# Patient Record
Sex: FEMALE | Race: WHITE | NOT HISPANIC OR LATINO | Employment: OTHER | URBAN - NONMETROPOLITAN AREA
[De-identification: names, ages, dates, MRNs, and addresses within clinical notes are randomized per-mention and may not be internally consistent; named-entity substitution may affect disease eponyms.]

---

## 2020-07-16 ENCOUNTER — HOSPITAL ENCOUNTER (OUTPATIENT)
Dept: MAMMOGRAPHY | Facility: HOSPITAL | Age: 50
Discharge: HOME | End: 2020-07-16
Admitting: NURSE PRACTITIONER

## 2020-07-16 DIAGNOSIS — Z12.31 VISIT FOR SCREENING MAMMOGRAM: ICD-10-CM

## 2020-07-16 PROCEDURE — 77063 BREAST TOMOSYNTHESIS BI: CPT

## 2020-07-16 PROCEDURE — 77067 SCR MAMMO BI INCL CAD: CPT | Performed by: RADIOLOGY

## 2020-07-16 PROCEDURE — 77063 BREAST TOMOSYNTHESIS BI: CPT | Performed by: RADIOLOGY

## 2020-07-16 PROCEDURE — 77067 SCR MAMMO BI INCL CAD: CPT

## 2021-08-25 ENCOUNTER — APPOINTMENT (OUTPATIENT)
Dept: MAMMOGRAPHY | Facility: HOSPITAL | Age: 51
End: 2021-08-25

## 2021-08-25 ENCOUNTER — APPOINTMENT (OUTPATIENT)
Dept: BONE DENSITY | Facility: HOSPITAL | Age: 51
End: 2021-08-25

## 2021-08-27 ENCOUNTER — HOSPITAL ENCOUNTER (EMERGENCY)
Facility: HOSPITAL | Age: 51
Discharge: HOME | End: 2021-08-27
Attending: EMERGENCY MEDICINE | Admitting: EMERGENCY MEDICINE

## 2021-08-27 ENCOUNTER — APPOINTMENT (OUTPATIENT)
Dept: CT IMAGING | Facility: HOSPITAL | Age: 51
End: 2021-08-27

## 2021-08-27 ENCOUNTER — APPOINTMENT (OUTPATIENT)
Dept: GENERAL RADIOLOGY | Facility: HOSPITAL | Age: 51
End: 2021-08-27

## 2021-08-27 VITALS
OXYGEN SATURATION: 95 % | HEIGHT: 67 IN | SYSTOLIC BLOOD PRESSURE: 122 MMHG | DIASTOLIC BLOOD PRESSURE: 80 MMHG | BODY MASS INDEX: 29.82 KG/M2 | HEART RATE: 92 BPM | RESPIRATION RATE: 18 BRPM | WEIGHT: 190 LBS | TEMPERATURE: 97.9 F

## 2021-08-27 DIAGNOSIS — J12.82 PNEUMONIA DUE TO COVID-19 VIRUS: Primary | ICD-10-CM

## 2021-08-27 DIAGNOSIS — U07.1 PNEUMONIA DUE TO COVID-19 VIRUS: Primary | ICD-10-CM

## 2021-08-27 LAB
A-A DO2: 38.3 MMHG (ref 0–300)
ALBUMIN SERPL-MCNC: 4.32 G/DL (ref 3.5–5.2)
ALBUMIN/GLOB SERPL: 1.5 G/DL
ALP SERPL-CCNC: 166 U/L (ref 39–117)
ALT SERPL W P-5'-P-CCNC: 161 U/L (ref 1–33)
ANION GAP SERPL CALCULATED.3IONS-SCNC: 10 MMOL/L (ref 5–15)
APTT PPP: 29.7 SECONDS (ref 25.5–35.4)
ARTERIAL PATENCY WRIST A: ABNORMAL
AST SERPL-CCNC: 140 U/L (ref 1–32)
ATMOSPHERIC PRESS: 731 MMHG
BASE EXCESS BLDA CALC-SCNC: 2.9 MMOL/L (ref 0–2)
BASOPHILS # BLD AUTO: 0 10*3/MM3 (ref 0–0.2)
BASOPHILS NFR BLD AUTO: 0 % (ref 0–1.5)
BDY SITE: ABNORMAL
BILIRUB SERPL-MCNC: 0.5 MG/DL (ref 0–1.2)
BODY TEMPERATURE: 0 C
BUN SERPL-MCNC: 9 MG/DL (ref 6–20)
BUN/CREAT SERPL: 10.5 (ref 7–25)
CALCIUM SPEC-SCNC: 9.1 MG/DL (ref 8.6–10.5)
CHLORIDE SERPL-SCNC: 103 MMOL/L (ref 98–107)
CO2 BLDA-SCNC: 28.1 MMOL/L (ref 22–33)
CO2 SERPL-SCNC: 28 MMOL/L (ref 22–29)
COHGB MFR BLD: 1 % (ref 0–5)
CREAT SERPL-MCNC: 0.86 MG/DL (ref 0.57–1)
CRP SERPL-MCNC: 2.3 MG/DL (ref 0–0.5)
D DIMER PPP FEU-MCNC: <0.27 MCGFEU/ML (ref 0–0.5)
D-LACTATE SERPL-SCNC: 1.2 MMOL/L (ref 0.5–2)
DEPRECATED RDW RBC AUTO: 44 FL (ref 37–54)
EOSINOPHIL # BLD AUTO: 0 10*3/MM3 (ref 0–0.4)
EOSINOPHIL NFR BLD AUTO: 0 % (ref 0.3–6.2)
ERYTHROCYTE [DISTWIDTH] IN BLOOD BY AUTOMATED COUNT: 12.7 % (ref 12.3–15.4)
FERRITIN SERPL-MCNC: 1207 NG/ML (ref 13–150)
GFR SERPL CREATININE-BSD FRML MDRD: 70 ML/MIN/1.73
GLOBULIN UR ELPH-MCNC: 2.9 GM/DL
GLUCOSE SERPL-MCNC: 89 MG/DL (ref 65–99)
HCO3 BLDA-SCNC: 26.9 MMOL/L (ref 20–26)
HCT VFR BLD AUTO: 38.6 % (ref 34–46.6)
HCT VFR BLD CALC: 38.8 % (ref 38–51)
HGB BLD-MCNC: 12.9 G/DL (ref 12–15.9)
HGB BLDA-MCNC: 12.7 G/DL (ref 13.5–17.5)
HOLD SPECIMEN: NORMAL
HOLD SPECIMEN: NORMAL
IMM GRANULOCYTES # BLD AUTO: 0.01 10*3/MM3 (ref 0–0.05)
IMM GRANULOCYTES NFR BLD AUTO: 0.3 % (ref 0–0.5)
INHALED O2 CONCENTRATION: 21 %
INR PPP: 0.87 (ref 0.9–1.1)
LDH SERPL-CCNC: 392 U/L (ref 135–214)
LYMPHOCYTES # BLD AUTO: 0.52 10*3/MM3 (ref 0.7–3.1)
LYMPHOCYTES NFR BLD AUTO: 15.4 % (ref 19.6–45.3)
Lab: ABNORMAL
MAGNESIUM SERPL-MCNC: 2 MG/DL (ref 1.6–2.6)
MCH RBC QN AUTO: 31.4 PG (ref 26.6–33)
MCHC RBC AUTO-ENTMCNC: 33.4 G/DL (ref 31.5–35.7)
MCV RBC AUTO: 93.9 FL (ref 79–97)
METHGB BLD QL: 0.3 % (ref 0–3)
MODALITY: ABNORMAL
MONOCYTES # BLD AUTO: 0.15 10*3/MM3 (ref 0.1–0.9)
MONOCYTES NFR BLD AUTO: 4.5 % (ref 5–12)
NEUTROPHILS NFR BLD AUTO: 2.69 10*3/MM3 (ref 1.7–7)
NEUTROPHILS NFR BLD AUTO: 79.8 % (ref 42.7–76)
NOTE: ABNORMAL
NRBC BLD AUTO-RTO: 0 /100 WBC (ref 0–0.2)
OXYHGB MFR BLDV: 93.1 % (ref 94–99)
PCO2 BLDA: 38.3 MM HG (ref 35–45)
PCO2 TEMP ADJ BLD: ABNORMAL MM[HG]
PH BLDA: 7.46 PH UNITS (ref 7.35–7.45)
PH, TEMP CORRECTED: ABNORMAL
PLATELET # BLD AUTO: 194 10*3/MM3 (ref 140–450)
PMV BLD AUTO: 9.7 FL (ref 6–12)
PO2 BLDA: 62.1 MM HG (ref 83–108)
PO2 TEMP ADJ BLD: ABNORMAL MM[HG]
POTASSIUM SERPL-SCNC: 3.3 MMOL/L (ref 3.5–5.2)
PROCALCITONIN SERPL-MCNC: 0.1 NG/ML (ref 0–0.25)
PROT SERPL-MCNC: 7.2 G/DL (ref 6–8.5)
PROTHROMBIN TIME: 12.2 SECONDS (ref 12.8–14.5)
RBC # BLD AUTO: 4.11 10*6/MM3 (ref 3.77–5.28)
SAO2 % BLDCOA: 94.3 % (ref 94–99)
SODIUM SERPL-SCNC: 141 MMOL/L (ref 136–145)
TROPONIN T SERPL-MCNC: <0.01 NG/ML (ref 0–0.03)
TROPONIN T SERPL-MCNC: <0.01 NG/ML (ref 0–0.03)
VENTILATOR MODE: ABNORMAL
WBC # BLD AUTO: 3.37 10*3/MM3 (ref 3.4–10.8)
WHOLE BLOOD HOLD SPECIMEN: NORMAL
WHOLE BLOOD HOLD SPECIMEN: NORMAL

## 2021-08-27 PROCEDURE — 83615 LACTATE (LD) (LDH) ENZYME: CPT | Performed by: PHYSICIAN ASSISTANT

## 2021-08-27 PROCEDURE — 71046 X-RAY EXAM CHEST 2 VIEWS: CPT

## 2021-08-27 PROCEDURE — 83050 HGB METHEMOGLOBIN QUAN: CPT

## 2021-08-27 PROCEDURE — 25010000002 CEFTRIAXONE PER 250 MG: Performed by: PHYSICIAN ASSISTANT

## 2021-08-27 PROCEDURE — 83605 ASSAY OF LACTIC ACID: CPT | Performed by: PHYSICIAN ASSISTANT

## 2021-08-27 PROCEDURE — 82728 ASSAY OF FERRITIN: CPT | Performed by: PHYSICIAN ASSISTANT

## 2021-08-27 PROCEDURE — 93005 ELECTROCARDIOGRAM TRACING: CPT | Performed by: PHYSICIAN ASSISTANT

## 2021-08-27 PROCEDURE — 25010000006 INJECTION, CASIRIVIMAB AND IMDEVIMAB, 1200 MG: Performed by: PHYSICIAN ASSISTANT

## 2021-08-27 PROCEDURE — 87040 BLOOD CULTURE FOR BACTERIA: CPT | Performed by: PHYSICIAN ASSISTANT

## 2021-08-27 PROCEDURE — 85379 FIBRIN DEGRADATION QUANT: CPT | Performed by: PHYSICIAN ASSISTANT

## 2021-08-27 PROCEDURE — 63710000001 DEXAMETHASONE PER 0.25 MG: Performed by: PHYSICIAN ASSISTANT

## 2021-08-27 PROCEDURE — 71250 CT THORAX DX C-: CPT | Performed by: RADIOLOGY

## 2021-08-27 PROCEDURE — 84145 PROCALCITONIN (PCT): CPT | Performed by: PHYSICIAN ASSISTANT

## 2021-08-27 PROCEDURE — 85730 THROMBOPLASTIN TIME PARTIAL: CPT | Performed by: PHYSICIAN ASSISTANT

## 2021-08-27 PROCEDURE — 82375 ASSAY CARBOXYHB QUANT: CPT

## 2021-08-27 PROCEDURE — 36600 WITHDRAWAL OF ARTERIAL BLOOD: CPT

## 2021-08-27 PROCEDURE — M0243 CASIRIVI AND IMDEVI INFUSION: HCPCS | Performed by: PHYSICIAN ASSISTANT

## 2021-08-27 PROCEDURE — 85025 COMPLETE CBC W/AUTO DIFF WBC: CPT | Performed by: PHYSICIAN ASSISTANT

## 2021-08-27 PROCEDURE — 86140 C-REACTIVE PROTEIN: CPT | Performed by: PHYSICIAN ASSISTANT

## 2021-08-27 PROCEDURE — 96365 THER/PROPH/DIAG IV INF INIT: CPT

## 2021-08-27 PROCEDURE — 71250 CT THORAX DX C-: CPT

## 2021-08-27 PROCEDURE — 82805 BLOOD GASES W/O2 SATURATION: CPT

## 2021-08-27 PROCEDURE — 83735 ASSAY OF MAGNESIUM: CPT | Performed by: PHYSICIAN ASSISTANT

## 2021-08-27 PROCEDURE — 99284 EMERGENCY DEPT VISIT MOD MDM: CPT

## 2021-08-27 PROCEDURE — 85610 PROTHROMBIN TIME: CPT | Performed by: PHYSICIAN ASSISTANT

## 2021-08-27 PROCEDURE — 84484 ASSAY OF TROPONIN QUANT: CPT | Performed by: PHYSICIAN ASSISTANT

## 2021-08-27 PROCEDURE — 71046 X-RAY EXAM CHEST 2 VIEWS: CPT | Performed by: RADIOLOGY

## 2021-08-27 PROCEDURE — 80053 COMPREHEN METABOLIC PANEL: CPT | Performed by: PHYSICIAN ASSISTANT

## 2021-08-27 RX ORDER — SODIUM CHLORIDE 0.9 % (FLUSH) 0.9 %
10 SYRINGE (ML) INJECTION AS NEEDED
Status: DISCONTINUED | OUTPATIENT
Start: 2021-08-27 | End: 2021-08-27 | Stop reason: HOSPADM

## 2021-08-27 RX ORDER — SODIUM CHLORIDE 9 MG/ML
30 INJECTION, SOLUTION INTRAVENOUS ONCE
Status: COMPLETED | OUTPATIENT
Start: 2021-08-27 | End: 2021-08-27

## 2021-08-27 RX ORDER — DEXAMETHASONE 4 MG/1
6 TABLET ORAL ONCE
Status: COMPLETED | OUTPATIENT
Start: 2021-08-27 | End: 2021-08-27

## 2021-08-27 RX ORDER — DEXAMETHASONE 6 MG/1
6 TABLET ORAL DAILY
Qty: 10 TABLET | Refills: 0 | Status: SHIPPED | OUTPATIENT
Start: 2021-08-27 | End: 2021-09-06

## 2021-08-27 RX ORDER — CEFDINIR 300 MG/1
300 CAPSULE ORAL 2 TIMES DAILY
Qty: 20 CAPSULE | Refills: 0 | Status: SHIPPED | OUTPATIENT
Start: 2021-08-27 | End: 2021-09-06

## 2021-08-27 RX ORDER — ALBUTEROL SULFATE 90 UG/1
2 AEROSOL, METERED RESPIRATORY (INHALATION) EVERY 4 HOURS PRN
Qty: 6.7 G | Refills: 0 | Status: SHIPPED | OUTPATIENT
Start: 2021-08-27 | End: 2023-01-09

## 2021-08-27 RX ORDER — EPINEPHRINE 1 MG/ML
0.3 INJECTION, SOLUTION INTRAMUSCULAR; SUBCUTANEOUS ONCE AS NEEDED
Status: DISCONTINUED | OUTPATIENT
Start: 2021-08-27 | End: 2021-08-27 | Stop reason: HOSPADM

## 2021-08-27 RX ORDER — ALBUTEROL SULFATE 90 UG/1
2 AEROSOL, METERED RESPIRATORY (INHALATION) ONCE
Status: COMPLETED | OUTPATIENT
Start: 2021-08-27 | End: 2021-08-27

## 2021-08-27 RX ORDER — METHYLPREDNISOLONE SODIUM SUCCINATE 125 MG/2ML
125 INJECTION, POWDER, LYOPHILIZED, FOR SOLUTION INTRAMUSCULAR; INTRAVENOUS ONCE AS NEEDED
Status: DISCONTINUED | OUTPATIENT
Start: 2021-08-27 | End: 2021-08-27 | Stop reason: HOSPADM

## 2021-08-27 RX ORDER — DIPHENHYDRAMINE HYDROCHLORIDE 50 MG/ML
50 INJECTION INTRAMUSCULAR; INTRAVENOUS ONCE AS NEEDED
Status: DISCONTINUED | OUTPATIENT
Start: 2021-08-27 | End: 2021-08-27 | Stop reason: HOSPADM

## 2021-08-27 RX ADMIN — CEFTRIAXONE 1 G: 1 INJECTION, POWDER, FOR SOLUTION INTRAMUSCULAR; INTRAVENOUS at 14:39

## 2021-08-27 RX ADMIN — ALBUTEROL SULFATE 2 PUFF: 90 AEROSOL, METERED RESPIRATORY (INHALATION) at 14:40

## 2021-08-27 RX ADMIN — DEXAMETHASONE 6 MG: 4 TABLET ORAL at 14:40

## 2021-08-27 RX ADMIN — CASIRIVIMAB AND IMDEVIMAB: 600; 600 INJECTION, SOLUTION, CONCENTRATE INTRAVENOUS at 15:33

## 2021-08-27 RX ADMIN — SODIUM CHLORIDE 30 ML: 9 INJECTION, SOLUTION INTRAVENOUS at 15:54

## 2021-08-28 LAB
QT INTERVAL: 312 MS
QTC INTERVAL: 404 MS

## 2021-09-01 LAB
BACTERIA SPEC AEROBE CULT: NORMAL
BACTERIA SPEC AEROBE CULT: NORMAL

## 2021-09-10 ENCOUNTER — APPOINTMENT (OUTPATIENT)
Dept: CT IMAGING | Facility: HOSPITAL | Age: 51
End: 2021-09-10

## 2021-09-10 ENCOUNTER — HOSPITAL ENCOUNTER (EMERGENCY)
Facility: HOSPITAL | Age: 51
Discharge: HOME | End: 2021-09-10
Attending: EMERGENCY MEDICINE | Admitting: EMERGENCY MEDICINE

## 2021-09-10 ENCOUNTER — APPOINTMENT (OUTPATIENT)
Dept: GENERAL RADIOLOGY | Facility: HOSPITAL | Age: 51
End: 2021-09-10

## 2021-09-10 ENCOUNTER — APPOINTMENT (OUTPATIENT)
Dept: ULTRASOUND IMAGING | Facility: HOSPITAL | Age: 51
End: 2021-09-10

## 2021-09-10 VITALS
WEIGHT: 190 LBS | RESPIRATION RATE: 16 BRPM | TEMPERATURE: 98.6 F | BODY MASS INDEX: 29.82 KG/M2 | OXYGEN SATURATION: 98 % | HEIGHT: 67 IN | HEART RATE: 59 BPM | DIASTOLIC BLOOD PRESSURE: 58 MMHG | SYSTOLIC BLOOD PRESSURE: 92 MMHG

## 2021-09-10 DIAGNOSIS — U07.1 COVID-19 VIRUS INFECTION: Primary | ICD-10-CM

## 2021-09-10 LAB
ALBUMIN SERPL-MCNC: 3.44 G/DL (ref 3.5–5.2)
ALBUMIN/GLOB SERPL: 1.8 G/DL
ALP SERPL-CCNC: 126 U/L (ref 39–117)
ALT SERPL W P-5'-P-CCNC: 206 U/L (ref 1–33)
ANION GAP SERPL CALCULATED.3IONS-SCNC: 10.5 MMOL/L (ref 5–15)
AST SERPL-CCNC: 53 U/L (ref 1–32)
BACTERIA UR QL AUTO: ABNORMAL /HPF
BASOPHILS # BLD AUTO: 0 10*3/MM3 (ref 0–0.2)
BASOPHILS NFR BLD AUTO: 0 % (ref 0–1.5)
BILIRUB SERPL-MCNC: 0.6 MG/DL (ref 0–1.2)
BILIRUB UR QL STRIP: ABNORMAL
BUN SERPL-MCNC: 17 MG/DL (ref 6–20)
BUN/CREAT SERPL: 22.1 (ref 7–25)
CALCIUM SPEC-SCNC: 8.4 MG/DL (ref 8.6–10.5)
CHLORIDE SERPL-SCNC: 106 MMOL/L (ref 98–107)
CLARITY UR: CLEAR
CO2 SERPL-SCNC: 23.5 MMOL/L (ref 22–29)
COD CRY URNS QL: ABNORMAL /HPF
COLOR UR: ABNORMAL
CREAT SERPL-MCNC: 0.77 MG/DL (ref 0.57–1)
CRP SERPL-MCNC: <0.3 MG/DL (ref 0–0.5)
D DIMER PPP FEU-MCNC: 0.9 MCGFEU/ML (ref 0–0.5)
DEPRECATED RDW RBC AUTO: 46 FL (ref 37–54)
EOSINOPHIL # BLD AUTO: 0.03 10*3/MM3 (ref 0–0.4)
EOSINOPHIL NFR BLD AUTO: 0.5 % (ref 0.3–6.2)
ERYTHROCYTE [DISTWIDTH] IN BLOOD BY AUTOMATED COUNT: 13.4 % (ref 12.3–15.4)
FERRITIN SERPL-MCNC: 508.1 NG/ML (ref 13–150)
GFR SERPL CREATININE-BSD FRML MDRD: 79 ML/MIN/1.73
GLOBULIN UR ELPH-MCNC: 2 GM/DL
GLUCOSE BLDC GLUCOMTR-MCNC: 84 MG/DL (ref 70–130)
GLUCOSE SERPL-MCNC: 84 MG/DL (ref 65–99)
GLUCOSE UR STRIP-MCNC: NEGATIVE MG/DL
HCT VFR BLD AUTO: 36.7 % (ref 34–46.6)
HGB BLD-MCNC: 12 G/DL (ref 12–15.9)
HGB UR QL STRIP.AUTO: NEGATIVE
HOLD SPECIMEN: NORMAL
HOLD SPECIMEN: NORMAL
HYALINE CASTS UR QL AUTO: ABNORMAL /LPF
IMM GRANULOCYTES # BLD AUTO: 0.04 10*3/MM3 (ref 0–0.05)
IMM GRANULOCYTES NFR BLD AUTO: 0.6 % (ref 0–0.5)
KETONES UR QL STRIP: ABNORMAL
LDH SERPL-CCNC: 256 U/L (ref 135–214)
LEUKOCYTE ESTERASE UR QL STRIP.AUTO: ABNORMAL
LYMPHOCYTES # BLD AUTO: 1.13 10*3/MM3 (ref 0.7–3.1)
LYMPHOCYTES NFR BLD AUTO: 17.2 % (ref 19.6–45.3)
MAGNESIUM SERPL-MCNC: 2.1 MG/DL (ref 1.6–2.6)
MCH RBC QN AUTO: 31.2 PG (ref 26.6–33)
MCHC RBC AUTO-ENTMCNC: 32.7 G/DL (ref 31.5–35.7)
MCV RBC AUTO: 95.3 FL (ref 79–97)
MONOCYTES # BLD AUTO: 0.46 10*3/MM3 (ref 0.1–0.9)
MONOCYTES NFR BLD AUTO: 7 % (ref 5–12)
NEUTROPHILS NFR BLD AUTO: 4.9 10*3/MM3 (ref 1.7–7)
NEUTROPHILS NFR BLD AUTO: 74.7 % (ref 42.7–76)
NITRITE UR QL STRIP: NEGATIVE
NRBC BLD AUTO-RTO: 0 /100 WBC (ref 0–0.2)
PH UR STRIP.AUTO: 5.5 [PH] (ref 5–8)
PLATELET # BLD AUTO: 222 10*3/MM3 (ref 140–450)
PMV BLD AUTO: 10.5 FL (ref 6–12)
POTASSIUM SERPL-SCNC: 4 MMOL/L (ref 3.5–5.2)
PROT SERPL-MCNC: 5.4 G/DL (ref 6–8.5)
PROT UR QL STRIP: NEGATIVE
RBC # BLD AUTO: 3.85 10*6/MM3 (ref 3.77–5.28)
RBC # UR: ABNORMAL /HPF
REF LAB TEST METHOD: ABNORMAL
SODIUM SERPL-SCNC: 140 MMOL/L (ref 136–145)
SP GR UR STRIP: >1.03 (ref 1–1.03)
SQUAMOUS #/AREA URNS HPF: ABNORMAL /HPF
UROBILINOGEN UR QL STRIP: ABNORMAL
WBC # BLD AUTO: 6.56 10*3/MM3 (ref 3.4–10.8)
WBC UR QL AUTO: ABNORMAL /HPF
WHOLE BLOOD HOLD SPECIMEN: NORMAL
WHOLE BLOOD HOLD SPECIMEN: NORMAL

## 2021-09-10 PROCEDURE — 70450 CT HEAD/BRAIN W/O DYE: CPT

## 2021-09-10 PROCEDURE — 93010 ELECTROCARDIOGRAM REPORT: CPT | Performed by: INTERNAL MEDICINE

## 2021-09-10 PROCEDURE — 85025 COMPLETE CBC W/AUTO DIFF WBC: CPT | Performed by: NURSE PRACTITIONER

## 2021-09-10 PROCEDURE — 80053 COMPREHEN METABOLIC PANEL: CPT | Performed by: NURSE PRACTITIONER

## 2021-09-10 PROCEDURE — 71045 X-RAY EXAM CHEST 1 VIEW: CPT | Performed by: RADIOLOGY

## 2021-09-10 PROCEDURE — 70360 X-RAY EXAM OF NECK: CPT

## 2021-09-10 PROCEDURE — 96374 THER/PROPH/DIAG INJ IV PUSH: CPT

## 2021-09-10 PROCEDURE — 70490 CT SOFT TISSUE NECK W/O DYE: CPT | Performed by: RADIOLOGY

## 2021-09-10 PROCEDURE — 96375 TX/PRO/DX INJ NEW DRUG ADDON: CPT

## 2021-09-10 PROCEDURE — 82962 GLUCOSE BLOOD TEST: CPT

## 2021-09-10 PROCEDURE — 81001 URINALYSIS AUTO W/SCOPE: CPT | Performed by: NURSE PRACTITIONER

## 2021-09-10 PROCEDURE — 83735 ASSAY OF MAGNESIUM: CPT | Performed by: NURSE PRACTITIONER

## 2021-09-10 PROCEDURE — 71045 X-RAY EXAM CHEST 1 VIEW: CPT

## 2021-09-10 PROCEDURE — 70450 CT HEAD/BRAIN W/O DYE: CPT | Performed by: RADIOLOGY

## 2021-09-10 PROCEDURE — 70490 CT SOFT TISSUE NECK W/O DYE: CPT

## 2021-09-10 PROCEDURE — 93005 ELECTROCARDIOGRAM TRACING: CPT | Performed by: NURSE PRACTITIONER

## 2021-09-10 PROCEDURE — 83615 LACTATE (LD) (LDH) ENZYME: CPT | Performed by: NURSE PRACTITIONER

## 2021-09-10 PROCEDURE — 99284 EMERGENCY DEPT VISIT MOD MDM: CPT

## 2021-09-10 PROCEDURE — 86140 C-REACTIVE PROTEIN: CPT | Performed by: NURSE PRACTITIONER

## 2021-09-10 PROCEDURE — 0 IOPAMIDOL PER 1 ML: Performed by: EMERGENCY MEDICINE

## 2021-09-10 PROCEDURE — 82728 ASSAY OF FERRITIN: CPT | Performed by: NURSE PRACTITIONER

## 2021-09-10 PROCEDURE — 25010000002 KETOROLAC TROMETHAMINE PER 15 MG: Performed by: NURSE PRACTITIONER

## 2021-09-10 PROCEDURE — 71275 CT ANGIOGRAPHY CHEST: CPT

## 2021-09-10 PROCEDURE — 85379 FIBRIN DEGRADATION QUANT: CPT | Performed by: NURSE PRACTITIONER

## 2021-09-10 PROCEDURE — 71275 CT ANGIOGRAPHY CHEST: CPT | Performed by: RADIOLOGY

## 2021-09-10 PROCEDURE — 93970 EXTREMITY STUDY: CPT | Performed by: RADIOLOGY

## 2021-09-10 PROCEDURE — 70360 X-RAY EXAM OF NECK: CPT | Performed by: RADIOLOGY

## 2021-09-10 PROCEDURE — P9612 CATHETERIZE FOR URINE SPEC: HCPCS

## 2021-09-10 PROCEDURE — 93970 EXTREMITY STUDY: CPT

## 2021-09-10 RX ORDER — KETOROLAC TROMETHAMINE 30 MG/ML
30 INJECTION, SOLUTION INTRAMUSCULAR; INTRAVENOUS EVERY 6 HOURS PRN
Status: DISCONTINUED | OUTPATIENT
Start: 2021-09-10 | End: 2021-09-10 | Stop reason: HOSPADM

## 2021-09-10 RX ORDER — PANTOPRAZOLE SODIUM 40 MG/10ML
40 INJECTION, POWDER, LYOPHILIZED, FOR SOLUTION INTRAVENOUS ONCE
Status: COMPLETED | OUTPATIENT
Start: 2021-09-10 | End: 2021-09-10

## 2021-09-10 RX ORDER — SODIUM CHLORIDE 0.9 % (FLUSH) 0.9 %
10 SYRINGE (ML) INJECTION AS NEEDED
Status: DISCONTINUED | OUTPATIENT
Start: 2021-09-10 | End: 2021-09-10 | Stop reason: HOSPADM

## 2021-09-10 RX ADMIN — KETOROLAC TROMETHAMINE 30 MG: 30 INJECTION, SOLUTION INTRAMUSCULAR; INTRAVENOUS at 18:53

## 2021-09-10 RX ADMIN — SODIUM CHLORIDE 1000 ML: 9 INJECTION, SOLUTION INTRAVENOUS at 18:53

## 2021-09-10 RX ADMIN — PANTOPRAZOLE SODIUM 40 MG: 40 INJECTION, POWDER, FOR SOLUTION INTRAVENOUS at 18:53

## 2021-09-10 RX ADMIN — IOPAMIDOL 70 ML: 755 INJECTION, SOLUTION INTRAVENOUS at 18:13

## 2021-09-10 NOTE — ED PROVIDER NOTES
Subjective   Patient is a 51  Year old female presenting to the clinic with dizziness. Patient states she was dx with COVID-19 3 weeks ago was given antibodies 2 weeks ago and has been doing great until last night. She was brushing her teeth and the room began to spin. She then started having bilateral leg pain and wheezing in her chest. Denies SOA but reports wheezing. Patient denies chest pain, sob, abd pain/n/v/d or any additional symptoms.           Review of Systems   Constitutional: Negative.    HENT: Negative.    Respiratory: Negative.    Cardiovascular: Negative.    Gastrointestinal: Negative.    Endocrine: Negative.    Genitourinary: Negative.    Musculoskeletal: Positive for myalgias.   Skin: Negative.    Allergic/Immunologic: Negative.    Neurological: Positive for dizziness.   Hematological: Negative.    Psychiatric/Behavioral: Negative.    All other systems reviewed and are negative.      Past Medical History:   Diagnosis Date   • Gallbladder attack    • GERD (gastroesophageal reflux disease)    • History of ear infections        Allergies   Allergen Reactions   • Keflex [Cephalexin] Hives       Past Surgical History:   Procedure Laterality Date   • CHOLECYSTECTOMY     • MIDDLE EAR SURGERY         Family History   Problem Relation Age of Onset   • Cancer Mother    • Cancer Father    • Cancer Sister    • Cancer Brother    • Cancer Maternal Grandmother    • Cancer Maternal Grandfather    • Breast cancer Neg Hx        Social History     Socioeconomic History   • Marital status:      Spouse name: Not on file   • Number of children: Not on file   • Years of education: Not on file   • Highest education level: Not on file   Tobacco Use   • Smoking status: Current Every Day Smoker   Substance and Sexual Activity   • Alcohol use: No   • Drug use: No           Objective   Physical Exam  Vitals and nursing note reviewed.   Constitutional:       Appearance: Normal appearance. She is normal weight.   HENT:       Head: Normocephalic.      Nose: Nose normal.      Mouth/Throat:      Mouth: Mucous membranes are dry.      Pharynx: Oropharynx is clear.   Eyes:      Extraocular Movements: Extraocular movements intact.      Conjunctiva/sclera: Conjunctivae normal.      Pupils: Pupils are equal, round, and reactive to light.   Cardiovascular:      Rate and Rhythm: Normal rate.      Pulses: Normal pulses.      Heart sounds: Normal heart sounds.   Pulmonary:      Effort: Pulmonary effort is normal.      Breath sounds: Normal breath sounds.   Abdominal:      General: Bowel sounds are normal.      Palpations: Abdomen is soft.   Musculoskeletal:         General: Normal range of motion.      Cervical back: Normal range of motion and neck supple.   Skin:     General: Skin is warm and dry.      Capillary Refill: Capillary refill takes less than 2 seconds.   Neurological:      General: No focal deficit present.      Mental Status: She is alert and oriented to person, place, and time. Mental status is at baseline.   Psychiatric:         Mood and Affect: Mood normal.         Behavior: Behavior normal.         Thought Content: Thought content normal.         Judgment: Judgment normal.         Procedures           ED Course  ED Course as of Sep 11 2208   Fri Sep 10, 2021   1823 Patient c/o sensation in her throat that something is stuck. Denies soa or difficulty breathing.     [SM]   2016 Endorsed to ANURAG Altamirano at shift change.     [SM]   2051 Normal sinus rhythm.  Rate 61.  Normal axis.  Normal QT interval.  Q waves in lead III and V1.  Nonspecific ST-T wave changes.  No acute ST elevation or depression.  Abnormal EKG.  Interpreted by me.   ECG 12 Lead [BC]      ED Course User Index  [BC] Pranay Villaseñor MD  [SM] Lavern Manuel, APRN      CT Soft Tissue Neck Without Contrast   Final Result   No foreign bodies identified on today's study        This report was finalized on 9/10/2021 8:17 PM by Dr. Edmond Vasquez MD.          XR Neck  Soft Tissue   Final Result      CT Chest Pulmonary Embolism   Final Result   1. No evidence of a pulmonary embolus   2. Few groundglass opacities bilaterally, possibly representing Covid   pneumonia.       This report was finalized on 9/10/2021 6:17 PM by Dr. Edmond Vasquez MD.          US Venous Doppler Lower Extremity Bilateral (duplex)   Final Result   No DVT in the lower extremities on today's exam.        This report was finalized on 9/10/2021 5:59 PM by Dr. Edmond Vasquez MD.          XR Chest 1 View   Final Result   No evidence of active or acute cardiopulmonary disease on today's chest   radiograph.       This report was finalized on 9/10/2021 4:30 PM by Dr. Edmond Vasquez MD.          CT Head Without Contrast   Final Result   No acute intracranial pathology. Nothing is seen on this exam to   specifically account for the patient's symptoms.       This report was finalized on 9/10/2021 4:16 PM by Dr. Edmond Vasquez MD.                Results for orders placed or performed during the hospital encounter of 09/10/21   Comprehensive Metabolic Panel    Specimen: Blood   Result Value Ref Range    Glucose 84 65 - 99 mg/dL    BUN 17 6 - 20 mg/dL    Creatinine 0.77 0.57 - 1.00 mg/dL    Sodium 140 136 - 145 mmol/L    Potassium 4.0 3.5 - 5.2 mmol/L    Chloride 106 98 - 107 mmol/L    CO2 23.5 22.0 - 29.0 mmol/L    Calcium 8.4 (L) 8.6 - 10.5 mg/dL    Total Protein 5.4 (L) 6.0 - 8.5 g/dL    Albumin 3.44 (L) 3.50 - 5.20 g/dL    ALT (SGPT) 206 (H) 1 - 33 U/L    AST (SGOT) 53 (H) 1 - 32 U/L    Alkaline Phosphatase 126 (H) 39 - 117 U/L    Total Bilirubin 0.6 0.0 - 1.2 mg/dL    eGFR Non African Amer 79 >60 mL/min/1.73    Globulin 2.0 gm/dL    A/G Ratio 1.8 g/dL    BUN/Creatinine Ratio 22.1 7.0 - 25.0    Anion Gap 10.5 5.0 - 15.0 mmol/L   Urinalysis With Microscopic If Indicated (No Culture) - Urine, Catheter    Specimen: Urine, Catheter   Result Value Ref Range    Color, UA Dark Yellow (A) Yellow, Straw    Appearance, UA Clear  Clear    pH, UA 5.5 5.0 - 8.0    Specific Gravity, UA >1.030 (H) 1.005 - 1.030    Glucose, UA Negative Negative    Ketones, UA Trace (A) Negative    Bilirubin, UA Small (1+) (A) Negative    Blood, UA Negative Negative    Protein, UA Negative Negative    Leuk Esterase, UA Trace (A) Negative    Nitrite, UA Negative Negative    Urobilinogen, UA 1.0 E.U./dL 0.2 - 1.0 E.U./dL   CBC Auto Differential    Specimen: Blood   Result Value Ref Range    WBC 6.56 3.40 - 10.80 10*3/mm3    RBC 3.85 3.77 - 5.28 10*6/mm3    Hemoglobin 12.0 12.0 - 15.9 g/dL    Hematocrit 36.7 34.0 - 46.6 %    MCV 95.3 79.0 - 97.0 fL    MCH 31.2 26.6 - 33.0 pg    MCHC 32.7 31.5 - 35.7 g/dL    RDW 13.4 12.3 - 15.4 %    RDW-SD 46.0 37.0 - 54.0 fl    MPV 10.5 6.0 - 12.0 fL    Platelets 222 140 - 450 10*3/mm3    Neutrophil % 74.7 42.7 - 76.0 %    Lymphocyte % 17.2 (L) 19.6 - 45.3 %    Monocyte % 7.0 5.0 - 12.0 %    Eosinophil % 0.5 0.3 - 6.2 %    Basophil % 0.0 0.0 - 1.5 %    Immature Grans % 0.6 (H) 0.0 - 0.5 %    Neutrophils, Absolute 4.90 1.70 - 7.00 10*3/mm3    Lymphocytes, Absolute 1.13 0.70 - 3.10 10*3/mm3    Monocytes, Absolute 0.46 0.10 - 0.90 10*3/mm3    Eosinophils, Absolute 0.03 0.00 - 0.40 10*3/mm3    Basophils, Absolute 0.00 0.00 - 0.20 10*3/mm3    Immature Grans, Absolute 0.04 0.00 - 0.05 10*3/mm3    nRBC 0.0 0.0 - 0.2 /100 WBC   D-dimer, Quantitative    Specimen: Blood   Result Value Ref Range    D-Dimer, Quantitative 0.90 (C) 0.00 - 0.50 MCGFEU/mL   C-reactive Protein    Specimen: Blood   Result Value Ref Range    C-Reactive Protein <0.30 0.00 - 0.50 mg/dL   Ferritin    Specimen: Blood   Result Value Ref Range    Ferritin 508.10 (H) 13.00 - 150.00 ng/mL   Lactate Dehydrogenase    Specimen: Blood   Result Value Ref Range     (H) 135 - 214 U/L   Urinalysis, Microscopic Only - Urine, Catheter    Specimen: Urine, Catheter   Result Value Ref Range    RBC, UA 0-2 None Seen, 0-2 /HPF    WBC, UA 3-5 (A) None Seen, 0-2 /HPF    Bacteria, UA  Trace (A) None Seen /HPF    Squamous Epithelial Cells, UA 13-20 (A) None Seen, 0-2 /HPF    Hyaline Casts, UA None Seen None Seen /LPF    Calcium Oxalate Crystals, UA Small/1+ None Seen /HPF    Methodology Manual Light Microscopy    POC Glucose Once    Specimen: Blood   Result Value Ref Range    Glucose 84 70 - 130 mg/dL   Green Top (Gel)   Result Value Ref Range    Extra Tube Hold for add-ons.    Lavender Top   Result Value Ref Range    Extra Tube hold for add-on    Gold Top - SST   Result Value Ref Range    Extra Tube Hold for add-ons.    Light Blue Top   Result Value Ref Range    Extra Tube hold for add-on                                      MDM    Final diagnoses:   COVID-19 virus infection       ED Disposition  ED Disposition     ED Disposition Condition Comment    Discharge Stable           Vimal Moreno MD  [address] [place] [address] [address] [place] [address] [phone]    Schedule an appointment as soon as possible for a visit in 1 day           Medication List      No changes were made to your prescriptions during this visit.          Lavern Manuel, APRN  09/11/21 5111

## 2021-09-11 LAB
QT INTERVAL: 406 MS
QTC INTERVAL: 408 MS

## 2021-09-11 NOTE — DISCHARGE INSTRUCTIONS
Advised patient to return to the ER with persistent or worsening symptoms. Advised patient to follow up with PCP. Verbalized understanding at this time.

## 2021-11-05 ENCOUNTER — HOSPITAL ENCOUNTER (OUTPATIENT)
Dept: MAMMOGRAPHY | Facility: HOSPITAL | Age: 51
Discharge: HOME | End: 2021-11-05
Admitting: NURSE PRACTITIONER

## 2021-11-05 ENCOUNTER — APPOINTMENT (OUTPATIENT)
Dept: BONE DENSITY | Facility: HOSPITAL | Age: 51
End: 2021-11-05

## 2021-11-05 DIAGNOSIS — Z12.31 VISIT FOR SCREENING MAMMOGRAM: ICD-10-CM

## 2021-11-05 PROCEDURE — 77067 SCR MAMMO BI INCL CAD: CPT | Performed by: RADIOLOGY

## 2021-11-05 PROCEDURE — 77067 SCR MAMMO BI INCL CAD: CPT

## 2021-11-05 PROCEDURE — 77063 BREAST TOMOSYNTHESIS BI: CPT

## 2021-11-05 PROCEDURE — 77063 BREAST TOMOSYNTHESIS BI: CPT | Performed by: RADIOLOGY

## 2022-11-03 ENCOUNTER — TRANSCRIBE ORDERS (OUTPATIENT)
Dept: ADMINISTRATIVE | Facility: HOSPITAL | Age: 52
End: 2022-11-03

## 2022-11-03 DIAGNOSIS — R10.31 ABDOMINAL PAIN, RIGHT LOWER QUADRANT: Primary | ICD-10-CM

## 2022-11-04 ENCOUNTER — HOSPITAL ENCOUNTER (OUTPATIENT)
Dept: ULTRASOUND IMAGING | Facility: HOSPITAL | Age: 52
Discharge: HOME | End: 2022-11-04

## 2022-11-04 DIAGNOSIS — R10.31 ABDOMINAL PAIN, RIGHT LOWER QUADRANT: ICD-10-CM

## 2022-11-04 PROCEDURE — 76700 US EXAM ABDOM COMPLETE: CPT | Performed by: RADIOLOGY

## 2022-11-04 PROCEDURE — 76700 US EXAM ABDOM COMPLETE: CPT

## 2022-11-04 PROCEDURE — 76856 US EXAM PELVIC COMPLETE: CPT

## 2022-11-04 PROCEDURE — 76856 US EXAM PELVIC COMPLETE: CPT | Performed by: RADIOLOGY

## 2022-11-09 ENCOUNTER — HOSPITAL ENCOUNTER (EMERGENCY)
Facility: HOSPITAL | Age: 52
Discharge: HOME | End: 2022-11-09
Attending: STUDENT IN AN ORGANIZED HEALTH CARE EDUCATION/TRAINING PROGRAM | Admitting: STUDENT IN AN ORGANIZED HEALTH CARE EDUCATION/TRAINING PROGRAM

## 2022-11-09 ENCOUNTER — APPOINTMENT (OUTPATIENT)
Dept: GENERAL RADIOLOGY | Facility: HOSPITAL | Age: 52
End: 2022-11-09

## 2022-11-09 ENCOUNTER — APPOINTMENT (OUTPATIENT)
Dept: CT IMAGING | Facility: HOSPITAL | Age: 52
End: 2022-11-09

## 2022-11-09 VITALS
WEIGHT: 194 LBS | DIASTOLIC BLOOD PRESSURE: 71 MMHG | HEIGHT: 67 IN | OXYGEN SATURATION: 98 % | TEMPERATURE: 97.8 F | RESPIRATION RATE: 18 BRPM | BODY MASS INDEX: 30.45 KG/M2 | HEART RATE: 67 BPM | SYSTOLIC BLOOD PRESSURE: 117 MMHG

## 2022-11-09 DIAGNOSIS — K59.00 CONSTIPATION, UNSPECIFIED CONSTIPATION TYPE: Primary | ICD-10-CM

## 2022-11-09 LAB
ALBUMIN SERPL-MCNC: 4.28 G/DL (ref 3.5–5.2)
ALBUMIN/GLOB SERPL: 1.6 G/DL
ALP SERPL-CCNC: 154 U/L (ref 39–117)
ALT SERPL W P-5'-P-CCNC: 16 U/L (ref 1–33)
ANION GAP SERPL CALCULATED.3IONS-SCNC: 9.9 MMOL/L (ref 5–15)
AST SERPL-CCNC: 21 U/L (ref 1–32)
BASOPHILS # BLD AUTO: 0.04 10*3/MM3 (ref 0–0.2)
BASOPHILS NFR BLD AUTO: 0.8 % (ref 0–1.5)
BILIRUB SERPL-MCNC: 0.9 MG/DL (ref 0–1.2)
BILIRUB UR QL STRIP: NEGATIVE
BUN SERPL-MCNC: 5 MG/DL (ref 6–20)
BUN/CREAT SERPL: 5.9 (ref 7–25)
CALCIUM SPEC-SCNC: 9.8 MG/DL (ref 8.6–10.5)
CHLORIDE SERPL-SCNC: 106 MMOL/L (ref 98–107)
CLARITY UR: CLEAR
CO2 SERPL-SCNC: 27.1 MMOL/L (ref 22–29)
COLOR UR: YELLOW
CREAT SERPL-MCNC: 0.85 MG/DL (ref 0.57–1)
CRP SERPL-MCNC: 1.18 MG/DL (ref 0–0.5)
DEPRECATED RDW RBC AUTO: 44.6 FL (ref 37–54)
EGFRCR SERPLBLD CKD-EPI 2021: 83.1 ML/MIN/1.73
EOSINOPHIL # BLD AUTO: 0.03 10*3/MM3 (ref 0–0.4)
EOSINOPHIL NFR BLD AUTO: 0.6 % (ref 0.3–6.2)
ERYTHROCYTE [DISTWIDTH] IN BLOOD BY AUTOMATED COUNT: 12.8 % (ref 12.3–15.4)
GLOBULIN UR ELPH-MCNC: 2.7 GM/DL
GLUCOSE SERPL-MCNC: 102 MG/DL (ref 65–99)
GLUCOSE UR STRIP-MCNC: NEGATIVE MG/DL
HCT VFR BLD AUTO: 37.1 % (ref 34–46.6)
HGB BLD-MCNC: 12.3 G/DL (ref 12–15.9)
HGB UR QL STRIP.AUTO: NEGATIVE
HOLD SPECIMEN: NORMAL
HOLD SPECIMEN: NORMAL
IMM GRANULOCYTES # BLD AUTO: 0 10*3/MM3 (ref 0–0.05)
IMM GRANULOCYTES NFR BLD AUTO: 0 % (ref 0–0.5)
KETONES UR QL STRIP: NEGATIVE
LEUKOCYTE ESTERASE UR QL STRIP.AUTO: NEGATIVE
LIPASE SERPL-CCNC: 19 U/L (ref 13–60)
LYMPHOCYTES # BLD AUTO: 0.79 10*3/MM3 (ref 0.7–3.1)
LYMPHOCYTES NFR BLD AUTO: 16.6 % (ref 19.6–45.3)
MCH RBC QN AUTO: 31.6 PG (ref 26.6–33)
MCHC RBC AUTO-ENTMCNC: 33.2 G/DL (ref 31.5–35.7)
MCV RBC AUTO: 95.4 FL (ref 79–97)
MONOCYTES # BLD AUTO: 0.25 10*3/MM3 (ref 0.1–0.9)
MONOCYTES NFR BLD AUTO: 5.3 % (ref 5–12)
NEUTROPHILS NFR BLD AUTO: 3.65 10*3/MM3 (ref 1.7–7)
NEUTROPHILS NFR BLD AUTO: 76.7 % (ref 42.7–76)
NITRITE UR QL STRIP: NEGATIVE
NRBC BLD AUTO-RTO: 0 /100 WBC (ref 0–0.2)
PH UR STRIP.AUTO: 7.5 [PH] (ref 5–8)
PLATELET # BLD AUTO: 225 10*3/MM3 (ref 140–450)
PMV BLD AUTO: 10.1 FL (ref 6–12)
POTASSIUM SERPL-SCNC: 4.1 MMOL/L (ref 3.5–5.2)
PROT SERPL-MCNC: 7 G/DL (ref 6–8.5)
PROT UR QL STRIP: NEGATIVE
RBC # BLD AUTO: 3.89 10*6/MM3 (ref 3.77–5.28)
SODIUM SERPL-SCNC: 143 MMOL/L (ref 136–145)
SP GR UR STRIP: <=1.005 (ref 1–1.03)
UROBILINOGEN UR QL STRIP: NORMAL
WBC NRBC COR # BLD: 4.76 10*3/MM3 (ref 3.4–10.8)
WHOLE BLOOD HOLD COAG: NORMAL
WHOLE BLOOD HOLD SPECIMEN: NORMAL

## 2022-11-09 PROCEDURE — 99284 EMERGENCY DEPT VISIT MOD MDM: CPT

## 2022-11-09 PROCEDURE — 81003 URINALYSIS AUTO W/O SCOPE: CPT | Performed by: PHYSICIAN ASSISTANT

## 2022-11-09 PROCEDURE — 99283 EMERGENCY DEPT VISIT LOW MDM: CPT

## 2022-11-09 PROCEDURE — 74018 RADEX ABDOMEN 1 VIEW: CPT | Performed by: RADIOLOGY

## 2022-11-09 PROCEDURE — 74018 RADEX ABDOMEN 1 VIEW: CPT

## 2022-11-09 PROCEDURE — 74177 CT ABD & PELVIS W/CONTRAST: CPT | Performed by: RADIOLOGY

## 2022-11-09 PROCEDURE — 83690 ASSAY OF LIPASE: CPT | Performed by: PHYSICIAN ASSISTANT

## 2022-11-09 PROCEDURE — 86140 C-REACTIVE PROTEIN: CPT | Performed by: PHYSICIAN ASSISTANT

## 2022-11-09 PROCEDURE — 74177 CT ABD & PELVIS W/CONTRAST: CPT

## 2022-11-09 PROCEDURE — 36415 COLL VENOUS BLD VENIPUNCTURE: CPT

## 2022-11-09 PROCEDURE — 80053 COMPREHEN METABOLIC PANEL: CPT | Performed by: PHYSICIAN ASSISTANT

## 2022-11-09 PROCEDURE — 85025 COMPLETE CBC W/AUTO DIFF WBC: CPT | Performed by: PHYSICIAN ASSISTANT

## 2022-11-09 PROCEDURE — 25010000002 IOPAMIDOL 61 % SOLUTION: Performed by: STUDENT IN AN ORGANIZED HEALTH CARE EDUCATION/TRAINING PROGRAM

## 2022-11-09 RX ORDER — POLYETHYLENE GLYCOL 3350 17 G/17G
17 POWDER, FOR SOLUTION ORAL DAILY PRN
Qty: 510 G | Refills: 0 | Status: ON HOLD | OUTPATIENT
Start: 2022-11-09 | End: 2023-03-16 | Stop reason: SDUPTHER

## 2022-11-09 RX ADMIN — IOPAMIDOL 100 ML: 612 INJECTION, SOLUTION INTRAVENOUS at 16:33

## 2022-11-09 NOTE — ED NOTES
MEDICAL SCREENING:    Reason for Visit: abdominal pain, nausea, and vomiting x 2 months. Recent US which was negative for any acute pathology. Sent by PCP for further evaluation.    Patient initially seen in triage.  The patient was advised further evaluation and diagnostic testing will be needed, some of the treatment and testing will be initiated in the lobby in order to begin the process.  The patient will be returned to the waiting area for the time being and possibly be re-assessed by a subsequent ED provider.  The patient will be brought back to the treatment area in as timely manner as possible.         Jenny Arriola PA-C  11/09/22 1257

## 2022-11-09 NOTE — ED PROVIDER NOTES
Subjective     Abdominal Pain  Pain location:  Generalized  Pain quality: aching    Pain radiates to:  Does not radiate  Pain severity:  Moderate  Onset quality:  Sudden  Duration:  1 day  Timing:  Constant  Progression:  Worsening  Chronicity:  New  Context: not awakening from sleep, not diet changes, not previous surgeries, not recent sexual activity and not suspicious food intake    Relieved by:  Nothing  Worsened by:  Nothing  Ineffective treatments:  None tried  Associated symptoms: nausea and vomiting    Associated symptoms: no anorexia, no chest pain, no constipation, no fever and no hematemesis    Risk factors: no alcohol abuse, not obese and no recent hospitalization        Review of Systems   Constitutional: Negative.  Negative for fever.   HENT: Negative.    Eyes: Negative.    Respiratory: Negative.    Cardiovascular: Negative.  Negative for chest pain.   Gastrointestinal: Positive for abdominal pain, nausea and vomiting. Negative for anorexia, constipation and hematemesis.   Endocrine: Negative.    Genitourinary: Negative.    Musculoskeletal: Negative.    Skin: Negative.    Allergic/Immunologic: Negative.    Neurological: Negative.    Hematological: Negative.    Psychiatric/Behavioral: Negative.        Past Medical History:   Diagnosis Date   • Gallbladder attack    • GERD (gastroesophageal reflux disease)    • History of ear infections        Allergies   Allergen Reactions   • Keflex [Cephalexin] Hives       Past Surgical History:   Procedure Laterality Date   • CHOLECYSTECTOMY     • MIDDLE EAR SURGERY         Family History   Problem Relation Age of Onset   • Cancer Mother    • Cancer Father    • Cancer Sister    • Cancer Brother    • Cancer Maternal Grandmother    • Cancer Maternal Grandfather    • Breast cancer Neg Hx        Social History     Socioeconomic History   • Marital status:    Tobacco Use   • Smoking status: Every Day   Substance and Sexual Activity   • Alcohol use: No   • Drug use:  No           Objective   Physical Exam  Vitals and nursing note reviewed.   Constitutional:       Appearance: She is well-developed.   HENT:      Head: Normocephalic.      Right Ear: External ear normal.      Left Ear: External ear normal.   Eyes:      Conjunctiva/sclera: Conjunctivae normal.      Pupils: Pupils are equal, round, and reactive to light.   Cardiovascular:      Rate and Rhythm: Normal rate and regular rhythm.      Heart sounds: Normal heart sounds.   Pulmonary:      Effort: Pulmonary effort is normal.      Breath sounds: Normal breath sounds.   Abdominal:      General: Bowel sounds are normal.      Palpations: Abdomen is soft.      Tenderness: There is abdominal tenderness.   Musculoskeletal:         General: Normal range of motion.      Cervical back: Normal range of motion and neck supple.   Skin:     General: Skin is warm and dry.      Capillary Refill: Capillary refill takes less than 2 seconds.   Neurological:      Mental Status: She is alert and oriented to person, place, and time.   Psychiatric:         Behavior: Behavior normal.         Thought Content: Thought content normal.         Procedures           ED Course                                           MDM    Final diagnoses:   Constipation, unspecified constipation type       ED Disposition  ED Disposition     ED Disposition   Discharge    Condition   Stable    Comment   --             Vimal Moreno MD  [address] [place] [address] [address] [place] [address] [phone]    Schedule an appointment as soon as possible for a visit   For further evaluation         Medication List      New Prescriptions    magnesium citrate solution  Take 296 mL by mouth 1 (One) Time for 1 dose.     polyethylene glycol 17 GM/SCOOP powder  Commonly known as: MIRALAX  Take 17 g by mouth Daily As Needed (constipation).           Where to Get Your Medications      You can get these medications from any pharmacy    Bring a paper prescription for each of these  medications  · magnesium citrate solution  · polyethylene glycol 17 GM/SCOOP powder          Niranjan Leon, APRN  11/09/22 1830

## 2023-01-09 ENCOUNTER — OFFICE VISIT (OUTPATIENT)
Dept: GASTROENTEROLOGY | Facility: CLINIC | Age: 53
End: 2023-01-09
Payer: COMMERCIAL

## 2023-01-09 VITALS — BODY MASS INDEX: 30.04 KG/M2 | WEIGHT: 191.4 LBS | HEIGHT: 67 IN

## 2023-01-09 DIAGNOSIS — R13.19 ESOPHAGEAL DYSPHAGIA: ICD-10-CM

## 2023-01-09 DIAGNOSIS — Z12.11 ENCOUNTER FOR SCREENING FOR MALIGNANT NEOPLASM OF COLON: Primary | ICD-10-CM

## 2023-01-09 DIAGNOSIS — K59.04 CHRONIC IDIOPATHIC CONSTIPATION: ICD-10-CM

## 2023-01-09 PROCEDURE — 99214 OFFICE O/P EST MOD 30 MIN: CPT | Performed by: PHYSICIAN ASSISTANT

## 2023-01-09 RX ORDER — LORATADINE 10 MG/1
CAPSULE, LIQUID FILLED ORAL AS NEEDED
COMMUNITY

## 2023-01-09 RX ORDER — BISACODYL 5 MG/1
20 TABLET, DELAYED RELEASE ORAL ONCE
Qty: 4 TABLET | Refills: 0 | Status: SHIPPED | OUTPATIENT
Start: 2023-01-09 | End: 2023-01-09

## 2023-01-09 RX ORDER — PANTOPRAZOLE SODIUM 40 MG/1
40 TABLET, DELAYED RELEASE ORAL DAILY
COMMUNITY
Start: 2023-01-03 | End: 2023-02-15 | Stop reason: HOSPADM

## 2023-01-09 RX ORDER — POLYETHYLENE GLYCOL 3350 17 G/17G
510 POWDER, FOR SOLUTION ORAL TAKE AS DIRECTED
Qty: 510 G | Refills: 0 | Status: SHIPPED | OUTPATIENT
Start: 2023-01-09 | End: 2023-02-15 | Stop reason: HOSPADM

## 2023-01-09 NOTE — PROGRESS NOTES
Chief Complaint   Patient presents with   • Difficulty Swallowing       Vangie Carney is a 52 y.o. female who presents to the office today for evaluation of Difficulty Swallowing      HPI    The patient is a new patient presenting for difficulty swallowing.The patient reports that she has had heartburn for a long time, but it has improved with medication management. She states that she does drinks coffee and Sprite often. The patient reports that she has stopped eating onions.  Her difficulty swallowing only occurs intermittently and is mainly to solids.  The patient reports that she is having stomach issues. She reports epigastric pain intermittently resulting in a bowel movement. She states that the epigastric pain will intermittently persist without a bowel movement and she will become nauseated. She states that the nausea has become so severe that she will occasionally vomit. She states that she always takes stool softeners, so the stool is not firm. The patient reports intermittent episodes of diarrhea. She states that sometimes she feels like she empties out pretty well. The patient reports that she has always had issues with constipation.  The patient reports that when she was a child, she would go 30 days without a bowel movement. She states that as she got older, it improved. The patient reports that when she started going through menopause, it got better. She states that she takes MiraLAX, but not daily.  The patient reports that she has never had a colonoscopy. She denies any family history of colon cancer.  Review of Systems   Constitutional: Negative for fever.   HENT: Positive for trouble swallowing.    Eyes: Negative.    Respiratory: Negative.    Cardiovascular: Negative.    Gastrointestinal: Positive for abdominal distention, abdominal pain, anal bleeding, blood in stool, constipation, diarrhea, nausea and vomiting. Negative for rectal pain.   Endocrine: Negative.    Genitourinary: Negative.   "  Musculoskeletal: Negative.    Skin: Negative.    Allergic/Immunologic: Negative.    Neurological: Negative.    Hematological: Negative.    Psychiatric/Behavioral: Negative.        ACTIVE PROBLEMS:   Specialty Problems    None      PAST MEDICAL HISTORY:  Past Medical History:   Diagnosis Date   • Gallbladder attack    • GERD (gastroesophageal reflux disease)    • History of ear infections        SURGICAL HISTORY:  Past Surgical History:   Procedure Laterality Date   • CHOLECYSTECTOMY     • MIDDLE EAR SURGERY         FAMILY HISTORY:  Family History   Problem Relation Age of Onset   • Cancer Mother    • Cancer Father    • Cancer Sister    • Cancer Brother    • Cancer Maternal Grandmother    • Cancer Maternal Grandfather    • Breast cancer Neg Hx        SOCIAL HISTORY:  Social History     Tobacco Use   • Smoking status: Every Day   • Smokeless tobacco: Not on file   Substance Use Topics   • Alcohol use: No       CURRENT MEDICATION:    Current Outpatient Medications:   •  Lactobacillus (DIGESTIVE HEALTH PROBIOTIC PO), Take  by mouth., Disp: , Rfl:   •  Loratadine (Claritin) 10 MG capsule, Take  by mouth., Disp: , Rfl:   •  pantoprazole (PROTONIX) 40 MG EC tablet, Take 40 mg by mouth Daily., Disp: , Rfl:   •  polyethylene glycol (MIRALAX) 17 GM/SCOOP powder, Take 17 g by mouth Daily As Needed (constipation)., Disp: 510 g, Rfl: 0  •  polyethylene glycol (MIRALAX) 17 GM/SCOOP powder, Take 510 g by mouth Take As Directed. Place 15 cap fulls of powder in 32 oz of liquid of choice at 4:00 and 10:00 PM, Disp: 510 g, Rfl: 0    ALLERGIES:  Keflex [cephalexin]    VISIT VITALS:  Ht 170.2 cm (67\")   Wt 86.8 kg (191 lb 6.4 oz)   LMP 06/15/2016   BMI 29.98 kg/m²   Physical Exam  Constitutional:       General: She is not in acute distress.     Appearance: Normal appearance. She is well-developed.   HENT:      Head: Normocephalic and atraumatic.   Eyes:      Pupils: Pupils are equal, round, and reactive to light.   Cardiovascular: "      Rate and Rhythm: Normal rate and regular rhythm.      Heart sounds: Normal heart sounds.   Pulmonary:      Effort: Pulmonary effort is normal. No respiratory distress.      Breath sounds: Normal breath sounds. No wheezing, rhonchi or rales.   Abdominal:      General: Abdomen is flat. Bowel sounds are normal. There is no distension.      Palpations: Abdomen is soft. There is no mass.      Tenderness: There is no abdominal tenderness. There is no guarding or rebound.      Hernia: No hernia is present.   Musculoskeletal:         General: No swelling. Normal range of motion.      Cervical back: Normal range of motion and neck supple.      Right lower leg: No edema.      Left lower leg: No edema.   Skin:     General: Skin is warm and dry.   Neurological:      Mental Status: She is alert and oriented to person, place, and time.   Psychiatric:         Attention and Perception: Attention normal.         Mood and Affect: Mood normal.         Speech: Speech normal.         Behavior: Behavior normal. Behavior is cooperative.         Thought Content: Thought content normal.       Assessment    1. Constipation  - The patient will come by the office on 01/10/2023 to get samples of Trulance.     2. Dysphagia  - An order for an esophagogastroduodenoscopy has been placed.      3. Colon cancer screening  - An order for a colonoscopy was placed.   - A prescription for the MiraLAX was sent to the patients preferred pharmacy. The patient was provided with the colon prep instructions.      Diagnosis Plan   1. Encounter for screening for malignant neoplasm of colon  Case Request    Follow Anesthesia Guidelines / Protocol    Obtain Informed Consent    Case Request    bisacodyl (DULCOLAX) 5 MG EC tablet    polyethylene glycol (MIRALAX) 17 GM/SCOOP powder      2. Chronic idiopathic constipation  Case Request    Follow Anesthesia Guidelines / Protocol    Obtain Informed Consent    Case Request    bisacodyl (DULCOLAX) 5 MG EC tablet     polyethylene glycol (MIRALAX) 17 GM/SCOOP powder      3. Esophageal dysphagia  bisacodyl (DULCOLAX) 5 MG EC tablet    polyethylene glycol (MIRALAX) 17 GM/SCOOP powder          Return for after procedure follow-up.               This document has been electronically signed by Tim Monsivais PA-C  January 12, 2023 08:36 EST    Part of this note may be an electronic transcription/translation of spoken language to printed text using the Dragon Dictation System.    Transcribed from ambient dictation for Tim Monsivais PA-C by Deborah Cox.  01/09/23   12:01 EST    Patient or patient representative verbalized consent to the visit recording.  I have personally performed the services described in this document as transcribed by the above individual, and it is both accurate and complete.

## 2023-01-16 ENCOUNTER — TELEPHONE (OUTPATIENT)
Dept: GASTROENTEROLOGY | Facility: CLINIC | Age: 53
End: 2023-01-16
Payer: COMMERCIAL

## 2023-01-16 NOTE — TELEPHONE ENCOUNTER
----- Message from Tim Monsivais PA-C sent at 1/12/2023  8:36 AM EST -----  Can you let patient know that we have Trulance samples and will leave her a few boxes at the window

## 2023-02-02 PROBLEM — Z12.11 ENCOUNTER FOR SCREENING FOR MALIGNANT NEOPLASM OF COLON: Status: ACTIVE | Noted: 2023-02-02

## 2023-02-02 PROBLEM — K59.04 CHRONIC IDIOPATHIC CONSTIPATION: Status: ACTIVE | Noted: 2023-02-02

## 2023-02-15 ENCOUNTER — HOSPITAL ENCOUNTER (OUTPATIENT)
Facility: HOSPITAL | Age: 53
Setting detail: HOSPITAL OUTPATIENT SURGERY
Discharge: HOME | End: 2023-02-15
Attending: INTERNAL MEDICINE | Admitting: INTERNAL MEDICINE
Payer: COMMERCIAL

## 2023-02-15 ENCOUNTER — ANESTHESIA EVENT (OUTPATIENT)
Dept: PERIOP | Facility: HOSPITAL | Age: 53
End: 2023-02-15
Payer: COMMERCIAL

## 2023-02-15 ENCOUNTER — ANESTHESIA (OUTPATIENT)
Dept: PERIOP | Facility: HOSPITAL | Age: 53
End: 2023-02-15
Payer: COMMERCIAL

## 2023-02-15 ENCOUNTER — APPOINTMENT (OUTPATIENT)
Dept: CT IMAGING | Facility: HOSPITAL | Age: 53
End: 2023-02-15
Payer: COMMERCIAL

## 2023-02-15 VITALS
DIASTOLIC BLOOD PRESSURE: 74 MMHG | BODY MASS INDEX: 29.98 KG/M2 | RESPIRATION RATE: 20 BRPM | HEART RATE: 62 BPM | WEIGHT: 191 LBS | HEIGHT: 67 IN | TEMPERATURE: 97.5 F | OXYGEN SATURATION: 98 % | SYSTOLIC BLOOD PRESSURE: 122 MMHG

## 2023-02-15 DIAGNOSIS — K63.89 COLONIC MASS: Primary | ICD-10-CM

## 2023-02-15 DIAGNOSIS — Z12.11 ENCOUNTER FOR SCREENING FOR MALIGNANT NEOPLASM OF COLON: ICD-10-CM

## 2023-02-15 DIAGNOSIS — K59.04 CHRONIC IDIOPATHIC CONSTIPATION: ICD-10-CM

## 2023-02-15 PROCEDURE — 85025 COMPLETE CBC W/AUTO DIFF WBC: CPT | Performed by: INTERNAL MEDICINE

## 2023-02-15 PROCEDURE — 88305 TISSUE EXAM BY PATHOLOGIST: CPT

## 2023-02-15 PROCEDURE — 36415 COLL VENOUS BLD VENIPUNCTURE: CPT | Performed by: INTERNAL MEDICINE

## 2023-02-15 PROCEDURE — 45380 COLONOSCOPY AND BIOPSY: CPT | Performed by: INTERNAL MEDICINE

## 2023-02-15 PROCEDURE — 80053 COMPREHEN METABOLIC PANEL: CPT | Performed by: INTERNAL MEDICINE

## 2023-02-15 PROCEDURE — 88342 IMHCHEM/IMCYTCHM 1ST ANTB: CPT

## 2023-02-15 PROCEDURE — C1726 CATH, BAL DIL, NON-VASCULAR: HCPCS | Performed by: INTERNAL MEDICINE

## 2023-02-15 PROCEDURE — 45381 COLONOSCOPY SUBMUCOUS NJX: CPT | Performed by: INTERNAL MEDICINE

## 2023-02-15 PROCEDURE — 82378 CARCINOEMBRYONIC ANTIGEN: CPT | Performed by: INTERNAL MEDICINE

## 2023-02-15 PROCEDURE — 88341 IMHCHEM/IMCYTCHM EA ADD ANTB: CPT

## 2023-02-15 PROCEDURE — 43239 EGD BIOPSY SINGLE/MULTIPLE: CPT | Performed by: INTERNAL MEDICINE

## 2023-02-15 PROCEDURE — 25010000002 FENTANYL CITRATE (PF) 50 MCG/ML SOLUTION: Performed by: NURSE ANESTHETIST, CERTIFIED REGISTERED

## 2023-02-15 PROCEDURE — 43249 ESOPH EGD DILATION <30 MM: CPT | Performed by: INTERNAL MEDICINE

## 2023-02-15 PROCEDURE — 74176 CT ABD & PELVIS W/O CONTRAST: CPT | Performed by: RADIOLOGY

## 2023-02-15 PROCEDURE — 74176 CT ABD & PELVIS W/O CONTRAST: CPT

## 2023-02-15 PROCEDURE — 25010000002 PROPOFOL 10 MG/ML EMULSION: Performed by: NURSE ANESTHETIST, CERTIFIED REGISTERED

## 2023-02-15 RX ORDER — SODIUM CHLORIDE 0.9 % (FLUSH) 0.9 %
10 SYRINGE (ML) INJECTION EVERY 12 HOURS SCHEDULED
Status: DISCONTINUED | OUTPATIENT
Start: 2023-02-15 | End: 2023-02-15 | Stop reason: HOSPADM

## 2023-02-15 RX ORDER — PROPOFOL 10 MG/ML
VIAL (ML) INTRAVENOUS AS NEEDED
Status: DISCONTINUED | OUTPATIENT
Start: 2023-02-15 | End: 2023-02-15 | Stop reason: SURG

## 2023-02-15 RX ORDER — SODIUM CHLORIDE, SODIUM LACTATE, POTASSIUM CHLORIDE, CALCIUM CHLORIDE 600; 310; 30; 20 MG/100ML; MG/100ML; MG/100ML; MG/100ML
100 INJECTION, SOLUTION INTRAVENOUS ONCE AS NEEDED
Status: DISCONTINUED | OUTPATIENT
Start: 2023-02-15 | End: 2023-02-15 | Stop reason: HOSPADM

## 2023-02-15 RX ORDER — IPRATROPIUM BROMIDE AND ALBUTEROL SULFATE 2.5; .5 MG/3ML; MG/3ML
3 SOLUTION RESPIRATORY (INHALATION) ONCE AS NEEDED
Status: DISCONTINUED | OUTPATIENT
Start: 2023-02-15 | End: 2023-02-15 | Stop reason: HOSPADM

## 2023-02-15 RX ORDER — LIDOCAINE HYDROCHLORIDE 20 MG/ML
INJECTION, SOLUTION EPIDURAL; INFILTRATION; INTRACAUDAL; PERINEURAL AS NEEDED
Status: DISCONTINUED | OUTPATIENT
Start: 2023-02-15 | End: 2023-02-15 | Stop reason: SURG

## 2023-02-15 RX ORDER — SODIUM CHLORIDE, SODIUM LACTATE, POTASSIUM CHLORIDE, CALCIUM CHLORIDE 600; 310; 30; 20 MG/100ML; MG/100ML; MG/100ML; MG/100ML
125 INJECTION, SOLUTION INTRAVENOUS ONCE
Status: COMPLETED | OUTPATIENT
Start: 2023-02-15 | End: 2023-02-15

## 2023-02-15 RX ORDER — MIDAZOLAM HYDROCHLORIDE 1 MG/ML
1 INJECTION INTRAMUSCULAR; INTRAVENOUS
Status: DISCONTINUED | OUTPATIENT
Start: 2023-02-15 | End: 2023-02-15 | Stop reason: HOSPADM

## 2023-02-15 RX ORDER — DEXLANSOPRAZOLE 60 MG/1
60 CAPSULE, DELAYED RELEASE ORAL DAILY
Qty: 30 CAPSULE | Refills: 5 | Status: SHIPPED | OUTPATIENT
Start: 2023-02-15 | End: 2023-03-13

## 2023-02-15 RX ORDER — SODIUM CHLORIDE 0.9 % (FLUSH) 0.9 %
10 SYRINGE (ML) INJECTION AS NEEDED
Status: DISCONTINUED | OUTPATIENT
Start: 2023-02-15 | End: 2023-02-15 | Stop reason: HOSPADM

## 2023-02-15 RX ORDER — FENTANYL CITRATE 50 UG/ML
50 INJECTION, SOLUTION INTRAMUSCULAR; INTRAVENOUS
Status: DISCONTINUED | OUTPATIENT
Start: 2023-02-15 | End: 2023-02-15 | Stop reason: HOSPADM

## 2023-02-15 RX ORDER — SODIUM CHLORIDE 9 MG/ML
40 INJECTION, SOLUTION INTRAVENOUS AS NEEDED
Status: DISCONTINUED | OUTPATIENT
Start: 2023-02-15 | End: 2023-02-15 | Stop reason: HOSPADM

## 2023-02-15 RX ORDER — FENTANYL CITRATE 50 UG/ML
INJECTION, SOLUTION INTRAMUSCULAR; INTRAVENOUS AS NEEDED
Status: DISCONTINUED | OUTPATIENT
Start: 2023-02-15 | End: 2023-02-15 | Stop reason: SURG

## 2023-02-15 RX ORDER — ONDANSETRON 2 MG/ML
4 INJECTION INTRAMUSCULAR; INTRAVENOUS AS NEEDED
Status: DISCONTINUED | OUTPATIENT
Start: 2023-02-15 | End: 2023-02-15 | Stop reason: HOSPADM

## 2023-02-15 RX ADMIN — SODIUM CHLORIDE, POTASSIUM CHLORIDE, SODIUM LACTATE AND CALCIUM CHLORIDE: 600; 310; 30; 20 INJECTION, SOLUTION INTRAVENOUS at 11:49

## 2023-02-15 RX ADMIN — PROPOFOL 70 MG: 10 INJECTION, EMULSION INTRAVENOUS at 11:51

## 2023-02-15 RX ADMIN — PROPOFOL 100 MG: 10 INJECTION, EMULSION INTRAVENOUS at 12:11

## 2023-02-15 RX ADMIN — PROPOFOL 70 MG: 10 INJECTION, EMULSION INTRAVENOUS at 11:56

## 2023-02-15 RX ADMIN — FENTANYL CITRATE 100 MCG: 50 INJECTION INTRAMUSCULAR; INTRAVENOUS at 11:49

## 2023-02-15 RX ADMIN — PROPOFOL 30 MG: 10 INJECTION, EMULSION INTRAVENOUS at 12:01

## 2023-02-15 RX ADMIN — PROPOFOL 100 MG: 10 INJECTION, EMULSION INTRAVENOUS at 12:06

## 2023-02-15 RX ADMIN — LIDOCAINE HYDROCHLORIDE 100 MG: 20 INJECTION, SOLUTION EPIDURAL; INFILTRATION; INTRACAUDAL; PERINEURAL at 11:49

## 2023-02-15 NOTE — ANESTHESIA PREPROCEDURE EVALUATION
Anesthesia Evaluation     Patient summary reviewed and Nursing notes reviewed   no history of anesthetic complications:  NPO Solid Status: > 8 hours  NPO Liquid Status: > 8 hours           Airway   Mallampati: I  TM distance: >3 FB  Dental    (+) poor dentition        Pulmonary - negative pulmonary ROS    breath sounds clear to auscultation  Cardiovascular   Exercise tolerance: good (4-7 METS)    Rhythm: regular  Rate: normal        Neuro/Psych- negative ROS  GI/Hepatic/Renal/Endo    (+)  GERD,      Musculoskeletal (-) negative ROS    Abdominal     Abdomen: soft.   Substance History - negative use     OB/GYN negative ob/gyn ROS         Other - negative ROS                       Anesthesia Plan    ASA 2     general     intravenous induction     Anesthetic plan, risks, benefits, and alternatives have been provided, discussed and informed consent has been obtained with: patient.    Use of blood products discussed with consented to blood products.   Plan discussed with CRNA.        CODE STATUS:

## 2023-02-15 NOTE — H&P
[facility]    Patient Identification:  Name:  Vangie Carney  Age:  52 y.o.  Sex:  female  :  1970  MRN:  [id number]   Visit Number:  [id number]  Primary Care Physician:  Vimal Moreno MD       Chief complaint: Dysphagia (solids), Constipation     History of presenting illness:  52 y.o. female who presents today for colonoscopy and EGD. Ms. Carney reports chronic difficulty with acid reflux. She is currently taking Protonix 40 mg once daily but continues with frequent episodes of burning in her esophagus. She denies having epigastric pain. She continues to have occasional dysphagia with solids. She is s/p cholecystectomy. She reports chronic difficulty with constipation. She is currently using Miralax daily with some improvement. She was previously given samples of Trulance but she says she did not take it as her constipation had improved. She reports occasional nausea when she is constipated but currently denies. She reports intermittent BRBPR but denies having this recently. Denies melena. Denies family history of colon cancer. She has no other complaints today. Of note, she had CT A/P done on 22 which showed focal wall thickening of the right colon without inflammatory change and lower endoscopy was recommended.     Review of Systems   Constitutional: Negative.    HENT: Positive for trouble swallowing.    Eyes: Negative.    Respiratory: Negative.    Cardiovascular: Negative.    Gastrointestinal: Positive for anal bleeding, constipation and nausea.   Endocrine: Negative.    Genitourinary: Negative.    Musculoskeletal: Negative.    Skin: Negative.    Neurological: Negative.    Hematological: Negative.    Psychiatric/Behavioral: Negative.         Past Medical History:   Diagnosis Date   • Gallbladder attack    • GERD (gastroesophageal reflux disease)    • Hearing aid worn    • History of ear infections      Past Surgical History:   Procedure  "Laterality Date   • CHOLECYSTECTOMY     • MIDDLE EAR SURGERY       Family History   Problem Relation Age of Onset   • Cancer Mother    • Cancer Father    • Cancer Sister    • Cancer Brother    • Cancer Maternal Grandmother    • Cancer Maternal Grandfather    • Breast cancer Neg Hx      Social History     Socioeconomic History   • Marital status:    Tobacco Use   • Smoking status: Former     Types: Cigarettes   • Smokeless tobacco: Never   Vaping Use   • Vaping Use: Every day   • Substances: Nicotine, Flavoring   Substance and Sexual Activity   • Alcohol use: No   • Drug use: No     ---------------------------------------------------------------------------------------------------------------------   Allergies:  Keflex [cephalexin]  ---------------------------------------------------------------------------------------------------------------------   Prior to Admission Medications     Prescriptions Last Dose Informant Patient Reported? Taking?    Lactobacillus (DIGESTIVE HEALTH PROBIOTIC PO)   Yes No    Take  by mouth.    Loratadine (Claritin) 10 MG capsule   Yes No    Take  by mouth.    pantoprazole (PROTONIX) 40 MG EC tablet   Yes No    Take 40 mg by mouth Daily.    polyethylene glycol (MIRALAX) 17 GM/SCOOP powder   No No    Take 17 g by mouth Daily As Needed (constipation).    polyethylene glycol (MIRALAX) 17 GM/SCOOP powder   No No    Take 510 g by mouth Take As Directed. Place 15 cap fulls of powder in 32 oz of liquid of choice at 4:00 and 10:00 PM        Hospital Scheduled Meds:    No current facility-administered medications for this encounter.      Vital Signs:      Vitals:    02/15/23 1050   BP: 127/83   BP Location: Right arm   Patient Position: Lying   Pulse: 106   Resp: 20   Temp: 97.9 °F (36.6 °C)   TempSrc: Oral   SpO2: 99%   Weight: 86.6 kg (191 lb)   Height: 170.2 cm (67\")     Physical Exam:  Constitutional: Alert and oriented.  Well-developed and well-nourished. In no acute distress   HENT:  " Head: Normocephalic and atraumatic.  Mouth:  Moist mucous membranes.    Eyes:  Conjunctivae and EOM are normal.  Pupils are equal, round, and reactive to light.  No scleral icterus.  Neck:  Neck supple.  No JVD present.    Cardiovascular:  RRR with no murmur.  Pulmonary/Chest:  CTAB  Abdominal:  Soft.  Bowel sounds are normal.  No distension and no tenderness.   Musculoskeletal:  No edema, no tenderness, and no deformity.  No red or swollen joints anywhere.    Neurological:  MS as above, grossly non focal exam   Skin:  Skin is warm and dry.  No rash noted.  No pallor.   Psychiatric:  Normal mood and affect.  Behavior is normal.  Judgment and thought content normal.         CrCl cannot be calculated (Patient's most recent lab result is older than the maximum 30 days allowed.).  No results found for: AMMONIA          No results found for: HGBA1C  No results found for: TSH, FREET4  Lab Results   Component Value Date    PREGTESTUR Negative 05/15/2015     Pain Management Panel    There is no flowsheet data to display.       No results found for: BLOODCX  No results found for: URINECX  No results found for: WOUNDCX  No results found for: STOOLCX      ---------------------------------------------------------------------------------------------------------------------  Imaging Results (Last 7 Days)     ** No results found for the last 168 hours. **        I have personally reviewed the radiology images and read the final radiology report.  ---------------------------------------------------------------------------------------------------------------------  Assessment and Plan:  1. Proceed with EGD secondary to acid reflux and dysphagia (solids). Proceed with colonoscopy for evaluation of constipation.      Isabell Del Toro, APRN  02/15/23  10:48 EST

## 2023-02-15 NOTE — ANESTHESIA POSTPROCEDURE EVALUATION
Patient: Vangie Carney    Procedure Summary     Date: 02/15/23 Room / Location: [facility] OR  /  [facility] OR    Anesthesia Start: 1149 Anesthesia Stop: 1216    Procedures:       ESOPHAGOGASTRODUODENOSCOPY WITH BIOPSY (Esophagus)      COLONOSCOPY FOR SCREENING Diagnosis:       Chronic idiopathic constipation      Encounter for screening for malignant neoplasm of colon      (Chronic idiopathic constipation [K59.04])      (Encounter for screening for malignant neoplasm of colon [Z12.11])    Surgeons: Giselle Iniguez MD Provider: All Qureshi MD    Anesthesia Type: general ASA Status: 2          Anesthesia Type: general    Vitals  Vitals Value Taken Time   /74 02/15/23 1250   Temp 97.5 °F (36.4 °C) 02/15/23 1220   Pulse 62 02/15/23 1250   Resp 20 02/15/23 1250   SpO2 98 % 02/15/23 1250           Post Anesthesia Care and Evaluation    Patient location during evaluation: PHASE II  Patient participation: complete - patient participated  Level of consciousness: awake and alert  Pain score: 0  Pain management: adequate    Airway patency: patent  Anesthetic complications: No anesthetic complications  PONV Status: controlled  Cardiovascular status: acceptable  Respiratory status: acceptable and room air  Hydration status: euvolemic

## 2023-02-16 ENCOUNTER — TELEPHONE (OUTPATIENT)
Dept: GASTROENTEROLOGY | Facility: CLINIC | Age: 53
End: 2023-02-16
Payer: COMMERCIAL

## 2023-02-16 LAB
ALBUMIN SERPL-MCNC: 3.8 G/DL (ref 3.5–5.2)
ALBUMIN/GLOB SERPL: 1.8 G/DL
ALP SERPL-CCNC: 133 U/L (ref 39–117)
ALT SERPL W P-5'-P-CCNC: 19 U/L (ref 1–33)
ANION GAP SERPL CALCULATED.3IONS-SCNC: 7.6 MMOL/L (ref 5–15)
AST SERPL-CCNC: 19 U/L (ref 1–32)
BASOPHILS # BLD AUTO: 0.04 10*3/MM3 (ref 0–0.2)
BASOPHILS NFR BLD AUTO: 1 % (ref 0–1.5)
BILIRUB SERPL-MCNC: 0.7 MG/DL (ref 0–1.2)
BUN SERPL-MCNC: 9 MG/DL (ref 6–20)
BUN/CREAT SERPL: 9.7 (ref 7–25)
CALCIUM SPEC-SCNC: 9.2 MG/DL (ref 8.6–10.5)
CEA SERPL-MCNC: 0.85 NG/ML
CHLORIDE SERPL-SCNC: 105 MMOL/L (ref 98–107)
CO2 SERPL-SCNC: 26.4 MMOL/L (ref 22–29)
CREAT SERPL-MCNC: 0.93 MG/DL (ref 0.57–1)
DEPRECATED RDW RBC AUTO: 44.4 FL (ref 37–54)
EGFRCR SERPLBLD CKD-EPI 2021: 74.6 ML/MIN/1.73
EOSINOPHIL # BLD AUTO: 0.02 10*3/MM3 (ref 0–0.4)
EOSINOPHIL NFR BLD AUTO: 0.5 % (ref 0.3–6.2)
ERYTHROCYTE [DISTWIDTH] IN BLOOD BY AUTOMATED COUNT: 12.9 % (ref 12.3–15.4)
GLOBULIN UR ELPH-MCNC: 2.1 GM/DL
GLUCOSE SERPL-MCNC: 92 MG/DL (ref 65–99)
HCT VFR BLD AUTO: 32.6 % (ref 34–46.6)
HGB BLD-MCNC: 10.9 G/DL (ref 12–15.9)
IMM GRANULOCYTES # BLD AUTO: 0 10*3/MM3 (ref 0–0.05)
IMM GRANULOCYTES NFR BLD AUTO: 0 % (ref 0–0.5)
LYMPHOCYTES # BLD AUTO: 0.61 10*3/MM3 (ref 0.7–3.1)
LYMPHOCYTES NFR BLD AUTO: 15.2 % (ref 19.6–45.3)
MCH RBC QN AUTO: 31.4 PG (ref 26.6–33)
MCHC RBC AUTO-ENTMCNC: 33.4 G/DL (ref 31.5–35.7)
MCV RBC AUTO: 93.9 FL (ref 79–97)
MONOCYTES # BLD AUTO: 0.26 10*3/MM3 (ref 0.1–0.9)
MONOCYTES NFR BLD AUTO: 6.5 % (ref 5–12)
NEUTROPHILS NFR BLD AUTO: 3.08 10*3/MM3 (ref 1.7–7)
NEUTROPHILS NFR BLD AUTO: 76.8 % (ref 42.7–76)
NRBC BLD AUTO-RTO: 0 /100 WBC (ref 0–0.2)
PLATELET # BLD AUTO: 212 10*3/MM3 (ref 140–450)
PMV BLD AUTO: 11 FL (ref 6–12)
POTASSIUM SERPL-SCNC: 3.9 MMOL/L (ref 3.5–5.2)
PROT SERPL-MCNC: 5.9 G/DL (ref 6–8.5)
RBC # BLD AUTO: 3.47 10*6/MM3 (ref 3.77–5.28)
SODIUM SERPL-SCNC: 139 MMOL/L (ref 136–145)
WBC NRBC COR # BLD: 4.01 10*3/MM3 (ref 3.4–10.8)

## 2023-02-16 NOTE — TELEPHONE ENCOUNTER
The patient was called with results of her CT scan which shows some mild thickening in the sigmoid colon.  Her hemoglobin and hematocrit are mildly low.  Her CEA level is in the normal range.  Once I obtain her pathology results, I will recommend surgical consult.

## 2023-02-17 LAB — REF LAB TEST METHOD: NORMAL

## 2023-02-20 ENCOUNTER — TELEPHONE (OUTPATIENT)
Dept: GASTROENTEROLOGY | Facility: CLINIC | Age: 53
End: 2023-02-20
Payer: COMMERCIAL

## 2023-02-20 NOTE — TELEPHONE ENCOUNTER
As we discussed by phone, the tumor in your sigmoid colon is an adenocarcinoma.  We will set you up with a colorectal surgeon due to the position of the tumor in the lower sigmoid colon.  Your tumor marker CEA is of normal range.  You are mildly anemic.  Your CT scan revealed thickening in the sigmoid colon area.  There did not appear to be any lesions in the liver.  We will keep you posted in respect to consultation with colorectal surgery.

## 2023-02-21 ENCOUNTER — TELEPHONE (OUTPATIENT)
Dept: GASTROENTEROLOGY | Facility: CLINIC | Age: 53
End: 2023-02-21
Payer: COMMERCIAL

## 2023-02-21 DIAGNOSIS — C18.7 CANCER OF SIGMOID COLON: Primary | ICD-10-CM

## 2023-02-21 NOTE — TELEPHONE ENCOUNTER
I Spoke to Dr. Aguirre's office,  I faxed referral form and paperwork to there office. Patient notified  and given Dr. Aguirre's phone number @ [phone] to call their office if she does not hear from them.

## 2023-02-21 NOTE — TELEPHONE ENCOUNTER
----- Message from Giselle Iniguez MD sent at 2/21/2023  2:45 PM EST -----  I have made a referral to Dr. Shari Martinez MD for colorectal surgery secondary to sigmoid colon cancer.  Please contact Dr. Martinez's office so that they can contact the patient about an appointment.  Thank you

## 2023-03-06 ENCOUNTER — TRANSCRIBE ORDERS (OUTPATIENT)
Dept: ADMINISTRATIVE | Facility: HOSPITAL | Age: 53
End: 2023-03-06
Payer: COMMERCIAL

## 2023-03-06 ENCOUNTER — HOSPITAL ENCOUNTER (OUTPATIENT)
Dept: CT IMAGING | Facility: HOSPITAL | Age: 53
Discharge: HOME | End: 2023-03-06
Admitting: COLON & RECTAL SURGERY
Payer: COMMERCIAL

## 2023-03-06 DIAGNOSIS — C18.7 MALIGNANT NEOPLASM OF SIGMOID COLON: Primary | ICD-10-CM

## 2023-03-06 DIAGNOSIS — C18.7 MALIGNANT NEOPLASM OF SIGMOID COLON: ICD-10-CM

## 2023-03-06 PROCEDURE — 71250 CT THORAX DX C-: CPT

## 2023-03-13 ENCOUNTER — PRE-ADMISSION TESTING (OUTPATIENT)
Dept: PREADMISSION TESTING | Facility: HOSPITAL | Age: 53
DRG: 330 | End: 2023-03-13
Payer: COMMERCIAL

## 2023-03-13 VITALS — BODY MASS INDEX: 31.52 KG/M2 | HEIGHT: 65 IN | WEIGHT: 189.15 LBS

## 2023-03-13 LAB
ABO GROUP BLD: NORMAL
ALBUMIN SERPL-MCNC: 4.7 G/DL (ref 3.5–5.2)
ALBUMIN/GLOB SERPL: 1.5 G/DL
ALP SERPL-CCNC: 181 U/L (ref 39–117)
ALT SERPL W P-5'-P-CCNC: 16 U/L (ref 1–33)
ANION GAP SERPL CALCULATED.3IONS-SCNC: 8 MMOL/L (ref 5–15)
AST SERPL-CCNC: 20 U/L (ref 1–32)
BILIRUB SERPL-MCNC: 1.2 MG/DL (ref 0–1.2)
BLD GP AB SCN SERPL QL: NEGATIVE
BUN SERPL-MCNC: 9 MG/DL (ref 6–20)
BUN/CREAT SERPL: 11 (ref 7–25)
CALCIUM SPEC-SCNC: 9.9 MG/DL (ref 8.6–10.5)
CEA SERPL-MCNC: 1.17 NG/ML
CHLORIDE SERPL-SCNC: 103 MMOL/L (ref 98–107)
CO2 SERPL-SCNC: 31 MMOL/L (ref 22–29)
CREAT SERPL-MCNC: 0.82 MG/DL (ref 0.57–1)
DEPRECATED RDW RBC AUTO: 42.6 FL (ref 37–54)
EGFRCR SERPLBLD CKD-EPI 2021: 86.7 ML/MIN/1.73
ERYTHROCYTE [DISTWIDTH] IN BLOOD BY AUTOMATED COUNT: 12.5 % (ref 12.3–15.4)
GLOBULIN UR ELPH-MCNC: 3.1 GM/DL
GLUCOSE SERPL-MCNC: 102 MG/DL (ref 65–99)
HBA1C MFR BLD: 4.7 % (ref 4.8–5.6)
HCT VFR BLD AUTO: 38.6 % (ref 34–46.6)
HGB BLD-MCNC: 12.8 G/DL (ref 12–15.9)
MCH RBC QN AUTO: 30.9 PG (ref 26.6–33)
MCHC RBC AUTO-ENTMCNC: 33.2 G/DL (ref 31.5–35.7)
MCV RBC AUTO: 93.2 FL (ref 79–97)
PLATELET # BLD AUTO: 269 10*3/MM3 (ref 140–450)
PMV BLD AUTO: 10 FL (ref 6–12)
POTASSIUM SERPL-SCNC: 4.2 MMOL/L (ref 3.5–5.2)
PROT SERPL-MCNC: 7.8 G/DL (ref 6–8.5)
RBC # BLD AUTO: 4.14 10*6/MM3 (ref 3.77–5.28)
RH BLD: NEGATIVE
SODIUM SERPL-SCNC: 142 MMOL/L (ref 136–145)
T&S EXPIRATION DATE: NORMAL
WBC NRBC COR # BLD: 4.72 10*3/MM3 (ref 3.4–10.8)

## 2023-03-13 PROCEDURE — 82378 CARCINOEMBRYONIC ANTIGEN: CPT

## 2023-03-13 PROCEDURE — 93005 ELECTROCARDIOGRAM TRACING: CPT

## 2023-03-13 PROCEDURE — 85027 COMPLETE CBC AUTOMATED: CPT

## 2023-03-13 PROCEDURE — 36415 COLL VENOUS BLD VENIPUNCTURE: CPT

## 2023-03-13 PROCEDURE — 86901 BLOOD TYPING SEROLOGIC RH(D): CPT

## 2023-03-13 PROCEDURE — 86923 COMPATIBILITY TEST ELECTRIC: CPT

## 2023-03-13 PROCEDURE — 86900 BLOOD TYPING SEROLOGIC ABO: CPT

## 2023-03-13 PROCEDURE — 93010 ELECTROCARDIOGRAM REPORT: CPT | Performed by: INTERNAL MEDICINE

## 2023-03-13 PROCEDURE — 80053 COMPREHEN METABOLIC PANEL: CPT

## 2023-03-13 PROCEDURE — 83036 HEMOGLOBIN GLYCOSYLATED A1C: CPT

## 2023-03-13 PROCEDURE — 86850 RBC ANTIBODY SCREEN: CPT

## 2023-03-13 RX ORDER — PANTOPRAZOLE SODIUM 40 MG/1
40 TABLET, DELAYED RELEASE ORAL DAILY
COMMUNITY

## 2023-03-13 NOTE — PAT
CXR cancelled per Lissy at Dr. Aguirre's office- Patient had CT scan of chest on 3/6/23.     Patient to apply Chlorhexadine wipes  to surgical area (as instructed) the night before procedure and the AM of procedure. Wipes provided.    Patient instructed to drink 20 ounces of Gatorade and it needs to be completed 1 hour (for Main OR patients) or 2 hours (scheduled  section & [facility] patients) before given arrival time for procedure (NO RED Gatorade)    Patient verbalized understanding.    Patient viewed general PAT education video as instructed in their preoperative information received from their surgeon.  Patient stated the general PAT education video was viewed in its entirety and survey completed.  Copies of PAT general education handouts (Incentive Spirometry, Meds to Beds Program, Patient Belongings, Pre-op skin preparation instructions, Blood Glucose testing, Visitor policy, Surgery FAQ, Code H) distributed to patient if not printed. Education related to the PAT pass and skin preparation for surgery (if applicable) completed in PAT as a reinforcement to PAT education video. Patient instructed to return PAT pass provided today as well as completed skin preparation sheet (if applicable) on the day of procedure.     Additionally if patient had not viewed video yet but intended to view it at home or in our waiting area, then referred them to the handout with QR code/link provided during PAT visit.  Instructed patient to complete survey after viewing the video in its entirety.  Encouraged patient/family to read PAT general education handouts thoroughly and notify PAT staff with any questions or concerns. Patient verbalized understanding of all information and priority content.      Blood bank bracelet applied to patient during Pre Admission Testing visit.  Patient instructed not to remove from arm until after procedure and they are discharged from the hospital.  Explained to patient that they may shower  and get the bracelet wet, but not to immerse under water for longer periods (bathing, swimming, hand dishwashing, etc).  Patient verbalized understanding.

## 2023-03-14 ENCOUNTER — ANESTHESIA (OUTPATIENT)
Dept: PERIOP | Facility: HOSPITAL | Age: 53
DRG: 330 | End: 2023-03-14
Payer: COMMERCIAL

## 2023-03-14 ENCOUNTER — APPOINTMENT (OUTPATIENT)
Dept: GENERAL RADIOLOGY | Facility: HOSPITAL | Age: 53
DRG: 330 | End: 2023-03-14
Payer: COMMERCIAL

## 2023-03-14 ENCOUNTER — ANESTHESIA EVENT (OUTPATIENT)
Dept: PERIOP | Facility: HOSPITAL | Age: 53
DRG: 330 | End: 2023-03-14
Payer: COMMERCIAL

## 2023-03-14 ENCOUNTER — ANESTHESIA EVENT CONVERTED (OUTPATIENT)
Dept: ANESTHESIOLOGY | Facility: HOSPITAL | Age: 53
DRG: 330 | End: 2023-03-14
Payer: COMMERCIAL

## 2023-03-14 ENCOUNTER — HOSPITAL ENCOUNTER (INPATIENT)
Facility: HOSPITAL | Age: 53
LOS: 2 days | Discharge: HOME | DRG: 330 | End: 2023-03-16
Attending: COLON & RECTAL SURGERY | Admitting: COLON & RECTAL SURGERY
Payer: COMMERCIAL

## 2023-03-14 DIAGNOSIS — G89.18 ACUTE POSTOPERATIVE PAIN: Primary | ICD-10-CM

## 2023-03-14 DIAGNOSIS — C18.7 MALIGNANT NEOPLASM OF SIGMOID COLON: ICD-10-CM

## 2023-03-14 LAB
ABO GROUP BLD: NORMAL
B-HCG UR QL: NEGATIVE
EXPIRATION DATE: NORMAL
GLUCOSE BLDC GLUCOMTR-MCNC: 200 MG/DL (ref 70–130)
HCT VFR BLD AUTO: 30.5 % (ref 34–46.6)
HGB BLD-MCNC: 10.3 G/DL (ref 12–15.9)
INTERNAL NEGATIVE CONTROL: NEGATIVE
INTERNAL POSITIVE CONTROL: POSITIVE
Lab: NORMAL
QT INTERVAL: 382 MS
QTC INTERVAL: 432 MS
RH BLD: NEGATIVE

## 2023-03-14 PROCEDURE — 8E0W4CZ ROBOTIC ASSISTED PROCEDURE OF TRUNK REGION, PERCUTANEOUS ENDOSCOPIC APPROACH: ICD-10-PCS | Performed by: COLON & RECTAL SURGERY

## 2023-03-14 PROCEDURE — 25010000002 FENTANYL CITRATE (PF) 100 MCG/2ML SOLUTION: Performed by: ANESTHESIOLOGY

## 2023-03-14 PROCEDURE — 25010000002 DEXAMETHASONE SODIUM PHOSPHATE 10 MG/ML SOLUTION: Performed by: NURSE ANESTHETIST, CERTIFIED REGISTERED

## 2023-03-14 PROCEDURE — 0DTN4ZZ RESECTION OF SIGMOID COLON, PERCUTANEOUS ENDOSCOPIC APPROACH: ICD-10-PCS | Performed by: COLON & RECTAL SURGERY

## 2023-03-14 PROCEDURE — 25010000002 PROPOFOL 10 MG/ML EMULSION: Performed by: ANESTHESIOLOGY

## 2023-03-14 PROCEDURE — 82962 GLUCOSE BLOOD TEST: CPT

## 2023-03-14 PROCEDURE — 88309 TISSUE EXAM BY PATHOLOGIST: CPT | Performed by: COLON & RECTAL SURGERY

## 2023-03-14 PROCEDURE — 25010000002 HEPARIN (PORCINE) PER 1000 UNITS: Performed by: COLON & RECTAL SURGERY

## 2023-03-14 PROCEDURE — 85014 HEMATOCRIT: CPT | Performed by: COLON & RECTAL SURGERY

## 2023-03-14 PROCEDURE — 86901 BLOOD TYPING SEROLOGIC RH(D): CPT

## 2023-03-14 PROCEDURE — 63710000001 INSULIN LISPRO (HUMAN) PER 5 UNITS: Performed by: COLON & RECTAL SURGERY

## 2023-03-14 PROCEDURE — 86900 BLOOD TYPING SEROLOGIC ABO: CPT

## 2023-03-14 PROCEDURE — 4A1BXSH MONITORING OF GASTROINTESTINAL VASCULAR PERFUSION USING INDOCYANINE GREEN DYE, EXTERNAL APPROACH: ICD-10-PCS | Performed by: COLON & RECTAL SURGERY

## 2023-03-14 PROCEDURE — 85018 HEMOGLOBIN: CPT | Performed by: COLON & RECTAL SURGERY

## 2023-03-14 PROCEDURE — 0DBP4ZZ EXCISION OF RECTUM, PERCUTANEOUS ENDOSCOPIC APPROACH: ICD-10-PCS | Performed by: COLON & RECTAL SURGERY

## 2023-03-14 PROCEDURE — 81025 URINE PREGNANCY TEST: CPT | Performed by: COLON & RECTAL SURGERY

## 2023-03-14 PROCEDURE — 25010000002 ERTAPENEM PER 500 MG: Performed by: COLON & RECTAL SURGERY

## 2023-03-14 PROCEDURE — 25010000002 ONDANSETRON PER 1 MG: Performed by: ANESTHESIOLOGY

## 2023-03-14 DEVICE — ECHELON CIRCULAR POWERED STAPLER
Type: IMPLANTABLE DEVICE | Site: SIGMOID COLON | Status: FUNCTIONAL
Brand: ECHELON CIRCULAR

## 2023-03-14 DEVICE — DEV WND/CLS STRATAFIX SPIRALPDS PLS CT 2/0 15CM 26MM VIL: Type: IMPLANTABLE DEVICE | Site: SIGMOID COLON | Status: FUNCTIONAL

## 2023-03-14 DEVICE — SUREFORM 45 RELOAD GREEN
Type: IMPLANTABLE DEVICE | Site: SIGMOID COLON | Status: FUNCTIONAL
Brand: SUREFORM

## 2023-03-14 RX ORDER — ENOXAPARIN SODIUM 100 MG/ML
40 INJECTION SUBCUTANEOUS EVERY 24 HOURS
Status: DISCONTINUED | OUTPATIENT
Start: 2023-03-15 | End: 2023-03-15

## 2023-03-14 RX ORDER — OXYCODONE HYDROCHLORIDE 5 MG/1
5 TABLET ORAL EVERY 4 HOURS PRN
Status: DISCONTINUED | OUTPATIENT
Start: 2023-03-14 | End: 2023-03-16 | Stop reason: HOSPADM

## 2023-03-14 RX ORDER — MEPERIDINE HYDROCHLORIDE 25 MG/ML
12.5 INJECTION INTRAMUSCULAR; INTRAVENOUS; SUBCUTANEOUS
Status: DISCONTINUED | OUTPATIENT
Start: 2023-03-14 | End: 2023-03-14 | Stop reason: HOSPADM

## 2023-03-14 RX ORDER — ACETAMINOPHEN 500 MG
1000 TABLET ORAL ONCE
Status: COMPLETED | OUTPATIENT
Start: 2023-03-14 | End: 2023-03-14

## 2023-03-14 RX ORDER — INSULIN LISPRO 100 [IU]/ML
0-7 INJECTION, SOLUTION INTRAVENOUS; SUBCUTANEOUS
Status: DISCONTINUED | OUTPATIENT
Start: 2023-03-14 | End: 2023-03-16 | Stop reason: HOSPADM

## 2023-03-14 RX ORDER — IBUPROFEN 400 MG/1
400 TABLET ORAL EVERY 6 HOURS SCHEDULED
Status: DISCONTINUED | OUTPATIENT
Start: 2023-03-15 | End: 2023-03-16 | Stop reason: HOSPADM

## 2023-03-14 RX ORDER — SODIUM CHLORIDE, SODIUM LACTATE, POTASSIUM CHLORIDE, CALCIUM CHLORIDE 600; 310; 30; 20 MG/100ML; MG/100ML; MG/100ML; MG/100ML
9 INJECTION, SOLUTION INTRAVENOUS CONTINUOUS
Status: DISCONTINUED | OUTPATIENT
Start: 2023-03-14 | End: 2023-03-14

## 2023-03-14 RX ORDER — ROCURONIUM BROMIDE 10 MG/ML
INJECTION, SOLUTION INTRAVENOUS AS NEEDED
Status: DISCONTINUED | OUTPATIENT
Start: 2023-03-14 | End: 2023-03-14 | Stop reason: SURG

## 2023-03-14 RX ORDER — CETIRIZINE HYDROCHLORIDE 10 MG/1
10 TABLET ORAL DAILY
Status: DISCONTINUED | OUTPATIENT
Start: 2023-03-15 | End: 2023-03-16 | Stop reason: HOSPADM

## 2023-03-14 RX ORDER — NALOXONE HCL 0.4 MG/ML
0.4 VIAL (ML) INJECTION AS NEEDED
Status: DISCONTINUED | OUTPATIENT
Start: 2023-03-14 | End: 2023-03-14 | Stop reason: HOSPADM

## 2023-03-14 RX ORDER — SODIUM CHLORIDE 0.9 % (FLUSH) 0.9 %
10 SYRINGE (ML) INJECTION AS NEEDED
Status: DISCONTINUED | OUTPATIENT
Start: 2023-03-14 | End: 2023-03-14 | Stop reason: HOSPADM

## 2023-03-14 RX ORDER — MORPHINE SULFATE 2 MG/ML
2 INJECTION, SOLUTION INTRAMUSCULAR; INTRAVENOUS EVERY 4 HOURS PRN
Status: DISCONTINUED | OUTPATIENT
Start: 2023-03-14 | End: 2023-03-16 | Stop reason: HOSPADM

## 2023-03-14 RX ORDER — MIDAZOLAM HYDROCHLORIDE 1 MG/ML
1 INJECTION INTRAMUSCULAR; INTRAVENOUS
Status: DISCONTINUED | OUTPATIENT
Start: 2023-03-14 | End: 2023-03-14 | Stop reason: HOSPADM

## 2023-03-14 RX ORDER — HEPARIN SODIUM 5000 [USP'U]/ML
5000 INJECTION, SOLUTION INTRAVENOUS; SUBCUTANEOUS ONCE
Status: COMPLETED | OUTPATIENT
Start: 2023-03-14 | End: 2023-03-14

## 2023-03-14 RX ORDER — ONDANSETRON 2 MG/ML
4 INJECTION INTRAMUSCULAR; INTRAVENOUS ONCE AS NEEDED
Status: DISCONTINUED | OUTPATIENT
Start: 2023-03-14 | End: 2023-03-14 | Stop reason: HOSPADM

## 2023-03-14 RX ORDER — PHENYLEPHRINE HCL IN 0.9% NACL 1 MG/10 ML
SYRINGE (ML) INTRAVENOUS AS NEEDED
Status: DISCONTINUED | OUTPATIENT
Start: 2023-03-14 | End: 2023-03-14 | Stop reason: SURG

## 2023-03-14 RX ORDER — ONDANSETRON 2 MG/ML
INJECTION INTRAMUSCULAR; INTRAVENOUS AS NEEDED
Status: DISCONTINUED | OUTPATIENT
Start: 2023-03-14 | End: 2023-03-14 | Stop reason: SDUPTHER

## 2023-03-14 RX ORDER — DIAZEPAM 5 MG/1
5 TABLET ORAL EVERY 6 HOURS PRN
Status: DISCONTINUED | OUTPATIENT
Start: 2023-03-14 | End: 2023-03-16 | Stop reason: HOSPADM

## 2023-03-14 RX ORDER — MELOXICAM 15 MG/1
15 TABLET ORAL ONCE
Status: COMPLETED | OUTPATIENT
Start: 2023-03-14 | End: 2023-03-14

## 2023-03-14 RX ORDER — PROPOFOL 10 MG/ML
VIAL (ML) INTRAVENOUS AS NEEDED
Status: DISCONTINUED | OUTPATIENT
Start: 2023-03-14 | End: 2023-03-14 | Stop reason: SURG

## 2023-03-14 RX ORDER — ALVIMOPAN 12 MG/1
12 CAPSULE ORAL ONCE
Status: COMPLETED | OUTPATIENT
Start: 2023-03-14 | End: 2023-03-14

## 2023-03-14 RX ORDER — ALVIMOPAN 12 MG/1
12 CAPSULE ORAL 2 TIMES DAILY
Status: DISCONTINUED | OUTPATIENT
Start: 2023-03-15 | End: 2023-03-16

## 2023-03-14 RX ORDER — DEXAMETHASONE SODIUM PHOSPHATE 10 MG/ML
INJECTION, SOLUTION INTRAMUSCULAR; INTRAVENOUS
Status: COMPLETED | OUTPATIENT
Start: 2023-03-14 | End: 2023-03-14

## 2023-03-14 RX ORDER — EPHEDRINE SULFATE 50 MG/ML
INJECTION INTRAVENOUS AS NEEDED
Status: DISCONTINUED | OUTPATIENT
Start: 2023-03-14 | End: 2023-03-14 | Stop reason: SDUPTHER

## 2023-03-14 RX ORDER — BUPIVACAINE HYDROCHLORIDE 2.5 MG/ML
INJECTION, SOLUTION EPIDURAL; INFILTRATION; INTRACAUDAL
Status: COMPLETED | OUTPATIENT
Start: 2023-03-14 | End: 2023-03-14

## 2023-03-14 RX ORDER — SODIUM CHLORIDE 9 MG/ML
40 INJECTION, SOLUTION INTRAVENOUS AS NEEDED
Status: DISCONTINUED | OUTPATIENT
Start: 2023-03-14 | End: 2023-03-14 | Stop reason: HOSPADM

## 2023-03-14 RX ORDER — ACETAMINOPHEN 500 MG
1000 TABLET ORAL EVERY 6 HOURS
Status: DISCONTINUED | OUTPATIENT
Start: 2023-03-14 | End: 2023-03-16 | Stop reason: HOSPADM

## 2023-03-14 RX ORDER — SODIUM CHLORIDE, SODIUM LACTATE, POTASSIUM CHLORIDE, CALCIUM CHLORIDE 600; 310; 30; 20 MG/100ML; MG/100ML; MG/100ML; MG/100ML
100 INJECTION, SOLUTION INTRAVENOUS CONTINUOUS
Status: DISCONTINUED | OUTPATIENT
Start: 2023-03-14 | End: 2023-03-15

## 2023-03-14 RX ORDER — LIDOCAINE HYDROCHLORIDE 10 MG/ML
0.5 INJECTION, SOLUTION EPIDURAL; INFILTRATION; INTRACAUDAL; PERINEURAL ONCE AS NEEDED
Status: COMPLETED | OUTPATIENT
Start: 2023-03-14 | End: 2023-03-14

## 2023-03-14 RX ORDER — LIDOCAINE HYDROCHLORIDE 10 MG/ML
INJECTION, SOLUTION EPIDURAL; INFILTRATION; INTRACAUDAL; PERINEURAL AS NEEDED
Status: DISCONTINUED | OUTPATIENT
Start: 2023-03-14 | End: 2023-03-14 | Stop reason: SURG

## 2023-03-14 RX ORDER — SCOLOPAMINE TRANSDERMAL SYSTEM 1 MG/1
1 PATCH, EXTENDED RELEASE TRANSDERMAL CONTINUOUS
Status: DISCONTINUED | OUTPATIENT
Start: 2023-03-14 | End: 2023-03-14

## 2023-03-14 RX ORDER — FAMOTIDINE 20 MG/1
20 TABLET, FILM COATED ORAL ONCE
Status: COMPLETED | OUTPATIENT
Start: 2023-03-14 | End: 2023-03-14

## 2023-03-14 RX ORDER — INDOCYANINE GREEN AND WATER 25 MG
KIT INJECTION AS NEEDED
Status: DISCONTINUED | OUTPATIENT
Start: 2023-03-14 | End: 2023-03-14 | Stop reason: SURG

## 2023-03-14 RX ORDER — DEXTROSE, SODIUM CHLORIDE, AND POTASSIUM CHLORIDE 5; .45; .15 G/100ML; G/100ML; G/100ML
100 INJECTION INTRAVENOUS CONTINUOUS
Status: DISCONTINUED | OUTPATIENT
Start: 2023-03-14 | End: 2023-03-15

## 2023-03-14 RX ORDER — PREGABALIN 75 MG/1
75 CAPSULE ORAL ONCE
Status: COMPLETED | OUTPATIENT
Start: 2023-03-14 | End: 2023-03-14

## 2023-03-14 RX ORDER — EPHEDRINE SULFATE 50 MG/ML
5 INJECTION, SOLUTION INTRAVENOUS ONCE AS NEEDED
Status: DISCONTINUED | OUTPATIENT
Start: 2023-03-14 | End: 2023-03-14 | Stop reason: HOSPADM

## 2023-03-14 RX ORDER — DEXAMETHASONE SODIUM PHOSPHATE 10 MG/ML
INJECTION, SOLUTION INTRAMUSCULAR; INTRAVENOUS AS NEEDED
Status: DISCONTINUED | OUTPATIENT
Start: 2023-03-14 | End: 2023-03-14 | Stop reason: SURG

## 2023-03-14 RX ORDER — FENTANYL CITRATE 50 UG/ML
50 INJECTION, SOLUTION INTRAMUSCULAR; INTRAVENOUS
Status: DISCONTINUED | OUTPATIENT
Start: 2023-03-14 | End: 2023-03-14 | Stop reason: HOSPADM

## 2023-03-14 RX ORDER — FAMOTIDINE 10 MG/ML
20 INJECTION, SOLUTION INTRAVENOUS ONCE
Status: CANCELLED | OUTPATIENT
Start: 2023-03-14 | End: 2023-03-14

## 2023-03-14 RX ORDER — PANTOPRAZOLE SODIUM 40 MG/1
40 TABLET, DELAYED RELEASE ORAL DAILY
Status: DISCONTINUED | OUTPATIENT
Start: 2023-03-15 | End: 2023-03-16 | Stop reason: HOSPADM

## 2023-03-14 RX ORDER — SODIUM CHLORIDE 0.9 % (FLUSH) 0.9 %
10 SYRINGE (ML) INJECTION EVERY 12 HOURS SCHEDULED
Status: DISCONTINUED | OUTPATIENT
Start: 2023-03-14 | End: 2023-03-14 | Stop reason: HOSPADM

## 2023-03-14 RX ORDER — NALOXONE HCL 0.4 MG/ML
0.4 VIAL (ML) INJECTION
Status: DISCONTINUED | OUTPATIENT
Start: 2023-03-14 | End: 2023-03-16 | Stop reason: HOSPADM

## 2023-03-14 RX ORDER — ONDANSETRON 2 MG/ML
4 INJECTION INTRAMUSCULAR; INTRAVENOUS EVERY 6 HOURS PRN
Status: DISCONTINUED | OUTPATIENT
Start: 2023-03-14 | End: 2023-03-16 | Stop reason: HOSPADM

## 2023-03-14 RX ORDER — FENTANYL CITRATE 50 UG/ML
INJECTION, SOLUTION INTRAMUSCULAR; INTRAVENOUS AS NEEDED
Status: DISCONTINUED | OUTPATIENT
Start: 2023-03-14 | End: 2023-03-14 | Stop reason: SDUPTHER

## 2023-03-14 RX ADMIN — PROPOFOL 200 MG: 10 INJECTION, EMULSION INTRAVENOUS at 15:23

## 2023-03-14 RX ADMIN — INDOCYANINE GREEN AND WATER 12.5 MG: KIT at 16:56

## 2023-03-14 RX ADMIN — ROCURONIUM BROMIDE 10 MG: 10 INJECTION INTRAVENOUS at 16:46

## 2023-03-14 RX ADMIN — EPHEDRINE SULFATE 10 MG: 50 INJECTION INTRAVENOUS at 15:59

## 2023-03-14 RX ADMIN — POTASSIUM CHLORIDE, DEXTROSE MONOHYDRATE AND SODIUM CHLORIDE 100 ML/HR: 150; 5; 450 INJECTION, SOLUTION INTRAVENOUS at 20:49

## 2023-03-14 RX ADMIN — INSULIN LISPRO 2 UNITS: 100 INJECTION, SOLUTION INTRAVENOUS; SUBCUTANEOUS at 23:32

## 2023-03-14 RX ADMIN — BUPIVACAINE HYDROCHLORIDE 60 ML: 2.5 INJECTION, SOLUTION EPIDURAL; INFILTRATION; INTRACAUDAL; PERINEURAL at 15:30

## 2023-03-14 RX ADMIN — ACETAMINOPHEN 1000 MG: 500 TABLET ORAL at 22:13

## 2023-03-14 RX ADMIN — Medication 1 G: at 15:23

## 2023-03-14 RX ADMIN — SODIUM CHLORIDE, POTASSIUM CHLORIDE, SODIUM LACTATE AND CALCIUM CHLORIDE 9 ML/HR: 600; 310; 30; 20 INJECTION, SOLUTION INTRAVENOUS at 12:44

## 2023-03-14 RX ADMIN — PROPOFOL 20 MCG/KG/MIN: 10 INJECTION, EMULSION INTRAVENOUS at 15:23

## 2023-03-14 RX ADMIN — DEXAMETHASONE SODIUM PHOSPHATE 6 MG: 10 INJECTION, SOLUTION INTRAMUSCULAR; INTRAVENOUS at 15:37

## 2023-03-14 RX ADMIN — ROCURONIUM BROMIDE 50 MG: 10 INJECTION INTRAVENOUS at 15:23

## 2023-03-14 RX ADMIN — SUGAMMADEX 200 MG: 100 INJECTION, SOLUTION INTRAVENOUS at 17:45

## 2023-03-14 RX ADMIN — Medication 100 MCG: at 16:01

## 2023-03-14 RX ADMIN — Medication 100 MCG: at 15:47

## 2023-03-14 RX ADMIN — EPHEDRINE SULFATE 5 MG: 50 INJECTION INTRAVENOUS at 16:01

## 2023-03-14 RX ADMIN — SODIUM CHLORIDE, POTASSIUM CHLORIDE, SODIUM LACTATE AND CALCIUM CHLORIDE: 600; 310; 30; 20 INJECTION, SOLUTION INTRAVENOUS at 16:14

## 2023-03-14 RX ADMIN — ALVIMOPAN 12 MG: 12 CAPSULE ORAL at 13:01

## 2023-03-14 RX ADMIN — SCOPALAMINE 1 PATCH: 1 PATCH, EXTENDED RELEASE TRANSDERMAL at 12:57

## 2023-03-14 RX ADMIN — Medication 100 MCG: at 15:37

## 2023-03-14 RX ADMIN — MELOXICAM 15 MG: 15 TABLET ORAL at 12:56

## 2023-03-14 RX ADMIN — ROCURONIUM BROMIDE 30 MG: 10 INJECTION INTRAVENOUS at 15:56

## 2023-03-14 RX ADMIN — PREGABALIN 75 MG: 75 CAPSULE ORAL at 12:56

## 2023-03-14 RX ADMIN — LIDOCAINE HYDROCHLORIDE 50 MG: 10 INJECTION, SOLUTION EPIDURAL; INFILTRATION; INTRACAUDAL; PERINEURAL at 15:23

## 2023-03-14 RX ADMIN — HEPARIN SODIUM 5000 UNITS: 5000 INJECTION INTRAVENOUS; SUBCUTANEOUS at 12:57

## 2023-03-14 RX ADMIN — FENTANYL CITRATE 100 MCG: 50 INJECTION, SOLUTION INTRAMUSCULAR; INTRAVENOUS at 15:23

## 2023-03-14 RX ADMIN — ONDANSETRON 4 MG: 2 INJECTION INTRAMUSCULAR; INTRAVENOUS at 17:33

## 2023-03-14 RX ADMIN — LIDOCAINE HYDROCHLORIDE 0.5 ML: 10 INJECTION, SOLUTION EPIDURAL; INFILTRATION; INTRACAUDAL; PERINEURAL at 12:44

## 2023-03-14 RX ADMIN — FAMOTIDINE 20 MG: 20 TABLET ORAL at 12:56

## 2023-03-14 RX ADMIN — DEXAMETHASONE SODIUM PHOSPHATE 4 MG: 10 INJECTION, SOLUTION INTRAMUSCULAR; INTRAVENOUS at 15:30

## 2023-03-14 RX ADMIN — ACETAMINOPHEN 1000 MG: 500 TABLET ORAL at 12:56

## 2023-03-14 NOTE — H&P
Pre-Op H&P  Vangie Carney  [id number]  1970      Chief complaint: Colon cancer      Subjective:  Patient is a 52 y.o.female presents for scheduled surgery by Dr. Aguirre. She anticipates a COLON RESECTION LOW ANTERIOR LAPAROSCOPIC WITH DAVINCI ROBOT today. Last October, she started having stomach problems. Symptoms included nausea, constipation and cramping. No diarrhea or bloody stools. About a month ago she had colonoscopy and was found to have sigmoid colon cancer.      Review of Systems:  Constitutional-- No fever, chills or sweats. No fatigue.  CV-- No chest pain, palpitation or syncope  Resp-- No SOB, cough, hemoptysis  Skin--No rashes or lesions      Allergies:   Allergies   Allergen Reactions   • Keflex [Cephalexin] Hives         Home Meds:  Medications Prior to Admission   Medication Sig Dispense Refill Last Dose   • Loratadine 10 MG capsule Take  by mouth As Needed.   Past Week   • pantoprazole (PROTONIX) 40 MG EC tablet Take 1 tablet by mouth Daily.   3/13/2023   • polyethylene glycol (MIRALAX) 17 GM/SCOOP powder Take 17 g by mouth Daily As Needed (constipation). 510 g 0 Past Week         PMH:   Past Medical History:   Diagnosis Date   • Gallbladder attack    • GERD (gastroesophageal reflux disease)    • Hearing aid worn    • History of ear infections    • Seasonal allergies    • Wears glasses      PSH:    Past Surgical History:   Procedure Laterality Date   • CHOLECYSTECTOMY     • COLONOSCOPY N/A 2/15/2023    Procedure: COLONOSCOPY FOR SCREENING;  Surgeon: Giselle Iniguez MD;  Location: [facility] OR;  Service: Gastroenterology;  Laterality: N/A;   • ENDOSCOPY N/A 2/15/2023    Procedure: ESOPHAGOGASTRODUODENOSCOPY WITH BIOPSY;  Surgeon: Giselle Iniguez MD;  Location: [facility] OR;  Service: Gastroenterology;  Laterality: N/A;   • LAPAROSCOPIC TUBAL LIGATION Bilateral    • MIDDLE EAR SURGERY         Immunization History:  Influenza: No  Pneumococcal: No  Tetanus: UTD  Covid :  "No    Social History:   Tobacco:   Social History     Tobacco Use   Smoking Status Former   • Packs/day: 1.00   • Years: 30.00   • Pack years: 30.00   • Types: Cigarettes   • Quit date:    • Years since quittin.2   Smokeless Tobacco Never      Alcohol:     Social History     Substance and Sexual Activity   Alcohol Use Never         Physical Exam:/82 (BP Location: Right arm, Patient Position: Lying)   Pulse 87   Temp 97.2 °F (36.2 °C) (Temporal)   Resp 18   Ht 165.1 cm (65\")   Wt 85.7 kg (189 lb)   LMP 06/15/2016   SpO2 96%   BMI 31.45 kg/m²       General Appearance:    Alert, cooperative, no distress, appears stated age   Head:    Normocephalic, without obvious abnormality, atraumatic   Lungs:     Clear to auscultation bilaterally, respirations unlabored    Heart:   Regular rate and rhythm, S1 and S2 normal    Abdomen:    Soft without tenderness   Extremities:   Extremities normal, atraumatic, no cyanosis or edema   Skin:   Skin color, texture, turgor normal, no rashes or lesions   Neurologic:   Grossly intact     Results Review:     LABS:  Lab Results   Component Value Date    WBC 4.72 2023    HGB 12.8 2023    HCT 38.6 2023    MCV 93.2 2023     2023    NEUTROABS 3.08 02/15/2023    GLUCOSE 102 (H) 2023    BUN 9 2023    CREATININE 0.82 2023    EGFRIFNONA 79 09/10/2021     2023    K 4.2 2023     2023    CO2 31.0 (H) 2023    MG 2.1 09/10/2021    CALCIUM 9.9 2023    ALBUMIN 4.7 2023    AST 20 2023    ALT 16 2023    BILITOT 1.2 2023       RADIOLOGY:  2/15/23 CT abd:  FINDINGS:     Lower thorax: Clear. No effusions.     Abdomen:     Liver: Homogeneous. No focal hepatic mass or ductal dilatation.     Gallbladder: Surgically absent     Pancreas: Unremarkable. No mass or ductal dilatation.     Spleen: Homogeneous. No splenomegaly.     Adrenals: No mass.     Kidneys/ureters: No mass. " No obstructive uropathy.  No evidence of  urolithiasis.     GI tract: Mild narrowing in the sigmoid colon image 479 of the axial  series.. There is no evidence of appendicitis     MESENTERY: No free fluid, walled off fluid collections, mesenteric  stranding, or enlarged lymph nodes        Vasculature: No evidence of aneurysm.     Abdominal wall: No focal hernia or mass.        Bladder: No focal mass or significant wall thickening     Reproductive: Unremarkable as visualized     Bones: No acute bony abnormality.     IMPRESSION:     1. Mild narrowing in the sigmoid colon. Recommend direct visualization     2.Other findings as above    I reviewed the patient's new clinical results.    Cancer Staging (if applicable)  Cancer Patient: __ yes __no __unknown; If yes, clinical stage T:__ N:__M:__, stage group or __N/A      Impression: Malignant neoplasm of sigmoid colon       Plan: COLON RESECTION LOW ANTERIOR LAPAROSCOPIC WITH DAVINCI ROBOT      Tayla Gonzalez, APRN   3/14/2023   13:10 EDT

## 2023-03-14 NOTE — ANESTHESIA PREPROCEDURE EVALUATION
Anesthesia Evaluation     Patient summary reviewed and Nursing notes reviewed   no history of anesthetic complications:  NPO Solid Status: > 8 hours  NPO Liquid Status: > 2 hours           Airway   Mallampati: II  TM distance: >3 FB  Neck ROM: full  No difficulty expected  Dental - normal exam     Pulmonary - normal exam    breath sounds clear to auscultation  (+) a smoker (Vapes),   Cardiovascular - normal exam    ECG reviewed  Rhythm: regular  Rate: normal        Neuro/Psych- negative ROS  GI/Hepatic/Renal/Endo    (+) obesity,  GERD well controlled,      ROS Comment: Chronic constipation; colon mass    Musculoskeletal     Abdominal    Substance History      OB/GYN          Other - negative ROS                       Anesthesia Plan    ASA 3     general with block     (Bilateral TAP blocks post-induction for post-operative analgesia per request of Dr. Aguirre)  intravenous induction     Anesthetic plan, risks, benefits, and alternatives have been provided, discussed and informed consent has been obtained with: patient.    Plan discussed with CRNA.        CODE STATUS:

## 2023-03-14 NOTE — ANESTHESIA POSTPROCEDURE EVALUATION
Patient: Vangie Carney    Procedure Summary     Date: 03/14/23 Room / Location:  [facility]    Anesthesia Start: 1520 Anesthesia Stop: 1835    Procedure: COLON RESECTION LOW ANTERIOR LAPAROSCOPIC WITH DAVINCI ROBOT (Abdomen) Diagnosis:     Surgeons: Shari Aguirre MD Provider: Kalyan Hernandez MD    Anesthesia Type: general with block ASA Status: 3          Anesthesia Type: general with block    Vitals  Vitals Value Taken Time   BP     Temp     Pulse 88 03/14/23 1834   Resp     SpO2 100 % 03/14/23 1834   Vitals shown include unvalidated device data.        Post Anesthesia Care and Evaluation    Patient location during evaluation: PACU  Patient participation: complete - patient participated  Level of consciousness: awake and alert  Pain management: adequate    Airway patency: patent  Anesthetic complications: No anesthetic complications  PONV Status: none  Cardiovascular status: hemodynamically stable and acceptable  Respiratory status: nonlabored ventilation, acceptable and nasal cannula  Hydration status: acceptable

## 2023-03-14 NOTE — OP NOTE
[facility]  OPERATIVE REPORT      Vangie Carney  3/14/2023    Pre-op Diagnosis:   Adenocarcinoma of the sigmoid colon    Post-op Diagnosis:    Adenocarcinoma of the sigmoid colon    Procedure(s):  COLON RESECTION LOW ANTERIOR LAPAROSCOPIC WITH DAVINCI ROBOT    Surgeon(s):  Shari Aguirre MD    Anesthesia: General with Block    Staff:   Circulator: Yanira Hawkins RN; Lilibeth Villatoro RN  Scrub Person: Diandra Mendez; Chaparrita Lewis; Laura Pop  Nursing Assistant: Екатерина Orellana  Assistant: Sha García PA    Assistant: Sha García PA was responsible for performing the following activities: Retraction, Suction, Irrigation, Suturing, Closing, Placing Dressing and Held/Positioned Camera and their skilled assistance was necessary for the success of this case.          Synoptic Findings:  Intent of Procedure (if related to known cancer diagnosis): This procedure was performed with curative intent related to a known cancer diagnosis.      Estimated Blood Loss: 100ml    Urine Voided: * No values recorded between 3/14/2023  3:20 PM and 3/14/2023  5:36 PM *    Specimens:                Specimens     ID Source Type Tests Collected By Collected At Frozen?    A Large Intestine, Sigmoid Colon Tissue · TISSUE PATHOLOGY EXAM   Shari Aguirre MD 3/14/23 1665     Description: Sigmoid Colon, Upper Rectum, Anastomotic Rings--stitch marks distal margin    This specimen was not marked as sent.                Drains:   Urethral Catheter Silicone 16 Fr. (Active)       Findings: Distal sigmoid tumor, apple core lesion.  Tumor perforated and toward during extraction.  It was not perforated in the abdominal cavity.  Negative air leak test    Complications: None.    Procedure in Detail:   Lithotomy position.       Operative site was prepped and draped in the usual sterile fashion.    GelPort was placed using open technique at the level of the umbilicus.      Abdominal exploration was  performed and there is no evidence of metastatic disease.      In the right abdomen, 3 additional 8 mm ports and one 12 mm port was placed under direct visualization.  The patient was placed in Trendelenburg position to 28 degrees and 9 degrees right side tilt.  The robot was docked.  There were pelvic adhesions of the small bowel to the vaginal cuff.  These were taken down sharply.  The small bowel was placed in the right upper quadrant.      Pathology noted in the distal sigmoid colon with tattoo present just above the peritoneal reflection.  Sigmoid was freely mobile without adhesions to the pelvic organs.  The line of Toldt was then incised.       The left ureter was identified and kept out of the plane of dissection at all times.     The ALVARADO pedicle was isolated and divided using the vessel seal device.  Tumor-specific mesorectal dissection was performed to the level of the mid rectum.  The mesorectum was transected using the vessel seal device.    Isocyanate green was used to confirm bright fluorescence in the descending colon conduit and the residual rectum.    Rectum transected with 2 firings of the sure form 45 stapler, 2 green loads.    A healthy location on the descending colon was chosen for a point of transection. This was performed using the vessel sealer device.  The mesentery of the specimen was then transected using the vessel seal device.  This was done using the vessel sealer.  Isocyanate green was used to confirm good fluorescence at the descending colon conduit.  The descending colon was transected using the 45 sure form green stapler.    The abdomen was desufflated and the specimen was removed through the GelPort.  The anvil was placed through the GelPort as the abdomen was reinsufflated.    I opened the specimen on the back table and ensured greater than 5 cm proximal and distal margins.  I put a stitch on the distal margin.  The specimen did tear at the level of the tumor.  There is a staple  line at the proximal margin as well.    The anvil placed in the proximal colon and secured with a pursestring of 3-0 strata fix.    Anastomosis was performed in the mid rectum, below the peritoneal reflection.   29 mm Ethicon powered EEA stapler and was used.    Rigid proctoscopy performed anastomosis under saline irrigation.  Anastomosis was found to be hemostatic, airtight, and circumferentially intact.      The anastomosis was inspected and found to be tension-free.      Abdomen was inspected for hemostasis.  Achieved.    Abdomen was inspected for other pathology. None noted.     12 millimeter stapler port in the right lower quadrant was closed with James-close device.     Uterus and adenxal structures appeared normal.    Fascia closed with #1 non-looped PDS x2 in an interrupted fashion.  Wounds were irrigated with chlorhexidine solution.  Skin closed with monocryl. Exofin dressing applied.       All counts were announced as correct at the end of the case. Patient tolerated procedure well, extubated in the operating room and transferred to recovery room in stable condition.         Shari Aguirre MD     Date: 3/14/2023  Time: 18:05 EDT

## 2023-03-14 NOTE — ANESTHESIA PROCEDURE NOTES
"Peripheral Block      Patient reassessed immediately prior to procedure    Patient location during procedure: OR  Reason for block: at surgeon's request and post-op pain management  Performed by  CRNA/CAA: Godwin Francisco, RISHABHSRNA: Fidelina Ramirez SRNA  Preanesthetic Checklist  Completed: patient identified, IV checked, site marked, risks and benefits discussed, surgical consent, monitors and equipment checked, pre-op evaluation and timeout performed  Prep:  Pt Position: supine  Sterile barriers:cap, gloves, mask and washed/disinfected hands  Prep: ChloraPrep  Patient monitoring: blood pressure monitoring, continuous pulse oximetry and EKG  Procedure    Sedation: yes  Performed under: general  Guidance:ultrasound guided  Images:still images obtained, printed/placed on chart    Laterality:Bilateral  Block Type:TAP  Injection Technique:single-shot  Needle Type:short-bevel and echogenic  Needle Gauge:20 G  Resistance on Injection: none    Medications Used: bupivacaine PF (MARCAINE) 0.25 % injection - Injection   60 mL - 3/14/2023 3:30:00 PM  dexamethasone sodium phosphate injection - Injection   4 mg - 3/14/2023 3:30:00 PM      Medications  Comment:Block Injection:  LA dose divided between Right and Left block        Post Assessment  Injection Assessment: negative aspiration for heme, incremental injection and no paresthesia on injection  Patient Tolerance:comfortable throughout block  Complications:no  Additional Notes    Subcostal TAPs    A high-frequency linear transducer, with sterile cover, was placed sub-xiphoid to identify Linea Alba, right and left Rectus Abdominus Muscles (CLIFFORD). The transducer was moved either right or left subcostally to identify the CLIFFORD and the Transverse Abdominus Muscle (BADILLO). The insertion site was prepped in sterile fashion and then localized with 2-5 ml of 1% Lidocaine. Using ultrasound-guidance, a 20-gauge B-Mondragon 4\" Ultraplex 360 non-stimulating echogenic needle was advanced in " "plane, from medial to lateral, until the tip of the needle was in the fascial plane between the CLIFFORD and BADILLO. 1-3ml of preservative free normal saline was used to hydro-dissect the fascial planes. After the fascial plane was verified, the local anesthetic (LA) was injected. The procedure was repeated on the opposite side for bilateral coverage. Aspiration every 5 ml to prevent intravascular injection. Injection was completed with negative aspiration of blood and negative intravascular injection. Injection pressures were normal with minimal resistance. The subcostal approach to the TAP nerve block ideally anesthetizes the intercostal nerves T6-T9.     Mid-Axillary/Lateral TAPs    A high-frequency linear transducer, with sterile cover, was placed in the midaxillary line between the ASIS and costal margin. The External Oblique Muscle (EOM), Internal Oblique Muscle (IOM), Transverse Abdominus Muscle (BADILLO), and Peritoneum were identified. The insertion site was prepped in sterile fashion and then localized with 2-5 ml of 1% Lidocaine. Using ultrasound-guidance, a 20-gauge B-Mondragon 4\" Ultraplex 360 non-stimulating echogenic needle was advanced in plane, from medial to lateral, until the tip of the needle was in the fascial plane between the IOM and BADILLO. 1-3ml of preservative free normal saline was used to hydro-dissect the fascial planes. After the fascial plane was verified, the local anesthetic (LA) was injected. The procedure was repeated on the opposite side for bilateral coverage. Aspiration every 5 ml to prevent intravascular injection. Injection was completed with negative aspiration of blood and negative intravascular injection. Injection pressures were normal with minimal resistance. Midaxillary TAPs should reach intercostal nerves T10- T11 and the subcostal nerve T12.            "

## 2023-03-14 NOTE — ANESTHESIA PROCEDURE NOTES
Airway  Urgency: elective    Date/Time: 3/14/2023 3:30 PM  Airway not difficult    General Information and Staff    Patient location during procedure: OR  CRNA/CAA: Rafita Dumont, CRNA    Indications and Patient Condition  Indications for airway management: airway protection    Preoxygenated: yes  MILS not maintained throughout  Mask difficulty assessment: 1 - vent by mask    Final Airway Details  Final airway type: endotracheal airway      Successful airway: ETT  Cuffed: yes   Successful intubation technique: direct laryngoscopy  Endotracheal tube insertion site: oral  Blade: Flaquito  Blade size: 3  ETT size (mm): 7.0  Cormack-Lehane Classification: grade I - full view of glottis  Placement verified by: chest auscultation and capnometry   Cuff volume (mL): 5  Measured from: lips  ETT/EBT  to lips (cm): 20  Number of attempts at approach: 1  Assessment: lips, teeth, and gum same as pre-op and atraumatic intubation    Additional Comments  Negative epigastric sounds, Breath sound equal bilaterally with symmetric chest rise and fall

## 2023-03-15 PROBLEM — Z90.49 S/P COLECTOMY: Status: ACTIVE | Noted: 2023-03-15

## 2023-03-15 PROBLEM — K21.9 GERD WITHOUT ESOPHAGITIS: Status: ACTIVE | Noted: 2023-03-15

## 2023-03-15 PROBLEM — G89.18 ACUTE POSTOPERATIVE PAIN: Status: ACTIVE | Noted: 2023-03-15

## 2023-03-15 LAB
ANION GAP SERPL CALCULATED.3IONS-SCNC: 7 MMOL/L (ref 5–15)
BASOPHILS # BLD AUTO: 0 10*3/MM3 (ref 0–0.2)
BASOPHILS NFR BLD AUTO: 0 % (ref 0–1.5)
BUN SERPL-MCNC: 7 MG/DL (ref 6–20)
BUN/CREAT SERPL: 11.1 (ref 7–25)
CALCIUM SPEC-SCNC: 8.5 MG/DL (ref 8.6–10.5)
CHLORIDE SERPL-SCNC: 107 MMOL/L (ref 98–107)
CO2 SERPL-SCNC: 27 MMOL/L (ref 22–29)
CREAT SERPL-MCNC: 0.63 MG/DL (ref 0.57–1)
DEPRECATED RDW RBC AUTO: 42.2 FL (ref 37–54)
EGFRCR SERPLBLD CKD-EPI 2021: 107.6 ML/MIN/1.73
EOSINOPHIL # BLD AUTO: 0 10*3/MM3 (ref 0–0.4)
EOSINOPHIL NFR BLD AUTO: 0 % (ref 0.3–6.2)
ERYTHROCYTE [DISTWIDTH] IN BLOOD BY AUTOMATED COUNT: 12.4 % (ref 12.3–15.4)
GLUCOSE BLDC GLUCOMTR-MCNC: 110 MG/DL (ref 70–130)
GLUCOSE BLDC GLUCOMTR-MCNC: 119 MG/DL (ref 70–130)
GLUCOSE BLDC GLUCOMTR-MCNC: 162 MG/DL (ref 70–130)
GLUCOSE BLDC GLUCOMTR-MCNC: 186 MG/DL (ref 70–130)
GLUCOSE BLDC GLUCOMTR-MCNC: 96 MG/DL (ref 70–130)
GLUCOSE SERPL-MCNC: 184 MG/DL (ref 65–99)
HCT VFR BLD AUTO: 26.1 % (ref 34–46.6)
HCT VFR BLD AUTO: 27.1 % (ref 34–46.6)
HCT VFR BLD AUTO: 30.3 % (ref 34–46.6)
HGB BLD-MCNC: 10.1 G/DL (ref 12–15.9)
HGB BLD-MCNC: 8.7 G/DL (ref 12–15.9)
HGB BLD-MCNC: 9 G/DL (ref 12–15.9)
IMM GRANULOCYTES # BLD AUTO: 0.02 10*3/MM3 (ref 0–0.05)
IMM GRANULOCYTES NFR BLD AUTO: 0.3 % (ref 0–0.5)
LYMPHOCYTES # BLD AUTO: 0.25 10*3/MM3 (ref 0.7–3.1)
LYMPHOCYTES NFR BLD AUTO: 3.8 % (ref 19.6–45.3)
MAGNESIUM SERPL-MCNC: 2 MG/DL (ref 1.6–2.6)
MCH RBC QN AUTO: 30.9 PG (ref 26.6–33)
MCHC RBC AUTO-ENTMCNC: 33.3 G/DL (ref 31.5–35.7)
MCV RBC AUTO: 92.7 FL (ref 79–97)
MONOCYTES # BLD AUTO: 0.09 10*3/MM3 (ref 0.1–0.9)
MONOCYTES NFR BLD AUTO: 1.4 % (ref 5–12)
NEUTROPHILS NFR BLD AUTO: 6.24 10*3/MM3 (ref 1.7–7)
NEUTROPHILS NFR BLD AUTO: 94.5 % (ref 42.7–76)
NRBC BLD AUTO-RTO: 0 /100 WBC (ref 0–0.2)
PLATELET # BLD AUTO: 217 10*3/MM3 (ref 140–450)
PMV BLD AUTO: 10.1 FL (ref 6–12)
POTASSIUM SERPL-SCNC: 4.3 MMOL/L (ref 3.5–5.2)
RBC # BLD AUTO: 3.27 10*6/MM3 (ref 3.77–5.28)
SODIUM SERPL-SCNC: 141 MMOL/L (ref 136–145)
WBC NRBC COR # BLD: 6.6 10*3/MM3 (ref 3.4–10.8)

## 2023-03-15 PROCEDURE — 85025 COMPLETE CBC W/AUTO DIFF WBC: CPT | Performed by: COLON & RECTAL SURGERY

## 2023-03-15 PROCEDURE — 85014 HEMATOCRIT: CPT | Performed by: COLON & RECTAL SURGERY

## 2023-03-15 PROCEDURE — 85018 HEMOGLOBIN: CPT | Performed by: COLON & RECTAL SURGERY

## 2023-03-15 PROCEDURE — 82962 GLUCOSE BLOOD TEST: CPT

## 2023-03-15 PROCEDURE — P9047 ALBUMIN (HUMAN), 25%, 50ML: HCPCS | Performed by: COLON & RECTAL SURGERY

## 2023-03-15 PROCEDURE — 25010000002 ENOXAPARIN PER 10 MG

## 2023-03-15 PROCEDURE — 25010000002 ALBUMIN HUMAN 25% PER 50 ML: Performed by: COLON & RECTAL SURGERY

## 2023-03-15 PROCEDURE — 63710000001 INSULIN LISPRO (HUMAN) PER 5 UNITS: Performed by: COLON & RECTAL SURGERY

## 2023-03-15 PROCEDURE — 83735 ASSAY OF MAGNESIUM: CPT | Performed by: COLON & RECTAL SURGERY

## 2023-03-15 PROCEDURE — 80048 BASIC METABOLIC PNL TOTAL CA: CPT | Performed by: COLON & RECTAL SURGERY

## 2023-03-15 RX ORDER — ALBUMIN (HUMAN) 12.5 G/50ML
25 SOLUTION INTRAVENOUS ONCE
Status: COMPLETED | OUTPATIENT
Start: 2023-03-15 | End: 2023-03-15

## 2023-03-15 RX ORDER — LABETALOL HYDROCHLORIDE 5 MG/ML
10 INJECTION, SOLUTION INTRAVENOUS EVERY 4 HOURS PRN
Status: DISCONTINUED | OUTPATIENT
Start: 2023-03-15 | End: 2023-03-16 | Stop reason: HOSPADM

## 2023-03-15 RX ORDER — SODIUM CHLORIDE, SODIUM LACTATE, POTASSIUM CHLORIDE, CALCIUM CHLORIDE 600; 310; 30; 20 MG/100ML; MG/100ML; MG/100ML; MG/100ML
100 INJECTION, SOLUTION INTRAVENOUS CONTINUOUS
Status: DISCONTINUED | OUTPATIENT
Start: 2023-03-15 | End: 2023-03-16

## 2023-03-15 RX ORDER — ENOXAPARIN SODIUM 100 MG/ML
40 INJECTION SUBCUTANEOUS
Status: DISCONTINUED | OUTPATIENT
Start: 2023-03-15 | End: 2023-03-15

## 2023-03-15 RX ORDER — ENOXAPARIN SODIUM 100 MG/ML
INJECTION SUBCUTANEOUS
Status: COMPLETED
Start: 2023-03-15 | End: 2023-03-15

## 2023-03-15 RX ADMIN — ACETAMINOPHEN 1000 MG: 500 TABLET ORAL at 03:04

## 2023-03-15 RX ADMIN — ALVIMOPAN 12 MG: 12 CAPSULE ORAL at 08:31

## 2023-03-15 RX ADMIN — ACETAMINOPHEN 1000 MG: 500 TABLET ORAL at 20:37

## 2023-03-15 RX ADMIN — SODIUM CHLORIDE 500 ML: 9 INJECTION, SOLUTION INTRAVENOUS at 15:21

## 2023-03-15 RX ADMIN — INSULIN LISPRO 2 UNITS: 100 INJECTION, SOLUTION INTRAVENOUS; SUBCUTANEOUS at 08:31

## 2023-03-15 RX ADMIN — ENOXAPARIN SODIUM: 40 INJECTION SUBCUTANEOUS at 10:30

## 2023-03-15 RX ADMIN — SODIUM CHLORIDE 500 ML: 9 INJECTION, SOLUTION INTRAVENOUS at 08:59

## 2023-03-15 RX ADMIN — ENOXAPARIN SODIUM 40 MG: 40 INJECTION SUBCUTANEOUS at 08:46

## 2023-03-15 RX ADMIN — PANTOPRAZOLE SODIUM 40 MG: 40 TABLET, DELAYED RELEASE ORAL at 08:32

## 2023-03-15 RX ADMIN — SODIUM CHLORIDE 500 ML: 9 INJECTION, SOLUTION INTRAVENOUS at 18:27

## 2023-03-15 RX ADMIN — IBUPROFEN 400 MG: 400 TABLET ORAL at 17:04

## 2023-03-15 RX ADMIN — ALBUMIN (HUMAN) 25 G: 0.25 INJECTION, SOLUTION INTRAVENOUS at 16:30

## 2023-03-15 RX ADMIN — CETIRIZINE HYDROCHLORIDE 10 MG: 10 TABLET, FILM COATED ORAL at 08:32

## 2023-03-15 RX ADMIN — ACETAMINOPHEN 1000 MG: 500 TABLET ORAL at 15:08

## 2023-03-15 RX ADMIN — ACETAMINOPHEN 1000 MG: 500 TABLET ORAL at 08:31

## 2023-03-15 RX ADMIN — IBUPROFEN 400 MG: 400 TABLET ORAL at 23:48

## 2023-03-15 RX ADMIN — ALVIMOPAN 12 MG: 12 CAPSULE ORAL at 20:35

## 2023-03-15 RX ADMIN — IBUPROFEN 400 MG: 400 TABLET ORAL at 08:31

## 2023-03-15 RX ADMIN — SODIUM CHLORIDE, POTASSIUM CHLORIDE, SODIUM LACTATE AND CALCIUM CHLORIDE 100 ML/HR: 600; 310; 30; 20 INJECTION, SOLUTION INTRAVENOUS at 15:54

## 2023-03-15 RX ADMIN — POTASSIUM CHLORIDE, DEXTROSE MONOHYDRATE AND SODIUM CHLORIDE 100 ML/HR: 150; 5; 450 INJECTION, SOLUTION INTRAVENOUS at 08:46

## 2023-03-15 RX ADMIN — SODIUM CHLORIDE, POTASSIUM CHLORIDE, SODIUM LACTATE AND CALCIUM CHLORIDE 100 ML/HR: 600; 310; 30; 20 INJECTION, SOLUTION INTRAVENOUS at 16:31

## 2023-03-15 NOTE — H&P
Admission HP     Patient Name: Vangie Carney  MRN: [id number]  : 1970  DOS: 3/14/2023    Attending: Shari Aguirre MD    Primary Care Provider: Vimal Moreno MD      Patient Care Team:  Vimal Moreno MD as PCP - General (Family Medicine)    Chief complaint: Colon cancer    Subjective   Patient is a pleasant 52 y.o. female presented for scheduled surgery by Dr. Aguirre.    Last October, she started having stomach problems. Symptoms included nausea, constipation and cramping. No diarrhea or bloody stools. About a month ago she had colonoscopy and was found to have sigmoid colon cancer.    Seen preoperatively, doing fairly well, no new complaints, no history of chest pain, no coronary artery disease, no liver or kidney disease.     Subsequent record review indicates she underwent robotic LAR by Dr. Aguirre under general anesthesia and a block, tolerated surgery well, was admitted for further management.    Allergies   Allergen Reactions   • Keflex [Cephalexin] Hives        Medications Prior to Admission   Medication Sig Dispense Refill Last Dose   • Loratadine 10 MG capsule Take  by mouth As Needed.   Past Week   • pantoprazole (PROTONIX) 40 MG EC tablet Take 1 tablet by mouth Daily.   3/13/2023   • polyethylene glycol (MIRALAX) 17 GM/SCOOP powder Take 17 g by mouth Daily As Needed (constipation). 510 g 0 Past Week            Past Medical History:   Diagnosis Date   • Gallbladder attack    • GERD (gastroesophageal reflux disease)    • Hearing aid worn    • History of ear infections    • Malignant neoplasm of sigmoid colon (HCC) 3/14/2023   • Seasonal allergies    • Wears glasses      Past Surgical History:   Procedure Laterality Date   • CHOLECYSTECTOMY     • COLONOSCOPY N/A 2/15/2023    Procedure: COLONOSCOPY FOR SCREENING;  Surgeon: Giselle Iniguez MD;  Location: [facility];  Service: Gastroenterology;  Laterality: N/A;   • ENDOSCOPY N/A 2/15/2023    Procedure:  "ESOPHAGOGASTRODUODENOSCOPY WITH BIOPSY;  Surgeon: Giselle Iniguez MD;  Location: [facility];  Service: Gastroenterology;  Laterality: N/A;   • LAPAROSCOPIC TUBAL LIGATION Bilateral    • MIDDLE EAR SURGERY       Family History   Problem Relation Age of Onset   • Cancer Mother    • Cancer Father    • Cancer Sister    • Cancer Brother    • Cancer Maternal Grandmother    • Cancer Maternal Grandfather    • Breast cancer Neg Hx      Social History     Tobacco Use   • Smoking status: Former     Packs/day: 1.00     Years: 30.00     Pack years: 30.00     Types: Cigarettes     Quit date:      Years since quittin.2   • Smokeless tobacco: Never   Vaping Use   • Vaping Use: Every day   • Substances: Nicotine, Flavoring   • Devices: Zuznow   Substance Use Topics   • Alcohol use: Never   • Drug use: Defer   , works with her  at his business    Review of Systems  Pertinent items are noted in HPI, all other systems reviewed and negative    Vital Signs  /65 (BP Location: Left arm, Patient Position: Lying)   Pulse 50   Temp 97 °F (36.1 °C) (Oral)   Resp 16   Ht 170.2 cm (67\")   Wt 90.2 kg (198 lb 12.8 oz)   LMP 06/15/2016   SpO2 97%   BMI 31.14 kg/m²     Physical Exam:    General Appearance:    Alert, cooperative, in no acute distress   Head:    Normocephalic, without obvious abnormality, atraumatic   Eyes:            Lids and lashes normal, conjunctivae and sclerae normal, no   icterus, no pallor, corneas clear   Ears:    Ears appear intact with no abnormalities noted   Throat:   No oral lesions, no thrush, oral mucosa moist   Neck:   No adenopathy, supple, trachea midline, no thyromegaly         Lungs:     Clear to auscultation,respirations regular, even and       unlabored. No wheezes or rales.    Heart:    Regular rhythm and normal rate, normal S1 and S2, no murmur    Abdomen:      Soft and benign, seen preop   Genitalia:    Deferred   Extremities:   Moves all extremities " well, no edema, no cyanosis, no              redness   Pulses:   Pulses palpable and equal bilaterally   Skin:   No bleeding, bruising or rash   Neurologic:   Cranial nerves 2 - 12 grossly intact, no gross deficit     Results from last 7 days   Lab Units 03/15/23  0400 03/14/23  1901 03/13/23  1339   WBC 10*3/mm3 6.60  --  4.72   HEMOGLOBIN g/dL 10.1* 10.3* 12.8   HEMATOCRIT % 30.3* 30.5* 38.6   PLATELETS 10*3/mm3 217  --  269     Results from last 7 days   Lab Units 03/15/23  0400 03/13/23  1339   SODIUM mmol/L 141 142   POTASSIUM mmol/L 4.3 4.2   CHLORIDE mmol/L 107 103   CO2 mmol/L 27.0 31.0*   BUN mg/dL 7 9   CREATININE mg/dL 0.63 0.82   CALCIUM mg/dL 8.5* 9.9   BILIRUBIN mg/dL  --  1.2   ALK PHOS U/L  --  181*   ALT (SGPT) U/L  --  16   AST (SGOT) U/L  --  20   GLUCOSE mg/dL 184* 102*     Lab Results   Component Value Date    HGBA1C 4.70 (L) 03/13/2023       Assessment and Plan:       S/P colectomy( robotic lap LAR)    Malignant neoplasm of sigmoid colon (HCC)    GERD without esophagitis  Anemia    Plan  Postop management to include  1. Ambulation  2. Pain control-prns   3. IS-encourage  4. DVT proph- Mechanical, subcutaneous Lovenox  5. Bowel regimen, alvimopan  6. Resume home medications as appropriate  7. Monitor post-op labs  8. DC planning   9. Diet, Clears, advance diet as tolerated.  IVF initially, monitor volume status.     -GERD:  Resume PPI.  Formulary substitution when indicated.    Discussed with patient and her .    Dragon disclaimer:  Part of this encounter note is an electronic transcription/translation of spoken language to printed text. The electronic translation of spoken language may permit erroneous, or at times, nonsensical words or phrases to be inadvertently transcribed; Although I have reviewed the note for such errors, some may still exist.    Jese Gray MD  03/15/23  07:53 EDT

## 2023-03-15 NOTE — PROGRESS NOTES
"IM progress note      Vangie Carney  [id number]  1970     LOS: 1 day     Attending: Shari Aguirre MD    Primary Care Provider: Vimal Moreno MD      Chief Complaint/Reason for visit:  Abd pain    Subjective   Doing fairly well.  Adequate pain control.  Tolerating clear liquid diet.  Ambulating in halls.  Reports flatus, no BM. Denies f/c/n/v/sob/cp.    Objective     Vital Signs    Visit Vitals  BP 95/53 (BP Location: Left arm, Patient Position: Lying)   Pulse 56   Temp 98.1 °F (36.7 °C) (Axillary)   Resp 18   Ht 170.2 cm (67\")   Wt 90.2 kg (198 lb 12.8 oz)   LMP 06/15/2016   SpO2 95%   BMI 31.14 kg/m²     Temp (24hrs), Av.3 °F (36.3 °C), Min:96.5 °F (35.8 °C), Max:98.1 °F (36.7 °C)      Nutrition: Advance to full liquids    Respiratory: Room air    Physical Exam:     General Appearance:    Alert, cooperative, in no acute distress   Head:    Normocephalic, without obvious abnormality, atraumatic    Lungs:     Normal effort, symmetric chest rise, no crepitus, clear to      auscultation bilaterally             Heart:    Regular rhythm and normal rate, normal S1 and S2   Abdomen:    Soft, expected tenderness.  Clean incisions. + Bowel sounds   Extremities:   No clubbing, cyanosis or edema.  No deformities.    Pulses:   Pulses palpable and equal bilaterally   Skin:   No bleeding, bruising or rash   Neurologic:   Moves all extremities with no obvious focal motor deficit.  Cranial nerves 2 - 12 grossly intact     Results Review:     I reviewed the patient's new clinical results.   Results from last 7 days   Lab Units 03/15/23  0400 23  1901 23  1339   WBC 10*3/mm3 6.60  --  4.72   HEMOGLOBIN g/dL 10.1* 10.3* 12.8   HEMATOCRIT % 30.3* 30.5* 38.6   PLATELETS 10*3/mm3 217  --  269     Results from last 7 days   Lab Units 03/15/23  0400 23  1339   SODIUM mmol/L 141 142   POTASSIUM mmol/L 4.3 4.2   CHLORIDE mmol/L 107 103   CO2 mmol/L 27.0 31.0*   BUN mg/dL 7 9   CREATININE mg/dL 0.63 " 0.82   CALCIUM mg/dL 8.5* 9.9   BILIRUBIN mg/dL  --  1.2   ALK PHOS U/L  --  181*   ALT (SGPT) U/L  --  16   AST (SGOT) U/L  --  20   GLUCOSE mg/dL 184* 102*      Latest Reference Range & Units 03/15/23 05:59 03/15/23 07:56 03/15/23 11:10   Glucose 70 - 130 mg/dL 186 (H) 162 (H) 96   (H): Data is abnormally high    I reviewed the patient's new imaging including images and reports.    All medications reviewed.   acetaminophen, 1,000 mg, Oral, Q6H  alvimopan, 12 mg, Oral, BID  cetirizine, 10 mg, Oral, Daily  enoxaparin, 40 mg, Subcutaneous, Q24H  ibuprofen, 400 mg, Oral, Q6H  insulin lispro, 0-7 Units, Subcutaneous, 4x Daily AC & at Bedtime  pantoprazole, 40 mg, Oral, Daily        Assessment & Plan     S/P colectomy( robotic lap LAR)    Malignant neoplasm of sigmoid colon (HCC)    GERD without esophagitis    Acute postoperative pain      Plan  1. Ambulation  2. Pain control-prns   3. IS-encouraged  4. DVT proph- mechs/Lovenox  5. Bowel regimen/Entereg  6.  Advance to clear liquid diet.  Continue IVF  7. Monitor post-op labs  8. DC planning for home      -GERD:  Resume PPI.  Formulary substitution when indicated.      ANURAG Valles  03/15/23  12:18 EDT

## 2023-03-15 NOTE — ADDENDUM NOTE
Addendum  created 03/15/23 0948 by Godwin Francisco, RISHABH    Clinical Note Signed, Diagnosis association updated, Intraprocedure Blocks edited, SmartForm saved

## 2023-03-15 NOTE — PROGRESS NOTES
Seen and examined.  Chart data reviewed.  Tired today and blood pressure has been in the high 80s to 90s.  Heart rate in the 50s to 60s.  Urinating and ambulated several times today.  Has received a liter of fluid so far.  Abdomen is soft and nontender.  Nondistended.  No nausea or vomiting, no fevers or chills.  No gas or bowel movement yet.  Stat H&H is pending and I have set up a couple units of blood for her and will give albumin.  Doubt she has bleeding but possibly.  Will monitor closely.

## 2023-03-16 ENCOUNTER — READMISSION MANAGEMENT (OUTPATIENT)
Dept: CALL CENTER | Facility: HOSPITAL | Age: 53
End: 2023-03-16
Payer: COMMERCIAL

## 2023-03-16 VITALS
DIASTOLIC BLOOD PRESSURE: 66 MMHG | HEART RATE: 62 BPM | TEMPERATURE: 97.7 F | OXYGEN SATURATION: 97 % | HEIGHT: 67 IN | RESPIRATION RATE: 18 BRPM | WEIGHT: 198.8 LBS | BODY MASS INDEX: 31.2 KG/M2 | SYSTOLIC BLOOD PRESSURE: 121 MMHG

## 2023-03-16 LAB
ANION GAP SERPL CALCULATED.3IONS-SCNC: 6 MMOL/L (ref 5–15)
BASOPHILS # BLD AUTO: 0.02 10*3/MM3 (ref 0–0.2)
BASOPHILS NFR BLD AUTO: 0.4 % (ref 0–1.5)
BUN SERPL-MCNC: 6 MG/DL (ref 6–20)
BUN/CREAT SERPL: 6.9 (ref 7–25)
CALCIUM SPEC-SCNC: 8.5 MG/DL (ref 8.6–10.5)
CHLORIDE SERPL-SCNC: 111 MMOL/L (ref 98–107)
CO2 SERPL-SCNC: 27 MMOL/L (ref 22–29)
CREAT SERPL-MCNC: 0.87 MG/DL (ref 0.57–1)
CYTO UR: NORMAL
DEPRECATED RDW RBC AUTO: 47.9 FL (ref 37–54)
EGFRCR SERPLBLD CKD-EPI 2021: 80.8 ML/MIN/1.73
EOSINOPHIL # BLD AUTO: 0.02 10*3/MM3 (ref 0–0.4)
EOSINOPHIL NFR BLD AUTO: 0.4 % (ref 0.3–6.2)
ERYTHROCYTE [DISTWIDTH] IN BLOOD BY AUTOMATED COUNT: 13.2 % (ref 12.3–15.4)
GLUCOSE BLDC GLUCOMTR-MCNC: 104 MG/DL (ref 70–130)
GLUCOSE BLDC GLUCOMTR-MCNC: 94 MG/DL (ref 70–130)
GLUCOSE SERPL-MCNC: 82 MG/DL (ref 65–99)
HCT VFR BLD AUTO: 26.4 % (ref 34–46.6)
HCT VFR BLD AUTO: 28.4 % (ref 34–46.6)
HGB BLD-MCNC: 8.4 G/DL (ref 12–15.9)
HGB BLD-MCNC: 9.1 G/DL (ref 12–15.9)
IMM GRANULOCYTES # BLD AUTO: 0.01 10*3/MM3 (ref 0–0.05)
IMM GRANULOCYTES NFR BLD AUTO: 0.2 % (ref 0–0.5)
LAB AP CASE REPORT: NORMAL
LAB AP CLINICAL INFORMATION: NORMAL
LYMPHOCYTES # BLD AUTO: 1.07 10*3/MM3 (ref 0.7–3.1)
LYMPHOCYTES NFR BLD AUTO: 22.4 % (ref 19.6–45.3)
MAGNESIUM SERPL-MCNC: 1.8 MG/DL (ref 1.6–2.6)
MCH RBC QN AUTO: 31.8 PG (ref 26.6–33)
MCHC RBC AUTO-ENTMCNC: 31.8 G/DL (ref 31.5–35.7)
MCV RBC AUTO: 100 FL (ref 79–97)
MONOCYTES # BLD AUTO: 0.24 10*3/MM3 (ref 0.1–0.9)
MONOCYTES NFR BLD AUTO: 5 % (ref 5–12)
NEUTROPHILS NFR BLD AUTO: 3.42 10*3/MM3 (ref 1.7–7)
NEUTROPHILS NFR BLD AUTO: 71.6 % (ref 42.7–76)
NRBC BLD AUTO-RTO: 0 /100 WBC (ref 0–0.2)
PATH REPORT.FINAL DX SPEC: NORMAL
PATH REPORT.GROSS SPEC: NORMAL
PLATELET # BLD AUTO: 195 10*3/MM3 (ref 140–450)
PMV BLD AUTO: 10.6 FL (ref 6–12)
POTASSIUM SERPL-SCNC: 4.3 MMOL/L (ref 3.5–5.2)
RBC # BLD AUTO: 2.64 10*6/MM3 (ref 3.77–5.28)
SODIUM SERPL-SCNC: 144 MMOL/L (ref 136–145)
WBC NRBC COR # BLD: 4.78 10*3/MM3 (ref 3.4–10.8)

## 2023-03-16 PROCEDURE — 82962 GLUCOSE BLOOD TEST: CPT

## 2023-03-16 PROCEDURE — 80048 BASIC METABOLIC PNL TOTAL CA: CPT | Performed by: COLON & RECTAL SURGERY

## 2023-03-16 PROCEDURE — 85018 HEMOGLOBIN: CPT | Performed by: COLON & RECTAL SURGERY

## 2023-03-16 PROCEDURE — 85014 HEMATOCRIT: CPT | Performed by: COLON & RECTAL SURGERY

## 2023-03-16 PROCEDURE — 85025 COMPLETE CBC W/AUTO DIFF WBC: CPT | Performed by: COLON & RECTAL SURGERY

## 2023-03-16 PROCEDURE — 83735 ASSAY OF MAGNESIUM: CPT | Performed by: COLON & RECTAL SURGERY

## 2023-03-16 RX ORDER — OXYCODONE HYDROCHLORIDE 5 MG/1
5-10 TABLET ORAL EVERY 4 HOURS PRN
Qty: 40 TABLET | Refills: 0 | Status: SHIPPED | OUTPATIENT
Start: 2023-03-16 | End: 2024-03-15

## 2023-03-16 RX ORDER — DIAZEPAM 5 MG/1
5 TABLET ORAL EVERY 6 HOURS PRN
Qty: 30 TABLET | Refills: 0 | Status: SHIPPED | OUTPATIENT
Start: 2023-03-16 | End: 2024-03-15

## 2023-03-16 RX ORDER — POLYETHYLENE GLYCOL 3350 17 G/17G
17 POWDER, FOR SOLUTION ORAL DAILY PRN
Qty: 510 G | Refills: 0
Start: 2023-03-16

## 2023-03-16 RX ADMIN — SODIUM CHLORIDE, POTASSIUM CHLORIDE, SODIUM LACTATE AND CALCIUM CHLORIDE 100 ML/HR: 600; 310; 30; 20 INJECTION, SOLUTION INTRAVENOUS at 02:15

## 2023-03-16 RX ADMIN — CETIRIZINE HYDROCHLORIDE 10 MG: 10 TABLET, FILM COATED ORAL at 08:50

## 2023-03-16 RX ADMIN — PANTOPRAZOLE SODIUM 40 MG: 40 TABLET, DELAYED RELEASE ORAL at 08:50

## 2023-03-16 RX ADMIN — ACETAMINOPHEN 1000 MG: 500 TABLET ORAL at 04:21

## 2023-03-16 RX ADMIN — ACETAMINOPHEN 1000 MG: 500 TABLET ORAL at 08:50

## 2023-03-16 RX ADMIN — IBUPROFEN 400 MG: 400 TABLET ORAL at 05:50

## 2023-03-16 RX ADMIN — ALVIMOPAN 12 MG: 12 CAPSULE ORAL at 08:50

## 2023-03-16 RX ADMIN — IBUPROFEN 400 MG: 400 TABLET ORAL at 12:21

## 2023-03-16 NOTE — PAYOR COMM NOTE
"Isela Gonzalez, RAÚL  Utilization Management  P:[phone]  F:[phone]    Auth# [id number]  Jan Solano (52 y.o. Female)     Date of Birth   1970    Social Security Number       Address   [address] [place] [address]    Home Phone   [phone]    MRN   [id number]       Muslim   Spiritism    Marital Status                               Admission Date   3/14/23    Admission Type   Elective    Admitting Provider   Shari Aguirre MD    Attending Provider   Shari Aguirre MD    Department, Room/Bed   [facility] 5G, S564/1       Discharge Date       Discharge Disposition       Discharge Destination                               Attending Provider: Shari Aguirre MD    Allergies: Keflex [Cephalexin]    Isolation: None   Infection: None   Code Status: CPR    Ht: 170.2 cm (67\")   Wt: 90.2 kg (198 lb 12.8 oz)    Admission Cmt: None   Principal Problem: S/P colectomy( robotic lap LAR) [Z90.49]                 Active Insurance as of 3/14/2023     Primary Coverage     Payor Plan Insurance Group Employer/Plan Group    [employer] [id number]     Payor Plan Address Payor Plan Phone Number Payor Plan Fax Number Effective Dates    [address] [phone]  2018 - None Entered    [place] [address]       Subscriber Name Subscriber Birth Date Member ID       JAN SOLANO 1970 [id number]                 Emergency Contacts      (Rel.) Home Phone Work Phone Mobile Phone    Red Solano (Spouse) -- -- [phone]               History & Physical      Jese Gray MD at 23 1435          Admission HP     Patient Name: Jan Solano  MRN: [id number]  : 1970  DOS: 3/14/2023    Attending: Shari Aguirre MD    Primary Care Provider: Vimal Moreno MD      Patient Care Team:  Vimal Moreno MD as PCP - General (Family Medicine)    Chief complaint: Colon cancer    Subjective  "   Patient is a pleasant 52 y.o. female presented for scheduled surgery by Dr. Aguirre.    Last October, she started having stomach problems. Symptoms included nausea, constipation and cramping. No diarrhea or bloody stools. About a month ago she had colonoscopy and was found to have sigmoid colon cancer.    Seen preoperatively, doing fairly well, no new complaints, no history of chest pain, no coronary artery disease, no liver or kidney disease.     Subsequent record review indicates she underwent robotic LAR by Dr. Aguirre under general anesthesia and a block, tolerated surgery well, was admitted for further management.    Allergies   Allergen Reactions   • Keflex [Cephalexin] Hives        Medications Prior to Admission   Medication Sig Dispense Refill Last Dose   • Loratadine 10 MG capsule Take  by mouth As Needed.   Past Week   • pantoprazole (PROTONIX) 40 MG EC tablet Take 1 tablet by mouth Daily.   3/13/2023   • polyethylene glycol (MIRALAX) 17 GM/SCOOP powder Take 17 g by mouth Daily As Needed (constipation). 510 g 0 Past Week            Past Medical History:   Diagnosis Date   • Gallbladder attack    • GERD (gastroesophageal reflux disease)    • Hearing aid worn    • History of ear infections    • Malignant neoplasm of sigmoid colon (HCC) 3/14/2023   • Seasonal allergies    • Wears glasses      Past Surgical History:   Procedure Laterality Date   • CHOLECYSTECTOMY     • COLONOSCOPY N/A 2/15/2023    Procedure: COLONOSCOPY FOR SCREENING;  Surgeon: Giselle Iniguez MD;  Location: [facility];  Service: Gastroenterology;  Laterality: N/A;   • ENDOSCOPY N/A 2/15/2023    Procedure: ESOPHAGOGASTRODUODENOSCOPY WITH BIOPSY;  Surgeon: Giselle Iniguez MD;  Location: [facility];  Service: Gastroenterology;  Laterality: N/A;   • LAPAROSCOPIC TUBAL LIGATION Bilateral    • MIDDLE EAR SURGERY       Family History   Problem Relation Age of Onset   • Cancer Mother    • Cancer Father    • Cancer Sister    • Cancer  "Brother    • Cancer Maternal Grandmother    • Cancer Maternal Grandfather    • Breast cancer Neg Hx      Social History     Tobacco Use   • Smoking status: Former     Packs/day: 1.00     Years: 30.00     Pack years: 30.00     Types: Cigarettes     Quit date:      Years since quittin.2   • Smokeless tobacco: Never   Vaping Use   • Vaping Use: Every day   • Substances: Nicotine, Flavoring   • Devices: Refillable tank   Substance Use Topics   • Alcohol use: Never   • Drug use: Defer   , works with her  at his business    Review of Systems  Pertinent items are noted in HPI, all other systems reviewed and negative    Vital Signs  /65 (BP Location: Left arm, Patient Position: Lying)   Pulse 50   Temp 97 °F (36.1 °C) (Oral)   Resp 16   Ht 170.2 cm (67\")   Wt 90.2 kg (198 lb 12.8 oz)   LMP 06/15/2016   SpO2 97%   BMI 31.14 kg/m²     Physical Exam:    General Appearance:    Alert, cooperative, in no acute distress   Head:    Normocephalic, without obvious abnormality, atraumatic   Eyes:            Lids and lashes normal, conjunctivae and sclerae normal, no   icterus, no pallor, corneas clear   Ears:    Ears appear intact with no abnormalities noted   Throat:   No oral lesions, no thrush, oral mucosa moist   Neck:   No adenopathy, supple, trachea midline, no thyromegaly         Lungs:     Clear to auscultation,respirations regular, even and       unlabored. No wheezes or rales.    Heart:    Regular rhythm and normal rate, normal S1 and S2, no murmur    Abdomen:      Soft and benign, seen preop   Genitalia:    Deferred   Extremities:   Moves all extremities well, no edema, no cyanosis, no              redness   Pulses:   Pulses palpable and equal bilaterally   Skin:   No bleeding, bruising or rash   Neurologic:   Cranial nerves 2 - 12 grossly intact, no gross deficit     Results from last 7 days   Lab Units 03/15/23  0400 23  1901 23  1339   WBC 10*3/mm3 6.60  --  4.72 "   HEMOGLOBIN g/dL 10.1* 10.3* 12.8   HEMATOCRIT % 30.3* 30.5* 38.6   PLATELETS 10*3/mm3 217  --  269     Results from last 7 days   Lab Units 03/15/23  0400 03/13/23  1339   SODIUM mmol/L 141 142   POTASSIUM mmol/L 4.3 4.2   CHLORIDE mmol/L 107 103   CO2 mmol/L 27.0 31.0*   BUN mg/dL 7 9   CREATININE mg/dL 0.63 0.82   CALCIUM mg/dL 8.5* 9.9   BILIRUBIN mg/dL  --  1.2   ALK PHOS U/L  --  181*   ALT (SGPT) U/L  --  16   AST (SGOT) U/L  --  20   GLUCOSE mg/dL 184* 102*     Lab Results   Component Value Date    HGBA1C 4.70 (L) 03/13/2023       Assessment and Plan:       S/P colectomy( robotic lap LAR)    Malignant neoplasm of sigmoid colon (HCC)    GERD without esophagitis  Anemia    Plan  Postop management to include  1. Ambulation  2. Pain control-prns   3. IS-encourage  4. DVT proph- Mechanical, subcutaneous Lovenox  5. Bowel regimen, alvimopan  6. Resume home medications as appropriate  7. Monitor post-op labs  8. DC planning   9. Diet, Clears, advance diet as tolerated.  IVF initially, monitor volume status.     -GERD:  Resume PPI.  Formulary substitution when indicated.    Discussed with patient and her .    Dragon disclaimer:  Part of this encounter note is an electronic transcription/translation of spoken language to printed text. The electronic translation of spoken language may permit erroneous, or at times, nonsensical words or phrases to be inadvertently transcribed; Although I have reviewed the note for such errors, some may still exist.    Jese Gray MD  03/15/23  07:53 EDT              Electronically signed by Jese Gray MD at 03/15/23 0756     Tayla Gonzalez APRN at 03/14/23 1310     Attestation signed by Shari Aguirre MD at 03/14/23 1431    I have reviewed this documentation and agree.                    Pre-Op H&P  Vangienoreen Carney  [id number]  1970      Chief complaint: Colon cancer      Subjective:  Patient is a 52 y.o.female presents for scheduled surgery by   Carla. She anticipates a COLON RESECTION LOW ANTERIOR LAPAROSCOPIC WITH DAVINCI ROBOT today. Last October, she started having stomach problems. Symptoms included nausea, constipation and cramping. No diarrhea or bloody stools. About a month ago she had colonoscopy and was found to have sigmoid colon cancer.      Review of Systems:  Constitutional-- No fever, chills or sweats. No fatigue.  CV-- No chest pain, palpitation or syncope  Resp-- No SOB, cough, hemoptysis  Skin--No rashes or lesions      Allergies:   Allergies   Allergen Reactions   • Keflex [Cephalexin] Hives         Home Meds:  Medications Prior to Admission   Medication Sig Dispense Refill Last Dose   • Loratadine 10 MG capsule Take  by mouth As Needed.   Past Week   • pantoprazole (PROTONIX) 40 MG EC tablet Take 1 tablet by mouth Daily.   3/13/2023   • polyethylene glycol (MIRALAX) 17 GM/SCOOP powder Take 17 g by mouth Daily As Needed (constipation). 510 g 0 Past Week         PMH:   Past Medical History:   Diagnosis Date   • Gallbladder attack    • GERD (gastroesophageal reflux disease)    • Hearing aid worn    • History of ear infections    • Seasonal allergies    • Wears glasses      PSH:    Past Surgical History:   Procedure Laterality Date   • CHOLECYSTECTOMY     • COLONOSCOPY N/A 2/15/2023    Procedure: COLONOSCOPY FOR SCREENING;  Surgeon: Giselle Iniguez MD;  Location: [facility];  Service: Gastroenterology;  Laterality: N/A;   • ENDOSCOPY N/A 2/15/2023    Procedure: ESOPHAGOGASTRODUODENOSCOPY WITH BIOPSY;  Surgeon: Giselle Iniguez MD;  Location: [facility];  Service: Gastroenterology;  Laterality: N/A;   • LAPAROSCOPIC TUBAL LIGATION Bilateral    • MIDDLE EAR SURGERY         Immunization History:  Influenza: No  Pneumococcal: No  Tetanus: UTD  Covid : No    Social History:   Tobacco:   Social History     Tobacco Use   Smoking Status Former   • Packs/day: 1.00   • Years: 30.00   • Pack years: 30.00   • Types: Cigarettes   • Quit  "date:    • Years since quittin.2   Smokeless Tobacco Never      Alcohol:     Social History     Substance and Sexual Activity   Alcohol Use Never         Physical Exam:/82 (BP Location: Right arm, Patient Position: Lying)   Pulse 87   Temp 97.2 °F (36.2 °C) (Temporal)   Resp 18   Ht 165.1 cm (65\")   Wt 85.7 kg (189 lb)   LMP 06/15/2016   SpO2 96%   BMI 31.45 kg/m²       General Appearance:    Alert, cooperative, no distress, appears stated age   Head:    Normocephalic, without obvious abnormality, atraumatic   Lungs:     Clear to auscultation bilaterally, respirations unlabored    Heart:   Regular rate and rhythm, S1 and S2 normal    Abdomen:    Soft without tenderness   Extremities:   Extremities normal, atraumatic, no cyanosis or edema   Skin:   Skin color, texture, turgor normal, no rashes or lesions   Neurologic:   Grossly intact     Results Review:     LABS:  Lab Results   Component Value Date    WBC 4.72 2023    HGB 12.8 2023    HCT 38.6 2023    MCV 93.2 2023     2023    NEUTROABS 3.08 02/15/2023    GLUCOSE 102 (H) 2023    BUN 9 2023    CREATININE 0.82 2023    EGFRIFNONA 79 09/10/2021     2023    K 4.2 2023     2023    CO2 31.0 (H) 2023    MG 2.1 09/10/2021    CALCIUM 9.9 2023    ALBUMIN 4.7 2023    AST 20 2023    ALT 16 2023    BILITOT 1.2 2023       RADIOLOGY:  2/15/23 CT abd:  FINDINGS:     Lower thorax: Clear. No effusions.     Abdomen:     Liver: Homogeneous. No focal hepatic mass or ductal dilatation.     Gallbladder: Surgically absent     Pancreas: Unremarkable. No mass or ductal dilatation.     Spleen: Homogeneous. No splenomegaly.     Adrenals: No mass.     Kidneys/ureters: No mass. No obstructive uropathy.  No evidence of  urolithiasis.     GI tract: Mild narrowing in the sigmoid colon image 479 of the axial  series.. There is no evidence of appendicitis   "   MESENTERY: No free fluid, walled off fluid collections, mesenteric  stranding, or enlarged lymph nodes        Vasculature: No evidence of aneurysm.     Abdominal wall: No focal hernia or mass.        Bladder: No focal mass or significant wall thickening     Reproductive: Unremarkable as visualized     Bones: No acute bony abnormality.     IMPRESSION:     1. Mild narrowing in the sigmoid colon. Recommend direct visualization     2.Other findings as above    I reviewed the patient's new clinical results.    Cancer Staging (if applicable)  Cancer Patient: __ yes __no __unknown; If yes, clinical stage T:__ N:__M:__, stage group or __N/A      Impression: Malignant neoplasm of sigmoid colon       Plan: COLON RESECTION LOW ANTERIOR LAPAROSCOPIC WITH DAVINCI ROBOT      Tayla ANURAG Gonzalez   3/14/2023   13:10 EDT    Electronically signed by Shari Aguirre MD at 03/14/23 1431         Current Facility-Administered Medications   Medication Dose Route Frequency Provider Last Rate Last Admin   • acetaminophen (TYLENOL) tablet 1,000 mg  1,000 mg Oral Q6H Shari Aguirre MD   1,000 mg at 03/16/23 0850   • alvimopan (ENTEREG) capsule 12 mg  12 mg Oral BID Shari Aguirre MD   12 mg at 03/16/23 0850   • cetirizine (zyrTEC) tablet 10 mg  10 mg Oral Daily Shari Aguirre MD   10 mg at 03/16/23 0850   • diazePAM (VALIUM) tablet 5 mg  5 mg Oral Q6H PRN Shari Aguirre MD       • ibuprofen (ADVIL,MOTRIN) tablet 400 mg  400 mg Oral Q6H Shari Aguirre MD   400 mg at 03/16/23 0550   • Insulin Lispro (humaLOG) injection 0-7 Units  0-7 Units Subcutaneous 4x Daily AC & at Bedtime Shari Aguirre MD   2 Units at 03/15/23 0831   • labetalol (NORMODYNE,TRANDATE) injection 10 mg  10 mg Intravenous Q4H PRN Jese Gray MD       • morphine injection 2 mg  2 mg Intravenous Q4H PRN Shari Aguirre MD        And   • naloxone (NARCAN) injection 0.4 mg  0.4 mg Intravenous Q5 Min PRN Shari Aguirre, MD       • ondansetron (ZOFRAN)  injection 4 mg  4 mg Intravenous Q6H PRN Shari Aguirre MD       • oxyCODONE (ROXICODONE) immediate release tablet 5 mg  5 mg Oral Q4H PRN Shari Aguirre MD       • pantoprazole (PROTONIX) EC tablet 40 mg  40 mg Oral Daily Shari Aguirre MD   40 mg at 03/16/23 0850   • sodium chloride 0.9 % bolus 500 mL  500 mL Intravenous TID PRN Jese Gray MD 1,000 mL/hr at 03/15/23 1827 500 mL at 03/15/23 1827       Lab Results (last 24 hours)     Procedure Component Value Units Date/Time    POC Glucose Once [[id number]]  (Normal) Collected: 03/16/23 1135    Specimen: Blood Updated: 03/16/23 1138     Glucose 94 mg/dL      Comment: Meter: [id number] : [id number] [facility]       Tissue Pathology Exam [[id number]] Collected: 03/14/23 1704    Specimen: Tissue from Large Intestine, Sigmoid Colon Updated: 03/16/23 1002     Case Report --     Surgical Pathology Report                         Case: [id number]                                  Authorizing Provider:  Shari Aguirre MD        Collected:           03/14/2023 05:04 PM          Ordering Location:     [facility]   Received:            03/15/2023 07:35 AM                                 OR                                                                           Pathologist:           Jose Benson MD                                                        Specimen:    Large Intestine, Sigmoid Colon, Sigmoid Colon, Upper Rectum, Anastomotic                            Rings--stitch marks distal margin                                                           Clinical Information --     Malignant neoplasm of sigmoid colon (HCC)       Final Diagnosis --     SIGMOID COLON AND UPPER RECTUM, LOW ANTERIOR RESECTION:  Adenocarcinoma, moderately differentiated, invasive through muscularis propria into pericolonic soft tissue.  Lymphovascular invasion present.  Surgical margins negative for carcinoma.  2 of 15 lymph nodes involved by metastatic  carcinoma with extranodal extension present (2/15).    COLON AND RECTUM TEMPLATE:  TYPE OF SPECIMEN/PROCEDURE: Low anterior resection  MULTIPLE PRIMARY SITES: Not identified  TUMOR SITE: Sigmoid colon  TUMOR SIZE (greatest dimension): 3.5 x 2.5 x 1.4 cm  MACROSCOPIC TUMOR PERFORATION: Not identified (see comment)  HISTOLOGIC TYPE: Adenocarcinoma  HISTOLOGIC GRADE: G2  TUMOR BUD SCORE: High  MICROSCOPIC DEPTH OF TUMOR INVASION/EXTENSION: Tumor extends through muscularis propria into pericolonic soft tissue  PERITONEAL INVOLVEMENT: Not identified  LYMPHVASCULAR INVASION: Present  PERINEURAL INVASION: Not identified  SURGICAL MARGINS: All surgical margins negative for invasive carcinoma, high-grade dysplasia and low-grade dysplasia  TUMOR DEPOSITS (DISCONTINUOUS EXTRAMURAL EXTENSION): Not identified  NUMBER OF LYMPH NODES INVOLVED BY METASTATIC NEOPLASM: 2  NUMBER OF REGIONAL LYMPH NODES EXAMINED: 15  EXTRANODAL EXTENSION: Present  TREATMENT EFFECT: No known presurgical therapy  MSI TESTING: Intact expression per previous biopsy   DISTANT SITES INVOLVED: N/A    AJCC PATHOLOGIC STAGE:  (COMPLETED BY PATHOLOGIST, BASED ONLY ON TISSUE FINDINGS, MORE EXTENSIVE DISEASE MAY NOT BE KNOWN TO THE PATHOLOGIST)  pT=  3  pN=  1b        Gross Description --     1. Large Intestine, Sigmoid Colon.  Received in formalin labeled sigmoid colon, upper rectum, anastomotic rings is a disrupted, intact segment of sigmoid colon and upper rectum.  There is a large transmural defect located 8.5 cm proximal to the distal margin with a piece of mesentery connecting the 2 disrupted pieces.  The colon is 25 cm long when reapproximated and averages 2.4 cm in diameter.  There is a suture designating the distal margin.  At the distal end there is up to 3.5 cm of intact, complete mesorectum.  Located 5.5 cm proximal to the anterior peritoneal reflection is a 3.5 x 2.5 x 1.4 cm disrupted, polypoid mass.  The mass is 7.7 cm from the distal margin and 13  cm from the proximal margin.  Sectioning through the mass reveals invasion through the muscularis into the fat.  The mass is at least 2.6 cm from the radial margin (inked green).  The remaining mucosa is tan and glistening with normal folds and sectioning reveals an unremarkable tan-pink colon wall averaging 0.3 cm thick.  Sectioning through the mesenteric fat reveals 17 possible lymph nodes ranging from 0.3 x 0.2 x 0.2 cm to 0.7 x 0.6 x 0.6 cm.  Also received in the container is a 1.8 x 1.8 x 1.2 cm anastomotic ring with congested mucosa.  No polyps or ulcers are present.  Representative sections are submitted as follows:    1A: Proximal margin en face  1B: Distal margin en face  1C: Perpendicular radial margin closest to mass  1D-1F: Mass with deepest invasion  1G-1H: Intact nodes  1I: Bisected node  1J: Intact nodes  1K: Anastomotic ring   HDM       Microscopic Description --     The slides are reviewed and demonstrate histopathologic features supporting the above rendered diagnosis.        POC Glucose Once [[id number]]  (Normal) Collected: 03/16/23 0735    Specimen: Blood Updated: 03/16/23 0736     Glucose 104 mg/dL      Comment: Meter: [id number] : [id number] [place]       Basic Metabolic Panel [[id number]]  (Abnormal) Collected: 03/16/23 0501    Specimen: Blood Updated: 03/16/23 0626     Glucose 82 mg/dL      BUN 6 mg/dL      Creatinine 0.87 mg/dL      Sodium 144 mmol/L      Potassium 4.3 mmol/L      Chloride 111 mmol/L      CO2 27.0 mmol/L      Calcium 8.5 mg/dL      BUN/Creatinine Ratio 6.9     Anion Gap 6.0 mmol/L      eGFR 80.8 mL/min/1.73     Narrative:      GFR Normal >60  Chronic Kidney Disease <60  Kidney Failure <15      Magnesium [[id number]]  (Normal) Collected: 03/16/23 0501    Specimen: Blood Updated: 03/16/23 0626     Magnesium 1.8 mg/dL     CBC & Differential [[id number]]  (Abnormal) Collected: 03/16/23 0501    Specimen: Blood Updated: 03/16/23 0604    Narrative:      The following  orders were created for panel order CBC & Differential.  Procedure                               Abnormality         Status                     ---------                               -----------         ------                     CBC Auto Differential[[id number]]        Abnormal            Final result                 Please view results for these tests on the individual orders.    CBC Auto Differential [[id number]]  (Abnormal) Collected: 03/16/23 0501    Specimen: Blood Updated: 03/16/23 0604     WBC 4.78 10*3/mm3      RBC 2.64 10*6/mm3      Hemoglobin 8.4 g/dL      Hematocrit 26.4 %      .0 fL      MCH 31.8 pg      MCHC 31.8 g/dL      RDW 13.2 %      RDW-SD 47.9 fl      MPV 10.6 fL      Platelets 195 10*3/mm3      Neutrophil % 71.6 %      Lymphocyte % 22.4 %      Monocyte % 5.0 %      Eosinophil % 0.4 %      Basophil % 0.4 %      Immature Grans % 0.2 %      Neutrophils, Absolute 3.42 10*3/mm3      Lymphocytes, Absolute 1.07 10*3/mm3      Monocytes, Absolute 0.24 10*3/mm3      Eosinophils, Absolute 0.02 10*3/mm3      Basophils, Absolute 0.02 10*3/mm3      Immature Grans, Absolute 0.01 10*3/mm3      nRBC 0.0 /100 WBC     Hemoglobin & Hematocrit, Blood [[id number]]  (Abnormal) Collected: 03/15/23 2054    Specimen: Blood Updated: 03/15/23 2105     Hemoglobin 8.7 g/dL      Hematocrit 26.1 %     POC Glucose Once [[id number]]  (Normal) Collected: 03/15/23 2009    Specimen: Blood Updated: 03/15/23 2010     Glucose 110 mg/dL      Comment: Meter: [id number] : [id number] Norma Domínguez       Hemoglobin & Hematocrit, Blood [[id number]]  (Abnormal) Collected: 03/15/23 1648    Specimen: Blood Updated: 03/15/23 1714     Hemoglobin 9.0 g/dL      Hematocrit 27.1 %     POC Glucose Once [[id number]]  (Normal) Collected: 03/15/23 1634    Specimen: Blood Updated: 03/15/23 1635     Glucose 119 mg/dL      Comment: Meter: [id number] : [id number] [facility]           Imaging Results (Last 24 Hours)     ** No results found  for the last 24 hours. **           Operative/Procedure Notes (all)      Shari Aguirre MD at 03/14/23 1257  Version 1 of 1         [facility]  OPERATIVE REPORT      Vangie Carney  3/14/2023    Pre-op Diagnosis:   Adenocarcinoma of the sigmoid colon    Post-op Diagnosis:    Adenocarcinoma of the sigmoid colon    Procedure(s):  COLON RESECTION LOW ANTERIOR LAPAROSCOPIC WITH DAVINCI ROBOT    Surgeon(s):  Shari Aguirre MD    Anesthesia: General with Block    Staff:   Circulator: Yanira Hawkins RN; Lilibeth Villatoro RN  Scrub Person: Diandra Mendez; Chaparrita Lewis; Laura Pop  Nursing Assistant: Екатерина Orellana  Assistant: Sha García PA    Assistant: Sha García PA was responsible for performing the following activities: Retraction, Suction, Irrigation, Suturing, Closing, Placing Dressing and Held/Positioned Camera and their skilled assistance was necessary for the success of this case.          Synoptic Findings:  Intent of Procedure (if related to known cancer diagnosis): This procedure was performed with curative intent related to a known cancer diagnosis.      Estimated Blood Loss: 100ml    Urine Voided: * No values recorded between 3/14/2023  3:20 PM and 3/14/2023  5:36 PM *    Specimens:                Specimens     ID Source Type Tests Collected By Collected At Frozen?    A Large Intestine, Sigmoid Colon Tissue · TISSUE PATHOLOGY EXAM   Shari Aguirre MD 3/14/23 4192     Description: Sigmoid Colon, Upper Rectum, Anastomotic Rings--stitch marks distal margin    This specimen was not marked as sent.                Drains:   Urethral Catheter Silicone 16 Fr. (Active)       Findings: Distal sigmoid tumor, apple core lesion.  Tumor perforated and toward during extraction.  It was not perforated in the abdominal cavity.  Negative air leak test    Complications: None.    Procedure in Detail:   Lithotomy position.       Operative site was prepped and draped in the  usual sterile fashion.    GelPort was placed using open technique at the level of the umbilicus.      Abdominal exploration was performed and there is no evidence of metastatic disease.      In the right abdomen, 3 additional 8 mm ports and one 12 mm port was placed under direct visualization.  The patient was placed in Trendelenburg position to 28 degrees and 9 degrees right side tilt.  The robot was docked.  There were pelvic adhesions of the small bowel to the vaginal cuff.  These were taken down sharply.  The small bowel was placed in the right upper quadrant.      Pathology noted in the distal sigmoid colon with tattoo present just above the peritoneal reflection.  Sigmoid was freely mobile without adhesions to the pelvic organs.  The line of Toldt was then incised.       The left ureter was identified and kept out of the plane of dissection at all times.     The ALVARADO pedicle was isolated and divided using the vessel seal device.  Tumor-specific mesorectal dissection was performed to the level of the mid rectum.  The mesorectum was transected using the vessel seal device.    Isocyanate green was used to confirm bright fluorescence in the descending colon conduit and the residual rectum.    Rectum transected with 2 firings of the sure form 45 stapler, 2 green loads.    A healthy location on the descending colon was chosen for a point of transection. This was performed using the vessel sealer device.  The mesentery of the specimen was then transected using the vessel seal device.  This was done using the vessel sealer.  Isocyanate green was used to confirm good fluorescence at the descending colon conduit.  The descending colon was transected using the 45 sure form green stapler.    The abdomen was desufflated and the specimen was removed through the GelPort.  The anvil was placed through the GelPort as the abdomen was reinsufflated.    I opened the specimen on the back table and ensured greater than 5 cm proximal  and distal margins.  I put a stitch on the distal margin.  The specimen did tear at the level of the tumor.  There is a staple line at the proximal margin as well.    The anvil placed in the proximal colon and secured with a pursestring of 3-0 strata fix.    Anastomosis was performed in the mid rectum, below the peritoneal reflection.   29 mm Ethicon powered EEA stapler and was used.    Rigid proctoscopy performed anastomosis under saline irrigation.  Anastomosis was found to be hemostatic, airtight, and circumferentially intact.      The anastomosis was inspected and found to be tension-free.      Abdomen was inspected for hemostasis.  Achieved.    Abdomen was inspected for other pathology. None noted.     12 millimeter stapler port in the right lower quadrant was closed with James-close device.     Uterus and adenxal structures appeared normal.    Fascia closed with #1 non-looped PDS x2 in an interrupted fashion.  Wounds were irrigated with chlorhexidine solution.  Skin closed with monocryl. Exofin dressing applied.       All counts were announced as correct at the end of the case. Patient tolerated procedure well, extubated in the operating room and transferred to recovery room in stable condition.         Shari Aguirre MD     Date: 3/14/2023  Time: 18:05 EDT        Electronically signed by Shari Aguirre MD at 03/14/23 1809          Physician Progress Notes (last 24 hours)      Shari Aguirre MD at 03/15/23 1637        Seen and examined.  Chart data reviewed.  Tired today and blood pressure has been in the high 80s to 90s.  Heart rate in the 50s to 60s.  Urinating and ambulated several times today.  Has received a liter of fluid so far.  Abdomen is soft and nontender.  Nondistended.  No nausea or vomiting, no fevers or chills.  No gas or bowel movement yet.  Stat H&H is pending and I have set up a couple units of blood for her and will give albumin.  Doubt she has bleeding but possibly.  Will monitor  "closely.    Electronically signed by Shari Aguirre MD at 03/15/23 1638     Carlos TaylaANURAG at 03/15/23 1216           progress note      Vangie Carney  [id number]  1970     LOS: 1 day     Attending: Shari Aguirre MD    Primary Care Provider: Vimal Moreno MD      Chief Complaint/Reason for visit:  Abd pain    Subjective   Doing fairly well.  Adequate pain control.  Tolerating clear liquid diet.  Ambulating in halls.  Reports flatus, no BM. Denies f/c/n/v/sob/cp.    Objective     Vital Signs    Visit Vitals  BP 95/53 (BP Location: Left arm, Patient Position: Lying)   Pulse 56   Temp 98.1 °F (36.7 °C) (Axillary)   Resp 18   Ht 170.2 cm (67\")   Wt 90.2 kg (198 lb 12.8 oz)   LMP 06/15/2016   SpO2 95%   BMI 31.14 kg/m²     Temp (24hrs), Av.3 °F (36.3 °C), Min:96.5 °F (35.8 °C), Max:98.1 °F (36.7 °C)      Nutrition: Advance to full liquids    Respiratory: Room air    Physical Exam:     General Appearance:    Alert, cooperative, in no acute distress   Head:    Normocephalic, without obvious abnormality, atraumatic    Lungs:     Normal effort, symmetric chest rise, no crepitus, clear to      auscultation bilaterally             Heart:    Regular rhythm and normal rate, normal S1 and S2   Abdomen:    Soft, expected tenderness.  Clean incisions. + Bowel sounds   Extremities:   No clubbing, cyanosis or edema.  No deformities.    Pulses:   Pulses palpable and equal bilaterally   Skin:   No bleeding, bruising or rash   Neurologic:   Moves all extremities with no obvious focal motor deficit.  Cranial nerves 2 - 12 grossly intact     Results Review:     I reviewed the patient's new clinical results.   Results from last 7 days   Lab Units 03/15/23  0400 23  1901 23  1339   WBC 10*3/mm3 6.60  --  4.72   HEMOGLOBIN g/dL 10.1* 10.3* 12.8   HEMATOCRIT % 30.3* 30.5* 38.6   PLATELETS 10*3/mm3 217  --  269     Results from last 7 days   Lab Units 03/15/23  0400 23  1339   SODIUM " mmol/L 141 142   POTASSIUM mmol/L 4.3 4.2   CHLORIDE mmol/L 107 103   CO2 mmol/L 27.0 31.0*   BUN mg/dL 7 9   CREATININE mg/dL 0.63 0.82   CALCIUM mg/dL 8.5* 9.9   BILIRUBIN mg/dL  --  1.2   ALK PHOS U/L  --  181*   ALT (SGPT) U/L  --  16   AST (SGOT) U/L  --  20   GLUCOSE mg/dL 184* 102*      Latest Reference Range & Units 03/15/23 05:59 03/15/23 07:56 03/15/23 11:10   Glucose 70 - 130 mg/dL 186 (H) 162 (H) 96   (H): Data is abnormally high    I reviewed the patient's new imaging including images and reports.    All medications reviewed.   acetaminophen, 1,000 mg, Oral, Q6H  alvimopan, 12 mg, Oral, BID  cetirizine, 10 mg, Oral, Daily  enoxaparin, 40 mg, Subcutaneous, Q24H  ibuprofen, 400 mg, Oral, Q6H  insulin lispro, 0-7 Units, Subcutaneous, 4x Daily AC & at Bedtime  pantoprazole, 40 mg, Oral, Daily        Assessment & Plan     S/P colectomy( robotic lap LAR)    Malignant neoplasm of sigmoid colon (HCC)    GERD without esophagitis    Acute postoperative pain      Plan  1. Ambulation  2. Pain control-prns   3. IS-encouraged  4. DVT proph- mechs/Lovenox  5. Bowel regimen/Entereg  6.  Advance to clear liquid diet.  Continue IVF  7. Monitor post-op labs  8. DC planning for home      -GERD:  Resume PPI.  Formulary substitution when indicated.      ANURAG Valles  03/15/23  12:18 EDT    Electronically signed by Tayla Gonzalez APRN at 03/15/23 4632

## 2023-03-16 NOTE — CASE MANAGEMENT/SOCIAL WORK
Discharge Planning Assessment  [facility]     Patient Name: Vangie Carney  MRN: [id number]  Today's Date: 3/16/2023    Admit Date: 3/14/2023    Plan: Home with Family   Discharge Needs Assessment     Row Name 03/16/23 1337       Living Environment    People in Home child(alfred), adult;spouse    Name(s) of People in Home Red Carney-     Current Living Arrangements home    Primary Care Provided by self    Provides Primary Care For no one    Family Caregiver if Needed spouse    Family Caregiver Names Red Carney    Quality of Family Relationships helpful;involved;supportive    Able to Return to Prior Arrangements yes       Transition Planning    Patient/Family Anticipates Transition to home with family    Transportation Anticipated family or friend will provide       Discharge Needs Assessment    Readmission Within the Last 30 Days no previous admission in last 30 days    Equipment Currently Used at Home none    Concerns to be Addressed discharge planning               Discharge Plan     Row Name 03/16/23 2241       Plan    Plan Home with Family    Patient/Family in Agreement with Plan yes    Plan Comments I have met with Mrs. Carney at the bedside to initiate a discharge plan.  She states that she lives with her , Red in a home in [facility].  She reports that she is independent with activities of daily living and mobility prior to this hospital admission.  She denies use of DME and current receipt of home health/outpatient services.  She plans to return home at discharge and states that her  will be available to assist as needed.  CM will cont to follow the evolving plan of care and assist with discharge planning as recommendations become available.    Final Discharge Disposition Code 01 - home or self-care              Continued Care and Services - Admitted Since 3/14/2023    Coordination has not been started for this encounter.          Demographic Summary     Row Name 03/16/23 1339        General Information    Admission Type inpatient    Arrived From home    Referral Source admission list    Reason for Consult discharge planning    General Information Comments I have confirmed with Ms. Carney that her PCP is Vimal Moreno MD and her insurance is "[employer].       Contact Information    Permission Granted to Share Info With                Functional Status     Row Name 03/16/23 1337       Functional Status, IADL    Medications independent    Meal Preparation independent    Housekeeping independent    Laundry independent    Shopping independent       Employment/    Employment Status employed part-time               Psychosocial    No documentation.                Abuse/Neglect    No documentation.                Legal    No documentation.                Substance Abuse    No documentation.                Patient Forms    No documentation.                   Sybil Huang RN

## 2023-03-16 NOTE — PROGRESS NOTES
Patient requested a visit from the . Patient asked the  to pray for her.  prayed according to her wishes.  provided supportive conversation, active listening and spiritual encouragement during this patient visit.

## 2023-03-16 NOTE — PROGRESS NOTES
Seen and examined.  Chart data reviewed.  Vital signs and lab work all reassuring.  She feels a lot better today.  Ambulating.  Tolerating solid food.  Bowels are moving and passing gas.  I will plan to see her in 2 to 3 weeks or sooner as needed.  Okay to discharge from my standpoint.

## 2023-03-16 NOTE — PLAN OF CARE
Goal Outcome Evaluation:           Progress: no change  Outcome Evaluation: Pt SBP in high 80s at the beggining of shift. H&H rechecked at 2030, 8.7 & 26.1. Pt recieved 3x 500ml NS bolus and 1x albumin on dayshift. SBP is now 90s-100s. No signs of bleeding. NSR-SB. Pain being well controlled with tylenol and ibuprofen. UOP adequate. No BM. Passing flatulence.

## 2023-03-16 NOTE — DISCHARGE SUMMARY
Patient Name: Vangie Carney  MRN: [id number]  : 1970  DOS: 3/16/2023    Attending: Shari Aguirre MD    Primary Care Provider: Vimal Moreno MD    Date of Admission:.3/14/2023 12:09 PM    Date of Discharge:  3/16/2023    Discharge Diagnosis:   S/P colectomy( robotic lap LAR)    Malignant neoplasm of sigmoid colon (HCC)    GERD without esophagitis    Acute postoperative pain      Hospital Course  At admit       Patient is a pleasant 52 y.o. female presented for scheduled surgery by Dr. Aguirre.     Last October, she started having stomach problems. Symptoms included nausea, constipation and cramping. No diarrhea or bloody stools. About a month ago she had colonoscopy and was found to have sigmoid colon cancer.     Seen preoperatively, doing fairly well, no new complaints, no history of chest pain, no coronary artery disease, no liver or kidney disease.     Subsequent record review indicates she underwent robotic LAR by Dr. Aguirre under general anesthesia and a block, tolerated surgery well, was admitted for further management.    After admit    She was provided pain medication as needed for pain control.  Pt received DVT prophylaxis with subcutaneous Lovenox as well as mechanicals      Patient was started on clear liquid diet, this was advanced when sheshowed evidence of bowel function return.  She had blood with stool, improved. H/H stable.     Tolerated diet without difficulty.    she  used an IS for atelectasis prophylaxis.    Home medications were resumed as appropriate, and labs were monitored and remained fairly stable.      With the progress she has made, pt is ready for DC home today.      Discussed with patient regarding plan and she shows understanding and agreement.       Procedures Performed  Procedure(s):  COLON RESECTION LOW ANTERIOR LAPAROSCOPIC WITH DAVINCI ROBOT       Pertinent Test Results:    I reviewed the patient's new clinical results.   Results from last 7 days   Lab Units  "23  1432 23  0501 03/15/23  2054 03/15/23  1648 03/15/23  0400 23  1901 23  1339   WBC 10*3/mm3  --  4.78  --   --  6.60  --  4.72   HEMOGLOBIN g/dL 9.1* 8.4* 8.7*   < > 10.1*   < > 12.8   HEMATOCRIT % 28.4* 26.4* 26.1*   < > 30.3*   < > 38.6   PLATELETS 10*3/mm3  --  195  --   --  217  --  269    < > = values in this interval not displayed.     Results from last 7 days   Lab Units 23  0501 03/15/23  0400 23  1339   SODIUM mmol/L 144 141 142   POTASSIUM mmol/L 4.3 4.3 4.2   CHLORIDE mmol/L 111* 107 103   CO2 mmol/L 27.0 27.0 31.0*   BUN mg/dL 6 7 9   CREATININE mg/dL 0.87 0.63 0.82   CALCIUM mg/dL 8.5* 8.5* 9.9   BILIRUBIN mg/dL  --   --  1.2   ALK PHOS U/L  --   --  181*   ALT (SGPT) U/L  --   --  16   AST (SGOT) U/L  --   --  20   GLUCOSE mg/dL 82 184* 102*     I reviewed the patient's new imaging including images and reports.       Discharge Assessment:       Visit Vitals  /66 (BP Location: Right arm, Patient Position: Lying)   Pulse 62   Temp 97.7 °F (36.5 °C) (Oral)   Resp 18   Ht 170.2 cm (67\")   Wt 90.2 kg (198 lb 12.8 oz)   LMP 06/15/2016   SpO2 97%   BMI 31.14 kg/m²     Temp (24hrs), Av.8 °F (36.6 °C), Min:97.6 °F (36.4 °C), Max:98.2 °F (36.8 °C)      General Appearance:    Alert, cooperative, in no acute distress   Lungs:     Clear to auscultation,respirations regular, even and                   unlabored    Heart:    Regular rhythm and normal rate, normal S1 and S2    Abdomen:   Soft and benign with clean incisions   Extremities:   Moves all extremities well, no edema, no cyanosis, no              redness   Pulses:   Pulses palpable and equal bilaterally   Skin:   No bleeding, bruising or rash            Discharge Disposition: Home.           Discharge Medications      New Medications      Instructions Start Date   diazePAM 5 MG tablet  Commonly known as: Valium   5 mg, Oral, Every 6 Hours PRN      oxyCODONE 5 MG immediate release tablet  Commonly known as: " Roxicodone   5-10 mg, Oral, Every 4 Hours PRN         Continue These Medications      Instructions Start Date   Loratadine 10 MG capsule   Oral, As Needed      pantoprazole 40 MG EC tablet  Commonly known as: PROTONIX   40 mg, Oral, Daily      polyethylene glycol 17 GM/SCOOP powder  Commonly known as: MIRALAX   17 g, Oral, Daily PRN             Discharge Diet: Soft GI.    Activity at Discharge:   Ambulate. Restriction per .     Follow-up Appointments:  Shari Aguirre MD per her orders.     Dragon disclaimer:  Part of this encounter note is an electronic transcription/translation of spoken language to printed text. The electronic translation of spoken language may permit erroneous, or at times, nonsensical words or phrases to be inadvertently transcribed; Although I have reviewed the note for such errors, some may still exist.       Jese Gray MD  03/16/23  15:52 EDT

## 2023-03-17 LAB
BH BB BLOOD EXPIRATION DATE: NORMAL
BH BB BLOOD EXPIRATION DATE: NORMAL
BH BB BLOOD TYPE BARCODE: 9500
BH BB BLOOD TYPE BARCODE: 9500
BH BB DISPENSE STATUS: NORMAL
BH BB DISPENSE STATUS: NORMAL
BH BB PRODUCT CODE: NORMAL
BH BB PRODUCT CODE: NORMAL
BH BB UNIT NUMBER: NORMAL
BH BB UNIT NUMBER: NORMAL
CROSSMATCH INTERPRETATION: NORMAL
CROSSMATCH INTERPRETATION: NORMAL
UNIT  ABO: NORMAL
UNIT  ABO: NORMAL
UNIT  RH: NORMAL
UNIT  RH: NORMAL

## 2023-03-17 NOTE — OUTREACH NOTE
Prep Survey    Flowsheet Row Responses   [facility] facility patient discharged from? [facility]   Is LACE score < 7 ? No   Eligibility Readm Mgmt   Discharge diagnosis COLON RESECTION LOW ANTERIOR LAPAROSCOPIC for adenocarcinoma of the sigmoid colon   Does the patient have one of the following disease processes/diagnoses(primary or secondary)? General Surgery   Does the patient have Home health ordered? No   Is there a DME ordered? No   Prep survey completed? Yes          Whitney JACOBSON - Registered Nurse

## 2023-03-20 NOTE — PAYOR COMM NOTE
"Isela Gonzalez, RAÚL  Utilization Management  P:[phone]  F:[phone]    Auth# [id number]  Jan Solano (52 y.o. Female)     Date of Birth   1970    Social Security Number       Address   [address] [place] [address]    Home Phone   [phone]    MRN   [id number]       Mormonism   Baptism    Marital Status                               Admission Date   3/14/23    Admission Type   Elective    Admitting Provider   Shari Aguirre MD    Attending Provider       Department, Room/Bed   [facility] 5G, S564/1       Discharge Date   3/16/2023    Discharge Disposition   Home or Self Care    Discharge Destination                               Attending Provider: (none)   Allergies: Keflex [Cephalexin]    Isolation: None   Infection: None   Code Status: Prior    Ht: 170.2 cm (67\")   Wt: 90.2 kg (198 lb 12.8 oz)    Admission Cmt: None   Principal Problem: S/P colectomy( robotic lap LAR) [Z90.49]                 Active Insurance as of 3/14/2023     Primary Coverage     Payor Plan Insurance Group Employer/Plan Group    [employer] [facility] [employer] [facility] [employer] EMPLOYEE [id number]     Payor Plan Address Payor Plan Phone Number Payor Plan Fax Number Effective Dates    [phone]  2018 - None Entered    [place] [address]       Subscriber Name Subscriber Birth Date Member ID       JAN SOLANO 1970 [id number]                 Emergency Contacts      (Rel.) Home Phone Work Phone Mobile Phone    Red Solano (Spouse) -- -- [phone]               Discharge Summary      Jese Gray MD at 23 1552            Patient Name: Jan Solano  MRN: [id number]  : 1970  DOS: 3/16/2023    Attending: Shari Aguirre MD    Primary Care Provider: Vimal Moreno MD    Date of Admission:.3/14/2023 12:09 PM    Date of Discharge:  3/16/2023    Discharge Diagnosis:   S/P colectomy( robotic lap LAR)    Malignant neoplasm of " sigmoid colon (HCC)    GERD without esophagitis    Acute postoperative pain      Hospital Course  At admit       Patient is a pleasant 52 y.o. female presented for scheduled surgery by Dr. Aguirre.     Last October, she started having stomach problems. Symptoms included nausea, constipation and cramping. No diarrhea or bloody stools. About a month ago she had colonoscopy and was found to have sigmoid colon cancer.     Seen preoperatively, doing fairly well, no new complaints, no history of chest pain, no coronary artery disease, no liver or kidney disease.     Subsequent record review indicates she underwent robotic LAR by Dr. Aguirre under general anesthesia and a block, tolerated surgery well, was admitted for further management.    After admit    She was provided pain medication as needed for pain control.  Pt received DVT prophylaxis with subcutaneous Lovenox as well as mechanicals      Patient was started on clear liquid diet, this was advanced when sheshowed evidence of bowel function return.  She had blood with stool, improved. H/H stable.     Tolerated diet without difficulty.    she  used an IS for atelectasis prophylaxis.    Home medications were resumed as appropriate, and labs were monitored and remained fairly stable.      With the progress she has made, pt is ready for DC home today.      Discussed with patient regarding plan and she shows understanding and agreement.       Procedures Performed  Procedure(s):  COLON RESECTION LOW ANTERIOR LAPAROSCOPIC WITH DAVINCI ROBOT       Pertinent Test Results:    I reviewed the patient's new clinical results.   Results from last 7 days   Lab Units 03/16/23  1432 03/16/23  0501 03/15/23  2054 03/15/23  1648 03/15/23  0400 03/14/23  1901 03/13/23  1339   WBC 10*3/mm3  --  4.78  --   --  6.60  --  4.72   HEMOGLOBIN g/dL 9.1* 8.4* 8.7*   < > 10.1*   < > 12.8   HEMATOCRIT % 28.4* 26.4* 26.1*   < > 30.3*   < > 38.6   PLATELETS 10*3/mm3  --  195  --   --  217  --  269    < > =  "values in this interval not displayed.     Results from last 7 days   Lab Units 23  0501 03/15/23  0400 23  1339   SODIUM mmol/L 144 141 142   POTASSIUM mmol/L 4.3 4.3 4.2   CHLORIDE mmol/L 111* 107 103   CO2 mmol/L 27.0 27.0 31.0*   BUN mg/dL 6 7 9   CREATININE mg/dL 0.87 0.63 0.82   CALCIUM mg/dL 8.5* 8.5* 9.9   BILIRUBIN mg/dL  --   --  1.2   ALK PHOS U/L  --   --  181*   ALT (SGPT) U/L  --   --  16   AST (SGOT) U/L  --   --  20   GLUCOSE mg/dL 82 184* 102*     I reviewed the patient's new imaging including images and reports.       Discharge Assessment:       Visit Vitals  /66 (BP Location: Right arm, Patient Position: Lying)   Pulse 62   Temp 97.7 °F (36.5 °C) (Oral)   Resp 18   Ht 170.2 cm (67\")   Wt 90.2 kg (198 lb 12.8 oz)   LMP 06/15/2016   SpO2 97%   BMI 31.14 kg/m²     Temp (24hrs), Av.8 °F (36.6 °C), Min:97.6 °F (36.4 °C), Max:98.2 °F (36.8 °C)      General Appearance:    Alert, cooperative, in no acute distress   Lungs:     Clear to auscultation,respirations regular, even and                   unlabored    Heart:    Regular rhythm and normal rate, normal S1 and S2    Abdomen:   Soft and benign with clean incisions   Extremities:   Moves all extremities well, no edema, no cyanosis, no              redness   Pulses:   Pulses palpable and equal bilaterally   Skin:   No bleeding, bruising or rash            Discharge Disposition: Home.           Discharge Medications      New Medications      Instructions Start Date   diazePAM 5 MG tablet  Commonly known as: Valium   5 mg, Oral, Every 6 Hours PRN      oxyCODONE 5 MG immediate release tablet  Commonly known as: Roxicodone   5-10 mg, Oral, Every 4 Hours PRN         Continue These Medications      Instructions Start Date   Loratadine 10 MG capsule   Oral, As Needed      pantoprazole 40 MG EC tablet  Commonly known as: PROTONIX   40 mg, Oral, Daily      polyethylene glycol 17 GM/SCOOP powder  Commonly known as: MIRALAX   17 g, Oral, Daily " PRN             Discharge Diet: Soft GI.    Activity at Discharge:   Ambulate. Restriction per .     Follow-up Appointments:  Shari Aguirre MD per her orders.     Dragon disclaimer:  Part of this encounter note is an electronic transcription/translation of spoken language to printed text. The electronic translation of spoken language may permit erroneous, or at times, nonsensical words or phrases to be inadvertently transcribed; Although I have reviewed the note for such errors, some may still exist.       Jese Gray MD  03/16/23  15:52 EDT          Electronically signed by Jese Gray MD at 03/19/23 0482

## 2023-03-21 ENCOUNTER — READMISSION MANAGEMENT (OUTPATIENT)
Dept: CALL CENTER | Facility: HOSPITAL | Age: 53
End: 2023-03-21
Payer: COMMERCIAL

## 2023-03-21 NOTE — OUTREACH NOTE
General Surgery Week 1 Survey    Flowsheet Row Responses   [facility] patient discharged from? [place]   Does the patient have one of the following disease processes/diagnoses(primary or secondary)? General Surgery   Week 1 attempt successful? Yes   Call start time 1616   Call end time 1623   Discharge diagnosis COLON RESECTION LOW ANTERIOR LAPAROSCOPIC for adenocarcinoma of the sigmoid colon   Meds reviewed with patient/caregiver? Yes   Is the patient having any side effects they believe may be caused by any medication additions or changes? No   Does the patient have all medications related to this admission filled (includes all antibiotics, pain medications, etc.) Yes   Is the patient taking all medications as directed (includes completed medication regime)? Yes   Does the patient have a follow up appointment scheduled with their surgeon? No   What is preventing the patient from scheduling follow up appointments? Haven't had time   Nursing Interventions Advised patient to make appointment, Educated patient on importance of making appointment   Has the patient kept scheduled appointments due by today? N/A   Has home health visited the patient within 72 hours of discharge? N/A   Psychosocial issues? No   Did the patient receive a copy of their discharge instructions? Yes   Nursing interventions Reviewed instructions with patient   What is the patient's perception of their health status since discharge? Improving   Nursing interventions Nurse provided patient education   Is the patient /caregiver able to teach back basic post-op care? Continue use of incentive spirometry at least 1 week post discharge, Practice 'cough and deep breath', Drive as instructed by MD in discharge instructions, Take showers only when approved by MD-sponge bathe until then, No tub bath, swimming, or hot tub until instructed by MD, Keep incision areas clean,dry and protected, Do not remove steri-strips, Lifting as instructed by MD in  discharge instructions   Is the patient/caregiver able to teach back signs and symptoms of incisional infection? Increased redness, swelling or pain at the incisonal site, Increased drainage or bleeding, Incisional warmth, Pus or odor from incision, Fever   Is the patient/caregiver able to teach back steps to recovery at home? Set small, achievable goals for return to baseline health, Rest and rebuild strength, gradually increase activity, Eat a well-balance diet   If the patient is a current smoker, are they able to teach back resources for cessation? --  [vapes]   Is the patient/caregiver able to teach back the hierarchy of who to call/visit for symptoms/problems? PCP, Specialist, Home health nurse, Urgent Care, ED, 911 Yes   Additional teach back comments states has nl bowel function with use of Miralax and stool softeners   Week 1 call completed? Yes          Carrie JACOBSON - Registered Nurse

## 2023-03-24 NOTE — PROGRESS NOTES
"Enter Query Response Below      Query Response: yes             If applicable, please update the problem list.     Patient: Vangie Carney        : 1970  Account: [id number]           Admit Date: 3/14/2023        How to Respond to this query:       a. Click New Note     b. Answer query within the yellow box.                c. Update the Problem List, if applicable.      If you have any questions about this query contact me at: [email or web address]    ,     Patient underwent \"COLON RESECTION LOW ANTERIOR LAPAROSCOPIC WITH DAVINCI ROBOT\" per OP note (3/14).  Based on Medicare billing guidelines [facility] are not allowed to use diagnoses from pathology reports as the sole basis for billing. Any findings noted on a pathology report must be affirmed by the treating physician before billing.  The pathologist reported that the specimen shows :    FINAL DIAGNOSIS  SIGMOID COLON AND UPPER RECTUM, LOW ANTERIOR RESECTION:  Adenocarcinoma, moderately differentiated, invasive through muscularis propria into pericolonic soft tissue.  Lymphovascular invasion present.  Surgical margins negative for carcinoma.  2 of 15 lymph nodes involved by metastatic carcinoma with extranodal extension present (2/15).    COLON AND RECTUM TEMPLATE:  TYPE OF SPECIMEN/PROCEDURE: Low anterior resection  MULTIPLE PRIMARY SITES: Not identified  TUMOR SITE: Sigmoid colon  TUMOR SIZE (greatest dimension): 3.5 x 2.5 x 1.4 cm  MACROSCOPIC TUMOR PERFORATION: Not identified (see comment)  HISTOLOGIC TYPE: Adenocarcinoma  HISTOLOGIC GRADE: G2  TUMOR BUD SCORE: High  MICROSCOPIC DEPTH OF TUMOR INVASION/EXTENSION: Tumor extends through muscularis propria into pericolonic soft tissue  PERITONEAL INVOLVEMENT: Not identified  LYMPHVASCULAR INVASION: Present  PERINEURAL INVASION: Not identified  SURGICAL MARGINS: All surgical margins negative for invasive carcinoma, high-grade dysplasia and low-grade dysplasia  TUMOR DEPOSITS " (DISCONTINUOUS EXTRAMURAL EXTENSION): Not identified  NUMBER OF LYMPH NODES INVOLVED BY METASTATIC NEOPLASM: 2  NUMBER OF REGIONAL LYMPH NODES EXAMINED: 15  EXTRANODAL EXTENSION: Present  TREATMENT EFFECT: No known presurgical therapy  MSI TESTING: Intact expression per previous biopsy  DISTANT SITES INVOLVED: N/A    AJCC PATHOLOGIC STAGE: (COMPLETED BY PATHOLOGIST, BASED ONLY ON TISSUE FINDINGS, MORE EXTENSIVE DISEASE MAY NOT BE KNOWN TO THE PATHOLOGIST)  pT= 3  pN= 1b  Electronically signed by Jose Benson MD on 3/16/2023 at 10:02 AM-    Is this finding consistent with your clinical impression and treatment plan for this patient?    -Yes  -No  -Other explanation for clinical impression and treatment plan_________  -Clinically indeterminable    By submitting this query, we are merely seeking further clarification of documentation to accurately reflect all conditions that you are monitoring, evaluating, treating or that extend the hospitalization or utilize additional resources of care. Please utilize your independent clinical judgment when addressing the question(s) above.     This query and your response, once completed, will be entered into the legal medical record.    Sincerely,  Lavern Marquis RN,[facility]  Clinical Documentation Integrity Program

## 2023-03-29 ENCOUNTER — READMISSION MANAGEMENT (OUTPATIENT)
Dept: CALL CENTER | Facility: HOSPITAL | Age: 53
End: 2023-03-29
Payer: COMMERCIAL

## 2023-03-29 ENCOUNTER — CONSULT (OUTPATIENT)
Dept: ONCOLOGY | Facility: CLINIC | Age: 53
End: 2023-03-29
Payer: COMMERCIAL

## 2023-03-29 VITALS
OXYGEN SATURATION: 99 % | RESPIRATION RATE: 18 BRPM | HEIGHT: 65 IN | HEART RATE: 98 BPM | DIASTOLIC BLOOD PRESSURE: 81 MMHG | SYSTOLIC BLOOD PRESSURE: 117 MMHG | TEMPERATURE: 97.3 F | WEIGHT: 180.4 LBS | BODY MASS INDEX: 30.06 KG/M2

## 2023-03-29 DIAGNOSIS — C18.7 MALIGNANT NEOPLASM OF SIGMOID COLON: Primary | ICD-10-CM

## 2023-03-29 PROCEDURE — 99205 OFFICE O/P NEW HI 60 MIN: CPT | Performed by: INTERNAL MEDICINE

## 2023-03-29 RX ORDER — DEXTROSE MONOHYDRATE 50 MG/ML
250 INJECTION, SOLUTION INTRAVENOUS ONCE
OUTPATIENT
Start: 2023-04-05

## 2023-03-29 RX ORDER — FAMOTIDINE 10 MG/ML
20 INJECTION, SOLUTION INTRAVENOUS AS NEEDED
OUTPATIENT
Start: 2023-04-19

## 2023-03-29 RX ORDER — FLUOROURACIL 50 MG/ML
400 INJECTION, SOLUTION INTRAVENOUS ONCE
OUTPATIENT
Start: 2023-05-03

## 2023-03-29 RX ORDER — DEXTROSE MONOHYDRATE 50 MG/ML
250 INJECTION, SOLUTION INTRAVENOUS ONCE
OUTPATIENT
Start: 2023-05-03

## 2023-03-29 RX ORDER — FAMOTIDINE 10 MG/ML
20 INJECTION, SOLUTION INTRAVENOUS AS NEEDED
OUTPATIENT
Start: 2023-04-05

## 2023-03-29 RX ORDER — FAMOTIDINE 10 MG/ML
20 INJECTION, SOLUTION INTRAVENOUS AS NEEDED
OUTPATIENT
Start: 2023-05-03

## 2023-03-29 RX ORDER — DIPHENHYDRAMINE HYDROCHLORIDE 50 MG/ML
50 INJECTION INTRAMUSCULAR; INTRAVENOUS AS NEEDED
OUTPATIENT
Start: 2023-05-03

## 2023-03-29 RX ORDER — DIPHENHYDRAMINE HYDROCHLORIDE 50 MG/ML
50 INJECTION INTRAMUSCULAR; INTRAVENOUS AS NEEDED
OUTPATIENT
Start: 2023-04-05

## 2023-03-29 RX ORDER — FLUOROURACIL 50 MG/ML
400 INJECTION, SOLUTION INTRAVENOUS ONCE
OUTPATIENT
Start: 2023-04-19

## 2023-03-29 RX ORDER — PALONOSETRON 0.05 MG/ML
0.25 INJECTION, SOLUTION INTRAVENOUS ONCE
OUTPATIENT
Start: 2023-04-05

## 2023-03-29 RX ORDER — PALONOSETRON 0.05 MG/ML
0.25 INJECTION, SOLUTION INTRAVENOUS ONCE
OUTPATIENT
Start: 2023-04-19

## 2023-03-29 RX ORDER — FLUOROURACIL 50 MG/ML
400 INJECTION, SOLUTION INTRAVENOUS ONCE
OUTPATIENT
Start: 2023-04-05

## 2023-03-29 RX ORDER — PALONOSETRON 0.05 MG/ML
0.25 INJECTION, SOLUTION INTRAVENOUS ONCE
OUTPATIENT
Start: 2023-05-03

## 2023-03-29 RX ORDER — DEXTROSE MONOHYDRATE 50 MG/ML
250 INJECTION, SOLUTION INTRAVENOUS ONCE
OUTPATIENT
Start: 2023-04-19

## 2023-03-29 RX ORDER — DIPHENHYDRAMINE HYDROCHLORIDE 50 MG/ML
50 INJECTION INTRAMUSCULAR; INTRAVENOUS AS NEEDED
OUTPATIENT
Start: 2023-04-19

## 2023-03-29 NOTE — PROGRESS NOTES
"  Name:  Vangie Carney  :  1970  Date:  3/30/2023     REFERRING PHYSICIAN  Shari Aguirre MD    PRIMARY CARE PHYSICIAN  Vimal Moreno MD    REASON FOR CONSULTATION  1. Malignant neoplasm of sigmoid colon (HCC)      CHIEF COMPLAINT  None.    Dear Dr. Aguirre,    HISTORY OF PRESENT ILLNESS:   Thank you for referring Ms. Carney to our medical oncology clinic. As you are aware, she is a pleasant, 52 y.o., white female with minimal past medical history who was in her usual state of health until 2023 when she developed more frequent epigastric pains and BRBPR. She was referred to local gastroenterology, who performed an endoscopic exam that identified a tumor in the sigmoid colon. Biopsies were consistent with a moderately differentiated adenocarcinoma. She was referred to you, and you ultimately took her to the OR on 2023 for LAR. This procedure went very well, and a diverting ostomy was not required. She is now referred to our clinic for further evaluation and management.    INTERIM HISTORY:  Ms. Carney presents to clinic today for follow up accompanied by her . They confirm the above history. Over the course of the ~two weeks since her surgery, she has been recovering very well, without any complications. Her bowel movements are still a little \"abnormal\", but they are obviously much improved compared to how she was doing prior to her diagnosis and resection. She has no specific complaints in clinic today.    Past Medical History:   Diagnosis Date   • Gallbladder attack    • GERD (gastroesophageal reflux disease)    • Hearing aid worn    • History of ear infections    • Malignant neoplasm of sigmoid colon (HCC) 3/14/2023   • Seasonal allergies    • Wears glasses        Past Surgical History:   Procedure Laterality Date   • CHOLECYSTECTOMY     • COLON RESECTION N/A 3/14/2023    Procedure: COLON RESECTION LOW ANTERIOR LAPAROSCOPIC WITH DAVINCI ROBOT;  Surgeon: Shari Aguirre MD;  " Location:  [facility];  Service: Robotics - DaVinci;  Laterality: N/A;   • COLONOSCOPY N/A 2/15/2023    Procedure: COLONOSCOPY FOR SCREENING;  Surgeon: Giselle Iniguez MD;  Location:  [facility];  Service: Gastroenterology;  Laterality: N/A;   • ENDOSCOPY N/A 2/15/2023    Procedure: ESOPHAGOGASTRODUODENOSCOPY WITH BIOPSY;  Surgeon: Giselle Iniguez MD;  Location:  [facility];  Service: Gastroenterology;  Laterality: N/A;   • LAPAROSCOPIC TUBAL LIGATION Bilateral    • MIDDLE EAR SURGERY         Social History     Socioeconomic History   • Marital status:    Tobacco Use   • Smoking status: Former     Packs/day: 1.00     Years: 30.00     Pack years: 30.00     Types: Cigarettes     Quit date:      Years since quittin.2   • Smokeless tobacco: Never   Vaping Use   • Vaping Use: Every day   • Substances: Nicotine, Flavoring   • Devices: Refillable tank   Substance and Sexual Activity   • Alcohol use: Never   • Drug use: Defer   • Sexual activity: Defer     Birth control/protection: None       Family History   Problem Relation Age of Onset   • Cancer Mother    • Cancer Father    • Cancer Sister    • Cancer Brother    • Cancer Maternal Grandmother    • Cancer Maternal Grandfather    • Breast cancer Neg Hx        Allergies   Allergen Reactions   • Keflex [Cephalexin] Hives       Current Outpatient Medications   Medication Sig Dispense Refill   • diazePAM (Valium) 5 MG tablet Take 1 tablet by mouth Every 6 (Six) Hours As Needed for Anxiety or Muscle Spasms. 30 tablet 0   • Loratadine 10 MG capsule Take  by mouth As Needed.     • oxyCODONE (Roxicodone) 5 MG immediate release tablet Take 1-2 tablets by mouth Every 4 (Four) Hours As Needed (breakthrough pain). 40 tablet 0   • pantoprazole (PROTONIX) 40 MG EC tablet Take 1 tablet by mouth Daily.     • polyethylene glycol (MIRALAX) 17 GM/SCOOP powder Take 17 g by mouth Daily As Needed (constipation). 510 g 0     No current facility-administered  "medications for this visit.     REVIEW OF SYSTEMS  CONSTITUTIONAL:  No fever, chills, night sweats or fatigue.  EYES:  No blurry vision, diplopia or other vision changes.  ENT:  No hearing loss, nosebleeds or sore throat.  CARDIOVASCULAR:  No palpitations, arrhythmia, syncopal episodes or edema.  PULMONARY:  No hemoptysis, wheezing, chronic cough or shortness of breath.  GASTROINTESTINAL:  As per the HPI above.  GENITOURINARY:  No hematuria, kidney stones or frequent urination.  MUSCULOSKELETAL:  No joint or back pains.  INTEGUMENTARY: No rashes or pruritus.  ENDOCRINE:  No excessive thirst or hot flashes.  HEMATOLOGIC:  No history of free bleeding, spontaneous bleeding or clotting.  IMMUNOLOGIC:  No allergies or frequent infections.  NEUROLOGIC: No numbness, tingling, seizures or weakness.  PSYCHIATRIC:  No anxiety or depression.    PHYSICAL EXAMINATION  /81   Pulse 98   Temp 97.3 °F (36.3 °C) (Temporal)   Resp 18   Ht 165.1 cm (65\")   Wt 81.8 kg (180 lb 6.4 oz)   LMP 06/15/2016   SpO2 99%   BMI 30.02 kg/m²     Pain Score:  Pain Score    23 1015   PainSc: 0-No pain       PHQ-Score Total:  PHQ-9 Total Score:      ECO  GENERAL:  A well-developed, well-nourished, white female in no acute distress.  HEENT:  Pupils equally round and reactive to light.  Extraocular muscles intact.  CARDIOVASCULAR:  Regular rate and rhythm.  No murmurs, gallops or rubs.  LUNGS:  Clear to auscultation bilaterally.  ABDOMEN:  Soft, nontender, nondistended with positive bowel sounds. Well-healing LAR scars.  EXTREMITIES:  No clubbing, cyanosis or edema bilaterally.  SKIN:  No rashes or petechiae.  NEURO:  Cranial nerves grossly intact.  No focal deficits.  PSYCH:  Alert and oriented x3.    LABORATORY  Lab Results   Component Value Date    WBC 4.78 2023    HGB 9.1 (L) 2023    HCT 28.4 (L) 2023    .0 (H) 2023     2023    NEUTROABS 3.42 2023       Lab Results   Component " Value Date     03/16/2023    K 4.3 03/16/2023     (H) 03/16/2023    CO2 27.0 03/16/2023    BUN 6 03/16/2023    CREATININE 0.87 03/16/2023    GLUCOSE 82 03/16/2023    CALCIUM 8.5 (L) 03/16/2023    AST 20 03/13/2023    ALT 16 03/13/2023    ALKPHOS 181 (H) 03/13/2023    BILITOT 1.2 03/13/2023    PROTEINTOT 7.8 03/13/2023    ALBUMIN 4.7 03/13/2023     CBC (03/16/2023): WBCs: 4.78; HgB: 9.1; Hct: 28.4; platelets: 195; MCV: 100.0    CEA (03/13/2023): 1.17 ng/mL    IMAGING  CT abdomen and pelvis without contrast (02/15/2023):  Impression:  1) Mild narrowing in the sigmoid colon. Recommend direct visualization.  2) Other findings [are nonacute].    CT chest without contrast (03/06/2023):  Impression: No evidence of metastatic disease in the chest.    PATHOLOGY  Sigmoid colon and upper rectum, low anterior resection (03/14/2023):  Adenocarcinoma, moderately differentiated, invasive through muscularis propria into pericolonic soft tissue. Lymphovascular invasion present. Surgical margins negative for carcinoma. Two out of fifteen (2/15) lymph nodes involved by metastatic carcinoma with extranodal extension present. Tumor size: 3.5 x 2.5 x 1.4 cm. Intact MSI expression per previous biopsy. [id number] (stage IIIB).    IMPRESSION AND PLAN  Ms. Carney is a 52 y.o., white female with:  1. Colon adenocarcinoma: Diagnosed in February 2023 and status post resection of the sigmoid colon and upper rectum on 03/14/2023. Final, surgical pathology is consistent with stage IIIB ([id number]) disease. Two out of fifteen (2/15) lymph nodes were involved with metastatic disease, but all surgical margins were negative. I had a long discussion with the patient and her  in clinic today regarding this diagnosis and its prognosis. We discussed how, while her surgery went overall very well, her chance of eventually developing a relapse in his cancer are, unfortunately, significant; and the purpose of adjuvant chemotherapy is to reduce  this chance by as much as possible (perhaps by as much as 10%). Particularly given her stage III disease, young age and multiple, poor risk factors (left-sided tumor, lymphovascular invasion present, etc.), six months of [id number] FOLFOX (or XELOX) is the preferred regimen; and, following a long discussion regarding the potential risks vs. benefits of this treatment regimen, she was agreeable to the former (FOLFOX). We will refer her back to local surgery for powerport placement. Given her locally very advanced disease, we will also attempt to obtain a PET scan between now and the start of therapy. Once these steps are completed, we should be able to give her the first cycle of [id number] FOLFOX in ~two to three weeks. We will see her back in our clinic in ~four to five weeks, on day #1 of cycle #2 of FOLFOX, with a CBC, CMP and CEA for a toxicity/symptom check.  2. Anemia: HgB of 9.1 g/dL today status post two weeks after resection of a sigmoid/upper rectal adenocarcinoma. A Rx for ferrous sulfate 325 mg BID was provided. Continue to monitor.  The patient and her  were in agreement with these plans.    It is a pleasure to participate in Ms. Carney's care. Please do not hesitate to call with any questions or concerns that you may have.    A total of 60 minutes were spent coordinating this patient’s care in clinic today; more than 50% of this time was face-to-face with the patient and her , reviewing her medical history, discussing the diagnosis and its prognosis and counseling on the current evaluation, treatment and followup plan. All questions were answered to their satisfaction.    FOLLOW UP  With [facility] colorectal surgery, as previously planned. NM PET scan within the next ~1-2 weeks. Referral placed to local surgery re: powerport placement. Begin adjuvant, [id number] FOLFOX in ~2-3 weeks. Return to our clinic in ~4-5 weeks, on day #1 of cycle #2 of FOLFOX, with a CBC, CMP and CEA.            This document  was electronically signed by ROWENA Hanks MD March 30, 2023 09:27 EDT      CC: MD Giselle Molina MD William Thomas Ashburn, MD

## 2023-03-29 NOTE — OUTREACH NOTE
General Surgery Week 2 Survey    Flowsheet Row Responses   [facility], [place], operated by [facility] patient discharged from? [place]   Does the patient have one of the following disease processes/diagnoses(primary or secondary)? General Surgery   Week 2 attempt successful? No   Call start time 1813   Unsuccessful attempts Attempt 1   Discharge diagnosis COLON RESECTION LOW ANTERIOR LAPAROSCOPIC for adenocarcinoma of the sigmoid colon          Giselle MASCORRO - Licensed Nurse

## 2023-03-30 RX ORDER — FERROUS SULFATE 325(65) MG
325 TABLET ORAL 2 TIMES DAILY WITH MEALS
Qty: 60 TABLET | Refills: 5 | Status: SHIPPED | OUTPATIENT
Start: 2023-03-30

## 2023-03-31 ENCOUNTER — READMISSION MANAGEMENT (OUTPATIENT)
Dept: CALL CENTER | Facility: HOSPITAL | Age: 53
End: 2023-03-31
Payer: COMMERCIAL

## 2023-03-31 NOTE — OUTREACH NOTE
General Surgery Week 2 Survey    Flowsheet Row Responses   [facility], [place] facility patient discharged from? [place]   Does the patient have one of the following disease processes/diagnoses(primary or secondary)? General Surgery   Week 2 attempt successful? No   Unsuccessful attempts Attempt 2          Myah THAPA - Registered Nurse

## 2023-04-03 ENCOUNTER — TELEPHONE (OUTPATIENT)
Dept: ONCOLOGY | Facility: CLINIC | Age: 53
End: 2023-04-03

## 2023-04-03 ENCOUNTER — PATIENT ROUNDING (BHMG ONLY) (OUTPATIENT)
Dept: ONCOLOGY | Facility: CLINIC | Age: 53
End: 2023-04-03
Payer: COMMERCIAL

## 2023-04-03 NOTE — TELEPHONE ENCOUNTER
Caller: LAZARO    Relationship: [facility]    Best call back number: [phone]    What was the call regarding: PATIENTS PET SCAN FOR TOMORROW WAS DENIED BY INSURANCE.      Do you require a callback: YES

## 2023-04-04 DIAGNOSIS — C18.7 MALIGNANT NEOPLASM OF SIGMOID COLON: Primary | ICD-10-CM

## 2023-04-06 ENCOUNTER — TELEPHONE (OUTPATIENT)
Dept: ONCOLOGY | Facility: CLINIC | Age: 53
End: 2023-04-06
Payer: COMMERCIAL

## 2023-04-06 NOTE — TELEPHONE ENCOUNTER
Caller: Vangie Carney    Relationship: Self    Best call back number: [phone]    What is the best time to reach you: ANYTIME    Who are you requesting to speak with (clinical staff, provider,  specific staff member): NURSE        What was the call regarding:     NEEDING TO KNOW IF CAN EAT OR DRINK AFTER FIRST BOTTLE OF CONTRAST AFTER 9PM     Do you require a callback: YES

## 2023-04-07 ENCOUNTER — ANCILLARY PROCEDURE (OUTPATIENT)
Dept: RADIATION ONCOLOGY | Facility: HOSPITAL | Age: 53
End: 2023-04-07
Payer: COMMERCIAL

## 2023-04-07 DIAGNOSIS — C18.7 MALIGNANT NEOPLASM OF SIGMOID COLON: ICD-10-CM

## 2023-04-07 PROCEDURE — 25510000001 IOPAMIDOL 61 % SOLUTION: Performed by: INTERNAL MEDICINE

## 2023-04-07 PROCEDURE — 74177 CT ABD & PELVIS W/CONTRAST: CPT

## 2023-04-07 PROCEDURE — 74177 CT ABD & PELVIS W/CONTRAST: CPT | Performed by: RADIOLOGY

## 2023-04-07 PROCEDURE — 71260 CT THORAX DX C+: CPT | Performed by: RADIOLOGY

## 2023-04-07 PROCEDURE — 71260 CT THORAX DX C+: CPT

## 2023-04-07 RX ADMIN — IOPAMIDOL 98 ML: 612 INJECTION, SOLUTION INTRAVENOUS at 15:14

## 2023-04-17 ENCOUNTER — TELEPHONE (OUTPATIENT)
Dept: ONCOLOGY | Facility: HOSPITAL | Age: 53
End: 2023-04-17
Payer: COMMERCIAL

## 2023-04-17 NOTE — TELEPHONE ENCOUNTER
Shari Rock MA reached out to Ms. Carney on 4/10/23 to schedule her Port placement following imaging. Ms. Carney informed KASHIF Mccarthy that she had decided against taking treatment. Informed RAÚL Dietz and Dr. Hanks. No new orders were given. Noted that future appts had been canceled.

## 2024-01-01 ENCOUNTER — TELEPHONE (OUTPATIENT)
Dept: TELEMETRY | Facility: HOSPITAL | Age: 54
End: 2024-01-01
Payer: COMMERCIAL

## 2024-01-03 ENCOUNTER — HOSPITAL ENCOUNTER (OUTPATIENT)
Dept: GENERAL RADIOLOGY | Facility: HOSPITAL | Age: 54
Discharge: HOME | End: 2024-01-03
Payer: COMMERCIAL

## 2024-01-03 ENCOUNTER — TRANSCRIBE ORDERS (OUTPATIENT)
Dept: ADMINISTRATIVE | Facility: HOSPITAL | Age: 54
End: 2024-01-03
Payer: COMMERCIAL

## 2024-01-03 DIAGNOSIS — R10.84 ABDOMINAL PAIN, GENERALIZED: Primary | ICD-10-CM

## 2024-01-03 DIAGNOSIS — R10.84 ABDOMINAL PAIN, GENERALIZED: ICD-10-CM

## 2024-01-03 DIAGNOSIS — M54.42 ACUTE BACK PAIN WITH SCIATICA, LEFT: ICD-10-CM

## 2024-01-03 PROCEDURE — 74022 RADEX COMPL AQT ABD SERIES: CPT

## 2024-01-03 PROCEDURE — 72110 X-RAY EXAM L-2 SPINE 4/>VWS: CPT

## 2024-01-11 ENCOUNTER — TRANSCRIBE ORDERS (OUTPATIENT)
Dept: ADMINISTRATIVE | Facility: HOSPITAL | Age: 54
End: 2024-01-11
Payer: COMMERCIAL

## 2024-01-11 DIAGNOSIS — R10.84 ABDOMINAL PAIN, GENERALIZED: Primary | ICD-10-CM

## 2024-01-17 ENCOUNTER — HOSPITAL ENCOUNTER (OUTPATIENT)
Dept: CT IMAGING | Facility: HOSPITAL | Age: 54
Discharge: HOME | End: 2024-01-17
Admitting: NURSE PRACTITIONER
Payer: COMMERCIAL

## 2024-01-17 DIAGNOSIS — R10.84 ABDOMINAL PAIN, GENERALIZED: ICD-10-CM

## 2024-01-17 LAB — CREAT BLDA-MCNC: 0.9 MG/DL (ref 0.6–1.3)

## 2024-01-17 PROCEDURE — 74178 CT ABD&PLV WO CNTR FLWD CNTR: CPT

## 2024-01-17 PROCEDURE — 82565 ASSAY OF CREATININE: CPT

## 2024-01-17 PROCEDURE — 25510000001 IOPAMIDOL 61 % SOLUTION: Performed by: NURSE PRACTITIONER

## 2024-01-17 RX ADMIN — IOPAMIDOL 100 ML: 612 INJECTION, SOLUTION INTRAVENOUS at 14:00

## 2024-01-23 ENCOUNTER — OFFICE VISIT (OUTPATIENT)
Dept: ONCOLOGY | Facility: CLINIC | Age: 54
End: 2024-01-23
Payer: COMMERCIAL

## 2024-01-23 ENCOUNTER — LAB (OUTPATIENT)
Dept: ONCOLOGY | Facility: CLINIC | Age: 54
End: 2024-01-23
Payer: COMMERCIAL

## 2024-01-23 VITALS
TEMPERATURE: 97.7 F | HEIGHT: 65 IN | OXYGEN SATURATION: 99 % | RESPIRATION RATE: 18 BRPM | BODY MASS INDEX: 27.96 KG/M2 | HEART RATE: 101 BPM | WEIGHT: 167.8 LBS | DIASTOLIC BLOOD PRESSURE: 66 MMHG | SYSTOLIC BLOOD PRESSURE: 118 MMHG

## 2024-01-23 DIAGNOSIS — C18.7 MALIGNANT NEOPLASM OF SIGMOID COLON: Primary | ICD-10-CM

## 2024-01-23 LAB
ALBUMIN SERPL-MCNC: 4.3 G/DL (ref 3.5–5.2)
ALBUMIN/GLOB SERPL: 1.7 G/DL
ALP SERPL-CCNC: 137 U/L (ref 39–117)
ALT SERPL W P-5'-P-CCNC: 9 U/L (ref 1–33)
ANION GAP SERPL CALCULATED.3IONS-SCNC: 8.9 MMOL/L (ref 5–15)
AST SERPL-CCNC: 13 U/L (ref 1–32)
BASOPHILS # BLD AUTO: 0.04 10*3/MM3 (ref 0–0.2)
BASOPHILS NFR BLD AUTO: 0.7 % (ref 0–1.5)
BILIRUB SERPL-MCNC: 0.4 MG/DL (ref 0–1.2)
BUN SERPL-MCNC: 9 MG/DL (ref 6–20)
BUN/CREAT SERPL: 10.3 (ref 7–25)
CALCIUM SPEC-SCNC: 9.8 MG/DL (ref 8.6–10.5)
CHLORIDE SERPL-SCNC: 105 MMOL/L (ref 98–107)
CO2 SERPL-SCNC: 28.1 MMOL/L (ref 22–29)
CREAT SERPL-MCNC: 0.87 MG/DL (ref 0.57–1)
DEPRECATED RDW RBC AUTO: 44.7 FL (ref 37–54)
EGFRCR SERPLBLD CKD-EPI 2021: 80.3 ML/MIN/1.73
EOSINOPHIL # BLD AUTO: 0.03 10*3/MM3 (ref 0–0.4)
EOSINOPHIL NFR BLD AUTO: 0.6 % (ref 0.3–6.2)
ERYTHROCYTE [DISTWIDTH] IN BLOOD BY AUTOMATED COUNT: 13.6 % (ref 12.3–15.4)
FERRITIN SERPL-MCNC: 9.74 NG/ML (ref 13–150)
GLOBULIN UR ELPH-MCNC: 2.5 GM/DL
GLUCOSE SERPL-MCNC: 123 MG/DL (ref 65–99)
HCT VFR BLD AUTO: 27.3 % (ref 34–46.6)
HGB BLD-MCNC: 8.3 G/DL (ref 12–15.9)
IMM GRANULOCYTES # BLD AUTO: 0.02 10*3/MM3 (ref 0–0.05)
IMM GRANULOCYTES NFR BLD AUTO: 0.4 % (ref 0–0.5)
IRON 24H UR-MRATE: 22 MCG/DL (ref 37–145)
IRON SATN MFR SERPL: 5 % (ref 20–50)
LYMPHOCYTES # BLD AUTO: 0.64 10*3/MM3 (ref 0.7–3.1)
LYMPHOCYTES NFR BLD AUTO: 12 % (ref 19.6–45.3)
MCH RBC QN AUTO: 27.8 PG (ref 26.6–33)
MCHC RBC AUTO-ENTMCNC: 30.4 G/DL (ref 31.5–35.7)
MCV RBC AUTO: 91.3 FL (ref 79–97)
MONOCYTES # BLD AUTO: 0.31 10*3/MM3 (ref 0.1–0.9)
MONOCYTES NFR BLD AUTO: 5.8 % (ref 5–12)
NEUTROPHILS NFR BLD AUTO: 4.3 10*3/MM3 (ref 1.7–7)
NEUTROPHILS NFR BLD AUTO: 80.5 % (ref 42.7–76)
NRBC BLD AUTO-RTO: 0 /100 WBC (ref 0–0.2)
PLATELET # BLD AUTO: 235 10*3/MM3 (ref 140–450)
PMV BLD AUTO: 10.2 FL (ref 6–12)
POTASSIUM SERPL-SCNC: 4.4 MMOL/L (ref 3.5–5.2)
PROT SERPL-MCNC: 6.8 G/DL (ref 6–8.5)
RBC # BLD AUTO: 2.99 10*6/MM3 (ref 3.77–5.28)
SODIUM SERPL-SCNC: 142 MMOL/L (ref 136–145)
TIBC SERPL-MCNC: 443 MCG/DL (ref 298–536)
TRANSFERRIN SERPL-MCNC: 297 MG/DL (ref 200–360)
WBC NRBC COR # BLD AUTO: 5.34 10*3/MM3 (ref 3.4–10.8)

## 2024-01-23 PROCEDURE — 82728 ASSAY OF FERRITIN: CPT | Performed by: INTERNAL MEDICINE

## 2024-01-23 PROCEDURE — 80053 COMPREHEN METABOLIC PANEL: CPT | Performed by: INTERNAL MEDICINE

## 2024-01-23 PROCEDURE — 36415 COLL VENOUS BLD VENIPUNCTURE: CPT | Performed by: INTERNAL MEDICINE

## 2024-01-23 PROCEDURE — 83540 ASSAY OF IRON: CPT | Performed by: INTERNAL MEDICINE

## 2024-01-23 PROCEDURE — 85025 COMPLETE CBC W/AUTO DIFF WBC: CPT | Performed by: INTERNAL MEDICINE

## 2024-01-23 PROCEDURE — 99215 OFFICE O/P EST HI 40 MIN: CPT | Performed by: INTERNAL MEDICINE

## 2024-01-23 PROCEDURE — 84466 ASSAY OF TRANSFERRIN: CPT | Performed by: INTERNAL MEDICINE

## 2024-01-23 PROCEDURE — 82378 CARCINOEMBRYONIC ANTIGEN: CPT | Performed by: INTERNAL MEDICINE

## 2024-01-23 RX ORDER — LACTULOSE 10 G/15ML
10 SOLUTION ORAL 2 TIMES DAILY
COMMUNITY
Start: 2024-01-04

## 2024-01-23 NOTE — PROGRESS NOTES
Venipuncture Blood Specimen Collection  Venipuncture performed in left arm by Lakeisha Waggoner MA with good hemostasis. Patient tolerated the procedure well without complications.   01/23/24   Lakeisha Waggoner MA

## 2024-01-23 NOTE — PROGRESS NOTES
Name:  Vangie Carney  :  1970  Date:  2024     REFERRING PHYSICIAN  Shari Aguirre MD    PRIMARY CARE PHYSICIAN  Josh, Vimal Neville MD    REASON FOR FOLLOWUP  1. Malignant neoplasm of sigmoid colon      CHIEF COMPLAINT  Reprogressive constipation since ~2023.    Dear Dr. Moreno,    HISTORY OF PRESENT ILLNESS:   I saw Ms. Carney in reconsultation today in our medical oncology clinic. As you are aware, she is a pleasant, 53 y.o., white female with minimal past medical history who was in her usual state of health until 2023 when she developed more frequent epigastric pains and BRBPR. She was referred to local gastroenterology, who performed an endoscopic exam that identified a tumor in the sigmoid colon. Biopsies were consistent with a moderately differentiated adenocarcinoma. She was referred [facility] colorectal surgery in [place], and she underwent an LAR on 2023 for LAR. This procedure went very well, and a diverting ostomy was not required. She was subsequently referred to our clinic for further evaluation and management. At the time of her initial consultation with us (on 2023), we discussed how ~six months of adjuvant chemotherapy was now indicated and recommended as the current standard of care. Following a long discussion regarding the potential risks vs. benefits, she was initially agreeable to undergoing powerport placement and proceeding with [id number] FOLFOX; however, she subsequently changed her mind, canceled her scheduled powerport appointment and was lost to routine follow up in both ours and [facility] colorectal surgery's clinics. You have now re-referred her to our clinic; because, unfortunately, since ~late 2023, she had been feeling overall steadily worse, with recurrent and progressive constipation and back pain. The results of a CT of the abdomen and pelvis performed on 2024 for further evaluation were consistent with recurrent, and now  metastatic, disease.    INTERIM HISTORY:  Ms. Carney presents to clinic today for follow up after a ~ten month absence again accompanied by her . She remains constipated, but she has been managing this fairly well for the past couple of months with scheduled stool softeners and prn laxatives. She has been taking Tylenol for the back pain. She otherwise has no specific complaints.    Past Medical History:   Diagnosis Date    Gallbladder attack     GERD (gastroesophageal reflux disease)     Hearing aid worn     History of ear infections     Malignant neoplasm of sigmoid colon 3/14/2023    Seasonal allergies     Wears glasses        Past Surgical History:   Procedure Laterality Date    CHOLECYSTECTOMY      COLON RESECTION N/A 3/14/2023    Procedure: COLON RESECTION LOW ANTERIOR LAPAROSCOPIC WITH DAVINCI ROBOT;  Surgeon: Shari Aguirre MD;  Location: [facility] OR;  Service: Robotics - DaVinci;  Laterality: N/A;    COLONOSCOPY N/A 2/15/2023    Procedure: COLONOSCOPY FOR SCREENING;  Surgeon: Giselle Iniguez MD;  Location: [facility] OR;  Service: Gastroenterology;  Laterality: N/A;    ENDOSCOPY N/A 2/15/2023    Procedure: ESOPHAGOGASTRODUODENOSCOPY WITH BIOPSY;  Surgeon: Giselle Iniguez MD;  Location: [facility] OR;  Service: Gastroenterology;  Laterality: N/A;    LAPAROSCOPIC TUBAL LIGATION Bilateral     MIDDLE EAR SURGERY         Social History     Socioeconomic History    Marital status:    Tobacco Use    Smoking status: Former     Packs/day: 1.00     Years: 30.00     Additional pack years: 0.00     Total pack years: 30.00     Types: Cigarettes     Quit date:      Years since quittin.0    Smokeless tobacco: Never   Vaping Use    Vaping Use: Every day    Substances: Nicotine, Flavoring    Devices: Refillable tank   Substance and Sexual Activity    Alcohol use: Never    Drug use: Defer    Sexual activity: Defer     Birth control/protection: None       Family History   Problem Relation  Age of Onset    Cancer Mother     Cancer Father     Cancer Sister     Cancer Brother     Cancer Maternal Grandmother     Cancer Maternal Grandfather     Breast cancer Neg Hx        Allergies   Allergen Reactions    Keflex [Cephalexin] Hives       Current Outpatient Medications   Medication Sig Dispense Refill    lactulose (CHRONULAC) 10 GM/15ML solution Take 15 mL by mouth 2 (Two) Times a Day.      Loratadine 10 MG capsule Take  by mouth As Needed.      pantoprazole (PROTONIX) 40 MG EC tablet Take 1 tablet by mouth Daily.      polyethylene glycol (MIRALAX) 17 GM/SCOOP powder Take 17 g by mouth Daily As Needed (constipation). 510 g 0    diazePAM (Valium) 5 MG tablet Take 1 tablet by mouth Every 6 (Six) Hours As Needed for Anxiety or Muscle Spasms. 30 tablet 0    ferrous sulfate 325 (65 FE) MG tablet Take 1 tablet by mouth 2 (Two) Times a Day With Meals. 60 tablet 5    oxyCODONE (Roxicodone) 5 MG immediate release tablet Take 1-2 tablets by mouth Every 4 (Four) Hours As Needed (breakthrough pain). 40 tablet 0     No current facility-administered medications for this visit.     REVIEW OF SYSTEMS  CONSTITUTIONAL:  No fever, chills, night sweats or fatigue.  EYES:  No blurry vision, diplopia or other vision changes.  ENT:  No hearing loss, nosebleeds or sore throat.  CARDIOVASCULAR:  No palpitations, arrhythmia, syncopal episodes or edema.  PULMONARY:  No hemoptysis, wheezing, chronic cough or shortness of breath.  GASTROINTESTINAL:  As per the HPI above.  GENITOURINARY:  No hematuria, kidney stones or frequent urination.  MUSCULOSKELETAL:  As per the HPI above.  INTEGUMENTARY: No rashes or pruritus.  ENDOCRINE:  No excessive thirst or hot flashes.  HEMATOLOGIC:  No history of free bleeding, spontaneous bleeding or clotting.  IMMUNOLOGIC:  No allergies or frequent infections.  NEUROLOGIC: No numbness, tingling, seizures or weakness.  PSYCHIATRIC:  No anxiety or depression.    PHYSICAL EXAMINATION  /66   Pulse 101   " Temp 97.7 °F (36.5 °C) (Temporal)   Resp 18   Ht 165.1 cm (65\")   Wt 76.1 kg (167 lb 12.8 oz)   LMP 06/15/2016   SpO2 99%   BMI 27.92 kg/m²     Pain Score:  Pain Score    24 1323   PainSc:   4   PainLoc: Rectum     PHQ-Score Total:  PHQ-9 Total Score:      ECO  GENERAL:  A well-developed, well-nourished, white female in no acute distress.  HEENT:  Pupils equally round and reactive to light.  Extraocular muscles intact.  CARDIOVASCULAR:  Regular rate and rhythm.  No murmurs, gallops or rubs.  LUNGS:  Clear to auscultation bilaterally.  ABDOMEN:  Soft, nontender, nondistended with positive bowel sounds.  EXTREMITIES:  No clubbing, cyanosis or edema bilaterally.  SKIN:  No rashes or petechiae.  NEURO:  Cranial nerves grossly intact.  No focal deficits.  PSYCH:  Alert and oriented x3.    LABORATORY  Lab Results   Component Value Date    WBC 5.34 2024    HGB 8.3 (L) 2024    HCT 27.3 (L) 2024    MCV 91.3 2024     2024    NEUTROABS 4.30 2024       Lab Results   Component Value Date     2024    K 4.4 2024     2024    CO2 28.1 2024    BUN 9 2024    CREATININE 0.87 2024    GLUCOSE 123 (H) 2024    CALCIUM 9.8 2024    AST 13 2024    ALT 9 2024    ALKPHOS 137 (H) 2024    BILITOT 0.4 2024    PROTEINTOT 6.8 2024    ALBUMIN 4.3 2024     CBC (2024): WBCs: 5.34; HgB: 8.3; Hct: 27.3; platelets: 235; MCV: 91.3  CBC (2023): WBCs: 4.78; HgB: 9.1; Hct: 28.4; platelets: 195; MCV: 100.0    CEA (2024): pending  CEA (2023): 1.17 ng/mL    Iron panel (2024): serum iron: 22; % saturation: 5; TIBC: 443; ferritin: 9.74 ng/mL    IMAGING  CT abdomen and pelvis without contrast (02/15/2023):  Impression:  1) Mild narrowing in the sigmoid colon. Recommend direct visualization.  2) Other findings [are nonacute].    CT chest without contrast (2023):  Impression: " No evidence of metastatic disease in the chest.    CT chest with contrast (04/07/2023, compared to 03/06/2023):  Impression: No evidence of metastatic disease to the chest. Stable exam.    CT abdomen and pelvis with contrast (04/07/2023, compared to 02/15/2023):  Impression:  1) Interval post surgical changes noted from partial resection of the sigmoid colon region.  2) 3.1 cm predominantly gas-filled collection in the left presacral space near the anastomosis site that may reflect postoperative changes. Possibility of small fistulous connection not excluded within the anastomosis.  3) No ileus or bowel obstruction.  4) No solid organ lesions or adenopathy identified.    CT abdomen and pelvis with and without contrast (01/17/2024):  Impression:  1) Interval development of metastatic disease.  2) Specifically, soft tissue metastatic implants are noted in the omentum, left lower quadrant, upper presacral space, and left lower presacral space immediately adjacent to the anastomosis site. Soft tissue implant within umbilical hernia fat.  3) Development of 5.5 mm nodule right lower lobe suspicious for metastatic lung nodule.  4) Interval postsurgical changes from sigmoid resection.  5) Mild fatty infiltration of liver. No focal liver lesions.  6) Other incidental/nonacute findings.    PATHOLOGY  Sigmoid colon and upper rectum, low anterior resection (03/14/2023):  Adenocarcinoma, moderately differentiated, invasive through muscularis propria into pericolonic soft tissue. Lymphovascular invasion present. Surgical margins negative for carcinoma. Two out of fifteen (2/15) lymph nodes involved by metastatic carcinoma with extranodal extension present. Tumor size: 3.5 x 2.5 x 1.4 cm. Intact MSI expression per previous biopsy. [id number] (stage IIIB).    IMPRESSION AND PLAN  Ms. Carney is a 53 y.o., white female with:  Colon adenocarcinoma: Initially diagnosed in February 2023 and status post resection of the sigmoid colon and  upper rectum on 03/14/2023. The final, surgical pathology from this procedure was consistent with stage IIIB ([id number]) disease. Two out of fifteen (2/15) lymph nodes were involved with metastatic disease, but all surgical margins were negative. I had a long discussion with the patient and her  at the time of her initial (and, prior to today, only) consultation in our clinic (on 03/30/2023) regarding this diagnosis and its prognosis. We discussed how, while her surgery went overall very well, her chance of eventually developing a relapse in this malignancy was, unfortunately, significant; and the purpose of adjuvant chemotherapy was to reduce this chance by as much as possible (perhaps by as much as 10%). Particularly given her stage III disease, young age and multiple, poor risk factors (left-sided tumor, lymphovascular invasion present, etc.), six months of [id number] FOLFOX (or XELOX) is the preferred regimen; and, following a long discussion regarding the potential risks vs. benefits of this treatment regimen at that time, she was initially agreeable to the former (FOLFOX). We referred her back to local surgery for powerport placement; however, per her preference, she ultimately never followed through with either this (powerport placement) or starting the adjuvant chemotherapy, and she was subsequently lost to routine follow up in both ours and [facility] colorectal surgery's clinics. She apparently overall still did very well for the next ~six to seven months; but, by late October 2023, she was experiencing recurrent constipation and back pain, and a repeat CT of the abdomen and pelvis performed on 01/17/2024 (and summarized above) is, unfortunately, consistent with recurrent, and now metastatic, disease, with the development of multiple, soft tissue implants within the omentum/peritoneum and, possibly, at least one metastatic lung lesion (in the right lower lobe) as well. I had a long discussion today with the  patient and her  regarding this updated diagnosis and, in general terms, its prognosis. We discussed how this disease is now recurrent, by definition, stage IV, and consequently, incurable. That said, particularly given her continued, good performance status, it is potentially treatable; and sustained remissions are possible. The initial steps in our reevaluation will be to refer her back to local surgery for powerport placement while we await the results of molecular profiling ([id number]) on both tissue (March 2023's partial colectomy specimen(s)) and liquid (peripheral blood drawn today) biopsies (which were requested/sent today). We will see her back in our clinic in two weeks with these results to finalize our initial, palliative treatment plan.  Anemia: She initially presented to our clinic in March 2023 with a HgB of 9.1 g/dL status post (at that time) the resection of a sigmoid/upper rectal adenocarcinoma primary; and a Rx for ferrous sulfate 325 mg BID was provided. She was subsequently lost to routine follow up in our clinic until today. Repeat labs from today (01/23/2024) show a HgB of 8.3 g/dL and a ferritin level of 9.7 ng/mL. She would clearly still greatly benefit from iron supplementation, and a new Rx for ferrous sulfate 325 mg BID was provided today. Continue to monitor.  The patient and her  were in agreement with these plans.    It is a pleasure to participate in Ms. Carney's care. Please do not hesitate to call with any questions or concerns that you may have.    A total of 50 minutes were spent coordinating this patient’s care in clinic today; more than 50% of this time was face-to-face with the patient and her , reviewing her interim medical history, discussing the (updated) diagnosis and, in general terms, its prognosis and counseling on the current evaluation, potential treatment and followup plans. All questions were answered to their satisfaction.    FOLLOW UP  Labs,  including a CEA level, drawn today. Next generation sequencing requested today on both tissue (from March 2023's sigmoidectomy) and liquid (peripheral blood drawn today) specimens. Referral placed to local surgery re: powerport placement. Return to our clinic in 2 weeks.            This document was electronically signed by ROWENA Hanks MD January 23, 2024 16:43 EST      CC: MD Shari Saeed MD Karen S. Jennings-Conklin, MD

## 2024-01-24 LAB — CEA SERPL-MCNC: 1.56 NG/ML

## 2024-01-24 RX ORDER — FERROUS SULFATE 325(65) MG
325 TABLET ORAL 2 TIMES DAILY WITH MEALS
Qty: 60 TABLET | Refills: 5 | Status: SHIPPED | OUTPATIENT
Start: 2024-01-24

## 2024-01-31 LAB
CYTO UR: NORMAL
LAB AP CASE REPORT: NORMAL
LAB AP CLINICAL INFORMATION: NORMAL
PATH REPORT.ADDENDUM SPEC: NORMAL
PATH REPORT.FINAL DX SPEC: NORMAL
PATH REPORT.GROSS SPEC: NORMAL

## 2024-02-06 ENCOUNTER — OFFICE VISIT (OUTPATIENT)
Dept: ONCOLOGY | Facility: CLINIC | Age: 54
End: 2024-02-06
Payer: COMMERCIAL

## 2024-02-06 VITALS
DIASTOLIC BLOOD PRESSURE: 71 MMHG | HEIGHT: 65 IN | BODY MASS INDEX: 27.99 KG/M2 | OXYGEN SATURATION: 100 % | SYSTOLIC BLOOD PRESSURE: 111 MMHG | WEIGHT: 168 LBS | RESPIRATION RATE: 18 BRPM | TEMPERATURE: 97.7 F | HEART RATE: 101 BPM

## 2024-02-06 DIAGNOSIS — C18.7 MALIGNANT NEOPLASM OF SIGMOID COLON: Primary | ICD-10-CM

## 2024-02-06 PROCEDURE — 99214 OFFICE O/P EST MOD 30 MIN: CPT | Performed by: INTERNAL MEDICINE

## 2024-02-06 RX ORDER — TRAMADOL HYDROCHLORIDE 50 MG/1
25 TABLET ORAL EVERY 6 HOURS PRN
COMMUNITY
Start: 2024-01-19

## 2024-02-06 NOTE — PROGRESS NOTES
Name:  Vangie Carney  :  1970  Date:  2024     REFERRING PHYSICIAN  Shari Aguirre MD    PRIMARY CARE PHYSICIAN  Josh, Vimal Neville MD    REASON FOR FOLLOWUP  1. Malignant neoplasm of sigmoid colon      CHIEF COMPLAINT  Reprogressive constipation since ~2023.    Dear Dr. Moreno,    HISTORY OF PRESENT ILLNESS:   I saw Ms. Carney in reconsultation today in our medical oncology clinic. As you are aware, she is a pleasant, 53 y.o., white female with minimal past medical history who was in her usual state of health until 2023 when she developed more frequent epigastric pains and BRBPR. She was referred to local gastroenterology, who performed an endoscopic exam that identified a tumor in the sigmoid colon. Biopsies were consistent with a moderately differentiated adenocarcinoma. She was referred [facility] colorectal surgery in [place], and she underwent an LAR on 2023 for LAR. This procedure went very well, and a diverting ostomy was not required. She was subsequently referred to our clinic for further evaluation and management. At the time of her initial consultation with us (on 2023), we discussed how ~six months of adjuvant chemotherapy was now indicated and recommended as the current standard of care. Following a long discussion regarding the potential risks vs. benefits, she was initially agreeable to undergoing powerport placement and proceeding with [id number] FOLFOX; however, she subsequently changed her mind, canceled her scheduled powerport appointment and was lost to routine follow up in both ours and [facility] colorectal surgery's clinics. You have now re-referred her to our clinic; because, unfortunately, since ~late 2023, she had been feeling overall steadily worse, with recurrent and progressive constipation and back pain. The results of a CT of the abdomen and pelvis performed on 2024 for further evaluation were consistent with recurrent, and now  metastatic, disease.    INTERIM HISTORY:  Ms. Carney returns to clinic today for follow up of (now metastatic) colorectal cancer again accompanied by her . She remains constipated, but she has still been managing this fairly well for the past couple of months with scheduled stool softeners and prn laxatives. Since we last saw her in our clinic, she did experience one, severe episode of increased abdominal pain that resolved on its own (after several minutes) and has not recurred. Her back pain remains about the same. She otherwise has no new or specific complaints.    Past Medical History:   Diagnosis Date    Gallbladder attack     GERD (gastroesophageal reflux disease)     Hearing aid worn     History of ear infections     Malignant neoplasm of sigmoid colon 3/14/2023    Seasonal allergies     Wears glasses        Past Surgical History:   Procedure Laterality Date    CHOLECYSTECTOMY      COLON RESECTION N/A 3/14/2023    Procedure: COLON RESECTION LOW ANTERIOR LAPAROSCOPIC WITH DAVINCI ROBOT;  Surgeon: Shari Aguirre MD;  Location: [facility] OR;  Service: Robotics - DaVinci;  Laterality: N/A;    COLONOSCOPY N/A 2/15/2023    Procedure: COLONOSCOPY FOR SCREENING;  Surgeon: Giselle Iniguez MD;  Location: [facility] OR;  Service: Gastroenterology;  Laterality: N/A;    ENDOSCOPY N/A 2/15/2023    Procedure: ESOPHAGOGASTRODUODENOSCOPY WITH BIOPSY;  Surgeon: Giselle Iniguez MD;  Location: [facility] OR;  Service: Gastroenterology;  Laterality: N/A;    LAPAROSCOPIC TUBAL LIGATION Bilateral     MIDDLE EAR SURGERY         Social History     Socioeconomic History    Marital status:    Tobacco Use    Smoking status: Former     Packs/day: 1.00     Years: 30.00     Additional pack years: 0.00     Total pack years: 30.00     Types: Cigarettes     Quit date:      Years since quittin.1    Smokeless tobacco: Never   Vaping Use    Vaping Use: Every day    Substances: Nicotine, Flavoring    Devices:  [facility] tank   Substance and Sexual Activity    Alcohol use: Never    Drug use: Defer    Sexual activity: Defer     Birth control/protection: None       Family History   Problem Relation Age of Onset    Cancer Mother     Cancer Father     Cancer Sister     Cancer Brother     Cancer Maternal Grandmother     Cancer Maternal Grandfather     Breast cancer Neg Hx        Allergies   Allergen Reactions    Keflex [Cephalexin] Hives       Current Outpatient Medications   Medication Sig Dispense Refill    ferrous sulfate 325 (65 FE) MG tablet Take 1 tablet by mouth 2 (Two) Times a Day With Meals. 60 tablet 5    lactulose (CHRONULAC) 10 GM/15ML solution Take 15 mL by mouth 2 (Two) Times a Day.      Loratadine 10 MG capsule Take  by mouth As Needed.      pantoprazole (PROTONIX) 40 MG EC tablet Take 1 tablet by mouth Daily.      polyethylene glycol (MIRALAX) 17 GM/SCOOP powder Take 17 g by mouth Daily As Needed (constipation). 510 g 0    traMADol (ULTRAM) 50 MG tablet Take 0.5 tablets by mouth Every 6 (Six) Hours As Needed. for pain      diazePAM (Valium) 5 MG tablet Take 1 tablet by mouth Every 6 (Six) Hours As Needed for Anxiety or Muscle Spasms. 30 tablet 0    ferrous sulfate 325 (65 FE) MG tablet Take 1 tablet by mouth 2 (Two) Times a Day With Meals. 60 tablet 5    oxyCODONE (Roxicodone) 5 MG immediate release tablet Take 1-2 tablets by mouth Every 4 (Four) Hours As Needed (breakthrough pain). 40 tablet 0     No current facility-administered medications for this visit.     REVIEW OF SYSTEMS  CONSTITUTIONAL:  No fever, chills, night sweats or fatigue.  EYES:  No blurry vision, diplopia or other vision changes.  ENT:  No hearing loss, nosebleeds or sore throat.  CARDIOVASCULAR:  No palpitations, arrhythmia, syncopal episodes or edema.  PULMONARY:  No hemoptysis, wheezing, chronic cough or shortness of breath.  GASTROINTESTINAL:  As per the HPI above.  GENITOURINARY:  No hematuria, kidney stones or frequent  "urination.  MUSCULOSKELETAL:  As per the HPI above.  INTEGUMENTARY: No rashes or pruritus.  ENDOCRINE:  No excessive thirst or hot flashes.  HEMATOLOGIC:  No history of free bleeding, spontaneous bleeding or clotting.  IMMUNOLOGIC:  No allergies or frequent infections.  NEUROLOGIC: No numbness, tingling, seizures or weakness.  PSYCHIATRIC:  No anxiety or depression.    PHYSICAL EXAMINATION  /71   Pulse 101   Temp 97.7 °F (36.5 °C) (Temporal)   Resp 18   Ht 165.1 cm (65\")   Wt 76.2 kg (168 lb)   LMP 06/15/2016   SpO2 100%   BMI 27.96 kg/m²     Pain Score:  Pain Score    24 1327   PainSc:   3   PainLoc: Abdomen     PHQ-Score Total:  PHQ-9 Total Score:      ECO  GENERAL:  A well-developed, well-nourished, white female in no acute distress.  HEENT:  Pupils equally round and reactive to light.  Extraocular muscles intact.  CARDIOVASCULAR:  Regular rate and rhythm.  No murmurs, gallops or rubs.  LUNGS:  Clear to auscultation bilaterally.  ABDOMEN:  Soft, nontender, nondistended with positive bowel sounds.  EXTREMITIES:  No clubbing, cyanosis or edema bilaterally.  SKIN:  No rashes or petechiae.  NEURO:  Cranial nerves grossly intact.  No focal deficits.  PSYCH:  Alert and oriented x3.    The physical exam is unchanged from the previous one.    LABORATORY  Lab Results   Component Value Date    WBC 5.34 2024    HGB 8.3 (L) 2024    HCT 27.3 (L) 2024    MCV 91.3 2024     2024    NEUTROABS 4.30 2024       Lab Results   Component Value Date     2024    K 4.4 2024     2024    CO2 28.1 2024    BUN 9 2024    CREATININE 0.87 2024    GLUCOSE 123 (H) 2024    CALCIUM 9.8 2024    AST 13 2024    ALT 9 2024    ALKPHOS 137 (H) 2024    BILITOT 0.4 2024    PROTEINTOT 6.8 2024    ALBUMIN 4.3 2024     CBC (2024): WBCs: 5.34; HgB: 8.3; Hct: 27.3; platelets: 235; MCV: 91.3  CBC " (03/16/2023): WBCs: 4.78; HgB: 9.1; Hct: 28.4; platelets: 195; MCV: 100.0    CEA (01/23/2024): 1.56 ng/mL  CEA (03/13/2023): 1.17 ng/mL    Iron panel (01/23/2024): serum iron: 22; % saturation: 5; TIBC: 443; ferritin: 9.74 ng/mL    IMAGING  CT abdomen and pelvis without contrast (02/15/2023):  Impression:  1) Mild narrowing in the sigmoid colon. Recommend direct visualization.  2) Other findings [are nonacute].    CT chest without contrast (03/06/2023):  Impression: No evidence of metastatic disease in the chest.    CT chest with contrast (04/07/2023, compared to 03/06/2023):  Impression: No evidence of metastatic disease to the chest. Stable exam.    CT abdomen and pelvis with contrast (04/07/2023, compared to 02/15/2023):  Impression:  1) Interval post surgical changes noted from partial resection of the sigmoid colon region.  2) 3.1 cm predominantly gas-filled collection in the left presacral space near the anastomosis site that may reflect postoperative changes. Possibility of small fistulous connection not excluded within the anastomosis.  3) No ileus or bowel obstruction.  4) No solid organ lesions or adenopathy identified.    CT abdomen and pelvis with and without contrast (01/17/2024):  Impression:  1) Interval development of metastatic disease.  2) Specifically, soft tissue metastatic implants are noted in the omentum, left lower quadrant, upper presacral space, and left lower presacral space immediately adjacent to the anastomosis site. Soft tissue implant within umbilical hernia fat.  3) Development of 5.5 mm nodule right lower lobe suspicious for metastatic lung nodule.  4) Interval postsurgical changes from sigmoid resection.  5) Mild fatty infiltration of liver. No focal liver lesions.  6) Other incidental/nonacute findings.    PATHOLOGY  Sigmoid colon and upper rectum, low anterior resection (03/14/2023):  Adenocarcinoma, moderately differentiated, invasive through muscularis propria into pericolonic  soft tissue. Lymphovascular invasion present. Surgical margins negative for carcinoma. Two out of fifteen (2/15) lymph nodes involved by metastatic carcinoma with extranodal extension present. Tumor size: 3.5 x 2.5 x 1.4 cm. Intact MSI expression per previous biopsy. tH5rR3d (stage IIIB).    Molecular profiling ([id number]), tissue (sigmoid colon/upper rectum) and liquid (peripheral blood):  PD-L1: 22%; KRAS G12D; PIK3CA E545K; TP53 Y220H; MSI-High NOT detected.    IMPRESSION AND PLAN  Ms. Carney is a 53 y.o., white female with:  Colon adenocarcinoma: Initially diagnosed in February 2023 and status post resection of the sigmoid colon and upper rectum on 03/14/2023. The final, surgical pathology from this procedure was consistent with stage IIIB (uX1pN1m) disease. Two out of fifteen (2/15) lymph nodes were involved with metastatic disease, but all surgical margins were negative. I had a long discussion with the patient and her  at the time of her initial (and, prior to today, only) consultation in our clinic (on 03/30/2023) regarding this diagnosis and its prognosis. We discussed how, while her surgery went overall very well, her chance of eventually developing a relapse in this malignancy was, unfortunately, significant; and the purpose of adjuvant chemotherapy was to reduce this chance by as much as possible (perhaps by as much as 10%). Particularly given her stage III disease, young age and multiple, poor risk factors (left-sided tumor, lymphovascular invasion present, etc.), six months of s4puqbno FOLFOX (or XELOX) is the preferred regimen; and, following a long discussion regarding the potential risks vs. benefits of this treatment regimen at that time, she was initially agreeable to the former (FOLFOX). We referred her back to local surgery for powerport placement; however, per her preference, she ultimately never followed through with either this (powerport placement) or starting the adjuvant  chemotherapy, and she was subsequently lost to routine follow up in both ours and [facility] colorectal surgery's clinics. She apparently overall still did very well for the next ~six to seven months; but, by late October 2023, she was experiencing recurrent constipation and back pain, and a repeat CT of the abdomen and pelvis performed on 01/17/2024 (and summarized above) was, unfortunately, consistent with recurrent, and now metastatic, disease, with the development of multiple, soft tissue implants within the omentum/peritoneum and, possibly, at least one metastatic lung lesion (in the right lower lobe) as well. I had another long discussion today with the patient and her  regarding this updated diagnosis and, in general terms, its prognosis. We again discussed how this disease is now recurrent, by definition, stage IV, and consequently, incurable. That said, particularly given her continued, good performance status, it is potentially treatable; and sustained remissions are possible. Some of the pertinent results of molecular profiling ([id number]) that were performed on both tissue (March 2023's partial colectomy specimen(s)) and liquid (peripheral blood drawn in January 2024) biopsies last month are summarized above. These do not change our recommendations for initial, palliative treatment with chemotherapy. Following a long discussion regarding the potential risks vs. benefits of this therapy, she is now agreeable to having a powerport placed (a referral was placed to surgery today) and starting d6wfujos 5-FU/leucovorin within the next couple of weeks. We will see her back in our clinic in ~three to four weeks, on day #1 of cycle #2 of 5-FU, with a CBC, CMP and CEA for a toxicity check.  Anemia: She initially presented to our clinic in March 2023 with a HgB of 9.1 g/dL status post (at that time) the resection of a sigmoid/upper rectal adenocarcinoma primary; and a Rx for ferrous sulfate 325 mg BID was  provided. She was subsequently lost to routine follow up in our clinic until today. Repeat labs from today 01/23/2024 showed a HgB of 8.3 g/dL and a ferritin level of 9.7 ng/mL. She would clearly still greatly benefit from iron supplementation, and a new Rx for ferrous sulfate 325 mg BID was previously provided. She states today that she could not tolerate this medication; however, she has found an over the counter iron supplement that she is doing well on and is taking faithfully. Continue to monitor.  The patient and her  were in agreement with these plans.    It is a pleasure to participate in Ms. Carney's care. Please do not hesitate to call with any questions or concerns that you may have.    A total of 30 minutes were spent coordinating this patient’s care in clinic today; more than 50% of this time was face-to-face with the patient and her , reviewing her interim medical history, discussing the recent molecular profiling results and counseling on the current treatment and followup plan. All questions were answered to their satisfaction.    FOLLOW UP  Re-referral placed to local surgery re: powerport placement. Begin first-line, palliative treatment with t3vivzys infusional 5-FU/leucovorin within the next ~2 weeks. Return to our clinic in ~3-4 weeks, on day #1 of cycle #2, with a CBC, CMP and CEA.            This document was electronically signed by ROWENA Hanks MD February 6, 2024 16:59 EST      CC: MD Shari Saeed MD Karen S. Jennings-Conklin, MD

## 2024-02-08 DIAGNOSIS — C18.7 MALIGNANT NEOPLASM OF SIGMOID COLON: Primary | ICD-10-CM

## 2024-02-08 RX ORDER — FLUOROURACIL 50 MG/ML
400 INJECTION, SOLUTION INTRAVENOUS ONCE
OUTPATIENT
Start: 2024-03-20

## 2024-02-08 RX ORDER — SODIUM CHLORIDE 9 MG/ML
20 INJECTION, SOLUTION INTRAVENOUS ONCE
OUTPATIENT
Start: 2024-03-06

## 2024-02-08 RX ORDER — SODIUM CHLORIDE 9 MG/ML
20 INJECTION, SOLUTION INTRAVENOUS ONCE
OUTPATIENT
Start: 2024-03-20

## 2024-02-08 RX ORDER — FLUOROURACIL 50 MG/ML
400 INJECTION, SOLUTION INTRAVENOUS ONCE
OUTPATIENT
Start: 2024-02-21

## 2024-02-08 RX ORDER — SODIUM CHLORIDE 9 MG/ML
20 INJECTION, SOLUTION INTRAVENOUS ONCE
OUTPATIENT
Start: 2024-02-21

## 2024-02-08 RX ORDER — FLUOROURACIL 50 MG/ML
400 INJECTION, SOLUTION INTRAVENOUS ONCE
OUTPATIENT
Start: 2024-02-08

## 2024-02-08 RX ORDER — FLUOROURACIL 50 MG/ML
400 INJECTION, SOLUTION INTRAVENOUS ONCE
OUTPATIENT
Start: 2024-03-06

## 2024-02-08 RX ORDER — SODIUM CHLORIDE 9 MG/ML
20 INJECTION, SOLUTION INTRAVENOUS ONCE
OUTPATIENT
Start: 2024-02-08

## 2024-02-12 ENCOUNTER — OFFICE VISIT (OUTPATIENT)
Dept: SURGERY | Facility: CLINIC | Age: 54
End: 2024-02-12
Payer: COMMERCIAL

## 2024-02-12 ENCOUNTER — OFFICE VISIT (OUTPATIENT)
Dept: ONCOLOGY | Facility: CLINIC | Age: 54
End: 2024-02-12
Payer: COMMERCIAL

## 2024-02-12 VITALS
TEMPERATURE: 97.1 F | RESPIRATION RATE: 18 BRPM | DIASTOLIC BLOOD PRESSURE: 75 MMHG | BODY MASS INDEX: 27.36 KG/M2 | HEART RATE: 111 BPM | SYSTOLIC BLOOD PRESSURE: 114 MMHG | OXYGEN SATURATION: 98 % | WEIGHT: 164.4 LBS

## 2024-02-12 VITALS
SYSTOLIC BLOOD PRESSURE: 110 MMHG | HEIGHT: 65 IN | BODY MASS INDEX: 27.32 KG/M2 | DIASTOLIC BLOOD PRESSURE: 70 MMHG | WEIGHT: 164 LBS

## 2024-02-12 DIAGNOSIS — C78.6 METASTASIS TO PERITONEUM: ICD-10-CM

## 2024-02-12 DIAGNOSIS — K90.9 MALABSORPTION OF IRON: ICD-10-CM

## 2024-02-12 DIAGNOSIS — D50.9 IRON DEFICIENCY ANEMIA, UNSPECIFIED IRON DEFICIENCY ANEMIA TYPE: ICD-10-CM

## 2024-02-12 DIAGNOSIS — C78.01 MALIGNANT NEOPLASM METASTATIC TO RIGHT LUNG: ICD-10-CM

## 2024-02-12 DIAGNOSIS — C18.7 MALIGNANT NEOPLASM OF SIGMOID COLON: Primary | ICD-10-CM

## 2024-02-12 PROCEDURE — 99203 OFFICE O/P NEW LOW 30 MIN: CPT

## 2024-02-12 RX ORDER — ONDANSETRON HYDROCHLORIDE 8 MG/1
8 TABLET, FILM COATED ORAL EVERY 8 HOURS PRN
Qty: 30 TABLET | Refills: 1 | Status: SHIPPED | OUTPATIENT
Start: 2024-02-12

## 2024-02-12 RX ORDER — SODIUM CHLORIDE 9 MG/ML
20 INJECTION, SOLUTION INTRAVENOUS ONCE
Status: CANCELLED | OUTPATIENT
Start: 2024-02-12

## 2024-02-12 RX ORDER — SODIUM CHLORIDE 0.9 % (FLUSH) 0.9 %
10 SYRINGE (ML) INJECTION EVERY 12 HOURS SCHEDULED
Status: CANCELLED | OUTPATIENT
Start: 2024-02-14

## 2024-02-12 RX ORDER — PROCHLORPERAZINE MALEATE 10 MG
10 TABLET ORAL EVERY 6 HOURS PRN
Qty: 30 TABLET | Refills: 1 | Status: SHIPPED | OUTPATIENT
Start: 2024-02-12

## 2024-02-12 RX ORDER — SODIUM CHLORIDE 0.9 % (FLUSH) 0.9 %
10 SYRINGE (ML) INJECTION AS NEEDED
Status: CANCELLED | OUTPATIENT
Start: 2024-02-14

## 2024-02-12 RX ORDER — SODIUM CHLORIDE 9 MG/ML
40 INJECTION, SOLUTION INTRAVENOUS AS NEEDED
Status: CANCELLED | OUTPATIENT
Start: 2024-02-14

## 2024-02-12 RX ORDER — LIDOCAINE AND PRILOCAINE 25; 25 MG/G; MG/G
CREAM TOPICAL
Qty: 30 G | Refills: 1 | Status: SHIPPED | OUTPATIENT
Start: 2024-02-12

## 2024-02-12 NOTE — H&P (VIEW-ONLY)
Labs drawn. Subjective   Vangie Carney is a 53 y.o. female who presents today for Initial Evaluation    Chief Complaint:    Chief Complaint   Patient presents with    port placement        History of Present Illness:    History of Present Illness Vangie is a 53-year-old female who presents with malignant neoplasm of the sigmoid colon.  She will be undergoing port placement.  Patient is status post colectomy.  Unfortunately over the recent past she has developed issues and concerns that have unfortunately been related to metastatic disease.  She reports that she did not have a port placement during her original diagnosis of colon cancer.  She does not take any blood thinning medications.  Denies any blood in her stool.    The following portions of the patient's history were reviewed and updated as appropriate: allergies, current medications, past family history, past medical history, past social history, past surgical history and problem list.    Past Medical History:  Past Medical History:   Diagnosis Date    Gallbladder attack     GERD (gastroesophageal reflux disease)     Hearing aid worn     History of ear infections     Malignant neoplasm of sigmoid colon 3/14/2023    Seasonal allergies     Wears glasses        Social History:  Social History     Socioeconomic History    Marital status:    Tobacco Use    Smoking status: Former     Packs/day: 1.00     Years: 30.00     Additional pack years: 0.00     Total pack years: 30.00     Types: Cigarettes     Quit date:      Years since quittin.1     Passive exposure: Never    Smokeless tobacco: Never   Vaping Use    Vaping Use: Every day    Substances: Nicotine, Flavoring    Devices: Refillable tank   Substance and Sexual Activity    Alcohol use: Never    Drug use: Defer    Sexual activity: Defer     Birth control/protection: None       Family History:  Family History   Problem Relation Age of Onset    Cancer Mother     Cancer Father     Cancer Sister     Cancer  Brother     Cancer Maternal Grandmother     Cancer Maternal Grandfather     Breast cancer Neg Hx        Past Surgical History:  Past Surgical History:   Procedure Laterality Date    CHOLECYSTECTOMY      COLON RESECTION N/A 3/14/2023    Procedure: COLON RESECTION LOW ANTERIOR LAPAROSCOPIC WITH DAVINCI ROBOT;  Surgeon: Shari Aguirre MD;  Location:  [facility] OR;  Service: Robotics - DaVinci;  Laterality: N/A;    COLONOSCOPY N/A 2/15/2023    Procedure: COLONOSCOPY FOR SCREENING;  Surgeon: Giselle Iniguez MD;  Location:  [facility] OR;  Service: Gastroenterology;  Laterality: N/A;    ENDOSCOPY N/A 2/15/2023    Procedure: ESOPHAGOGASTRODUODENOSCOPY WITH BIOPSY;  Surgeon: Giselle Iniguez MD;  Location:  [facility] OR;  Service: Gastroenterology;  Laterality: N/A;    LAPAROSCOPIC TUBAL LIGATION Bilateral     MIDDLE EAR SURGERY         Problem List:  Patient Active Problem List   Diagnosis    Chronic idiopathic constipation    Encounter for screening for malignant neoplasm of colon    Malignant neoplasm of sigmoid colon    GERD without esophagitis    S/P colectomy( robotic lap LAR)    Acute postoperative pain       Allergy:   Allergies   Allergen Reactions    Keflex [Cephalexin] Hives        Current Medications:   Current Outpatient Medications   Medication Sig Dispense Refill    ferrous sulfate 325 (65 FE) MG tablet Take 1 tablet by mouth 2 (Two) Times a Day With Meals. 60 tablet 5    lactulose (CHRONULAC) 10 GM/15ML solution Take 15 mL by mouth 2 (Two) Times a Day.      Loratadine 10 MG capsule Take  by mouth As Needed.      pantoprazole (PROTONIX) 40 MG EC tablet Take 1 tablet by mouth Daily.      polyethylene glycol (MIRALAX) 17 GM/SCOOP powder Take 17 g by mouth Daily As Needed (constipation). 510 g 0    traMADol (ULTRAM) 50 MG tablet Take 0.5 tablets by mouth Every 6 (Six) Hours As Needed. for pain       No current facility-administered medications for this visit.       Review of Systems:    Review of  "Systems   Constitutional:  Negative for activity change.   HENT:  Negative for congestion.    Eyes:  Negative for blurred vision.   Respiratory:  Negative for shortness of breath.    Cardiovascular:  Negative for chest pain.   Gastrointestinal:  Positive for GERD. Negative for abdominal pain.   Endocrine: Negative for cold intolerance.   Genitourinary:  Negative for flank pain.   Musculoskeletal:  Negative for arthralgias.   Skin:  Negative for bruise.   Allergic/Immunologic: Negative for environmental allergies.   Neurological:  Negative for confusion.   Hematological:  Negative for adenopathy.   Psychiatric/Behavioral:  Negative for agitation.          Physical Exam:   Physical Exam  Constitutional:       Appearance: Normal appearance.   HENT:      Head: Normocephalic and atraumatic.      Right Ear: External ear normal.      Left Ear: External ear normal.   Eyes:      Conjunctiva/sclera: Conjunctivae normal.      Pupils: Pupils are equal, round, and reactive to light.   Cardiovascular:      Pulses: Normal pulses.   Pulmonary:      Effort: Pulmonary effort is normal.   Abdominal:      General: Abdomen is flat.      Palpations: Abdomen is soft.   Musculoskeletal:         General: Normal range of motion.      Cervical back: Normal range of motion and neck supple.   Skin:     General: Skin is warm and dry.      Capillary Refill: Capillary refill takes less than 2 seconds.   Neurological:      General: No focal deficit present.      Mental Status: She is alert and oriented to person, place, and time.   Psychiatric:         Mood and Affect: Mood normal.         Behavior: Behavior normal.       Vitals:  Blood pressure 110/70, height 165.1 cm (65\"), weight 74.4 kg (164 lb), last menstrual period 06/15/2016.   Body mass index is 27.29 kg/m².      Assessment & Plan   Diagnoses and all orders for this visit:    1. Malignant neoplasm of sigmoid colon (Primary)  -     Case Request; Standing  -     sodium chloride 0.9 % flush " 10 mL  -     sodium chloride 0.9 % flush 10 mL  -     sodium chloride 0.9 % infusion 40 mL  -     ceFAZolin (ANCEF) 2,000 mg in sodium chloride 0.9 % 100 mL IVPB  -     Case Request    Other orders  -     Follow Anesthesia Guidelines / Protocol; Future  -     Follow Anesthesia Guidelines / Protocol; Standing  -     Verify / Perform Chlorhexidine Skin Prep; Standing  -     Verify / Perform Chlorhexidine Skin Prep if Indicated (If Not Already Completed); Standing  -     Obtain Informed Consent; Future  -     Provide NPO Instructions to Patient; Future  -     Chlorhexidine Skin Prep; Future  -     Insert Peripheral IV; Standing  -     Saline Lock & Maintain IV Access; Standing      Vangie is a 53-year-old female who presents with metastatic disease from malignant neoplasm of the sigmoid colon.  She undergo port placement with Dr. Frias this Wednesday.  Verbalized understanding prep instructions and procedure wishes to proceed.    Visit Diagnoses:    ICD-10-CM ICD-9-CM   1. Malignant neoplasm of sigmoid colon  C18.7 153.3         MEDS ORDERED DURING VISIT:  No orders of the defined types were placed in this encounter.      Return if symptoms worsen or fail to improve.             This document has been electronically signed by ANURAG Parnell  February 12, 2024 11:38 EST    Please note that portions of this note were completed with a voice recognition program.

## 2024-02-14 ENCOUNTER — ANESTHESIA EVENT (OUTPATIENT)
Dept: PERIOP | Facility: HOSPITAL | Age: 54
End: 2024-02-14
Payer: COMMERCIAL

## 2024-02-14 ENCOUNTER — ANESTHESIA (OUTPATIENT)
Dept: PERIOP | Facility: HOSPITAL | Age: 54
End: 2024-02-14
Payer: COMMERCIAL

## 2024-02-14 ENCOUNTER — HOSPITAL ENCOUNTER (OUTPATIENT)
Facility: HOSPITAL | Age: 54
Setting detail: HOSPITAL OUTPATIENT SURGERY
Discharge: HOME | End: 2024-02-14
Attending: SURGERY | Admitting: SURGERY
Payer: COMMERCIAL

## 2024-02-14 ENCOUNTER — APPOINTMENT (OUTPATIENT)
Dept: GENERAL RADIOLOGY | Facility: HOSPITAL | Age: 54
End: 2024-02-14
Payer: COMMERCIAL

## 2024-02-14 VITALS
DIASTOLIC BLOOD PRESSURE: 61 MMHG | SYSTOLIC BLOOD PRESSURE: 117 MMHG | HEART RATE: 73 BPM | BODY MASS INDEX: 25.58 KG/M2 | HEIGHT: 67 IN | WEIGHT: 163 LBS | OXYGEN SATURATION: 99 % | TEMPERATURE: 97.4 F | RESPIRATION RATE: 18 BRPM

## 2024-02-14 DIAGNOSIS — C18.7 MALIGNANT NEOPLASM OF SIGMOID COLON: ICD-10-CM

## 2024-02-14 PROCEDURE — C1894 INTRO/SHEATH, NON-LASER: HCPCS | Performed by: SURGERY

## 2024-02-14 PROCEDURE — 25010000002 FENTANYL CITRATE (PF) 50 MCG/ML SOLUTION: Performed by: NURSE ANESTHETIST, CERTIFIED REGISTERED

## 2024-02-14 PROCEDURE — 71045 X-RAY EXAM CHEST 1 VIEW: CPT

## 2024-02-14 PROCEDURE — 36561 INSERT TUNNELED CV CATH: CPT | Performed by: SURGERY

## 2024-02-14 PROCEDURE — 71045 X-RAY EXAM CHEST 1 VIEW: CPT | Performed by: RADIOLOGY

## 2024-02-14 PROCEDURE — 25010000002 MIDAZOLAM PER 1 MG: Performed by: NURSE ANESTHETIST, CERTIFIED REGISTERED

## 2024-02-14 PROCEDURE — 25010000002 CEFAZOLIN PER 500 MG: Performed by: SURGERY

## 2024-02-14 PROCEDURE — 77001 FLUOROGUIDE FOR VEIN DEVICE: CPT | Performed by: SURGERY

## 2024-02-14 PROCEDURE — C1788 PORT, INDWELLING, IMP: HCPCS | Performed by: SURGERY

## 2024-02-14 PROCEDURE — 25810000003 LACTATED RINGERS PER 1000 ML: Performed by: ANESTHESIOLOGY

## 2024-02-14 PROCEDURE — 25010000002 PROPOFOL 200 MG/20ML EMULSION: Performed by: NURSE ANESTHETIST, CERTIFIED REGISTERED

## 2024-02-14 PROCEDURE — 77001 FLUOROGUIDE FOR VEIN DEVICE: CPT

## 2024-02-14 PROCEDURE — 76000 FLUOROSCOPY <1 HR PHYS/QHP: CPT

## 2024-02-14 PROCEDURE — 76000 FLUOROSCOPY <1 HR PHYS/QHP: CPT | Performed by: RADIOLOGY

## 2024-02-14 PROCEDURE — 25010000002 LIDOCAINE 1 % SOLUTION: Performed by: SURGERY

## 2024-02-14 PROCEDURE — 25010000002 HEPARIN LOCK FLUSH PER 10 UNITS: Performed by: SURGERY

## 2024-02-14 DEVICE — PRT INTRO VASC/INTERV VORTEX FILL/HL DETACH/POLYURET/CATH 8F: Type: IMPLANTABLE DEVICE | Site: SUBCLAVIAN | Status: FUNCTIONAL

## 2024-02-14 RX ORDER — SODIUM CHLORIDE, SODIUM LACTATE, POTASSIUM CHLORIDE, CALCIUM CHLORIDE 600; 310; 30; 20 MG/100ML; MG/100ML; MG/100ML; MG/100ML
100 INJECTION, SOLUTION INTRAVENOUS ONCE AS NEEDED
Status: DISCONTINUED | OUTPATIENT
Start: 2024-02-14 | End: 2024-02-14 | Stop reason: HOSPADM

## 2024-02-14 RX ORDER — FENTANYL CITRATE 50 UG/ML
50 INJECTION, SOLUTION INTRAMUSCULAR; INTRAVENOUS
Status: DISCONTINUED | OUTPATIENT
Start: 2024-02-14 | End: 2024-02-14 | Stop reason: HOSPADM

## 2024-02-14 RX ORDER — MIDAZOLAM HYDROCHLORIDE 1 MG/ML
INJECTION INTRAMUSCULAR; INTRAVENOUS AS NEEDED
Status: DISCONTINUED | OUTPATIENT
Start: 2024-02-14 | End: 2024-02-14 | Stop reason: SURG

## 2024-02-14 RX ORDER — PROPOFOL 10 MG/ML
INJECTION, EMULSION INTRAVENOUS CONTINUOUS PRN
Status: DISCONTINUED | OUTPATIENT
Start: 2024-02-14 | End: 2024-02-14 | Stop reason: SURG

## 2024-02-14 RX ORDER — SODIUM CHLORIDE, SODIUM LACTATE, POTASSIUM CHLORIDE, CALCIUM CHLORIDE 600; 310; 30; 20 MG/100ML; MG/100ML; MG/100ML; MG/100ML
125 INJECTION, SOLUTION INTRAVENOUS ONCE
Status: COMPLETED | OUTPATIENT
Start: 2024-02-14 | End: 2024-02-14

## 2024-02-14 RX ORDER — SODIUM CHLORIDE 9 MG/ML
40 INJECTION, SOLUTION INTRAVENOUS AS NEEDED
Status: DISCONTINUED | OUTPATIENT
Start: 2024-02-14 | End: 2024-02-14 | Stop reason: HOSPADM

## 2024-02-14 RX ORDER — SODIUM CHLORIDE 0.9 % (FLUSH) 0.9 %
10 SYRINGE (ML) INJECTION EVERY 12 HOURS SCHEDULED
Status: DISCONTINUED | OUTPATIENT
Start: 2024-02-14 | End: 2024-02-14 | Stop reason: HOSPADM

## 2024-02-14 RX ORDER — LIDOCAINE HYDROCHLORIDE 10 MG/ML
INJECTION, SOLUTION INFILTRATION; PERINEURAL AS NEEDED
Status: DISCONTINUED | OUTPATIENT
Start: 2024-02-14 | End: 2024-02-14 | Stop reason: HOSPADM

## 2024-02-14 RX ORDER — FENTANYL CITRATE 50 UG/ML
INJECTION, SOLUTION INTRAMUSCULAR; INTRAVENOUS AS NEEDED
Status: DISCONTINUED | OUTPATIENT
Start: 2024-02-14 | End: 2024-02-14 | Stop reason: SURG

## 2024-02-14 RX ORDER — ONDANSETRON 2 MG/ML
4 INJECTION INTRAMUSCULAR; INTRAVENOUS AS NEEDED
Status: DISCONTINUED | OUTPATIENT
Start: 2024-02-14 | End: 2024-02-14 | Stop reason: HOSPADM

## 2024-02-14 RX ORDER — SODIUM CHLORIDE 0.9 % (FLUSH) 0.9 %
10 SYRINGE (ML) INJECTION AS NEEDED
Status: DISCONTINUED | OUTPATIENT
Start: 2024-02-14 | End: 2024-02-14 | Stop reason: HOSPADM

## 2024-02-14 RX ORDER — KETOROLAC TROMETHAMINE 30 MG/ML
30 INJECTION, SOLUTION INTRAMUSCULAR; INTRAVENOUS EVERY 6 HOURS PRN
Status: DISCONTINUED | OUTPATIENT
Start: 2024-02-14 | End: 2024-02-14 | Stop reason: HOSPADM

## 2024-02-14 RX ORDER — IPRATROPIUM BROMIDE AND ALBUTEROL SULFATE 2.5; .5 MG/3ML; MG/3ML
3 SOLUTION RESPIRATORY (INHALATION) ONCE AS NEEDED
Status: DISCONTINUED | OUTPATIENT
Start: 2024-02-14 | End: 2024-02-14 | Stop reason: HOSPADM

## 2024-02-14 RX ORDER — MAGNESIUM HYDROXIDE 1200 MG/15ML
LIQUID ORAL AS NEEDED
Status: DISCONTINUED | OUTPATIENT
Start: 2024-02-14 | End: 2024-02-14 | Stop reason: HOSPADM

## 2024-02-14 RX ORDER — MIDAZOLAM HYDROCHLORIDE 1 MG/ML
1 INJECTION INTRAMUSCULAR; INTRAVENOUS
Status: DISCONTINUED | OUTPATIENT
Start: 2024-02-14 | End: 2024-02-14 | Stop reason: HOSPADM

## 2024-02-14 RX ORDER — HEPARIN SODIUM (PORCINE) LOCK FLUSH IV SOLN 100 UNIT/ML 100 UNIT/ML
SOLUTION INTRAVENOUS AS NEEDED
Status: DISCONTINUED | OUTPATIENT
Start: 2024-02-14 | End: 2024-02-14 | Stop reason: HOSPADM

## 2024-02-14 RX ORDER — OXYCODONE HYDROCHLORIDE AND ACETAMINOPHEN 5; 325 MG/1; MG/1
1 TABLET ORAL ONCE AS NEEDED
Status: DISCONTINUED | OUTPATIENT
Start: 2024-02-14 | End: 2024-02-14 | Stop reason: HOSPADM

## 2024-02-14 RX ORDER — SODIUM CHLORIDE 9 MG/ML
INJECTION, SOLUTION INTRAMUSCULAR; INTRAVENOUS; SUBCUTANEOUS AS NEEDED
Status: DISCONTINUED | OUTPATIENT
Start: 2024-02-14 | End: 2024-02-14 | Stop reason: HOSPADM

## 2024-02-14 RX ADMIN — SODIUM CHLORIDE, POTASSIUM CHLORIDE, SODIUM LACTATE AND CALCIUM CHLORIDE: 600; 310; 30; 20 INJECTION, SOLUTION INTRAVENOUS at 07:26

## 2024-02-14 RX ADMIN — PROPOFOL 200 MCG/KG/MIN: 10 INJECTION, EMULSION INTRAVENOUS at 07:31

## 2024-02-14 RX ADMIN — MIDAZOLAM HYDROCHLORIDE 2 MG: 1 INJECTION, SOLUTION INTRAMUSCULAR; INTRAVENOUS at 07:26

## 2024-02-14 RX ADMIN — CEFAZOLIN 2 G: 2 INJECTION, POWDER, FOR SOLUTION INTRAMUSCULAR; INTRAVENOUS at 07:31

## 2024-02-14 RX ADMIN — FENTANYL CITRATE 100 MCG: 50 INJECTION INTRAMUSCULAR; INTRAVENOUS at 07:26

## 2024-02-14 NOTE — OP NOTE
OPERATIVE NOTE    Patient Name:  Vangie Carney  YOB: 1970  [id number]    Date of Surgery:  2/14/2024      PREOPERATIVE DIAGNOSIS: Stage IV colorectal cancer      POSTOPERATIVE DIAGNOSIS: Same       PROCEDURE PERFORMED: Right subclavian port placement under fluoroscopic guidance       SURGEON: Jaylen Leal MD       Circulator: Chiara Mack RN  Radiology Technologist: Juarez Peters, RT  Scrub Person: Edmond Becerra Tina        SPECIMENS: None       ANESTHESIA: General.  Local       FINDINGS:   1.  Right subclavian [employer] smart port       INDICATIONS: The patient is a 53 y.o. female who underwent low anterior resection early last year for colorectal cancer.  Surgical pathology demonstrated stage IIIb adenocarcinoma.  The patient did not elect for adjuvant chemotherapy.  She now has evidence of metastases.  Risks and benefits of port placement discussed with the patient and she elected to proceed     DESCRIPTION OF PROCEDURE:      After obtaining informed consent, the patient was taken to the operating room and placed in supine  position. After appropriate DVT and antibiotic prophylaxis, general anesthesia was induced. The patient's neck and chest were then prepped in the normal sterile fashion. A proper OR timeout was performed.     Patient was placed in the Trendelenburg position. Local anesthetic was administered to right upper chest wall and along the planned catheter tract.  Micropuncture finder needle was used to access the right subclavian vein. This took 1 attempt. Once accessed, guidewire was advanced into position under fluoroscopic guidance.  This was then exchanged for 4 Montserratian sheath and the PowerPort kit wire was then advanced into position under fluoroscopic guidance.  A 3 cm incision was made in the chest wall, including the wire entrance site into the skin. A subcutaneous pocket was created, down to the pectoralis fascia with  combination of electrocautery and blunt dissection. Preflushed port was then sutured to the pectoralis fascia with Prolene sutures at two points within the pocket. Wire tract was then dilated and combination dilator/sheath advanced under fluoroscopic guidance. The dilator was then removed.   Catheter was advanced into the sheath. Sheath was carefully removed to ensure the catheter remained in place. Final position was confirmed with fluoroscopy. Port flushed and tolu back appropriately. It was flushed with heparin.    Subcutaneous tissue was closed with 4-0 Vicryl suture and skin with running 4-0 Monocryl. Dermabond placed over incision.      All sponge and needle counts were correct X 2 at the completion of the procedure. The patient recovered from anesthesia and was transported to the PACU  in stable condition. Postop chest xray was ordered, to be completed in PACU      Jaylen Leal MD  2/14/2024  08:07 EST

## 2024-02-14 NOTE — INTERVAL H&P NOTE
HPI reviewed.  Patient with history of colorectal cancer status post low anterior resection early last year.  Pathology came back as stage IIIb adenocarcinoma but the patient did not elect to undergo adjuvant chemotherapy.  Unfortunately, she has developed signs of residual/metastatic disease.  Risks and benefits of right or left internal jugular or subclavian port placement discussed with the patient and she elects to proceed

## 2024-02-16 ENCOUNTER — INFUSION (OUTPATIENT)
Dept: ONCOLOGY | Facility: HOSPITAL | Age: 54
End: 2024-02-16
Payer: COMMERCIAL

## 2024-02-16 VITALS
TEMPERATURE: 97.7 F | OXYGEN SATURATION: 98 % | RESPIRATION RATE: 18 BRPM | DIASTOLIC BLOOD PRESSURE: 72 MMHG | WEIGHT: 163.3 LBS | HEART RATE: 107 BPM | BODY MASS INDEX: 25.58 KG/M2 | SYSTOLIC BLOOD PRESSURE: 113 MMHG

## 2024-02-16 DIAGNOSIS — D50.9 IRON DEFICIENCY ANEMIA, UNSPECIFIED IRON DEFICIENCY ANEMIA TYPE: ICD-10-CM

## 2024-02-16 DIAGNOSIS — K90.9 MALABSORPTION OF IRON: Primary | ICD-10-CM

## 2024-02-16 DIAGNOSIS — Z95.828 PORT-A-CATH IN PLACE: ICD-10-CM

## 2024-02-16 PROCEDURE — 25010000002 HEPARIN LOCK FLUSH PER 10 UNITS

## 2024-02-16 PROCEDURE — 25010000002 FERUMOXYTOL 510 MG/17ML SOLUTION: Performed by: NURSE PRACTITIONER

## 2024-02-16 PROCEDURE — 96374 THER/PROPH/DIAG INJ IV PUSH: CPT

## 2024-02-16 PROCEDURE — 96365 THER/PROPH/DIAG IV INF INIT: CPT

## 2024-02-16 PROCEDURE — 25810000003 SODIUM CHLORIDE 0.9 % SOLUTION: Performed by: NURSE PRACTITIONER

## 2024-02-16 RX ORDER — SODIUM CHLORIDE 0.9 % (FLUSH) 0.9 %
10 SYRINGE (ML) INJECTION AS NEEDED
Status: DISCONTINUED | OUTPATIENT
Start: 2024-02-16 | End: 2024-02-16 | Stop reason: HOSPADM

## 2024-02-16 RX ORDER — HEPARIN SODIUM (PORCINE) LOCK FLUSH IV SOLN 100 UNIT/ML 100 UNIT/ML
500 SOLUTION INTRAVENOUS AS NEEDED
OUTPATIENT
Start: 2024-02-19

## 2024-02-16 RX ORDER — SODIUM CHLORIDE 0.9 % (FLUSH) 0.9 %
10 SYRINGE (ML) INJECTION AS NEEDED
OUTPATIENT
Start: 2024-02-19

## 2024-02-16 RX ORDER — SODIUM CHLORIDE 9 MG/ML
20 INJECTION, SOLUTION INTRAVENOUS ONCE
Status: COMPLETED | OUTPATIENT
Start: 2024-02-16 | End: 2024-02-16

## 2024-02-16 RX ORDER — HEPARIN SODIUM (PORCINE) LOCK FLUSH IV SOLN 100 UNIT/ML 100 UNIT/ML
500 SOLUTION INTRAVENOUS AS NEEDED
Status: DISCONTINUED | OUTPATIENT
Start: 2024-02-16 | End: 2024-02-16 | Stop reason: HOSPADM

## 2024-02-16 RX ORDER — HEPARIN SODIUM (PORCINE) LOCK FLUSH IV SOLN 100 UNIT/ML 100 UNIT/ML
300 SOLUTION INTRAVENOUS ONCE
OUTPATIENT
Start: 2024-02-16

## 2024-02-16 RX ORDER — SODIUM CHLORIDE 0.9 % (FLUSH) 0.9 %
20 SYRINGE (ML) INJECTION AS NEEDED
Status: CANCELLED | OUTPATIENT
Start: 2024-02-16

## 2024-02-16 RX ORDER — SODIUM CHLORIDE 9 MG/ML
20 INJECTION, SOLUTION INTRAVENOUS ONCE
Status: CANCELLED | OUTPATIENT
Start: 2024-02-19

## 2024-02-16 RX ORDER — SODIUM CHLORIDE 0.9 % (FLUSH) 0.9 %
20 SYRINGE (ML) INJECTION AS NEEDED
OUTPATIENT
Start: 2024-02-16

## 2024-02-16 RX ORDER — HEPARIN SODIUM (PORCINE) LOCK FLUSH IV SOLN 100 UNIT/ML 100 UNIT/ML
300 SOLUTION INTRAVENOUS ONCE
Status: CANCELLED | OUTPATIENT
Start: 2024-02-16

## 2024-02-16 RX ADMIN — FERUMOXYTOL 510 MG: 510 INJECTION INTRAVENOUS at 14:56

## 2024-02-16 RX ADMIN — Medication 500 UNITS: at 15:42

## 2024-02-16 RX ADMIN — SODIUM CHLORIDE 20 ML/HR: 9 INJECTION, SOLUTION INTRAVENOUS at 14:56

## 2024-02-16 RX ADMIN — Medication 10 ML: at 15:42

## 2024-02-19 ENCOUNTER — INFUSION (OUTPATIENT)
Dept: ONCOLOGY | Facility: HOSPITAL | Age: 54
End: 2024-02-19
Payer: COMMERCIAL

## 2024-02-19 ENCOUNTER — LAB (OUTPATIENT)
Dept: ONCOLOGY | Facility: HOSPITAL | Age: 54
End: 2024-02-19
Payer: COMMERCIAL

## 2024-02-19 VITALS
TEMPERATURE: 97.1 F | OXYGEN SATURATION: 93 % | DIASTOLIC BLOOD PRESSURE: 70 MMHG | BODY MASS INDEX: 25.09 KG/M2 | SYSTOLIC BLOOD PRESSURE: 101 MMHG | HEART RATE: 98 BPM | WEIGHT: 160.2 LBS | RESPIRATION RATE: 18 BRPM

## 2024-02-19 DIAGNOSIS — K90.9 MALABSORPTION OF IRON: ICD-10-CM

## 2024-02-19 DIAGNOSIS — D50.9 IRON DEFICIENCY ANEMIA, UNSPECIFIED IRON DEFICIENCY ANEMIA TYPE: ICD-10-CM

## 2024-02-19 DIAGNOSIS — C18.7 MALIGNANT NEOPLASM OF SIGMOID COLON: ICD-10-CM

## 2024-02-19 DIAGNOSIS — C78.6 METASTASIS TO PERITONEUM: ICD-10-CM

## 2024-02-19 DIAGNOSIS — C18.7 MALIGNANT NEOPLASM OF SIGMOID COLON: Primary | ICD-10-CM

## 2024-02-19 DIAGNOSIS — C78.01 MALIGNANT NEOPLASM METASTATIC TO RIGHT LUNG: ICD-10-CM

## 2024-02-19 DIAGNOSIS — E87.6 HYPOKALEMIA: Primary | ICD-10-CM

## 2024-02-19 LAB
ALBUMIN SERPL-MCNC: 4.2 G/DL (ref 3.5–5.2)
ALBUMIN/GLOB SERPL: 1.5 G/DL
ALP SERPL-CCNC: 164 U/L (ref 39–117)
ALT SERPL W P-5'-P-CCNC: 13 U/L (ref 1–33)
ANION GAP SERPL CALCULATED.3IONS-SCNC: 17.5 MMOL/L (ref 5–15)
AST SERPL-CCNC: 20 U/L (ref 1–32)
BASOPHILS # BLD AUTO: 0.05 10*3/MM3 (ref 0–0.2)
BASOPHILS NFR BLD AUTO: 0.3 % (ref 0–1.5)
BILIRUB SERPL-MCNC: 0.8 MG/DL (ref 0–1.2)
BUN SERPL-MCNC: 20 MG/DL (ref 6–20)
BUN/CREAT SERPL: 24.7 (ref 7–25)
CALCIUM SPEC-SCNC: 9.5 MG/DL (ref 8.6–10.5)
CHLORIDE SERPL-SCNC: 92 MMOL/L (ref 98–107)
CO2 SERPL-SCNC: 27.5 MMOL/L (ref 22–29)
CREAT SERPL-MCNC: 0.81 MG/DL (ref 0.57–1)
DEPRECATED RDW RBC AUTO: 45 FL (ref 37–54)
EGFRCR SERPLBLD CKD-EPI 2021: 87.5 ML/MIN/1.73
EOSINOPHIL # BLD AUTO: 0.01 10*3/MM3 (ref 0–0.4)
EOSINOPHIL NFR BLD AUTO: 0.1 % (ref 0.3–6.2)
ERYTHROCYTE [DISTWIDTH] IN BLOOD BY AUTOMATED COUNT: 14.7 % (ref 12.3–15.4)
GLOBULIN UR ELPH-MCNC: 2.8 GM/DL
GLUCOSE SERPL-MCNC: 144 MG/DL (ref 65–99)
HCT VFR BLD AUTO: 34 % (ref 34–46.6)
HGB BLD-MCNC: 10.5 G/DL (ref 12–15.9)
IMM GRANULOCYTES # BLD AUTO: 0.06 10*3/MM3 (ref 0–0.05)
IMM GRANULOCYTES NFR BLD AUTO: 0.3 % (ref 0–0.5)
LYMPHOCYTES # BLD AUTO: 0.78 10*3/MM3 (ref 0.7–3.1)
LYMPHOCYTES NFR BLD AUTO: 4.5 % (ref 19.6–45.3)
MCH RBC QN AUTO: 26.3 PG (ref 26.6–33)
MCHC RBC AUTO-ENTMCNC: 30.9 G/DL (ref 31.5–35.7)
MCV RBC AUTO: 85.2 FL (ref 79–97)
MONOCYTES # BLD AUTO: 0.72 10*3/MM3 (ref 0.1–0.9)
MONOCYTES NFR BLD AUTO: 4.2 % (ref 5–12)
NEUTROPHILS NFR BLD AUTO: 15.67 10*3/MM3 (ref 1.7–7)
NEUTROPHILS NFR BLD AUTO: 90.6 % (ref 42.7–76)
NRBC BLD AUTO-RTO: 0 /100 WBC (ref 0–0.2)
PLATELET # BLD AUTO: 451 10*3/MM3 (ref 140–450)
PMV BLD AUTO: 9.8 FL (ref 6–12)
POTASSIUM SERPL-SCNC: 3.4 MMOL/L (ref 3.5–5.2)
PROT SERPL-MCNC: 7 G/DL (ref 6–8.5)
RBC # BLD AUTO: 3.99 10*6/MM3 (ref 3.77–5.28)
SODIUM SERPL-SCNC: 137 MMOL/L (ref 136–145)
WBC NRBC COR # BLD AUTO: 17.29 10*3/MM3 (ref 3.4–10.8)

## 2024-02-19 PROCEDURE — 96409 CHEMO IV PUSH SNGL DRUG: CPT

## 2024-02-19 PROCEDURE — 25010000002 FERUMOXYTOL 510 MG/17ML SOLUTION: Performed by: NURSE PRACTITIONER

## 2024-02-19 PROCEDURE — G0498 CHEMO EXTEND IV INFUS W/PUMP: HCPCS

## 2024-02-19 PROCEDURE — 25010000002 DEXAMETHASONE SODIUM PHOSPHATE 20 MG/5ML SOLUTION: Performed by: INTERNAL MEDICINE

## 2024-02-19 PROCEDURE — 25010000002 FLUOROURACIL PER 500 MG: Performed by: INTERNAL MEDICINE

## 2024-02-19 PROCEDURE — 96375 TX/PRO/DX INJ NEW DRUG ADDON: CPT

## 2024-02-19 PROCEDURE — 80053 COMPREHEN METABOLIC PANEL: CPT

## 2024-02-19 PROCEDURE — 25810000003 SODIUM CHLORIDE 0.9 % SOLUTION: Performed by: NURSE PRACTITIONER

## 2024-02-19 PROCEDURE — 85025 COMPLETE CBC W/AUTO DIFF WBC: CPT

## 2024-02-19 PROCEDURE — 96366 THER/PROPH/DIAG IV INF ADDON: CPT

## 2024-02-19 PROCEDURE — 96367 TX/PROPH/DG ADDL SEQ IV INF: CPT

## 2024-02-19 PROCEDURE — 25010000002 LEUCOVORIN CALCIUM PER 50MG: Performed by: INTERNAL MEDICINE

## 2024-02-19 PROCEDURE — 25810000003 SODIUM CHLORIDE 0.9 % SOLUTION 500 ML FLEX CONT: Performed by: INTERNAL MEDICINE

## 2024-02-19 PROCEDURE — 96411 CHEMO IV PUSH ADDL DRUG: CPT

## 2024-02-19 RX ORDER — SODIUM CHLORIDE 9 MG/ML
20 INJECTION, SOLUTION INTRAVENOUS ONCE
Status: DISCONTINUED | OUTPATIENT
Start: 2024-02-19 | End: 2024-02-19 | Stop reason: HOSPADM

## 2024-02-19 RX ORDER — HYDROCODONE BITARTRATE AND ACETAMINOPHEN 10; 325 MG/1; MG/1
1 TABLET ORAL ONCE
Status: COMPLETED | OUTPATIENT
Start: 2024-02-19 | End: 2024-02-19

## 2024-02-19 RX ORDER — PANTOPRAZOLE SODIUM 40 MG/1
40 TABLET, DELAYED RELEASE ORAL ONCE
Status: COMPLETED | OUTPATIENT
Start: 2024-02-19 | End: 2024-02-19

## 2024-02-19 RX ORDER — CLINDAMYCIN HYDROCHLORIDE 300 MG/1
300 CAPSULE ORAL 2 TIMES DAILY
Qty: 20 CAPSULE | Refills: 0 | Status: SHIPPED | OUTPATIENT
Start: 2024-02-19 | End: 2024-02-21

## 2024-02-19 RX ORDER — POTASSIUM CHLORIDE 20 MEQ/1
40 TABLET, EXTENDED RELEASE ORAL ONCE
Status: COMPLETED | OUTPATIENT
Start: 2024-02-19 | End: 2024-02-19

## 2024-02-19 RX ORDER — FLUOROURACIL 50 MG/ML
700 INJECTION, SOLUTION INTRAVENOUS ONCE
Status: COMPLETED | OUTPATIENT
Start: 2024-02-19 | End: 2024-02-19

## 2024-02-19 RX ORDER — PANTOPRAZOLE SODIUM 40 MG/10ML
40 INJECTION, POWDER, LYOPHILIZED, FOR SOLUTION INTRAVENOUS ONCE
Status: DISCONTINUED | OUTPATIENT
Start: 2024-02-19 | End: 2024-02-19

## 2024-02-19 RX ORDER — HYDROCODONE BITARTRATE AND ACETAMINOPHEN 5; 325 MG/1; MG/1
TABLET ORAL
Qty: 180 TABLET | Refills: 0 | Status: SHIPPED | OUTPATIENT
Start: 2024-02-19

## 2024-02-19 RX ADMIN — HYDROCODONE BITARTRATE AND ACETAMINOPHEN 1 TABLET: 10; 325 TABLET ORAL at 08:33

## 2024-02-19 RX ADMIN — SODIUM CHLORIDE 1000 ML: 9 INJECTION, SOLUTION INTRAVENOUS at 08:55

## 2024-02-19 RX ADMIN — PANTOPRAZOLE SODIUM 40 MG: 40 TABLET, DELAYED RELEASE ORAL at 09:43

## 2024-02-19 RX ADMIN — FERUMOXYTOL 510 MG: 510 INJECTION INTRAVENOUS at 09:45

## 2024-02-19 RX ADMIN — FLUOROURACIL 4400 MG: 50 INJECTION, SOLUTION INTRAVENOUS at 11:32

## 2024-02-19 RX ADMIN — FLUOROURACIL 700 MG: 50 INJECTION, SOLUTION INTRAVENOUS at 11:17

## 2024-02-19 RX ADMIN — POTASSIUM CHLORIDE 40 MEQ: 1500 TABLET, EXTENDED RELEASE ORAL at 09:43

## 2024-02-19 RX ADMIN — LEUCOVORIN CALCIUM 700 MG: 500 INJECTION, POWDER, LYOPHILIZED, FOR SOLUTION INTRAMUSCULAR; INTRAVENOUS at 10:23

## 2024-02-19 RX ADMIN — DEXAMETHASONE SODIUM PHOSPHATE 12 MG: 4 INJECTION, SOLUTION INTRA-ARTICULAR; INTRALESIONAL; INTRAMUSCULAR; INTRAVENOUS; SOFT TISSUE at 09:29

## 2024-02-19 NOTE — PROGRESS NOTES
Patient currently taking Tramadol PRN without sufficient pain control. Rates pain 10/10 today prior to chemotherapy. Given a dose of Norco 10/325 mg with good relief in symptoms. Requests that this be changed. Will give trial of Norco 5/325 mg 1-2 tablets q8 hours PRN for pain. Will re-evaluate at next visit.       Jessie Dahl, APRN

## 2024-02-20 ENCOUNTER — DOCUMENTATION (OUTPATIENT)
Dept: ONCOLOGY | Facility: HOSPITAL | Age: 54
End: 2024-02-20
Payer: COMMERCIAL

## 2024-02-20 NOTE — PROGRESS NOTES
Patient is 53 y.o. white female diagnosed with Malignant neoplasm of sigmoid colon.    Patient in clinic on Monday, February 19 to receive chemotherapy preparation. Patient received her first treatment with leucovorin/5-FU.    SS received referral per RAÚL Ansari for psychosocial assessment.    Oncology Distress Level 4-7  Received: Yesterday  Coty Allen RN Taylor, Pamela F, MSW    Ambulatory Referral to ONC Social Work [[id number]]    Electronically signed by: Jessie Dahl APRN on 02/19/24 1302 Status: Active   Mode: Ordering in Verbal with readback mode Communicated by: Coty Allen RN   Ordering user: Coty Allen RN 02/19/24 1301 Ordering provider: Jessie Dahl APRN   Authorized by: Jessie Dahl APRN   Frequency:  02/19/24 -     Diagnoses  Malignant neoplasm of sigmoid colon [C18.7]   Questionnaire    Question Answer   Follow-up needed: Yes     SS attempted to contact patient [phone] this date without success.    SS will attempt contact at later time.

## 2024-02-21 ENCOUNTER — INFUSION (OUTPATIENT)
Dept: ONCOLOGY | Facility: HOSPITAL | Age: 54
End: 2024-02-21
Payer: COMMERCIAL

## 2024-02-21 VITALS
WEIGHT: 157.9 LBS | TEMPERATURE: 97.1 F | HEART RATE: 78 BPM | BODY MASS INDEX: 24.73 KG/M2 | DIASTOLIC BLOOD PRESSURE: 70 MMHG | OXYGEN SATURATION: 93 % | RESPIRATION RATE: 18 BRPM | SYSTOLIC BLOOD PRESSURE: 118 MMHG

## 2024-02-21 DIAGNOSIS — R11.0 NAUSEA: ICD-10-CM

## 2024-02-21 DIAGNOSIS — Z95.828 PORT-A-CATH IN PLACE: Primary | ICD-10-CM

## 2024-02-21 PROCEDURE — 25010000002 HEPARIN LOCK FLUSH PER 10 UNITS

## 2024-02-21 PROCEDURE — 25010000002 ONDANSETRON PER 1 MG: Performed by: NURSE PRACTITIONER

## 2024-02-21 PROCEDURE — 96374 THER/PROPH/DIAG INJ IV PUSH: CPT

## 2024-02-21 PROCEDURE — 96361 HYDRATE IV INFUSION ADD-ON: CPT

## 2024-02-21 PROCEDURE — 25810000003 SODIUM CHLORIDE 0.9 % SOLUTION: Performed by: NURSE PRACTITIONER

## 2024-02-21 PROCEDURE — 96365 THER/PROPH/DIAG IV INF INIT: CPT

## 2024-02-21 RX ORDER — SODIUM CHLORIDE 0.9 % (FLUSH) 0.9 %
10 SYRINGE (ML) INJECTION AS NEEDED
Status: DISCONTINUED | OUTPATIENT
Start: 2024-02-21 | End: 2024-02-21 | Stop reason: HOSPADM

## 2024-02-21 RX ORDER — HEPARIN SODIUM (PORCINE) LOCK FLUSH IV SOLN 100 UNIT/ML 100 UNIT/ML
300 SOLUTION INTRAVENOUS ONCE
OUTPATIENT
Start: 2024-02-21

## 2024-02-21 RX ORDER — LACTULOSE 10 G/15ML
20 SOLUTION ORAL 2 TIMES DAILY PRN
Qty: 450 ML | Refills: 2 | Status: SHIPPED | OUTPATIENT
Start: 2024-02-21

## 2024-02-21 RX ORDER — HEPARIN SODIUM (PORCINE) LOCK FLUSH IV SOLN 100 UNIT/ML 100 UNIT/ML
500 SOLUTION INTRAVENOUS AS NEEDED
OUTPATIENT
Start: 2024-02-21

## 2024-02-21 RX ORDER — SODIUM CHLORIDE 0.9 % (FLUSH) 0.9 %
20 SYRINGE (ML) INJECTION AS NEEDED
OUTPATIENT
Start: 2024-02-21

## 2024-02-21 RX ORDER — SODIUM CHLORIDE 0.9 % (FLUSH) 0.9 %
10 SYRINGE (ML) INJECTION AS NEEDED
OUTPATIENT
Start: 2024-02-21

## 2024-02-21 RX ORDER — AMOXICILLIN AND CLAVULANATE POTASSIUM 875; 125 MG/1; MG/1
1 TABLET, FILM COATED ORAL 2 TIMES DAILY
Qty: 20 TABLET | Refills: 0 | Status: SHIPPED | OUTPATIENT
Start: 2024-02-21

## 2024-02-21 RX ORDER — HEPARIN SODIUM (PORCINE) LOCK FLUSH IV SOLN 100 UNIT/ML 100 UNIT/ML
500 SOLUTION INTRAVENOUS AS NEEDED
Status: DISCONTINUED | OUTPATIENT
Start: 2024-02-21 | End: 2024-02-21 | Stop reason: HOSPADM

## 2024-02-21 RX ADMIN — Medication 500 UNITS: at 11:05

## 2024-02-21 RX ADMIN — SODIUM CHLORIDE 1000 ML: 9 INJECTION, SOLUTION INTRAVENOUS at 09:57

## 2024-02-21 RX ADMIN — Medication 10 ML: at 11:05

## 2024-02-21 RX ADMIN — ONDANSETRON 16 MG: 2 INJECTION INTRAMUSCULAR; INTRAVENOUS at 09:57

## 2024-02-23 ENCOUNTER — DOCUMENTATION (OUTPATIENT)
Dept: ONCOLOGY | Facility: HOSPITAL | Age: 54
End: 2024-02-23
Payer: COMMERCIAL

## 2024-02-23 ENCOUNTER — TELEPHONE (OUTPATIENT)
Dept: ONCOLOGY | Facility: CLINIC | Age: 54
End: 2024-02-23
Payer: COMMERCIAL

## 2024-02-23 NOTE — TELEPHONE ENCOUNTER
Pt states she had iron on Friday started projectile vomiting on Saturday morning had tx and iron again on Monday started projectile vomiting later that evening. Pt states she vomited 8 to 9 times with each episode with large amounts of fluid coming up. Pt is no longer vomiting but throat is extremely sore wants to know what she needs to do to treat her throat and wants to speak with someone before her next treatment. Call back number is [phone]

## 2024-02-23 NOTE — PROGRESS NOTES
"Patient is 53 y.o. white female diagnosed with metastatic colon cancer.    Patient in clinic on 2/19/2024 to begin treatment with leucovorin/5-FU.    SS received referral per patient's nurse RAÚL Ansari.    Oncology Distress Level 4-7  Received: 4 days ago  Coty Allen RN Taylor, Pamela F, MSW    Ambulatory Referral to ONC Social Work [[id number]]    Electronically signed by: Jessie Dahl APRN on 02/19/24 1302 Status: Active   Mode: Ordering in Verbal with readback mode Communicated by: Coty Allen RN   Ordering user: Coty Allen RN 02/19/24 1301 Ordering provider: Jessie Dahl APRN   Authorized by: Jessie Dahl APRN   Frequency:  02/19/24 -     Diagnoses  Malignant neoplasm of sigmoid colon [C18.7]   Questionnaire    Question Answer   Follow-up needed: Yes       SS contacted patient patient [phone] to complete psychosocial assessment. Role of oncology social worker introduced to patient.    Patient lives at home with spouse Red and son Jorge 23 y.o.    Patient doesn't utilize any home health.    Patient doesn't utilize any durable medical equipment.    Patient has no children.    Spouse provides transportation.    Patient verbalized that after first chemo treatment this week, she has been extremely sick throwing up. Encouraged patient to contact nurse regarding symptoms. Patient verbalized being agreeable.    Advance Care Planning:  The patient and I discussed care planning \"Conversations that Matter.\" This service was offered, free of charge, for development of advance directives with a certified ACP facilitator. The patient does not have an up-to-date advance directive. The patient is not interested in an appointment with one of our facilitators to create or update their advanced directives.    Distress Screening Follow-up    Diagnosis: Metastatic Colon Cancer    Location of Distress Screening: Medical Oncology    Distress Level: 5 (Referral sent to NIK Ramirez) " (2/19/2024  9:35 AM)    Financial Needs: other (see comments) (Patient verbalizes being retired from [employer].)  Transportation Needs: gas cards (SS educated patient on resources for travel assistance, i.e., [place] [facility]. Patient declines currently. Patient to contact  if funding needing at later date.)  Emotional Needs: emotional suppport/coping strategies (Patient completed [facility] Distress Screening and rated her distress a 5 out of 10.Time spent talking with patient, empathetic listening provided, and reassurance given.    SS offered supportive counseling services but patient declines at this time.)  Physical Needs: other (see comments) (Patient verbalizes being independent with ADL's. Patient states receiving first chemo tx this week and being extremely sick. SS encouraged patient to followup with nurse. SS transferred patient to .)  Palliative Care: advance care planning (Patient doesn't utilize a living will or power of . SS provided patient with information on obtaining both if needed at later time.)  Practical Needs: Social Work Referral (Ambulatory Referral to ONC Social Work ([phone])--Patient's family assist with providing daily needs, such as medication, food preparation, laundry, medication assistance, etc.)    Patient lives at [address] [place] [address]. Patient retired from [employer] and has [employer] [facility] [employer] [facility] [facility] Employee insurance.    SS educated patient about receiving boost in clinic if needed.    SS provided patient with contact information and encouraged patient to call with any questions, concerns, or needs.     Patient verbalized understanding and agrees with resource assistance and plan.    SS will follow and assist as needed.

## 2024-02-23 NOTE — TELEPHONE ENCOUNTER
Spoke with patient, instructed her to take nausea medication as needed as well as the evening after getting her infusions. She is agreeable to this plan. Magic mouth wash sent in to patients pharmacy. No other questions or concerns at this time.

## 2024-02-27 ENCOUNTER — OFFICE VISIT (OUTPATIENT)
Dept: ONCOLOGY | Facility: CLINIC | Age: 54
End: 2024-02-27
Payer: COMMERCIAL

## 2024-02-27 ENCOUNTER — INFUSION (OUTPATIENT)
Dept: ONCOLOGY | Facility: HOSPITAL | Age: 54
End: 2024-02-27
Payer: COMMERCIAL

## 2024-02-27 VITALS
DIASTOLIC BLOOD PRESSURE: 68 MMHG | TEMPERATURE: 97.3 F | HEART RATE: 118 BPM | BODY MASS INDEX: 24.48 KG/M2 | RESPIRATION RATE: 18 BRPM | OXYGEN SATURATION: 97 % | WEIGHT: 156.31 LBS | SYSTOLIC BLOOD PRESSURE: 108 MMHG

## 2024-02-27 VITALS
DIASTOLIC BLOOD PRESSURE: 68 MMHG | WEIGHT: 156.4 LBS | TEMPERATURE: 97.3 F | BODY MASS INDEX: 24.5 KG/M2 | SYSTOLIC BLOOD PRESSURE: 108 MMHG | HEART RATE: 118 BPM | RESPIRATION RATE: 18 BRPM | OXYGEN SATURATION: 97 %

## 2024-02-27 DIAGNOSIS — R11.0 NAUSEA: Primary | ICD-10-CM

## 2024-02-27 DIAGNOSIS — C18.7 MALIGNANT NEOPLASM OF SIGMOID COLON: ICD-10-CM

## 2024-02-27 DIAGNOSIS — R11.2 NAUSEA AND VOMITING, UNSPECIFIED VOMITING TYPE: ICD-10-CM

## 2024-02-27 DIAGNOSIS — K59.00 CONSTIPATION, UNSPECIFIED CONSTIPATION TYPE: ICD-10-CM

## 2024-02-27 DIAGNOSIS — Z95.828 PORT-A-CATH IN PLACE: Primary | ICD-10-CM

## 2024-02-27 PROCEDURE — 96365 THER/PROPH/DIAG IV INF INIT: CPT

## 2024-02-27 PROCEDURE — 25010000002 HEPARIN LOCK FLUSH PER 10 UNITS

## 2024-02-27 PROCEDURE — 96375 TX/PRO/DX INJ NEW DRUG ADDON: CPT

## 2024-02-27 PROCEDURE — 96361 HYDRATE IV INFUSION ADD-ON: CPT

## 2024-02-27 PROCEDURE — 25010000002 ONDANSETRON PER 1 MG: Performed by: NURSE PRACTITIONER

## 2024-02-27 PROCEDURE — 99214 OFFICE O/P EST MOD 30 MIN: CPT | Performed by: NURSE PRACTITIONER

## 2024-02-27 PROCEDURE — 25810000003 SODIUM CHLORIDE 0.9 % SOLUTION: Performed by: NURSE PRACTITIONER

## 2024-02-27 PROCEDURE — 25010000002 PROCHLORPERAZINE 10 MG/2ML SOLUTION: Performed by: NURSE PRACTITIONER

## 2024-02-27 RX ORDER — HEPARIN SODIUM (PORCINE) LOCK FLUSH IV SOLN 100 UNIT/ML 100 UNIT/ML
500 SOLUTION INTRAVENOUS AS NEEDED
OUTPATIENT
Start: 2024-02-27

## 2024-02-27 RX ORDER — AMOXICILLIN 250 MG
2 CAPSULE ORAL 2 TIMES DAILY
Qty: 120 TABLET | Refills: 1 | Status: SHIPPED | OUTPATIENT
Start: 2024-02-27

## 2024-02-27 RX ORDER — HEPARIN SODIUM (PORCINE) LOCK FLUSH IV SOLN 100 UNIT/ML 100 UNIT/ML
300 SOLUTION INTRAVENOUS ONCE
OUTPATIENT
Start: 2024-02-27

## 2024-02-27 RX ORDER — PROCHLORPERAZINE EDISYLATE 5 MG/ML
10 INJECTION INTRAMUSCULAR; INTRAVENOUS ONCE
Status: COMPLETED | OUTPATIENT
Start: 2024-02-27 | End: 2024-02-27

## 2024-02-27 RX ORDER — SODIUM CHLORIDE 0.9 % (FLUSH) 0.9 %
10 SYRINGE (ML) INJECTION AS NEEDED
OUTPATIENT
Start: 2024-02-27

## 2024-02-27 RX ORDER — SODIUM CHLORIDE 0.9 % (FLUSH) 0.9 %
10 SYRINGE (ML) INJECTION AS NEEDED
Status: DISCONTINUED | OUTPATIENT
Start: 2024-02-27 | End: 2024-02-27 | Stop reason: HOSPADM

## 2024-02-27 RX ORDER — SODIUM CHLORIDE 0.9 % (FLUSH) 0.9 %
20 SYRINGE (ML) INJECTION AS NEEDED
OUTPATIENT
Start: 2024-02-27

## 2024-02-27 RX ORDER — HEPARIN SODIUM (PORCINE) LOCK FLUSH IV SOLN 100 UNIT/ML 100 UNIT/ML
500 SOLUTION INTRAVENOUS AS NEEDED
Status: DISCONTINUED | OUTPATIENT
Start: 2024-02-27 | End: 2024-02-27 | Stop reason: HOSPADM

## 2024-02-27 RX ADMIN — SODIUM CHLORIDE 1000 ML: 9 INJECTION, SOLUTION INTRAVENOUS at 12:32

## 2024-02-27 RX ADMIN — ONDANSETRON 16 MG: 2 INJECTION INTRAMUSCULAR; INTRAVENOUS at 12:32

## 2024-02-27 RX ADMIN — PROCHLORPERAZINE EDISYLATE 10 MG: 5 INJECTION INTRAMUSCULAR; INTRAVENOUS at 13:26

## 2024-02-27 RX ADMIN — Medication 10 ML: at 13:40

## 2024-02-27 RX ADMIN — Medication 500 UNITS: at 13:40

## 2024-02-27 NOTE — PROGRESS NOTES
DATE:  2/27/2024    DIAGNOSIS: Colon Cancer    CHIEF COMPLAINT:  Nausea/Vomiting, Decreased PO intake      Interval History:  Ms. Carney follows with Dr. Hanks for colon cancer and is being seen today for an acute visit. She received her initial cycle of 5FU/LCV, along with her final dose of IV Feraheme on 02/19/2024. She reports that after receiving the first dose of IV iron she developed nausea. She was started on zofran and compazine which was helpful. However, this past Sunday she woke up feeling great and went to [employer] and went to [employer] for lunch. Later that evening she developed nausea with vomiting. Since that time, she reports vomiting a total of 4-5 times. She reports a fair appetite and hydrating with 2-3 bottles of water as tolerated. She has also been struggling with constipation recently. She is taking Lactulose daily and an OTC stool softener twice a day. She denies any new or worsening abdominal pain. No fever/chills. Denies any sick contacts. She is without any other specific complaints at this time.     The following portions of the patient's history were reviewed and updated as appropriate: allergies, current medications, past family history, past medical history, past social history, past surgical history and problem list.    PAST MEDICAL HISTORY:  Past Medical History:   Diagnosis Date    Gallbladder attack     GERD (gastroesophageal reflux disease)     Hearing aid worn     History of ear infections     Malignant neoplasm of sigmoid colon 3/14/2023    Seasonal allergies     Wears glasses        PAST SURGICAL HISTORY:  Past Surgical History:   Procedure Laterality Date    CHOLECYSTECTOMY      COLON RESECTION N/A 3/14/2023    Procedure: COLON RESECTION LOW ANTERIOR LAPAROSCOPIC WITH DAVINCI ROBOT;  Surgeon: Shari Aguirre MD;  Location: [facility];  Service: Robotics - DaVinci;  Laterality: N/A;    COLONOSCOPY N/A 2/15/2023    Procedure: COLONOSCOPY FOR SCREENING;  Surgeon:  Giselle Iniguez MD;  Location: [facility] OR;  Service: Gastroenterology;  Laterality: N/A;    ENDOSCOPY N/A 2/15/2023    Procedure: ESOPHAGOGASTRODUODENOSCOPY WITH BIOPSY;  Surgeon: Giselle Iniguez MD;  Location: [facility] OR;  Service: Gastroenterology;  Laterality: N/A;    LAPAROSCOPIC TUBAL LIGATION Bilateral     MIDDLE EAR SURGERY      PORTACATH PLACEMENT N/A 2024    Procedure: INSERTION OF PORTACATH;  Surgeon: Jaylen Leal MD;  Location: [facility] OR;  Service: General;  Laterality: N/A;       SOCIAL HISTORY:  Social History     Socioeconomic History    Marital status:    Tobacco Use    Smoking status: Former     Packs/day: 1.00     Years: 30.00     Additional pack years: 0.00     Total pack years: 30.00     Types: Cigarettes     Quit date:      Years since quittin.1     Passive exposure: Never    Smokeless tobacco: Never   Vaping Use    Vaping Use: Every day    Substances: Nicotine, Flavoring    Devices: [employer] tank   Substance and Sexual Activity    Alcohol use: Never    Drug use: Defer    Sexual activity: Defer     Birth control/protection: None              FAMILY HISTORY:  Family History   Problem Relation Age of Onset    Cancer Mother     Cancer Father     Cancer Sister     Cancer Brother     Cancer Maternal Grandmother     Cancer Maternal Grandfather     Breast cancer Neg Hx          MEDICATIONS:  The current medication list was reviewed in the EMR    Current Outpatient Medications:     amoxicillin-clavulanate (AUGMENTIN) 875-125 MG per tablet, Take 1 tablet by mouth 2 (Two) Times a Day., Disp: 20 tablet, Rfl: 0    ferrous sulfate 325 (65 FE) MG tablet, Take 1 tablet by mouth 2 (Two) Times a Day With Meals., Disp: 60 tablet, Rfl: 5    HYDROcodone-acetaminophen (NORCO) 5-325 MG per tablet, Take 1-2 tablets as needed every 8 hours for pain., Disp: 180 tablet, Rfl: 0    lactulose (CHRONULAC) 10 GM/15ML solution, Take 30 mL by mouth 2 (Two) Times a Day As Needed  (refractory constipation)., Disp: 450 mL, Rfl: 2    lidocaine-prilocaine (EMLA) 2.5-2.5 % cream, Apply to port-a-cath site 30 minutes prior to arrival at infusion center. Cover with plastic wrap., Disp: 30 g, Rfl: 1    Loratadine 10 MG capsule, Take  by mouth As Needed., Disp: , Rfl:     ondansetron (Zofran) 8 MG tablet, Take 1 tablet by mouth Every 8 (Eight) Hours As Needed for Nausea or Vomiting., Disp: 30 tablet, Rfl: 1    pantoprazole (PROTONIX) 40 MG EC tablet, Take 1 tablet by mouth Daily., Disp: , Rfl:     polyethylene glycol (MIRALAX) 17 GM/SCOOP powder, Take 17 g by mouth Daily As Needed (constipation)., Disp: 510 g, Rfl: 0    prochlorperazine (COMPAZINE) 10 MG tablet, Take 1 tablet by mouth Every 6 (Six) Hours As Needed for Nausea or Vomiting., Disp: 30 tablet, Rfl: 1    sennosides-docusate (PERICOLACE) 8.6-50 MG per tablet, Take 2 tablets by mouth 2 (Two) Times a Day., Disp: 120 tablet, Rfl: 1    ALLERGIES:    Allergies   Allergen Reactions    Keflex [Cephalexin] Nausea Only     Beta lactam allergy details  Antibiotic reaction: nausea  Age at reaction: adult  Dose to reaction time: (!) minutes  Reason for antibiotic: unknown  Epinephrine required for reaction?: no  Tolerated antibiotics: augmentin, amoxicillin            REVIEW OF SYSTEMS:    A comprehensive 14 point review of systems was performed.  Significant findings as mentioned above.  All other systems reviewed and are negative.        Physical Exam   Vital Signs:   Vitals:    02/27/24 1150   BP: 108/68   Pulse: 118   Resp: 18   Temp: 97.3 °F (36.3 °C)   SpO2: 97%    BMI is within normal parameters. No other follow-up for BMI required.    General: Well developed, well nourished, alert and oriented x 3, in no acute distress.   Head: ATNC   Eyes: PERRL, No evidence of conjunctivitis.   Nose: No nasal discharge.   Mouth: Oral mucosal membranes moist. No oral ulceration or hemorrhages.   Neck: Neck supple. No thyromegaly. No JVD.   Lungs: Clear in all  fields to A&P without rales, rhonchi or wheezing.   Heart: S1, S2. Regular rate and rhythm. No murmurs, rubs, or gallops.   Abdomen: Soft. Bowel sounds are normoactive. Nontender with palpation.   Extremities: No clubbing, cyanosis or edema bilaterally.   Integumentary: Warm, dry, intact.    Neurologic: Grossly non-focal exam.     Pain Score:  Pain Score    02/27/24 1150   PainSc: 0-No pain             RECENT LABS:  Lab Results   Component Value Date    WBC 17.29 (H) 02/19/2024    HGB 10.5 (L) 02/19/2024    HCT 34.0 02/19/2024    MCV 85.2 02/19/2024    RDW 14.7 02/19/2024     (H) 02/19/2024    NEUTRORELPCT 90.6 (H) 02/19/2024    LYMPHORELPCT 4.5 (L) 02/19/2024    MONORELPCT 4.2 (L) 02/19/2024    EOSRELPCT 0.1 (L) 02/19/2024    BASORELPCT 0.3 02/19/2024    NEUTROABS 15.67 (H) 02/19/2024    LYMPHSABS 0.78 02/19/2024       Lab Results   Component Value Date     02/19/2024    K 3.4 (L) 02/19/2024    CO2 27.5 02/19/2024    CL 92 (L) 02/19/2024    BUN 20 02/19/2024    CREATININE 0.81 02/19/2024    EGFRIFNONA 79 09/10/2021    GLUCOSE 144 (H) 02/19/2024    CALCIUM 9.5 02/19/2024    ALKPHOS 164 (H) 02/19/2024    AST 20 02/19/2024    ALT 13 02/19/2024    BILITOT 0.8 02/19/2024    ALBUMIN 4.2 02/19/2024    PROTEINTOT 7.0 02/19/2024    MG 1.8 03/16/2023       Lab Results   Component Value Date     (H) 09/10/2021       Lab Results   Component Value Date    FERRITIN 9.74 (L) 01/23/2024    IRON 22 (L) 01/23/2024    TIBC 443 01/23/2024    LABIRON 5 (L) 01/23/2024      ASSESSMENT & PLAN:  Vangie Carney is a very pleasant 53 y.o. female with    1. Colon Cancer  - Please see Dr. Hanks's most recent follow up 02/06/2024 for full details regarding oncology history. She is being seen today for an acute visit (see below). She will follow up with MD as previously scheduled.     2. Nausea/Vomiting  3. Decreased Oral Intake  4. Constipation  - Likely secondary to IV Feraheme and chemotherapy with constipation also  contributing.  - Will give Zofran 16 mg IV and Compazine 10 mg IV with one liter NS today.  - Advised to continue Zofran and Compazine as needed.   - Recommended to continue Lactulose every 6 hours until bowel movement and then as needed. She can also use Miralax daily. Will add Senna Colace 2 tabs BID.   - Advised patient to notify clinic if further supportive care is needed.         I spent 30 minutes caring for Vangie on this date of service. This time includes time spent by me in the following activities: preparing for the visit, reviewing tests, performing a medically appropriate examination and/or evaluation, counseling and educating the patient/family/caregiver, ordering medications, tests, or procedures, referring and communicating with other health care professionals, documenting information in the medical record, independently interpreting results and communicating that information with the patient/family/caregiver, and care coordination          Electronically Signed by: ANURAG Hooker , APRN February 27, 2024 12:17 EST       CC:   No ref. provider found  Vimal Moreno MD

## 2024-03-05 ENCOUNTER — TELEPHONE (OUTPATIENT)
Dept: ONCOLOGY | Facility: CLINIC | Age: 54
End: 2024-03-05

## 2024-03-05 ENCOUNTER — INFUSION (OUTPATIENT)
Dept: ONCOLOGY | Facility: HOSPITAL | Age: 54
End: 2024-03-05
Payer: COMMERCIAL

## 2024-03-05 ENCOUNTER — OFFICE VISIT (OUTPATIENT)
Dept: ONCOLOGY | Facility: CLINIC | Age: 54
End: 2024-03-05
Payer: COMMERCIAL

## 2024-03-05 ENCOUNTER — LAB (OUTPATIENT)
Dept: ONCOLOGY | Facility: CLINIC | Age: 54
End: 2024-03-05
Payer: COMMERCIAL

## 2024-03-05 VITALS
OXYGEN SATURATION: 98 % | DIASTOLIC BLOOD PRESSURE: 77 MMHG | TEMPERATURE: 97.1 F | HEART RATE: 104 BPM | SYSTOLIC BLOOD PRESSURE: 117 MMHG | RESPIRATION RATE: 18 BRPM

## 2024-03-05 VITALS
WEIGHT: 156.8 LBS | TEMPERATURE: 96.9 F | HEART RATE: 93 BPM | OXYGEN SATURATION: 96 % | DIASTOLIC BLOOD PRESSURE: 79 MMHG | RESPIRATION RATE: 18 BRPM | BODY MASS INDEX: 24.56 KG/M2 | SYSTOLIC BLOOD PRESSURE: 112 MMHG

## 2024-03-05 DIAGNOSIS — C18.7 MALIGNANT NEOPLASM OF SIGMOID COLON: Primary | ICD-10-CM

## 2024-03-05 DIAGNOSIS — C18.7 MALIGNANT NEOPLASM OF SIGMOID COLON: ICD-10-CM

## 2024-03-05 LAB
ALBUMIN SERPL-MCNC: 4.2 G/DL (ref 3.5–5.2)
ALBUMIN/GLOB SERPL: 1.9 G/DL
ALP SERPL-CCNC: 120 U/L (ref 39–117)
ALT SERPL W P-5'-P-CCNC: 15 U/L (ref 1–33)
ANION GAP SERPL CALCULATED.3IONS-SCNC: 9.9 MMOL/L (ref 5–15)
ANISOCYTOSIS BLD QL: NORMAL
AST SERPL-CCNC: 19 U/L (ref 1–32)
BASOPHILS # BLD AUTO: 0.03 10*3/MM3 (ref 0–0.2)
BASOPHILS NFR BLD AUTO: 0.9 % (ref 0–1.5)
BILIRUB SERPL-MCNC: 0.7 MG/DL (ref 0–1.2)
BUN SERPL-MCNC: 15 MG/DL (ref 6–20)
BUN/CREAT SERPL: 15.8 (ref 7–25)
CALCIUM SPEC-SCNC: 10 MG/DL (ref 8.6–10.5)
CEA SERPL-MCNC: 2.39 NG/ML
CHLORIDE SERPL-SCNC: 103 MMOL/L (ref 98–107)
CO2 SERPL-SCNC: 31.1 MMOL/L (ref 22–29)
CREAT SERPL-MCNC: 0.95 MG/DL (ref 0.57–1)
DEPRECATED RDW RBC AUTO: 70.3 FL (ref 37–54)
EGFRCR SERPLBLD CKD-EPI 2021: 72.2 ML/MIN/1.73
EOSINOPHIL # BLD AUTO: 0.06 10*3/MM3 (ref 0–0.4)
EOSINOPHIL NFR BLD AUTO: 1.8 % (ref 0.3–6.2)
ERYTHROCYTE [DISTWIDTH] IN BLOOD BY AUTOMATED COUNT: 22.1 % (ref 12.3–15.4)
GLOBULIN UR ELPH-MCNC: 2.2 GM/DL
GLUCOSE SERPL-MCNC: 103 MG/DL (ref 65–99)
HCT VFR BLD AUTO: 35.5 % (ref 34–46.6)
HGB BLD-MCNC: 10.9 G/DL (ref 12–15.9)
HYPOCHROMIA BLD QL: NORMAL
IMM GRANULOCYTES # BLD AUTO: 0 10*3/MM3 (ref 0–0.05)
IMM GRANULOCYTES NFR BLD AUTO: 0 % (ref 0–0.5)
LARGE PLATELETS: NORMAL
LYMPHOCYTES # BLD AUTO: 0.88 10*3/MM3 (ref 0.7–3.1)
LYMPHOCYTES NFR BLD AUTO: 26.5 % (ref 19.6–45.3)
MCH RBC QN AUTO: 27.7 PG (ref 26.6–33)
MCHC RBC AUTO-ENTMCNC: 30.7 G/DL (ref 31.5–35.7)
MCV RBC AUTO: 90.3 FL (ref 79–97)
MONOCYTES # BLD AUTO: 0.41 10*3/MM3 (ref 0.1–0.9)
MONOCYTES NFR BLD AUTO: 12.3 % (ref 5–12)
NEUTROPHILS NFR BLD AUTO: 1.94 10*3/MM3 (ref 1.7–7)
NEUTROPHILS NFR BLD AUTO: 58.5 % (ref 42.7–76)
NRBC BLD AUTO-RTO: 0 /100 WBC (ref 0–0.2)
PLATELET # BLD AUTO: 251 10*3/MM3 (ref 140–450)
PMV BLD AUTO: 9.9 FL (ref 6–12)
POTASSIUM SERPL-SCNC: 4 MMOL/L (ref 3.5–5.2)
PROT SERPL-MCNC: 6.4 G/DL (ref 6–8.5)
RBC # BLD AUTO: 3.93 10*6/MM3 (ref 3.77–5.28)
SODIUM SERPL-SCNC: 144 MMOL/L (ref 136–145)
WBC NRBC COR # BLD AUTO: 3.32 10*3/MM3 (ref 3.4–10.8)

## 2024-03-05 PROCEDURE — 25010000002 LEUCOVORIN CALCIUM PER 50MG: Performed by: INTERNAL MEDICINE

## 2024-03-05 PROCEDURE — 36415 COLL VENOUS BLD VENIPUNCTURE: CPT | Performed by: INTERNAL MEDICINE

## 2024-03-05 PROCEDURE — 85007 BL SMEAR W/DIFF WBC COUNT: CPT | Performed by: INTERNAL MEDICINE

## 2024-03-05 PROCEDURE — 82378 CARCINOEMBRYONIC ANTIGEN: CPT | Performed by: INTERNAL MEDICINE

## 2024-03-05 PROCEDURE — 25810000003 SODIUM CHLORIDE 0.9 % SOLUTION: Performed by: INTERNAL MEDICINE

## 2024-03-05 PROCEDURE — 96375 TX/PRO/DX INJ NEW DRUG ADDON: CPT

## 2024-03-05 PROCEDURE — 96367 TX/PROPH/DG ADDL SEQ IV INF: CPT

## 2024-03-05 PROCEDURE — 99214 OFFICE O/P EST MOD 30 MIN: CPT | Performed by: INTERNAL MEDICINE

## 2024-03-05 PROCEDURE — 85025 COMPLETE CBC W/AUTO DIFF WBC: CPT | Performed by: INTERNAL MEDICINE

## 2024-03-05 PROCEDURE — 84134 ASSAY OF PREALBUMIN: CPT | Performed by: STUDENT IN AN ORGANIZED HEALTH CARE EDUCATION/TRAINING PROGRAM

## 2024-03-05 PROCEDURE — 80053 COMPREHEN METABOLIC PANEL: CPT | Performed by: INTERNAL MEDICINE

## 2024-03-05 PROCEDURE — 25010000002 DEXAMETHASONE SODIUM PHOSPHATE 20 MG/5ML SOLUTION: Performed by: INTERNAL MEDICINE

## 2024-03-05 PROCEDURE — 25810000003 SODIUM CHLORIDE 0.9 % SOLUTION 250 ML FLEX CONT: Performed by: INTERNAL MEDICINE

## 2024-03-05 PROCEDURE — 96366 THER/PROPH/DIAG IV INF ADDON: CPT

## 2024-03-05 PROCEDURE — 25010000002 ONDANSETRON PER 1 MG

## 2024-03-05 PROCEDURE — 96409 CHEMO IV PUSH SNGL DRUG: CPT

## 2024-03-05 PROCEDURE — 25010000002 FLUOROURACIL PER 500 MG: Performed by: INTERNAL MEDICINE

## 2024-03-05 PROCEDURE — G0498 CHEMO EXTEND IV INFUS W/PUMP: HCPCS

## 2024-03-05 RX ORDER — SODIUM CHLORIDE 9 MG/ML
20 INJECTION, SOLUTION INTRAVENOUS ONCE
Status: COMPLETED | OUTPATIENT
Start: 2024-03-05 | End: 2024-03-05

## 2024-03-05 RX ORDER — SODIUM CHLORIDE 9 MG/ML
20 INJECTION, SOLUTION INTRAVENOUS ONCE
OUTPATIENT
Start: 2024-04-30

## 2024-03-05 RX ORDER — FLUOROURACIL 50 MG/ML
400 INJECTION, SOLUTION INTRAVENOUS ONCE
OUTPATIENT
Start: 2024-04-16

## 2024-03-05 RX ORDER — METOCLOPRAMIDE 10 MG/1
10 TABLET ORAL 3 TIMES DAILY PRN
Qty: 90 TABLET | Refills: 2 | Status: ON HOLD | OUTPATIENT
Start: 2024-03-05 | End: 2024-03-07

## 2024-03-05 RX ORDER — SODIUM CHLORIDE 9 MG/ML
20 INJECTION, SOLUTION INTRAVENOUS ONCE
OUTPATIENT
Start: 2024-04-16

## 2024-03-05 RX ORDER — FLUOROURACIL 50 MG/ML
700 INJECTION, SOLUTION INTRAVENOUS ONCE
Status: COMPLETED | OUTPATIENT
Start: 2024-03-05 | End: 2024-03-05

## 2024-03-05 RX ORDER — FLUOROURACIL 50 MG/ML
400 INJECTION, SOLUTION INTRAVENOUS ONCE
OUTPATIENT
Start: 2024-04-30

## 2024-03-05 RX ADMIN — FLUOROURACIL 700 MG: 50 INJECTION, SOLUTION INTRAVENOUS at 11:29

## 2024-03-05 RX ADMIN — LEUCOVORIN CALCIUM 700 MG: 10 INJECTION INTRAMUSCULAR; INTRAVENOUS at 10:30

## 2024-03-05 RX ADMIN — SODIUM CHLORIDE 20 ML/HR: 9 INJECTION, SOLUTION INTRAVENOUS at 09:43

## 2024-03-05 RX ADMIN — DEXAMETHASONE SODIUM PHOSPHATE 12 MG: 4 INJECTION, SOLUTION INTRA-ARTICULAR; INTRALESIONAL; INTRAMUSCULAR; INTRAVENOUS; SOFT TISSUE at 09:43

## 2024-03-05 RX ADMIN — ONDANSETRON 16 MG: 2 INJECTION INTRAMUSCULAR; INTRAVENOUS at 11:10

## 2024-03-05 RX ADMIN — FLUOROURACIL 4300 MG: 50 INJECTION, SOLUTION INTRAVENOUS at 11:35

## 2024-03-05 NOTE — TELEPHONE ENCOUNTER
Caller: Vangie Carney    Relationship: Self    Best call back number: [phone]    What is the best time to reach you: ANYTIME    Who are you requesting to speak with (clinical staff, provider,  specific staff member): CLINICAL         What was the call regarding:     WAS SUPPOSED TO HAVE MAGIC MOUTH WASH CALLED IN BUT PHARMACY HAS NOT RECEIVED THIS YET.     WAS ADVISED TO CALL BACK IF HAD NOT BEEN CALLED IN YET AND WOULD HAVE THIS SENT IN .    PHARMACY : [facility] - [place], [place] - [address] [place] [address] St. - [phone] [facility] [phone]  [phone]      Is it okay if the provider responds through [employer]: YES

## 2024-03-05 NOTE — PROGRESS NOTES
Name:  Vangie Carney  :  1970  Date:  3/5/2024     REFERRING PHYSICIAN  Shari Aguirre MD    PRIMARY CARE PHYSICIAN  Vimal Moreno MD    REASON FOR FOLLOWUP  1. Malignant neoplasm of sigmoid colon      CHIEF COMPLAINT  Nausea and vomiting following both doses of IV Feraheme; also intermittent nausea and vomiting following the initial cycle of palliative, infusional 5-FU/leucovorin.    Dear Dr. Moerno,    HISTORY OF PRESENT ILLNESS:   I saw Ms. Carney in follow up today in our medical oncology clinic. As you are aware, she is a pleasant, 53 y.o., white female with minimal past medical history who was in her usual state of health until 2023 when she developed more frequent epigastric pains and BRBPR. She was referred to local gastroenterology, who performed an endoscopic exam that identified a tumor in the sigmoid colon. Biopsies were consistent with a moderately differentiated adenocarcinoma. She was referred to [facility] colorectal surgery in [place], and she underwent an LAR on 2023 for LAR. This procedure went very well, and a diverting ostomy was not required. She was subsequently referred to our clinic for further evaluation and management. At the time of her initial consultation with us (on 2023), we discussed how ~six months of adjuvant chemotherapy was now indicated and recommended as the current standard of care. Following a long discussion regarding the potential risks vs. benefits, she was initially agreeable to undergoing powerport placement and proceeding with [id number] FOLFOX; however, she subsequently changed her mind, canceled her scheduled powerport appointment and was lost to routine follow up in both ours and [facility] colorectal surgery's clinics. You re-referred her to our clinic in 2024; because, unfortunately, since ~late 2023, she had been feeling overall steadily worse, with recurrent and progressive constipation and back pain. The results  "of a CT of the abdomen and pelvis performed on 01/17/2024 for further evaluation were consistent with recurrent, and now metastatic, disease. At the time of her follow up appointments in late January/early February 2024, she was agreeable to undergoing powerport placement and beginning first-line, palliative treatment with [id number], infusional 5-FU/leucovorin.    INTERIM HISTORY:  Ms. Carney returns to clinic today for follow up of (now metastatic) colorectal cancer again accompanied by her . She continues to have intermittent episodes of abdominal pain. Following powerport placement, she received the first cycle of palliative, [id number] infusional 5-FU/leucovorin the week of 02/19/2024. She also received the second planned dose of a cycle of IV Feraheme that day (after receiving the first dose the previous week). She reports today that she was very nauseated and threw up following both doses of the iron. She also spent the better part of last week intermittently nauseated, following the first cycle of 5-FU/leucovorin (vomiting was not as much of an issue, however). On all occasions, she thinks that the nausea is triggered by a sensation of food getting \"stuck\" in her stomach. Her back pain remains about the same. She otherwise has no new or specific complaints.    Past Medical History:   Diagnosis Date    Gallbladder attack     GERD (gastroesophageal reflux disease)     Hearing aid worn     History of ear infections     Malignant neoplasm of sigmoid colon 3/14/2023    Seasonal allergies     Wears glasses        Past Surgical History:   Procedure Laterality Date    CHOLECYSTECTOMY      COLON RESECTION N/A 3/14/2023    Procedure: COLON RESECTION LOW ANTERIOR LAPAROSCOPIC WITH DAVINCI ROBOT;  Surgeon: Shari Aguirre MD;  [facility];  Service: Robotics - DaVinci;  Laterality: N/A;    COLONOSCOPY N/A 2/15/2023    Procedure: COLONOSCOPY FOR SCREENING;  Surgeon: Giselle Iniguez MD;  [facility];  Service: Gastroenterology;  Laterality: N/A;    ENDOSCOPY N/A 2/15/2023    Procedure: ESOPHAGOGASTRODUODENOSCOPY WITH BIOPSY;  Surgeon: Giselle Iniguez MD;  Location:  [facility];  Service: Gastroenterology;  Laterality: N/A;    LAPAROSCOPIC TUBAL LIGATION Bilateral     MIDDLE EAR SURGERY      PORTACATH PLACEMENT N/A 2024    Procedure: INSERTION OF PORTACATH;  Surgeon: Jaylen Leal MD;  Location:  [facility];  Service: General;  Laterality: N/A;       Social History     Socioeconomic History    Marital status:    Tobacco Use    Smoking status: Former     Current packs/day: 0.00     Average packs/day: 1 pack/day for 30.0 years (30.0 ttl pk-yrs)     Types: Cigarettes     Start date:      Quit date:      Years since quittin.1     Passive exposure: Never    Smokeless tobacco: Never   Vaping Use    Vaping status: Every Day    Substances: Nicotine, Flavoring    Devices: Refillable tank   Substance and Sexual Activity    Alcohol use: Never    Drug use: Defer    Sexual activity: Defer     Birth control/protection: None       Family History   Problem Relation Age of Onset    Cancer Mother     Cancer Father     Cancer Sister     Cancer Brother     Cancer Maternal Grandmother     Cancer Maternal Grandfather     Breast cancer Neg Hx        Allergies   Allergen Reactions    Keflex [Cephalexin] Nausea Only     Beta lactam allergy details  Antibiotic reaction: nausea  Age at reaction: adult  Dose to reaction time: (!) minutes  Reason for antibiotic: unknown  Epinephrine required for reaction?: no  Tolerated antibiotics: augmentin, amoxicillin          Current Outpatient Medications   Medication Sig Dispense Refill    HYDROcodone-acetaminophen (NORCO) 5-325 MG per tablet Take 1-2 tablets as needed every 8 hours for pain. 180 tablet 0    lactulose (CHRONULAC) 10 GM/15ML solution Take 30 mL by mouth 2 (Two) Times a Day As Needed (refractory constipation). 450 mL 2    lidocaine-prilocaine  (EMLA) 2.5-2.5 % cream Apply to port-a-cath site 30 minutes prior to arrival at infusion center. Cover with plastic wrap. 30 g 1    Loratadine 10 MG capsule Take  by mouth As Needed.      ondansetron (Zofran) 8 MG tablet Take 1 tablet by mouth Every 8 (Eight) Hours As Needed for Nausea or Vomiting. 30 tablet 1    pantoprazole (PROTONIX) 40 MG EC tablet Take 1 tablet by mouth Daily.      polyethylene glycol (MIRALAX) 17 GM/SCOOP powder Take 17 g by mouth Daily As Needed (constipation). 510 g 0    prochlorperazine (COMPAZINE) 10 MG tablet Take 1 tablet by mouth Every 6 (Six) Hours As Needed for Nausea or Vomiting. 30 tablet 1    sennosides-docusate (PERICOLACE) 8.6-50 MG per tablet Take 2 tablets by mouth 2 (Two) Times a Day. 120 tablet 1    amoxicillin-clavulanate (AUGMENTIN) 875-125 MG per tablet Take 1 tablet by mouth 2 (Two) Times a Day. (Patient not taking: Reported on 3/5/2024) 20 tablet 0    ferrous sulfate 325 (65 FE) MG tablet Take 1 tablet by mouth 2 (Two) Times a Day With Meals. (Patient not taking: Reported on 3/5/2024) 60 tablet 5    metoclopramide (REGLAN) 10 MG tablet Take 1 tablet by mouth 3 (Three) Times a Day As Needed (nausea). 90 tablet 2     No current facility-administered medications for this visit.     REVIEW OF SYSTEMS  CONSTITUTIONAL:  No fever, chills, night sweats or fatigue.  EYES:  No blurry vision, diplopia or other vision changes.  ENT:  No hearing loss, nosebleeds or sore throat.  CARDIOVASCULAR:  No palpitations, arrhythmia, syncopal episodes or edema.  PULMONARY:  No hemoptysis, wheezing, chronic cough or shortness of breath.  GASTROINTESTINAL:  As per the HPI above.  GENITOURINARY:  No hematuria, kidney stones or frequent urination.  MUSCULOSKELETAL:  As per the HPI above.  INTEGUMENTARY: No rashes or pruritus.  ENDOCRINE:  No excessive thirst or hot flashes.  HEMATOLOGIC:  No history of free bleeding, spontaneous bleeding or clotting.  IMMUNOLOGIC:  No allergies or frequent  infections.  NEUROLOGIC: No numbness, tingling, seizures or weakness.  PSYCHIATRIC:  No anxiety or depression.    PHYSICAL EXAMINATION  /79   Pulse 93   Temp 96.9 °F (36.1 °C) (Temporal)   Resp 18   Wt 71.1 kg (156 lb 12.8 oz)   LMP 06/15/2016   SpO2 96%   BMI 24.56 kg/m²     Pain Score:  Pain Score    24 0833   PainSc:   3   PainLoc: Abdomen     PHQ-Score Total:  PHQ-9 Total Score:      ECO  GENERAL:  A well-developed, well-nourished, white female in no acute distress.  HEENT:  Pupils equally round and reactive to light. Extraocular muscles intact.  CARDIOVASCULAR:  Regular rate and rhythm.  No murmurs, gallops or rubs.  LUNGS:  Clear to auscultation bilaterally.  ABDOMEN:  Soft, nontender, nondistended with positive bowel sounds.  EXTREMITIES:  No clubbing, cyanosis or edema bilaterally.  SKIN:  No rashes or petechiae. Powerport now in place.  NEURO:  Cranial nerves grossly intact.  No focal deficits.  PSYCH:  Alert and oriented x3.    LABORATORY  Lab Results   Component Value Date    WBC 3.32 (L) 2024    HGB 10.9 (L) 2024    HCT 35.5 2024    MCV 90.3 2024     2024    NEUTROABS 1.94 2024       Lab Results   Component Value Date     2024    K 4.0 2024     2024    CO2 31.1 (H) 2024    BUN 15 2024    CREATININE 0.95 2024    GLUCOSE 103 (H) 2024    CALCIUM 10.0 2024    AST 19 2024    ALT 15 2024    ALKPHOS 120 (H) 2024    BILITOT 0.7 2024    PROTEINTOT 6.4 2024    ALBUMIN 4.2 2024     CBC (2024): WBCs: 3.32; HgB: 10.9; Hct: 35.5; platelets: 251; MCV: 90.3  CBC (2024): WBCs: 17.29; HgB: 10.5; Hct: 34.0; platelets: 451; MCV: 85.2  CBC (2024): WBCs: 5.34; HgB: 8.3; Hct: 27.3; platelets: 235; MCV: 91.3  CBC (2023): WBCs: 4.78; HgB: 9.1; Hct: 28.4; platelets: 195; MCV: 100.0    CEA (2024): pending  CEA (2024): 1.56 ng/mL  CEA  (03/13/2023): 1.17 ng/mL    Iron panel (01/23/2024): serum iron: 22; % saturation: 5; TIBC: 443; ferritin: 9.74 ng/mL    IMAGING  CT abdomen and pelvis without contrast (02/15/2023):  Impression:  1) Mild narrowing in the sigmoid colon. Recommend direct visualization.  2) Other findings [are nonacute].    CT chest without contrast (03/06/2023):  Impression: No evidence of metastatic disease in the chest.    CT chest with contrast (04/07/2023, compared to 03/06/2023):  Impression: No evidence of metastatic disease to the chest. Stable exam.    CT abdomen and pelvis with contrast (04/07/2023, compared to 02/15/2023):  Impression:  1) Interval post surgical changes noted from partial resection of the sigmoid colon region.  2) 3.1 cm predominantly gas-filled collection in the left presacral space near the anastomosis site that may reflect postoperative changes. Possibility of small fistulous connection not excluded within the anastomosis.  3) No ileus or bowel obstruction.  4) No solid organ lesions or adenopathy identified.    CT abdomen and pelvis with and without contrast (01/17/2024):  Impression:  1) Interval development of metastatic disease.  2) Specifically, soft tissue metastatic implants are noted in the omentum, left lower quadrant, upper presacral space, and left lower presacral space immediately adjacent to the anastomosis site. Soft tissue implant within umbilical hernia fat.  3) Development of 5.5 mm nodule right lower lobe suspicious for metastatic lung nodule.  4) Interval postsurgical changes from sigmoid resection.  5) Mild fatty infiltration of liver. No focal liver lesions.  6) Other incidental/nonacute findings.    PATHOLOGY  Sigmoid colon and upper rectum, low anterior resection (03/14/2023):  Adenocarcinoma, moderately differentiated, invasive through muscularis propria into pericolonic soft tissue. Lymphovascular invasion present. Surgical margins negative for carcinoma. Two out of fifteen  (2/15) lymph nodes involved by metastatic carcinoma with extranodal extension present. Tumor size: 3.5 x 2.5 x 1.4 cm. Intact MSI expression per previous biopsy. [id number] (stage IIIB).    Molecular profiling ([id number]), tissue (sigmoid colon/upper rectum) and liquid (peripheral blood):  PD-L1: 22%; KRAS G12D; PIK3CA E545K; TP53 Y220H; MSI-High NOT detected.    IMPRESSION AND PLAN  Ms. Carney is a 53 y.o., white female with:  Colon adenocarcinoma: Initially diagnosed in February 2023 and status post resection of the sigmoid colon and upper rectum on 03/14/2023. The final, surgical pathology from this procedure was consistent with stage IIIB ([id number]) disease. Two out of fifteen (2/15) lymph nodes were involved with metastatic disease, but all surgical margins were negative. I had a long discussion with the patient and her  at the time of her initial (and, prior to today, only) consultation in our clinic (on 03/30/2023) regarding this diagnosis and its prognosis. We discussed how, while her surgery went overall very well, her chance of eventually developing a relapse in this malignancy was, unfortunately, significant; and the purpose of adjuvant chemotherapy was to reduce this chance by as much as possible (perhaps by as much as 10%). Particularly given her stage III disease, young age and multiple, poor risk factors (left-sided tumor, lymphovascular invasion present, etc.), six months of [id number] FOLFOX (or XELOX) is the preferred regimen; and, following a long discussion regarding the potential risks vs. benefits of this treatment regimen at that time, she was initially agreeable to the former (FOLFOX). We referred her back to local surgery for powerport placement; however, per her preference, she ultimately never followed through with either this (powerport placement) or starting the adjuvant chemotherapy, and she was subsequently lost to routine follow up in both ours and [facility] colorectal surgery's clinics.  She apparently overall still did very well for the next ~six to seven months; but, by late October 2023, she was experiencing recurrent constipation and back pain, and a repeat CT of the abdomen and pelvis performed on 01/17/2024 (and summarized above) was, unfortunately, consistent with recurrent, and now metastatic, disease, with the development of multiple, soft tissue implants within the omentum/peritoneum and, possibly, at least one metastatic lung lesion (in the right lower lobe) as well. Both around that time and since, I have had multiple, long discussions with the patient and her  regarding this updated diagnosis and, in general terms, its prognosis. They remain aware that this disease is now recurrent, by definition, stage IV, and consequently, incurable. That said, particularly given her continued, good performance status, it was/is potentially treatable; and sustained remissions are possible. Some of the pertinent results of molecular profiling ([id number]) that were performed on both tissue (March 2023's partial colectomy specimen(s)) and liquid (peripheral blood drawn in January 2024) biopsies last month are summarized above. Following a long discussion in early February 2024 regarding its potential risks vs. benefits, she was agreeable to having a powerport placed and beginning [id number] 5-FU/leucovorin. She received the first cycle the week of 02/19/2024. While she has struggled with nausea and, at times, vomiting over the course of the past couple of weeks, she insists today that she is fairly certain the course of IV Feraheme she received around the same time was more to blame (than the 5-FU was). She also thinks that issue #3 played a large role (see below). We will proceed with the current treatment plan for now (day #1 of cycle #2 of 5-FU/leucovorin today). We will see her back in our clinic in two weeks, on day #1 of cycle #3, with a CBC and CMP for another symptom/toxicity  "check.  Anemia: She initially presented to our clinic in March 2023 with a HgB of 9.1 g/dL status post (at that time) the resection of a sigmoid/upper rectal adenocarcinoma primary; and a Rx for ferrous sulfate 325 mg BID was provided. She was subsequently lost to routine follow up in our clinic until today. Repeat labs from 01/23/2024 showed a HgB of 8.3 g/dL and a ferritin level of 9.7 ng/mL. As she was not tolerating oral iron replacement very well (due to constipation), we gave her a cycle of IV Feraheme in early February 2024. While this caused some issues with N/V (see above), her HgB and indices have now improved. Continue to monitor.  Gastric dysmotility: She elaborates today that she thinks an intermittent sensation that food just \"gets stuck\" in her stomach contributed to the periodic N/V she experienced over the course of the past couple of weeks. A Rx for Reglan 10 mg TID prn was provided today. Continue to monitor.  The patient and her  were in agreement with these plans.    It is a pleasure to participate in Ms. Carney's care. Please do not hesitate to call with any questions or concerns that you may have.    A total of 30 minutes were spent coordinating this patient’s care in clinic today; more than 50% of this time was face-to-face with the patient and her , reviewing her interim medical history and counseling on the current treatment and followup plan. All questions were answered to their satisfaction.    FOLLOW UP  Rx for Reglan 10 mg TID prn provided today. Proceed with palliative, [id number] infusional 5-FU/leucovorin, as planned (day #1 of cycle #2 today). Return to our clinic in 2 weeks, on day #1 of cycle #3, with a CBC, CMP and CEA.            This document was electronically signed by ROWENA Hanks MD March 5, 2024 09:08 EST      CC: MD Shari Saeed MD Karen S. Jennings-Conklin, MD  "

## 2024-03-05 NOTE — PROGRESS NOTES
Venipuncture Blood Specimen Collection  Venipuncture performed in right arm by Lakeisha Waggoner MA with good hemostasis. Patient tolerated the procedure well without complications.   03/05/24   Lakeisha Waggoner MA

## 2024-03-05 NOTE — TELEPHONE ENCOUNTER
Contacted [facility] pharmacy, they stated that they had the original prescription from 2/23 when it was called in. RN asked them to go ahead and fill this prescription as the patient is wanting to pick this up. RN contacted patient to let her know that they were working on filling her prescription at [facility]. Pt verbalized understanding.

## 2024-03-06 ENCOUNTER — TELEPHONE (OUTPATIENT)
Dept: ONCOLOGY | Facility: CLINIC | Age: 54
End: 2024-03-06
Payer: COMMERCIAL

## 2024-03-06 NOTE — TELEPHONE ENCOUNTER
Pt had treatment yesterday is taking all medications that were given to her is still very sick to her stomach wants to know if there is something else she can do.

## 2024-03-06 NOTE — TELEPHONE ENCOUNTER
"RN received call from Nichole  stating that the patient was calling back and not feeling well. RN instructed Nichole to transfer the phone call to RN. RN spoke with patient who states that she has had an \"upset stomach and cramping\" all day. Patient states that she is taking her nausea medications correctly. RN asked patient if she had started taking the reglan that was prescribed yesterday, patient states that she forgot about that but was agreeable to trying that today. Patient instructed to contact our clinic tomorrow if symptoms do not improve. Patient is agreeable to plan and has no other questions or concerns at this time. RN received this digital message after speaking with patient and taking care of immediate patient care needs.   "

## 2024-03-07 ENCOUNTER — HOSPITAL ENCOUNTER (INPATIENT)
Facility: HOSPITAL | Age: 54
LOS: 4 days | Discharge: HOME | DRG: 375 | End: 2024-03-11
Attending: STUDENT IN AN ORGANIZED HEALTH CARE EDUCATION/TRAINING PROGRAM | Admitting: STUDENT IN AN ORGANIZED HEALTH CARE EDUCATION/TRAINING PROGRAM
Payer: COMMERCIAL

## 2024-03-07 ENCOUNTER — INFUSION (OUTPATIENT)
Dept: ONCOLOGY | Facility: HOSPITAL | Age: 54
End: 2024-03-07
Payer: COMMERCIAL

## 2024-03-07 ENCOUNTER — APPOINTMENT (OUTPATIENT)
Dept: GENERAL RADIOLOGY | Facility: HOSPITAL | Age: 54
DRG: 375 | End: 2024-03-07
Payer: COMMERCIAL

## 2024-03-07 ENCOUNTER — APPOINTMENT (OUTPATIENT)
Dept: CT IMAGING | Facility: HOSPITAL | Age: 54
DRG: 375 | End: 2024-03-07
Payer: COMMERCIAL

## 2024-03-07 VITALS
HEART RATE: 95 BPM | TEMPERATURE: 97.5 F | RESPIRATION RATE: 18 BRPM | OXYGEN SATURATION: 95 % | DIASTOLIC BLOOD PRESSURE: 70 MMHG | BODY MASS INDEX: 24.64 KG/M2 | SYSTOLIC BLOOD PRESSURE: 110 MMHG | WEIGHT: 157.3 LBS

## 2024-03-07 DIAGNOSIS — C18.7 MALIGNANT NEOPLASM OF SIGMOID COLON: ICD-10-CM

## 2024-03-07 DIAGNOSIS — D70.1 CHEMOTHERAPY-INDUCED NEUTROPENIA: ICD-10-CM

## 2024-03-07 DIAGNOSIS — Z95.828 PORT-A-CATH IN PLACE: Primary | ICD-10-CM

## 2024-03-07 DIAGNOSIS — T45.1X5A CHEMOTHERAPY-INDUCED NEUTROPENIA: ICD-10-CM

## 2024-03-07 DIAGNOSIS — K56.609 SMALL BOWEL OBSTRUCTION: Primary | ICD-10-CM

## 2024-03-07 LAB
ALBUMIN SERPL-MCNC: 3.4 G/DL (ref 3.5–5.2)
ALBUMIN/GLOB SERPL: 2 G/DL
ALP SERPL-CCNC: 103 U/L (ref 39–117)
ALT SERPL W P-5'-P-CCNC: 15 U/L (ref 1–33)
ANION GAP SERPL CALCULATED.3IONS-SCNC: 11.1 MMOL/L (ref 5–15)
ANISOCYTOSIS BLD QL: NORMAL
AST SERPL-CCNC: 17 U/L (ref 1–32)
BASOPHILS # BLD AUTO: 0.01 10*3/MM3 (ref 0–0.2)
BASOPHILS NFR BLD AUTO: 0.6 % (ref 0–1.5)
BILIRUB SERPL-MCNC: 0.9 MG/DL (ref 0–1.2)
BILIRUB UR QL STRIP: NEGATIVE
BUN SERPL-MCNC: 19 MG/DL (ref 6–20)
BUN/CREAT SERPL: 32.2 (ref 7–25)
CALCIUM SPEC-SCNC: 8.2 MG/DL (ref 8.6–10.5)
CHLORIDE SERPL-SCNC: 99 MMOL/L (ref 98–107)
CLARITY UR: ABNORMAL
CO2 SERPL-SCNC: 23.9 MMOL/L (ref 22–29)
COLOR UR: ABNORMAL
CREAT SERPL-MCNC: 0.59 MG/DL (ref 0.57–1)
D-LACTATE SERPL-SCNC: 0.8 MMOL/L (ref 0.5–2)
DEPRECATED RDW RBC AUTO: 70.7 FL (ref 37–54)
EGFRCR SERPLBLD CKD-EPI 2021: 108.6 ML/MIN/1.73
EOSINOPHIL # BLD AUTO: 0.02 10*3/MM3 (ref 0–0.4)
EOSINOPHIL NFR BLD AUTO: 1.2 % (ref 0.3–6.2)
ERYTHROCYTE [DISTWIDTH] IN BLOOD BY AUTOMATED COUNT: 21.6 % (ref 12.3–15.4)
FERRITIN SERPL-MCNC: 348.7 NG/ML (ref 13–150)
GEN 5 2HR TROPONIN T REFLEX: 9 NG/L
GLOBULIN UR ELPH-MCNC: 1.7 GM/DL
GLUCOSE BLDC GLUCOMTR-MCNC: 117 MG/DL (ref 70–130)
GLUCOSE SERPL-MCNC: 113 MG/DL (ref 65–99)
GLUCOSE UR STRIP-MCNC: NEGATIVE MG/DL
HCT VFR BLD AUTO: 35.2 % (ref 34–46.6)
HGB BLD-MCNC: 10.8 G/DL (ref 12–15.9)
HGB UR QL STRIP.AUTO: NEGATIVE
HOLD SPECIMEN: NORMAL
HOLD SPECIMEN: NORMAL
HYPOCHROMIA BLD QL: NORMAL
IMM GRANULOCYTES # BLD AUTO: 0.01 10*3/MM3 (ref 0–0.05)
IMM GRANULOCYTES NFR BLD AUTO: 0.6 % (ref 0–0.5)
IRON 24H UR-MRATE: 61 MCG/DL (ref 37–145)
IRON SATN MFR SERPL: 27 % (ref 20–50)
KETONES UR QL STRIP: ABNORMAL
LEUKOCYTE ESTERASE UR QL STRIP.AUTO: NEGATIVE
LYMPHOCYTES # BLD AUTO: 0.41 10*3/MM3 (ref 0.7–3.1)
LYMPHOCYTES NFR BLD AUTO: 25.2 % (ref 19.6–45.3)
MCH RBC QN AUTO: 28.2 PG (ref 26.6–33)
MCHC RBC AUTO-ENTMCNC: 30.7 G/DL (ref 31.5–35.7)
MCV RBC AUTO: 91.9 FL (ref 79–97)
MONOCYTES # BLD AUTO: 0.11 10*3/MM3 (ref 0.1–0.9)
MONOCYTES NFR BLD AUTO: 6.7 % (ref 5–12)
NEUTROPHILS NFR BLD AUTO: 1.07 10*3/MM3 (ref 1.7–7)
NEUTROPHILS NFR BLD AUTO: 65.7 % (ref 42.7–76)
NITRITE UR QL STRIP: NEGATIVE
NRBC BLD AUTO-RTO: 0 /100 WBC (ref 0–0.2)
OVALOCYTES BLD QL SMEAR: NORMAL
PH UR STRIP.AUTO: 5.5 [PH] (ref 5–8)
PLAT MORPH BLD: NORMAL
PLATELET # BLD AUTO: 229 10*3/MM3 (ref 140–450)
PMV BLD AUTO: 10 FL (ref 6–12)
POIKILOCYTOSIS BLD QL SMEAR: NORMAL
POTASSIUM SERPL-SCNC: 3.6 MMOL/L (ref 3.5–5.2)
PREALB SERPL-MCNC: 14 MG/DL (ref 20–40)
PROT SERPL-MCNC: 5.1 G/DL (ref 6–8.5)
PROT UR QL STRIP: ABNORMAL
QT INTERVAL: 318 MS
QTC INTERVAL: 414 MS
RBC # BLD AUTO: 3.83 10*6/MM3 (ref 3.77–5.28)
SODIUM SERPL-SCNC: 134 MMOL/L (ref 136–145)
SP GR UR STRIP: >1.03 (ref 1–1.03)
TIBC SERPL-MCNC: 224 MCG/DL (ref 298–536)
TRANSFERRIN SERPL-MCNC: 150 MG/DL (ref 200–360)
TROPONIN T DELTA: NORMAL
TROPONIN T SERPL HS-MCNC: <6 NG/L
UROBILINOGEN UR QL STRIP: ABNORMAL
WBC NRBC COR # BLD AUTO: 1.63 10*3/MM3 (ref 3.4–10.8)
WHOLE BLOOD HOLD COAG: NORMAL
WHOLE BLOOD HOLD SPECIMEN: NORMAL

## 2024-03-07 PROCEDURE — 96360 HYDRATION IV INFUSION INIT: CPT

## 2024-03-07 PROCEDURE — 82948 REAGENT STRIP/BLOOD GLUCOSE: CPT | Performed by: FAMILY MEDICINE

## 2024-03-07 PROCEDURE — 25010000002 PROCHLORPERAZINE 10 MG/2ML SOLUTION

## 2024-03-07 PROCEDURE — 99223 1ST HOSP IP/OBS HIGH 75: CPT | Performed by: STUDENT IN AN ORGANIZED HEALTH CARE EDUCATION/TRAINING PROGRAM

## 2024-03-07 PROCEDURE — 87040 BLOOD CULTURE FOR BACTERIA: CPT | Performed by: STUDENT IN AN ORGANIZED HEALTH CARE EDUCATION/TRAINING PROGRAM

## 2024-03-07 PROCEDURE — 71045 X-RAY EXAM CHEST 1 VIEW: CPT

## 2024-03-07 PROCEDURE — 71045 X-RAY EXAM CHEST 1 VIEW: CPT | Performed by: RADIOLOGY

## 2024-03-07 PROCEDURE — 25810000003 SODIUM CHLORIDE 0.9 % SOLUTION

## 2024-03-07 PROCEDURE — 74176 CT ABD & PELVIS W/O CONTRAST: CPT | Performed by: RADIOLOGY

## 2024-03-07 PROCEDURE — 84484 ASSAY OF TROPONIN QUANT: CPT | Performed by: STUDENT IN AN ORGANIZED HEALTH CARE EDUCATION/TRAINING PROGRAM

## 2024-03-07 PROCEDURE — 99285 EMERGENCY DEPT VISIT HI MDM: CPT

## 2024-03-07 PROCEDURE — 25010000002 ENOXAPARIN PER 10 MG: Performed by: STUDENT IN AN ORGANIZED HEALTH CARE EDUCATION/TRAINING PROGRAM

## 2024-03-07 PROCEDURE — 25010000002 PIPERACILLIN SOD-TAZOBACTAM PER 1 G: Performed by: STUDENT IN AN ORGANIZED HEALTH CARE EDUCATION/TRAINING PROGRAM

## 2024-03-07 PROCEDURE — 83540 ASSAY OF IRON: CPT | Performed by: STUDENT IN AN ORGANIZED HEALTH CARE EDUCATION/TRAINING PROGRAM

## 2024-03-07 PROCEDURE — 81003 URINALYSIS AUTO W/O SCOPE: CPT | Performed by: STUDENT IN AN ORGANIZED HEALTH CARE EDUCATION/TRAINING PROGRAM

## 2024-03-07 PROCEDURE — 93005 ELECTROCARDIOGRAM TRACING: CPT | Performed by: STUDENT IN AN ORGANIZED HEALTH CARE EDUCATION/TRAINING PROGRAM

## 2024-03-07 PROCEDURE — 96375 TX/PRO/DX INJ NEW DRUG ADDON: CPT

## 2024-03-07 PROCEDURE — 94799 UNLISTED PULMONARY SVC/PX: CPT

## 2024-03-07 PROCEDURE — 96374 THER/PROPH/DIAG INJ IV PUSH: CPT

## 2024-03-07 PROCEDURE — 82378 CARCINOEMBRYONIC ANTIGEN: CPT | Performed by: STUDENT IN AN ORGANIZED HEALTH CARE EDUCATION/TRAINING PROGRAM

## 2024-03-07 PROCEDURE — 83605 ASSAY OF LACTIC ACID: CPT | Performed by: STUDENT IN AN ORGANIZED HEALTH CARE EDUCATION/TRAINING PROGRAM

## 2024-03-07 PROCEDURE — 84466 ASSAY OF TRANSFERRIN: CPT | Performed by: STUDENT IN AN ORGANIZED HEALTH CARE EDUCATION/TRAINING PROGRAM

## 2024-03-07 PROCEDURE — 70450 CT HEAD/BRAIN W/O DYE: CPT | Performed by: RADIOLOGY

## 2024-03-07 PROCEDURE — 25810000003 LACTATED RINGERS PER 1000 ML: Performed by: STUDENT IN AN ORGANIZED HEALTH CARE EDUCATION/TRAINING PROGRAM

## 2024-03-07 PROCEDURE — 74176 CT ABD & PELVIS W/O CONTRAST: CPT

## 2024-03-07 PROCEDURE — 93010 ELECTROCARDIOGRAM REPORT: CPT | Performed by: INTERNAL MEDICINE

## 2024-03-07 PROCEDURE — 70450 CT HEAD/BRAIN W/O DYE: CPT

## 2024-03-07 PROCEDURE — 85007 BL SMEAR W/DIFF WBC COUNT: CPT | Performed by: STUDENT IN AN ORGANIZED HEALTH CARE EDUCATION/TRAINING PROGRAM

## 2024-03-07 PROCEDURE — 85025 COMPLETE CBC W/AUTO DIFF WBC: CPT | Performed by: STUDENT IN AN ORGANIZED HEALTH CARE EDUCATION/TRAINING PROGRAM

## 2024-03-07 PROCEDURE — 36415 COLL VENOUS BLD VENIPUNCTURE: CPT

## 2024-03-07 PROCEDURE — 80053 COMPREHEN METABOLIC PANEL: CPT | Performed by: STUDENT IN AN ORGANIZED HEALTH CARE EDUCATION/TRAINING PROGRAM

## 2024-03-07 PROCEDURE — 82728 ASSAY OF FERRITIN: CPT | Performed by: STUDENT IN AN ORGANIZED HEALTH CARE EDUCATION/TRAINING PROGRAM

## 2024-03-07 RX ORDER — LACTULOSE 10 G/15ML
20 SOLUTION ORAL 2 TIMES DAILY PRN
COMMUNITY

## 2024-03-07 RX ORDER — HEPARIN SODIUM (PORCINE) LOCK FLUSH IV SOLN 100 UNIT/ML 100 UNIT/ML
300 SOLUTION INTRAVENOUS ONCE
OUTPATIENT
Start: 2024-03-07

## 2024-03-07 RX ORDER — SODIUM CHLORIDE 0.9 % (FLUSH) 0.9 %
10 SYRINGE (ML) INJECTION EVERY 12 HOURS SCHEDULED
Status: DISCONTINUED | OUTPATIENT
Start: 2024-03-07 | End: 2024-03-11 | Stop reason: HOSPADM

## 2024-03-07 RX ORDER — ONDANSETRON 2 MG/ML
4 INJECTION INTRAMUSCULAR; INTRAVENOUS EVERY 6 HOURS PRN
Status: DISCONTINUED | OUTPATIENT
Start: 2024-03-07 | End: 2024-03-11 | Stop reason: HOSPADM

## 2024-03-07 RX ORDER — PROCHLORPERAZINE EDISYLATE 5 MG/ML
10 INJECTION INTRAMUSCULAR; INTRAVENOUS EVERY 6 HOURS PRN
Status: DISCONTINUED | OUTPATIENT
Start: 2024-03-07 | End: 2024-03-09

## 2024-03-07 RX ORDER — PANTOPRAZOLE SODIUM 40 MG/1
40 TABLET, DELAYED RELEASE ORAL DAILY
Status: CANCELLED | OUTPATIENT
Start: 2024-03-08

## 2024-03-07 RX ORDER — SODIUM CHLORIDE 0.9 % (FLUSH) 0.9 %
10 SYRINGE (ML) INJECTION AS NEEDED
Status: DISCONTINUED | OUTPATIENT
Start: 2024-03-07 | End: 2024-03-11 | Stop reason: HOSPADM

## 2024-03-07 RX ORDER — METOCLOPRAMIDE 10 MG/1
10 TABLET ORAL 3 TIMES DAILY PRN
COMMUNITY

## 2024-03-07 RX ORDER — LACTULOSE 10 G/15ML
20 SOLUTION ORAL 2 TIMES DAILY PRN
Status: CANCELLED | OUTPATIENT
Start: 2024-03-07

## 2024-03-07 RX ORDER — HEPARIN SODIUM (PORCINE) LOCK FLUSH IV SOLN 100 UNIT/ML 100 UNIT/ML
500 SOLUTION INTRAVENOUS AS NEEDED
OUTPATIENT
Start: 2024-03-07

## 2024-03-07 RX ORDER — AMOXICILLIN 250 MG
2 CAPSULE ORAL 2 TIMES DAILY PRN
Status: CANCELLED | OUTPATIENT
Start: 2024-03-07

## 2024-03-07 RX ORDER — SODIUM CHLORIDE 0.9 % (FLUSH) 0.9 %
20 SYRINGE (ML) INJECTION AS NEEDED
OUTPATIENT
Start: 2024-03-07

## 2024-03-07 RX ORDER — PANTOPRAZOLE SODIUM 40 MG/10ML
40 INJECTION, POWDER, LYOPHILIZED, FOR SOLUTION INTRAVENOUS
Status: DISCONTINUED | OUTPATIENT
Start: 2024-03-07 | End: 2024-03-11 | Stop reason: HOSPADM

## 2024-03-07 RX ORDER — PANTOPRAZOLE SODIUM 40 MG/1
40 TABLET, DELAYED RELEASE ORAL DAILY
COMMUNITY

## 2024-03-07 RX ORDER — FAMOTIDINE 10 MG/ML
20 INJECTION, SOLUTION INTRAVENOUS ONCE
Status: COMPLETED | OUTPATIENT
Start: 2024-03-07 | End: 2024-03-07

## 2024-03-07 RX ORDER — ENOXAPARIN SODIUM 100 MG/ML
40 INJECTION SUBCUTANEOUS NIGHTLY
Status: DISCONTINUED | OUTPATIENT
Start: 2024-03-07 | End: 2024-03-11 | Stop reason: HOSPADM

## 2024-03-07 RX ORDER — ACETAMINOPHEN 325 MG/1
650 TABLET ORAL EVERY 4 HOURS PRN
Status: DISCONTINUED | OUTPATIENT
Start: 2024-03-07 | End: 2024-03-08

## 2024-03-07 RX ORDER — ONDANSETRON 4 MG/1
8 TABLET, FILM COATED ORAL EVERY 8 HOURS PRN
Status: CANCELLED | OUTPATIENT
Start: 2024-03-07

## 2024-03-07 RX ORDER — HYDROCODONE BITARTRATE AND ACETAMINOPHEN 5; 325 MG/1; MG/1
1-2 TABLET ORAL EVERY 8 HOURS PRN
Status: CANCELLED | OUTPATIENT
Start: 2024-03-07

## 2024-03-07 RX ORDER — METOCLOPRAMIDE 10 MG/1
10 TABLET ORAL 3 TIMES DAILY PRN
Status: CANCELLED | OUTPATIENT
Start: 2024-03-07

## 2024-03-07 RX ORDER — SODIUM CHLORIDE 9 MG/ML
40 INJECTION, SOLUTION INTRAVENOUS AS NEEDED
Status: DISCONTINUED | OUTPATIENT
Start: 2024-03-07 | End: 2024-03-11 | Stop reason: HOSPADM

## 2024-03-07 RX ORDER — SODIUM CHLORIDE 0.9 % (FLUSH) 0.9 %
10 SYRINGE (ML) INJECTION AS NEEDED
Status: DISCONTINUED | OUTPATIENT
Start: 2024-03-07 | End: 2024-03-07 | Stop reason: HOSPADM

## 2024-03-07 RX ORDER — HEPARIN SODIUM (PORCINE) LOCK FLUSH IV SOLN 100 UNIT/ML 100 UNIT/ML
500 SOLUTION INTRAVENOUS AS NEEDED
Status: DISCONTINUED | OUTPATIENT
Start: 2024-03-07 | End: 2024-03-07 | Stop reason: HOSPADM

## 2024-03-07 RX ORDER — SODIUM CHLORIDE 0.9 % (FLUSH) 0.9 %
10 SYRINGE (ML) INJECTION AS NEEDED
OUTPATIENT
Start: 2024-03-07

## 2024-03-07 RX ORDER — MECLIZINE HCL 12.5 MG/1
25 TABLET ORAL ONCE
Status: COMPLETED | OUTPATIENT
Start: 2024-03-07 | End: 2024-03-07

## 2024-03-07 RX ORDER — HYDROMORPHONE HYDROCHLORIDE 1 MG/ML
0.5 INJECTION, SOLUTION INTRAMUSCULAR; INTRAVENOUS; SUBCUTANEOUS
Status: DISCONTINUED | OUTPATIENT
Start: 2024-03-07 | End: 2024-03-11 | Stop reason: HOSPADM

## 2024-03-07 RX ORDER — SODIUM CHLORIDE, SODIUM LACTATE, POTASSIUM CHLORIDE, CALCIUM CHLORIDE 600; 310; 30; 20 MG/100ML; MG/100ML; MG/100ML; MG/100ML
100 INJECTION, SOLUTION INTRAVENOUS CONTINUOUS
Status: DISCONTINUED | OUTPATIENT
Start: 2024-03-07 | End: 2024-03-09

## 2024-03-07 RX ORDER — MORPHINE SULFATE 2 MG/ML
2 INJECTION, SOLUTION INTRAMUSCULAR; INTRAVENOUS
Status: DISCONTINUED | OUTPATIENT
Start: 2024-03-07 | End: 2024-03-08

## 2024-03-07 RX ORDER — AMOXICILLIN 250 MG
2 CAPSULE ORAL 2 TIMES DAILY PRN
COMMUNITY

## 2024-03-07 RX ORDER — ONDANSETRON HYDROCHLORIDE 8 MG/1
8 TABLET, FILM COATED ORAL EVERY 8 HOURS PRN
COMMUNITY

## 2024-03-07 RX ORDER — PROCHLORPERAZINE MALEATE 10 MG
10 TABLET ORAL EVERY 6 HOURS PRN
Status: CANCELLED | OUTPATIENT
Start: 2024-03-07

## 2024-03-07 RX ORDER — LIDOCAINE 40 MG/G
CREAM TOPICAL AS NEEDED
Status: DISCONTINUED | OUTPATIENT
Start: 2024-03-07 | End: 2024-03-11 | Stop reason: HOSPADM

## 2024-03-07 RX ORDER — PROCHLORPERAZINE MALEATE 10 MG
10 TABLET ORAL EVERY 6 HOURS PRN
COMMUNITY
End: 2024-03-11 | Stop reason: HOSPADM

## 2024-03-07 RX ORDER — PROCHLORPERAZINE EDISYLATE 5 MG/ML
10 INJECTION INTRAMUSCULAR; INTRAVENOUS ONCE
Status: COMPLETED | OUTPATIENT
Start: 2024-03-07 | End: 2024-03-07

## 2024-03-07 RX ORDER — HYDROCODONE BITARTRATE AND ACETAMINOPHEN 5; 325 MG/1; MG/1
1-2 TABLET ORAL EVERY 8 HOURS PRN
COMMUNITY

## 2024-03-07 RX ADMIN — PROCHLORPERAZINE EDISYLATE 10 MG: 5 INJECTION INTRAMUSCULAR; INTRAVENOUS at 10:14

## 2024-03-07 RX ADMIN — PIPERACILLIN SODIUM AND TAZOBACTAM SODIUM 3.38 G: 3; .375 INJECTION, POWDER, LYOPHILIZED, FOR SOLUTION INTRAVENOUS at 22:23

## 2024-03-07 RX ADMIN — SODIUM CHLORIDE 1000 ML: 9 INJECTION, SOLUTION INTRAVENOUS at 09:50

## 2024-03-07 RX ADMIN — ENOXAPARIN SODIUM 40 MG: 40 INJECTION SUBCUTANEOUS at 20:23

## 2024-03-07 RX ADMIN — PIPERACILLIN SODIUM AND TAZOBACTAM SODIUM 3.38 G: 3; .375 INJECTION, POWDER, LYOPHILIZED, FOR SOLUTION INTRAVENOUS at 15:30

## 2024-03-07 RX ADMIN — PANTOPRAZOLE SODIUM 40 MG: 40 INJECTION, POWDER, FOR SOLUTION INTRAVENOUS at 20:23

## 2024-03-07 RX ADMIN — Medication 10 ML: at 20:24

## 2024-03-07 RX ADMIN — FAMOTIDINE 20 MG: 10 INJECTION INTRAVENOUS at 10:12

## 2024-03-07 RX ADMIN — MECLIZINE HCL 12.5 MG 25 MG: 12.5 TABLET ORAL at 12:20

## 2024-03-07 RX ADMIN — SODIUM CHLORIDE, POTASSIUM CHLORIDE, SODIUM LACTATE AND CALCIUM CHLORIDE 100 ML/HR: 600; 310; 30; 20 INJECTION, SOLUTION INTRAVENOUS at 18:15

## 2024-03-07 NOTE — H&P
"    [facility]    Patient Identification:  Name:  Vangie Carney  Age:  53 y.o.  Sex:  female  :  1970  MRN:  [id number]   Admit Date: 3/7/2024   Visit Number:  [id number]  Room number:  104/04  Primary Care Physician:  Vimal Moreno MD     Subjective     Chief complaint:    Chief Complaint   Patient presents with    Dizziness       History of presenting illness:   Patient is a 53-year-old female with history significant for stage III colon cancer status post resection with recurrence, currently on chemotherapy who presented to the ER with reports of dizziness, abdominal pain and nausea with emesis.  Patient states she has had 2 rounds of chemotherapy and has had issues with nausea and vomiting throughout chemotherapy and that part is normal for her.  She says she has not had a normal bowel movement in \"quite some time \"but has had some episodes of diarrhea the last few days with the last bowel movement being yesterday.  She notes increasing nausea with emesis, abdominal pain and is not passing gas.  She denies any fever/chills, respiratory symptoms or urinary symptoms.    In the ER, patient was hemodynamically stable.  Labs notable for leukopenia with absolute neutropenia, hemoglobin 11 with a CT abdomen and pelvis without contrast noted dilated fluid-filled small bowel loops suggestive of a small bowel obstruction.  Patient received IV fluid resuscitation, antiemetics and opiates and was very comfortable at the time of my initial examination.  She had no active vomiting or nausea at the time of exam.    ---------------------------------------------------------------------------------------------------------------------   Review of Systems   Constitutional:  Negative for chills, fatigue and fever.   HENT:  Negative for ear pain, sinus pain and sore throat.    Respiratory:  Negative for cough, chest tightness, shortness of breath and wheezing.  "   Cardiovascular:  Negative for chest pain, palpitations and leg swelling.   Gastrointestinal:  Positive for abdominal pain, diarrhea, nausea and vomiting. Negative for constipation.   Genitourinary:  Negative for dysuria, hematuria and urgency.   Musculoskeletal:  Negative for arthralgias and myalgias.   Neurological:  Negative for dizziness, syncope and light-headedness.   Psychiatric/Behavioral:  Negative for confusion.      ---------------------------------------------------------------------------------------------------------------------   Past Medical History:   Diagnosis Date    Gallbladder attack     GERD (gastroesophageal reflux disease)     Hearing aid worn     History of ear infections     Malignant neoplasm of sigmoid colon 3/14/2023    Seasonal allergies     Wears glasses      Past Surgical History:   Procedure Laterality Date    CHOLECYSTECTOMY      COLON RESECTION N/A 3/14/2023    Procedure: COLON RESECTION LOW ANTERIOR LAPAROSCOPIC WITH DAVINCI ROBOT;  Surgeon: Shari Aguirre MD;  Location: [facility];  Service: Robotics - DaVinci;  Laterality: N/A;    COLONOSCOPY N/A 2/15/2023    Procedure: COLONOSCOPY FOR SCREENING;  Surgeon: Giselle Iniguez MD;  Location: [facility] OR;  Service: Gastroenterology;  Laterality: N/A;    ENDOSCOPY N/A 2/15/2023    Procedure: ESOPHAGOGASTRODUODENOSCOPY WITH BIOPSY;  Surgeon: Giselle Iniguez MD;  Location: [facility] OR;  Service: Gastroenterology;  Laterality: N/A;    LAPAROSCOPIC TUBAL LIGATION Bilateral     MIDDLE EAR SURGERY      PORTACATH PLACEMENT N/A 2/14/2024    Procedure: INSERTION OF PORTACATH;  Surgeon: Jaylen Leal MD;  Location: [facility] OR;  Service: General;  Laterality: N/A;     Family History   Problem Relation Age of Onset    Cancer Mother     Cancer Father     Cancer Sister     Cancer Brother     Cancer Maternal Grandmother     Cancer Maternal Grandfather     Breast cancer Neg Hx      Social History     Socioeconomic History     Marital status:    Tobacco Use    Smoking status: Former     Current packs/day: 0.00     Average packs/day: 1 pack/day for 30.0 years (30.0 ttl pk-yrs)     Types: Cigarettes     Start date:      Quit date:      Years since quittin.1     Passive exposure: Never    Smokeless tobacco: Never   Vaping Use    Vaping status: Every Day    Substances: Nicotine, Flavoring    Devices: Refillable tank   Substance and Sexual Activity    Alcohol use: Never    Drug use: Defer    Sexual activity: Defer     Birth control/protection: None     ---------------------------------------------------------------------------------------------------------------------   Allergies:  Keflex [cephalexin]  ---------------------------------------------------------------------------------------------------------------------   Medications below are reported home medications pulling from within the system; at this time, these medications have not been reconciled unless otherwise specified and are in the verification process for further verifcation as current home medications.    Prior to Admission Medications       Prescriptions Last Dose Informant Patient Reported? Taking?    amoxicillin-clavulanate (AUGMENTIN) 875-125 MG per tablet   No No    Take 1 tablet by mouth 2 (Two) Times a Day.    Patient not taking:  Reported on 3/5/2024    ferrous sulfate 325 (65 FE) MG tablet   No No    Take 1 tablet by mouth 2 (Two) Times a Day With Meals.    Patient not taking:  Reported on 3/5/2024    HYDROcodone-acetaminophen (NORCO) 5-325 MG per tablet   No No    Take 1-2 tablets as needed every 8 hours for pain.    lactulose (CHRONULAC) 10 GM/15ML solution   No No    Take 30 mL by mouth 2 (Two) Times a Day As Needed (refractory constipation).    lidocaine-prilocaine (EMLA) 2.5-2.5 % cream   No No    Apply to port-a-cath site 30 minutes prior to arrival at infusion center. Cover with plastic wrap.    Loratadine 10 MG capsule  Self Yes No    Take   by mouth As Needed.    metoclopramide (REGLAN) 10 MG tablet   No No    Take 1 tablet by mouth 3 (Three) Times a Day As Needed (nausea).    ondansetron (Zofran) 8 MG tablet   No No    Take 1 tablet by mouth Every 8 (Eight) Hours As Needed for Nausea or Vomiting.    pantoprazole (PROTONIX) 40 MG EC tablet  Self Yes No    Take 1 tablet by mouth Daily.    polyethylene glycol (MIRALAX) 17 GM/SCOOP powder   No No    Take 17 g by mouth Daily As Needed (constipation).    prochlorperazine (COMPAZINE) 10 MG tablet   No No    Take 1 tablet by mouth Every 6 (Six) Hours As Needed for Nausea or Vomiting.    sennosides-docusate (PERICOLACE) 8.6-50 MG per tablet   No No    Take 2 tablets by mouth 2 (Two) Times a Day.          Objective     Vital Signs:  Temp:  [97.5 °F (36.4 °C)-97.9 °F (36.6 °C)] 97.9 °F (36.6 °C)  Heart Rate:  [] 97  Resp:  [17-18] 17  BP: (104-115)/(68-76) 115/68    Mean Arterial Pressure (Non-Invasive) for the past 24 hrs (Last 3 readings):   Noninvasive MAP (mmHg)   03/07/24 1320 81   03/07/24 1219 88     SpO2:  [92 %-96 %] 95 %  on   ;   Device (Oxygen Therapy): room air  Body mass index is 24.59 kg/m².    Wt Readings from Last 3 Encounters:   03/07/24 71.2 kg (157 lb)   03/07/24 71.4 kg (157 lb 4.8 oz)   03/05/24 71.1 kg (156 lb 12.8 oz)      ---------------------------------------------------------------------------------------------------------------------   Physical Exam:  Constitutional: Middle-age female, nontoxic, speaking clearly in full sentences, well-developed and well-nourished.  No respiratory distress.      HENT:  Head: Normocephalic and atraumatic.  Mouth: Dry mucous membranes.    Eyes:  Conjunctivae and EOM are normal.  No scleral icterus.  Neck:  Neck supple.  No JVD present.    Cardiovascular: Tachycardic, regular rhythm and normal heart sounds with no murmur.  Pulmonary/Chest:  No respiratory distress, no wheezes, no crackles, with normal breath sounds and good air movement.  Port in  the right chest wall.  Abdominal:  Soft.  Nontender, no distention, no guarding or rebound.  No bowel sounds appreciated  Musculoskeletal:  No tenderness, and no deformity.  No red or swollen joints anywhere.    Neurological:  Alert and oriented to person, place, and time.  No cranial nerve deficit.  No tongue deviation.  No facial droop.  No slurred speech.   Skin:  Skin is warm and dry.  No rash noted.  No pallor.   Peripheral vascular:  No edema and pulses on all 4 extremities.    ---------------------------------------------------------------------------------------------------------------------  EKG: Sinus tachycardia, rate 102, QTc 414, no acute ST or T wave changes    ECG 12 Lead Chest Pain   Preliminary Result   Test Reason : Chest Pain   Blood Pressure :   */*   mmHG   Vent. Rate : 102 BPM     Atrial Rate : 102 BPM      P-R Int : 164 ms          QRS Dur :  72 ms       QT Int : 318 ms       P-R-T Axes :  51  10  26 degrees      QTc Int : 414 ms      Sinus tachycardia   Cannot rule out Anterior infarct , age undetermined   Abnormal ECG   When compared with ECG of 13-MAR-2023 13:57,   T wave amplitude has decreased in Anterolateral leads      Referred By: FORD           Confirmed By:           Telemetry: Reviewed    I have personally looked at both the EKG and the telemetry strips.    Last echocardiogram:  None on file    --------------------------------------------------------------------------------------------------------------------  Labs:  Results from last 7 days   Lab Units 03/07/24  1148 03/05/24  0835   WBC 10*3/mm3 1.63* 3.32*   HEMOGLOBIN g/dL 10.8* 10.9*   HEMATOCRIT % 35.2 35.5   MCV fL 91.9 90.3   MCHC g/dL 30.7* 30.7*   PLATELETS 10*3/mm3 229 251         Results from last 7 days   Lab Units 03/07/24  1148 03/05/24  0835   SODIUM mmol/L 134* 144   POTASSIUM mmol/L 3.6 4.0   CHLORIDE mmol/L 99 103   CO2 mmol/L 23.9 31.1*   BUN mg/dL 19 15   CREATININE mg/dL 0.59 0.95   CALCIUM mg/dL 8.2* 10.0  "  GLUCOSE mg/dL 113* 103*   ALBUMIN g/dL 3.4* 4.2   BILIRUBIN mg/dL 0.9 0.7   ALK PHOS U/L 103 120*   AST (SGOT) U/L 17 19   ALT (SGPT) U/L 15 15   Estimated Creatinine Clearance: 125.4 mL/min (by C-G formula based on SCr of 0.59 mg/dL).    No results found for: \"AMMONIA\"  Results from last 7 days   Lab Units 03/07/24  1148   HSTROP T ng/L <6         Glucose   Date/Time Value Ref Range Status   03/07/2024 1020 117 70 - 130 mg/dL Final     No results found for: \"TSH\", \"FREET4\"  Lab Results   Component Value Date    PREGTESTUR Negative 05/15/2015     Pain Management Panel           No data to display              Brief Urine Lab Results  (Last result in the past 365 days)        Color   Clarity   Blood   Leuk Est   Nitrite   Protein   CREAT   Urine HCG        03/07/24 1330 Dark Yellow   Cloudy   Negative   Negative   Negative   Trace                 No results found for: \"BLOODCX\"  No results found for: \"URINECX\"  No results found for: \"WOUNDCX\"  No results found for: \"STOOLCX\"    I have personally looked at the labs and they are summarized above.  ----------------------------------------------------------------------------------------------------------------------  Detailed radiology reports for the last 24 hours:    Imaging Results (Last 24 Hours)       Procedure Component Value Units Date/Time    XR Chest 1 View [[id number]] Collected: 03/07/24 1224     Updated: 03/07/24 1227    Narrative:      EXAM:    XR Chest, 1 View     EXAM DATE:    3/7/2024 11:42 AM     CLINICAL HISTORY:    Chest Pain Protocol     TECHNIQUE:    Frontal view of the chest.     COMPARISON:    3/14/2024     FINDINGS:    LUNGS AND PLEURAL SPACES:  Unremarkable as visualized.  No  consolidation.  No pneumothorax.    HEART:  Unremarkable as visualized.  No cardiomegaly.    MEDIASTINUM:  Unremarkable as visualized.  Normal mediastinal contour.    BONES/JOINTS:  Unremarkable as visualized.  No acute fracture.    TUBES, LINES AND DEVICES:  Right " Port-A-Cath with tip in SVC.       Impression:        No acute cardiopulmonary findings identified.        This report was finalized on 3/7/2024 12:25 PM by Dr. Ruddy Blanco MD.       CT Abdomen Pelvis Without Contrast [[id number]] Collected: 03/07/24 1204     Updated: 03/07/24 1207    Narrative:      EXAM:    CT Abdomen and Pelvis Without Intravenous Contrast     EXAM DATE:    3/7/2024 11:45 AM     CLINICAL HISTORY:    Abdominal pain, acute, nonlocalized     TECHNIQUE:    Axial computed tomography images of the abdomen and pelvis without  intravenous contrast.  Sagittal and coronal reformatted images were  created and reviewed.  This CT exam was performed using one or more of  the following dose reduction techniques:  automated exposure control,  adjustment of the mA and/or kV according to patient size, and/or use of  iterative reconstruction technique.     COMPARISON:    No relevant prior studies available.     FINDINGS:    LUNG BASES:  Unremarkable as visualized.  No mass.  No consolidation.      ABDOMEN:    LIVER:  Unremarkable as visualized.    GALLBLADDER AND BILE DUCTS:  Cholecystectomy clips.  No ductal  dilation.    PANCREAS:  Unremarkable as visualized.  No ductal dilation.    SPLEEN:  Unremarkable as visualized.  No splenomegaly.    ADRENALS:  Unremarkable as visualized.  No mass.    KIDNEYS AND URETERS:  Unremarkable as visualized.  No obstructing  stones.  No hydronephrosis.    STOMACH AND BOWEL:  Dilated fluid-filled small bowel loops with  decompressed mid and distal ileum suggestive of small bowel obstruction.   No mucosal thickening.      PELVIS:    APPENDIX:  No findings to suggest acute appendicitis.    BLADDER:  Unremarkable as visualized.  No stones.    REPRODUCTIVE:  Unremarkable as visualized.      ABDOMEN and PELVIS:    INTRAPERITONEAL SPACE:  Unremarkable as visualized.  No free air.  No  significant fluid collection.    BONES/JOINTS:  No acute fracture.  No dislocation.    SOFT TISSUES:   Unremarkable as visualized.    VASCULATURE:  Unremarkable as visualized.  No abdominal aortic  aneurysm.    LYMPH NODES:  Unremarkable as visualized.  No enlarged lymph nodes.       Impression:        Dilated fluid-filled small bowel loops with decompressed mid and  distal ileum suggestive of small bowel obstruction.        This report was finalized on 3/7/2024 12:05 PM by Dr. Ruddy Blanco MD.       CT Head Without Contrast [[id number]] Collected: 03/07/24 1204     Updated: 03/07/24 1206    Narrative:      EXAM:    CT Head Without Intravenous Contrast     EXAM DATE:    3/7/2024 11:45 AM     CLINICAL HISTORY:    Dizziness, persistent/recurrent, cardiac or vascular cause suspected     TECHNIQUE:    Axial computed tomography images of the head/brain without intravenous  contrast.  Sagittal and coronal reformatted images were created and  reviewed.  This CT exam was performed using one or more of the following  dose reduction techniques:  automated exposure control, adjustment of  the mA and/or kV according to patient size, and/or use of iterative  reconstruction technique.     COMPARISON:    No relevant prior studies available.     FINDINGS:    BRAIN AND EXTRA-AXIAL SPACES:  Unremarkable as visualized.  No  hemorrhage.  No significant white matter disease.  No edema.  No  ventriculomegaly.    BONES/JOINTS:  Unremarkable as visualized.  No acute fracture.    SOFT TISSUES:  Unremarkable as visualized.    SINUSES:  Unremarkable as visualized.  No acute sinusitis.    MASTOID AIR CELLS:  Unremarkable as visualized.  No mastoid effusion.       Impression:        Unremarkable exam demonstrating no CT evidence of acute intracranial  findings.        This report was finalized on 3/7/2024 12:04 PM by Dr. Ruddy Blanco MD.             Final impressions for the last 30 days of radiology reports:    XR Chest 1 View    Result Date: 3/7/2024    No acute cardiopulmonary findings identified.   This report was finalized on 3/7/2024  12:25 PM by Dr. Ruddy Blanco MD.      CT Abdomen Pelvis Without Contrast    Result Date: 3/7/2024    Dilated fluid-filled small bowel loops with decompressed mid and distal ileum suggestive of small bowel obstruction.   This report was finalized on 3/7/2024 12:05 PM by Dr. Ruddy Blanco MD.      CT Head Without Contrast    Result Date: 3/7/2024    Unremarkable exam demonstrating no CT evidence of acute intracranial findings.   This report was finalized on 3/7/2024 12:04 PM by Dr. Ruddy Blanco MD.      XR Chest AP    Result Date: 2/14/2024  Port placed. Tip in the superior vena cava. No evidence of pneumothorax    This report was finalized on 2/14/2024 8:38 AM by Dr. Edmond Vasquez MD.     I have personally looked at the radiology images and read the final radiology report.    Assessment & Plan      Patient is a 53-year-old female with history significant for stage III colon cancer status post resection with recurrence, currently on chemotherapy who presented to the ER with reports of dizziness, abdominal pain and nausea with emesis.     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  Patient reported no normal bowel movement for several days leading up to admission but did have a few episodes of diarrhea with last bowel movement 1 day PTA.  Initially in significant amount of pain which improved with IV fluids/antiemetics/opiates.  --Patient was diagnosed with stage III colon cancer in March 2023, underwent resection by Dr. Martinez in [place] with recommendations for adjuvant chemotherapy at that time but she declined.  Symptoms returned with repeat scans noting concerns for liver, omental  and pulmonary metastases and at this time agreed to start chemotherapy.  She has received 2 rounds of chemotherapy, last dose on 3/5/2024.  --Chemotherapy regimen: Leucovorin, fluorouracil  --Admission labs showed WBC 1.63 with absolute neutrophils of 1.07, lactic WNL  --Blood cultures x 2  --CT abdomen pelvis without contrast noted dilated fluid-filled small bowel loops with decompressed mid/distal ileum suggestive of SBO  --IV opiates as needed, antiemetics  --Neutropenic precaution isolation  --N.p.o., NG tube to low intermittent wall suction for decompression  --Start LR to 100 cc an hour  --Start empiric Zosyn for intra-abdominal coverage given immunosuppressive state, likely can de-escalate quickly pending further workup as infectious etiology is felt to be unlikely at this point  --Discussed with general surgery, NG tube placed per above, CEA and prealbumin ordered-will proceed with CT abdomen/pelvis with IV/p.o. water-soluble contrast tomorrow along with CT chest with contrast for further evaluation  --General surgery consulted, appreciate recommendations  --Hematology/oncology courtesy consulted, appreciate assistance  --Admit to [facility]    Checklist:  Antibiotics: zosyn  Steroids: none  DVT ppx: lovenox  GI ppx: ppi  Diet: NPO  Code: CPR, full  Risk/dispo: Patient is a high risk due to acute small bowel obstruction likely due to metastases/omental mets.  Anticipate greater than 2 midnight stay.  Disposition expected Home when medically stable.    Palmer Payne DO  [facility] Hospitalist  03/07/24  15:03 EST

## 2024-03-07 NOTE — PLAN OF CARE
Goal Outcome Evaluation:  Plan of Care Reviewed With: patient        Progress: no change  Outcome Evaluation: Pt brought up from ER. Resting in bed. Neutropenic precautions

## 2024-03-07 NOTE — ED PROVIDER NOTES
Subjective   History of Present Illness  53-year-old female with past medical history of colon cancer with previous resection presents to the ER due to concerns for increasing dizziness and nausea after IV infusion with chemotherapy.  Patient received IV fluids with outpatient oncology with no improvement in symptoms.  Patient was counseled to report to the ER.  Patient noted worsening nausea but dizziness has improved.  No chest pain.  No headache.  No focal neurological deficits or vision changes.  Vital stable.  Afebrile      Review of Systems   Gastrointestinal:  Positive for nausea.   Neurological:  Positive for dizziness.   All other systems reviewed and are negative.      Past Medical History:   Diagnosis Date    Gallbladder attack     GERD (gastroesophageal reflux disease)     Hearing aid worn     History of ear infections     Malignant neoplasm of sigmoid colon 3/14/2023    Seasonal allergies     Wears glasses        Allergies   Allergen Reactions    Keflex [Cephalexin] Nausea Only     Beta lactam allergy details  Antibiotic reaction: nausea  Age at reaction: adult  Dose to reaction time: (!) minutes  Reason for antibiotic: unknown  Epinephrine required for reaction?: no  Tolerated antibiotics: augmentin, amoxicillin          Past Surgical History:   Procedure Laterality Date    CHOLECYSTECTOMY      COLON RESECTION N/A 3/14/2023    Procedure: COLON RESECTION LOW ANTERIOR LAPAROSCOPIC WITH DAVINCI ROBOT;  Surgeon: Shari Aguirre MD;  Location: [facility] OR;  Service: Robotics - DaVinci;  Laterality: N/A;    COLONOSCOPY N/A 2/15/2023    Procedure: COLONOSCOPY FOR SCREENING;  Surgeon: Giselle Iniguez MD;  Location: [facility] OR;  Service: Gastroenterology;  Laterality: N/A;    ENDOSCOPY N/A 2/15/2023    Procedure: ESOPHAGOGASTRODUODENOSCOPY WITH BIOPSY;  Surgeon: Giselle Iniguez MD;  Location: [facility] OR;  Service: Gastroenterology;  Laterality: N/A;    LAPAROSCOPIC TUBAL LIGATION Bilateral      MIDDLE EAR SURGERY      PORTACATH PLACEMENT N/A 2024    Procedure: INSERTION OF PORTACATH;  Surgeon: Jaylen Leal MD;  Location: [facility];  Service: General;  Laterality: N/A;       Family History   Problem Relation Age of Onset    Cancer Mother     Cancer Father     Cancer Sister     Cancer Brother     Cancer Maternal Grandmother     Cancer Maternal Grandfather     Breast cancer Neg Hx        Social History     Socioeconomic History    Marital status:    Tobacco Use    Smoking status: Former     Current packs/day: 0.00     Average packs/day: 1 pack/day for 30.0 years (30.0 ttl pk-yrs)     Types: Cigarettes     Start date:      Quit date:      Years since quittin.1     Passive exposure: Never    Smokeless tobacco: Never   Vaping Use    Vaping status: Every Day    Substances: Nicotine, Flavoring    Devices: Refillable tank   Substance and Sexual Activity    Alcohol use: Never    Drug use: Defer    Sexual activity: Defer     Birth control/protection: None           Objective   Physical Exam  Constitutional:       General: She is not in acute distress.     Appearance: Normal appearance. She is not ill-appearing.   HENT:      Head: Normocephalic and atraumatic.      Right Ear: External ear normal.      Left Ear: External ear normal.      Nose: Nose normal.      Mouth/Throat:      Mouth: Mucous membranes are moist.   Eyes:      Extraocular Movements: Extraocular movements intact.      Pupils: Pupils are equal, round, and reactive to light.   Cardiovascular:      Rate and Rhythm: Normal rate and regular rhythm.      Heart sounds: No murmur heard.  Pulmonary:      Effort: Pulmonary effort is normal. No respiratory distress.      Breath sounds: Normal breath sounds. No wheezing.   Abdominal:      General: Bowel sounds are normal. There is distension.      Palpations: Abdomen is soft.      Tenderness: There is generalized abdominal tenderness.   Musculoskeletal:         General: No deformity  or signs of injury. Normal range of motion.      Cervical back: Normal range of motion and neck supple.   Skin:     General: Skin is warm and dry.      Findings: No erythema.   Neurological:      General: No focal deficit present.      Mental Status: She is alert and oriented to person, place, and time. Mental status is at baseline.      Cranial Nerves: No cranial nerve deficit.   Psychiatric:         Mood and Affect: Mood normal.         Behavior: Behavior normal.         Thought Content: Thought content normal.         Procedures           ED Course  ED Course as of 03/07/24 1506   Thu Mar 07, 2024   1220 EKG notes sinus tachycardia.  102 bpm.  No acute ST elevation.  QTc 414.  Electronically signed by Tavo David DO, 03/07/24, 12:20 PM EST.   [SF]      ED Course User Index  [SF] Tavo David DO      XR Chest 1 View    Result Date: 3/7/2024    No acute cardiopulmonary findings identified.   This report was finalized on 3/7/2024 12:25 PM by Dr. Ruddy Blanco MD.      CT Abdomen Pelvis Without Contrast    Result Date: 3/7/2024    Dilated fluid-filled small bowel loops with decompressed mid and distal ileum suggestive of small bowel obstruction.   This report was finalized on 3/7/2024 12:05 PM by Dr. Ruddy Blanco MD.      CT Head Without Contrast    Result Date: 3/7/2024    Unremarkable exam demonstrating no CT evidence of acute intracranial findings.   This report was finalized on 3/7/2024 12:04 PM by Dr. Ruddy Blanco MD.         Results for orders placed or performed during the hospital encounter of 03/07/24   Comprehensive Metabolic Panel    Specimen: Blood   Result Value Ref Range    Glucose 113 (H) 65 - 99 mg/dL    BUN 19 6 - 20 mg/dL    Creatinine 0.59 0.57 - 1.00 mg/dL    Sodium 134 (L) 136 - 145 mmol/L    Potassium 3.6 3.5 - 5.2 mmol/L    Chloride 99 98 - 107 mmol/L    CO2 23.9 22.0 - 29.0 mmol/L    Calcium 8.2 (L) 8.6 - 10.5 mg/dL    Total Protein 5.1 (L) 6.0 - 8.5 g/dL    Albumin 3.4 (L) 3.5 - 5.2  g/dL    ALT (SGPT) 15 1 - 33 U/L    AST (SGOT) 17 1 - 32 U/L    Alkaline Phosphatase 103 39 - 117 U/L    Total Bilirubin 0.9 0.0 - 1.2 mg/dL    Globulin 1.7 gm/dL    A/G Ratio 2.0 g/dL    BUN/Creatinine Ratio 32.2 (H) 7.0 - 25.0    Anion Gap 11.1 5.0 - 15.0 mmol/L    eGFR 108.6 >60.0 mL/min/1.73   High Sensitivity Troponin T    Specimen: Blood   Result Value Ref Range    HS Troponin T <6 <14 ng/L   CBC Auto Differential    Specimen: Blood   Result Value Ref Range    WBC 1.63 (C) 3.40 - 10.80 10*3/mm3    RBC 3.83 3.77 - 5.28 10*6/mm3    Hemoglobin 10.8 (L) 12.0 - 15.9 g/dL    Hematocrit 35.2 34.0 - 46.6 %    MCV 91.9 79.0 - 97.0 fL    MCH 28.2 26.6 - 33.0 pg    MCHC 30.7 (L) 31.5 - 35.7 g/dL    RDW 21.6 (H) 12.3 - 15.4 %    RDW-SD 70.7 (H) 37.0 - 54.0 fl    MPV 10.0 6.0 - 12.0 fL    Platelets 229 140 - 450 10*3/mm3    Neutrophil % 65.7 42.7 - 76.0 %    Lymphocyte % 25.2 19.6 - 45.3 %    Monocyte % 6.7 5.0 - 12.0 %    Eosinophil % 1.2 0.3 - 6.2 %    Basophil % 0.6 0.0 - 1.5 %    Immature Grans % 0.6 (H) 0.0 - 0.5 %    Neutrophils, Absolute 1.07 (L) 1.70 - 7.00 10*3/mm3    Lymphocytes, Absolute 0.41 (L) 0.70 - 3.10 10*3/mm3    Monocytes, Absolute 0.11 0.10 - 0.90 10*3/mm3    Eosinophils, Absolute 0.02 0.00 - 0.40 10*3/mm3    Basophils, Absolute 0.01 0.00 - 0.20 10*3/mm3    Immature Grans, Absolute 0.01 0.00 - 0.05 10*3/mm3    nRBC 0.0 0.0 - 0.2 /100 WBC   Scan Slide    Specimen: Blood   Result Value Ref Range    Anisocytosis Large/3+ None Seen    Hypochromia Slight/1+ None Seen    Ovalocytes Slight/1+ None Seen    Poikilocytes Slight/1+ None Seen    Platelet Morphology Normal Normal   High Sensitivity Troponin T 2Hr    Specimen: Blood   Result Value Ref Range    HS Troponin T 9 <14 ng/L    Troponin T Delta     Urinalysis With Culture If Indicated - Urine, Clean Catch    Specimen: Urine, Clean Catch   Result Value Ref Range    Color, UA Dark Yellow (A) Yellow, Straw    Appearance, UA Cloudy (A) Clear    pH, UA 5.5 5.0 -  8.0    Specific Gravity, UA >1.030 (H) 1.005 - 1.030    Glucose, UA Negative Negative    Ketones, UA 15 mg/dL (1+) (A) Negative    Bilirubin, UA Negative Negative    Blood, UA Negative Negative    Protein, UA Trace (A) Negative    Leuk Esterase, UA Negative Negative    Nitrite, UA Negative Negative    Urobilinogen, UA 0.2 E.U./dL 0.2 - 1.0 E.U./dL   ECG 12 Lead Chest Pain   Result Value Ref Range    QT Interval 318 ms    QTC Interval 414 ms   Green Top (Gel)   Result Value Ref Range    Extra Tube Hold for add-ons.    Lavender Top   Result Value Ref Range    Extra Tube hold for add-on    Gold Top - SST   Result Value Ref Range    Extra Tube Hold for add-ons.    Light Blue Top   Result Value Ref Range    Extra Tube Hold for add-ons.                                             Medical Decision Making  CBC notes leukopenia.  Likely from recent chemotherapy.  CMP listed.  Symptoms improved with meclizine.  CT of the head without contrast revealed no acute abnormality.  CT imaging of the abdomen and pelvis obtained due to slight distention and tenderness.  Concerns for small bowel obstruction noted by radiology.  Surgery consulted.  Recommend admission for further work up and treatment.  Hospitalist team consulted and made aware of the patient.  Consults and orders placed per hospitalist request.  Patient was agreeable to admission plan.  Vitals stable on admission.    Problems Addressed:  Small bowel obstruction: complicated acute illness or injury    Amount and/or Complexity of Data Reviewed  Labs: ordered. Decision-making details documented in ED Course.  Radiology: ordered. Decision-making details documented in ED Course.  ECG/medicine tests: ordered.    Risk  Prescription drug management.        Final diagnoses:   Small bowel obstruction   Chemotherapy-induced neutropenia       ED Disposition  ED Disposition       ED Disposition   Intended Admit    Condition   --    Comment   --               No follow-up provider  specified.       Medication List      No changes were made to your prescriptions during this visit.            Tavo David,   03/07/24 1509

## 2024-03-08 ENCOUNTER — APPOINTMENT (OUTPATIENT)
Dept: CT IMAGING | Facility: HOSPITAL | Age: 54
DRG: 375 | End: 2024-03-08
Payer: COMMERCIAL

## 2024-03-08 LAB
ANION GAP SERPL CALCULATED.3IONS-SCNC: 10.4 MMOL/L (ref 5–15)
ANISOCYTOSIS BLD QL: ABNORMAL
BUN SERPL-MCNC: 14 MG/DL (ref 6–20)
BUN/CREAT SERPL: 24.1 (ref 7–25)
CALCIUM SPEC-SCNC: 7.8 MG/DL (ref 8.6–10.5)
CEA SERPL-MCNC: 1.59 NG/ML
CHLORIDE SERPL-SCNC: 103 MMOL/L (ref 98–107)
CO2 SERPL-SCNC: 21.6 MMOL/L (ref 22–29)
CREAT SERPL-MCNC: 0.58 MG/DL (ref 0.57–1)
DEPRECATED RDW RBC AUTO: 68.6 FL (ref 37–54)
EGFRCR SERPLBLD CKD-EPI 2021: 109 ML/MIN/1.73
ELLIPTOCYTES BLD QL SMEAR: ABNORMAL
EOSINOPHIL # BLD MANUAL: 0.03 10*3/MM3 (ref 0–0.4)
EOSINOPHIL NFR BLD MANUAL: 2 % (ref 0.3–6.2)
ERYTHROCYTE [DISTWIDTH] IN BLOOD BY AUTOMATED COUNT: 21.3 % (ref 12.3–15.4)
GLUCOSE SERPL-MCNC: 90 MG/DL (ref 65–99)
HCT VFR BLD AUTO: 30.5 % (ref 34–46.6)
HGB BLD-MCNC: 9.5 G/DL (ref 12–15.9)
HYPOCHROMIA BLD QL: ABNORMAL
LYMPHOCYTES # BLD MANUAL: 0.6 10*3/MM3 (ref 0.7–3.1)
LYMPHOCYTES NFR BLD MANUAL: 4 % (ref 5–12)
MCH RBC QN AUTO: 28.1 PG (ref 26.6–33)
MCHC RBC AUTO-ENTMCNC: 31.1 G/DL (ref 31.5–35.7)
MCV RBC AUTO: 90.2 FL (ref 79–97)
MONOCYTES # BLD: 0.06 10*3/MM3 (ref 0.1–0.9)
NEUTROPHILS # BLD AUTO: 0.71 10*3/MM3 (ref 1.7–7)
NEUTROPHILS NFR BLD MANUAL: 51 % (ref 42.7–76)
PLAT MORPH BLD: NORMAL
PLATELET # BLD AUTO: 213 10*3/MM3 (ref 140–450)
PMV BLD AUTO: 10 FL (ref 6–12)
POTASSIUM SERPL-SCNC: 3.8 MMOL/L (ref 3.5–5.2)
RBC # BLD AUTO: 3.38 10*6/MM3 (ref 3.77–5.28)
SODIUM SERPL-SCNC: 135 MMOL/L (ref 136–145)
VARIANT LYMPHS NFR BLD MANUAL: 43 % (ref 19.6–45.3)
WBC NRBC COR # BLD AUTO: 1.39 10*3/MM3 (ref 3.4–10.8)

## 2024-03-08 PROCEDURE — 71260 CT THORAX DX C+: CPT | Performed by: RADIOLOGY

## 2024-03-08 PROCEDURE — 25010000002 ENOXAPARIN PER 10 MG: Performed by: STUDENT IN AN ORGANIZED HEALTH CARE EDUCATION/TRAINING PROGRAM

## 2024-03-08 PROCEDURE — 99232 SBSQ HOSP IP/OBS MODERATE 35: CPT | Performed by: INTERNAL MEDICINE

## 2024-03-08 PROCEDURE — 25510000001 IOPAMIDOL 61 % SOLUTION: Performed by: INTERNAL MEDICINE

## 2024-03-08 PROCEDURE — 25010000002 HYDROMORPHONE PER 4 MG: Performed by: STUDENT IN AN ORGANIZED HEALTH CARE EDUCATION/TRAINING PROGRAM

## 2024-03-08 PROCEDURE — 99223 1ST HOSP IP/OBS HIGH 75: CPT | Performed by: NURSE PRACTITIONER

## 2024-03-08 PROCEDURE — 74177 CT ABD & PELVIS W/CONTRAST: CPT

## 2024-03-08 PROCEDURE — 97161 PT EVAL LOW COMPLEX 20 MIN: CPT

## 2024-03-08 PROCEDURE — 99221 1ST HOSP IP/OBS SF/LOW 40: CPT | Performed by: SURGERY

## 2024-03-08 PROCEDURE — 25810000003 LACTATED RINGERS PER 1000 ML: Performed by: STUDENT IN AN ORGANIZED HEALTH CARE EDUCATION/TRAINING PROGRAM

## 2024-03-08 PROCEDURE — 97166 OT EVAL MOD COMPLEX 45 MIN: CPT

## 2024-03-08 PROCEDURE — 85007 BL SMEAR W/DIFF WBC COUNT: CPT | Performed by: STUDENT IN AN ORGANIZED HEALTH CARE EDUCATION/TRAINING PROGRAM

## 2024-03-08 PROCEDURE — 80048 BASIC METABOLIC PNL TOTAL CA: CPT | Performed by: STUDENT IN AN ORGANIZED HEALTH CARE EDUCATION/TRAINING PROGRAM

## 2024-03-08 PROCEDURE — 25010000002 PIPERACILLIN SOD-TAZOBACTAM PER 1 G: Performed by: STUDENT IN AN ORGANIZED HEALTH CARE EDUCATION/TRAINING PROGRAM

## 2024-03-08 PROCEDURE — 85025 COMPLETE CBC W/AUTO DIFF WBC: CPT | Performed by: STUDENT IN AN ORGANIZED HEALTH CARE EDUCATION/TRAINING PROGRAM

## 2024-03-08 PROCEDURE — 25010000002 PROCHLORPERAZINE 10 MG/2ML SOLUTION: Performed by: STUDENT IN AN ORGANIZED HEALTH CARE EDUCATION/TRAINING PROGRAM

## 2024-03-08 PROCEDURE — 71260 CT THORAX DX C+: CPT

## 2024-03-08 PROCEDURE — 74177 CT ABD & PELVIS W/CONTRAST: CPT | Performed by: RADIOLOGY

## 2024-03-08 RX ADMIN — PROCHLORPERAZINE EDISYLATE 10 MG: 5 INJECTION INTRAMUSCULAR; INTRAVENOUS at 23:15

## 2024-03-08 RX ADMIN — HYDROMORPHONE HYDROCHLORIDE 0.5 MG: 1 INJECTION, SOLUTION INTRAMUSCULAR; INTRAVENOUS; SUBCUTANEOUS at 06:27

## 2024-03-08 RX ADMIN — PIPERACILLIN SODIUM AND TAZOBACTAM SODIUM 3.38 G: 3; .375 INJECTION, POWDER, LYOPHILIZED, FOR SOLUTION INTRAVENOUS at 06:20

## 2024-03-08 RX ADMIN — HYDROMORPHONE HYDROCHLORIDE 0.5 MG: 1 INJECTION, SOLUTION INTRAMUSCULAR; INTRAVENOUS; SUBCUTANEOUS at 02:39

## 2024-03-08 RX ADMIN — SODIUM CHLORIDE, POTASSIUM CHLORIDE, SODIUM LACTATE AND CALCIUM CHLORIDE 100 ML/HR: 600; 310; 30; 20 INJECTION, SOLUTION INTRAVENOUS at 02:39

## 2024-03-08 RX ADMIN — SODIUM CHLORIDE, POTASSIUM CHLORIDE, SODIUM LACTATE AND CALCIUM CHLORIDE 100 ML/HR: 600; 310; 30; 20 INJECTION, SOLUTION INTRAVENOUS at 13:40

## 2024-03-08 RX ADMIN — MUPIROCIN 1 APPLICATION: 20 OINTMENT TOPICAL at 08:14

## 2024-03-08 RX ADMIN — PROCHLORPERAZINE EDISYLATE 10 MG: 5 INJECTION INTRAMUSCULAR; INTRAVENOUS at 16:57

## 2024-03-08 RX ADMIN — PANTOPRAZOLE SODIUM 40 MG: 40 INJECTION, POWDER, FOR SOLUTION INTRAVENOUS at 08:14

## 2024-03-08 RX ADMIN — HYDROMORPHONE HYDROCHLORIDE 0.5 MG: 1 INJECTION, SOLUTION INTRAMUSCULAR; INTRAVENOUS; SUBCUTANEOUS at 13:40

## 2024-03-08 RX ADMIN — ENOXAPARIN SODIUM 40 MG: 40 INJECTION SUBCUTANEOUS at 20:08

## 2024-03-08 RX ADMIN — Medication 10 ML: at 20:09

## 2024-03-08 RX ADMIN — IOPAMIDOL 80 ML: 612 INJECTION, SOLUTION INTRAVENOUS at 11:47

## 2024-03-08 RX ADMIN — MUPIROCIN 1 APPLICATION: 20 OINTMENT TOPICAL at 20:09

## 2024-03-08 RX ADMIN — PIPERACILLIN SODIUM AND TAZOBACTAM SODIUM 3.38 G: 3; .375 INJECTION, POWDER, LYOPHILIZED, FOR SOLUTION INTRAVENOUS at 13:39

## 2024-03-08 NOTE — CONSULTS
Date:  03/08/24    Referring Provider: Dr. Palmer Payne DO    Reason for Consultation: Metastatic colon cancer    Patient Care Team:  Vimal Moreno MD as PCP - General (Family Medicine)  Francine Garcia MSW as     Chief complaint: Abdominal pain, nausea, dizziness, change in bowel habits    History of present illness:  Vangie Carney is a 53 y.o. year-old female seen today at the request of Dr. Palmer Payne DO for evaluation and treatment of metastatic colon cancer. Patient currently follows in the outpatient setting with Dr. Hanks. She received cycle 2 5-FU/Leucovorin on 03/05/24. She presented to [facility] for bulb unhook on 03/07/24 and was with reports of continued nausea and dizziness. She was given 1 L of NS without improvement in symptoms and was advised to seek further evaluation in ED. She reports that nausea has been ongoing for some time, even before starting treatment, but feels that this worsened after she received IV iron. She had some change in bowel habits that was not reported to Dr. Hanks but reports that she had 2 loose bowel movements yesterday. While in ED, CT AP w/o contrast was performed with findings concerning for small bowel obstruction. NG tube was placed and she was admitted for further management/treatment.    At time of examination this morning, she is resting in bed comfortably. She denies dizziness. She states that abdominal pain is improved after NG tube placement. She states nausea is improved as well. She is able to pass flatus. She denies fever, chills, vomiting, diarrhea overnight. She is otherwise without specific complaints.     Oncology History:  - February 2023: diagnosed with colon adenocarcinoma; s/p resection of sigmoid colon and upper rectum on 03/14/2024. Final surgical pathology consistent with stage IIIB ([id number]) disease with 2/15 lymph nodes involved and negative surgical margins.  - 03/20/2023: Initial oncology consultation;  recommended adjuvant treatment with six months of [id number] FOLFOX (or XELOX). Was referred for port placement but was then lost to follow up.  - October 2023: began to experience recurrent constipation and back pain.  - 01/17/2024: Repeat CT AP consistent with recurrent and metastatic disease with the development of multiple soft tissue implants within the omentum/peritoneum and possibly one metastatic lung lesion (in the RLL), now stage IV and classically incurable disease.  - February 2024: agreeable to proceeding with powerport placement and l8bepwsd 5-FU/leucovorin. She began cycle 1 of chemotherapy on 02/19/2024.      History  Past Medical History:   Diagnosis Date    Gallbladder attack     GERD (gastroesophageal reflux disease)     Hearing aid worn     History of ear infections     Malignant neoplasm of sigmoid colon 3/14/2023    Seasonal allergies     Wears glasses        Past Surgical History:   Procedure Laterality Date    CHOLECYSTECTOMY      COLON RESECTION N/A 3/14/2023    Procedure: COLON RESECTION LOW ANTERIOR LAPAROSCOPIC WITH DAVINCI ROBOT;  Surgeon: Shari Aguirre MD;  Location:  [facility];  Service: Robotics - DaVinci;  Laterality: N/A;    COLONOSCOPY N/A 2/15/2023    Procedure: COLONOSCOPY FOR SCREENING;  Surgeon: Giselle Iniguez MD;  Location:  [facility];  Service: Gastroenterology;  Laterality: N/A;    ENDOSCOPY N/A 2/15/2023    Procedure: ESOPHAGOGASTRODUODENOSCOPY WITH BIOPSY;  Surgeon: Giselle Iniguez MD;  Location:  [facility];  Service: Gastroenterology;  Laterality: N/A;    LAPAROSCOPIC TUBAL LIGATION Bilateral     MIDDLE EAR SURGERY      PORTACATH PLACEMENT N/A 2/14/2024    Procedure: INSERTION OF PORTACATH;  Surgeon: Jaylen Leal MD;  Location:  [facility];  Service: General;  Laterality: N/A;       Family History   Problem Relation Age of Onset    Cancer Mother     Cancer Father     Cancer Sister     Cancer Brother     Cancer Maternal Grandmother     Cancer  Maternal Grandfather     Breast cancer Neg Hx        Social History     Tobacco Use    Smoking status: Former     Current packs/day: 0.00     Average packs/day: 1 pack/day for 30.0 years (30.0 ttl pk-yrs)     Types: Cigarettes     Start date:      Quit date:      Years since quittin.1     Passive exposure: Never    Smokeless tobacco: Never   Vaping Use    Vaping status: Every Day    Substances: Nicotine, Flavoring    Devices: RefMocavoble tank   Substance Use Topics    Alcohol use: Never    Drug use: Defer       Allergies:  Keflex [cephalexin]    Outpatient Medications:  Medications Prior to Admission   Medication Sig Dispense Refill Last Dose    HYDROcodone-acetaminophen (NORCO) 5-325 MG per tablet Take 1-2 tablets by mouth Every 8 (Eight) Hours As Needed for Mild Pain.       lactulose (CHRONULAC) 10 GM/15ML solution Take 30 mL by mouth 2 (Two) Times a Day As Needed (for constipation).       metoclopramide (REGLAN) 10 MG tablet Take 1 tablet by mouth 3 (Three) Times a Day As Needed (for nausea).       ondansetron (ZOFRAN) 8 MG tablet Take 1 tablet by mouth Every 8 (Eight) Hours As Needed for Nausea or Vomiting.       pantoprazole (PROTONIX) 40 MG EC tablet Take 1 tablet by mouth Daily.   3/7/2024    prochlorperazine (COMPAZINE) 10 MG tablet Take 1 tablet by mouth Every 6 (Six) Hours As Needed for Nausea or Vomiting.       sennosides-docusate (PERICOLACE) 8.6-50 MG per tablet Take 2 tablets by mouth 2 (Two) Times a Day As Needed for Constipation.          Inpatient Medications:  Scheduled Meds:  enoxaparin, 40 mg, Subcutaneous, Nightly  mupirocin, 1 application , Each Nare, BID  pantoprazole, 40 mg, Intravenous, QAM AC  piperacillin-tazobactam, 3.375 g, Intravenous, Q8H  sodium chloride, 10 mL, Intravenous, Q12H        Continuous Infusions:  lactated ringers, 100 mL/hr, Last Rate: 100 mL/hr (24)        PRN Meds:    HYDROmorphone    lidocaine    ondansetron    prochlorperazine    sodium  "chloride    sodium chloride    sodium chloride    Review of Systems  A comprehensive 14 point review of systems was performed.  Significant findings as mentioned above.  All other systems reviewed and are negative.       Physical Exam:  Vital Signs   Patient Vitals for the past 24 hrs:   BP Temp Temp src Pulse Resp SpO2 Height Weight   03/08/24 0654 105/63 -- -- 88 16 96 % -- --   03/08/24 0245 112/60 98.7 °F (37.1 °C) Oral 96 16 93 % -- --   03/07/24 2240 111/63 98.2 °F (36.8 °C) Oral 101 16 -- -- --   03/07/24 1940 -- -- -- -- -- 95 % -- --   03/07/24 1809 107/71 97.4 °F (36.3 °C) Oral 86 18 -- 170.2 cm (67\") 70.9 kg (156 lb 4.8 oz)   03/07/24 1640 113/68 -- -- 104 -- -- -- --   03/07/24 1630 -- -- -- 90 -- 94 % -- --   03/07/24 1620 110/98 -- -- 87 -- 95 % -- --   03/07/24 1600 125/67 -- -- 94 -- 94 % -- --   03/07/24 1540 127/80 -- -- 101 -- 99 % -- --   03/07/24 1520 105/75 -- -- 91 -- 94 % -- --   03/07/24 1500 117/76 -- -- 89 -- 94 % -- --   03/07/24 1440 119/72 -- -- 89 -- 94 % -- --   03/07/24 1420 121/90 -- -- 90 -- 94 % -- --   03/07/24 1400 120/75 -- -- 89 -- 93 % -- --   03/07/24 1340 109/77 -- -- 95 -- 97 % -- --   03/07/24 1328 -- -- -- 97 -- 95 % -- --   03/07/24 1320 115/68 -- -- -- -- -- -- --   03/07/24 1236 -- -- -- 99 -- 92 % -- --   03/07/24 1219 109/76 -- -- -- -- -- -- --   03/07/24 1105 112/70 97.9 °F (36.6 °C) Oral 110 17 96 % 170.2 cm (67\") 71.2 kg (157 lb)       General:  Awake, alert and oriented, in no distress  HEENT:  Pupils are equal, round and reactive to light and accommodation  Neck:  No JVD, thyromegaly or lymphadenopathy  CV:  Regular rate and rhythm, no murmurs, rubs or gallops  Resp:  Lungs are clear to auscultation bilaterally  Abd:  Soft, non-tender, non-distended, hypoactive bowel sounds present, no organomegaly, NG tube in place.  Ext:  No clubbing, cyanosis or edema  Neuro:  MS as above, CN II-XII intact, grossly non-focal exam      Labs / Studies:  Lab Results "   Component Value Date    WBC 1.39 (C) 03/08/2024    HGB 9.5 (L) 03/08/2024    HCT 30.5 (L) 03/08/2024    MCV 90.2 03/08/2024    RDW 21.3 (H) 03/08/2024     03/08/2024    NEUTRORELPCT 65.7 03/07/2024    LYMPHORELPCT 25.2 03/07/2024    MONORELPCT 6.7 03/07/2024    EOSRELPCT 1.2 03/07/2024    BASORELPCT 0.6 03/07/2024    NEUTROABS 0.71 (L) 03/08/2024    LYMPHSABS 0.41 (L) 03/07/2024       Lab Results   Component Value Date     (L) 03/08/2024    K 3.8 03/08/2024    CO2 21.6 (L) 03/08/2024     03/08/2024    BUN 14 03/08/2024    CREATININE 0.58 03/08/2024    EGFRIFNONA 79 09/10/2021    GLUCOSE 90 03/08/2024    CALCIUM 7.8 (L) 03/08/2024    ALKPHOS 103 03/07/2024    AST 17 03/07/2024    ALT 15 03/07/2024    BILITOT 0.9 03/07/2024    ALBUMIN 3.4 (L) 03/07/2024    PROTEINTOT 5.1 (L) 03/07/2024    MG 1.8 03/16/2023       Imaging Results (Last 72 Hours)       Procedure Component Value Units Date/Time    XR Chest AP [[id number]] Collected: 03/07/24 1649     Updated: 03/07/24 1652    Narrative:      EXAM:    XR Chest, 1 View     EXAM DATE:    3/7/2024 4:48 PM     CLINICAL HISTORY:    ng tube; K56.609-Unspecified intestinal obstruction, unspecified as to  partial versus complete obstruction; D70.1-Agranulocytosis secondary to  cancer chemotherapy; T45.6I9F-Igtmuyz effect of antineoplastic and  immunosuppressive drugs, initial encounter     TECHNIQUE:    Frontal view of the chest.     COMPARISON:    3/7/2024     FINDINGS:    LUNGS AND PLEURAL SPACES:  Unremarkable as visualized.  No  consolidation.  No pneumothorax.    HEART:  Unremarkable as visualized.  No cardiomegaly.    MEDIASTINUM:  Unremarkable as visualized.  Normal mediastinal contour.    BONES/JOINTS:  Unremarkable as visualized.  No acute fracture.    TUBES, LINES AND DEVICES:  Nasogastric tube tip in the stomach.  Right  Port-A-Cath with tip in SVC.       Impression:        No acute cardiopulmonary findings identified.        This report was  finalized on 3/7/2024 4:50 PM by Dr. Ruddy Blanco MD.       XR Chest 1 View [[id number]] Collected: 03/07/24 1224     Updated: 03/07/24 1227    Narrative:      EXAM:    XR Chest, 1 View     EXAM DATE:    3/7/2024 11:42 AM     CLINICAL HISTORY:    Chest Pain Protocol     TECHNIQUE:    Frontal view of the chest.     COMPARISON:    3/14/2024     FINDINGS:    LUNGS AND PLEURAL SPACES:  Unremarkable as visualized.  No  consolidation.  No pneumothorax.    HEART:  Unremarkable as visualized.  No cardiomegaly.    MEDIASTINUM:  Unremarkable as visualized.  Normal mediastinal contour.    BONES/JOINTS:  Unremarkable as visualized.  No acute fracture.    TUBES, LINES AND DEVICES:  Right Port-A-Cath with tip in SVC.       Impression:        No acute cardiopulmonary findings identified.        This report was finalized on 3/7/2024 12:25 PM by Dr. Ruddy Blanco MD.       CT Abdomen Pelvis Without Contrast [[id number]] Collected: 03/07/24 1204     Updated: 03/07/24 1207    Narrative:      EXAM:    CT Abdomen and Pelvis Without Intravenous Contrast     EXAM DATE:    3/7/2024 11:45 AM     CLINICAL HISTORY:    Abdominal pain, acute, nonlocalized     TECHNIQUE:    Axial computed tomography images of the abdomen and pelvis without  intravenous contrast.  Sagittal and coronal reformatted images were  created and reviewed.  This CT exam was performed using one or more of  the following dose reduction techniques:  automated exposure control,  adjustment of the mA and/or kV according to patient size, and/or use of  iterative reconstruction technique.     COMPARISON:    No relevant prior studies available.     FINDINGS:    LUNG BASES:  Unremarkable as visualized.  No mass.  No consolidation.      ABDOMEN:    LIVER:  Unremarkable as visualized.    GALLBLADDER AND BILE DUCTS:  Cholecystectomy clips.  No ductal  dilation.    PANCREAS:  Unremarkable as visualized.  No ductal dilation.    SPLEEN:  Unremarkable as visualized.  No splenomegaly.     ADRENALS:  Unremarkable as visualized.  No mass.    KIDNEYS AND URETERS:  Unremarkable as visualized.  No obstructing  stones.  No hydronephrosis.    STOMACH AND BOWEL:  Dilated fluid-filled small bowel loops with  decompressed mid and distal ileum suggestive of small bowel obstruction.   No mucosal thickening.      PELVIS:    APPENDIX:  No findings to suggest acute appendicitis.    BLADDER:  Unremarkable as visualized.  No stones.    REPRODUCTIVE:  Unremarkable as visualized.      ABDOMEN and PELVIS:    INTRAPERITONEAL SPACE:  Unremarkable as visualized.  No free air.  No  significant fluid collection.    BONES/JOINTS:  No acute fracture.  No dislocation.    SOFT TISSUES:  Unremarkable as visualized.    VASCULATURE:  Unremarkable as visualized.  No abdominal aortic  aneurysm.    LYMPH NODES:  Unremarkable as visualized.  No enlarged lymph nodes.       Impression:        Dilated fluid-filled small bowel loops with decompressed mid and  distal ileum suggestive of small bowel obstruction.        This report was finalized on 3/7/2024 12:05 PM by Dr. Ruddy Blanco MD.       CT Head Without Contrast [[id number]] Collected: 03/07/24 1204     Updated: 03/07/24 1206    Narrative:      EXAM:    CT Head Without Intravenous Contrast     EXAM DATE:    3/7/2024 11:45 AM     CLINICAL HISTORY:    Dizziness, persistent/recurrent, cardiac or vascular cause suspected     TECHNIQUE:    Axial computed tomography images of the head/brain without intravenous  contrast.  Sagittal and coronal reformatted images were created and  reviewed.  This CT exam was performed using one or more of the following  dose reduction techniques:  automated exposure control, adjustment of  the mA and/or kV according to patient size, and/or use of iterative  reconstruction technique.     COMPARISON:    No relevant prior studies available.     FINDINGS:    BRAIN AND EXTRA-AXIAL SPACES:  Unremarkable as visualized.  No  hemorrhage.  No significant white matter  disease.  No edema.  No  ventriculomegaly.    BONES/JOINTS:  Unremarkable as visualized.  No acute fracture.    SOFT TISSUES:  Unremarkable as visualized.    SINUSES:  Unremarkable as visualized.  No acute sinusitis.    MASTOID AIR CELLS:  Unremarkable as visualized.  No mastoid effusion.       Impression:        Unremarkable exam demonstrating no CT evidence of acute intracranial  findings.        This report was finalized on 3/7/2024 12:04 PM by Dr. Ruddy Blanco MD.                 Assessment & Plan:  Vangie Carney is a 53 y.o. year-old female presenting with:    1. Metastatic colon cancer  2. Small bowel obstruction  - Initially diagnosed with stage IIIB colon adenocarcinoma in February 2023 and s/p surgical resection of the sigmoid colon and upper rectum on 03/14/2023. Seen by medical oncology March 2023 and recommended adjuvant treatment with six months of t1tsvvup FOLFOX (or XELOX). Patient was lost to follow up. In October 2023, she began to experience recurrent constipation and back pain.  - Repeat CT imaging in January 2024 consistent with recurrent and metastatic disease with development of multiple sot tissue implants within the omentum/peritoneum and possibly at least one metastatic lung lesion. This is consistent with stage IV and incurable disease. Long discussion was had with patient (and her family) regarding the diagnosis, updated staging and it's prognosis. It was recommended that she receive l8mngwsk 5-FU/leucovorin. She was referred for powerport placement and began treatment with cycle 1 on 02/19/24.   - Most recently, she has been struggling with nausea (which is typical for her after chemotherapy) but she states that it seemed to be somewhat worse after receiving IV iron (received IV Feraheme 2/16/24 and 2/19/24) due to history of TOBIAS with malabsorption of oral iron. She reports that she had a change in bowel habits.  - She presented and received cycle 2 5-FU/leucovorin on 03/05/24. She  then came for bulb unhook in the infusion center on 03/07/24 and was with reports of dizziness and nausea. Supportive care with 1 L NS was given. She denied improvement in symptoms and was recommended to go to the ED for further evaluation.  - She underwent ED workup with labs, imaging (CT AP without IV contrast) that revealed dilated fluid-filled small bowel loops with compressed mid and distal ileum suggestive of small bowel obstruction. Felt that obstruction may be secondary to omental metastasis. NG tube was placed. General surgery consulted and following, appreciate assistance.  - Repeat CT imaging of CAP with contrast is pending this morning.  - She will need follow up in the outpatient clinic with Dr. Hanks after discharge (scheduled for 03/19/2024 - on day of planned cycle 3 5-FU/leucovorin).        Thank you for taking such good care of Ms. Carney.  We will continue to follow with you. Please don't hesitate to call with questions or concerns.     Jessie Dahl, ANURAG  03/08/24  09:21 EST        A total of 75 minutes were spent helping to coordinate this patient’s care today; ~60 minutes of this time were spent face-to-face with the patient in her hospital room this morning, reviewing her interim medical history and counseling on the current treatment and follow up plans. All questions were answered to her satisfaction.

## 2024-03-08 NOTE — PROGRESS NOTES
CT images personally reviewed. Continued dilated loops of small bowel but oral contrast makes it to the decompressed bowel and on to the terminal ileum. Agree with radiology that this represents a high grade partial SBO, likely malignant/progression of disease    Clamp NG  CLD starting this evening.

## 2024-03-08 NOTE — THERAPY EVALUATION
Patient Name: Vangie Carney  : 1970    MRN: [id number]                              Today's Date: 3/8/2024       Admit Date: 3/7/2024    Visit Dx:     ICD-10-CM ICD-9-CM   1. Small bowel obstruction  K56.609 560.9   2. Chemotherapy-induced neutropenia  D70.1 288.03    T45.1X5A E933.1     Patient Active Problem List   Diagnosis    Chronic idiopathic constipation    Encounter for screening for malignant neoplasm of colon    Malignant neoplasm of sigmoid colon    GERD without esophagitis    S/P colectomy( robotic lap LAR)    Acute postoperative pain    Iron deficiency anemia    Malabsorption of iron    Port-A-Cath in place    SBO (small bowel obstruction)     Past Medical History:   Diagnosis Date    Gallbladder attack     GERD (gastroesophageal reflux disease)     Hearing aid worn     History of ear infections     Malignant neoplasm of sigmoid colon 3/14/2023    Seasonal allergies     Wears glasses      Past Surgical History:   Procedure Laterality Date    CHOLECYSTECTOMY      COLON RESECTION N/A 3/14/2023    Procedure: COLON RESECTION LOW ANTERIOR LAPAROSCOPIC WITH DAVINCI ROBOT;  Surgeon: Shari gAuirre MD;  Location:  [facility];  Service: Robotics - DaVinci;  Laterality: N/A;    COLONOSCOPY N/A 2/15/2023    Procedure: COLONOSCOPY FOR SCREENING;  Surgeon: Giselle Iniguez MD;  Location:  [facility];  Service: Gastroenterology;  Laterality: N/A;    ENDOSCOPY N/A 2/15/2023    Procedure: ESOPHAGOGASTRODUODENOSCOPY WITH BIOPSY;  Surgeon: Giselle Iniguez MD;  Location:  [facility];  Service: Gastroenterology;  Laterality: N/A;    LAPAROSCOPIC TUBAL LIGATION Bilateral     MIDDLE EAR SURGERY      PORTACATH PLACEMENT N/A 2024    Procedure: INSERTION OF PORTACATH;  Surgeon: Jaylen Leal MD;  Location:  [facility];  Service: General;  Laterality: N/A;      General Information       Row Name 24 1318          OT Time and Intention    Document Type evaluation  -LA     Mode of Treatment  occupational therapy  -LA       Row Name 03/08/24 1318          General Information    Patient Profile Reviewed yes  -LA     Prior Level of Function independent:;ADL's;all household mobility  -LA     Barriers to Rehab none identified  -LA       Row Name 03/08/24 1318          Occupational Profile    Reason for Services/Referral (Occupational Profile) OT to assess for changes in level of independence/safety with ADLs  -LA     Patient Goals (Occupational Profile) Return home  -LA       Row Name 03/08/24 1318          Living Environment    People in Home spouse;child(alfred), adult  -LA       Row Name 03/08/24 1318          Cognition    Orientation Status (Cognition) oriented x 4  -LA               User Key  (r) = Recorded By, (t) = Taken By, (c) = Cosigned By      Initials Name Provider Type    Catalina Rebollar OT Occupational Therapist                     Mobility/ADL's       Row Name 03/08/24 1318          Bed Mobility    Supine-Sit Bennett (Bed Mobility) independent  -LA       Row Name 03/08/24 1318          Transfers    Transfers bed-chair transfer;sit-stand transfer;toilet transfer  -LA       Row Name 03/08/24 1318          Bed-Chair Transfer    Bed-Chair Bennett (Transfers) independent;standby assist  -LA       Row Name 03/08/24 1318          Sit-Stand Transfer    Sit-Stand Bennett (Transfers) independent  -LA       Row Name 03/08/24 1318          Stand-Sit Transfer    Stand-Sit Bennett (Transfers) independent  -LA       Row Name 03/08/24 1318          Toilet Transfer    Type (Toilet Transfer) stand pivot/stand step  -LA     Bennett Level (Toilet Transfer) independent;standby assist  -LA       Row Name 03/08/24 1318          Functional Mobility    Functional Mobility- Ind. Level independent  -LA       Row Name 03/08/24 1318          Activities of Daily Living    BADL Assessment/Intervention bathing;upper body dressing;lower body dressing;grooming;feeding;toileting  -LA       Row Name  03/08/24 1318          Bathing Assessment/Intervention    [place] Level (Bathing) set up  -[facility] Name 03/08/24 1318          Upper Body Dressing Assessment/Training    [place] Level (Upper Body Dressing) set up  -[facility] Name 03/08/24 1318          Lower Body Dressing Assessment/Training    [place] Level (Lower Body Dressing) set up  -[facility] Name 03/08/24 1318          Grooming Assessment/Training    [place] Level (Grooming) set up  -[facility] Name 03/08/24 1318          Self-Feeding Assessment/Training    [place] Level (Feeding) set up  -LA       Row Name 03/08/24 1318          Toileting Assessment/Training    [place] Level (Toileting) independent  -LA               User Key  (r) = Recorded By, (t) = Taken By, (c) = Cosigned By      Initials Name Provider Type    Catalina Rebollar OT Occupational Therapist                   Obj/Interventions       [facility] Name 03/08/24 1320          Sensory Assessment (Somatosensory)    Sensory Assessment (Somatosensory) sensation intact  -LA       Row Name 03/08/24 1320          Vision Assessment/Intervention    Visual Impairment/Limitations WFL  -LA       Row Name 03/08/24 1320          Range of Motion Comprehensive    General Range of Motion no range of motion deficits identified  -LA     Comment, General Range of Motion BUE ROM grossly WFL  -LA       Row Name 03/08/24 1320          Strength Comprehensive (MMT)    General Manual Muscle Testing (MMT) Assessment no strength deficits identified  -LA     Comment, General Manual Muscle Testing (MMT) Assessment UE strength grossly 4+/5  -LA       Row Name 03/08/24 1320          Motor Skills    Motor Skills coordination;functional endurance  -LA     Coordination WFL  -LA     Functional Endurance good  -LA       Row Name 03/08/24 1320          Balance    Balance Assessment sitting static balance;sitting dynamic balance  -LA     Static Sitting Balance independent  -LA     Dynamic Sitting  Balance independent  -LA               User Key  (r) = Recorded By, (t) = Taken By, (c) = Cosigned By      Initials Name Provider Type    Catalina Rebollar OT Occupational Therapist                   Goals/Plan    No documentation.                  Clinical Impression       Row Name 03/08/24 1320          Plan of Care Review    Plan of Care Reviewed With patient  -LA     Outcome Evaluation Patient seen for OT evaluation this date. Patient remains independent with ADLs with no further need for OT intervention.  -[facility] Name 03/08/24 1320          Therapy Assessment/Plan (OT)    Therapy Frequency (OT) evaluation only  -[facility] Name 03/08/24 1320          Therapy Plan Review/Discharge Plan (OT)    Equipment Needs Upon Discharge (OT) --  none identified  -LA     Anticipated Discharge Disposition (OT) home;home with assist  -[facility] Name 03/08/24 1320          Positioning and Restraints    Pre-Treatment Position in bed  -LA     Post Treatment Position chair  -LA     In Chair sitting;encouraged to call for assist;call light within reach  -LA               User Key  (r) = Recorded By, (t) = Taken By, (c) = Cosigned By      Initials Name Provider Type    Catalina Rebollar OT Occupational Therapist                   Outcome Measures       Row Name 03/08/24 0820          How much help from another person do you currently need...    Turning from your back to your side while in flat bed without using bedrails? 4  -VM     Moving from lying on back to sitting on the side of a flat bed without bedrails? 4  -VM     Moving to and from a bed to a chair (including a wheelchair)? 4  -VM     Standing up from a chair using your arms (e.g., wheelchair, bedside chair)? 4  -VM     Climbing 3-5 steps with a railing? 4  -VM     To walk in hospital room? 4  -VM     AM-PAC 6 Clicks Score (PT) 24  -VM     Highest Level of Mobility Goal 8 --> Walked 250 feet or more  -               User Key  (r) = Recorded By, (t) = Taken By,  (c) = Cosigned By      Initials Name Provider Type    Violeta Marquis, RN Registered Nurse                      OT Recommendation and Plan  Therapy Frequency (OT): evaluation only  Plan of Care Review  Plan of Care Reviewed With: patient  Outcome Evaluation: Patient seen for OT evaluation this date. Patient remains independent with ADLs with no further need for OT intervention.     Time Calculation:          Therapy Charges for Today       Code Description Service Date Service Provider Modifiers Qty    [id number] HC OT EVAL MOD COMPLEXITY 4 3/8/2024 Catalina Villagran OT GO 1                 Catalina Villagran OT  3/8/2024

## 2024-03-08 NOTE — PLAN OF CARE
Goal Outcome Evaluation:      Pt is alert/oriented and resting in bed during shift. VSS. PRN pain meds given, see MAR. Pt able to ambulate to bedside commode during shift. No acute changes, will continue with plan of care.

## 2024-03-08 NOTE — PROGRESS NOTES
[facility] Medicine Services  PROGRESS NOTE     Patient Identification:  Name:  Vangie Carney  Age:  53 y.o.  Sex:  female  :  1970  MRN:  [id number]  Visit Number:  [id number]  Primary Care Provider:  Vimal Moreno MD    Length of stay:  1    ----------------------------------------------------------------------------------------------------------------------  Subjective     Chief Complaint:  Follow up for n/v    Subjective:  Today, the patient feels better after NGT placement. Has 200 cc gastric material in canister. No more nausea or vomiting reported. Had a BM this am.     ----------------------------------------------------------------------------------------------------------------------  Objective     Current Hospital Meds:  enoxaparin, 40 mg, Subcutaneous, Nightly  mupirocin, 1 application , Each Nare, BID  pantoprazole, 40 mg, Intravenous, QAM AC  piperacillin-tazobactam, 3.375 g, Intravenous, Q8H  sodium chloride, 10 mL, Intravenous, Q12H      lactated ringers, 100 mL/hr, Last Rate: 100 mL/hr (24 1340)      ----------------------------------------------------------------------------------------------------------------------  Vital Signs:  Temp:  [97.4 °F (36.3 °C)-98.7 °F (37.1 °C)] 98.7 °F (37.1 °C)  Heart Rate:  [] 86  Resp:  [16-18] 18  BP: (105-120)/(60-77) 120/77  Mean Arterial Pressure (Non-Invasive) for the past 24 hrs (Last 3 readings):   Noninvasive MAP (mmHg)   24 1440 94   24 1038 84   24 0654 79     SpO2 Percentage    24 0245 24 0654 24 1038   SpO2: 93% 96% 97%     SpO2:  [93 %-97 %] 97 %  on   ;   Device (Oxygen Therapy): room air    Body mass index is 24.48 kg/m².  Wt Readings from Last 3 Encounters:   24 70.9 kg (156 lb 4.8 oz)   24 71.4 kg (157 lb 4.8 oz)   24 71.1 kg (156 lb 12.8 oz)        Intake/Output Summary (Last 24 hours) at 3/8/2024 1621  Last data  "filed at 3/8/2024 1403  Gross per 24 hour   Intake 3000 ml   Output --   Net 3000 ml     Diet: Liquid; Clear Liquid; No Straw, No Carbonated Beverages; Fluid Consistency: Thin (IDDSI 0)  ----------------------------------------------------------------------------------------------------------------------  Physical exam:   GEN: NAD  CV:RRR, no murmur  PULM:CTA, no wheeze or crackles  GI:distended, +bs, soft, NT  PSYCH:A+O x 4, no confusion or agitation  -------------------------------------------------------------------------------------------------------------------          Results from last 7 days   Lab Units 03/08/24  0155 03/07/24  1529 03/07/24  1148 03/05/24  0835   LACTATE mmol/L  --  0.8  --   --    WBC 10*3/mm3 1.39*  --  1.63* 3.32*   HEMOGLOBIN g/dL 9.5*  --  10.8* 10.9*   HEMATOCRIT % 30.5*  --  35.2 35.5   MCV fL 90.2  --  91.9 90.3   MCHC g/dL 31.1*  --  30.7* 30.7*   PLATELETS 10*3/mm3 213  --  229 251     Results from last 7 days   Lab Units 03/08/24  0155 03/07/24  1148 03/05/24  0835   SODIUM mmol/L 135* 134* 144   POTASSIUM mmol/L 3.8 3.6 4.0   CHLORIDE mmol/L 103 99 103   CO2 mmol/L 21.6* 23.9 31.1*   BUN mg/dL 14 19 15   CREATININE mg/dL 0.58 0.59 0.95   CALCIUM mg/dL 7.8* 8.2* 10.0   GLUCOSE mg/dL 90 113* 103*   ALBUMIN g/dL  --  3.4* 4.2   BILIRUBIN mg/dL  --  0.9 0.7   ALK PHOS U/L  --  103 120*   AST (SGOT) U/L  --  17 19   ALT (SGPT) U/L  --  15 15   Estimated Creatinine Clearance: 127 mL/min (by C-G formula based on SCr of 0.58 mg/dL).  No results found for: \"AMMONIA\"    Glucose   Date/Time Value Ref Range Status   03/07/2024 1020 117 70 - 130 mg/dL Final     Lab Results   Component Value Date    HGBA1C 4.70 (L) 03/13/2023     No results found for: \"TSH\", \"FREET4\"    Blood Culture   Date Value Ref Range Status   03/07/2024 No growth at 24 hours  Preliminary   03/07/2024 No growth at 24 hours  Preliminary "       ----------------------------------------------------------------------------------------------------------------------  Imaging Results (Last 24 Hours)       Procedure Component Value Units Date/Time    CT Abdomen Pelvis With Contrast [[id number]] Collected: 03/08/24 1153     Updated: 03/08/24 1159    Narrative:      EXAM:    CT Abdomen and Pelvis With Intravenous Contrast     EXAM DATE:    3/8/2024 11:34 AM     CLINICAL HISTORY:    SBO, colon cancer with mets; K56.609-Unspecified intestinal  obstruction, unspecified as to partial versus complete obstruction;  D70.1-Agranulocytosis secondary to cancer chemotherapy; T45.0R5C-Cugscco  effect of antineoplastic and immunosuppressive drugs, initial encounter     TECHNIQUE:    Axial computed tomography images of the abdomen and pelvis with  intravenous contrast.  Sagittal and coronal reformatted images were  created and reviewed.  This CT exam was performed using one or more of  the following dose reduction techniques:  automated exposure control,  adjustment of the mA and/or kV according to patient size, and/or use of  iterative reconstruction technique.     COMPARISON:    CT Abdomen Pelvis dated 3/7/2024     FINDINGS:    LUNG BASES:  Unremarkable as visualized.  No mass.  No consolidation.      ABDOMEN:    LIVER:  Unremarkable as visualized.  No mass.    GALLBLADDER AND BILE DUCTS:  Unremarkable as visualized.  No calcified  stones.  No ductal dilation.    PANCREAS:  Unremarkable as visualized.  No mass.  No ductal dilation.    SPLEEN:  Unremarkable as visualized.  No splenomegaly.    ADRENALS:  Unremarkable as visualized.  No mass.    KIDNEYS AND URETERS:  Unremarkable as visualized.  No solid mass.  No  hydronephrosis.    STOMACH AND BOWEL:  Oral contrast is seen within some very  decompressed mid and distal ileal small bowel loops suggestive of likely  probably high-grade partial obstruction.  No mucosal thickening.      PELVIS:    APPENDIX:  No findings to  suggest acute appendicitis.    BLADDER:  Unremarkable as visualized.  No mass.    REPRODUCTIVE:  Unremarkable as visualized.      ABDOMEN and PELVIS:    INTRAPERITONEAL SPACE:  Unremarkable as visualized.  No free air.  No  significant fluid collection.    BONES/JOINTS:  No acute fracture.  No dislocation.    SOFT TISSUES:  New soft tissue density lesion from 11/9/2022 in the  left lower abdomen anterior to the psoas muscle measuring 1.5 cm.    VASCULATURE:  Unremarkable as visualized.  No abdominal aortic  aneurysm.    LYMPH NODES:  Unremarkable as visualized.  No enlarged lymph nodes.       Impression:      1.  Oral contrast is seen within some very decompressed mid and distal  ileal small bowel loops suggestive of likely probably high-grade partial  obstruction.  2.  New soft tissue density lesion from 11/9/2022 in the left lower  abdomen anterior to the psoas muscle measuring 1.5 cm.        This report was finalized on 3/8/2024 11:57 AM by Dr. Ruddy Blanco MD.       CT Chest With Contrast Diagnostic [[id number]] Collected: 03/08/24 1152     Updated: 03/08/24 1157    Narrative:      EXAM:    CT Chest With Intravenous Contrast     EXAM DATE:    3/8/2024 11:33 AM     CLINICAL HISTORY:    SBO, colon cancer with mets; K56.609-Unspecified intestinal  obstruction, unspecified as to partial versus complete obstruction;  D70.1-Agranulocytosis secondary to cancer chemotherapy; T45.9O9N-Mqssdmu  effect of antineoplastic and immunosuppressive drugs, initial encounter     TECHNIQUE:    Axial computed tomography images of the chest with intravenous  contrast.  Sagittal and coronal reformatted images were created and  reviewed.  This CT exam was performed using one or more of the following  dose reduction techniques:  automated exposure control, adjustment of  the mA and/or kV according to patient size, and/or use of iterative  reconstruction technique.     COMPARISON:    4/7/2023     FINDINGS:    LUNGS AND PLEURAL SPACES:   New left upper lobe 1.2 cm pulmonary  nodule.  No consolidation.  No pneumothorax.  No significant effusion.    HEART:  Unremarkable as visualized.  No cardiomegaly.  No significant  pericardial effusion.  No significant coronary artery calcifications.    BONES/JOINTS:  Unremarkable as visualized.  No acute fracture.  No  dislocation.    SOFT TISSUES:  Unremarkable as visualized.    VASCULATURE:  Unremarkable as visualized.  No thoracic aortic  aneurysm.    LYMPH NODES:  Unremarkable as visualized.  No enlarged lymph nodes.    TUBES, LINES AND DEVICES:  Nasogastric tube tip in the stomach.       Impression:        New left upper lobe 1.2 cm pulmonary nodule.        This report was finalized on 3/8/2024 11:53 AM by Dr. Ruddy Blanco MD.       XR Chest AP [[id number]] Collected: 03/07/24 1649     Updated: 03/07/24 1652    Narrative:      EXAM:    XR Chest, 1 View     EXAM DATE:    3/7/2024 4:48 PM     CLINICAL HISTORY:    ng tube; K56.609-Unspecified intestinal obstruction, unspecified as to  partial versus complete obstruction; D70.1-Agranulocytosis secondary to  cancer chemotherapy; T45.1S2T-Nwshplz effect of antineoplastic and  immunosuppressive drugs, initial encounter     TECHNIQUE:    Frontal view of the chest.     COMPARISON:    3/7/2024     FINDINGS:    LUNGS AND PLEURAL SPACES:  Unremarkable as visualized.  No  consolidation.  No pneumothorax.    HEART:  Unremarkable as visualized.  No cardiomegaly.    MEDIASTINUM:  Unremarkable as visualized.  Normal mediastinal contour.    BONES/JOINTS:  Unremarkable as visualized.  No acute fracture.    TUBES, LINES AND DEVICES:  Nasogastric tube tip in the stomach.  Right  Port-A-Cath with tip in SVC.       Impression:        No acute cardiopulmonary findings identified.        This report was finalized on 3/7/2024 4:50 PM by Dr. Ruddy Blanco MD.                ----------------------------------------------------------------------------------------------------------------------  Assessment/Plan     Partial SBO- likely result of metastatic colon cancer. Cont NGT to wall suction, NPO except ice chips for comfort. Appreciate general surgery recs. Planning to clamp NGT tonight and challenge with clear liquid diet. Likely can stop abx.   Neutropenia- due to chemotherapy last received on 3/5. Cont IVF.     CHRONIC MEDICAP PROBLEMS:  Metastatic stage IV colon cancer- initially diagnosed with stage III, underwent resection. However, declined adjuvant chemotherapy.  A year later she was diagnosed with metastatic disease with mets to liver, omentum and lungs.oncology was oncology was consulted on admission to discuss prognosis   Chronic anemia- likely form colon cancer    --------------------------------------------------  DVT Prophylaxis: Lovenox  GI PPX- PPI IV  FULL CODE     Disposition: home once medically ready  --------------------------------------------------      Katayoun Behbahani, MD  Hospitalist Service -- [facility]     03/08/24  16:21 EST

## 2024-03-08 NOTE — PAYOR COMM NOTE
"CONTACT: KAITY RUEDA RN  UTILIZATION MANAGEMENT DEPT.  [facility]  [address] [place] [address]  PHONE: [phone]  FAX: [phone]      INPATIENT AUTH REQUEST    Jan Solano (53 y.o. Female)       Date of Birth   1970    Social Security Number       Address   [address] [address]    Home Phone   [phone]    MRN   [id number]       Lutheran   Restorationist    Marital Status                               Admission Date   3/7/24    Admission Type   Emergency    Admitting Provider   Palmer Payne DO    Attending Provider   Behbahani, Katayoun, MD    Department, Room/Bed   [place] [id number]/       Discharge Date       Discharge Disposition       Discharge Destination                                 Attending Provider: Behbahani, Katayoun, MD    Allergies: Keflex [Cephalexin]    Isolation: None   Infection: None   Code Status: CPR    Ht: 170.2 cm (67\")   Wt: 70.9 kg (156 lb 4.8 oz)    Admission Cmt: None   Principal Problem: SBO (small bowel obstruction) [K56.609]                   Active Insurance as of 3/7/2024       Primary Coverage       Payor Plan Insurance Group Employer/Plan Group    [facility] BLUE [employer] [facility] EMPLOYEE [id number]       Payor Plan Address Payor Plan Phone Number Payor Plan Fax Number Effective Dates    [address] [phone]  2018 - None Entered    [place] [address]         Subscriber Name Subscriber Birth Date Member ID       JAN SOLANO 1970 [id number]                     Emergency Contacts        (Rel.) Home Phone Work Phone Mobile Phone    Red Solano (Spouse) -- -- [phone]                 History & Physical        Palmer Payne DO at 24 1503              [facility] HOSPITALIST HISTORY AND PHYSICAL    Patient Identification:  Name:  Jan Solano  Age:  53 y.o.  Sex:  female  :  1970  MRN:  [id number]   Admit " "Date: 3/7/2024   Visit Number:  [id number]  Room number:  104/04  Primary Care Physician:  Vimal Moreno MD     Subjective     Chief complaint:    Chief Complaint   Patient presents with    Dizziness       History of presenting illness:   Patient is a 53-year-old female with history significant for stage III colon cancer status post resection with recurrence, currently on chemotherapy who presented to the ER with reports of dizziness, abdominal pain and nausea with emesis.  Patient states she has had 2 rounds of chemotherapy and has had issues with nausea and vomiting throughout chemotherapy and that part is normal for her.  She says she has not had a normal bowel movement in \"quite some time \"but has had some episodes of diarrhea the last few days with the last bowel movement being yesterday.  She notes increasing nausea with emesis, abdominal pain and is not passing gas.  She denies any fever/chills, respiratory symptoms or urinary symptoms.    In the ER, patient was hemodynamically stable.  Labs notable for leukopenia with absolute neutropenia, hemoglobin 11 with a CT abdomen and pelvis without contrast noted dilated fluid-filled small bowel loops suggestive of a small bowel obstruction.  Patient received IV fluid resuscitation, antiemetics and opiates and was very comfortable at the time of my initial examination.  She had no active vomiting or nausea at the time of exam.    ---------------------------------------------------------------------------------------------------------------------   Review of Systems   Constitutional:  Negative for chills, fatigue and fever.   HENT:  Negative for ear pain, sinus pain and sore throat.    Respiratory:  Negative for cough, chest tightness, shortness of breath and wheezing.    Cardiovascular:  Negative for chest pain, palpitations and leg swelling.   Gastrointestinal:  Positive for abdominal pain, diarrhea, nausea and vomiting. Negative for constipation. "   Genitourinary:  Negative for dysuria, hematuria and urgency.   Musculoskeletal:  Negative for arthralgias and myalgias.   Neurological:  Negative for dizziness, syncope and light-headedness.   Psychiatric/Behavioral:  Negative for confusion.      ---------------------------------------------------------------------------------------------------------------------   Past Medical History:   Diagnosis Date    Gallbladder attack     GERD (gastroesophageal reflux disease)     Hearing aid worn     History of ear infections     Malignant neoplasm of sigmoid colon 3/14/2023    Seasonal allergies     Wears glasses      Past Surgical History:   Procedure Laterality Date    CHOLECYSTECTOMY      COLON RESECTION N/A 3/14/2023    Procedure: COLON RESECTION LOW ANTERIOR LAPAROSCOPIC WITH DAVINCI ROBOT;  Surgeon: Shari Aguirre MD;  Location: [facility];  Service: Robotics - DaVinci;  Laterality: N/A;    COLONOSCOPY N/A 2/15/2023    Procedure: COLONOSCOPY FOR SCREENING;  Surgeon: Giselle Iniguez MD;  Location: [facility] OR;  Service: Gastroenterology;  Laterality: N/A;    ENDOSCOPY N/A 2/15/2023    Procedure: ESOPHAGOGASTRODUODENOSCOPY WITH BIOPSY;  Surgeon: Giselle Iniguez MD;  Location: [facility] OR;  Service: Gastroenterology;  Laterality: N/A;    LAPAROSCOPIC TUBAL LIGATION Bilateral     MIDDLE EAR SURGERY      PORTACATH PLACEMENT N/A 2/14/2024    Procedure: INSERTION OF PORTACATH;  Surgeon: Jaylen Leal MD;  Location: [facility] OR;  Service: General;  Laterality: N/A;     Family History   Problem Relation Age of Onset    Cancer Mother     Cancer Father     Cancer Sister     Cancer Brother     Cancer Maternal Grandmother     Cancer Maternal Grandfather     Breast cancer Neg Hx      Social History     Socioeconomic History    Marital status:    Tobacco Use    Smoking status: Former     Current packs/day: 0.00     Average packs/day: 1 pack/day for 30.0 years (30.0 ttl pk-yrs)     Types: Cigarettes      Start date:      Quit date: 2017     Years since quittin.1     Passive exposure: Never    Smokeless tobacco: Never   Vaping Use    Vaping status: Every Day    Substances: Nicotine, Flavoring    Devices: Refillable tank   Substance and Sexual Activity    Alcohol use: Never    Drug use: Defer    Sexual activity: Defer     Birth control/protection: None     ---------------------------------------------------------------------------------------------------------------------   Allergies:  Keflex [cephalexin]  ---------------------------------------------------------------------------------------------------------------------   Medications below are reported home medications pulling from within the system; at this time, these medications have not been reconciled unless otherwise specified and are in the verification process for further verifcation as current home medications.    Prior to Admission Medications       Prescriptions Last Dose Informant Patient Reported? Taking?    amoxicillin-clavulanate (AUGMENTIN) 875-125 MG per tablet   No No    Take 1 tablet by mouth 2 (Two) Times a Day.    Patient not taking:  Reported on 3/5/2024    ferrous sulfate 325 (65 FE) MG tablet   No No    Take 1 tablet by mouth 2 (Two) Times a Day With Meals.    Patient not taking:  Reported on 3/5/2024    HYDROcodone-acetaminophen (NORCO) 5-325 MG per tablet   No No    Take 1-2 tablets as needed every 8 hours for pain.    lactulose (CHRONULAC) 10 GM/15ML solution   No No    Take 30 mL by mouth 2 (Two) Times a Day As Needed (refractory constipation).    lidocaine-prilocaine (EMLA) 2.5-2.5 % cream   No No    Apply to port-a-cath site 30 minutes prior to arrival at infusion center. Cover with plastic wrap.    Loratadine 10 MG capsule  Self Yes No    Take  by mouth As Needed.    metoclopramide (REGLAN) 10 MG tablet   No No    Take 1 tablet by mouth 3 (Three) Times a Day As Needed (nausea).    ondansetron (Zofran) 8 MG tablet   No No     Take 1 tablet by mouth Every 8 (Eight) Hours As Needed for Nausea or Vomiting.    pantoprazole (PROTONIX) 40 MG EC tablet  Self Yes No    Take 1 tablet by mouth Daily.    polyethylene glycol (MIRALAX) 17 GM/SCOOP powder   No No    Take 17 g by mouth Daily As Needed (constipation).    prochlorperazine (COMPAZINE) 10 MG tablet   No No    Take 1 tablet by mouth Every 6 (Six) Hours As Needed for Nausea or Vomiting.    sennosides-docusate (PERICOLACE) 8.6-50 MG per tablet   No No    Take 2 tablets by mouth 2 (Two) Times a Day.          Objective     Vital Signs:  Temp:  [97.5 °F (36.4 °C)-97.9 °F (36.6 °C)] 97.9 °F (36.6 °C)  Heart Rate:  [] 97  Resp:  [17-18] 17  BP: (104-115)/(68-76) 115/68    Mean Arterial Pressure (Non-Invasive) for the past 24 hrs (Last 3 readings):   Noninvasive MAP (mmHg)   03/07/24 1320 81   03/07/24 1219 88     SpO2:  [92 %-96 %] 95 %  on   ;   Device (Oxygen Therapy): room air  Body mass index is 24.59 kg/m².    Wt Readings from Last 3 Encounters:   03/07/24 71.2 kg (157 lb)   03/07/24 71.4 kg (157 lb 4.8 oz)   03/05/24 71.1 kg (156 lb 12.8 oz)      ---------------------------------------------------------------------------------------------------------------------   Physical Exam:  Constitutional: Middle-age female, nontoxic, speaking clearly in full sentences, well-developed and well-nourished.  No respiratory distress.      HENT:  Head: Normocephalic and atraumatic.  Mouth: Dry mucous membranes.    Eyes:  Conjunctivae and EOM are normal.  No scleral icterus.  Neck:  Neck supple.  No JVD present.    Cardiovascular: Tachycardic, regular rhythm and normal heart sounds with no murmur.  Pulmonary/Chest:  No respiratory distress, no wheezes, no crackles, with normal breath sounds and good air movement.  Port in the right chest wall.  Abdominal:  Soft.  Nontender, no distention, no guarding or rebound.  No bowel sounds appreciated  Musculoskeletal:  No tenderness, and no deformity.  No red or  swollen joints anywhere.    Neurological:  Alert and oriented to person, place, and time.  No cranial nerve deficit.  No tongue deviation.  No facial droop.  No slurred speech.   Skin:  Skin is warm and dry.  No rash noted.  No pallor.   Peripheral vascular:  No edema and pulses on all 4 extremities.    ---------------------------------------------------------------------------------------------------------------------  EKG: Sinus tachycardia, rate 102, QTc 414, no acute ST or T wave changes    ECG 12 Lead Chest Pain   Preliminary Result   Test Reason : Chest Pain   Blood Pressure :   */*   mmHG   Vent. Rate : 102 BPM     Atrial Rate : 102 BPM      P-R Int : 164 ms          QRS Dur :  72 ms       QT Int : 318 ms       P-R-T Axes :  51  10  26 degrees      QTc Int : 414 ms      Sinus tachycardia   Cannot rule out Anterior infarct , age undetermined   Abnormal ECG   When compared with ECG of 13-MAR-2023 13:57,   T wave amplitude has decreased in Anterolateral leads      Referred By: FORD           Confirmed By:           Telemetry: Reviewed    I have personally looked at both the EKG and the telemetry strips.    Last echocardiogram:  None on file    --------------------------------------------------------------------------------------------------------------------  Labs:  Results from last 7 days   Lab Units 03/07/24  1148 03/05/24  0835   WBC 10*3/mm3 1.63* 3.32*   HEMOGLOBIN g/dL 10.8* 10.9*   HEMATOCRIT % 35.2 35.5   MCV fL 91.9 90.3   MCHC g/dL 30.7* 30.7*   PLATELETS 10*3/mm3 229 251         Results from last 7 days   Lab Units 03/07/24  1148 03/05/24  0835   SODIUM mmol/L 134* 144   POTASSIUM mmol/L 3.6 4.0   CHLORIDE mmol/L 99 103   CO2 mmol/L 23.9 31.1*   BUN mg/dL 19 15   CREATININE mg/dL 0.59 0.95   CALCIUM mg/dL 8.2* 10.0   GLUCOSE mg/dL 113* 103*   ALBUMIN g/dL 3.4* 4.2   BILIRUBIN mg/dL 0.9 0.7   ALK PHOS U/L 103 120*   AST (SGOT) U/L 17 19   ALT (SGPT) U/L 15 15   Estimated Creatinine Clearance: 125.4  "mL/min (by C-G formula based on SCr of 0.59 mg/dL).    No results found for: \"AMMONIA\"  Results from last 7 days   Lab Units 03/07/24  1148   HSTROP T ng/L <6         Glucose   Date/Time Value Ref Range Status   03/07/2024 1020 117 70 - 130 mg/dL Final     No results found for: \"TSH\", \"FREET4\"  Lab Results   Component Value Date    PREGTESTUR Negative 05/15/2015     Pain Management Panel           No data to display              Brief Urine Lab Results  (Last result in the past 365 days)        Color   Clarity   Blood   Leuk Est   Nitrite   Protein   CREAT   Urine HCG        03/07/24 1330 Dark Yellow   Cloudy   Negative   Negative   Negative   Trace                 No results found for: \"BLOODCX\"  No results found for: \"URINECX\"  No results found for: \"WOUNDCX\"  No results found for: \"STOOLCX\"    I have personally looked at the labs and they are summarized above.  ----------------------------------------------------------------------------------------------------------------------  Detailed radiology reports for the last 24 hours:    Imaging Results (Last 24 Hours)       Procedure Component Value Units Date/Time    XR Chest 1 View [[id number]] Collected: 03/07/24 1224     Updated: 03/07/24 1227    Narrative:      EXAM:    XR Chest, 1 View     EXAM DATE:    3/7/2024 11:42 AM     CLINICAL HISTORY:    Chest Pain Protocol     TECHNIQUE:    Frontal view of the chest.     COMPARISON:    3/14/2024     FINDINGS:    LUNGS AND PLEURAL SPACES:  Unremarkable as visualized.  No  consolidation.  No pneumothorax.    HEART:  Unremarkable as visualized.  No cardiomegaly.    MEDIASTINUM:  Unremarkable as visualized.  Normal mediastinal contour.    BONES/JOINTS:  Unremarkable as visualized.  No acute fracture.    TUBES, LINES AND DEVICES:  Right Port-A-Cath with tip in SVC.       Impression:        No acute cardiopulmonary findings identified.        This report was finalized on 3/7/2024 12:25 PM by Dr. Ruddy Blanco MD.       CT " Abdomen Pelvis Without Contrast [[id number]] Collected: 03/07/24 1204     Updated: 03/07/24 1207    Narrative:      EXAM:    CT Abdomen and Pelvis Without Intravenous Contrast     EXAM DATE:    3/7/2024 11:45 AM     CLINICAL HISTORY:    Abdominal pain, acute, nonlocalized     TECHNIQUE:    Axial computed tomography images of the abdomen and pelvis without  intravenous contrast.  Sagittal and coronal reformatted images were  created and reviewed.  This CT exam was performed using one or more of  the following dose reduction techniques:  automated exposure control,  adjustment of the mA and/or kV according to patient size, and/or use of  iterative reconstruction technique.     COMPARISON:    No relevant prior studies available.     FINDINGS:    LUNG BASES:  Unremarkable as visualized.  No mass.  No consolidation.      ABDOMEN:    LIVER:  Unremarkable as visualized.    GALLBLADDER AND BILE DUCTS:  Cholecystectomy clips.  No ductal  dilation.    PANCREAS:  Unremarkable as visualized.  No ductal dilation.    SPLEEN:  Unremarkable as visualized.  No splenomegaly.    ADRENALS:  Unremarkable as visualized.  No mass.    KIDNEYS AND URETERS:  Unremarkable as visualized.  No obstructing  stones.  No hydronephrosis.    STOMACH AND BOWEL:  Dilated fluid-filled small bowel loops with  decompressed mid and distal ileum suggestive of small bowel obstruction.   No mucosal thickening.      PELVIS:    APPENDIX:  No findings to suggest acute appendicitis.    BLADDER:  Unremarkable as visualized.  No stones.    REPRODUCTIVE:  Unremarkable as visualized.      ABDOMEN and PELVIS:    INTRAPERITONEAL SPACE:  Unremarkable as visualized.  No free air.  No  significant fluid collection.    BONES/JOINTS:  No acute fracture.  No dislocation.    SOFT TISSUES:  Unremarkable as visualized.    VASCULATURE:  Unremarkable as visualized.  No abdominal aortic  aneurysm.    LYMPH NODES:  Unremarkable as visualized.  No enlarged lymph nodes.        Impression:        Dilated fluid-filled small bowel loops with decompressed mid and  distal ileum suggestive of small bowel obstruction.        This report was finalized on 3/7/2024 12:05 PM by Dr. Ruddy Blanco MD.       CT Head Without Contrast [[id number]] Collected: 03/07/24 1204     Updated: 03/07/24 1206    Narrative:      EXAM:    CT Head Without Intravenous Contrast     EXAM DATE:    3/7/2024 11:45 AM     CLINICAL HISTORY:    Dizziness, persistent/recurrent, cardiac or vascular cause suspected     TECHNIQUE:    Axial computed tomography images of the head/brain without intravenous  contrast.  Sagittal and coronal reformatted images were created and  reviewed.  This CT exam was performed using one or more of the following  dose reduction techniques:  automated exposure control, adjustment of  the mA and/or kV according to patient size, and/or use of iterative  reconstruction technique.     COMPARISON:    No relevant prior studies available.     FINDINGS:    BRAIN AND EXTRA-AXIAL SPACES:  Unremarkable as visualized.  No  hemorrhage.  No significant white matter disease.  No edema.  No  ventriculomegaly.    BONES/JOINTS:  Unremarkable as visualized.  No acute fracture.    SOFT TISSUES:  Unremarkable as visualized.    SINUSES:  Unremarkable as visualized.  No acute sinusitis.    MASTOID AIR CELLS:  Unremarkable as visualized.  No mastoid effusion.       Impression:        Unremarkable exam demonstrating no CT evidence of acute intracranial  findings.        This report was finalized on 3/7/2024 12:04 PM by Dr. Ruddy Blanco MD.             Final impressions for the last 30 days of radiology reports:    XR Chest 1 View    Result Date: 3/7/2024    No acute cardiopulmonary findings identified.   This report was finalized on 3/7/2024 12:25 PM by Dr. Ruddy Blanco MD.      CT Abdomen Pelvis Without Contrast    Result Date: 3/7/2024    Dilated fluid-filled small bowel loops with decompressed mid and distal ileum  suggestive of small bowel obstruction.   This report was finalized on 3/7/2024 12:05 PM by Dr. Ruddy Blanco MD.      CT Head Without Contrast    Result Date: 3/7/2024    Unremarkable exam demonstrating no CT evidence of acute intracranial findings.   This report was finalized on 3/7/2024 12:04 PM by Dr. Ruddy Blanco MD.      XR Chest AP    Result Date: 2/14/2024  Port placed. Tip in the superior vena cava. No evidence of pneumothorax    This report was finalized on 2/14/2024 8:38 AM by Dr. Edmond Vasquez MD.     I have personally looked at the radiology images and read the final radiology report.    Assessment & Plan      Patient is a 53-year-old female with history significant for stage III colon cancer status post resection with recurrence, currently on chemotherapy who presented to the ER with reports of dizziness, abdominal pain and nausea with emesis.     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  Patient reported no normal bowel movement for several days leading up to admission but did have a few episodes of diarrhea with last bowel movement 1 day PTA.  Initially in significant amount of pain which improved with IV fluids/antiemetics/opiates.  --Patient was diagnosed with stage III colon cancer in March 2023, underwent resection by Dr. Martinez in [place] with recommendations for adjuvant chemotherapy at that time but she declined.  Symptoms returned with repeat scans noting concerns for liver, omental and pulmonary metastases and at this time agreed to start chemotherapy.  She has received 2 rounds of chemotherapy, last dose on 3/5/2024.  --Chemotherapy regimen: Leucovorin,  fluorouracil  --Admission labs showed WBC 1.63 with absolute neutrophils of 1.07, lactic WNL  --Blood cultures x 2  --CT abdomen pelvis without contrast noted dilated fluid-filled small bowel loops with decompressed mid/distal ileum suggestive of SBO  --IV opiates as needed, antiemetics  --Neutropenic precaution isolation  --N.p.o., NG tube to low intermittent wall suction for decompression  --Start LR to 100 cc an hour  --Start empiric Zosyn for intra-abdominal coverage given immunosuppressive state, likely can de-escalate quickly pending further workup as infectious etiology is felt to be unlikely at this point  --Discussed with general surgery, NG tube placed per above, CEA and prealbumin ordered-will proceed with CT abdomen/pelvis with IV/p.o. water-soluble contrast tomorrow along with CT chest with contrast for further evaluation  --General surgery consulted, appreciate recommendations  --Hematology/oncology courtesy consulted, appreciate assistance  --[facility]    Checklist:  Antibiotics: zosyn  Steroids: none  DVT ppx: lovenox  GI ppx: ppi  Diet: NPO  Code: CPR, full  Risk/dispo: Patient is a high risk due to acute small bowel obstruction likely due to metastases/omental mets.  Anticipate greater than 2 midnight stay.  Disposition expected Home when medically stable.    Palmer Payne DO  [facility]  03/07/24  15:03 EST      Electronically signed by Palmer Payne DO at 03/07/24 1805          Emergency Department Notes        Hardik Gann RN at 03/07/24 1633          Called to give report dj asked if he could call me right back     Electronically signed by Hardik Gann RN at 03/07/24 1633       Tavo David DO at 03/07/24 1503          Subjective   History of Present Illness  53-year-old female with past medical history of colon cancer with previous resection presents to the ER due to concerns for increasing dizziness and nausea after IV infusion with  chemotherapy.  Patient received IV fluids with outpatient oncology with no improvement in symptoms.  Patient was counseled to report to the ER.  Patient noted worsening nausea but dizziness has improved.  No chest pain.  No headache.  No focal neurological deficits or vision changes.  Vital stable.  Afebrile      Review of Systems   Gastrointestinal:  Positive for nausea.   Neurological:  Positive for dizziness.   All other systems reviewed and are negative.      Past Medical History:   Diagnosis Date    Gallbladder attack     GERD (gastroesophageal reflux disease)     Hearing aid worn     History of ear infections     Malignant neoplasm of sigmoid colon 3/14/2023    Seasonal allergies     Wears glasses        Allergies   Allergen Reactions    Keflex [Cephalexin] Nausea Only     Beta lactam allergy details  Antibiotic reaction: nausea  Age at reaction: adult  Dose to reaction time: (!) minutes  Reason for antibiotic: unknown  Epinephrine required for reaction?: no  Tolerated antibiotics: augmentin, amoxicillin          Past Surgical History:   Procedure Laterality Date    CHOLECYSTECTOMY      COLON RESECTION N/A 3/14/2023    Procedure: COLON RESECTION LOW ANTERIOR LAPAROSCOPIC WITH DAVINCI ROBOT;  Surgeon: Shari Aguirre MD;  Location: [facility] OR;  Service: Robotics - DaVinci;  Laterality: N/A;    COLONOSCOPY N/A 2/15/2023    Procedure: COLONOSCOPY FOR SCREENING;  Surgeon: Giselle Iniguez MD;  Location: [facility] OR;  Service: Gastroenterology;  Laterality: N/A;    ENDOSCOPY N/A 2/15/2023    Procedure: ESOPHAGOGASTRODUODENOSCOPY WITH BIOPSY;  Surgeon: Giselle Iniguez MD;  Location: [facility] OR;  Service: Gastroenterology;  Laterality: N/A;    LAPAROSCOPIC TUBAL LIGATION Bilateral     MIDDLE EAR SURGERY      PORTACATH PLACEMENT N/A 2/14/2024    Procedure: INSERTION OF PORTACATH;  Surgeon: Jaylen Leal MD;  Location: [facility] OR;  Service: General;  Laterality: N/A;       Family History   Problem  Relation Age of Onset    Cancer Mother     Cancer Father     Cancer Sister     Cancer Brother     Cancer Maternal Grandmother     Cancer Maternal Grandfather     Breast cancer Neg Hx        Social History     Socioeconomic History    Marital status:    Tobacco Use    Smoking status: Former     Current packs/day: 0.00     Average packs/day: 1 pack/day for 30.0 years (30.0 ttl pk-yrs)     Types: Cigarettes     Start date:      Quit date: 2017     Years since quittin.1     Passive exposure: Never    Smokeless tobacco: Never   Vaping Use    Vaping status: Every Day    Substances: Nicotine, Flavoring    Devices: Refillable tank   Substance and Sexual Activity    Alcohol use: Never    Drug use: Defer    Sexual activity: Defer     Birth control/protection: None           Objective   Physical Exam  Constitutional:       General: She is not in acute distress.     Appearance: Normal appearance. She is not ill-appearing.   HENT:      Head: Normocephalic and atraumatic.      Right Ear: External ear normal.      Left Ear: External ear normal.      Nose: Nose normal.      Mouth/Throat:      Mouth: Mucous membranes are moist.   Eyes:      Extraocular Movements: Extraocular movements intact.      Pupils: Pupils are equal, round, and reactive to light.   Cardiovascular:      Rate and Rhythm: Normal rate and regular rhythm.      Heart sounds: No murmur heard.  Pulmonary:      Effort: Pulmonary effort is normal. No respiratory distress.      Breath sounds: Normal breath sounds. No wheezing.   Abdominal:      General: Bowel sounds are normal. There is distension.      Palpations: Abdomen is soft.      Tenderness: There is generalized abdominal tenderness.   Musculoskeletal:         General: No deformity or signs of injury. Normal range of motion.      Cervical back: Normal range of motion and neck supple.   Skin:     General: Skin is warm and dry.      Findings: No erythema.   Neurological:      General: No focal deficit  present.      Mental Status: She is alert and oriented to person, place, and time. Mental status is at baseline.      Cranial Nerves: No cranial nerve deficit.   Psychiatric:         Mood and Affect: Mood normal.         Behavior: Behavior normal.         Thought Content: Thought content normal.         Procedures          ED Course  ED Course as of 03/07/24 1506   Thu Mar 07, 2024   1220 EKG notes sinus tachycardia.  102 bpm.  No acute ST elevation.  QTc 414.  Electronically signed by Tavo David DO, 03/07/24, 12:20 PM EST.   [SF]      ED Course User Index  [SF] Tavo David DO      XR Chest 1 View    Result Date: 3/7/2024    No acute cardiopulmonary findings identified.   This report was finalized on 3/7/2024 12:25 PM by Dr. Ruddy Blanco MD.      CT Abdomen Pelvis Without Contrast    Result Date: 3/7/2024    Dilated fluid-filled small bowel loops with decompressed mid and distal ileum suggestive of small bowel obstruction.   This report was finalized on 3/7/2024 12:05 PM by Dr. Ruddy Blanco MD.      CT Head Without Contrast    Result Date: 3/7/2024    Unremarkable exam demonstrating no CT evidence of acute intracranial findings.   This report was finalized on 3/7/2024 12:04 PM by Dr. Ruddy Blanco MD.         Results for orders placed or performed during the hospital encounter of 03/07/24   Comprehensive Metabolic Panel    Specimen: Blood   Result Value Ref Range    Glucose 113 (H) 65 - 99 mg/dL    BUN 19 6 - 20 mg/dL    Creatinine 0.59 0.57 - 1.00 mg/dL    Sodium 134 (L) 136 - 145 mmol/L    Potassium 3.6 3.5 - 5.2 mmol/L    Chloride 99 98 - 107 mmol/L    CO2 23.9 22.0 - 29.0 mmol/L    Calcium 8.2 (L) 8.6 - 10.5 mg/dL    Total Protein 5.1 (L) 6.0 - 8.5 g/dL    Albumin 3.4 (L) 3.5 - 5.2 g/dL    ALT (SGPT) 15 1 - 33 U/L    AST (SGOT) 17 1 - 32 U/L    Alkaline Phosphatase 103 39 - 117 U/L    Total Bilirubin 0.9 0.0 - 1.2 mg/dL    Globulin 1.7 gm/dL    A/G Ratio 2.0 g/dL    BUN/Creatinine Ratio 32.2 (H) 7.0  - 25.0    Anion Gap 11.1 5.0 - 15.0 mmol/L    eGFR 108.6 >60.0 mL/min/1.73   High Sensitivity Troponin T    Specimen: Blood   Result Value Ref Range    HS Troponin T <6 <14 ng/L   CBC Auto Differential    Specimen: Blood   Result Value Ref Range    WBC 1.63 (C) 3.40 - 10.80 10*3/mm3    RBC 3.83 3.77 - 5.28 10*6/mm3    Hemoglobin 10.8 (L) 12.0 - 15.9 g/dL    Hematocrit 35.2 34.0 - 46.6 %    MCV 91.9 79.0 - 97.0 fL    MCH 28.2 26.6 - 33.0 pg    MCHC 30.7 (L) 31.5 - 35.7 g/dL    RDW 21.6 (H) 12.3 - 15.4 %    RDW-SD 70.7 (H) 37.0 - 54.0 fl    MPV 10.0 6.0 - 12.0 fL    Platelets 229 140 - 450 10*3/mm3    Neutrophil % 65.7 42.7 - 76.0 %    Lymphocyte % 25.2 19.6 - 45.3 %    Monocyte % 6.7 5.0 - 12.0 %    Eosinophil % 1.2 0.3 - 6.2 %    Basophil % 0.6 0.0 - 1.5 %    Immature Grans % 0.6 (H) 0.0 - 0.5 %    Neutrophils, Absolute 1.07 (L) 1.70 - 7.00 10*3/mm3    Lymphocytes, Absolute 0.41 (L) 0.70 - 3.10 10*3/mm3    Monocytes, Absolute 0.11 0.10 - 0.90 10*3/mm3    Eosinophils, Absolute 0.02 0.00 - 0.40 10*3/mm3    Basophils, Absolute 0.01 0.00 - 0.20 10*3/mm3    Immature Grans, Absolute 0.01 0.00 - 0.05 10*3/mm3    nRBC 0.0 0.0 - 0.2 /100 WBC   Scan Slide    Specimen: Blood   Result Value Ref Range    Anisocytosis Large/3+ None Seen    Hypochromia Slight/1+ None Seen    Ovalocytes Slight/1+ None Seen    Poikilocytes Slight/1+ None Seen    Platelet Morphology Normal Normal   High Sensitivity Troponin T 2Hr    Specimen: Blood   Result Value Ref Range    HS Troponin T 9 <14 ng/L    Troponin T Delta     Urinalysis With Culture If Indicated - Urine, Clean Catch    Specimen: Urine, Clean Catch   Result Value Ref Range    Color, UA Dark Yellow (A) Yellow, Straw    Appearance, UA Cloudy (A) Clear    pH, UA 5.5 5.0 - 8.0    Specific Gravity, UA >1.030 (H) 1.005 - 1.030    Glucose, UA Negative Negative    Ketones, UA 15 mg/dL (1+) (A) Negative    Bilirubin, UA Negative Negative    Blood, UA Negative Negative    Protein, UA Trace (A)  Negative    Leuk Esterase, UA Negative Negative    Nitrite, UA Negative Negative    Urobilinogen, UA 0.2 E.U./dL 0.2 - 1.0 E.U./dL   ECG 12 Lead Chest Pain   Result Value Ref Range    QT Interval 318 ms    QTC Interval 414 ms   Green Top (Gel)   Result Value Ref Range    Extra Tube Hold for add-ons.    Lavender Top   Result Value Ref Range    Extra Tube hold for add-on    Gold Top - SST   Result Value Ref Range    Extra Tube Hold for add-ons.    Light Blue Top   Result Value Ref Range    Extra Tube Hold for add-ons.                                             Medical Decision Making  CBC notes leukopenia.  Likely from recent chemotherapy.  CMP listed.  Symptoms improved with meclizine.  CT of the head without contrast revealed no acute abnormality.  CT imaging of the abdomen and pelvis obtained due to slight distention and tenderness.  Concerns for small bowel obstruction noted by radiology.  Surgery consulted.  Recommend admission for further work up and treatment.  Hospitalist team consulted and made aware of the patient.  Consults and orders placed per hospitalist request.  Patient was agreeable to admission plan.  Vitals stable on admission.    Problems Addressed:  Small bowel obstruction: complicated acute illness or injury    Amount and/or Complexity of Data Reviewed  Labs: ordered. Decision-making details documented in ED Course.  Radiology: ordered. Decision-making details documented in ED Course.  ECG/medicine tests: ordered.    Risk  Prescription drug management.        Final diagnoses:   Small bowel obstruction   Chemotherapy-induced neutropenia       ED Disposition  ED Disposition       ED Disposition   Intended Admit    Condition   --    Comment   --               No follow-up provider specified.       Medication List      No changes were made to your prescriptions during this visit.            Tavo David DO  03/07/24 1502      Electronically signed by Tavo David DO at 03/07/24 3262        Clover Santiago at 03/07/24 1150          EKG done @ 1150 and shown to Dr. David @ 1151.    Electronically signed by Cloevr Santiago at 03/07/24 1157       Facility-Administered Medications as of 3/8/2024   Medication Dose Route Frequency Provider Last Rate Last Admin    acetaminophen (TYLENOL) tablet 650 mg  650 mg Oral Q4H PRN Palmer Payne DO        Enoxaparin Sodium (LOVENOX) syringe 40 mg  40 mg Subcutaneous Nightly Palmer Payne DO   40 mg at 03/07/24 2023    [COMPLETED] famotidine (PEPCID) injection 20 mg  20 mg Intravenous Once Jesusita Osborn APRN   20 mg at 03/07/24 1012    HYDROmorphone (DILAUDID) injection 0.5 mg  0.5 mg Intravenous Q2H PRN Palmer Payne DO   0.5 mg at 03/08/24 0627    lactated ringers infusion  100 mL/hr Intravenous Continuous Palmer Payne  mL/hr at 03/08/24 0239 100 mL/hr at 03/08/24 0239    lidocaine (LMX) 4 % cream   Topical PRN Palmer Payne DO        [COMPLETED] meclizine (ANTIVERT) tablet 25 mg  25 mg Oral Once Tavo David DO   25 mg at 03/07/24 1220    morphine injection 2 mg  2 mg Intravenous Q3H PRN Palmer aPyne DO        mupirocin (BACTROBAN) 2 % nasal ointment 1 Application  1 application  Each Nare BID Behbahani, Katayoun, MD        ondansetron (ZOFRAN) injection 4 mg  4 mg Intravenous Q6H PRN Palmer Payne DO        pantoprazole (PROTONIX) injection 40 mg  40 mg Intravenous QAM AC Palmer Payne DO   40 mg at 03/07/24 2023    [COMPLETED] piperacillin-tazobactam (ZOSYN) IVPB 3.375 g IVPB in 100 mL NS (VTB)  3.375 g Intravenous Once Tavo David DO   3.375 g at 03/07/24 1530    piperacillin-tazobactam (ZOSYN) IVPB 3.375 g IVPB in 100 mL NS (VTB)  3.375 g Intravenous Q8H Palmer Payne DO   3.375 g at 03/08/24 0620    [COMPLETED] prochlorperazine (COMPAZINE) injection 10 mg  10 mg Intravenous Once Jesusita Osborn APRN   10 mg at  03/07/24 1014    prochlorperazine (COMPAZINE) injection 10 mg  10 mg Intravenous Q6H PRN Palmer Payne DO        [COMPLETED] sodium chloride 0.9 % bolus 1,000 mL  1,000 mL Intravenous Once Jesusita Osborn, APRN   Stopped at 03/07/24 1100    sodium chloride 0.9 % flush 10 mL  10 mL Intravenous PRN Palmer Payne,         sodium chloride 0.9 % flush 10 mL  10 mL Intravenous Q12H Palmer Payne,    10 mL at 03/07/24 2024    sodium chloride 0.9 % flush 10 mL  10 mL Intravenous PRN Palmer Payne,    10 mL at 03/07/24 2024    sodium chloride 0.9 % infusion 40 mL  40 mL Intravenous PRN Palmer Payne DO         Orders (all)        Start     Ordered    03/08/24 0900  mupirocin (BACTROBAN) 2 % nasal ointment 1 Application  2 Times Daily         03/08/24 0701    03/08/24 0702  Discontinue mupirocin for decolonization after 5 days or sooner if patient no longer has a central venous access device, accessed port, hemodialysis catheter, or midline  Continuous         03/08/24 0701    03/08/24 0700  CT Abdomen Pelvis With Contrast  1 Time Imaging        Comments: Water soluble contrast via NGT    03/07/24 1827    03/08/24 0700  CT Chest With Contrast Diagnostic  1 Time Imaging         03/07/24 1827    03/08/24 0600  CBC & Differential  Daily       03/07/24 2152 03/08/24 0600  Basic Metabolic Panel  Daily       03/07/24 2152    03/08/24 0600  CBC Auto Differential  PROCEDURE ONCE         03/07/24 2202 03/08/24 0315  Initiate & Follow Hypercapnic Monitoring Guideline for Opioid Administration via EtCO2 and / or SpO2  Continuous        Comments: Follow Hypercapnic Monitoring Guideline As Outlined in Process Instructions (Open Order Report to View Full Instructions)    03/08/24 0314 03/08/24 0315  Opioid Administration - Document EtCO2 and / or SpO2 With Each Set of Vitals & Any Change in Patient Status  Per Order Details        Comments: With Each Set of  Vitals & Any Change in Patient Status    03/08/24 0314 03/08/24 0315  Opioid Administration - Notify Provider Hypercapnic Monitoring  Continuous        Comments: Open Order Report to View Parameters Requiring Provider Notification    03/08/24 0314 03/08/24 0315  Opioid Administration - Continuous Pulse Oximetry (SpO2)  Continuous         03/08/24 0314 03/08/24 0301  Manual Differential  Once         03/08/24 0300    03/07/24 2200  piperacillin-tazobactam (ZOSYN) IVPB 3.375 g IVPB in 100 mL NS (VTB)  Every 8 Hours         03/07/24 1745    03/07/24 2100  sodium chloride 0.9 % flush 10 mL  Every 12 Hours Scheduled         03/07/24 1745    03/07/24 2100  Enoxaparin Sodium (LOVENOX) syringe 40 mg  Nightly         03/07/24 1745    03/07/24 2000  Vital Signs  Every 4 Hours       03/07/24 1745    03/07/24 1900  pantoprazole (PROTONIX) injection 40 mg  Every Morning Before Breakfast         03/07/24 1800    03/07/24 1845  lactated ringers infusion  Continuous         03/07/24 1745    03/07/24 1828  Iron Profile  Once         03/07/24 1827    03/07/24 1828  Ferritin  Once         03/07/24 1827    03/07/24 1827  lidocaine (LMX) 4 % cream  As Needed         03/07/24 1827    03/07/24 1827  HYDROmorphone (DILAUDID) injection 0.5 mg  Every 2 Hours PRN         03/07/24 1827    03/07/24 1800  Oral Care  2 Times Daily       03/07/24 1745    03/07/24 1800  Incentive Spirometry  Every 4 Hours While Awake       03/07/24 1745    03/07/24 1746  Intake & Output  Every Shift       03/07/24 1745    03/07/24 1746  Weigh Patient  Once         03/07/24 1745    03/07/24 1746  Insert Peripheral IV  Once         03/07/24 1745    03/07/24 1746  Saline Lock & Maintain IV Access  Continuous         03/07/24 1745    03/07/24 1746  Activity - Ad Christel  Until Discontinued         03/07/24 1745    03/07/24 1746  NPO Diet NPO Type: Strict NPO  Diet Effective Now         03/07/24 1745    03/07/24 1746  Inpatient General Surgery Consult  Once         Specialty:  General Surgery  Provider:  Jaylen Leal MD    03/07/24 1745    03/07/24 1746  Bowel Regimen Not Indicated  Once         03/07/24 1745    03/07/24 1746  Inpatient Hematology & Oncology Consult  Once        Specialty:  Hematology and Oncology  Provider:  ORWENA Hanks MD    03/07/24 1745    03/07/24 1746  PT Consult: Eval & Treat Functional Mobility Below Baseline  Once         03/07/24 1745    03/07/24 1746  OT Consult: Eval & Treat ADL Performance Below Baseline  Once         03/07/24 1745    03/07/24 1746  Low Wall Suction  Continuous         03/07/24 1745    03/07/24 1746  Discontinue Cardiac Monitoring  Once         03/07/24 1745    03/07/24 1746  Patient Isolation Neutropenic  Continuous         03/07/24 1745    03/07/24 1745  sodium chloride 0.9 % flush 10 mL  As Needed         03/07/24 1745    03/07/24 1745  sodium chloride 0.9 % infusion 40 mL  As Needed         03/07/24 1745    03/07/24 1745  acetaminophen (TYLENOL) tablet 650 mg  Every 4 Hours PRN         03/07/24 1745    03/07/24 1745  ondansetron (ZOFRAN) injection 4 mg  Every 6 Hours PRN         03/07/24 1745    03/07/24 1745  prochlorperazine (COMPAZINE) injection 10 mg  Every 6 Hours PRN         03/07/24 1745    03/07/24 1745  morphine injection 2 mg  Every 3 Hours PRN         03/07/24 1745    03/07/24 1709  Flush clear port of NG with 60 cc of water , followed by 60 cc of air into blue port, every 6 hours Continuous low wall suction after Monitor NG I/Os  Nursing Communication  Per Order Details        Comments: Flush clear port of NG with 60 cc of water , followed by 60 cc of air into blue port, every 6 hours  Continuous low wall suction after  Monitor NG I/Os    03/07/24 1708    03/07/24 1632  XR Chest AP  1 Time Imaging         03/07/24 1631    03/07/24 1547  Inpatient Admission  Once         03/07/24 1553    03/07/24 1547  Code Status and Medical Interventions:  Continuous         03/07/24 1553    03/07/24 1546  Prealbumin  " Once         03/07/24 1545    03/07/24 1520  CEA  Once         03/07/24 1519    03/07/24 1519  piperacillin-tazobactam (ZOSYN) IVPB 3.375 g IVPB in 100 mL NS (VTB)  Once         03/07/24 1503    03/07/24 1519  Nasogastric tube insertion  Once         03/07/24 1518    03/07/24 1504  Blood Culture - Blood, Hand, Left  Once        Placed in \"And\" Linked Group    03/07/24 1503    03/07/24 1504  Blood Culture - Blood, Arm, Left  Once        Placed in \"And\" Linked Group    03/07/24 1503    03/07/24 1502  Lactic Acid, Plasma  STAT         03/07/24 1501    03/07/24 1348  High Sensitivity Troponin T 2Hr  PROCEDURE ONCE         03/07/24 1229    03/07/24 1316  Urinalysis With Culture If Indicated - Urine, Clean Catch  Once         03/07/24 1315    03/07/24 1224  Scan Slide  Once         03/07/24 1223    03/07/24 1155  meclizine (ANTIVERT) tablet 25 mg  Once         03/07/24 1139    03/07/24 1140  CT Abdomen Pelvis Without Contrast  1 Time Imaging         03/07/24 1139    03/07/24 1139  Cardiac Monitoring  Continuous,   Status:  Canceled        Comments: Follow Standing Orders As Outlined in Process Instructions (Open Order Report to View Full Instructions)    03/07/24 1139    03/07/24 1139  Continuous Pulse Oximetry  Continuous,   Status:  Canceled         03/07/24 1139    03/07/24 1139  Vital Signs  Per Hospital Policy         03/07/24 1139    03/07/24 1139  ECG 12 Lead Chest Pain  Once         03/07/24 1139    03/07/24 1139  XR Chest 1 View  1 Time Imaging         03/07/24 1139    03/07/24 1139  Insert Peripheral IV  Once         03/07/24 1139    03/07/24 1139  Seanor Draw  Once         03/07/24 1139    03/07/24 1139  CBC & Differential  Once         03/07/24 1139    03/07/24 1139  Comprehensive Metabolic Panel  Once         03/07/24 1139    03/07/24 1139  High Sensitivity Troponin T  Once         03/07/24 1139    03/07/24 1139  CT Head Without Contrast  1 Time Imaging         03/07/24 1139    03/07/24 1139  Green Top " (Gel)  PROCEDURE ONCE         03/07/24 1139    03/07/24 1139  Lavender Top  PROCEDURE ONCE         03/07/24 1139    03/07/24 1139  Gold Top - SST  PROCEDURE ONCE         03/07/24 1139    03/07/24 1139  Light Blue Top  PROCEDURE ONCE         03/07/24 1139    03/07/24 1139  CBC Auto Differential  PROCEDURE ONCE         03/07/24 1139    03/07/24 1138  sodium chloride 0.9 % flush 10 mL  As Needed         03/07/24 1139    Unscheduled  Oxygen Therapy- Nasal Cannula; Titrate 1-6 LPM Per SpO2; 90 - 95%  Continuous PRN       03/07/24 1139    Unscheduled  ECG 12 Lead Chest Pain  As Needed      Comments: Persistent, Unrelieved or Recurrent Chest Pain    03/07/24 1139    --  HYDROcodone-acetaminophen (NORCO) 5-325 MG per tablet  Every 8 Hours PRN         03/07/24 2125    --  lactulose (CHRONULAC) 10 GM/15ML solution  2 Times Daily PRN         03/07/24 2125    --  metoclopramide (REGLAN) 10 MG tablet  3 Times Daily PRN         03/07/24 2125    --  ondansetron (ZOFRAN) 8 MG tablet  Every 8 Hours PRN         03/07/24 2125    --  pantoprazole (PROTONIX) 40 MG EC tablet  Daily         03/07/24 2125    --  prochlorperazine (COMPAZINE) 10 MG tablet  Every 6 Hours PRN         03/07/24 2125    --  sennosides-docusate (PERICOLACE) 8.6-50 MG per tablet  2 Times Daily PRN         03/07/24 2125                  Physician Progress Notes (all)    No notes of this type exist for this encounter.       Consult Notes (all)    No notes of this type exist for this encounter.

## 2024-03-08 NOTE — PLAN OF CARE
Goal Outcome Evaluation:              Outcome Evaluation: Pt seen for PT evaluation, pleasant and cooperative with therapy. Pt does not demonstrate need for therapeutic intervention at this time, may benefit from HEP for maintenance of general strengthening.      Anticipated Discharge Disposition (PT): home with assist, home with supervision

## 2024-03-08 NOTE — CONSULTS
Patient Name:  Vangie Carney  YOB: 1970  [id number]       Patient Care Team:  Vimal Moreno MD as PCP - General (Family Medicine)  Francine Garcia MSW as       General Surgery Consult Note     Date of Consultation: 03/08/24    Consulting Physician - Behbahani, Katayoun, MD    Reason for Consult - SBO    Subjective     I have been asked to see  Vangie Carney , a 53 y.o. female with prior history of Robotic LAR for stage IIIB rectosigmoid adenocarcinoma but declined adjuvant therapy in consultation for SBO.     Patient reports recent issues with constipation that were preceded by loose stool. A port was placed by me in mid-February. She has undergone two chemotherapy sessions (most recent Tues-Thurs). This has been associated with nausea and vomiting, attributed to the chemotherapy.   She reports having a small liquid stool the morning of presentation      Allergy:   Allergies   Allergen Reactions    Keflex [Cephalexin] Nausea Only     Beta lactam allergy details  Antibiotic reaction: nausea  Age at reaction: adult  Dose to reaction time: (!) minutes  Reason for antibiotic: unknown  Epinephrine required for reaction?: no  Tolerated antibiotics: augmentin, amoxicillin          Medications:  enoxaparin, 40 mg, Subcutaneous, Nightly  mupirocin, 1 application , Each Nare, BID  pantoprazole, 40 mg, Intravenous, QAM AC  piperacillin-tazobactam, 3.375 g, Intravenous, Q8H  sodium chloride, 10 mL, Intravenous, Q12H      lactated ringers, 100 mL/hr, Last Rate: 100 mL/hr (03/08/24 0239)      Current Facility-Administered Medications on File Prior to Encounter   Medication    [COMPLETED] famotidine (PEPCID) injection 20 mg    [COMPLETED] prochlorperazine (COMPAZINE) injection 10 mg    [COMPLETED] sodium chloride 0.9 % bolus 1,000 mL    [DISCONTINUED] heparin injection 500 Units    [DISCONTINUED] sodium chloride 0.9 % flush 10 mL     Current Outpatient Medications on File Prior to  "Encounter   Medication Sig    HYDROcodone-acetaminophen (NORCO) 5-325 MG per tablet Take 1-2 tablets by mouth Every 8 (Eight) Hours As Needed for Mild Pain.    lactulose (CHRONULAC) 10 GM/15ML solution Take 30 mL by mouth 2 (Two) Times a Day As Needed (for constipation).    metoclopramide (REGLAN) 10 MG tablet Take 1 tablet by mouth 3 (Three) Times a Day As Needed (for nausea).    ondansetron (ZOFRAN) 8 MG tablet Take 1 tablet by mouth Every 8 (Eight) Hours As Needed for Nausea or Vomiting.    pantoprazole (PROTONIX) 40 MG EC tablet Take 1 tablet by mouth Daily.    prochlorperazine (COMPAZINE) 10 MG tablet Take 1 tablet by mouth Every 6 (Six) Hours As Needed for Nausea or Vomiting.    sennosides-docusate (PERICOLACE) 8.6-50 MG per tablet Take 2 tablets by mouth 2 (Two) Times a Day As Needed for Constipation.       PMHx:   Past Medical History:   Diagnosis Date    Gallbladder attack     GERD (gastroesophageal reflux disease)     Hearing aid worn     History of ear infections     Malignant neoplasm of sigmoid colon 3/14/2023    Seasonal allergies     Wears glasses          Past Surgical History:  Robotic LAR  Right subclavian port    Family History:    Cancer Mother      Cancer Father      Cancer Sister      Cancer Brother      Cancer Maternal Grandmother      Cancer Maternal Grandfather      Breast cancer Neg Hx        Social History: Former smoker     Review of Systems   14 pt ROS negative except for what is noted in HPI             Objective     Physical Exam:      Vital Signs  /63 (BP Location: Left arm, Patient Position: Lying)   Pulse 88   Temp 98.7 °F (37.1 °C) (Oral)   Resp 16   Ht 170.2 cm (67\")   Wt 70.9 kg (156 lb 4.8 oz)   LMP 06/15/2016   SpO2 96%   BMI 24.48 kg/m²     Intake/Output Summary (Last 24 hours) at 3/8/2024 0905  Last data filed at 3/8/2024 0828  Gross per 24 hour   Intake 3000 ml   Output --   Net 3000 ml         Physical Exam:      03/07/24  1105 03/07/24  1809   Weight: 71.2 kg " (157 lb) 70.9 kg (156 lb 4.8 oz)    Body mass index is 24.48 kg/m².  Constitution: No acute distress.   Head: Normocephalic, atraumatic.   Eyes: Aligned without strabismus. Conjunctiva noninjected   Ears, Nose, Mouth: , No lesions appreciated   Respiratory: Symmetric chest expansion. No respiratory distress.   Gastrointestinal:  soft, ND, NT  Skin:  No cyanosis, clubbing or edema bilaterally    Lymphatics: No abnormal cervical or supraclavicular adenopathy  Neurologic: No gross deficits.  Alert and oriented x3  Psychiatric: Normal mood        Results Review: I have personally reviewed all of the recent lab and imaging results available at this time.     CT with IV contrast with dilated loops of small bowel proximally with distal decompression. Multiple intra-abdominal nodules consistent with known metastatic disease    CEA 1.59        Assessment and Plan:    53 y.o. female with Stage IV colonic adenocarcinoma with intra-abdominal metastases. Likely malignant bowel obstruction    -NG placed in ED  -CT chest/abd/pelvis with IV and oral water soluble contrast today  -CEA  -Prealbumin    Jaylen Leal MD  [facility] Surgery  03/08/24  09:05 EST

## 2024-03-08 NOTE — PLAN OF CARE
Goal Outcome Evaluation:               Pt resting in bed at this time. Pt ambulated in room and to restroom this shift. Family at bedside. No other complaints at this time.

## 2024-03-08 NOTE — THERAPY DISCHARGE NOTE
Acute Care - Physical Therapy Initial Evaluation/Discharge   [place]     Patient Name: Vangie Carney  : 1970  MRN: [id number]  Today's Date: 3/8/2024   Onset of Illness/Injury or Date of Surgery: 24            Admit Date: 3/7/2024    Visit Dx:    ICD-10-CM ICD-9-CM   1. Small bowel obstruction  K56.609 560.9   2. Chemotherapy-induced neutropenia  D70.1 288.03    T45.1X5A E933.1     Patient Active Problem List   Diagnosis    Chronic idiopathic constipation    Encounter for screening for malignant neoplasm of colon    Malignant neoplasm of sigmoid colon    GERD without esophagitis    S/P colectomy( robotic lap LAR)    Acute postoperative pain    Iron deficiency anemia    Malabsorption of iron    Port-A-Cath in place    SBO (small bowel obstruction)     Past Medical History:   Diagnosis Date    Gallbladder attack     GERD (gastroesophageal reflux disease)     Hearing aid worn     History of ear infections     Malignant neoplasm of sigmoid colon 3/14/2023    Seasonal allergies     Wears glasses      Past Surgical History:   Procedure Laterality Date    CHOLECYSTECTOMY      COLON RESECTION N/A 3/14/2023    Procedure: COLON RESECTION LOW ANTERIOR LAPAROSCOPIC WITH DAVINCI ROBOT;  Surgeon: Shari Aguirre MD;  Location: [facility] OR;  Service: Robotics - DaVinci;  Laterality: N/A;    COLONOSCOPY N/A 2/15/2023    Procedure: COLONOSCOPY FOR SCREENING;  Surgeon: Giselle Iniguez MD;  Location: [facility] OR;  Service: Gastroenterology;  Laterality: N/A;    ENDOSCOPY N/A 2/15/2023    Procedure: ESOPHAGOGASTRODUODENOSCOPY WITH BIOPSY;  Surgeon: Giselle Iniguez MD;  Location: [facility] OR;  Service: Gastroenterology;  Laterality: N/A;    LAPAROSCOPIC TUBAL LIGATION Bilateral     MIDDLE EAR SURGERY      PORTACATH PLACEMENT N/A 2024    Procedure: INSERTION OF PORTACATH;  Surgeon: Jaylen Leal MD;  Location: [facility] OR;  Service: General;  Laterality: N/A;       PT Assessment (Last 12 Hours)        PT Evaluation and Treatment       Row Name 03/08/24 0945          Physical Therapy Time and Intention    Document Type evaluation;discharge evaluation/summary  -     Mode of Treatment physical therapy  -     Patient Effort good  -     Comment Pt seen for evaluation this date, pleasant and cooperative with therapy. Pt grossly (I) PLOF with supportive  and son living with her at home. Pt reports no AD, no falls/balance issues at home.  -       Row Name 03/08/24 0945          General Information    Patient Profile Reviewed yes  -     Onset of Illness/Injury or Date of Surgery 03/07/24  -     Patient Observations alert;cooperative;agree to therapy  -     General Observations of Patient Pt seen supine in hospital bed, pleasant and cooperative with therapy.  -     Prior Level of Function independent:  -     Equipment Currently Used at Home none  -     Existing Precautions/Restrictions --  neutropenic  -       Row Name 03/08/24 0945          Range of Motion Comprehensive    Comment, General Range of Motion AROM observed grossly WFL  -       Row Name 03/08/24 0945          Strength Comprehensive (MMT)    Comment, General Manual Muscle Testing (MMT) Assessment Grossly 5/5, 4/5 hip flexion however functional  -       Row Name 03/08/24 0945          Bed Mobility    Bed Mobility supine-sit  -     Supine-Sit [place] (Bed Mobility) independent  -       Row Name 03/08/24 0945          Transfers    Transfers sit-stand transfer;stand-sit transfer  -       Row Name 03/08/24 0945          Sit-Stand Transfer    Sit-Stand [place] (Transfers) independent  -       Row Name 03/08/24 0945          Stand-Sit Transfer    Stand-Sit [place] (Transfers) independent  -       Row Name 03/08/24 0945          Gait/Stairs (Locomotion)    Gait/Stairs Locomotion gait/ambulation independence  -     [place] Level (Gait) independent  -     Patient was able to Ambulate yes  -      Distance in Feet (Gait) 2-3'  -       Row Name 03/08/24 0945          Plan of Care Review    Outcome Evaluation Pt seen for PT evaluation, pleasant and cooperative with therapy. Pt does not demonstrate need for therapeutic intervention at this time, may benefit from HEP for maintenance of general strengthening.  -       Row Name 03/08/24 0945          Positioning and Restraints    Pre-Treatment Position in bed  -     Post Treatment Position chair  -     In Chair sitting;call light within reach  -       Row Name 03/08/24 0945          Therapy Assessment/Plan (PT)    Therapy Frequency (PT) evaluation only  -       Row Name 03/08/24 0945          Discharge Summary (PT)    Additional Documentation Discharge Summary (PT) (Group)  -       Row Name 03/08/24 0945          Discharge Summary (PT)    Reason for Discharge (PT) no further needs identified  -     Outcomes Achieved/Progress Made Upon Discharge (PT) evaluation only  -     Transfer to Another Level of Care or Facility (PT) home with assist recommended;home with supervision recommended  -               User Key  (r) = Recorded By, (t) = Taken By, (c) = Cosigned By      Initials Name Provider Type    Reyna Gutierres, PT Physical Therapist                          PT Recommendation and Plan  Anticipated Discharge Disposition (PT): home with assist, home with supervision  Therapy Frequency (PT): evaluation only  Outcome Evaluation: Pt seen for PT evaluation, pleasant and cooperative with therapy. Pt does not demonstrate need for therapeutic intervention at this time, may benefit from HEP for maintenance of general strengthening.         Time Calculation:    PT Charges       Row Name 03/08/24 9262             Time Calculation    Start Time 0945  -      PT Received On 03/08/24  -                User Key  (r) = Recorded By, (t) = Taken By, (c) = Cosigned By      Initials Name Provider Type    Reyna Gutierres, PT Physical Therapist                   Therapy Charges for Today       Code Description Service Date Service Provider Modifiers Qty    [id number] HC PT EVAL LOW COMPLEXITY 4 3/8/2024 Reyna Lara, PT GP 1            PT G-Codes  AM-PAC 6 Clicks Score (PT): 24    PT Discharge Summary  Anticipated Discharge Disposition (PT): home with assist, home with supervision    Reyna Lara, PT  3/8/2024

## 2024-03-08 NOTE — PROGRESS NOTES
Antimicrobial Length of Therapy:  Zosyn 2/7    Thank you,  Sonido Parker  Pharmacy Resident  3/8/2024  13:57 EST

## 2024-03-09 ENCOUNTER — APPOINTMENT (OUTPATIENT)
Dept: GENERAL RADIOLOGY | Facility: HOSPITAL | Age: 54
DRG: 375 | End: 2024-03-09
Payer: COMMERCIAL

## 2024-03-09 LAB
ANION GAP SERPL CALCULATED.3IONS-SCNC: 12.4 MMOL/L (ref 5–15)
BUN SERPL-MCNC: 7 MG/DL (ref 6–20)
BUN/CREAT SERPL: 14 (ref 7–25)
CALCIUM SPEC-SCNC: 8.4 MG/DL (ref 8.6–10.5)
CHLORIDE SERPL-SCNC: 98 MMOL/L (ref 98–107)
CO2 SERPL-SCNC: 23.6 MMOL/L (ref 22–29)
CREAT SERPL-MCNC: 0.5 MG/DL (ref 0.57–1)
EGFRCR SERPLBLD CKD-EPI 2021: 113 ML/MIN/1.73
GLUCOSE SERPL-MCNC: 85 MG/DL (ref 65–99)
MAGNESIUM SERPL-MCNC: 1.9 MG/DL (ref 1.6–2.6)
PHOSPHATE SERPL-MCNC: 2.1 MG/DL (ref 2.5–4.5)
POTASSIUM SERPL-SCNC: 3.6 MMOL/L (ref 3.5–5.2)
SODIUM SERPL-SCNC: 134 MMOL/L (ref 136–145)

## 2024-03-09 PROCEDURE — 25010000002 ENOXAPARIN PER 10 MG: Performed by: STUDENT IN AN ORGANIZED HEALTH CARE EDUCATION/TRAINING PROGRAM

## 2024-03-09 PROCEDURE — 25010000002 HYDROMORPHONE PER 4 MG: Performed by: STUDENT IN AN ORGANIZED HEALTH CARE EDUCATION/TRAINING PROGRAM

## 2024-03-09 PROCEDURE — 99232 SBSQ HOSP IP/OBS MODERATE 35: CPT | Performed by: SURGERY

## 2024-03-09 PROCEDURE — 25010000002 FILGRASTIM PER 480 MCG: Performed by: SURGERY

## 2024-03-09 PROCEDURE — 25810000003 SODIUM CHLORIDE 0.9 % SOLUTION: Performed by: INTERNAL MEDICINE

## 2024-03-09 PROCEDURE — 25810000003 LACTATED RINGERS PER 1000 ML: Performed by: STUDENT IN AN ORGANIZED HEALTH CARE EDUCATION/TRAINING PROGRAM

## 2024-03-09 PROCEDURE — 25810000003 DEXTROSE 5% IN LACTATED RINGERS PER 1000 ML: Performed by: INTERNAL MEDICINE

## 2024-03-09 PROCEDURE — 80048 BASIC METABOLIC PNL TOTAL CA: CPT | Performed by: STUDENT IN AN ORGANIZED HEALTH CARE EDUCATION/TRAINING PROGRAM

## 2024-03-09 PROCEDURE — 74019 RADEX ABDOMEN 2 VIEWS: CPT

## 2024-03-09 PROCEDURE — 84100 ASSAY OF PHOSPHORUS: CPT | Performed by: INTERNAL MEDICINE

## 2024-03-09 PROCEDURE — 83735 ASSAY OF MAGNESIUM: CPT | Performed by: INTERNAL MEDICINE

## 2024-03-09 PROCEDURE — 25010000002 ONDANSETRON PER 1 MG: Performed by: STUDENT IN AN ORGANIZED HEALTH CARE EDUCATION/TRAINING PROGRAM

## 2024-03-09 PROCEDURE — 99232 SBSQ HOSP IP/OBS MODERATE 35: CPT | Performed by: INTERNAL MEDICINE

## 2024-03-09 PROCEDURE — 74019 RADEX ABDOMEN 2 VIEWS: CPT | Performed by: RADIOLOGY

## 2024-03-09 RX ORDER — LIDOCAINE HYDROCHLORIDE 20 MG/ML
5 SOLUTION OROPHARYNGEAL
Status: DISCONTINUED | OUTPATIENT
Start: 2024-03-09 | End: 2024-03-09

## 2024-03-09 RX ORDER — FENTANYL/ROPIVACAINE/NS/PF 2-625MCG/1
15 PLASTIC BAG, INJECTION (ML) EPIDURAL ONCE
Status: COMPLETED | OUTPATIENT
Start: 2024-03-09 | End: 2024-03-09

## 2024-03-09 RX ORDER — DEXTROSE, SODIUM CHLORIDE, SODIUM LACTATE, POTASSIUM CHLORIDE, AND CALCIUM CHLORIDE 5; .6; .31; .03; .02 G/100ML; G/100ML; G/100ML; G/100ML; G/100ML
100 INJECTION, SOLUTION INTRAVENOUS CONTINUOUS
Status: DISCONTINUED | OUTPATIENT
Start: 2024-03-09 | End: 2024-03-11

## 2024-03-09 RX ORDER — LIDOCAINE HYDROCHLORIDE 20 MG/ML
15 SOLUTION OROPHARYNGEAL
Status: DISCONTINUED | OUTPATIENT
Start: 2024-03-09 | End: 2024-03-11 | Stop reason: HOSPADM

## 2024-03-09 RX ORDER — LIDOCAINE HYDROCHLORIDE 20 MG/ML
JELLY TOPICAL AS NEEDED
Status: DISCONTINUED | OUTPATIENT
Start: 2024-03-09 | End: 2024-03-11 | Stop reason: HOSPADM

## 2024-03-09 RX ADMIN — PANTOPRAZOLE SODIUM 40 MG: 40 INJECTION, POWDER, FOR SOLUTION INTRAVENOUS at 08:14

## 2024-03-09 RX ADMIN — ONDANSETRON 4 MG: 2 INJECTION INTRAMUSCULAR; INTRAVENOUS at 20:22

## 2024-03-09 RX ADMIN — Medication 10 ML: at 20:23

## 2024-03-09 RX ADMIN — MUPIROCIN 1 APPLICATION: 20 OINTMENT TOPICAL at 08:14

## 2024-03-09 RX ADMIN — ENOXAPARIN SODIUM 40 MG: 40 INJECTION SUBCUTANEOUS at 20:22

## 2024-03-09 RX ADMIN — FILGRASTIM 480 MCG: 480 INJECTION, SOLUTION INTRAVENOUS; SUBCUTANEOUS at 17:00

## 2024-03-09 RX ADMIN — Medication 1 SPRAY: at 14:50

## 2024-03-09 RX ADMIN — ONDANSETRON 4 MG: 2 INJECTION INTRAMUSCULAR; INTRAVENOUS at 04:24

## 2024-03-09 RX ADMIN — SODIUM CHLORIDE, POTASSIUM CHLORIDE, SODIUM LACTATE AND CALCIUM CHLORIDE 100 ML/HR: 600; 310; 30; 20 INJECTION, SOLUTION INTRAVENOUS at 01:06

## 2024-03-09 RX ADMIN — SODIUM CHLORIDE, SODIUM LACTATE, POTASSIUM CHLORIDE, CALCIUM CHLORIDE AND DEXTROSE MONOHYDRATE 100 ML/HR: 5; 600; 310; 30; 20 INJECTION, SOLUTION INTRAVENOUS at 09:39

## 2024-03-09 RX ADMIN — HYDROMORPHONE HYDROCHLORIDE 0.5 MG: 1 INJECTION, SOLUTION INTRAMUSCULAR; INTRAVENOUS; SUBCUTANEOUS at 09:39

## 2024-03-09 RX ADMIN — MUPIROCIN 1 APPLICATION: 20 OINTMENT TOPICAL at 20:23

## 2024-03-09 RX ADMIN — Medication 10 ML: at 08:14

## 2024-03-09 RX ADMIN — POTASSIUM PHOSPHATE, MONOBASIC AND POTASSIUM PHOSPHATE, DIBASIC 15 MMOL: 224; 236 INJECTION, SOLUTION, CONCENTRATE INTRAVENOUS at 09:39

## 2024-03-09 RX ADMIN — HYDROMORPHONE HYDROCHLORIDE 0.5 MG: 1 INJECTION, SOLUTION INTRAMUSCULAR; INTRAVENOUS; SUBCUTANEOUS at 22:08

## 2024-03-09 NOTE — PROGRESS NOTES
[facility] Medicine Services  PROGRESS NOTE     Patient Identification:  Name:  Vangie Carney  Age:  53 y.o.  Sex:  female  :  1970  MRN:  [id number]  Visit Number:  [id number]  Primary Care Provider:  Vimal Moreno MD    Length of stay:  2    ----------------------------------------------------------------------------------------------------------------------  Subjective     Chief Complaint:  Follow up for partial SBO w N/V     Subjective:  NG tube was clamped last night and clear liquid diet initiated.  Patient did not tolerate as she had nausea and vomiting.  She has not had a bowel movement and her abdomen is slightly distended.  She had some abdominal pain that improved with Dilaudid.  Nausea also improved with Zofran.  She did not respond well to Compazine as it worsened nausea.    ----------------------------------------------------------------------------------------------------------------------  Objective     Current Hospital Meds:  enoxaparin, 40 mg, Subcutaneous, Nightly  mupirocin, 1 application , Each Nare, BID  pantoprazole, 40 mg, Intravenous, QAM AC  sodium chloride, 10 mL, Intravenous, Q12H      dextrose 5 % and lactated Ringer's, 100 mL/hr, Last Rate: 100 mL/hr (24 1007)      ----------------------------------------------------------------------------------------------------------------------  Vital Signs:  Temp:  [98.5 °F (36.9 °C)-98.9 °F (37.2 °C)] 98.9 °F (37.2 °C)  Heart Rate:  [81-96] 87  Resp:  [18] 18  BP: (116-132)/(62-80) 117/67  Mean Arterial Pressure (Non-Invasive) for the past 24 hrs (Last 3 readings):   Noninvasive MAP (mmHg)   24 1122 89   24 0647 93   24 1440 94     SpO2 Percentage    24 0300 24 0647 24 1122   SpO2: 94% 95% 95%     SpO2:  [94 %-96 %] 95 %  on   ;   Device (Oxygen Therapy): room air    Body mass index is 24.48 kg/m².  Wt Readings from Last 3 Encounters:  "  03/07/24 70.9 kg (156 lb 4.8 oz)   03/07/24 71.4 kg (157 lb 4.8 oz)   03/05/24 71.1 kg (156 lb 12.8 oz)        Intake/Output Summary (Last 24 hours) at 3/9/2024 1304  Last data filed at 3/9/2024 0948  Gross per 24 hour   Intake 2413 ml   Output --   Net 2413 ml     Diet: Liquid; Clear Liquid; No Straw, No Carbonated Beverages; Fluid Consistency: Thin (IDDSI 0)  ----------------------------------------------------------------------------------------------------------------------  Physical exam:   GEN: NAD  CV: RRR, no audible murmur  PULM: CTA, no wheeze or crackles  GI: Severely hypoactive bowel sounds, distended abdomen, soft, no significant tenderness.  NG tube in place  PSYCH: ANO x 4, no confusion or agitation  -------------------------------------------------------------------------------------------------------------------  Results from last 7 days   Lab Units 03/08/24  0155 03/07/24  1529 03/07/24  1148 03/05/24  0835   LACTATE mmol/L  --  0.8  --   --    WBC 10*3/mm3 1.39*  --  1.63* 3.32*   HEMOGLOBIN g/dL 9.5*  --  10.8* 10.9*   HEMATOCRIT % 30.5*  --  35.2 35.5   MCV fL 90.2  --  91.9 90.3   MCHC g/dL 31.1*  --  30.7* 30.7*   PLATELETS 10*3/mm3 213  --  229 251     Results from last 7 days   Lab Units 03/09/24  0220 03/08/24  0155 03/07/24  1148 03/05/24  0835   SODIUM mmol/L 134* 135* 134* 144   POTASSIUM mmol/L 3.6 3.8 3.6 4.0   MAGNESIUM mg/dL 1.9  --   --   --    CHLORIDE mmol/L 98 103 99 103   CO2 mmol/L 23.6 21.6* 23.9 31.1*   BUN mg/dL 7 14 19 15   CREATININE mg/dL 0.50* 0.58 0.59 0.95   CALCIUM mg/dL 8.4* 7.8* 8.2* 10.0   GLUCOSE mg/dL 85 90 113* 103*   ALBUMIN g/dL  --   --  3.4* 4.2   BILIRUBIN mg/dL  --   --  0.9 0.7   ALK PHOS U/L  --   --  103 120*   AST (SGOT) U/L  --   --  17 19   ALT (SGPT) U/L  --   --  15 15   Estimated Creatinine Clearance: 147.3 mL/min (A) (by C-G formula based on SCr of 0.5 mg/dL (L)).  No results found for: \"AMMONIA\"    Glucose   Date/Time Value Ref Range Status " "  03/07/2024 1020 117 70 - 130 mg/dL Final     Lab Results   Component Value Date    HGBA1C 4.70 (L) 03/13/2023     No results found for: \"TSH\", \"FREET4\"    Blood Culture   Date Value Ref Range Status   03/07/2024 No growth at 24 hours  Preliminary   03/07/2024 No growth at 24 hours  Preliminary       ----------------------------------------------------------------------------------------------------------------------  Imaging Results (Last 24 Hours)       Procedure Component Value Units Date/Time    XR Abdomen Flat & Upright [[id number]] Resulted: 03/09/24 1233     Updated: 03/09/24 1233            ----------------------------------------------------------------------------------------------------------------------  Assessment/Plan     Partial SBO- likely result of metastatic colon cancer.  She was noted to have some oral contrast passing beyond the point of obstruction.  Therefore she was challenged with clear liquid diet and clamping NG tube last night however did not tolerate as she became nauseated and vomited.  NG to back to wall suction.  Can have ice chips and clear liquids for comfort only.  General surgery is following.  KUB ordered and pending. Cont to hold all poral meds. Changed IVF to D5w LR.   Neutropenia- due to chemotherapy last received on 3/5. Cont IVF.   Hypophosphatemia- supplementing IV    CHRONIC MEDICAP PROBLEMS:  Metastatic stage IV colon cancer- initially diagnosed with stage III, underwent resection. However, declined adjuvant chemotherapy.  A year later she was diagnosed with metastatic disease with mets to liver, omentum and lungs.oncology was oncology was consulted on admission to discuss prognosis   Chronic anemia-  colon cancer    --------------------------------------------------  DVT Prophylaxis: lovenox  GI PPX- IV PPI     Disposition: home once medically ready  --------------------------------------------------      Katayoun Behbahani, MD  Hospitalist Service -- [facility] " [place]     03/09/24  13:04 EST

## 2024-03-09 NOTE — PLAN OF CARE
Goal Outcome Evaluation:               Pt is Awake, A/O X4, PT has had N/V throughout shift see mar for prn meds given.Sibling present at bedside, safety maintained. Continuing POC.

## 2024-03-09 NOTE — H&P
LOS: 2 days   Patient Care Team:  Vimal Moreno MD as PCP - General (Family Medicine)  Francine Garcia MSW as     Surgery follow up for bowel obstruction    Subjective     Interval History:  Patient states that she vomited last night and as a result her NG was hooked back up to suction.  Patient states that she feels that the vomiting was related to Compazine, which has since been discontinued.  She states she has been taking some clear liquids and having some cramping but no more than usual.  NG tube has put out less than 100 cc since being reconnected to suction.  Abdominal x-rays today demonstrate some dilated small bowel loops but contrast in the colon.    History taken from patient.      Review of Systems:   Review of systems negative except for history of present illness    Objective     Vital Signs  Temp:  [98.5 °F (36.9 °C)-98.9 °F (37.2 °C)] 98.9 °F (37.2 °C)  Heart Rate:  [81-96] 87  Resp:  [18] 18  BP: (116-132)/(62-80) 117/67    Physical Exam:  General:  This is a WD WN female in no acute distress  Lungs:  Respiratory effort normal. Auscultation: Clear, without wheezes, rhonchi, rales  Heart:  Regular rate and rhythm, without murmur, gallop, rub.  No pedal edema  Abdomen: Hypoactive bowel sounds.  No rebound or guarding       Results Review:    Results from last 7 days   Lab Units 03/07/24  1433 03/07/24  1148   HSTROP T ng/L 9 <6     Results from last 7 days   Lab Units 03/08/24  0155 03/07/24  1529 03/07/24  1148 03/05/24  0835   LACTATE mmol/L  --  0.8  --   --    WBC 10*3/mm3 1.39*  --  1.63* 3.32*   HEMOGLOBIN g/dL 9.5*  --  10.8* 10.9*   HEMATOCRIT % 30.5*  --  35.2 35.5   PLATELETS 10*3/mm3 213  --  229 251         Results from last 7 days   Lab Units 03/09/24  0220 03/08/24  0155 03/07/24  1148 03/05/24  0835   SODIUM mmol/L 134* 135* 134* 144   POTASSIUM mmol/L 3.6 3.8 3.6 4.0   MAGNESIUM mg/dL 1.9  --   --   --    CHLORIDE mmol/L 98 103 99 103   CO2 mmol/L 23.6  "21.6* 23.9 31.1*   BUN mg/dL 7 14 19 15   CREATININE mg/dL 0.50* 0.58 0.59 0.95   CALCIUM mg/dL 8.4* 7.8* 8.2* 10.0   GLUCOSE mg/dL 85 90 113* 103*   ALBUMIN g/dL  --   --  3.4* 4.2   BILIRUBIN mg/dL  --   --  0.9 0.7   ALK PHOS U/L  --   --  103 120*   AST (SGOT) U/L  --   --  17 19   ALT (SGPT) U/L  --   --  15 15   Estimated Creatinine Clearance: 147.3 mL/min (A) (by C-G formula based on SCr of 0.5 mg/dL (L)).  No results found for: \"AMMONIA\"      Blood Culture   Date Value Ref Range Status   03/07/2024 No growth at 24 hours  Preliminary   03/07/2024 No growth at 24 hours  Preliminary     No results found for: \"URINECX\"  No results found for: \"WOUNDCX\"  No results found for: \"STOOLCX\"    Imaging:  Imaging Results (Last 24 Hours)       Procedure Component Value Units Date/Time    XR Abdomen Flat & Upright [[id number]] Collected: 03/09/24 1348     Updated: 03/09/24 1351    Narrative:      VERIFICATION OBSERVER NAME: Yakov Singer MD.     Comparison: Study dated 1/3/2024     TECHNIQUE, HISTORY, FINDINGS:      History: Bowel obstruction.        2 views of the abdomen were performed.  Contrast noted in RIGHT colon.   Dilated small bowel loops are visualized consistent with partial  obstruction.  Status post cholecystectomy.       Impression:      Dilated small bowel loops consistent with partial obstruction.              [id number]     Zip code: [id number]                 This report was finalized on 3/9/2024 1:49 PM by Dr. Yakov Singer MD.                    Impression:  Partial small bowel obstruction versus severe ileus  Chemotherapy induced neutropenia    Plan:  Remove NG  Continue clear liquid diet  Discussed neutropenia with Dr. Hanks who recommended Neupogen daily until counts are recovering.    Radha Regan MD  03/09/24  14:08 EST      Please note that portions of this note were completed with a voice recognition program.    "

## 2024-03-09 NOTE — PLAN OF CARE
Goal Outcome Evaluation:              Outcome Evaluation: Pt states she is feeling better this shift. PRN pain meds given. NG pulled. Will continue plan of care.

## 2024-03-10 LAB
ANION GAP SERPL CALCULATED.3IONS-SCNC: 9.4 MMOL/L (ref 5–15)
BUN SERPL-MCNC: 7 MG/DL (ref 6–20)
BUN/CREAT SERPL: 14.3 (ref 7–25)
CALCIUM SPEC-SCNC: 8.1 MG/DL (ref 8.6–10.5)
CHLORIDE SERPL-SCNC: 102 MMOL/L (ref 98–107)
CO2 SERPL-SCNC: 25.6 MMOL/L (ref 22–29)
CREAT SERPL-MCNC: 0.49 MG/DL (ref 0.57–1)
DEPRECATED RDW RBC AUTO: 67.7 FL (ref 37–54)
EGFRCR SERPLBLD CKD-EPI 2021: 113.6 ML/MIN/1.73
ERYTHROCYTE [DISTWIDTH] IN BLOOD BY AUTOMATED COUNT: 20.9 % (ref 12.3–15.4)
GLUCOSE SERPL-MCNC: 103 MG/DL (ref 65–99)
HCT VFR BLD AUTO: 28.5 % (ref 34–46.6)
HGB BLD-MCNC: 9.1 G/DL (ref 12–15.9)
MAGNESIUM SERPL-MCNC: 1.8 MG/DL (ref 1.6–2.6)
MCH RBC QN AUTO: 28.8 PG (ref 26.6–33)
MCHC RBC AUTO-ENTMCNC: 31.9 G/DL (ref 31.5–35.7)
MCV RBC AUTO: 90.2 FL (ref 79–97)
PHOSPHATE SERPL-MCNC: 2 MG/DL (ref 2.5–4.5)
PLATELET # BLD AUTO: 219 10*3/MM3 (ref 140–450)
PMV BLD AUTO: 10.1 FL (ref 6–12)
POTASSIUM SERPL-SCNC: 3.6 MMOL/L (ref 3.5–5.2)
RBC # BLD AUTO: 3.16 10*6/MM3 (ref 3.77–5.28)
SODIUM SERPL-SCNC: 137 MMOL/L (ref 136–145)
WBC NRBC COR # BLD AUTO: 5.89 10*3/MM3 (ref 3.4–10.8)

## 2024-03-10 PROCEDURE — 25010000002 ENOXAPARIN PER 10 MG: Performed by: STUDENT IN AN ORGANIZED HEALTH CARE EDUCATION/TRAINING PROGRAM

## 2024-03-10 PROCEDURE — 99232 SBSQ HOSP IP/OBS MODERATE 35: CPT | Performed by: SURGERY

## 2024-03-10 PROCEDURE — 25010000002 HYDROMORPHONE PER 4 MG: Performed by: STUDENT IN AN ORGANIZED HEALTH CARE EDUCATION/TRAINING PROGRAM

## 2024-03-10 PROCEDURE — 85027 COMPLETE CBC AUTOMATED: CPT | Performed by: SURGERY

## 2024-03-10 PROCEDURE — 84100 ASSAY OF PHOSPHORUS: CPT | Performed by: INTERNAL MEDICINE

## 2024-03-10 PROCEDURE — 25010000002 POTASSIUM CHLORIDE 10 MEQ/100ML SOLUTION: Performed by: INTERNAL MEDICINE

## 2024-03-10 PROCEDURE — 94761 N-INVAS EAR/PLS OXIMETRY MLT: CPT

## 2024-03-10 PROCEDURE — 99232 SBSQ HOSP IP/OBS MODERATE 35: CPT | Performed by: INTERNAL MEDICINE

## 2024-03-10 PROCEDURE — 25810000003 DEXTROSE 5% IN LACTATED RINGERS PER 1000 ML: Performed by: INTERNAL MEDICINE

## 2024-03-10 PROCEDURE — 25810000003 SODIUM CHLORIDE 0.9 % SOLUTION: Performed by: INTERNAL MEDICINE

## 2024-03-10 PROCEDURE — 94799 UNLISTED PULMONARY SVC/PX: CPT

## 2024-03-10 PROCEDURE — 83735 ASSAY OF MAGNESIUM: CPT | Performed by: INTERNAL MEDICINE

## 2024-03-10 PROCEDURE — 80048 BASIC METABOLIC PNL TOTAL CA: CPT | Performed by: STUDENT IN AN ORGANIZED HEALTH CARE EDUCATION/TRAINING PROGRAM

## 2024-03-10 RX ORDER — ALUMINA, MAGNESIA, AND SIMETHICONE 2400; 2400; 240 MG/30ML; MG/30ML; MG/30ML
15 SUSPENSION ORAL EVERY 6 HOURS PRN
Status: DISCONTINUED | OUTPATIENT
Start: 2024-03-10 | End: 2024-03-11 | Stop reason: HOSPADM

## 2024-03-10 RX ORDER — FENTANYL/ROPIVACAINE/NS/PF 2-625MCG/1
15 PLASTIC BAG, INJECTION (ML) EPIDURAL ONCE
Qty: 250 ML | Refills: 0 | Status: COMPLETED | OUTPATIENT
Start: 2024-03-10 | End: 2024-03-10

## 2024-03-10 RX ORDER — POTASSIUM CHLORIDE 7.45 MG/ML
10 INJECTION INTRAVENOUS
Status: COMPLETED | OUTPATIENT
Start: 2024-03-10 | End: 2024-03-10

## 2024-03-10 RX ADMIN — PANTOPRAZOLE SODIUM 40 MG: 40 INJECTION, POWDER, FOR SOLUTION INTRAVENOUS at 09:03

## 2024-03-10 RX ADMIN — POTASSIUM CHLORIDE 10 MEQ: 7.46 INJECTION, SOLUTION INTRAVENOUS at 10:11

## 2024-03-10 RX ADMIN — POTASSIUM CHLORIDE 10 MEQ: 7.46 INJECTION, SOLUTION INTRAVENOUS at 09:04

## 2024-03-10 RX ADMIN — SODIUM CHLORIDE, SODIUM LACTATE, POTASSIUM CHLORIDE, CALCIUM CHLORIDE AND DEXTROSE MONOHYDRATE 100 ML/HR: 5; 600; 310; 30; 20 INJECTION, SOLUTION INTRAVENOUS at 16:25

## 2024-03-10 RX ADMIN — HYDROMORPHONE HYDROCHLORIDE 0.5 MG: 1 INJECTION, SOLUTION INTRAMUSCULAR; INTRAVENOUS; SUBCUTANEOUS at 22:54

## 2024-03-10 RX ADMIN — POTASSIUM & SODIUM PHOSPHATES POWDER PACK 280-160-250 MG 2 PACKET: 280-160-250 PACK at 11:11

## 2024-03-10 RX ADMIN — ENOXAPARIN SODIUM 40 MG: 40 INJECTION SUBCUTANEOUS at 22:54

## 2024-03-10 RX ADMIN — MUPIROCIN 1 APPLICATION: 20 OINTMENT TOPICAL at 22:54

## 2024-03-10 RX ADMIN — MUPIROCIN 1 APPLICATION: 20 OINTMENT TOPICAL at 09:03

## 2024-03-10 RX ADMIN — HYDROMORPHONE HYDROCHLORIDE 0.5 MG: 1 INJECTION, SOLUTION INTRAMUSCULAR; INTRAVENOUS; SUBCUTANEOUS at 12:51

## 2024-03-10 RX ADMIN — HYDROMORPHONE HYDROCHLORIDE 0.5 MG: 1 INJECTION, SOLUTION INTRAMUSCULAR; INTRAVENOUS; SUBCUTANEOUS at 05:23

## 2024-03-10 RX ADMIN — ALUMINUM HYDROXIDE, MAGNESIUM HYDROXIDE, AND DIMETHICONE 15 ML: 400; 400; 40 SUSPENSION ORAL at 10:14

## 2024-03-10 RX ADMIN — POTASSIUM PHOSPHATE, MONOBASIC AND POTASSIUM PHOSPHATE, DIBASIC 15 MMOL: 224; 236 INJECTION, SOLUTION, CONCENTRATE INTRAVENOUS at 09:02

## 2024-03-10 RX ADMIN — Medication 10 ML: at 09:03

## 2024-03-10 RX ADMIN — POTASSIUM CHLORIDE 10 MEQ: 7.46 INJECTION, SOLUTION INTRAVENOUS at 11:11

## 2024-03-10 RX ADMIN — POTASSIUM CHLORIDE 10 MEQ: 7.46 INJECTION, SOLUTION INTRAVENOUS at 09:03

## 2024-03-10 RX ADMIN — SODIUM CHLORIDE, SODIUM LACTATE, POTASSIUM CHLORIDE, CALCIUM CHLORIDE AND DEXTROSE MONOHYDRATE 100 ML/HR: 5; 600; 310; 30; 20 INJECTION, SOLUTION INTRAVENOUS at 00:54

## 2024-03-10 RX ADMIN — POTASSIUM & SODIUM PHOSPHATES POWDER PACK 280-160-250 MG 2 PACKET: 280-160-250 PACK at 17:14

## 2024-03-10 NOTE — PLAN OF CARE
Goal Outcome Evaluation:  Plan of Care Reviewed With: patient        Progress: improving  Outcome Evaluation: Pt's resting in bed, A&O, stable on RA, tolerated GI, fiber restricted diet. K/Ph replaced per MD order. Ambulated in hallway. Will con't to monitor.

## 2024-03-10 NOTE — PROGRESS NOTES
[facility] Medicine Services  PROGRESS NOTE     Patient Identification:  Name:  Vangie Carney  Age:  53 y.o.  Sex:  female  :  1970  MRN:  [id number]  Visit Number:  [id number]  Primary Care Provider:  Vimal Moreno MD    Length of stay:  3    ----------------------------------------------------------------------------------------------------------------------  Subjective     Chief Complaint:  Follow up for N/V     Subjective:  Today, the patient reports doing well.  Had 1 episode of nausea last night after NG tube was removed but nausea improved with IV medication.  Has not had any vomiting.  Started clear liquid diet this morning.  Has not had any bowel movements or passing flatus for the past 2 days.  Ambulating without difficulty.    ----------------------------------------------------------------------------------------------------------------------  Objective     Current Hospital Meds:  enoxaparin, 40 mg, Subcutaneous, Nightly  mupirocin, 1 application , Each Nare, BID  pantoprazole, 40 mg, Intravenous, QAM AC  potassium chloride, 10 mEq, Intravenous, Q1H  potassium phosphate, 15 mmol, Intravenous, Once  sodium chloride, 10 mL, Intravenous, Q12H      dextrose 5 % and lactated Ringer's, 100 mL/hr, Last Rate: 100 mL/hr (03/10/24 0054)      ----------------------------------------------------------------------------------------------------------------------  Vital Signs:  Temp:  [98.5 °F (36.9 °C)-98.8 °F (37.1 °C)] 98.5 °F (36.9 °C)  Heart Rate:  [81-87] 81  Resp:  [18] 18  BP: ()/(49-70) 97/49  Mean Arterial Pressure (Non-Invasive) for the past 24 hrs (Last 3 readings):   Noninvasive MAP (mmHg)   03/10/24 0634 65   03/10/24 0453 76   24 2311 70     SpO2 Percentage    24 1122 24 2311 03/10/24 0634   SpO2: 95% 95% 95%     SpO2:  [95 %] 95 %  on   ;   Device (Oxygen Therapy): room air    Body mass index is 24.48 kg/m².  Wt  Readings from Last 3 Encounters:   03/07/24 70.9 kg (156 lb 4.8 oz)   03/07/24 71.4 kg (157 lb 4.8 oz)   03/05/24 71.1 kg (156 lb 12.8 oz)        Intake/Output Summary (Last 24 hours) at 3/10/2024 1016  Last data filed at 3/10/2024 0844  Gross per 24 hour   Intake 1101 ml   Output 100 ml   Net 1001 ml     Diet: Liquid; Clear Liquid; No Straw, No Carbonated Beverages; Fluid Consistency: Thin (IDDSI 0)  ----------------------------------------------------------------------------------------------------------------------  Physical exam:   GEN: NAD  CV: RRR, no murmur  PULM: CTA, no wheeze or crackles on room air  GI: Severely hypoactive bowel sounds, soft and nontender  PSYCH: Alert and oriented x 4, no confusion or agitation or anxiety  -------------------------------------------------------------------------------------------------------------------  Results from last 7 days   Lab Units 03/10/24  0939 03/08/24  0155 03/07/24  1529 03/07/24  1148   LACTATE mmol/L  --   --  0.8  --    WBC 10*3/mm3 5.89 1.39*  --  1.63*   HEMOGLOBIN g/dL 9.1* 9.5*  --  10.8*   HEMATOCRIT % 28.5* 30.5*  --  35.2   MCV fL 90.2 90.2  --  91.9   MCHC g/dL 31.9 31.1*  --  30.7*   PLATELETS 10*3/mm3 219 213  --  229     Results from last 7 days   Lab Units 03/10/24  0041 03/09/24  0220 03/08/24  0155 03/07/24  1148 03/05/24  0835   SODIUM mmol/L 137 134* 135* 134* 144   POTASSIUM mmol/L 3.6 3.6 3.8 3.6 4.0   MAGNESIUM mg/dL 1.8 1.9  --   --   --    CHLORIDE mmol/L 102 98 103 99 103   CO2 mmol/L 25.6 23.6 21.6* 23.9 31.1*   BUN mg/dL 7 7 14 19 15   CREATININE mg/dL 0.49* 0.50* 0.58 0.59 0.95   CALCIUM mg/dL 8.1* 8.4* 7.8* 8.2* 10.0   GLUCOSE mg/dL 103* 85 90 113* 103*   ALBUMIN g/dL  --   --   --  3.4* 4.2   BILIRUBIN mg/dL  --   --   --  0.9 0.7   ALK PHOS U/L  --   --   --  103 120*   AST (SGOT) U/L  --   --   --  17 19   ALT (SGPT) U/L  --   --   --  15 15   Estimated Creatinine Clearance: 150.3 mL/min (A) (by C-G formula based on SCr of  "0.49 mg/dL (L)).  No results found for: \"AMMONIA\"    Glucose   Date/Time Value Ref Range Status   03/07/2024 1020 117 70 - 130 mg/dL Final     Lab Results   Component Value Date    HGBA1C 4.70 (L) 03/13/2023     No results found for: \"TSH\", \"FREET4\"    Blood Culture   Date Value Ref Range Status   03/07/2024 No growth at 2 days  Preliminary   03/07/2024 No growth at 2 days  Preliminary     ----------------------------------------------------------------------------------------------------------------------  Imaging Results (Last 24 Hours)       Procedure Component Value Units Date/Time    XR Abdomen Flat & Upright [[id number]] Collected: 03/09/24 1348     Updated: 03/09/24 1351    Narrative:      VERIFICATION OBSERVER NAME: Yakov Singer MD.     Comparison: Study dated 1/3/2024     TECHNIQUE, HISTORY, FINDINGS:      History: Bowel obstruction.        2 views of the abdomen were performed.  Contrast noted in RIGHT colon.   Dilated small bowel loops are visualized consistent with partial  obstruction.  Status post cholecystectomy.       Impression:      Dilated small bowel loops consistent with partial obstruction.              [id number]     Zip code: [id number]                 This report was finalized on 3/9/2024 1:49 PM by Dr. Yakov Singer MD.               ----------------------------------------------------------------------------------------------------------------------  Assessment/Plan     Partial SBO- likely result of metastatic colon cancer.   NG tube was removed yesterday per surgery's recommendations.  Clear liquid diet challenge today.  Encourage ambulating is much as possible.  Advance diet as tolerated.  Continue IV fluid for now.  neutropenia- due to chemotherapy last received on 3/5. Cont IVF. s/p once dose of Filgrastim   Hypophosphatemia-continue replacement  Borderline hypokalemia-supplementing     CHRONIC MEDICAP PROBLEMS:  Metastatic stage IV colon cancer- initially diagnosed with stage III, underwent " resection. However, declined adjuvant chemotherapy.  A year later she was diagnosed with metastatic disease with mets to liver, omentum and lungs.oncology was oncology was consulted on admission to discuss prognosis   Chronic anemia-  colon cancer    --------------------------------------------------  DVT Prophylaxis: Lovenox  GI PPX- cont IV protonix. Added Maalox PRN     Disposition: home once medically ready and able to tolerate advancing diet.   --------------------------------------------------      Katayoun Behbahani, MD  Hospitalist Service -- [facility]     03/10/24  10:16 EDT

## 2024-03-10 NOTE — PROGRESS NOTES
LOS: 3 days   Patient Care Team:  Vimal Moreno MD as PCP - General (Family Medicine)  Francine Garcia MSW as     Surgery follow up for bowel obstruction    Subjective     Interval History:  NG tube was removed yesterday and patient has been tolerating the clear liquid diet.  She does have some cramps but states that this is her baseline and she is not having any more than normal.  She states she wants to go home.  She had a dose of Neupogen yesterday and WBC is now greater than 3.  No nausea or vomiting.    History taken from patient.      Review of Systems:   Review of systems negative except for history of present illness    Objective     Vital Signs  Temp:  [98 °F (36.7 °C)-98.8 °F (37.1 °C)] 98 °F (36.7 °C)  Heart Rate:  [79-85] 79  Resp:  [18] 18  BP: ()/(49-70) 107/61    Physical Exam:  General:  This is a WD WN female in no acute distress  Lungs:  Respiratory effort normal. Auscultation: Clear, without wheezes, rhonchi, rales  Heart:  Regular rate and rhythm, without murmur, gallop, rub.  No pedal edema  Abdomen: Hypoactive bowel sounds.  No rebound or guarding.  No localized tenderness.  Abdomen does not appear to be distended       Results Review:    Results from last 7 days   Lab Units 03/07/24  1433 03/07/24  1148   HSTROP T ng/L 9 <6     Results from last 7 days   Lab Units 03/10/24  0939 03/08/24  0155 03/07/24  1529 03/07/24  1148   LACTATE mmol/L  --   --  0.8  --    WBC 10*3/mm3 5.89 1.39*  --  1.63*   HEMOGLOBIN g/dL 9.1* 9.5*  --  10.8*   HEMATOCRIT % 28.5* 30.5*  --  35.2   PLATELETS 10*3/mm3 219 213  --  229         Results from last 7 days   Lab Units 03/10/24  0041 03/09/24  0220 03/08/24  0155 03/07/24  1148 03/05/24  0835   SODIUM mmol/L 137 134* 135* 134* 144   POTASSIUM mmol/L 3.6 3.6 3.8 3.6 4.0   MAGNESIUM mg/dL 1.8 1.9  --   --   --    CHLORIDE mmol/L 102 98 103 99 103   CO2 mmol/L 25.6 23.6 21.6* 23.9 31.1*   BUN mg/dL 7 7 14 19 15   CREATININE  "mg/dL 0.49* 0.50* 0.58 0.59 0.95   CALCIUM mg/dL 8.1* 8.4* 7.8* 8.2* 10.0   GLUCOSE mg/dL 103* 85 90 113* 103*   ALBUMIN g/dL  --   --   --  3.4* 4.2   BILIRUBIN mg/dL  --   --   --  0.9 0.7   ALK PHOS U/L  --   --   --  103 120*   AST (SGOT) U/L  --   --   --  17 19   ALT (SGPT) U/L  --   --   --  15 15   Estimated Creatinine Clearance: 150.3 mL/min (A) (by C-G formula based on SCr of 0.49 mg/dL (L)).  No results found for: \"AMMONIA\"      Blood Culture   Date Value Ref Range Status   03/07/2024 No growth at 24 hours  Preliminary   03/07/2024 No growth at 24 hours  Preliminary     No results found for: \"URINECX\"  No results found for: \"WOUNDCX\"  No results found for: \"STOOLCX\"    Imaging:  Imaging Results (Last 24 Hours)       ** No results found for the last 24 hours. **                 Impression:  Partial small bowel obstruction versus severe ileus  Chemotherapy induced neutropenia    Plan:  Advance to low residue diet if tolerated anticipate possible discharge in a.m.    Radha Regan MD  03/10/24  15:33 EDT      Please note that portions of this note were completed with a voice recognition program.    "

## 2024-03-10 NOTE — PLAN OF CARE
Goal Outcome Evaluation:      Patient alert/oriented and resting in bed during shift. Pt has been up to bedside chair during shift. VSS. Pt had complaints of nausea, PRN meds given, see MAR. PRN pain meds given, see MAR. Will continue with plan of care.

## 2024-03-11 ENCOUNTER — READMISSION MANAGEMENT (OUTPATIENT)
Dept: CALL CENTER | Facility: HOSPITAL | Age: 54
End: 2024-03-11
Payer: COMMERCIAL

## 2024-03-11 VITALS
TEMPERATURE: 97.9 F | RESPIRATION RATE: 18 BRPM | OXYGEN SATURATION: 95 % | HEART RATE: 82 BPM | BODY MASS INDEX: 24.53 KG/M2 | WEIGHT: 156.3 LBS | HEIGHT: 67 IN | SYSTOLIC BLOOD PRESSURE: 115 MMHG | DIASTOLIC BLOOD PRESSURE: 63 MMHG

## 2024-03-11 LAB
ANION GAP SERPL CALCULATED.3IONS-SCNC: 11.4 MMOL/L (ref 5–15)
BUN SERPL-MCNC: 7 MG/DL (ref 6–20)
BUN/CREAT SERPL: 12.1 (ref 7–25)
CALCIUM SPEC-SCNC: 8.5 MG/DL (ref 8.6–10.5)
CHLORIDE SERPL-SCNC: 101 MMOL/L (ref 98–107)
CO2 SERPL-SCNC: 24.6 MMOL/L (ref 22–29)
CREAT SERPL-MCNC: 0.58 MG/DL (ref 0.57–1)
EGFRCR SERPLBLD CKD-EPI 2021: 109 ML/MIN/1.73
GLUCOSE SERPL-MCNC: 96 MG/DL (ref 65–99)
MAGNESIUM SERPL-MCNC: 1.9 MG/DL (ref 1.6–2.6)
PHOSPHATE SERPL-MCNC: 3.3 MG/DL (ref 2.5–4.5)
POTASSIUM SERPL-SCNC: 3.7 MMOL/L (ref 3.5–5.2)
SODIUM SERPL-SCNC: 137 MMOL/L (ref 136–145)

## 2024-03-11 PROCEDURE — 99238 HOSP IP/OBS DSCHRG MGMT 30/<: CPT | Performed by: INTERNAL MEDICINE

## 2024-03-11 PROCEDURE — 25010000002 HEPARIN LOCK FLUSH PER 10 UNITS: Performed by: INTERNAL MEDICINE

## 2024-03-11 PROCEDURE — 84100 ASSAY OF PHOSPHORUS: CPT | Performed by: INTERNAL MEDICINE

## 2024-03-11 PROCEDURE — 25010000002 HYDROMORPHONE PER 4 MG: Performed by: STUDENT IN AN ORGANIZED HEALTH CARE EDUCATION/TRAINING PROGRAM

## 2024-03-11 PROCEDURE — 80048 BASIC METABOLIC PNL TOTAL CA: CPT | Performed by: INTERNAL MEDICINE

## 2024-03-11 PROCEDURE — 83735 ASSAY OF MAGNESIUM: CPT | Performed by: INTERNAL MEDICINE

## 2024-03-11 PROCEDURE — 25010000002 ONDANSETRON PER 1 MG: Performed by: STUDENT IN AN ORGANIZED HEALTH CARE EDUCATION/TRAINING PROGRAM

## 2024-03-11 PROCEDURE — 99232 SBSQ HOSP IP/OBS MODERATE 35: CPT | Performed by: NURSE PRACTITIONER

## 2024-03-11 RX ORDER — HEPARIN SODIUM (PORCINE) LOCK FLUSH IV SOLN 100 UNIT/ML 100 UNIT/ML
100 SOLUTION INTRAVENOUS ONCE
Status: COMPLETED | OUTPATIENT
Start: 2024-03-11 | End: 2024-03-11

## 2024-03-11 RX ORDER — LIDOCAINE AND PRILOCAINE 25; 25 MG/G; MG/G
CREAM TOPICAL
Qty: 30 G | Refills: 1 | Status: SHIPPED | OUTPATIENT
Start: 2024-03-11

## 2024-03-11 RX ADMIN — Medication 100 UNITS: at 10:31

## 2024-03-11 RX ADMIN — ONDANSETRON 4 MG: 2 INJECTION INTRAMUSCULAR; INTRAVENOUS at 09:56

## 2024-03-11 RX ADMIN — Medication 10 ML: at 08:34

## 2024-03-11 RX ADMIN — MUPIROCIN 1 APPLICATION: 20 OINTMENT TOPICAL at 08:34

## 2024-03-11 RX ADMIN — HYDROMORPHONE HYDROCHLORIDE 0.5 MG: 1 INJECTION, SOLUTION INTRAMUSCULAR; INTRAVENOUS; SUBCUTANEOUS at 05:14

## 2024-03-11 RX ADMIN — PANTOPRAZOLE SODIUM 40 MG: 40 INJECTION, POWDER, FOR SOLUTION INTRAVENOUS at 07:46

## 2024-03-11 RX ADMIN — POTASSIUM & SODIUM PHOSPHATES POWDER PACK 280-160-250 MG 2 PACKET: 280-160-250 PACK at 07:46

## 2024-03-11 NOTE — PLAN OF CARE
Goal Outcome Evaluation:      Patient is being discharged today

## 2024-03-11 NOTE — PAYOR COMM NOTE
"CONTACT: KAITY RUEDA RN  UTILIZATION [place] [address]  PHONE: [phone]  FAX: [phone]        DISCHARGE NOTIFICATION  DC DATE:3/11/24 TO HOME    REF#[id number]       Jan Solano (53 y.o. Female)       Date of Birth   1970    Social Security Number       Address   [address] [address]    Home Phone   [phone]    MRN   [id number]       Anabaptism   Gnosticism    Marital Status                               Admission Date   3/7/24    Admission Type   Emergency    Admitting Provider   Palmer Payne DO    Attending Provider       Department, Room/Bed   [place] [address]/       Discharge Date   3/11/2024    Discharge Disposition   Home or Self Care    Discharge Destination                                 Attending Provider: (none)   Allergies: Keflex [Cephalexin]    Isolation: None   Infection: None   Code Status: CPR    Ht: 170.2 cm (67\")   Wt: 70.9 kg (156 lb 4.8 oz)    Admission Cmt: None   Principal Problem: SBO (small bowel obstruction) [K56.609]                   Active Insurance as of 3/7/2024       Primary Coverage       Payor Plan Insurance Group Employer/Plan Group    [facility] EMPLOYEE [id number]       Payor Plan Address Payor Plan Phone Number Payor Plan Fax Number Effective Dates    [address] [phone]  1/1/2018 - None Entered    [place] [address]         Subscriber Name Subscriber Birth Date Member ID       JAN SOLANO 1970 [id number]                     Emergency Contacts        (Rel.) Home Phone Work Phone Mobile Phone    RommelRed (Spouse) -- -- [phone]                 Discharge Summary        Behbahani, Katayoun, MD at 03/11/24 0933              [facility] Medicine Services  DISCHARGE SUMMARY    Patient Identification:  Name:  Jan Solano  Age:  53 y.o.  Sex:  " "female  :  1970  MRN:  [id number]  Visit Number:  [id number]    Date of Admission: 3/7/2024  Date of Discharge:  3/11/2024    PCP: Vimal Moreno MD      Admission/Discharge Diagnoses     Discharge Diagnoses:  Partial SBO due to metastatic colon cancer  Hypomagnesemia  Hypokalemia  Hypophosphatemia  Neutropenia due to chemotherapy    Consults/Procedures     Consults:   Consults       Date and Time Order Name Status Description    3/7/2024  5:45 PM Inpatient Hematology & Oncology Consult Completed     3/7/2024  5:45 PM Inpatient General Surgery Consult Completed             Procedures Performed:         History of Presenting Illness   Per admitting provider:  \"Patient is a 53-year-old female with history significant for stage III colon cancer status post resection with recurrence, currently on chemotherapy who presented to the ER with reports of dizziness, abdominal pain and nausea with emesis.  Patient states she has had 2 rounds of chemotherapy and has had issues with nausea and vomiting throughout chemotherapy and that part is normal for her.  She says she has not had a normal bowel movement in \"quite some time \"but has had some episodes of diarrhea the last few days with the last bowel movement being yesterday.  She notes increasing nausea with emesis, abdominal pain and is not passing gas.  She denies any fever/chills, respiratory symptoms or urinary symptoms. \"    Hospital Course     Mrs. Carney was admitted with partial small bowel obstruction due to metastatic colon cancer. NGT was inserted to wall suction. Imaging showed oral contrast passing through to colon. Initially NPO then advanced to clear liquids, full liquids then solids. She has tolerated solid food since last night at supper time without any vomiting. Bowel sounds have normalized on exam. NGT was discontinued after 24 hours.  She is cleared from surgical stand point to go home. Given metastatic disease was the cause of " obstruction recurrence is highly likely. She was educated regarding signs of recurrence and to come back to the ED if nausea and vomiting or abdominal pain recur. She has follow up with oncology next week. Can also f/u with PCP to monitor electrolytes (needed replacement during admission) and symptoms.     Discharge Vitals/Physical Examination     Vital Signs:  Temp:  [97.9 °F (36.6 °C)-98.6 °F (37 °C)] 97.9 °F (36.6 °C)  Heart Rate:  [78-89] 82  Resp:  [18] 18  BP: (106-115)/(61-68) 115/63  Mean Arterial Pressure (Non-Invasive) for the past 24 hrs (Last 3 readings):   Noninvasive MAP (mmHg)   03/10/24 1433 80   03/10/24 1022 80     SpO2 Percentage    03/10/24 1022 03/10/24 1433 03/10/24 2003   SpO2: 94% 96% 95%     SpO2:  [94 %-96 %] 95 %  on   ;   Device (Oxygen Therapy): room air    Body mass index is 24.48 kg/m².  Wt Readings from Last 3 Encounters:   03/07/24 70.9 kg (156 lb 4.8 oz)   03/07/24 71.4 kg (157 lb 4.8 oz)   03/05/24 71.1 kg (156 lb 12.8 oz)         Physical Exam:  GEN:NAD  CV:RRR, no murmur  PULM:CTA, no wheeze or crackles  GI:+bs, soft and NT  PSYCH:A+O x 4, in good spirits. No confusion or anxiety or depression noted    Pertinent Laboratory/Radiology Results     Pertinent Laboratory Results:  Results from last 7 days   Lab Units 03/07/24  1433 03/07/24  1148   HSTROP T ng/L 9 <6           Results from last 7 days   Lab Units 03/10/24  0939 03/08/24  0155 03/07/24  1529 03/07/24  1148   LACTATE mmol/L  --   --  0.8  --    WBC 10*3/mm3 5.89 1.39*  --  1.63*   HEMOGLOBIN g/dL 9.1* 9.5*  --  10.8*   HEMATOCRIT % 28.5* 30.5*  --  35.2   MCV fL 90.2 90.2  --  91.9   MCHC g/dL 31.9 31.1*  --  30.7*   PLATELETS 10*3/mm3 219 213  --  229     Results from last 7 days   Lab Units 03/11/24  0751 03/10/24  0041 03/09/24  0220 03/08/24  0155 03/07/24  1148 03/05/24  0835   SODIUM mmol/L 137 137 134*   < > 134* 144   POTASSIUM mmol/L 3.7 3.6 3.6   < > 3.6 4.0   MAGNESIUM mg/dL 1.9 1.8 1.9  --   --   --   "  CHLORIDE mmol/L 101 102 98   < > 99 103   CO2 mmol/L 24.6 25.6 23.6   < > 23.9 31.1*   BUN mg/dL 7 7 7   < > 19 15   CREATININE mg/dL 0.58 0.49* 0.50*   < > 0.59 0.95   CALCIUM mg/dL 8.5* 8.1* 8.4*   < > 8.2* 10.0   GLUCOSE mg/dL 96 103* 85   < > 113* 103*   ALBUMIN g/dL  --   --   --   --  3.4* 4.2   BILIRUBIN mg/dL  --   --   --   --  0.9 0.7   ALK PHOS U/L  --   --   --   --  103 120*   AST (SGOT) U/L  --   --   --   --  17 19   ALT (SGPT) U/L  --   --   --   --  15 15    < > = values in this interval not displayed.   Estimated Creatinine Clearance: 127 mL/min (by C-G formula based on SCr of 0.58 mg/dL).  No results found for: \"AMMONIA\"    No results found for: \"HGBA1C\", \"POCGLU\"  Lab Results   Component Value Date    HGBA1C 4.70 (L) 03/13/2023     No results found for: \"TSH\", \"FREET4\"    Blood Culture   Date Value Ref Range Status   03/07/2024 No growth at 3 days  Preliminary   03/07/2024 No growth at 3 days  Preliminary     Microbiology Results (last 10 days)       Procedure Component Value - Date/Time    Blood Culture - Blood, Arm, Left [[id number]]  (Normal) Collected: 03/07/24 1529    Lab Status: Preliminary result Specimen: Blood from Arm, Left Updated: 03/10/24 1645     Blood Culture No growth at 3 days    Blood Culture - Blood, Hand, Left [[id number]]  (Normal) Collected: 03/07/24 1505    Lab Status: Preliminary result Specimen: Blood from Hand, Left Updated: 03/10/24 1645     Blood Culture No growth at 3 days          Pain Management Panel           No data to display                Pertinent Radiology Results:  Imaging Results (All)       Procedure Component Value Units Date/Time    XR Abdomen Flat & Upright [[id number]] Collected: 03/09/24 1348     Updated: 03/09/24 1351    Narrative:      VERIFICATION OBSERVER NAME: Yakov Singer MD.     Comparison: Study dated 1/3/2024     TECHNIQUE, HISTORY, FINDINGS:      History: Bowel obstruction.        2 views of the abdomen were performed.  Contrast noted in " RIGHT colon.   Dilated small bowel loops are visualized consistent with partial  obstruction.  Status post cholecystectomy.       Impression:      Dilated small bowel loops consistent with partial obstruction.              [id number]     Zip code: [id number]                 This report was finalized on 3/9/2024 1:49 PM by Dr. Yakov Singer MD.       CT Abdomen Pelvis With Contrast [[id number]] Collected: 03/08/24 1153     Updated: 03/08/24 1159    Narrative:      EXAM:    CT Abdomen and Pelvis With Intravenous Contrast     EXAM DATE:    3/8/2024 11:34 AM     CLINICAL HISTORY:    SBO, colon cancer with mets; K56.609-Unspecified intestinal  obstruction, unspecified as to partial versus complete obstruction;  D70.1-Agranulocytosis secondary to cancer chemotherapy; T45.7A9T-Zcjngug  effect of antineoplastic and immunosuppressive drugs, initial encounter     TECHNIQUE:    Axial computed tomography images of the abdomen and pelvis with  intravenous contrast.  Sagittal and coronal reformatted images were  created and reviewed.  This CT exam was performed using one or more of  the following dose reduction techniques:  automated exposure control,  adjustment of the mA and/or kV according to patient size, and/or use of  iterative reconstruction technique.     COMPARISON:    CT Abdomen Pelvis dated 3/7/2024     FINDINGS:    LUNG BASES:  Unremarkable as visualized.  No mass.  No consolidation.      ABDOMEN:    LIVER:  Unremarkable as visualized.  No mass.    GALLBLADDER AND BILE DUCTS:  Unremarkable as visualized.  No calcified  stones.  No ductal dilation.    PANCREAS:  Unremarkable as visualized.  No mass.  No ductal dilation.    SPLEEN:  Unremarkable as visualized.  No splenomegaly.    ADRENALS:  Unremarkable as visualized.  No mass.    KIDNEYS AND URETERS:  Unremarkable as visualized.  No solid mass.  No  hydronephrosis.    STOMACH AND BOWEL:  Oral contrast is seen within some very  decompressed mid and distal ileal small bowel  loops suggestive of likely  probably high-grade partial obstruction.  No mucosal thickening.      PELVIS:    APPENDIX:  No findings to suggest acute appendicitis.    BLADDER:  Unremarkable as visualized.  No mass.    REPRODUCTIVE:  Unremarkable as visualized.      ABDOMEN and PELVIS:    INTRAPERITONEAL SPACE:  Unremarkable as visualized.  No free air.  No  significant fluid collection.    BONES/JOINTS:  No acute fracture.  No dislocation.    SOFT TISSUES:  New soft tissue density lesion from 11/9/2022 in the  left lower abdomen anterior to the psoas muscle measuring 1.5 cm.    VASCULATURE:  Unremarkable as visualized.  No abdominal aortic  aneurysm.    LYMPH NODES:  Unremarkable as visualized.  No enlarged lymph nodes.       Impression:      1.  Oral contrast is seen within some very decompressed mid and distal  ileal small bowel loops suggestive of likely probably high-grade partial  obstruction.  2.  New soft tissue density lesion from 11/9/2022 in the left lower  abdomen anterior to the psoas muscle measuring 1.5 cm.        This report was finalized on 3/8/2024 11:57 AM by Dr. Ruddy Blanco MD.       CT Chest With Contrast Diagnostic [[id number]] Collected: 03/08/24 1152     Updated: 03/08/24 1157    Narrative:      EXAM:    CT Chest With Intravenous Contrast     EXAM DATE:    3/8/2024 11:33 AM     CLINICAL HISTORY:    SBO, colon cancer with mets; K56.609-Unspecified intestinal  obstruction, unspecified as to partial versus complete obstruction;  D70.1-Agranulocytosis secondary to cancer chemotherapy; T45.9S6P-Tfnfyyk  effect of antineoplastic and immunosuppressive drugs, initial encounter     TECHNIQUE:    Axial computed tomography images of the chest with intravenous  contrast.  Sagittal and coronal reformatted images were created and  reviewed.  This CT exam was performed using one or more of the following  dose reduction techniques:  automated exposure control, adjustment of  the mA and/or kV according to  patient size, and/or use of iterative  reconstruction technique.     COMPARISON:    4/7/2023     FINDINGS:    LUNGS AND PLEURAL SPACES:  New left upper lobe 1.2 cm pulmonary  nodule.  No consolidation.  No pneumothorax.  No significant effusion.    HEART:  Unremarkable as visualized.  No cardiomegaly.  No significant  pericardial effusion.  No significant coronary artery calcifications.    BONES/JOINTS:  Unremarkable as visualized.  No acute fracture.  No  dislocation.    SOFT TISSUES:  Unremarkable as visualized.    VASCULATURE:  Unremarkable as visualized.  No thoracic aortic  aneurysm.    LYMPH NODES:  Unremarkable as visualized.  No enlarged lymph nodes.    TUBES, LINES AND DEVICES:  Nasogastric tube tip in the stomach.       Impression:        New left upper lobe 1.2 cm pulmonary nodule.        This report was finalized on 3/8/2024 11:53 AM by Dr. Ruddy Blanco MD.       XR Chest AP [[id number]] Collected: 03/07/24 1649     Updated: 03/07/24 1652    Narrative:      EXAM:    XR Chest, 1 View     EXAM DATE:    3/7/2024 4:48 PM     CLINICAL HISTORY:    ng tube; K56.609-Unspecified intestinal obstruction, unspecified as to  partial versus complete obstruction; D70.1-Agranulocytosis secondary to  cancer chemotherapy; T45.7Y1C-Tzmtqar effect of antineoplastic and  immunosuppressive drugs, initial encounter     TECHNIQUE:    Frontal view of the chest.     COMPARISON:    3/7/2024     FINDINGS:    LUNGS AND PLEURAL SPACES:  Unremarkable as visualized.  No  consolidation.  No pneumothorax.    HEART:  Unremarkable as visualized.  No cardiomegaly.    MEDIASTINUM:  Unremarkable as visualized.  Normal mediastinal contour.    BONES/JOINTS:  Unremarkable as visualized.  No acute fracture.    TUBES, LINES AND DEVICES:  Nasogastric tube tip in the stomach.  Right  Port-A-Cath with tip in SVC.       Impression:        No acute cardiopulmonary findings identified.        This report was finalized on 3/7/2024 4:50 PM by Dr. Fox  MD Francis.       XR Chest 1 View [[id number]] Collected: 03/07/24 1224     Updated: 03/07/24 1227    Narrative:      EXAM:    XR Chest, 1 View     EXAM DATE:    3/7/2024 11:42 AM     CLINICAL HISTORY:    Chest Pain Protocol     TECHNIQUE:    Frontal view of the chest.     COMPARISON:    3/14/2024     FINDINGS:    LUNGS AND PLEURAL SPACES:  Unremarkable as visualized.  No  consolidation.  No pneumothorax.    HEART:  Unremarkable as visualized.  No cardiomegaly.    MEDIASTINUM:  Unremarkable as visualized.  Normal mediastinal contour.    BONES/JOINTS:  Unremarkable as visualized.  No acute fracture.    TUBES, LINES AND DEVICES:  Right Port-A-Cath with tip in SVC.       Impression:        No acute cardiopulmonary findings identified.        This report was finalized on 3/7/2024 12:25 PM by Dr. Ruddy Blanco MD.       CT Abdomen Pelvis Without Contrast [[id number]] Collected: 03/07/24 1204     Updated: 03/07/24 1207    Narrative:      EXAM:    CT Abdomen and Pelvis Without Intravenous Contrast     EXAM DATE:    3/7/2024 11:45 AM     CLINICAL HISTORY:    Abdominal pain, acute, nonlocalized     TECHNIQUE:    Axial computed tomography images of the abdomen and pelvis without  intravenous contrast.  Sagittal and coronal reformatted images were  created and reviewed.  This CT exam was performed using one or more of  the following dose reduction techniques:  automated exposure control,  adjustment of the mA and/or kV according to patient size, and/or use of  iterative reconstruction technique.     COMPARISON:    No relevant prior studies available.     FINDINGS:    LUNG BASES:  Unremarkable as visualized.  No mass.  No consolidation.      ABDOMEN:    LIVER:  Unremarkable as visualized.    GALLBLADDER AND BILE DUCTS:  Cholecystectomy clips.  No ductal  dilation.    PANCREAS:  Unremarkable as visualized.  No ductal dilation.    SPLEEN:  Unremarkable as visualized.  No splenomegaly.    ADRENALS:  Unremarkable as visualized.  No  mass.    KIDNEYS AND URETERS:  Unremarkable as visualized.  No obstructing  stones.  No hydronephrosis.    STOMACH AND BOWEL:  Dilated fluid-filled small bowel loops with  decompressed mid and distal ileum suggestive of small bowel obstruction.   No mucosal thickening.      PELVIS:    APPENDIX:  No findings to suggest acute appendicitis.    BLADDER:  Unremarkable as visualized.  No stones.    REPRODUCTIVE:  Unremarkable as visualized.      ABDOMEN and PELVIS:    INTRAPERITONEAL SPACE:  Unremarkable as visualized.  No free air.  No  significant fluid collection.    BONES/JOINTS:  No acute fracture.  No dislocation.    SOFT TISSUES:  Unremarkable as visualized.    VASCULATURE:  Unremarkable as visualized.  No abdominal aortic  aneurysm.    LYMPH NODES:  Unremarkable as visualized.  No enlarged lymph nodes.       Impression:        Dilated fluid-filled small bowel loops with decompressed mid and  distal ileum suggestive of small bowel obstruction.        This report was finalized on 3/7/2024 12:05 PM by Dr. Ruddy Blanco MD.       CT Head Without Contrast [[id number]] Collected: 03/07/24 1204     Updated: 03/07/24 1206    Narrative:      EXAM:    CT Head Without Intravenous Contrast     EXAM DATE:    3/7/2024 11:45 AM     CLINICAL HISTORY:    Dizziness, persistent/recurrent, cardiac or vascular cause suspected     TECHNIQUE:    Axial computed tomography images of the head/brain without intravenous  contrast.  Sagittal and coronal reformatted images were created and  reviewed.  This CT exam was performed using one or more of the following  dose reduction techniques:  automated exposure control, adjustment of  the mA and/or kV according to patient size, and/or use of iterative  reconstruction technique.     COMPARISON:    No relevant prior studies available.     FINDINGS:    BRAIN AND EXTRA-AXIAL SPACES:  Unremarkable as visualized.  No  hemorrhage.  No significant white matter disease.  No edema.  No  ventriculomegaly.     BONES/JOINTS:  Unremarkable as visualized.  No acute fracture.    SOFT TISSUES:  Unremarkable as visualized.    SINUSES:  Unremarkable as visualized.  No acute sinusitis.    MASTOID AIR CELLS:  Unremarkable as visualized.  No mastoid effusion.       Impression:        Unremarkable exam demonstrating no CT evidence of acute intracranial  findings.        This report was finalized on 3/7/2024 12:04 PM by Dr. Ruddy Blanco MD.               Test Results Pending at Discharge:  Pending Labs       Order Current Status    Blood Culture - Blood, Arm, Left Preliminary result    Blood Culture - Blood, Hand, Left Preliminary result            Discharge Disposition/Discharge Medications/Discharge Appointments     Discharge Disposition:   Home or Self Care    Condition at Discharge:  Stable   DME Prescribed at Discharge:  none    Discharge Diet:  Diet Instructions       Diet: Regular/House Diet; Soft to Chew (NDD 3); Ground Meat; Thin (IDDSI 0)      Discharge Diet: Regular/House Diet    Texture: Soft to Chew (NDD 3)    Soft to Chew: Ground Meat    Fluid Consistency: Thin (IDDSI 0)            Discharge Activity:  Activity Instructions       Activity as Tolerated              Code Status While Inpatient:  Code Status and Medical Interventions:   Ordered at: 03/07/24 4877     Level Of Support Discussed With:    Patient     Code Status (Patient has no pulse and is not breathing):    CPR (Attempt to Resuscitate)     Medical Interventions (Patient has pulse or is breathing):    Full Support       Discharge Medications:     Discharge Medications        New Medications        Instructions Start Date   potassium & sodium phosphates 280-160-250 MG pack packet  Commonly known as: PHOS-NAK   2 packets, Oral, 2 Times Daily Before Meals             Continue These Medications        Instructions Start Date   HYDROcodone-acetaminophen 5-325 MG per tablet  Commonly known as: NORCO   1-2 tablets, Oral, Every 8 Hours PRN      lactulose 10 GM/15ML  solution  Commonly known as: CHRONULAC   20 g, Oral, 2 Times Daily PRN      lidocaine-prilocaine 2.5-2.5 % cream  Commonly known as: EMLA   Apply to port-a-cath site 30 minutes prior to arrival at infusion center. Cover with plastic wrap.      metoclopramide 10 MG tablet  Commonly known as: REGLAN   10 mg, Oral, 3 Times Daily PRN      ondansetron 8 MG tablet  Commonly known as: ZOFRAN   8 mg, Oral, Every 8 Hours PRN      pantoprazole 40 MG EC tablet  Commonly known as: PROTONIX   40 mg, Oral, Daily      sennosides-docusate 8.6-50 MG per tablet  Commonly known as: PERICOLACE   2 tablets, Oral, 2 Times Daily PRN             Stop These Medications      prochlorperazine 10 MG tablet  Commonly known as: COMPAZINE              Discharge Appointments:  Your Scheduled Appointments      Mar 19, 2024  8:30 AM  LAB with [place] [facility]  [facility] HEMATOLOGY & ONCOLOGY ([place]) [address]  [facility] [address]  [phone]   Bring ID and  Insurance cards.  If you received paperwork in the mail, fill it out and bring it with them.         Mar 19, 2024  9:00 AM  FOLLOW UP with ROWENA Hanks MD  [facility] HEMATOLOGY & ONCOLOGY ([place]) [address]  [place] [address]  [phone]   Bring ID and  Insurance cards.  New patients are to arrive 30 minutes prior to the appointement.  If you received paperwork in the mail, fill it out and bring it with them.  All other appt's need to be here 15 minutes early.          Mar 19, 2024  9:30 AM  INFUSION with  [facility]  [facility] [place] [facility] ([place]) [address]  [place] [address]  [phone]        Mar 21, 2024 11:45 AM  INJECTION with  [facility] INFUS CHA[facility] [address]  [facility] [place] [facility] ([place]) [address]  [place] [address]  [phone]               Additional Instructions for the Follow-ups that You Need to Schedule       Discharge Follow-up with PCP   As  directed       Currently Documented PCP:    Vimal Moreno MD    PCP Phone Number:    [phone]     Follow Up Details: 1 week (partial SBO: electrolyte check, symptoms)                .Pending Labs at Discharge:  Pending Labs       Order Current Status    Blood Culture - Blood, Arm, Left Preliminary result    Blood Culture - Blood, Hand, Left Preliminary result               Katayoun Behbahani, MD  Hospitalist Service -- [facility]       03/11/24  09:33 EDT    Discharge Time: <30 minutes        Electronically signed by Behbahani, Katayoun, MD at 03/11/24 0951       Discharge Order (From admission, onward)       Start     Ordered    03/11/24 0930  Discharge patient  Once        Expected Discharge Date: 03/11/24   Expected Discharge Time: Midday   Discharge Disposition: Home or Self Care   Physician of Record for Attribution - Please select from Treatment Team: BEHBAHANI, KATAYOUN [[id number]]   Review needed by CMO to determine Physician of Record: No   Please choose which facility the patient is currently admitted if they are being discharged to another facility or unit.:  [place]   Mode: Family      Question Answer Comment   Physician of Record for Attribution - Please select from Treatment Team BEHBAHANI, KATAYOUN    Review needed by CMO to determine Physician of Record No    Please choose which facility the patient is currently admitted if they are being discharged to another facility or unit.  [facility]    Mode: Family        03/11/24 0938

## 2024-03-11 NOTE — PLAN OF CARE
Goal Outcome Evaluation:  Plan of Care Reviewed With: patient        Progress: improving  Outcome Evaluation: prn pain medication given. IV fluids stopped per order. no complaints or acute changes. will continue poc

## 2024-03-11 NOTE — DISCHARGE INSTR - APPOINTMENTS
YOU HAVE A FOLLOW-UP APPOINTMENT SCHEDULED WITH DR VERGARA ON 3-18-24 AT 2:00, OFFICE CAN BE REACHED [phone]

## 2024-03-11 NOTE — DISCHARGE SUMMARY
"    [facility] Medicine Services  DISCHARGE SUMMARY    Patient Identification:  Name:  Vangie Carney  Age:  53 y.o.  Sex:  female  :  1970  MRN:  [id number]  Visit Number:  [id number]    Date of Admission: 3/7/2024  Date of Discharge:  3/11/2024    PCP: Vimal Moreno MD      Admission/Discharge Diagnoses     Discharge Diagnoses:  Partial SBO due to metastatic colon cancer  Hypomagnesemia  Hypokalemia  Hypophosphatemia  Neutropenia due to chemotherapy    Consults/Procedures     Consults:   Consults       Date and Time Order Name Status Description    3/7/2024  5:45 PM Inpatient Hematology & Oncology Consult Completed     3/7/2024  5:45 PM Inpatient General Surgery Consult Completed             Procedures Performed:         History of Presenting Illness   Per admitting provider:  \"Patient is a 53-year-old female with history significant for stage III colon cancer status post resection with recurrence, currently on chemotherapy who presented to the ER with reports of dizziness, abdominal pain and nausea with emesis.  Patient states she has had 2 rounds of chemotherapy and has had issues with nausea and vomiting throughout chemotherapy and that part is normal for her.  She says she has not had a normal bowel movement in \"quite some time \"but has had some episodes of diarrhea the last few days with the last bowel movement being yesterday.  She notes increasing nausea with emesis, abdominal pain and is not passing gas.  She denies any fever/chills, respiratory symptoms or urinary symptoms. \"    Hospital Course     Mrs. Carney was admitted with partial small bowel obstruction due to metastatic colon cancer. NGT was inserted to wall suction. Imaging showed oral contrast passing through to colon. Initially NPO then advanced to clear liquids, full liquids then solids. She has tolerated solid food since last night at supper time without any vomiting. Bowel sounds have normalized on " exam. NGT was discontinued after 24 hours.  She is cleared from surgical stand point to go home. Given metastatic disease was the cause of obstruction recurrence is highly likely. She was educated regarding signs of recurrence and to come back to the ED if nausea and vomiting or abdominal pain recur. She has follow up with oncology next week. Can also f/u with PCP to monitor electrolytes (needed replacement during admission) and symptoms.     Discharge Vitals/Physical Examination     Vital Signs:  Temp:  [97.9 °F (36.6 °C)-98.6 °F (37 °C)] 97.9 °F (36.6 °C)  Heart Rate:  [78-89] 82  Resp:  [18] 18  BP: (106-115)/(61-68) 115/63  Mean Arterial Pressure (Non-Invasive) for the past 24 hrs (Last 3 readings):   Noninvasive MAP (mmHg)   03/10/24 1433 80   03/10/24 1022 80     SpO2 Percentage    03/10/24 1022 03/10/24 1433 03/10/24 2003   SpO2: 94% 96% 95%     SpO2:  [94 %-96 %] 95 %  on   ;   Device (Oxygen Therapy): room air    Body mass index is 24.48 kg/m².  Wt Readings from Last 3 Encounters:   03/07/24 70.9 kg (156 lb 4.8 oz)   03/07/24 71.4 kg (157 lb 4.8 oz)   03/05/24 71.1 kg (156 lb 12.8 oz)         Physical Exam:  GEN:NAD  CV:RRR, no murmur  PULM:CTA, no wheeze or crackles  GI:+bs, soft and NT  PSYCH:A+O x 4, in good spirits. No confusion or anxiety or depression noted    Pertinent Laboratory/Radiology Results     Pertinent Laboratory Results:  Results from last 7 days   Lab Units 03/07/24  1433 03/07/24  1148   HSTROP T ng/L 9 <6           Results from last 7 days   Lab Units 03/10/24  0939 03/08/24  0155 03/07/24  1529 03/07/24  1148   LACTATE mmol/L  --   --  0.8  --    WBC 10*3/mm3 5.89 1.39*  --  1.63*   HEMOGLOBIN g/dL 9.1* 9.5*  --  10.8*   HEMATOCRIT % 28.5* 30.5*  --  35.2   MCV fL 90.2 90.2  --  91.9   MCHC g/dL 31.9 31.1*  --  30.7*   PLATELETS 10*3/mm3 219 213  --  229     Results from last 7 days   Lab Units 03/11/24  0751 03/10/24  0041 03/09/24  0220 03/08/24  0155 03/07/24  1148 03/05/24  0835  "  SODIUM mmol/L 137 137 134*   < > 134* 144   POTASSIUM mmol/L 3.7 3.6 3.6   < > 3.6 4.0   MAGNESIUM mg/dL 1.9 1.8 1.9  --   --   --    CHLORIDE mmol/L 101 102 98   < > 99 103   CO2 mmol/L 24.6 25.6 23.6   < > 23.9 31.1*   BUN mg/dL 7 7 7   < > 19 15   CREATININE mg/dL 0.58 0.49* 0.50*   < > 0.59 0.95   CALCIUM mg/dL 8.5* 8.1* 8.4*   < > 8.2* 10.0   GLUCOSE mg/dL 96 103* 85   < > 113* 103*   ALBUMIN g/dL  --   --   --   --  3.4* 4.2   BILIRUBIN mg/dL  --   --   --   --  0.9 0.7   ALK PHOS U/L  --   --   --   --  103 120*   AST (SGOT) U/L  --   --   --   --  17 19   ALT (SGPT) U/L  --   --   --   --  15 15    < > = values in this interval not displayed.   Estimated Creatinine Clearance: 127 mL/min (by C-G formula based on SCr of 0.58 mg/dL).  No results found for: \"AMMONIA\"    No results found for: \"HGBA1C\", \"POCGLU\"  Lab Results   Component Value Date    HGBA1C 4.70 (L) 03/13/2023     No results found for: \"TSH\", \"FREET4\"    Blood Culture   Date Value Ref Range Status   03/07/2024 No growth at 3 days  Preliminary   03/07/2024 No growth at 3 days  Preliminary     Microbiology Results (last 10 days)       Procedure Component Value - Date/Time    Blood Culture - Blood, Arm, Left [[id number]]  (Normal) Collected: 03/07/24 1529    Lab Status: Preliminary result Specimen: Blood from Arm, Left Updated: 03/10/24 1645     Blood Culture No growth at 3 days    Blood Culture - Blood, Hand, Left [[id number]]  (Normal) Collected: 03/07/24 1505    Lab Status: Preliminary result Specimen: Blood from Hand, Left Updated: 03/10/24 1645     Blood Culture No growth at 3 days          Pain Management Panel           No data to display                Pertinent Radiology Results:  Imaging Results (All)       Procedure Component Value Units Date/Time    XR Abdomen Flat & Upright [[id number]] Collected: 03/09/24 1348     Updated: 03/09/24 1351    Narrative:      VERIFICATION OBSERVER NAME: Yakov Singer MD.     Comparison: Study dated 1/3/2024   "   TECHNIQUE, HISTORY, FINDINGS:      History: Bowel obstruction.        2 views of the abdomen were performed.  Contrast noted in RIGHT colon.   Dilated small bowel loops are visualized consistent with partial  obstruction.  Status post cholecystectomy.       Impression:      Dilated small bowel loops consistent with partial obstruction.              [id number]     Zip code: [id number]                 This report was finalized on 3/9/2024 1:49 PM by Dr. Yakov Singer MD.       CT Abdomen Pelvis With Contrast [[id number]] Collected: 03/08/24 1153     Updated: 03/08/24 1159    Narrative:      EXAM:    CT Abdomen and Pelvis With Intravenous Contrast     EXAM DATE:    3/8/2024 11:34 AM     CLINICAL HISTORY:    SBO, colon cancer with mets; K56.609-Unspecified intestinal  obstruction, unspecified as to partial versus complete obstruction;  D70.1-Agranulocytosis secondary to cancer chemotherapy; T45.3M0D-Fvuuweq  effect of antineoplastic and immunosuppressive drugs, initial encounter     TECHNIQUE:    Axial computed tomography images of the abdomen and pelvis with  intravenous contrast.  Sagittal and coronal reformatted images were  created and reviewed.  This CT exam was performed using one or more of  the following dose reduction techniques:  automated exposure control,  adjustment of the mA and/or kV according to patient size, and/or use of  iterative reconstruction technique.     COMPARISON:    CT Abdomen Pelvis dated 3/7/2024     FINDINGS:    LUNG BASES:  Unremarkable as visualized.  No mass.  No consolidation.      ABDOMEN:    LIVER:  Unremarkable as visualized.  No mass.    GALLBLADDER AND BILE DUCTS:  Unremarkable as visualized.  No calcified  stones.  No ductal dilation.    PANCREAS:  Unremarkable as visualized.  No mass.  No ductal dilation.    SPLEEN:  Unremarkable as visualized.  No splenomegaly.    ADRENALS:  Unremarkable as visualized.  No mass.    KIDNEYS AND URETERS:  Unremarkable as visualized.  No solid mass.   No  hydronephrosis.    STOMACH AND BOWEL:  Oral contrast is seen within some very  decompressed mid and distal ileal small bowel loops suggestive of likely  probably high-grade partial obstruction.  No mucosal thickening.      PELVIS:    APPENDIX:  No findings to suggest acute appendicitis.    BLADDER:  Unremarkable as visualized.  No mass.    REPRODUCTIVE:  Unremarkable as visualized.      ABDOMEN and PELVIS:    INTRAPERITONEAL SPACE:  Unremarkable as visualized.  No free air.  No  significant fluid collection.    BONES/JOINTS:  No acute fracture.  No dislocation.    SOFT TISSUES:  New soft tissue density lesion from 11/9/2022 in the  left lower abdomen anterior to the psoas muscle measuring 1.5 cm.    VASCULATURE:  Unremarkable as visualized.  No abdominal aortic  aneurysm.    LYMPH NODES:  Unremarkable as visualized.  No enlarged lymph nodes.       Impression:      1.  Oral contrast is seen within some very decompressed mid and distal  ileal small bowel loops suggestive of likely probably high-grade partial  obstruction.  2.  New soft tissue density lesion from 11/9/2022 in the left lower  abdomen anterior to the psoas muscle measuring 1.5 cm.        This report was finalized on 3/8/2024 11:57 AM by Dr. Ruddy Blanco MD.       CT Chest With Contrast Diagnostic [[id number]] Collected: 03/08/24 1152     Updated: 03/08/24 1157    Narrative:      EXAM:    CT Chest With Intravenous Contrast     EXAM DATE:    3/8/2024 11:33 AM     CLINICAL HISTORY:    SBO, colon cancer with mets; K56.609-Unspecified intestinal  obstruction, unspecified as to partial versus complete obstruction;  D70.1-Agranulocytosis secondary to cancer chemotherapy; T45.5H3E-Gujlmzq  effect of antineoplastic and immunosuppressive drugs, initial encounter     TECHNIQUE:    Axial computed tomography images of the chest with intravenous  contrast.  Sagittal and coronal reformatted images were created and  reviewed.  This CT exam was performed using one or  more of the following  dose reduction techniques:  automated exposure control, adjustment of  the mA and/or kV according to patient size, and/or use of iterative  reconstruction technique.     COMPARISON:    4/7/2023     FINDINGS:    LUNGS AND PLEURAL SPACES:  New left upper lobe 1.2 cm pulmonary  nodule.  No consolidation.  No pneumothorax.  No significant effusion.    HEART:  Unremarkable as visualized.  No cardiomegaly.  No significant  pericardial effusion.  No significant coronary artery calcifications.    BONES/JOINTS:  Unremarkable as visualized.  No acute fracture.  No  dislocation.    SOFT TISSUES:  Unremarkable as visualized.    VASCULATURE:  Unremarkable as visualized.  No thoracic aortic  aneurysm.    LYMPH NODES:  Unremarkable as visualized.  No enlarged lymph nodes.    TUBES, LINES AND DEVICES:  Nasogastric tube tip in the stomach.       Impression:        New left upper lobe 1.2 cm pulmonary nodule.        This report was finalized on 3/8/2024 11:53 AM by Dr. Ruddy Blanco MD.       XR Chest AP [[id number]] Collected: 03/07/24 1649     Updated: 03/07/24 1652    Narrative:      EXAM:    XR Chest, 1 View     EXAM DATE:    3/7/2024 4:48 PM     CLINICAL HISTORY:    ng tube; K56.609-Unspecified intestinal obstruction, unspecified as to  partial versus complete obstruction; D70.1-Agranulocytosis secondary to  cancer chemotherapy; T45.2Q9E-Mjsxihe effect of antineoplastic and  immunosuppressive drugs, initial encounter     TECHNIQUE:    Frontal view of the chest.     COMPARISON:    3/7/2024     FINDINGS:    LUNGS AND PLEURAL SPACES:  Unremarkable as visualized.  No  consolidation.  No pneumothorax.    HEART:  Unremarkable as visualized.  No cardiomegaly.    MEDIASTINUM:  Unremarkable as visualized.  Normal mediastinal contour.    BONES/JOINTS:  Unremarkable as visualized.  No acute fracture.    TUBES, LINES AND DEVICES:  Nasogastric tube tip in the stomach.  Right  Port-A-Cath with tip in SVC.        Impression:        No acute cardiopulmonary findings identified.        This report was finalized on 3/7/2024 4:50 PM by Dr. Ruddy Blanco MD.       XR Chest 1 View [[id number]] Collected: 03/07/24 1224     Updated: 03/07/24 1227    Narrative:      EXAM:    XR Chest, 1 View     EXAM DATE:    3/7/2024 11:42 AM     CLINICAL HISTORY:    Chest Pain Protocol     TECHNIQUE:    Frontal view of the chest.     COMPARISON:    3/14/2024     FINDINGS:    LUNGS AND PLEURAL SPACES:  Unremarkable as visualized.  No  consolidation.  No pneumothorax.    HEART:  Unremarkable as visualized.  No cardiomegaly.    MEDIASTINUM:  Unremarkable as visualized.  Normal mediastinal contour.    BONES/JOINTS:  Unremarkable as visualized.  No acute fracture.    TUBES, LINES AND DEVICES:  Right Port-A-Cath with tip in SVC.       Impression:        No acute cardiopulmonary findings identified.        This report was finalized on 3/7/2024 12:25 PM by Dr. Ruddy Blanco MD.       CT Abdomen Pelvis Without Contrast [[id number]] Collected: 03/07/24 1204     Updated: 03/07/24 1207    Narrative:      EXAM:    CT Abdomen and Pelvis Without Intravenous Contrast     EXAM DATE:    3/7/2024 11:45 AM     CLINICAL HISTORY:    Abdominal pain, acute, nonlocalized     TECHNIQUE:    Axial computed tomography images of the abdomen and pelvis without  intravenous contrast.  Sagittal and coronal reformatted images were  created and reviewed.  This CT exam was performed using one or more of  the following dose reduction techniques:  automated exposure control,  adjustment of the mA and/or kV according to patient size, and/or use of  iterative reconstruction technique.     COMPARISON:    No relevant prior studies available.     FINDINGS:    LUNG BASES:  Unremarkable as visualized.  No mass.  No consolidation.      ABDOMEN:    LIVER:  Unremarkable as visualized.    GALLBLADDER AND BILE DUCTS:  Cholecystectomy clips.  No ductal  dilation.    PANCREAS:  Unremarkable as  visualized.  No ductal dilation.    SPLEEN:  Unremarkable as visualized.  No splenomegaly.    ADRENALS:  Unremarkable as visualized.  No mass.    KIDNEYS AND URETERS:  Unremarkable as visualized.  No obstructing  stones.  No hydronephrosis.    STOMACH AND BOWEL:  Dilated fluid-filled small bowel loops with  decompressed mid and distal ileum suggestive of small bowel obstruction.   No mucosal thickening.      PELVIS:    APPENDIX:  No findings to suggest acute appendicitis.    BLADDER:  Unremarkable as visualized.  No stones.    REPRODUCTIVE:  Unremarkable as visualized.      ABDOMEN and PELVIS:    INTRAPERITONEAL SPACE:  Unremarkable as visualized.  No free air.  No  significant fluid collection.    BONES/JOINTS:  No acute fracture.  No dislocation.    SOFT TISSUES:  Unremarkable as visualized.    VASCULATURE:  Unremarkable as visualized.  No abdominal aortic  aneurysm.    LYMPH NODES:  Unremarkable as visualized.  No enlarged lymph nodes.       Impression:        Dilated fluid-filled small bowel loops with decompressed mid and  distal ileum suggestive of small bowel obstruction.        This report was finalized on 3/7/2024 12:05 PM by Dr. Ruddy Blanco MD.       CT Head Without Contrast [[id number]] Collected: 03/07/24 1204     Updated: 03/07/24 1206    Narrative:      EXAM:    CT Head Without Intravenous Contrast     EXAM DATE:    3/7/2024 11:45 AM     CLINICAL HISTORY:    Dizziness, persistent/recurrent, cardiac or vascular cause suspected     TECHNIQUE:    Axial computed tomography images of the head/brain without intravenous  contrast.  Sagittal and coronal reformatted images were created and  reviewed.  This CT exam was performed using one or more of the following  dose reduction techniques:  automated exposure control, adjustment of  the mA and/or kV according to patient size, and/or use of iterative  reconstruction technique.     COMPARISON:    No relevant prior studies available.     FINDINGS:    BRAIN AND  EXTRA-AXIAL SPACES:  Unremarkable as visualized.  No  hemorrhage.  No significant white matter disease.  No edema.  No  ventriculomegaly.    BONES/JOINTS:  Unremarkable as visualized.  No acute fracture.    SOFT TISSUES:  Unremarkable as visualized.    SINUSES:  Unremarkable as visualized.  No acute sinusitis.    MASTOID AIR CELLS:  Unremarkable as visualized.  No mastoid effusion.       Impression:        Unremarkable exam demonstrating no CT evidence of acute intracranial  findings.        This report was finalized on 3/7/2024 12:04 PM by Dr. Ruddy Blanco MD.               Test Results Pending at Discharge:  Pending Labs       Order Current Status    Blood Culture - Blood, Arm, Left Preliminary result    Blood Culture - Blood, Hand, Left Preliminary result            Discharge Disposition/Discharge Medications/Discharge Appointments     Discharge Disposition:   Home or Self Care    Condition at Discharge:  Stable   DME Prescribed at Discharge:  none    Discharge Diet:  Diet Instructions       Diet: Regular/House Diet; Soft to Chew (NDD 3); Ground Meat; Thin (IDDSI 0)      Discharge Diet: Regular/House Diet    Texture: Soft to Chew (NDD 3)    Soft to Chew: Ground Meat    Fluid Consistency: Thin (IDDSI 0)            Discharge Activity:  Activity Instructions       Activity as Tolerated              Code Status While Inpatient:  Code Status and Medical Interventions:   Ordered at: 03/07/24 2647     Level Of Support Discussed With:    Patient     Code Status (Patient has no pulse and is not breathing):    CPR (Attempt to Resuscitate)     Medical Interventions (Patient has pulse or is breathing):    Full Support       Discharge Medications:     Discharge Medications        New Medications        Instructions Start Date   potassium & sodium phosphates 280-160-250 MG pack packet  Commonly known as: PHOS-NAK   2 packets, Oral, 2 Times Daily Before Meals             Continue These Medications        Instructions Start  Date   HYDROcodone-acetaminophen 5-325 MG per tablet  Commonly known as: NORCO   1-2 tablets, Oral, Every 8 Hours PRN      lactulose 10 GM/15ML solution  Commonly known as: CHRONULAC   20 g, Oral, 2 Times Daily PRN      lidocaine-prilocaine 2.5-2.5 % cream  Commonly known as: EMLA   Apply to port-a-cath site 30 minutes prior to arrival at infusion center. Cover with plastic wrap.      metoclopramide 10 MG tablet  Commonly known as: REGLAN   10 mg, Oral, 3 Times Daily PRN      ondansetron 8 MG tablet  Commonly known as: ZOFRAN   8 mg, Oral, Every 8 Hours PRN      pantoprazole 40 MG EC tablet  Commonly known as: PROTONIX   40 mg, Oral, Daily      sennosides-docusate 8.6-50 MG per tablet  Commonly known as: PERICOLACE   2 tablets, Oral, 2 Times Daily PRN             Stop These Medications      prochlorperazine 10 MG tablet  Commonly known as: COMPAZINE              Discharge Appointments:  Your Scheduled Appointments      Mar 19, 2024  8:30 AM  LAB with [place] [facility]  [facility] HEMATOLOGY & ONCOLOGY ([place]) [address]  [place] [address]  [phone]   Bring ID and  Insurance cards.  If you received paperwork in the mail, fill it out and bring it with them.         Mar 19, 2024  9:00 AM  FOLLOW UP with ROWENA Hanks MD  [facility] HEMATOLOGY & ONCOLOGY ([place]) [address] [facility] [address]  [place] [address]  [phone]   Bring ID and  Insurance cards.  New patients are to arrive 30 minutes prior to the appointement.  If you received paperwork in the mail, fill it out and bring it with them.  All other appt's need to be here 15 minutes early.          Mar 19, 2024  9:30 AM  INFUSION with  [facility]  [facility] [place] [facility] ([place]) [address]  [place] [address]  [phone]        Mar 21, 2024 11:45 AM  INJECTION with  [facility] IN[facility]AIR 27  [facility] [address]  [place]  [address]  [phone]               Additional Instructions for the Follow-ups that You Need to Schedule       Discharge Follow-up with PCP   As directed       Currently Documented PCP:    Vimal Moreno MD    PCP Phone Number:    [phone]     Follow Up Details: 1 week (partial SBO: electrolyte check, symptoms)                .Pending Labs at Discharge:  Pending Labs       Order Current Status    Blood Culture - Blood, Arm, Left Preliminary result    Blood Culture - Blood, Hand, Left Preliminary result               Katayoun Behbahani, MD  Hospitalist Service -- [facility]       03/11/24  09:33 EDT    Discharge Time: <30 minutes

## 2024-03-11 NOTE — PROGRESS NOTES
Date:  03/11/24    CC: Follow Up Metastatic Colon Cancer    HPI: Ms. Carney is sitting up in bed this morning. She was advanced to regular diet this morning and reports taking a few bites of which she tolerated well. She reports mild nausea without vomiting. She has not had a bowel movement for 3 days. She reports mild, intermittent abdominal pain which is at her baseline. She received a dose of Neupogen over the weekend and WBC/ANC has improved. She is hopeful to go home today. She is without any other specific complaints at this time.     Review of Systems  A comprehensive 14 point review of systems was performed.  Significant findings as mentioned above.  All other systems reviewed and are negative.   Objective     Vital Signs  Vitals:    03/11/24 0642   BP: 115/63   Pulse: 82   Resp: 18   Temp: 97.9 °F (36.6 °C)   SpO2:         Physical Exam:  General: Well developed, well nourished, alert and oriented x 3, in no acute distress.   Head: ATNC   Eyes: PERRL, No evidence of conjunctivitis.   Nose: No nasal discharge.   Mouth: Oral mucosal membranes moist. No oral ulceration or hemorrhages.   Neck: Neck supple. No thyromegaly. No JVD.   Lungs: Clear in all fields to A&P without rales, rhonchi or wheezing.   Heart: S1, S2. Regular rate and rhythm. No murmurs, rubs, or gallops.   Abdomen: Soft. Bowel sounds are hypoactive. Nontender with palpation.   Extremities: No clubbing, cyanosis or edema bilaterally.   Integumentary: Warm, dry, intact.   Neurologic: Grossly non-focal exam.     Labs / Studies:  Lab Results   Component Value Date    WBC 5.89 03/10/2024    HGB 9.1 (L) 03/10/2024    HCT 28.5 (L) 03/10/2024    MCV 90.2 03/10/2024    RDW 20.9 (H) 03/10/2024     03/10/2024    NEUTRORELPCT 65.7 03/07/2024    LYMPHORELPCT 25.2 03/07/2024    MONORELPCT 6.7 03/07/2024    EOSRELPCT 1.2 03/07/2024    BASORELPCT 0.6 03/07/2024    NEUTROABS 0.71 (L) 03/08/2024    LYMPHSABS 0.41 (L) 03/07/2024       Lab Results    Component Value Date     03/11/2024    K 3.7 03/11/2024    CO2 24.6 03/11/2024     03/11/2024    BUN 7 03/11/2024    CREATININE 0.58 03/11/2024    EGFRIFNONA 79 09/10/2021    GLUCOSE 96 03/11/2024    CALCIUM 8.5 (L) 03/11/2024    ALKPHOS 103 03/07/2024    AST 17 03/07/2024    ALT 15 03/07/2024    BILITOT 0.9 03/07/2024    ALBUMIN 3.4 (L) 03/07/2024    PROTEINTOT 5.1 (L) 03/07/2024    MG 1.9 03/11/2024    PHOS 3.3 03/11/2024       Lab Results   Component Value Date    FERRITIN 348.70 (H) 03/07/2024    IRON 61 03/07/2024    TIBC 224 (L) 03/07/2024    LABIRON 27 03/07/2024       Imaging:  XR Abdomen Flat & Upright    Result Date: 3/9/2024  VERIFICATION OBSERVER NAME: Yakov Singer MD.  Comparison: Study dated 1/3/2024  TECHNIQUE, HISTORY, FINDINGS:  History: Bowel obstruction.   2 views of the abdomen were performed.  Contrast noted in RIGHT colon. Dilated small bowel loops are visualized consistent with partial obstruction.  Status post cholecystectomy.      Dilated small bowel loops consistent with partial obstruction.     [id number]  Zip code: [id number]      This report was finalized on 3/9/2024 1:49 PM by Dr. Yakov Singer MD.      CT Abdomen Pelvis With Contrast    Result Date: 3/8/2024  EXAM:   CT Abdomen and Pelvis With Intravenous Contrast  EXAM DATE:   3/8/2024 11:34 AM  CLINICAL HISTORY:   SBO, colon cancer with mets; K56.609-Unspecified intestinal obstruction, unspecified as to partial versus complete obstruction; D70.1-Agranulocytosis secondary to cancer chemotherapy; T45.5R3Z-Jotcdqf effect of antineoplastic and immunosuppressive drugs, initial encounter  TECHNIQUE:   Axial computed tomography images of the abdomen and pelvis with intravenous contrast.  Sagittal and coronal reformatted images were created and reviewed.  This CT exam was performed using one or more of the following dose reduction techniques:  automated exposure control, adjustment of the mA and/or kV according to patient size,  and/or use of iterative reconstruction technique.  COMPARISON:   CT Abdomen Pelvis dated 3/7/2024  FINDINGS:   LUNG BASES:  Unremarkable as visualized.  No mass.  No consolidation.   ABDOMEN:   LIVER:  Unremarkable as visualized.  No mass.   GALLBLADDER AND BILE DUCTS:  Unremarkable as visualized.  No calcified stones.  No ductal dilation.   PANCREAS:  Unremarkable as visualized.  No mass.  No ductal dilation.   SPLEEN:  Unremarkable as visualized.  No splenomegaly.   ADRENALS:  Unremarkable as visualized.  No mass.   KIDNEYS AND URETERS:  Unremarkable as visualized.  No solid mass.  No hydronephrosis.   STOMACH AND BOWEL:  Oral contrast is seen within some very decompressed mid and distal ileal small bowel loops suggestive of likely probably high-grade partial obstruction.  No mucosal thickening.   PELVIS:   APPENDIX:  No findings to suggest acute appendicitis.   BLADDER:  Unremarkable as visualized.  No mass.   REPRODUCTIVE:  Unremarkable as visualized.   ABDOMEN and PELVIS:   INTRAPERITONEAL SPACE:  Unremarkable as visualized.  No free air.  No significant fluid collection.   BONES/JOINTS:  No acute fracture.  No dislocation.   SOFT TISSUES:  New soft tissue density lesion from 11/9/2022 in the left lower abdomen anterior to the psoas muscle measuring 1.5 cm.   VASCULATURE:  Unremarkable as visualized.  No abdominal aortic aneurysm.   LYMPH NODES:  Unremarkable as visualized.  No enlarged lymph nodes.      1.  Oral contrast is seen within some very decompressed mid and distal ileal small bowel loops suggestive of likely probably high-grade partial obstruction. 2.  New soft tissue density lesion from 11/9/2022 in the left lower abdomen anterior to the psoas muscle measuring 1.5 cm.   This report was finalized on 3/8/2024 11:57 AM by Dr. Ruddy Blanco MD.      CT Chest With Contrast Diagnostic    Result Date: 3/8/2024  EXAM:   CT Chest With Intravenous Contrast  EXAM DATE:   3/8/2024 11:33 AM  CLINICAL HISTORY:    SBO, colon cancer with mets; K56.609-Unspecified intestinal obstruction, unspecified as to partial versus complete obstruction; D70.1-Agranulocytosis secondary to cancer chemotherapy; T45.0Y3T-Acoewzh effect of antineoplastic and immunosuppressive drugs, initial encounter  TECHNIQUE:   Axial computed tomography images of the chest with intravenous contrast.  Sagittal and coronal reformatted images were created and reviewed.  This CT exam was performed using one or more of the following dose reduction techniques:  automated exposure control, adjustment of the mA and/or kV according to patient size, and/or use of iterative reconstruction technique.  COMPARISON:   4/7/2023  FINDINGS:   LUNGS AND PLEURAL SPACES:  New left upper lobe 1.2 cm pulmonary nodule.  No consolidation.  No pneumothorax.  No significant effusion.   HEART:  Unremarkable as visualized.  No cardiomegaly.  No significant pericardial effusion.  No significant coronary artery calcifications.   BONES/JOINTS:  Unremarkable as visualized.  No acute fracture.  No dislocation.   SOFT TISSUES:  Unremarkable as visualized.   VASCULATURE:  Unremarkable as visualized.  No thoracic aortic aneurysm.   LYMPH NODES:  Unremarkable as visualized.  No enlarged lymph nodes.   TUBES, LINES AND DEVICES:  Nasogastric tube tip in the stomach.        New left upper lobe 1.2 cm pulmonary nodule.   This report was finalized on 3/8/2024 11:53 AM by Dr. Ruddy Blanco MD.      XR Chest AP    Result Date: 3/7/2024  EXAM:   XR Chest, 1 View  EXAM DATE:   3/7/2024 4:48 PM  CLINICAL HISTORY:   ng tube; K56.609-Unspecified intestinal obstruction, unspecified as to partial versus complete obstruction; D70.1-Agranulocytosis secondary to cancer chemotherapy; T45.4Q7N-Tukvlbn effect of antineoplastic and immunosuppressive drugs, initial encounter  TECHNIQUE:   Frontal view of the chest.  COMPARISON:   3/7/2024  FINDINGS:   LUNGS AND PLEURAL SPACES:  Unremarkable as visualized.  No  consolidation.  No pneumothorax.   HEART:  Unremarkable as visualized.  No cardiomegaly.   MEDIASTINUM:  Unremarkable as visualized.  Normal mediastinal contour.   BONES/JOINTS:  Unremarkable as visualized.  No acute fracture.   TUBES, LINES AND DEVICES:  Nasogastric tube tip in the stomach.  Right Port-A-Cath with tip in SVC.        No acute cardiopulmonary findings identified.   This report was finalized on 3/7/2024 4:50 PM by Dr. Ruddy Blanco MD.      XR Chest 1 View    Result Date: 3/7/2024  EXAM:   XR Chest, 1 View  EXAM DATE:   3/7/2024 11:42 AM  CLINICAL HISTORY:   Chest Pain Protocol  TECHNIQUE:   Frontal view of the chest.  COMPARISON:   3/14/2024  FINDINGS:   LUNGS AND PLEURAL SPACES:  Unremarkable as visualized.  No consolidation.  No pneumothorax.   HEART:  Unremarkable as visualized.  No cardiomegaly.   MEDIASTINUM:  Unremarkable as visualized.  Normal mediastinal contour.   BONES/JOINTS:  Unremarkable as visualized.  No acute fracture.   TUBES, LINES AND DEVICES:  Right Port-A-Cath with tip in SVC.        No acute cardiopulmonary findings identified.   This report was finalized on 3/7/2024 12:25 PM by Dr. Ruddy Blanco MD.      CT Abdomen Pelvis Without Contrast    Result Date: 3/7/2024  EXAM:   CT Abdomen and Pelvis Without Intravenous Contrast  EXAM DATE:   3/7/2024 11:45 AM  CLINICAL HISTORY:   Abdominal pain, acute, nonlocalized  TECHNIQUE:   Axial computed tomography images of the abdomen and pelvis without intravenous contrast.  Sagittal and coronal reformatted images were created and reviewed.  This CT exam was performed using one or more of the following dose reduction techniques:  automated exposure control, adjustment of the mA and/or kV according to patient size, and/or use of iterative reconstruction technique.  COMPARISON:   No relevant prior studies available.  FINDINGS:   LUNG BASES:  Unremarkable as visualized.  No mass.  No consolidation.   ABDOMEN:   LIVER:  Unremarkable as  visualized.   GALLBLADDER AND BILE DUCTS:  Cholecystectomy clips.  No ductal dilation.   PANCREAS:  Unremarkable as visualized.  No ductal dilation.   SPLEEN:  Unremarkable as visualized.  No splenomegaly.   ADRENALS:  Unremarkable as visualized.  No mass.   KIDNEYS AND URETERS:  Unremarkable as visualized.  No obstructing stones.  No hydronephrosis.   STOMACH AND BOWEL:  Dilated fluid-filled small bowel loops with decompressed mid and distal ileum suggestive of small bowel obstruction.  No mucosal thickening.   PELVIS:   APPENDIX:  No findings to suggest acute appendicitis.   BLADDER:  Unremarkable as visualized.  No stones.   REPRODUCTIVE:  Unremarkable as visualized.   ABDOMEN and PELVIS:   INTRAPERITONEAL SPACE:  Unremarkable as visualized.  No free air.  No significant fluid collection.   BONES/JOINTS:  No acute fracture.  No dislocation.   SOFT TISSUES:  Unremarkable as visualized.   VASCULATURE:  Unremarkable as visualized.  No abdominal aortic aneurysm.   LYMPH NODES:  Unremarkable as visualized.  No enlarged lymph nodes.        Dilated fluid-filled small bowel loops with decompressed mid and distal ileum suggestive of small bowel obstruction.   This report was finalized on 3/7/2024 12:05 PM by Dr. Ruddy Blanco MD.      CT Head Without Contrast    Result Date: 3/7/2024  EXAM:   CT Head Without Intravenous Contrast  EXAM DATE:   3/7/2024 11:45 AM  CLINICAL HISTORY:   Dizziness, persistent/recurrent, cardiac or vascular cause suspected  TECHNIQUE:   Axial computed tomography images of the head/brain without intravenous contrast.  Sagittal and coronal reformatted images were created and reviewed.  This CT exam was performed using one or more of the following dose reduction techniques:  automated exposure control, adjustment of the mA and/or kV according to patient size, and/or use of iterative reconstruction technique.  COMPARISON:   No relevant prior studies available.  FINDINGS:   BRAIN AND EXTRA-AXIAL  SPACES:  Unremarkable as visualized.  No hemorrhage.  No significant white matter disease.  No edema.  No ventriculomegaly.   BONES/JOINTS:  Unremarkable as visualized.  No acute fracture.   SOFT TISSUES:  Unremarkable as visualized.   SINUSES:  Unremarkable as visualized.  No acute sinusitis.   MASTOID AIR CELLS:  Unremarkable as visualized.  No mastoid effusion.        Unremarkable exam demonstrating no CT evidence of acute intracranial findings.   This report was finalized on 3/7/2024 12:04 PM by Dr. Ruddy Blanco MD.      FL Surgery Fluoro    Result Date: 2/14/2024  EXAMINATION: FL SURGERY FLUORO-  CLINICAL INDICATION: port; C18.7-Malignant neoplasm of sigmoid colon  COMPARISON: None available.  Total fluoroscopy time 7 seconds.  Fluoroscopy was provided and 1 image was obtained as port was placed.  For a full procedural report, please see the report provided by the performing physician.   This report was finalized on 2/14/2024 11:48 AM by Dr. Edmond Vasquez MD.      XR Chest AP    Result Date: 2/14/2024  XR CHEST AP-  CLINICAL INDICATION: post op; C18.7-Malignant neoplasm of sigmoid colon   COMPARISON: 9/10/2021  TECHNIQUE: Single frontal view of the chest.  FINDINGS:  LUNGS: Right-sided port has been placed. Tip is in the superior vena cava. No pneumothorax  HEART AND MEDIASTINUM: Heart and mediastinal contours are unremarkable   SKELETON: Bony and soft tissue structures are unremarkable.        Port placed. Tip in the superior vena cava. No evidence of pneumothorax    This report was finalized on 2/14/2024 8:38 AM by Dr. Edmond Vasquez MD.         Assessment & Plan:  Vangie Carney is a 53 y.o. year-old female with     1. Metastatic colon cancer  2. Small bowel obstruction  - Please see Dr. Hanks's most recent follow up note from 03/05/2024 for full details regarding oncology history.   - Initially diagnosed with stage IIIB colon adenocarcinoma in February 2023 and s/p surgical resection of the sigmoid colon  and upper rectum on 03/14/2023. Seen by medical oncology March 2023 and recommended adjuvant treatment with six months of s0fxpgem FOLFOX (or XELOX). Patient was lost to follow up. In October 2023, she began to experience recurrent constipation and back pain.  - Repeat CT imaging in January 2024 consistent with recurrent and metastatic disease with development of multiple sot tissue implants within the omentum/peritoneum and possibly at least one metastatic lung lesion. This is consistent with stage IV and incurable disease. Long discussion was had with patient (and her family) regarding the diagnosis, updated staging and it's prognosis. It was recommended that she receive [id number] 5-FU/leucovorin. She was referred for powerport placement and began treatment with cycle 1 on 02/19/24.   - Most recently, she has been struggling with nausea (which is typical for her after chemotherapy) but she states that it seemed to be somewhat worse after receiving IV iron (received IV Feraheme 2/16/24 and 2/19/24) due to history of TOBIAS with malabsorption of oral iron. She reports that she had a change in bowel habits.  - She presented and received cycle 2 5-FU/leucovorin on 03/05/24. She then came for bulb unhook in the infusion center on 03/07/24 and was with reports of dizziness and nausea. Supportive care with 1 L NS was given. She denied improvement in symptoms and was recommended to go to the ED for further evaluation.  - She underwent ED workup with labs, imaging (CT AP without IV contrast) that revealed dilated fluid-filled small bowel loops with compressed mid and distal ileum suggestive of small bowel obstruction. Felt that obstruction may be secondary to omental metastasis. NG tube was placed. General surgery consulted and following, appreciate assistance.  - Repeat CT imaging of CAP with contrast showed very decompressed mid and distal ileal small bowel loops suggestive of likely probably high-grade partial obstruction.  However, metastatic colon cancer appears stable from prior.  - She will need follow up in the outpatient clinic with Dr. Hanks after discharge (scheduled for 03/19/2024 @8:30 am - on day of planned cycle 3 5-FU/leucovorin).    3. Neutropenia  - WBC dropped to 1.39 and she was given a one time dose of Neupogen. WBC count has recovered and improved to 5.89. Will continue to monitor in outpatient clinic.    Electronically signed by: ANURAG Hooker , March 11, 2024 09:32 EDT

## 2024-03-12 LAB
BACTERIA SPEC AEROBE CULT: NORMAL
BACTERIA SPEC AEROBE CULT: NORMAL

## 2024-03-12 NOTE — OUTREACH NOTE
Prep Survey      Flowsheet Row Responses   [facility] discharged from? [facility]   Is LACE score < 7 ? No   Eligibility Readm Mgmt   Discharge diagnosis Small bowel obstruction   Does the patient have one of the following disease processes/diagnoses(primary or secondary)? Other   Does the patient have Home health ordered? No   Is there a DME ordered? No   Prep survey completed? Yes            PAYTON JOHNSON - Registered Nurse

## 2024-03-13 ENCOUNTER — TELEPHONE (OUTPATIENT)
Dept: ONCOLOGY | Facility: CLINIC | Age: 54
End: 2024-03-13
Payer: COMMERCIAL

## 2024-03-13 NOTE — TELEPHONE ENCOUNTER
RN spoke with patient spouse Red. He states that Vangie is still having nausea and vomiting. Patient is agreeable to try sancuso, sample provided by office today. No other questions or concerns at this time.

## 2024-03-13 NOTE — TELEPHONE ENCOUNTER
Spouse called stating PT was nauseous. Nausea started about four days ago. PT has been vomiting but not consistent. Claims no other symptoms just nausea.

## 2024-03-15 ENCOUNTER — READMISSION MANAGEMENT (OUTPATIENT)
Dept: CALL CENTER | Facility: HOSPITAL | Age: 54
End: 2024-03-15
Payer: COMMERCIAL

## 2024-03-15 NOTE — OUTREACH NOTE
Medical Week 1 Survey      Flowsheet Row Responses   [facility] patient discharged from? [facility]   Does the patient have one of the following disease processes/diagnoses(primary or secondary)? Other   Week 1 attempt successful? Yes   Call start time 1356   Call end time 1358   Discharge diagnosis Small bowel obstruction   Meds reviewed with patient/caregiver? Yes   Is the patient having any side effects they believe may be caused by any medication additions or changes? No   Does the patient have all medications ordered at discharge? Yes   Is the patient taking all medications as directed (includes completed medication regime)? Yes   Does the patient have a primary care provider?  Yes   Does the patient have an appointment with their PCP within 7 days of discharge? Greater than 7 days   Nursing Interventions Verified appointment date/time/provider   Has the patient kept scheduled appointments due by today? Yes   Psychosocial issues? No   Did the patient receive a copy of their discharge instructions? Yes   Nursing interventions Reviewed instructions with patient   What is the patient's perception of their health status since discharge? Improving   Is the patient/caregiver able to teach back signs and symptoms related to disease process for when to call PCP? Yes   Is the patient/caregiver able to teach back signs and symptoms related to disease process for when to call 911? Yes   Is the patient/caregiver able to teach back the hierarchy of who to call/visit for symptoms/problems? PCP, Specialist, Home health nurse, Urgent Care, ED, 911 Yes   If the patient is a current smoker, are they able to teach back resources for cessation? Smoking cessation medications   Week 1 call completed? Yes   Would this patient benefit from a Referral to Amb Social Work? No   Is the patient interested in additional calls from an ambulatory ? No   Call end time 1358            EDVIN GUAMAN - Registered Nurse

## 2024-03-19 ENCOUNTER — INFUSION (OUTPATIENT)
Dept: ONCOLOGY | Facility: HOSPITAL | Age: 54
End: 2024-03-19
Payer: COMMERCIAL

## 2024-03-19 ENCOUNTER — LAB (OUTPATIENT)
Dept: ONCOLOGY | Facility: CLINIC | Age: 54
End: 2024-03-19
Payer: COMMERCIAL

## 2024-03-19 ENCOUNTER — OFFICE VISIT (OUTPATIENT)
Dept: ONCOLOGY | Facility: CLINIC | Age: 54
End: 2024-03-19
Payer: COMMERCIAL

## 2024-03-19 ENCOUNTER — APPOINTMENT (OUTPATIENT)
Dept: ONCOLOGY | Facility: CLINIC | Age: 54
End: 2024-03-19
Payer: COMMERCIAL

## 2024-03-19 VITALS
DIASTOLIC BLOOD PRESSURE: 64 MMHG | RESPIRATION RATE: 18 BRPM | SYSTOLIC BLOOD PRESSURE: 92 MMHG | HEART RATE: 60 BPM | BODY MASS INDEX: 23.65 KG/M2 | OXYGEN SATURATION: 96 % | WEIGHT: 151 LBS | TEMPERATURE: 97.3 F

## 2024-03-19 VITALS
OXYGEN SATURATION: 95 % | RESPIRATION RATE: 18 BRPM | SYSTOLIC BLOOD PRESSURE: 107 MMHG | DIASTOLIC BLOOD PRESSURE: 66 MMHG | TEMPERATURE: 97.9 F | HEART RATE: 120 BPM

## 2024-03-19 DIAGNOSIS — C18.7 MALIGNANT NEOPLASM OF SIGMOID COLON: Primary | ICD-10-CM

## 2024-03-19 DIAGNOSIS — C18.7 MALIGNANT NEOPLASM OF SIGMOID COLON: ICD-10-CM

## 2024-03-19 LAB
ALBUMIN SERPL-MCNC: 3.7 G/DL (ref 3.5–5.2)
ALBUMIN/GLOB SERPL: 1.8 G/DL
ALP SERPL-CCNC: 136 U/L (ref 39–117)
ALT SERPL W P-5'-P-CCNC: 29 U/L (ref 1–33)
ANION GAP SERPL CALCULATED.3IONS-SCNC: 16.4 MMOL/L (ref 5–15)
ANISOCYTOSIS BLD QL: NORMAL
AST SERPL-CCNC: 25 U/L (ref 1–32)
BASOPHILS # BLD AUTO: 0.07 10*3/MM3 (ref 0–0.2)
BASOPHILS NFR BLD AUTO: 1.1 % (ref 0–1.5)
BILIRUB SERPL-MCNC: 0.7 MG/DL (ref 0–1.2)
BUN SERPL-MCNC: 18 MG/DL (ref 6–20)
BUN/CREAT SERPL: 24.7 (ref 7–25)
CALCIUM SPEC-SCNC: 8.8 MG/DL (ref 8.6–10.5)
CEA SERPL-MCNC: 1.9 NG/ML
CHLORIDE SERPL-SCNC: 85 MMOL/L (ref 98–107)
CO2 SERPL-SCNC: 31.6 MMOL/L (ref 22–29)
CREAT SERPL-MCNC: 0.73 MG/DL (ref 0.57–1)
DEPRECATED RDW RBC AUTO: 70.2 FL (ref 37–54)
EGFRCR SERPLBLD CKD-EPI 2021: 99.1 ML/MIN/1.73
EOSINOPHIL # BLD AUTO: 0.02 10*3/MM3 (ref 0–0.4)
EOSINOPHIL NFR BLD AUTO: 0.3 % (ref 0.3–6.2)
ERYTHROCYTE [DISTWIDTH] IN BLOOD BY AUTOMATED COUNT: 22.5 % (ref 12.3–15.4)
FERRITIN SERPL-MCNC: 391.9 NG/ML (ref 13–150)
GLOBULIN UR ELPH-MCNC: 2.1 GM/DL
GLUCOSE SERPL-MCNC: 111 MG/DL (ref 65–99)
HCT VFR BLD AUTO: 42.1 % (ref 34–46.6)
HGB BLD-MCNC: 14.1 G/DL (ref 12–15.9)
IMM GRANULOCYTES # BLD AUTO: 0.03 10*3/MM3 (ref 0–0.05)
IMM GRANULOCYTES NFR BLD AUTO: 0.5 % (ref 0–0.5)
IRON 24H UR-MRATE: 74 MCG/DL (ref 37–145)
IRON SATN MFR SERPL: 30 % (ref 20–50)
LYMPHOCYTES # BLD AUTO: 1.37 10*3/MM3 (ref 0.7–3.1)
LYMPHOCYTES NFR BLD AUTO: 21.8 % (ref 19.6–45.3)
MCH RBC QN AUTO: 29.1 PG (ref 26.6–33)
MCHC RBC AUTO-ENTMCNC: 33.5 G/DL (ref 31.5–35.7)
MCV RBC AUTO: 87 FL (ref 79–97)
MONOCYTES # BLD AUTO: 0.49 10*3/MM3 (ref 0.1–0.9)
MONOCYTES NFR BLD AUTO: 7.8 % (ref 5–12)
NEUTROPHILS NFR BLD AUTO: 4.3 10*3/MM3 (ref 1.7–7)
NEUTROPHILS NFR BLD AUTO: 68.5 % (ref 42.7–76)
NRBC BLD AUTO-RTO: 0 /100 WBC (ref 0–0.2)
PLAT MORPH BLD: NORMAL
PLATELET # BLD AUTO: 319 10*3/MM3 (ref 140–450)
PMV BLD AUTO: 10.3 FL (ref 6–12)
POTASSIUM SERPL-SCNC: 3.6 MMOL/L (ref 3.5–5.2)
PROT SERPL-MCNC: 5.8 G/DL (ref 6–8.5)
RBC # BLD AUTO: 4.84 10*6/MM3 (ref 3.77–5.28)
SODIUM SERPL-SCNC: 133 MMOL/L (ref 136–145)
TIBC SERPL-MCNC: 243 MCG/DL (ref 298–536)
TRANSFERRIN SERPL-MCNC: 163 MG/DL (ref 200–360)
WBC NRBC COR # BLD AUTO: 6.28 10*3/MM3 (ref 3.4–10.8)

## 2024-03-19 PROCEDURE — 96409 CHEMO IV PUSH SNGL DRUG: CPT

## 2024-03-19 PROCEDURE — 80053 COMPREHEN METABOLIC PANEL: CPT | Performed by: INTERNAL MEDICINE

## 2024-03-19 PROCEDURE — 96367 TX/PROPH/DG ADDL SEQ IV INF: CPT

## 2024-03-19 PROCEDURE — 83540 ASSAY OF IRON: CPT | Performed by: INTERNAL MEDICINE

## 2024-03-19 PROCEDURE — 85007 BL SMEAR W/DIFF WBC COUNT: CPT | Performed by: INTERNAL MEDICINE

## 2024-03-19 PROCEDURE — 96365 THER/PROPH/DIAG IV INF INIT: CPT

## 2024-03-19 PROCEDURE — 85025 COMPLETE CBC W/AUTO DIFF WBC: CPT | Performed by: INTERNAL MEDICINE

## 2024-03-19 PROCEDURE — 99214 OFFICE O/P EST MOD 30 MIN: CPT | Performed by: INTERNAL MEDICINE

## 2024-03-19 PROCEDURE — 82378 CARCINOEMBRYONIC ANTIGEN: CPT | Performed by: INTERNAL MEDICINE

## 2024-03-19 PROCEDURE — 84466 ASSAY OF TRANSFERRIN: CPT | Performed by: INTERNAL MEDICINE

## 2024-03-19 PROCEDURE — 25010000002 DEXAMETHASONE SODIUM PHOSPHATE 20 MG/5ML SOLUTION: Performed by: INTERNAL MEDICINE

## 2024-03-19 PROCEDURE — G0498 CHEMO EXTEND IV INFUS W/PUMP: HCPCS

## 2024-03-19 PROCEDURE — 25010000002 LEUCOVORIN CALCIUM PER 50MG: Performed by: INTERNAL MEDICINE

## 2024-03-19 PROCEDURE — 96375 TX/PRO/DX INJ NEW DRUG ADDON: CPT

## 2024-03-19 PROCEDURE — 25810000003 SODIUM CHLORIDE 0.9 % SOLUTION 250 ML FLEX CONT: Performed by: INTERNAL MEDICINE

## 2024-03-19 PROCEDURE — 25810000003 SODIUM CHLORIDE 0.9 % SOLUTION: Performed by: NURSE PRACTITIONER

## 2024-03-19 PROCEDURE — 25010000002 FLUOROURACIL PER 500 MG: Performed by: INTERNAL MEDICINE

## 2024-03-19 PROCEDURE — 82728 ASSAY OF FERRITIN: CPT | Performed by: INTERNAL MEDICINE

## 2024-03-19 RX ORDER — GRANISETRON 3.1 MG/24H
PATCH TRANSDERMAL
Qty: 4 PATCH | Refills: 3 | Status: SHIPPED | OUTPATIENT
Start: 2024-03-19

## 2024-03-19 RX ORDER — FLUOROURACIL 50 MG/ML
700 INJECTION, SOLUTION INTRAVENOUS ONCE
Status: COMPLETED | OUTPATIENT
Start: 2024-03-19 | End: 2024-03-19

## 2024-03-19 RX ADMIN — FLUOROURACIL 700 MG: 50 INJECTION, SOLUTION INTRAVENOUS at 12:48

## 2024-03-19 RX ADMIN — DEXAMETHASONE SODIUM PHOSPHATE 12 MG: 4 INJECTION, SOLUTION INTRA-ARTICULAR; INTRALESIONAL; INTRAMUSCULAR; INTRAVENOUS; SOFT TISSUE at 10:50

## 2024-03-19 RX ADMIN — SODIUM CHLORIDE 1000 ML: 9 INJECTION, SOLUTION INTRAVENOUS at 10:50

## 2024-03-19 RX ADMIN — LEUCOVORIN CALCIUM 700 MG: 10 INJECTION INTRAMUSCULAR; INTRAVENOUS at 11:22

## 2024-03-19 RX ADMIN — FLUOROURACIL 4300 MG: 50 INJECTION, SOLUTION INTRAVENOUS at 13:01

## 2024-03-19 NOTE — PROGRESS NOTES
Name:  Vangie Carney  :  1970  Date:  3/19/2024     REFERRING PHYSICIAN  Shari Aguirre MD    PRIMARY CARE PHYSICIAN  Urvashi Basurto, ANURAG    REASON FOR FOLLOWUP  1. Malignant neoplasm of sigmoid colon      CHIEF COMPLAINT  Intermittent nausea and vomiting following each cycle of palliative, infusional 5-FU/leucovorin, currently improved since starting Sancuso.    Dear Dr. Moreno,    HISTORY OF PRESENT ILLNESS:   I saw Ms. Carney in follow up today in our medical oncology clinic. As you are aware, she is a pleasant, 53 y.o., white female with minimal past medical history who was in her usual state of health until 2023 when she developed more frequent epigastric pains and BRBPR. She was referred to local gastroenterology, who performed an endoscopic exam that identified a tumor in the sigmoid colon. Biopsies were consistent with a moderately differentiated adenocarcinoma. She was referred to [facility] colorectal surgery in [place], and she underwent an LAR on 2023 for LAR. This procedure went very well, and a diverting ostomy was not required. She was subsequently referred to our clinic for further evaluation and management. At the time of her initial consultation with us (on 2023), we discussed how ~six months of adjuvant chemotherapy was now indicated and recommended as the current standard of care. Following a long discussion regarding the potential risks vs. benefits, she was initially agreeable to undergoing powerport placement and proceeding with [id number] FOLFOX; however, she subsequently changed her mind, canceled her scheduled powerport appointment and was lost to routine follow up in both ours and [facility] colorectal surgery's clinics. You re-referred her to our clinic in 2024; because, unfortunately, since ~late 2023, she had been feeling overall steadily worse, with recurrent and progressive constipation and back pain. The results of a CT of the abdomen and  pelvis performed on 01/17/2024 for further evaluation were consistent with recurrent, and now metastatic, disease. At the time of her follow up appointments in late January/early February 2024, she was agreeable to undergoing powerport placement and beginning first-line, palliative treatment with [id number], infusional 5-FU/leucovorin.    INTERIM HISTORY:  Ms. Carney returns to clinic today for follow up of (now metastatic) colorectal cancer again accompanied by her . Following powerport placement, she began palliative, [id number] infusional 5-FU/leucovorin the week of 02/19/2024. She has now received a total of two (2) cycles. Just as she did with the first cycle, she confirms today that she spent the better part of the week after receiving chemotherapy intermittently nauseated; and, this time around, she wound up being hospitalized for several days (for, ultimately, nonsurgical management of a partial bowel obstruction; which currently still seems to be resolved). She was started on a Sancuso patch shortly after her discharged from the hospital last week, and she states today that this treatment does seem to be helping this intermittent symptom quite a bit. She is still taking prn Reglan as well. She otherwise again has no other specific complaints.    Past Medical History:   Diagnosis Date    Gallbladder attack     GERD (gastroesophageal reflux disease)     Hearing aid worn     History of ear infections     Malignant neoplasm of sigmoid colon 3/14/2023    Seasonal allergies     Wears glasses        Past Surgical History:   Procedure Laterality Date    CHOLECYSTECTOMY      COLON RESECTION N/A 3/14/2023    Procedure: COLON RESECTION LOW ANTERIOR LAPAROSCOPIC WITH DAVINCI ROBOT;  Surgeon: Shari Aguirre MD;  Location: [facility] OR;  Service: Robotics - DaVinci;  Laterality: N/A;    COLONOSCOPY N/A 2/15/2023    Procedure: COLONOSCOPY FOR SCREENING;  Surgeon: Giselle Iniguez MD;  Location: [facility] OR;   Service: Gastroenterology;  Laterality: N/A;    ENDOSCOPY N/A 2/15/2023    Procedure: ESOPHAGOGASTRODUODENOSCOPY WITH BIOPSY;  Surgeon: Giselle Iniguez MD;  Location: [facility];  Service: Gastroenterology;  Laterality: N/A;    LAPAROSCOPIC TUBAL LIGATION Bilateral     MIDDLE EAR SURGERY      PORTACATH PLACEMENT N/A 2024    Procedure: INSERTION OF PORTACATH;  Surgeon: Jaylen Leal MD;  Location: [facility];  Service: General;  Laterality: N/A;       Social History     Socioeconomic History    Marital status:    Tobacco Use    Smoking status: Former     Current packs/day: 0.00     Average packs/day: 1 pack/day for 30.0 years (30.0 ttl pk-yrs)     Types: Cigarettes     Start date:      Quit date:      Years since quittin.2     Passive exposure: Never    Smokeless tobacco: Never   Vaping Use    Vaping status: Every Day    Substances: Nicotine, Flavoring    Devices: Refillable tank   Substance and Sexual Activity    Alcohol use: Never    Drug use: Defer    Sexual activity: Defer     Birth control/protection: None       Family History   Problem Relation Age of Onset    Cancer Mother     Cancer Father     Cancer Sister     Cancer Brother     Cancer Maternal Grandmother     Cancer Maternal Grandfather     Breast cancer Neg Hx        Allergies   Allergen Reactions    Keflex [Cephalexin] Nausea Only     Beta lactam allergy details  Antibiotic reaction: nausea  Age at reaction: adult  Dose to reaction time: (!) minutes  Reason for antibiotic: unknown  Epinephrine required for reaction?: no  Tolerated antibiotics: augmentin, amoxicillin          Current Outpatient Medications   Medication Sig Dispense Refill    HYDROcodone-acetaminophen (NORCO) 5-325 MG per tablet Take 1-2 tablets by mouth Every 8 (Eight) Hours As Needed for Mild Pain.      lactulose (CHRONULAC) 10 GM/15ML solution Take 30 mL by mouth 2 (Two) Times a Day As Needed (for constipation).      lidocaine-prilocaine (EMLA)  2.5-2.5 % cream Apply to port-a-cath site 30 minutes prior to arrival at infusion center. Cover with plastic wrap. 30 g 1    metoclopramide (REGLAN) 10 MG tablet Take 1 tablet by mouth 3 (Three) Times a Day As Needed (for nausea).      ondansetron (ZOFRAN) 8 MG tablet Take 1 tablet by mouth Every 8 (Eight) Hours As Needed for Nausea or Vomiting.      pantoprazole (PROTONIX) 40 MG EC tablet Take 1 tablet by mouth Daily.      sennosides-docusate (PERICOLACE) 8.6-50 MG per tablet Take 2 tablets by mouth 2 (Two) Times a Day As Needed for Constipation.      granisetron (Sancuso) 3.1 MG/24HR Place 1 patch on the skin once every 7 days. 4 patch 3     No current facility-administered medications for this visit.     Facility-Administered Medications Ordered in Other Visits   Medication Dose Route Frequency Provider Last Rate Last Admin    fluorouracil (ADRUCIL) 4,300 mg in sodium chloride 0.9 % 241 mL chemo infusion - FOR HOME USE  4,300 mg Intravenous Once ROWENA Hanks MD   4,300 mg at 03/19/24 1301     REVIEW OF SYSTEMS  CONSTITUTIONAL:  No fever, chills or night sweats. Progressive fatigue with ongoing, palliative chemotherapy.  EYES:  No blurry vision, diplopia or other vision changes.  ENT:  No hearing loss, nosebleeds or sore throat.  CARDIOVASCULAR:  No palpitations, arrhythmia, syncopal episodes or edema.  PULMONARY:  No hemoptysis, wheezing, chronic cough or shortness of breath.  GASTROINTESTINAL:  As per the HPI above.  GENITOURINARY:  No hematuria, kidney stones or frequent urination.  MUSCULOSKELETAL:  As per the HPI above.  INTEGUMENTARY: No rashes or pruritus.  ENDOCRINE:  No excessive thirst or hot flashes.  HEMATOLOGIC:  No history of free bleeding, spontaneous bleeding or clotting.  IMMUNOLOGIC:  No allergies or frequent infections.  NEUROLOGIC: No numbness, tingling, seizures or weakness.  PSYCHIATRIC:  No anxiety or depression.    PHYSICAL EXAMINATION  BP 92/64   Pulse 60   Temp 97.3 °F (36.3 °C)  (Temporal)   Resp 18   Wt 68.5 kg (151 lb)   LMP 06/15/2016   SpO2 96%   BMI 23.65 kg/m²     Pain Score:  Pain Score    24 0901   PainSc: 0-No pain     PHQ-Score Total:  PHQ-9 Total Score:      ECO  GENERAL:  A well-developed, well-nourished, white female in no acute distress, sitting in a wheelchair.  HEENT:  Pupils equally round and reactive to light. Extraocular muscles intact.  CARDIOVASCULAR:  Regular rate and rhythm.  No murmurs, gallops or rubs.  LUNGS:  Clear to auscultation bilaterally.  ABDOMEN:  Soft, nontender, nondistended with positive bowel sounds.  EXTREMITIES:  No clubbing, cyanosis or edema bilaterally.  SKIN:  No rashes or petechiae. Powerport in place.  NEURO:  Cranial nerves grossly intact. No focal deficits.  PSYCH:  Alert and oriented x3.    LABORATORY  Lab Results   Component Value Date    WBC 6.28 2024    HGB 14.1 2024    HCT 42.1 2024    MCV 87.0 2024     2024    NEUTROABS 4.30 2024       Lab Results   Component Value Date     (L) 2024    K 3.6 2024    CL 85 (L) 2024    CO2 31.6 (H) 2024    BUN 18 2024    CREATININE 0.73 2024    GLUCOSE 111 (H) 2024    CALCIUM 8.8 2024    AST 25 2024    ALT 29 2024    ALKPHOS 136 (H) 2024    BILITOT 0.7 2024    PROTEINTOT 5.8 (L) 2024    ALBUMIN 3.7 2024     CBC (2024): WBCs: 6.28; HgB: 14.1; Hct: 42.1; platelets: 319; MCV:   CBC (2024): WBCs: 3.32; HgB: 10.9; Hct: 35.5; platelets: 251; MCV: 90.3  CBC (2024): WBCs: 17.29; HgB: 10.5; Hct: 34.0; platelets: 451; MCV: 85.2  CBC (2024): WBCs: 5.34; HgB: 8.3; Hct: 27.3; platelets: 235; MCV: 91.3  CBC (2023): WBCs: 4.78; HgB: 9.1; Hct: 28.4; platelets: 195; MCV: 100.0    CEA (2024): pending  CEA (2024): 1.59 ng/mL  CEA (2024): 1.56 ng/mL  CEA (2023): 1.17 ng/mL    Iron panel (2024): serum iron: 22; %  saturation: 5; TIBC: 443; ferritin: 9.74 ng/mL    IMAGING  CT abdomen and pelvis without contrast (02/15/2023):  Impression:  1) Mild narrowing in the sigmoid colon. Recommend direct visualization.  2) Other findings [are nonacute].    CT chest without contrast (03/06/2023):  Impression: No evidence of metastatic disease in the chest.    CT chest with contrast (04/07/2023, compared to 03/06/2023):  Impression: No evidence of metastatic disease to the chest. Stable exam.    CT abdomen and pelvis with contrast (04/07/2023, compared to 02/15/2023):  Impression:  1) Interval post surgical changes noted from partial resection of the sigmoid colon region.  2) 3.1 cm predominantly gas-filled collection in the left presacral space near the anastomosis site that may reflect postoperative changes. Possibility of small fistulous connection not excluded within the anastomosis.  3) No ileus or bowel obstruction.  4) No solid organ lesions or adenopathy identified.    CT abdomen and pelvis with and without contrast (01/17/2024):  Impression:  1) Interval development of metastatic disease.  2) Specifically, soft tissue metastatic implants are noted in the omentum, left lower quadrant, upper presacral space, and left lower presacral space immediately adjacent to the anastomosis site. Soft tissue implant within umbilical hernia fat.  3) Development of 5.5 mm nodule right lower lobe suspicious for metastatic lung nodule.  4) Interval postsurgical changes from sigmoid resection.  5) Mild fatty infiltration of liver. No focal liver lesions.  6) Other incidental/nonacute findings.    PATHOLOGY  Sigmoid colon and upper rectum, low anterior resection (03/14/2023):  Adenocarcinoma, moderately differentiated, invasive through muscularis propria into pericolonic soft tissue. Lymphovascular invasion present. Surgical margins negative for carcinoma. Two out of fifteen (2/15) lymph nodes involved by metastatic carcinoma with extranodal  extension present. Tumor size: 3.5 x 2.5 x 1.4 cm. Intact MSI expression per previous biopsy. [id number] (stage IIIB).    Molecular profiling ([id number]), tissue (sigmoid colon/upper rectum) and liquid (peripheral blood):  PD-L1: 22%; KRAS G12D; PIK3CA E545K; TP53 Y220H; MSI-High NOT detected.    IMPRESSION AND PLAN  Ms. Carney is a 53 y.o., white female with:  Colon adenocarcinoma: Initially diagnosed in February 2023 and status post resection of the sigmoid colon and upper rectum on 03/14/2023. The final, surgical pathology from this procedure was consistent with stage IIIB ([id number]) disease. Two out of fifteen (2/15) lymph nodes were involved with metastatic disease, but all surgical margins were negative. I had a long discussion with the patient and her  at the time of her initial (and, prior to today, only) consultation in our clinic (on 03/30/2023) regarding this diagnosis and its prognosis. We discussed how, while her surgery went overall very well, her chance of eventually developing a relapse in this malignancy was, unfortunately, significant; and the purpose of adjuvant chemotherapy was to reduce this chance by as much as possible (perhaps by as much as 10%). Particularly given her stage III disease, young age and multiple, poor risk factors (left-sided tumor, lymphovascular invasion present, etc.), six months of [id number] FOLFOX (or XELOX) is the preferred regimen; and, following a long discussion regarding the potential risks vs. benefits of this treatment regimen at that time, she was initially agreeable to the former (FOLFOX). We referred her back to local surgery for powerport placement; however, per her preference, she ultimately never followed through with either this (powerport placement) or starting the adjuvant chemotherapy, and she was subsequently lost to routine follow up in both ours and [facility] colorectal surgery's clinics. She apparently overall still did very well for the next ~six to  seven months; but, by late October 2023, she was experiencing recurrent constipation and back pain, and a repeat CT of the abdomen and pelvis performed on 01/17/2024 (and summarized above) was, unfortunately, consistent with recurrent, and now metastatic, disease, with the development of multiple, soft tissue implants within the omentum/peritoneum and, possibly, at least one metastatic lung lesion (in the right lower lobe) as well. Both around that time and since, I have had multiple, long discussions with the patient and her  regarding this updated diagnosis and, in general terms, its prognosis. They remain aware that this disease is now recurrent, by definition, stage IV, and consequently, incurable. That said, particularly given her continued, good performance status, it was/is potentially treatable; and sustained remissions are possible. Some of the pertinent results of molecular profiling ([id number]) that were performed on both tissue (March 2023's partial colectomy specimen(s)) and liquid (peripheral blood drawn in January 2024) biopsies last month are summarized above. Following a long discussion in early February 2024 regarding its potential risks vs. benefits, she was agreeable to having a powerport placed and beginning [id number] 5-FU/leucovorin. She began this regimen on 02/19/2024, and she has now completed a total of two (2) cycles. Her primary complaint continues to be of intermittent nausea and vomiting, particularly during the week immediately after she receives the 5-FU. Following the most recent cycle (the second one), she was admitted to [facility] for a partial bowel obstruction; but this clinically resolved with an NG tube and nonsurgical management. We will proceed with the current treatment plan (day #1 of cycle #3 of palliative, 5-FU/leucovorin today). We will see her back in our clinic in both two and four weeks, on day #1 of cycles #4 and #5, respectively, with CBCs, CMPs and CEA levels for  "symptom/toxicity checks.  Anemia: Repeat CBCs from 01/23/2024 showed a HgB of 8.3 g/dL and a ferritin level of 9.7 ng/mL. As she was not tolerating oral iron replacement very well (due to constipation), we gave her a cycle of IV Feraheme in early February 2024. Her HgB and indices have recently still been much improved. Continue to monitor.  Gastric dysmotility: She previously elaborated that she thinks an intermittent sensation that food just \"gets stuck\" in her stomach contributes to the periodic N/V she has experienced with the first couple of cycles of palliative chemotherapy. Continue Reglan 10 mg TID prn. Continue to monitor.  Nausea/vomiting: Multifactorial, with ongoing, palliative chemotherapy and omental/peritoneal metastases from issue #1 both contributing. She had to be hospitalized for several days last week due to a partial bowel obstruction; however, this resolved with nonsurgical management alone. She reports today that the Sancuso patch we gave her a sample of late last week has helped quite a bit. A Rx for this (Sancuso patches TD qweekly) was provided today. Continue to monitor.  The patient and her  were in agreement with these plans.    It is a pleasure to participate in Ms. Carney's care. Please do not hesitate to call with any questions or concerns that you may have.    A total of 30 minutes were spent coordinating this patient’s care in clinic today; more than 50% of this time was face-to-face with the patient and her , reviewing her interim medical history, discussing the results of recent labwork and counseling on the current treatment and followup plan. All questions were answered to their satisfaction.    FOLLOW UP  Continue Reglan 10 mg TID prn. Continue qweekly Sancuso TD (Rx provided today). Proceed with palliative, [id number] infusional 5-FU/leucovorin, as planned (day #1 of cycle #3 today). Return to our clinic in both 2 weeks and 1 month, on day #1 of cycles #4 and #5, " respectively, with CBCs, CMPs and CEA levels.            This document was electronically signed by ROWENA Hanks MD March 19, 2024 13:35 EDT      CC: MD Shari Saeed MD Karen S. Jennings-Conklin, MD

## 2024-03-19 NOTE — PROGRESS NOTES
Venipuncture Blood Specimen Collection  Venipuncture performed in right arm by Horace Craig MA with good hemostasis. Patient tolerated the procedure well without complications.   03/19/24   Horace Craig MA

## 2024-03-21 ENCOUNTER — INFUSION (OUTPATIENT)
Dept: ONCOLOGY | Facility: HOSPITAL | Age: 54
End: 2024-03-21
Payer: COMMERCIAL

## 2024-03-21 VITALS
SYSTOLIC BLOOD PRESSURE: 98 MMHG | RESPIRATION RATE: 18 BRPM | TEMPERATURE: 97.6 F | DIASTOLIC BLOOD PRESSURE: 57 MMHG | HEART RATE: 118 BPM | BODY MASS INDEX: 23.52 KG/M2 | WEIGHT: 150.2 LBS | OXYGEN SATURATION: 93 %

## 2024-03-21 DIAGNOSIS — Z95.828 PORT-A-CATH IN PLACE: ICD-10-CM

## 2024-03-21 DIAGNOSIS — C18.7 MALIGNANT NEOPLASM OF SIGMOID COLON: Primary | ICD-10-CM

## 2024-03-21 PROCEDURE — 25810000003 SODIUM CHLORIDE 0.9 % SOLUTION: Performed by: NURSE PRACTITIONER

## 2024-03-21 PROCEDURE — 25010000002 HEPARIN LOCK FLUSH PER 10 UNITS

## 2024-03-21 PROCEDURE — 96360 HYDRATION IV INFUSION INIT: CPT

## 2024-03-21 RX ORDER — SODIUM CHLORIDE 0.9 % (FLUSH) 0.9 %
10 SYRINGE (ML) INJECTION AS NEEDED
Status: DISCONTINUED | OUTPATIENT
Start: 2024-03-21 | End: 2024-03-21 | Stop reason: HOSPADM

## 2024-03-21 RX ORDER — HEPARIN SODIUM (PORCINE) LOCK FLUSH IV SOLN 100 UNIT/ML 100 UNIT/ML
300 SOLUTION INTRAVENOUS ONCE
OUTPATIENT
Start: 2024-03-21

## 2024-03-21 RX ORDER — HEPARIN SODIUM (PORCINE) LOCK FLUSH IV SOLN 100 UNIT/ML 100 UNIT/ML
500 SOLUTION INTRAVENOUS AS NEEDED
Status: DISCONTINUED | OUTPATIENT
Start: 2024-03-21 | End: 2024-03-21 | Stop reason: HOSPADM

## 2024-03-21 RX ORDER — SODIUM CHLORIDE 0.9 % (FLUSH) 0.9 %
10 SYRINGE (ML) INJECTION AS NEEDED
OUTPATIENT
Start: 2024-03-21

## 2024-03-21 RX ORDER — HEPARIN SODIUM (PORCINE) LOCK FLUSH IV SOLN 100 UNIT/ML 100 UNIT/ML
500 SOLUTION INTRAVENOUS AS NEEDED
OUTPATIENT
Start: 2024-03-21

## 2024-03-21 RX ORDER — SODIUM CHLORIDE 0.9 % (FLUSH) 0.9 %
20 SYRINGE (ML) INJECTION AS NEEDED
OUTPATIENT
Start: 2024-03-21

## 2024-03-21 RX ADMIN — HEPARIN 500 UNITS: 100 SYRINGE at 12:44

## 2024-03-21 RX ADMIN — SODIUM CHLORIDE 500 ML: 9 INJECTION, SOLUTION INTRAVENOUS at 12:03

## 2024-03-21 RX ADMIN — Medication 10 ML: at 12:44

## 2024-04-02 ENCOUNTER — LAB (OUTPATIENT)
Dept: ONCOLOGY | Facility: HOSPITAL | Age: 54
End: 2024-04-02
Payer: COMMERCIAL

## 2024-04-02 ENCOUNTER — INFUSION (OUTPATIENT)
Dept: ONCOLOGY | Facility: HOSPITAL | Age: 54
End: 2024-04-02
Payer: COMMERCIAL

## 2024-04-02 ENCOUNTER — OFFICE VISIT (OUTPATIENT)
Dept: ONCOLOGY | Facility: CLINIC | Age: 54
End: 2024-04-02
Payer: COMMERCIAL

## 2024-04-02 VITALS
SYSTOLIC BLOOD PRESSURE: 92 MMHG | OXYGEN SATURATION: 95 % | HEART RATE: 109 BPM | TEMPERATURE: 97.6 F | BODY MASS INDEX: 22.58 KG/M2 | RESPIRATION RATE: 18 BRPM | WEIGHT: 144.2 LBS | DIASTOLIC BLOOD PRESSURE: 61 MMHG

## 2024-04-02 VITALS
WEIGHT: 144.2 LBS | SYSTOLIC BLOOD PRESSURE: 92 MMHG | OXYGEN SATURATION: 95 % | TEMPERATURE: 97.6 F | HEART RATE: 109 BPM | BODY MASS INDEX: 22.58 KG/M2 | DIASTOLIC BLOOD PRESSURE: 61 MMHG | RESPIRATION RATE: 18 BRPM

## 2024-04-02 DIAGNOSIS — E87.6 HYPOKALEMIA: ICD-10-CM

## 2024-04-02 DIAGNOSIS — C18.7 MALIGNANT NEOPLASM OF SIGMOID COLON: Primary | ICD-10-CM

## 2024-04-02 DIAGNOSIS — C78.6 METASTASIS TO PERITONEUM: ICD-10-CM

## 2024-04-02 DIAGNOSIS — K90.9 MALABSORPTION OF IRON: ICD-10-CM

## 2024-04-02 DIAGNOSIS — D50.9 IRON DEFICIENCY ANEMIA, UNSPECIFIED IRON DEFICIENCY ANEMIA TYPE: ICD-10-CM

## 2024-04-02 DIAGNOSIS — C18.7 MALIGNANT NEOPLASM OF SIGMOID COLON: ICD-10-CM

## 2024-04-02 DIAGNOSIS — R11.2 NAUSEA AND VOMITING, UNSPECIFIED VOMITING TYPE: ICD-10-CM

## 2024-04-02 DIAGNOSIS — C78.01 MALIGNANT NEOPLASM METASTATIC TO RIGHT LUNG: ICD-10-CM

## 2024-04-02 LAB
ALBUMIN SERPL-MCNC: 3.3 G/DL (ref 3.5–5.2)
ALBUMIN/GLOB SERPL: 2.1 G/DL
ALP SERPL-CCNC: 185 U/L (ref 39–117)
ALT SERPL W P-5'-P-CCNC: 103 U/L (ref 1–33)
ANION GAP SERPL CALCULATED.3IONS-SCNC: 14.2 MMOL/L (ref 5–15)
ANISOCYTOSIS BLD QL: NORMAL
AST SERPL-CCNC: 84 U/L (ref 1–32)
BASOPHILS # BLD AUTO: 0.02 10*3/MM3 (ref 0–0.2)
BASOPHILS NFR BLD AUTO: 0.6 % (ref 0–1.5)
BILIRUB SERPL-MCNC: 0.9 MG/DL (ref 0–1.2)
BUN SERPL-MCNC: 16 MG/DL (ref 6–20)
BUN/CREAT SERPL: 26.2 (ref 7–25)
CALCIUM SPEC-SCNC: 8.8 MG/DL (ref 8.6–10.5)
CHLORIDE SERPL-SCNC: 90 MMOL/L (ref 98–107)
CO2 SERPL-SCNC: 29.8 MMOL/L (ref 22–29)
CREAT SERPL-MCNC: 0.61 MG/DL (ref 0.57–1)
DEPRECATED RDW RBC AUTO: 73.1 FL (ref 37–54)
EGFRCR SERPLBLD CKD-EPI 2021: 107.7 ML/MIN/1.73
EOSINOPHIL # BLD AUTO: 0.03 10*3/MM3 (ref 0–0.4)
EOSINOPHIL NFR BLD AUTO: 0.8 % (ref 0.3–6.2)
ERYTHROCYTE [DISTWIDTH] IN BLOOD BY AUTOMATED COUNT: 23.3 % (ref 12.3–15.4)
GLOBULIN UR ELPH-MCNC: 1.6 GM/DL
GLUCOSE SERPL-MCNC: 94 MG/DL (ref 65–99)
HCT VFR BLD AUTO: 35.3 % (ref 34–46.6)
HGB BLD-MCNC: 12.2 G/DL (ref 12–15.9)
IMM GRANULOCYTES # BLD AUTO: 0 10*3/MM3 (ref 0–0.05)
IMM GRANULOCYTES NFR BLD AUTO: 0 % (ref 0–0.5)
LYMPHOCYTES # BLD AUTO: 1.06 10*3/MM3 (ref 0.7–3.1)
LYMPHOCYTES NFR BLD AUTO: 29.9 % (ref 19.6–45.3)
MCH RBC QN AUTO: 30.1 PG (ref 26.6–33)
MCHC RBC AUTO-ENTMCNC: 34.6 G/DL (ref 31.5–35.7)
MCV RBC AUTO: 87.2 FL (ref 79–97)
MONOCYTES # BLD AUTO: 0.34 10*3/MM3 (ref 0.1–0.9)
MONOCYTES NFR BLD AUTO: 9.6 % (ref 5–12)
NEUTROPHILS NFR BLD AUTO: 2.1 10*3/MM3 (ref 1.7–7)
NEUTROPHILS NFR BLD AUTO: 59.1 % (ref 42.7–76)
NRBC BLD AUTO-RTO: 0 /100 WBC (ref 0–0.2)
OVALOCYTES BLD QL SMEAR: NORMAL
PLAT MORPH BLD: NORMAL
PLATELET # BLD AUTO: 164 10*3/MM3 (ref 140–450)
PMV BLD AUTO: 10.7 FL (ref 6–12)
POIKILOCYTOSIS BLD QL SMEAR: NORMAL
POTASSIUM SERPL-SCNC: 2.6 MMOL/L (ref 3.5–5.2)
PROT SERPL-MCNC: 4.9 G/DL (ref 6–8.5)
RBC # BLD AUTO: 4.05 10*6/MM3 (ref 3.77–5.28)
SODIUM SERPL-SCNC: 134 MMOL/L (ref 136–145)
WBC NRBC COR # BLD AUTO: 3.55 10*3/MM3 (ref 3.4–10.8)

## 2024-04-02 PROCEDURE — 25810000003 SODIUM CHLORIDE 0.9 % SOLUTION 500 ML FLEX CONT: Performed by: INTERNAL MEDICINE

## 2024-04-02 PROCEDURE — 80053 COMPREHEN METABOLIC PANEL: CPT

## 2024-04-02 PROCEDURE — 25810000003 SODIUM CHLORIDE 0.9 % SOLUTION: Performed by: INTERNAL MEDICINE

## 2024-04-02 PROCEDURE — 96365 THER/PROPH/DIAG IV INF INIT: CPT

## 2024-04-02 PROCEDURE — 25010000002 DEXAMETHASONE SODIUM PHOSPHATE 20 MG/5ML SOLUTION: Performed by: INTERNAL MEDICINE

## 2024-04-02 PROCEDURE — 25010000002 FLUOROURACIL PER 500 MG: Performed by: INTERNAL MEDICINE

## 2024-04-02 PROCEDURE — 25010000002 LEUCOVORIN CALCIUM PER 50MG: Performed by: INTERNAL MEDICINE

## 2024-04-02 PROCEDURE — 96409 CHEMO IV PUSH SNGL DRUG: CPT

## 2024-04-02 PROCEDURE — G0498 CHEMO EXTEND IV INFUS W/PUMP: HCPCS

## 2024-04-02 PROCEDURE — 85007 BL SMEAR W/DIFF WBC COUNT: CPT

## 2024-04-02 PROCEDURE — 96375 TX/PRO/DX INJ NEW DRUG ADDON: CPT

## 2024-04-02 PROCEDURE — 96411 CHEMO IV PUSH ADDL DRUG: CPT

## 2024-04-02 PROCEDURE — 85025 COMPLETE CBC W/AUTO DIFF WBC: CPT

## 2024-04-02 PROCEDURE — 96367 TX/PROPH/DG ADDL SEQ IV INF: CPT

## 2024-04-02 PROCEDURE — 25810000003 SODIUM CHLORIDE 0.9 % SOLUTION

## 2024-04-02 RX ORDER — FLUOROURACIL 50 MG/ML
700 INJECTION, SOLUTION INTRAVENOUS ONCE
Status: COMPLETED | OUTPATIENT
Start: 2024-04-02 | End: 2024-04-02

## 2024-04-02 RX ORDER — POTASSIUM CHLORIDE 20 MEQ/1
TABLET, EXTENDED RELEASE ORAL
Qty: 4 TABLET | Refills: 0 | Status: SHIPPED | OUTPATIENT
Start: 2024-04-02 | End: 2024-04-04 | Stop reason: CLARIF

## 2024-04-02 RX ORDER — POTASSIUM CHLORIDE 20 MEQ/1
40 TABLET, EXTENDED RELEASE ORAL ONCE
Status: COMPLETED | OUTPATIENT
Start: 2024-04-02 | End: 2024-04-02

## 2024-04-02 RX ORDER — SODIUM CHLORIDE 9 MG/ML
20 INJECTION, SOLUTION INTRAVENOUS ONCE
Status: COMPLETED | OUTPATIENT
Start: 2024-04-02 | End: 2024-04-02

## 2024-04-02 RX ADMIN — FLUOROURACIL 700 MG: 50 INJECTION, SOLUTION INTRAVENOUS at 11:08

## 2024-04-02 RX ADMIN — POTASSIUM CHLORIDE 40 MEQ: 1500 TABLET, EXTENDED RELEASE ORAL at 10:16

## 2024-04-02 RX ADMIN — SODIUM CHLORIDE 20 ML/HR: 9 INJECTION, SOLUTION INTRAVENOUS at 10:11

## 2024-04-02 RX ADMIN — FLUOROURACIL 4200 MG: 50 INJECTION, SOLUTION INTRAVENOUS at 11:32

## 2024-04-02 RX ADMIN — LEUCOVORIN CALCIUM 700 MG: 10 INJECTION INTRAMUSCULAR; INTRAVENOUS at 10:30

## 2024-04-02 RX ADMIN — DEXAMETHASONE SODIUM PHOSPHATE 12 MG: 4 INJECTION, SOLUTION INTRA-ARTICULAR; INTRALESIONAL; INTRAMUSCULAR; INTRAVENOUS; SOFT TISSUE at 10:11

## 2024-04-02 RX ADMIN — SODIUM CHLORIDE 1000 ML: 9 INJECTION, SOLUTION INTRAVENOUS at 10:25

## 2024-04-02 NOTE — PROGRESS NOTES
Name:  Vangie Carney  :  1970  Date:  2024     REFERRING PHYSICIAN  Shari Aguirre MD    PRIMARY CARE PHYSICIAN  Urvashi Basurto, APRN    REASON FOR FOLLOWUP  1. Malignant neoplasm of sigmoid colon    2. Malignant neoplasm metastatic to right lung    3. 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 Nausea and vomiting, unspecified vomiting type    5. Hypokalemia    6. Iron deficiency anemia, unspecified iron deficiency anemia type    7. Malabsorption of iron        CHIEF COMPLAINT  Intermittent nausea and vomiting following each cycle of palliative, infusional 5-FU/leucovorin, currently improved since starting Sancuso.    Dear Dr. Moreno,    HISTORY OF PRESENT ILLNESS:   I saw Ms. Carney in follow up today in our medical oncology clinic. As you are aware, she is a pleasant, 53 y.o., white female with minimal past medical history who was in her usual state of health until 2023 when she developed more frequent epigastric pains and BRBPR. She was referred to local gastroenterology, who performed an endoscopic exam that identified a tumor in the sigmoid colon. Biopsies were consistent with a moderately differentiated adenocarcinoma. She was referred to [facility] colorectal surgery in [place], and she underwent an LAR on 2023 for LAR. This procedure went very well, and a diverting ostomy was not required. She was subsequently referred to our clinic for further evaluation and management. At the time of her initial consultation with us (on 2023), we discussed how ~six months of adjuvant chemotherapy was now indicated and recommended as the current standard of care. Following a long discussion regarding the potential risks vs. benefits, she was initially agreeable to undergoing powerport placement and proceeding with [id number] FOLFOX; however, she subsequently changed her mind, canceled her scheduled powerport appointment and was lost to routine follow up in both ours and [facility] colorectal surgery's  clinics. You re-referred her to our clinic in January 2024; because, unfortunately, since ~late October 2023, she had been feeling overall steadily worse, with recurrent and progressive constipation and back pain. The results of a CT of the abdomen and pelvis performed on 01/17/2024 for further evaluation were consistent with recurrent, and now metastatic, disease. At the time of her follow up appointments in late January/early February 2024, she was agreeable to undergoing powerport placement and beginning first-line, palliative treatment with [id number], infusional 5-FU/leucovorin.    INTERIM HISTORY:  Ms. Carney returns to clinic today for follow up of (now metastatic) colorectal cancer again accompanied by her . Following powerport placement, she began palliative, [id number] infusional 5-FU/leucovorin the week of 02/19/2024. She has now received a total of three (3) cycles.  She is struggled with intermittent nausea throughout treatment.  After her second treatment she was hospitalized for several days (for, ultimately, nonsurgical management of a partial bowel obstruction; which currently still seems to be resolved).  She was placed on Sancuso patches once back in the outpatient setting, as well as Reglan as needed.  She states today that these continue to work for her.  She states she still has some breakthrough nausea and vomiting but nothing like she was having.  She reports today that she has been hydrating well, and eating okay.  Boost samples were given today.  Denies fevers or chills.  She still having intermittent diarrhea, but reports that bowel movements are regular.  Otherwise no other new or specific complaints today.  She is asking for supportive care with IV fluids today.      Past Medical History:   Diagnosis Date    Gallbladder attack     GERD (gastroesophageal reflux disease)     Hearing aid worn     History of ear infections     Malignant neoplasm of sigmoid colon 3/14/2023    Seasonal  allergies     Wears glasses        Past Surgical History:   Procedure Laterality Date    CHOLECYSTECTOMY      COLON RESECTION N/A 3/14/2023    Procedure: COLON RESECTION LOW ANTERIOR LAPAROSCOPIC WITH DAVINCI ROBOT;  Surgeon: Shari Aguirre MD;  Location:  [facility];  Service: Robotics - DaVinci;  Laterality: N/A;    COLONOSCOPY N/A 2/15/2023    Procedure: COLONOSCOPY FOR SCREENING;  Surgeon: Giselle Iniguez MD;  Location:  [facility];  Service: Gastroenterology;  Laterality: N/A;    ENDOSCOPY N/A 2/15/2023    Procedure: ESOPHAGOGASTRODUODENOSCOPY WITH BIOPSY;  Surgeon: Giselle Iniguez MD;  Location:  [facility];  Service: Gastroenterology;  Laterality: N/A;    LAPAROSCOPIC TUBAL LIGATION Bilateral     MIDDLE EAR SURGERY      PORTACATH PLACEMENT N/A 2024    Procedure: INSERTION OF PORTACATH;  Surgeon: Jaylen Leal MD;  Location:  [facility];  Service: General;  Laterality: N/A;       Social History     Socioeconomic History    Marital status:    Tobacco Use    Smoking status: Former     Current packs/day: 0.00     Average packs/day: 1 pack/day for 30.0 years (30.0 ttl pk-yrs)     Types: Cigarettes     Start date:      Quit date:      Years since quittin.2     Passive exposure: Never    Smokeless tobacco: Never   Vaping Use    Vaping status: Every Day    Substances: Nicotine, Flavoring    Devices: Refillable tank   Substance and Sexual Activity    Alcohol use: Never    Drug use: Defer    Sexual activity: Defer     Birth control/protection: None       Family History   Problem Relation Age of Onset    Cancer Mother     Cancer Father     Cancer Sister     Cancer Brother     Cancer Maternal Grandmother     Cancer Maternal Grandfather     Breast cancer Neg Hx        Allergies   Allergen Reactions    Keflex [Cephalexin] Nausea Only     Beta lactam allergy details  Antibiotic reaction: nausea  Age at reaction: adult  Dose to reaction time: (!) minutes  Reason for antibiotic:  unknown  Epinephrine required for reaction?: no  Tolerated antibiotics: augmentin, amoxicillin          Current Outpatient Medications   Medication Sig Dispense Refill    granisetron (Sancuso) 3.1 MG/24HR Place 1 patch on the skin once every 7 days. 4 patch 3    HYDROcodone-acetaminophen (NORCO) 5-325 MG per tablet Take 1-2 tablets by mouth Every 8 (Eight) Hours As Needed for Mild Pain.      lactulose (CHRONULAC) 10 GM/15ML solution Take 30 mL by mouth 2 (Two) Times a Day As Needed (for constipation).      lidocaine-prilocaine (EMLA) 2.5-2.5 % cream Apply to port-a-cath site 30 minutes prior to arrival at infusion center. Cover with plastic wrap. 30 g 1    metoclopramide (REGLAN) 10 MG tablet Take 1 tablet by mouth 3 (Three) Times a Day As Needed (for nausea).      ondansetron (ZOFRAN) 8 MG tablet Take 1 tablet by mouth Every 8 (Eight) Hours As Needed for Nausea or Vomiting.      pantoprazole (PROTONIX) 40 MG EC tablet Take 1 tablet by mouth Daily.      potassium chloride (KLOR-CON M20) 20 MEQ CR tablet Take 2 tablets by mouth this evening  (4/2); THEN take 2 tablets by mouth next day, in the morning (4/3). 4 tablet 0    sennosides-docusate (PERICOLACE) 8.6-50 MG per tablet Take 2 tablets by mouth 2 (Two) Times a Day As Needed for Constipation.       No current facility-administered medications for this visit.     Facility-Administered Medications Ordered in Other Visits   Medication Dose Route Frequency Provider Last Rate Last Admin    fluorouracil (ADRUCIL) 4,200 mg in sodium chloride 0.9 % 241 mL chemo infusion - FOR HOME USE  4,200 mg Intravenous Once ROWENA Hanks MD        fluorouracil (ADRUCIL) chemo injection 700 mg  700 mg Intravenous Once ROWENA Hanks MD        leucovorin 700 mg in sodium chloride 0.9 % 170 mL IVPB  700 mg Intravenous Once ROWENA Hanks MD   700 mg at 04/02/24 1030    sodium chloride 0.9 % bolus 1,000 mL  1,000 mL Intravenous Once Jesusita Osborn APRN 1,000 mL/hr at  24 1025 1,000 mL at 24 1025     REVIEW OF SYSTEMS  CONSTITUTIONAL:  No fever, chills or night sweats. Progressive fatigue with ongoing, palliative chemotherapy.  EYES:  No blurry vision, diplopia or other vision changes.  ENT:  No hearing loss, nosebleeds or sore throat.  CARDIOVASCULAR:  No palpitations, arrhythmia, syncopal episodes or edema.  PULMONARY:  No hemoptysis, wheezing, chronic cough or shortness of breath.  GASTROINTESTINAL:  As per the HPI above.  GENITOURINARY:  No hematuria, kidney stones or frequent urination.  MUSCULOSKELETAL:  As per the HPI above.  INTEGUMENTARY: No rashes or pruritus.  ENDOCRINE:  No excessive thirst or hot flashes.  HEMATOLOGIC:  No history of free bleeding, spontaneous bleeding or clotting.  IMMUNOLOGIC:  No allergies or frequent infections.  NEUROLOGIC: No numbness, tingling, seizures or weakness.  PSYCHIATRIC:  No anxiety or depression.    PHYSICAL EXAMINATION  BP 92/61   Pulse 109   Temp 97.6 °F (36.4 °C) (Oral)   Resp 18   Wt 65.4 kg (144 lb 3.2 oz)   LMP 06/15/2016   SpO2 95%   BMI 22.58 kg/m²     Pain Score:  Pain Score    24 0857   PainSc: 0-No pain     PHQ-Score Total:  PHQ-9 Total Score:      ECO  GENERAL:  A well-developed, well-nourished, white female in no acute distress, sitting in a wheelchair.  HEENT:  Pupils equally round and reactive to light. Extraocular muscles intact.  CARDIOVASCULAR:  Regular rate and rhythm.  No murmurs, gallops or rubs.  LUNGS:  Clear to auscultation bilaterally.  No wheezing.  ABDOMEN:  Soft, nontender, nondistended with positive bowel sounds.  EXTREMITIES:  No clubbing, cyanosis or edema bilaterally.  SKIN:  No rashes or petechiae. Powerport in place.  NEURO:  Cranial nerves grossly intact. No focal deficits.  PSYCH:  Alert and oriented x3.    LABORATORY  Lab Results   Component Value Date    WBC 3.55 2024    HGB 12.2 2024    HCT 35.3 2024    MCV 87.2 2024     2024     NEUTROABS 2.10 04/02/2024       Lab Results   Component Value Date     (L) 04/02/2024    K 2.6 (C) 04/02/2024    CL 90 (L) 04/02/2024    CO2 29.8 (H) 04/02/2024    BUN 16 04/02/2024    CREATININE 0.61 04/02/2024    GLUCOSE 94 04/02/2024    CALCIUM 8.8 04/02/2024    AST 84 (H) 04/02/2024     (H) 04/02/2024    ALKPHOS 185 (H) 04/02/2024    BILITOT 0.9 04/02/2024    PROTEINTOT 4.9 (L) 04/02/2024    ALBUMIN 3.3 (L) 04/02/2024     CBC (03/19/2024): WBCs: 6.28; HgB: 14.1; Hct: 42.1; platelets: 319; MCV:   CBC (03/05/2024): WBCs: 3.32; HgB: 10.9; Hct: 35.5; platelets: 251; MCV: 90.3  CBC (02/19/2024): WBCs: 17.29; HgB: 10.5; Hct: 34.0; platelets: 451; MCV: 85.2  CBC (01/23/2024): WBCs: 5.34; HgB: 8.3; Hct: 27.3; platelets: 235; MCV: 91.3  CBC (03/16/2023): WBCs: 4.78; HgB: 9.1; Hct: 28.4; platelets: 195; MCV: 100.0    CEA (03/19/2024): pending  CEA (03/07/2024): 1.59 ng/mL  CEA (01/23/2024): 1.56 ng/mL  CEA (03/13/2023): 1.17 ng/mL    Iron panel (01/23/2024): serum iron: 22; % saturation: 5; TIBC: 443; ferritin: 9.74 ng/mL    IMAGING  CT abdomen and pelvis without contrast (02/15/2023):  Impression:  1) Mild narrowing in the sigmoid colon. Recommend direct visualization.  2) Other findings [are nonacute].    CT chest without contrast (03/06/2023):  Impression: No evidence of metastatic disease in the chest.    CT chest with contrast (04/07/2023, compared to 03/06/2023):  Impression: No evidence of metastatic disease to the chest. Stable exam.    CT abdomen and pelvis with contrast (04/07/2023, compared to 02/15/2023):  Impression:  1) Interval post surgical changes noted from partial resection of the sigmoid colon region.  2) 3.1 cm predominantly gas-filled collection in the left presacral space near the anastomosis site that may reflect postoperative changes. Possibility of small fistulous connection not excluded within the anastomosis.  3) No ileus or bowel obstruction.  4) No solid organ lesions or  adenopathy identified.    CT abdomen and pelvis with and without contrast (01/17/2024):  Impression:  1) Interval development of metastatic disease.  2) Specifically, soft tissue metastatic implants are noted in the omentum, left lower quadrant, upper presacral space, and left lower presacral space immediately adjacent to the anastomosis site. Soft tissue implant within umbilical hernia fat.  3) Development of 5.5 mm nodule right lower lobe suspicious for metastatic lung nodule.  4) Interval postsurgical changes from sigmoid resection.  5) Mild fatty infiltration of liver. No focal liver lesions.  6) Other incidental/nonacute findings.    PATHOLOGY  Sigmoid colon and upper rectum, low anterior resection (03/14/2023):  Adenocarcinoma, moderately differentiated, invasive through muscularis propria into pericolonic soft tissue. Lymphovascular invasion present. Surgical margins negative for carcinoma. Two out of fifteen (2/15) lymph nodes involved by metastatic carcinoma with extranodal extension present. Tumor size: 3.5 x 2.5 x 1.4 cm. Intact MSI expression per previous biopsy. [id number] (stage IIIB).    Molecular profiling ([id number]), tissue (sigmoid colon/upper rectum) and liquid (peripheral blood):  PD-L1: 22%; KRAS G12D; PIK3CA E545K; TP53 Y220H; MSI-High NOT detected.    IMPRESSION AND PLAN  Ms. Carney is a 53 y.o., white female with:  Colon adenocarcinoma: Initially diagnosed in February 2023 and status post resection of the sigmoid colon and upper rectum on 03/14/2023. The final, surgical pathology from this procedure was consistent with stage IIIB ([id number]) disease. Two out of fifteen (2/15) lymph nodes were involved with metastatic disease, but all surgical margins were negative.  Dr. Hanks had a long discussion with the patient and her  at the time of her initial (and, prior to today, only) consultation in our clinic (on 03/30/2023) regarding this diagnosis and its prognosis. Discussed how, while her  surgery went overall very well, her chance of eventually developing a relapse in this malignancy was, unfortunately, significant; and the purpose of adjuvant chemotherapy was to reduce this chance by as much as possible (perhaps by as much as 10%). Particularly given her stage III disease, young age and multiple, poor risk factors (left-sided tumor, lymphovascular invasion present, etc.), six months of m3lyvxja FOLFOX (or XELOX) is the preferred regimen; and, following a long discussion regarding the potential risks vs. benefits of this treatment regimen at that time, she was initially agreeable to the former (FOLFOX). Referred her back to local surgery for powerport placement; however, per her preference, she ultimately never followed through with either this (powerport placement) or starting the adjuvant chemotherapy, and she was subsequently lost to routine follow up in both ours and [facility] colorectal surgery's clinics. She apparently overall still did very well for the next ~six to seven months; but, by late October 2023, she was experiencing recurrent constipation and back pain, and a repeat CT of the abdomen and pelvis performed on 01/17/2024 (and summarized above) was, unfortunately, consistent with recurrent, and now metastatic, disease, with the development of multiple, soft tissue implants within the omentum/peritoneum and, possibly, at least one metastatic lung lesion (in the right lower lobe) as well. Both around that time and since, Dr. Hanks has had multiple, long discussions with the patient and her  regarding this updated diagnosis and, in general terms, its prognosis. They remain aware that this disease is now recurrent, by definition, stage IV, and consequently, incurable. That said, particularly given her continued, good performance status, it was/is potentially treatable; and sustained remissions are possible. Some of the pertinent results of molecular profiling ([id number]) that were  "performed on both tissue (March 2023's partial colectomy specimen(s)) and liquid (peripheral blood drawn in January 2024) biopsies last month are summarized above. Following a long discussion in early February 2024 regarding its potential risks vs. benefits, she was agreeable to having a powerport placed and beginning q3mpoxep 5-FU/leucovorin. She began this regimen on 02/19/2024, and she has now completed a total of three (3) cycles. Her primary complaint continues to be of intermittent nausea and vomiting, particularly during the week immediately after she receives the 5-FU. Following the second cycle, she was admitted to [facility] [place] for a partial bowel obstruction; but this clinically resolved with an NG tube and nonsurgical management. We will proceed with the current treatment plan (day #1 of cycle #3 of palliative, 5-FU/leucovorin today). We will see her back in four weeks with Dr. Hanks, on day #1 of cycle #5, with CBCs, CMPs and CEA levels for symptom/toxicity checks.  Anemia: Repeat CBCs from 01/23/2024 showed a HgB of 8.3 g/dL and a ferritin level of 9.7 ng/mL. As she was not tolerating oral iron replacement very well (due to constipation), we gave her a cycle of IV Feraheme in early February 2024. Her HgB and indices have recently still been much improved. Continue to monitor.  Gastric dysmotility: She previously elaborated that she thinks an intermittent sensation that food just \"gets stuck\" in her stomach contributes to the periodic N/V she has experienced with the first couple of cycles of palliative chemotherapy. Continue Reglan 10 mg TID prn. Continue to monitor.  Nausea/vomiting: Multifactorial, with ongoing, palliative chemotherapy and omental/peritoneal metastases from issue #1 both contributing. She had to be hospitalized for several days last week due to a partial bowel obstruction; however, this resolved with nonsurgical management alone. She continues to report good results with the Sancuso patch.  " Continue to monitor.  Hypokalemia: Potassium on CMP today was 2.6.  40 mEq of oral potassium given while in clinic today.  Patient was sent home with oral potassium, 40 mEq to be taken this evening, and then 40 mEq to be taken tomorrow morning.  The patient and her  were in agreement with these plans.    It is a pleasure to participate in Ms. Carney's care. Please do not hesitate to call with any questions or concerns that you may have.    A total of 30 minutes were spent coordinating this patient’s care in clinic today; more than 50% of this time was face-to-face with the patient and her , reviewing her interim medical history, discussing the results of recent labwork and counseling on the current treatment and followup plan. All questions were answered to their satisfaction.    FOLLOW UP  Supportive care with 1 L IV fluids today.  Continue Reglan 10 mg TID prn. Continue qweekly Sancuso TD. Proceed with palliative, [id number] infusional 5-FU/leucovorin, as planned (day #1 of cycle #4 today). Return to our clinic in 2 weeks, on day #1 of cycle #5, with CBCs, CMPs and CEA levels.      This document was electronically signed by ANURAG Buchanan April 2, 2024 10:50 EDT      CC: MD Shari Saeed MD Karen S. Jennings-Conklin, MD

## 2024-04-03 RX ORDER — GRANISETRON 3.1 MG/24H
PATCH TRANSDERMAL
Qty: 4 PATCH | Refills: 3 | OUTPATIENT
Start: 2024-04-03

## 2024-04-03 NOTE — TELEPHONE ENCOUNTER
Caller: Vangie Carney    Relationship: Self    Best call back number: [phone]    Requested Prescriptions:   Requested Prescriptions     Pending Prescriptions Disp Refills    granisetron (Sancuso) 3.1 MG/24HR 4 patch 3     Sig: Place 1 patch on the skin once every 7 days.        Pharmacy where request should be sent: [facility]      Last office visit with prescribing clinician: 3/19/2024   Last telemedicine visit with prescribing clinician: Visit date not found   Next office visit with prescribing clinician: 4/16/2024     Does the patient have less than a 3 day supply:  [] Yes  [x] No    Would you like a call back once the refill request has been completed: [] Yes [x] No    If the office needs to give you a call back, can they leave a voicemail: [] Yes [x] No

## 2024-04-03 NOTE — TELEPHONE ENCOUNTER
RN called patient and informed her she should have 3 refills available to her at the pharmacy. She verbalized understanding.

## 2024-04-04 ENCOUNTER — INFUSION (OUTPATIENT)
Dept: ONCOLOGY | Facility: HOSPITAL | Age: 54
End: 2024-04-04
Payer: COMMERCIAL

## 2024-04-04 VITALS
TEMPERATURE: 97.7 F | RESPIRATION RATE: 18 BRPM | WEIGHT: 150.5 LBS | SYSTOLIC BLOOD PRESSURE: 102 MMHG | BODY MASS INDEX: 23.57 KG/M2 | HEART RATE: 117 BPM | DIASTOLIC BLOOD PRESSURE: 72 MMHG | OXYGEN SATURATION: 97 %

## 2024-04-04 DIAGNOSIS — C18.7 MALIGNANT NEOPLASM OF SIGMOID COLON: ICD-10-CM

## 2024-04-04 DIAGNOSIS — Z95.828 PORT-A-CATH IN PLACE: Primary | ICD-10-CM

## 2024-04-04 LAB — POTASSIUM SERPL-SCNC: 2.7 MMOL/L (ref 3.5–5.2)

## 2024-04-04 PROCEDURE — 96375 TX/PRO/DX INJ NEW DRUG ADDON: CPT

## 2024-04-04 PROCEDURE — 84132 ASSAY OF SERUM POTASSIUM: CPT

## 2024-04-04 PROCEDURE — 25010000002 PROCHLORPERAZINE 10 MG/2ML SOLUTION

## 2024-04-04 PROCEDURE — 25810000003 SODIUM CHLORIDE 0.9 % SOLUTION

## 2024-04-04 PROCEDURE — 25010000002 POTASSIUM CHLORIDE 10 MEQ/100ML SOLUTION

## 2024-04-04 PROCEDURE — 25010000002 HEPARIN LOCK FLUSH PER 10 UNITS

## 2024-04-04 PROCEDURE — 96361 HYDRATE IV INFUSION ADD-ON: CPT

## 2024-04-04 PROCEDURE — 96365 THER/PROPH/DIAG IV INF INIT: CPT

## 2024-04-04 PROCEDURE — 96366 THER/PROPH/DIAG IV INF ADDON: CPT

## 2024-04-04 RX ORDER — POTASSIUM CHLORIDE 20MEQ/15ML
20 LIQUID (ML) ORAL ONCE
Qty: 15 ML | Refills: 0 | Status: SHIPPED | OUTPATIENT
Start: 2024-04-04 | End: 2024-04-05

## 2024-04-04 RX ORDER — SODIUM CHLORIDE 0.9 % (FLUSH) 0.9 %
10 SYRINGE (ML) INJECTION AS NEEDED
OUTPATIENT
Start: 2024-04-04

## 2024-04-04 RX ORDER — SODIUM CHLORIDE 0.9 % (FLUSH) 0.9 %
20 SYRINGE (ML) INJECTION AS NEEDED
OUTPATIENT
Start: 2024-04-04

## 2024-04-04 RX ORDER — PROCHLORPERAZINE EDISYLATE 5 MG/ML
10 INJECTION INTRAMUSCULAR; INTRAVENOUS ONCE
Status: COMPLETED | OUTPATIENT
Start: 2024-04-04 | End: 2024-04-04

## 2024-04-04 RX ORDER — HEPARIN SODIUM (PORCINE) LOCK FLUSH IV SOLN 100 UNIT/ML 100 UNIT/ML
500 SOLUTION INTRAVENOUS AS NEEDED
Status: DISCONTINUED | OUTPATIENT
Start: 2024-04-04 | End: 2024-04-04 | Stop reason: HOSPADM

## 2024-04-04 RX ORDER — HEPARIN SODIUM (PORCINE) LOCK FLUSH IV SOLN 100 UNIT/ML 100 UNIT/ML
500 SOLUTION INTRAVENOUS AS NEEDED
OUTPATIENT
Start: 2024-04-04

## 2024-04-04 RX ORDER — SODIUM CHLORIDE 0.9 % (FLUSH) 0.9 %
10 SYRINGE (ML) INJECTION AS NEEDED
Status: DISCONTINUED | OUTPATIENT
Start: 2024-04-04 | End: 2024-04-04 | Stop reason: HOSPADM

## 2024-04-04 RX ORDER — HEPARIN SODIUM (PORCINE) LOCK FLUSH IV SOLN 100 UNIT/ML 100 UNIT/ML
300 SOLUTION INTRAVENOUS ONCE
OUTPATIENT
Start: 2024-04-04

## 2024-04-04 RX ORDER — POTASSIUM CHLORIDE 7.45 MG/ML
10 INJECTION INTRAVENOUS
Status: COMPLETED | OUTPATIENT
Start: 2024-04-04 | End: 2024-04-04

## 2024-04-04 RX ADMIN — Medication 10 ML: at 14:57

## 2024-04-04 RX ADMIN — Medication 500 UNITS: at 14:57

## 2024-04-04 RX ADMIN — POTASSIUM CHLORIDE 10 MEQ: 7.46 INJECTION, SOLUTION INTRAVENOUS at 12:25

## 2024-04-04 RX ADMIN — POTASSIUM CHLORIDE 10 MEQ: 7.46 INJECTION, SOLUTION INTRAVENOUS at 10:32

## 2024-04-04 RX ADMIN — PROCHLORPERAZINE EDISYLATE 10 MG: 5 INJECTION INTRAMUSCULAR; INTRAVENOUS at 10:28

## 2024-04-04 RX ADMIN — SODIUM CHLORIDE 1000 ML: 9 INJECTION, SOLUTION INTRAVENOUS at 10:03

## 2024-04-04 RX ADMIN — POTASSIUM CHLORIDE 10 MEQ: 7.46 INJECTION, SOLUTION INTRAVENOUS at 11:21

## 2024-04-04 RX ADMIN — POTASSIUM CHLORIDE 10 MEQ: 7.46 INJECTION, SOLUTION INTRAVENOUS at 13:40

## 2024-04-16 ENCOUNTER — LAB (OUTPATIENT)
Dept: ONCOLOGY | Facility: CLINIC | Age: 54
End: 2024-04-16
Payer: COMMERCIAL

## 2024-04-16 ENCOUNTER — APPOINTMENT (OUTPATIENT)
Dept: ONCOLOGY | Facility: HOSPITAL | Age: 54
End: 2024-04-16
Payer: COMMERCIAL

## 2024-04-16 ENCOUNTER — OFFICE VISIT (OUTPATIENT)
Dept: ONCOLOGY | Facility: CLINIC | Age: 54
End: 2024-04-16
Payer: COMMERCIAL

## 2024-04-16 VITALS
HEIGHT: 67 IN | OXYGEN SATURATION: 98 % | DIASTOLIC BLOOD PRESSURE: 68 MMHG | TEMPERATURE: 97.1 F | HEART RATE: 95 BPM | BODY MASS INDEX: 23.04 KG/M2 | SYSTOLIC BLOOD PRESSURE: 102 MMHG | RESPIRATION RATE: 18 BRPM | WEIGHT: 146.8 LBS

## 2024-04-16 DIAGNOSIS — C78.01 MALIGNANT NEOPLASM METASTATIC TO RIGHT LUNG: ICD-10-CM

## 2024-04-16 DIAGNOSIS — C18.7 MALIGNANT NEOPLASM OF SIGMOID COLON: Primary | ICD-10-CM

## 2024-04-16 DIAGNOSIS — C78.6 METASTASIS TO PERITONEUM: ICD-10-CM

## 2024-04-16 DIAGNOSIS — C18.7 MALIGNANT NEOPLASM OF SIGMOID COLON: ICD-10-CM

## 2024-04-16 LAB
ALBUMIN SERPL-MCNC: 2.8 G/DL (ref 3.5–5.2)
ALBUMIN/GLOB SERPL: 1.3 G/DL
ALP SERPL-CCNC: 438 U/L (ref 39–117)
ALT SERPL W P-5'-P-CCNC: 269 U/L (ref 1–33)
ANION GAP SERPL CALCULATED.3IONS-SCNC: 9.2 MMOL/L (ref 5–15)
ANISOCYTOSIS BLD QL: NORMAL
AST SERPL-CCNC: 119 U/L (ref 1–32)
BASOPHILS # BLD AUTO: 0.01 10*3/MM3 (ref 0–0.2)
BASOPHILS NFR BLD AUTO: 0.5 % (ref 0–1.5)
BILIRUB SERPL-MCNC: 1.3 MG/DL (ref 0–1.2)
BUN SERPL-MCNC: 12 MG/DL (ref 6–20)
BUN/CREAT SERPL: 21.8 (ref 7–25)
CALCIUM SPEC-SCNC: 8.8 MG/DL (ref 8.6–10.5)
CEA SERPL-MCNC: 2.67 NG/ML
CHLORIDE SERPL-SCNC: 103 MMOL/L (ref 98–107)
CO2 SERPL-SCNC: 27.8 MMOL/L (ref 22–29)
CREAT SERPL-MCNC: 0.55 MG/DL (ref 0.57–1)
DEPRECATED RDW RBC AUTO: 93.7 FL (ref 37–54)
EGFRCR SERPLBLD CKD-EPI 2021: 110.4 ML/MIN/1.73
EOSINOPHIL # BLD AUTO: 0.01 10*3/MM3 (ref 0–0.4)
EOSINOPHIL NFR BLD AUTO: 0.5 % (ref 0.3–6.2)
ERYTHROCYTE [DISTWIDTH] IN BLOOD BY AUTOMATED COUNT: 27.8 % (ref 12.3–15.4)
GLOBULIN UR ELPH-MCNC: 2.2 GM/DL
GLUCOSE SERPL-MCNC: 91 MG/DL (ref 65–99)
HCT VFR BLD AUTO: 33.4 % (ref 34–46.6)
HGB BLD-MCNC: 11.2 G/DL (ref 12–15.9)
IMM GRANULOCYTES # BLD AUTO: 0 10*3/MM3 (ref 0–0.05)
IMM GRANULOCYTES NFR BLD AUTO: 0 % (ref 0–0.5)
LYMPHOCYTES # BLD AUTO: 0.71 10*3/MM3 (ref 0.7–3.1)
LYMPHOCYTES NFR BLD AUTO: 35 % (ref 19.6–45.3)
MCH RBC QN AUTO: 30.8 PG (ref 26.6–33)
MCHC RBC AUTO-ENTMCNC: 33.5 G/DL (ref 31.5–35.7)
MCV RBC AUTO: 91.8 FL (ref 79–97)
MONOCYTES # BLD AUTO: 0.22 10*3/MM3 (ref 0.1–0.9)
MONOCYTES NFR BLD AUTO: 10.8 % (ref 5–12)
NEUTROPHILS NFR BLD AUTO: 1.08 10*3/MM3 (ref 1.7–7)
NEUTROPHILS NFR BLD AUTO: 53.2 % (ref 42.7–76)
NRBC BLD AUTO-RTO: 0 /100 WBC (ref 0–0.2)
PLAT MORPH BLD: NORMAL
PLATELET # BLD AUTO: 163 10*3/MM3 (ref 140–450)
PMV BLD AUTO: 10 FL (ref 6–12)
POIKILOCYTOSIS BLD QL SMEAR: NORMAL
POTASSIUM SERPL-SCNC: 3.6 MMOL/L (ref 3.5–5.2)
PROT SERPL-MCNC: 5 G/DL (ref 6–8.5)
RBC # BLD AUTO: 3.64 10*6/MM3 (ref 3.77–5.28)
SODIUM SERPL-SCNC: 140 MMOL/L (ref 136–145)
STOMATOCYTES BLD QL SMEAR: NORMAL
WBC NRBC COR # BLD AUTO: 2.03 10*3/MM3 (ref 3.4–10.8)

## 2024-04-16 PROCEDURE — 99214 OFFICE O/P EST MOD 30 MIN: CPT | Performed by: INTERNAL MEDICINE

## 2024-04-16 PROCEDURE — 82378 CARCINOEMBRYONIC ANTIGEN: CPT | Performed by: INTERNAL MEDICINE

## 2024-04-16 PROCEDURE — 85007 BL SMEAR W/DIFF WBC COUNT: CPT | Performed by: INTERNAL MEDICINE

## 2024-04-16 PROCEDURE — 36415 COLL VENOUS BLD VENIPUNCTURE: CPT | Performed by: INTERNAL MEDICINE

## 2024-04-16 PROCEDURE — 85025 COMPLETE CBC W/AUTO DIFF WBC: CPT | Performed by: INTERNAL MEDICINE

## 2024-04-16 PROCEDURE — 80053 COMPREHEN METABOLIC PANEL: CPT | Performed by: INTERNAL MEDICINE

## 2024-04-16 RX ORDER — LINACLOTIDE 72 UG/1
1 CAPSULE, GELATIN COATED ORAL DAILY
COMMUNITY
Start: 2024-03-28

## 2024-04-16 NOTE — PROGRESS NOTES
Venipuncture Blood Specimen Collection  Venipuncture performed in Left arm by Eulalia Stone MA with good hemostasis. Patient tolerated the procedure well without complications.   04/16/24   Eulalia Stone MA

## 2024-04-16 NOTE — PROGRESS NOTES
Name:  Vangie Carney  :  1970  Date:  2024     REFERRING PHYSICIAN  Shari Aguirre MD    PRIMARY CARE PROVIDER  Urvashi Basurto, ANURAG    REASON FOR FOLLOWUP  1. Malignant neoplasm of sigmoid colon      CHIEF COMPLAINT  Intermittent nausea and vomiting following each cycle of palliative, infusional 5-FU/leucovorin, improved, but still persistent, since starting Sancuso.    Dear Ms. Basurto,    HISTORY OF PRESENT ILLNESS:   I saw Ms. Carney in follow up today in our medical oncology clinic. As you are aware, she is a pleasant, 53 y.o., white female with minimal past medical history who was in her usual state of health until 2023 when she developed more frequent epigastric pains and BRBPR. She was referred to local gastroenterology, who performed an endoscopic exam that identified a tumor in the sigmoid colon. Biopsies were consistent with a moderately differentiated adenocarcinoma. She was referred to [facility] colorectal surgery in [place], and she underwent an LAR on 2023 for LAR. This procedure went very well, and a diverting ostomy was not required. She was subsequently referred to our clinic for further evaluation and management. At the time of her initial consultation with us (on 2023), we discussed how ~six months of adjuvant chemotherapy was now indicated and recommended as the current standard of care. Following a long discussion regarding the potential risks vs. benefits, she was initially agreeable to undergoing powerport placement and proceeding with [id number] FOLFOX; however, she subsequently changed her mind, canceled her scheduled powerport appointment and was lost to routine follow up in both ours and [facility] colorectal surgery's clinics. You re-referred her to our clinic in 2024; because, unfortunately, since ~late 2023, she had been feeling overall steadily worse, with recurrent and progressive constipation and back pain. The results of a CT of the  abdomen and pelvis performed on 01/17/2024 for further evaluation were consistent with recurrent, and now metastatic, disease. At the time of her follow up appointments in late January/early February 2024, she was agreeable to undergoing powerport placement and beginning first-line, palliative treatment with [id number], infusional 5-FU/leucovorin.    INTERIM HISTORY:  Ms. Carney returns to clinic today for follow up of (now metastatic) colorectal cancer again accompanied by her daughter. Following powerport placement, she began palliative, [id number] infusional 5-FU/leucovorin the week of 02/19/2024. She has now received a total of four (4) cycles. She continues to struggle with this treatment regimen, despite the Sancuso patch. Following the most recent (the fourth) cycle, she again spent the better part of the week after receiving chemotherapy intermittently nauseated. Following the second cycle, she wound up being hospitalized for several days (for, ultimately, nonsurgical management of a partial bowel obstruction). She is still taking prn Reglan as well.    Past Medical History:   Diagnosis Date    Gallbladder attack     GERD (gastroesophageal reflux disease)     Hearing aid worn     History of ear infections     Malignant neoplasm of sigmoid colon 3/14/2023    Seasonal allergies     Wears glasses        Past Surgical History:   Procedure Laterality Date    CHOLECYSTECTOMY      COLON RESECTION N/A 3/14/2023    Procedure: COLON RESECTION LOW ANTERIOR LAPAROSCOPIC WITH DAVINCI ROBOT;  Surgeon: Shari Aguirre MD;  Location: [facility] OR;  Service: Robotics - DaVinci;  Laterality: N/A;    COLONOSCOPY N/A 2/15/2023    Procedure: COLONOSCOPY FOR SCREENING;  Surgeon: Giselle Iniguez MD;  Location: [facility] OR;  Service: Gastroenterology;  Laterality: N/A;    ENDOSCOPY N/A 2/15/2023    Procedure: ESOPHAGOGASTRODUODENOSCOPY WITH BIOPSY;  Surgeon: Giselle Iniguez MD;  Location:  [facility] OR;  Service:  Gastroenterology;  Laterality: N/A;    LAPAROSCOPIC TUBAL LIGATION Bilateral     MIDDLE EAR SURGERY      PORTACATH PLACEMENT N/A 2024    Procedure: INSERTION OF PORTACATH;  Surgeon: Jaylen Leal MD;  Location: [facility];  Service: General;  Laterality: N/A;       Social History     Socioeconomic History    Marital status:    Tobacco Use    Smoking status: Former     Current packs/day: 0.00     Average packs/day: 1 pack/day for 30.0 years (30.0 ttl pk-yrs)     Types: Cigarettes     Start date:      Quit date:      Years since quittin.2     Passive exposure: Never    Smokeless tobacco: Never   Vaping Use    Vaping status: Every Day    Substances: Nicotine, Flavoring    Devices: Refillable tank   Substance and Sexual Activity    Alcohol use: Never    Drug use: Defer    Sexual activity: Defer     Birth control/protection: None       Family History   Problem Relation Age of Onset    Cancer Mother     Cancer Father     Cancer Sister     Cancer Brother     Cancer Maternal Grandmother     Cancer Maternal Grandfather     Breast cancer Neg Hx        Allergies   Allergen Reactions    Keflex [Cephalexin] Nausea Only     Beta lactam allergy details  Antibiotic reaction: nausea  Age at reaction: adult  Dose to reaction time: (!) minutes  Reason for antibiotic: unknown  Epinephrine required for reaction?: no  Tolerated antibiotics: augmentin, amoxicillin          Current Outpatient Medications   Medication Sig Dispense Refill    granisetron (Sancuso) 3.1 MG/24HR Place 1 patch on the skin once every 7 days. 4 patch 3    HYDROcodone-acetaminophen (NORCO) 5-325 MG per tablet Take 1-2 tablets by mouth Every 8 (Eight) Hours As Needed for Mild Pain.      lactulose (CHRONULAC) 10 GM/15ML solution Take 30 mL by mouth 2 (Two) Times a Day As Needed (for constipation).      lidocaine-prilocaine (EMLA) 2.5-2.5 % cream Apply to port-a-cath site 30 minutes prior to arrival at infusion center. Cover with plastic  "wrap. 30 g 1    Linzess 72 MCG capsule capsule Take 1 capsule by mouth Daily.      metoclopramide (REGLAN) 10 MG tablet Take 1 tablet by mouth 3 (Three) Times a Day As Needed (for nausea).      ondansetron (ZOFRAN) 8 MG tablet Take 1 tablet by mouth Every 8 (Eight) Hours As Needed for Nausea or Vomiting.      pantoprazole (PROTONIX) 40 MG EC tablet Take 1 tablet by mouth Daily.      sennosides-docusate (PERICOLACE) 8.6-50 MG per tablet Take 2 tablets by mouth 2 (Two) Times a Day As Needed for Constipation.       No current facility-administered medications for this visit.     REVIEW OF SYSTEMS  CONSTITUTIONAL:  No fever, chills or night sweats. Progressive fatigue with ongoing, palliative chemotherapy.  EYES:  No blurry vision, diplopia or other vision changes.  ENT:  No hearing loss, nosebleeds or sore throat.  CARDIOVASCULAR:  No palpitations, arrhythmia, syncopal episodes or edema.  PULMONARY:  No hemoptysis, wheezing, chronic cough or shortness of breath.  GASTROINTESTINAL:  As per the HPI above.  GENITOURINARY:  No hematuria, kidney stones or frequent urination.  MUSCULOSKELETAL:  As per the HPI above.  INTEGUMENTARY: No rashes or pruritus.  ENDOCRINE:  No excessive thirst or hot flashes.  HEMATOLOGIC:  No history of free bleeding, spontaneous bleeding or clotting.  IMMUNOLOGIC:  No allergies or frequent infections.  NEUROLOGIC: No numbness, tingling, seizures or weakness.  PSYCHIATRIC:  No anxiety or depression.    PHYSICAL EXAMINATION  /68   Pulse 95   Temp 97.1 °F (36.2 °C) (Temporal)   Resp 18   Ht 170.2 cm (67\")   Wt 66.6 kg (146 lb 12.8 oz)   LMP 06/15/2016   SpO2 98%   BMI 22.99 kg/m²     Pain Score:  Pain Score    24 0850   PainSc: 0-No pain     PHQ-Score Total:  PHQ-9 Total Score:      ECO  GENERAL:  A well-developed, well-nourished, white female in no acute distress, again sitting in a wheelchair.  HEENT:  Pupils equally round and reactive to light. Extraocular muscles " intact.  CARDIOVASCULAR:  Regular rate and rhythm.  No murmurs, gallops or rubs.  LUNGS:  Clear to auscultation bilaterally.  ABDOMEN:  Soft, nontender, nondistended with positive bowel sounds.  EXTREMITIES:  No clubbing, cyanosis or edema bilaterally.  SKIN:  No rashes or petechiae. Powerport in place.  NEURO:  Cranial nerves grossly intact. No focal deficits.  PSYCH:  Alert and oriented x3.    LABORATORY  Lab Results   Component Value Date    WBC 2.03 (L) 04/16/2024    HGB 11.2 (L) 04/16/2024    HCT 33.4 (L) 04/16/2024    MCV 91.8 04/16/2024     04/16/2024    NEUTROABS 1.08 (L) 04/16/2024       Lab Results   Component Value Date     04/16/2024    K 3.6 04/16/2024     04/16/2024    CO2 27.8 04/16/2024    BUN 12 04/16/2024    CREATININE 0.55 (L) 04/16/2024    GLUCOSE 91 04/16/2024    CALCIUM 8.8 04/16/2024     (H) 04/16/2024     (H) 04/16/2024    ALKPHOS 438 (H) 04/16/2024    BILITOT 1.3 (H) 04/16/2024    PROTEINTOT 5.0 (L) 04/16/2024    ALBUMIN 2.8 (L) 04/16/2024     CBC (04/16/2024): WBCs: 2.03; HgB: 11.2; Hct: 33.4; platelets: 163; MCV: 91.8  CBC (04/02/2024): WBCs: 3.55; HgB: 12.2; Hct: 35.3; platelets: 164  CBC (03/19/2024): WBCs: 6.28; HgB: 14.1; Hct: 42.1; platelets: 319  CBC (03/05/2024): WBCs: 3.32; HgB: 10.9; Hct: 35.5; platelets: 251; MCV: 90.3  CBC (02/19/2024): WBCs: 17.29; HgB: 10.5; Hct: 34.0; platelets: 451; MCV: 85.2  CBC (01/23/2024): WBCs: 5.34; HgB: 8.3; Hct: 27.3; platelets: 235; MCV: 91.3  CBC (03/16/2023): WBCs: 4.78; HgB: 9.1; Hct: 28.4; platelets: 195; MCV: 100.0    CEA (04/16/2024): pending  CEA (03/19/2024): 1.90 ng/mL  CEA (03/07/2024): 1.59 ng/mL  CEA (01/23/2024): 1.56 ng/mL  CEA (03/13/2023): 1.17 ng/mL    Iron panel (03/19/2024): serum iron: 74; % saturation: 30; TIBC: 243; ferritin: 391.9 ng/mL  Iron panel (01/23/2024): serum iron: 22; % saturation: 5; TIBC: 443; ferritin: 9.74 ng/mL    IMAGING  CT abdomen and pelvis without contrast  (02/15/2023):  Impression:  1) Mild narrowing in the sigmoid colon. Recommend direct visualization.  2) Other findings [are nonacute].    CT chest without contrast (03/06/2023):  Impression: No evidence of metastatic disease in the chest.    CT chest with contrast (04/07/2023, compared to 03/06/2023):  Impression: No evidence of metastatic disease to the chest. Stable exam.    CT abdomen and pelvis with contrast (04/07/2023, compared to 02/15/2023):  Impression:  1) Interval post surgical changes noted from partial resection of the sigmoid colon region.  2) 3.1 cm predominantly gas-filled collection in the left presacral space near the anastomosis site that may reflect postoperative changes. Possibility of small fistulous connection not excluded within the anastomosis.  3) No ileus or bowel obstruction.  4) No solid organ lesions or adenopathy identified.    CT abdomen and pelvis with and without contrast (01/17/2024):  Impression:  1) Interval development of metastatic disease.  2) Specifically, soft tissue metastatic implants are noted in the omentum, left lower quadrant, upper presacral space, and left lower presacral space immediately adjacent to the anastomosis site. Soft tissue implant within umbilical hernia fat.  3) Development of 5.5 mm nodule right lower lobe suspicious for metastatic lung nodule.  4) Interval postsurgical changes from sigmoid resection.  5) Mild fatty infiltration of liver. No focal liver lesions.  6) Other incidental/nonacute findings.    CT abdomen and pelvis without contrast (03/07/2024):  Impression: Dilated fluid filled small bowel loops with decompressed mid and distal ileum suggestive of small bowel obstruction.    CT chest with contrast (03/08/2024, compared to 04/07/2023):  Impression: New left upper lobe 1.2 cm pulmonary nodule.    CT abdomen and pelvis with contrast (03/08/2024, compared to 03/07/2024):  Impression:  1) Oral contrast is seen within some very decompressed mid  and distal ileal small bowel loops suggestive of likely probably high-grade partial obstruction.  2) New soft tissue density lesion from 11/09/2022 in the left lower abdomen anterior to the psoas muscle measuring 1.5 cm.    PATHOLOGY  Sigmoid colon and upper rectum, low anterior resection (03/14/2023):  Adenocarcinoma, moderately differentiated, invasive through muscularis propria into pericolonic soft tissue. Lymphovascular invasion present. Surgical margins negative for carcinoma. Two out of fifteen (2/15) lymph nodes involved by metastatic carcinoma with extranodal extension present. Tumor size: 3.5 x 2.5 x 1.4 cm. Intact MSI expression per previous biopsy. [id number] (stage IIIB).    Molecular profiling ([id number]), tissue (sigmoid colon/upper rectum) and liquid (peripheral blood):  PD-L1: 22%; KRAS G12D; PIK3CA E545K; TP53 Y220H; MSI-High NOT detected.    IMPRESSION AND PLAN  Ms. Carney is a 53 y.o., white female with:  Colon adenocarcinoma: Initially diagnosed in February 2023 and status post resection of the sigmoid colon and upper rectum on 03/14/2023. The final, surgical pathology from this procedure was consistent with stage IIIB (tB2nJ1p) disease. Two out of fifteen (2/15) lymph nodes were involved with metastatic disease, but all surgical margins were negative. I had a long discussion with the patient and her  at the time of her initial (and, prior to today, only) consultation in our clinic (on 03/30/2023) regarding this diagnosis and its prognosis. We discussed how, while her surgery went overall very well, her chance of eventually developing a relapse in this malignancy was, unfortunately, significant; and the purpose of adjuvant chemotherapy was to reduce this chance by as much as possible (perhaps by as much as 10%). Particularly given her stage III disease, young age and multiple, poor risk factors (left-sided tumor, lymphovascular invasion present, etc.), six months of u3qokrus FOLFOX (or  XELOX) is the preferred regimen; and, following a long discussion regarding the potential risks vs. benefits of this treatment regimen at that time, she was initially agreeable to the former (FOLFOX). We referred her back to local surgery for powerport placement; however, per her preference, she ultimately never followed through with either this (powerport placement) or starting the adjuvant chemotherapy, and she was subsequently lost to routine follow up in both ours and [facility] colorectal surgery's clinics. She apparently overall still did very well for the next ~six to seven months; but, by late October 2023, she was experiencing recurrent constipation and back pain, and a repeat CT of the abdomen and pelvis performed on 01/17/2024 (and summarized above) was, unfortunately, consistent with recurrent, and now metastatic, disease, with the development of multiple, soft tissue implants within the omentum/peritoneum and, possibly, at least one metastatic lung lesion (in the right lower lobe) as well. Both around that time and since, I have had multiple, long discussions with the patient (+/- her  or daughter) regarding this updated diagnosis and, in general terms, its prognosis. She and her family remain aware that this disease is now recurrent, by definition, stage IV, and consequently, incurable. That said, particularly given her continued, good performance status, it was/is potentially treatable; and sustained remissions are possible. Some of the pertinent results of molecular profiling ([id number]) that were performed on both tissue (March 2023's partial colectomy specimen(s)) and liquid (peripheral blood drawn in January 2024) biopsies in early 2024 are summarized above. Following a long discussion in early February 2024 regarding its potential risks vs. benefits, she was agreeable to having a powerport placed and beginning [id number] 5-FU/leucovorin. She began this regimen on 02/19/2024, and she has now  "completed a total of four (4) cycles. Following the second cycle, she was briefly admitted to [facility] for a partial bowel obstruction; but this clinically resolved with an NG tube and nonsurgical management. Following the most recent (the fourth) cycle, she has, unfortunately, continued to struggle with this regimen, with persistently increased fatigue and nausea. Between this and today's borderline cell counts (see above) and rising LFTs (also see above), she would benefit from deferring this week's planned fifth cycle until (at least) next week. We will see her back in our clinic at that time (next week) with repeat CTs of the chest, abdomen and pelvis with contrast.  Anemia: Repeat CBCs from 01/23/2024 showed a HgB of 8.3 g/dL and a ferritin level of 9.7 ng/mL. As she was not tolerating oral iron replacement very well (due to constipation), we gave her a cycle of IV Feraheme in early February 2024. Her HgB and indices have recently still been much improved since then. Continue to monitor.  Gastric dysmotility: She previously elaborated that she thinks an intermittent sensation that food just \"gets stuck\" in her stomach contributes to the periodic N/V she has experienced with the first couple of cycles of palliative chemotherapy. Continue Reglan 10 mg TID prn. Continue to monitor.  Nausea/vomiting: Multifactorial, with ongoing, palliative chemotherapy and omental/peritoneal metastases from issue #1 both contributing. She had to be hospitalized for several days last week due to a partial bowel obstruction; however, this resolved with nonsurgical management alone. Continue Sancuso TD patches. Continue to monitor.  The patient and her daughter were in agreement with these plans.    It is a pleasure to participate in Ms. Carney's care. Please do not hesitate to call with any questions or concerns that you may have.    A total of 30 minutes were spent coordinating this patient’s care in clinic today; more than 50% of this " time was face-to-face with the patient and her daughter, reviewing her interim medical history, discussing the results of today's repeat labwork and counseling on the current treatment and followup plan. All questions were answered to their satisfaction.    FOLLOW UP  Continue Reglan 10 mg TID prn. Continue qweekly Sancuso TD. Defer this week's planned cycle of palliative, t1spgegu infusional 5-FU/leucovorin (the 5th one) until, at least, next week. Return to our clinic at that time (next week) with a CBC, CMP and repeat CTs of the chest, abdomen and pelvis with contrast.            This document was electronically signed by ROWENA Hanks MD April 16, 2024 12:03 EDT      CC: ANURAG Mishra MD Karen S. Jennings-Conklin, MD

## 2024-04-17 ENCOUNTER — TELEPHONE (OUTPATIENT)
Dept: ONCOLOGY | Facility: CLINIC | Age: 54
End: 2024-04-17
Payer: COMMERCIAL

## 2024-04-17 NOTE — TELEPHONE ENCOUNTER
Spoke with patient, answered her questions regarding her medication. No other questions at this time.

## 2024-04-18 ENCOUNTER — TELEPHONE (OUTPATIENT)
Dept: ONCOLOGY | Facility: CLINIC | Age: 54
End: 2024-04-18
Payer: COMMERCIAL

## 2024-04-18 NOTE — TELEPHONE ENCOUNTER
Spoke with patient and answered questions regarding her CT scans. No other questions at this time.

## 2024-04-18 NOTE — TELEPHONE ENCOUNTER
Caller: Vangie Carney    Relationship: Self    Best call back number: [phone]      What was the call regarding: PT CALLED HAS QUESTIONS REGARDING HER CT SCAN THAT IS SCHEDULED FOR SUNDAY. SHE ASKED TO SPEAK TO THE NURSE

## 2024-04-21 ENCOUNTER — HOSPITAL ENCOUNTER (EMERGENCY)
Facility: HOSPITAL | Age: 54
Discharge: HOME | End: 2024-04-21
Attending: STUDENT IN AN ORGANIZED HEALTH CARE EDUCATION/TRAINING PROGRAM | Admitting: STUDENT IN AN ORGANIZED HEALTH CARE EDUCATION/TRAINING PROGRAM
Payer: COMMERCIAL

## 2024-04-21 ENCOUNTER — HOSPITAL ENCOUNTER (OUTPATIENT)
Dept: CT IMAGING | Facility: HOSPITAL | Age: 54
Discharge: HOME | End: 2024-04-21
Payer: COMMERCIAL

## 2024-04-21 VITALS
RESPIRATION RATE: 14 BRPM | DIASTOLIC BLOOD PRESSURE: 66 MMHG | SYSTOLIC BLOOD PRESSURE: 95 MMHG | TEMPERATURE: 97.9 F | HEIGHT: 67 IN | WEIGHT: 144 LBS | OXYGEN SATURATION: 98 % | BODY MASS INDEX: 22.6 KG/M2 | HEART RATE: 85 BPM

## 2024-04-21 DIAGNOSIS — C78.01 MALIGNANT NEOPLASM METASTATIC TO RIGHT LUNG: ICD-10-CM

## 2024-04-21 DIAGNOSIS — C18.7 MALIGNANT NEOPLASM OF SIGMOID COLON: ICD-10-CM

## 2024-04-21 DIAGNOSIS — C78.6 METASTASIS TO PERITONEUM: ICD-10-CM

## 2024-04-21 DIAGNOSIS — I26.99 OTHER ACUTE PULMONARY EMBOLISM WITHOUT ACUTE COR PULMONALE: Primary | ICD-10-CM

## 2024-04-21 LAB
ALBUMIN SERPL-MCNC: 2.9 G/DL (ref 3.5–5.2)
ALBUMIN/GLOB SERPL: 1.1 G/DL
ALP SERPL-CCNC: 393 U/L (ref 39–117)
ALT SERPL W P-5'-P-CCNC: 195 U/L (ref 1–33)
ANION GAP SERPL CALCULATED.3IONS-SCNC: 13.6 MMOL/L (ref 5–15)
ANISOCYTOSIS BLD QL: ABNORMAL
APTT PPP: 25.9 SECONDS (ref 26.5–34.5)
AST SERPL-CCNC: 236 U/L (ref 1–32)
BASOPHILS # BLD AUTO: 0.03 10*3/MM3 (ref 0–0.2)
BASOPHILS NFR BLD AUTO: 1.1 % (ref 0–1.5)
BILIRUB SERPL-MCNC: 1 MG/DL (ref 0–1.2)
BILIRUB UR QL STRIP: NEGATIVE
BUN SERPL-MCNC: 13 MG/DL (ref 6–20)
BUN/CREAT SERPL: 22 (ref 7–25)
CALCIUM SPEC-SCNC: 9.1 MG/DL (ref 8.6–10.5)
CHLORIDE SERPL-SCNC: 101 MMOL/L (ref 98–107)
CLARITY UR: ABNORMAL
CO2 SERPL-SCNC: 25.4 MMOL/L (ref 22–29)
COLOR UR: ABNORMAL
CREAT SERPL-MCNC: 0.59 MG/DL (ref 0.57–1)
DEPRECATED RDW RBC AUTO: 90.9 FL (ref 37–54)
EGFRCR SERPLBLD CKD-EPI 2021: 108.6 ML/MIN/1.73
EOSINOPHIL # BLD AUTO: 0.05 10*3/MM3 (ref 0–0.4)
EOSINOPHIL # BLD MANUAL: 0.11 10*3/MM3 (ref 0–0.4)
EOSINOPHIL NFR BLD AUTO: 1.9 % (ref 0.3–6.2)
EOSINOPHIL NFR BLD MANUAL: 4 % (ref 0.3–6.2)
ERYTHROCYTE [DISTWIDTH] IN BLOOD BY AUTOMATED COUNT: 26.8 % (ref 12.3–15.4)
GLOBULIN UR ELPH-MCNC: 2.6 GM/DL
GLUCOSE SERPL-MCNC: 87 MG/DL (ref 65–99)
GLUCOSE UR STRIP-MCNC: NEGATIVE MG/DL
HCT VFR BLD AUTO: 36.9 % (ref 34–46.6)
HGB BLD-MCNC: 12.3 G/DL (ref 12–15.9)
HGB UR QL STRIP.AUTO: NEGATIVE
HOLD SPECIMEN: NORMAL
HOLD SPECIMEN: NORMAL
INR PPP: 0.91 (ref 0.9–1.1)
KETONES UR QL STRIP: NEGATIVE
LEUKOCYTE ESTERASE UR QL STRIP.AUTO: NEGATIVE
LYMPHOCYTES # BLD AUTO: 1.2 10*3/MM3 (ref 0.7–3.1)
LYMPHOCYTES # BLD MANUAL: 1.37 10*3/MM3 (ref 0.7–3.1)
LYMPHOCYTES NFR BLD AUTO: 45.6 % (ref 19.6–45.3)
LYMPHOCYTES NFR BLD MANUAL: 5 % (ref 5–12)
MCH RBC QN AUTO: 31.5 PG (ref 26.6–33)
MCHC RBC AUTO-ENTMCNC: 33.3 G/DL (ref 31.5–35.7)
MCV RBC AUTO: 94.6 FL (ref 79–97)
MONOCYTES # BLD AUTO: 0.42 10*3/MM3 (ref 0.1–0.9)
MONOCYTES # BLD: 0.13 10*3/MM3 (ref 0.1–0.9)
MONOCYTES NFR BLD AUTO: 16 % (ref 5–12)
NEUTROPHILS # BLD AUTO: 1.03 10*3/MM3 (ref 1.7–7)
NEUTROPHILS NFR BLD AUTO: 0.89 10*3/MM3 (ref 1.7–7)
NEUTROPHILS NFR BLD AUTO: 33.9 % (ref 42.7–76)
NEUTROPHILS NFR BLD MANUAL: 39 % (ref 42.7–76)
NITRITE UR QL STRIP: NEGATIVE
PH UR STRIP.AUTO: 5 [PH] (ref 5–8)
PLAT MORPH BLD: NORMAL
PLATELET # BLD AUTO: 348 10*3/MM3 (ref 140–450)
PMV BLD AUTO: 9.6 FL (ref 6–12)
POTASSIUM SERPL-SCNC: 3.5 MMOL/L (ref 3.5–5.2)
PROT SERPL-MCNC: 5.5 G/DL (ref 6–8.5)
PROT UR QL STRIP: ABNORMAL
PROTHROMBIN TIME: 12.8 SECONDS (ref 12.1–14.7)
QT INTERVAL: 318 MS
QTC INTERVAL: 408 MS
RBC # BLD AUTO: 3.9 10*6/MM3 (ref 3.77–5.28)
SODIUM SERPL-SCNC: 140 MMOL/L (ref 136–145)
SP GR UR STRIP: 1.01 (ref 1–1.03)
TARGETS BLD QL SMEAR: ABNORMAL
UROBILINOGEN UR QL STRIP: ABNORMAL
VARIANT LYMPHS NFR BLD MANUAL: 52 % (ref 19.6–45.3)
WBC NRBC COR # BLD AUTO: 2.63 10*3/MM3 (ref 3.4–10.8)
WHOLE BLOOD HOLD COAG: NORMAL
WHOLE BLOOD HOLD SPECIMEN: NORMAL

## 2024-04-21 PROCEDURE — 71260 CT THORAX DX C+: CPT

## 2024-04-21 PROCEDURE — 85007 BL SMEAR W/DIFF WBC COUNT: CPT | Performed by: PHYSICIAN ASSISTANT

## 2024-04-21 PROCEDURE — 71260 CT THORAX DX C+: CPT | Performed by: RADIOLOGY

## 2024-04-21 PROCEDURE — 74177 CT ABD & PELVIS W/CONTRAST: CPT

## 2024-04-21 PROCEDURE — 85730 THROMBOPLASTIN TIME PARTIAL: CPT | Performed by: PHYSICIAN ASSISTANT

## 2024-04-21 PROCEDURE — 25510000001 IOPAMIDOL 61 % SOLUTION: Performed by: INTERNAL MEDICINE

## 2024-04-21 PROCEDURE — 93010 ELECTROCARDIOGRAM REPORT: CPT | Performed by: INTERNAL MEDICINE

## 2024-04-21 PROCEDURE — 93005 ELECTROCARDIOGRAM TRACING: CPT | Performed by: PHYSICIAN ASSISTANT

## 2024-04-21 PROCEDURE — 85610 PROTHROMBIN TIME: CPT | Performed by: PHYSICIAN ASSISTANT

## 2024-04-21 PROCEDURE — 85025 COMPLETE CBC W/AUTO DIFF WBC: CPT | Performed by: PHYSICIAN ASSISTANT

## 2024-04-21 PROCEDURE — 81003 URINALYSIS AUTO W/O SCOPE: CPT | Performed by: PHYSICIAN ASSISTANT

## 2024-04-21 PROCEDURE — 99283 EMERGENCY DEPT VISIT LOW MDM: CPT

## 2024-04-21 PROCEDURE — 74177 CT ABD & PELVIS W/CONTRAST: CPT | Performed by: RADIOLOGY

## 2024-04-21 PROCEDURE — 80053 COMPREHEN METABOLIC PANEL: CPT | Performed by: PHYSICIAN ASSISTANT

## 2024-04-21 PROCEDURE — 36415 COLL VENOUS BLD VENIPUNCTURE: CPT

## 2024-04-21 RX ADMIN — APIXABAN 10 MG: 5 TABLET, FILM COATED ORAL at 17:16

## 2024-04-21 RX ADMIN — IOPAMIDOL 70 ML: 612 INJECTION, SOLUTION INTRAVENOUS at 11:26

## 2024-04-21 NOTE — ED PROVIDER NOTES
Subjective   History of Present Illness  53-year-old female presents secondary to abnormal CT.  Patient states that she has been seen by her oncologist will order CAT scan of the chest abdomen pelvis because they were looking at switching her chemotherapy.  She states that she was called by the radiologist and told that she had a blood clot in her lung.  Patient denies any shortness of breath.  She denies any chest pain pressure tightness or squeezing.  She denies any abdominal pain.  She denies any new nausea vomiting.  She states that she has had no lower extremity swelling or pain.  Her bowel movements are normal.  She had a bowel movement today.  She has a past medical history of colon cancer, acid reflux.  She presents by private vehicle.      Review of Systems   Constitutional: Negative.  Negative for fever.   HENT: Negative.     Respiratory: Negative.  Negative for chest tightness and shortness of breath.    Cardiovascular: Negative.  Negative for chest pain.   Gastrointestinal: Negative.  Negative for abdominal pain.   Endocrine: Negative.    Genitourinary: Negative.  Negative for dysuria.   Skin: Negative.    Neurological: Negative.    Psychiatric/Behavioral: Negative.     All other systems reviewed and are negative.      Past Medical History:   Diagnosis Date    Gallbladder attack     GERD (gastroesophageal reflux disease)     Hearing aid worn     History of ear infections     Malignant neoplasm of sigmoid colon 3/14/2023    Seasonal allergies     Wears glasses        Allergies   Allergen Reactions    Keflex [Cephalexin] Nausea Only     Beta lactam allergy details  Antibiotic reaction: nausea  Age at reaction: adult  Dose to reaction time: (!) minutes  Reason for antibiotic: unknown  Epinephrine required for reaction?: no  Tolerated antibiotics: augmentin, amoxicillin          Past Surgical History:   Procedure Laterality Date    CHOLECYSTECTOMY      COLON RESECTION N/A 3/14/2023    Procedure: COLON  RESECTION LOW ANTERIOR LAPAROSCOPIC WITH DAVINCI ROBOT;  Surgeon: Shari Aguirre MD;  Location:  [facility];  Service: Robotics - DaVinci;  Laterality: N/A;    COLONOSCOPY N/A 2/15/2023    Procedure: COLONOSCOPY FOR SCREENING;  Surgeon: Giselle Iniguez MD;  Location:  [facility];  Service: Gastroenterology;  Laterality: N/A;    ENDOSCOPY N/A 2/15/2023    Procedure: ESOPHAGOGASTRODUODENOSCOPY WITH BIOPSY;  Surgeon: Giselle Iniguez MD;  Location:  [facility];  Service: Gastroenterology;  Laterality: N/A;    LAPAROSCOPIC TUBAL LIGATION Bilateral     MIDDLE EAR SURGERY      PORTACATH PLACEMENT N/A 2024    Procedure: INSERTION OF PORTACATH;  Surgeon: Jaylen Leal MD;  Location:  [facility];  Service: General;  Laterality: N/A;       Family History   Problem Relation Age of Onset    Cancer Mother     Cancer Father     Cancer Sister     Cancer Brother     Cancer Maternal Grandmother     Cancer Maternal Grandfather     Breast cancer Neg Hx        Social History     Socioeconomic History    Marital status:    Tobacco Use    Smoking status: Former     Current packs/day: 0.00     Average packs/day: 1 pack/day for 30.0 years (30.0 ttl pk-yrs)     Types: Cigarettes     Start date:      Quit date:      Years since quittin.3     Passive exposure: Never    Smokeless tobacco: Never   Vaping Use    Vaping status: Every Day    Substances: Nicotine, Flavoring    Devices: Refillable tank   Substance and Sexual Activity    Alcohol use: Never    Drug use: Defer    Sexual activity: Defer     Birth control/protection: None           Objective   Physical Exam  Vitals and nursing note reviewed.   Constitutional:       General: She is not in acute distress.     Appearance: She is well-developed. She is not diaphoretic.   HENT:      Head: Normocephalic and atraumatic.      Right Ear: External ear normal.      Left Ear: External ear normal.      Nose: Nose normal.   Eyes:      Conjunctiva/sclera:  Conjunctivae normal.      Pupils: Pupils are equal, round, and reactive to light.   Neck:      Vascular: No JVD.      Trachea: No tracheal deviation.   Cardiovascular:      Rate and Rhythm: Normal rate and regular rhythm.      Heart sounds: Normal heart sounds. No murmur heard.  Pulmonary:      Effort: Pulmonary effort is normal. No respiratory distress.      Breath sounds: Normal breath sounds. No wheezing.   Abdominal:      General: Bowel sounds are normal.      Palpations: Abdomen is soft.      Tenderness: There is no abdominal tenderness.   Musculoskeletal:         General: No deformity. Normal range of motion.      Cervical back: Normal range of motion and neck supple.   Skin:     General: Skin is warm and dry.      Coloration: Skin is not pale.      Findings: No erythema or rash.   Neurological:      Mental Status: She is alert and oriented to person, place, and time.      Cranial Nerves: No cranial nerve deficit.   Psychiatric:         Behavior: Behavior normal.         Thought Content: Thought content normal.         Procedures           ED Course  ED Course as of 04/21/24 1950   Sun Apr 21, 2024   1636 Pesi score is 82 [JI]      ED Course User Index  [JI] Terrance Chawla PA                                   Results for orders placed or performed during the hospital encounter of 04/21/24   Comprehensive Metabolic Panel    Specimen: Blood   Result Value Ref Range    Glucose 87 65 - 99 mg/dL    BUN 13 6 - 20 mg/dL    Creatinine 0.59 0.57 - 1.00 mg/dL    Sodium 140 136 - 145 mmol/L    Potassium 3.5 3.5 - 5.2 mmol/L    Chloride 101 98 - 107 mmol/L    CO2 25.4 22.0 - 29.0 mmol/L    Calcium 9.1 8.6 - 10.5 mg/dL    Total Protein 5.5 (L) 6.0 - 8.5 g/dL    Albumin 2.9 (L) 3.5 - 5.2 g/dL    ALT (SGPT) 195 (H) 1 - 33 U/L    AST (SGOT) 236 (H) 1 - 32 U/L    Alkaline Phosphatase 393 (H) 39 - 117 U/L    Total Bilirubin 1.0 0.0 - 1.2 mg/dL    Globulin 2.6 gm/dL    A/G Ratio 1.1 g/dL    BUN/Creatinine Ratio 22.0 7.0 - 25.0     Anion Gap 13.6 5.0 - 15.0 mmol/L    eGFR 108.6 >60.0 mL/min/1.73   Protime-INR    Specimen: Blood   Result Value Ref Range    Protime 12.8 12.1 - 14.7 Seconds    INR 0.91 0.90 - 1.10   aPTT    Specimen: Blood   Result Value Ref Range    PTT 25.9 (L) 26.5 - 34.5 seconds   Urinalysis With Culture If Indicated - Urine, Clean Catch    Specimen: Urine, Clean Catch   Result Value Ref Range    Color, UA Dark Yellow (A) Yellow, Straw    Appearance, UA Slightly Cloudy (A) Clear    pH, UA 5.0 5.0 - 8.0    Specific Gravity, UA 1.015 1.005 - 1.030    Glucose, UA Negative Negative    Ketones, UA Negative Negative    Bilirubin, UA Negative Negative    Blood, UA Negative Negative    Protein, UA Trace (A) Negative    Leuk Esterase, UA Negative Negative    Nitrite, UA Negative Negative    Urobilinogen, UA 0.2 E.U./dL 0.2 - 1.0 E.U./dL   CBC Auto Differential    Specimen: Blood   Result Value Ref Range    WBC 2.63 (L) 3.40 - 10.80 10*3/mm3    RBC 3.90 3.77 - 5.28 10*6/mm3    Hemoglobin 12.3 12.0 - 15.9 g/dL    Hematocrit 36.9 34.0 - 46.6 %    MCV 94.6 79.0 - 97.0 fL    MCH 31.5 26.6 - 33.0 pg    MCHC 33.3 31.5 - 35.7 g/dL    RDW 26.8 (H) 12.3 - 15.4 %    RDW-SD 90.9 (H) 37.0 - 54.0 fl    MPV 9.6 6.0 - 12.0 fL    Platelets 348 140 - 450 10*3/mm3    Neutrophil % 33.9 (L) 42.7 - 76.0 %    Lymphocyte % 45.6 (H) 19.6 - 45.3 %    Monocyte % 16.0 (H) 5.0 - 12.0 %    Eosinophil % 1.9 0.3 - 6.2 %    Basophil % 1.1 0.0 - 1.5 %    Neutrophils, Absolute 0.89 (L) 1.70 - 7.00 10*3/mm3    Lymphocytes, Absolute 1.20 0.70 - 3.10 10*3/mm3    Monocytes, Absolute 0.42 0.10 - 0.90 10*3/mm3    Eosinophils, Absolute 0.05 0.00 - 0.40 10*3/mm3    Basophils, Absolute 0.03 0.00 - 0.20 10*3/mm3   Manual Differential    Specimen: Blood   Result Value Ref Range    Neutrophil % 39.0 (L) 42.7 - 76.0 %    Lymphocyte % 52.0 (H) 19.6 - 45.3 %    Monocyte % 5.0 5.0 - 12.0 %    Eosinophil % 4.0 0.3 - 6.2 %    Neutrophils Absolute 1.03 (L) 1.70 - 7.00 10*3/mm3     Lymphocytes Absolute 1.37 0.70 - 3.10 10*3/mm3    Monocytes Absolute 0.13 0.10 - 0.90 10*3/mm3    Eosinophils Absolute 0.11 0.00 - 0.40 10*3/mm3    Anisocytosis Mod/2+ None Seen    Target Cells Slight/1+ None Seen    Platelet Morphology Normal Normal   ECG 12 Lead Dyspnea   Result Value Ref Range    QT Interval 318 ms    QTC Interval 408 ms   Green Top (Gel)   Result Value Ref Range    Extra Tube Hold for add-ons.    Lavender Top   Result Value Ref Range    Extra Tube hold for add-on    Gold Top - SST   Result Value Ref Range    Extra Tube Hold for add-ons.    Light Blue Top   Result Value Ref Range    Extra Tube Hold for add-ons.      CT Abdomen Pelvis With Contrast    Result Date: 4/21/2024  1. Filling defect in the right lower lobe pulmonary artery consistent with an embolus. 2. Interval decrease in size of the previously noted left upper lobe nodule now measuring 8 mm. 3. New small left effusion and opacity in the medial left lower lobe which may be infectious or inflammatory in nature. Short interval follow-up is recommended to ensure resolution. 4. Interval improvement in the previously noted soft tissue density mesenteric masses consistent with a treatment response. 5. Multiple dilated loops of small bowel consistent with at least a partial obstruction.    Critical Finding  Critical report results have been called to to the radiology department who will attempt to contact the patient regarding the right-sided pulmonary embolus and advised to go to the emergency department. Report called at 4/21/2024 3:20 PM.     This report was finalized on 4/21/2024 3:21 PM by Filiberto Sol M.D..      CT Chest With Contrast Diagnostic    Result Date: 4/21/2024  1. Filling defect in the right lower lobe pulmonary artery consistent with an embolus. 2. Interval decrease in size of the previously noted left upper lobe nodule now measuring 8 mm. 3. New small left effusion and opacity in the medial left lower lobe which may  be infectious or inflammatory in nature. Short interval follow-up is recommended to ensure resolution. 4. Interval improvement in the previously noted soft tissue density mesenteric masses consistent with a treatment response. 5. Multiple dilated loops of small bowel consistent with at least a partial obstruction.    Critical Finding  Critical report results have been called to to the radiology department who will attempt to contact the patient regarding the right-sided pulmonary embolus and advised to go to the emergency department. Report called at 4/21/2024 3:20 PM.     This report was finalized on 4/21/2024 3:21 PM by Filiberto Sol M.D..               Medical Decision Making  53-year-old female presents secondary to abnormal CT.  Patient states that she has been seen by her oncologist will order CAT scan of the chest abdomen pelvis because they were looking at switching her chemotherapy.  She states that she was called by the radiologist and told that she had a blood clot in her lung.  Patient denies any shortness of breath.  She denies any chest pain pressure tightness or squeezing.  She denies any abdominal pain.  She denies any new nausea vomiting.  She states that she has had no lower extremity swelling or pain.  Her bowel movements are normal.  She had a bowel movement today.  She has a past medical history of colon cancer, acid reflux.  She presents by private vehicle.    Problems Addressed:  Other acute pulmonary embolism without acute cor pulmonale: complicated acute illness or injury    Amount and/or Complexity of Data Reviewed  Labs: ordered. Decision-making details documented in ED Course.  ECG/medicine tests: ordered.    Risk  Prescription drug management.  Risk Details: Patient was counseled about her diagnostic workup and labs.  She was totally asymptomatic.  Oxygen saturation normal.  No distress.  Patient was advised to follow-up with her hematology/oncologist.  [facility] [place]   She was counseled at the potential side effects.  She voiced understanding.        Final diagnoses:   Other acute pulmonary embolism without acute cor pulmonale       ED Disposition  ED Disposition       ED Disposition   Discharge    Condition   Stable    Comment   --               Urvashi Basurto, APRN  [address] [place] [address] [address]  [facility] [address] [phone]    Schedule an appointment as soon as possible for a visit       ROWENA Hanks MD  [address] [place] [id number]  [phone]    Schedule an appointment as soon as possible for a visit       [facility] EMERGENCY DEPARTMENT  [address] [facility] [id number] [phone]    If symptoms worsen         Medication List        New Prescriptions      Apixaban Starter Pack tablet therapy pack  Take two 5 mg tablets by mouth every 12 hours for 7 days. Followed by one 5 mg tablet every 12 hours. (Dispense starter pack if available)               Where to Get Your Medications        You can get these medications from any pharmacy    Bring a paper prescription for each of these medications  Apixaban Starter Pack tablet therapy pack            Terrance Chawla PA  04/21/24 1950

## 2024-04-22 DIAGNOSIS — C18.7 MALIGNANT NEOPLASM OF SIGMOID COLON: Primary | ICD-10-CM

## 2024-04-22 RX ORDER — SODIUM CHLORIDE 9 MG/ML
20 INJECTION, SOLUTION INTRAVENOUS ONCE
OUTPATIENT
Start: 2024-06-04

## 2024-04-22 RX ORDER — FLUOROURACIL 50 MG/ML
400 INJECTION, SOLUTION INTRAVENOUS ONCE
OUTPATIENT
Start: 2024-06-18

## 2024-04-22 RX ORDER — FLUOROURACIL 50 MG/ML
400 INJECTION, SOLUTION INTRAVENOUS ONCE
OUTPATIENT
Start: 2024-06-04

## 2024-04-22 RX ORDER — SODIUM CHLORIDE 9 MG/ML
20 INJECTION, SOLUTION INTRAVENOUS ONCE
OUTPATIENT
Start: 2024-06-18

## 2024-04-22 RX ORDER — SODIUM CHLORIDE 9 MG/ML
20 INJECTION, SOLUTION INTRAVENOUS ONCE
OUTPATIENT
Start: 2024-05-21

## 2024-04-22 RX ORDER — FLUOROURACIL 50 MG/ML
400 INJECTION, SOLUTION INTRAVENOUS ONCE
OUTPATIENT
Start: 2024-05-21

## 2024-04-23 ENCOUNTER — APPOINTMENT (OUTPATIENT)
Dept: ONCOLOGY | Facility: HOSPITAL | Age: 54
End: 2024-04-23
Payer: COMMERCIAL

## 2024-04-23 ENCOUNTER — LAB (OUTPATIENT)
Dept: ONCOLOGY | Facility: CLINIC | Age: 54
End: 2024-04-23
Payer: COMMERCIAL

## 2024-04-23 ENCOUNTER — OFFICE VISIT (OUTPATIENT)
Dept: ONCOLOGY | Facility: CLINIC | Age: 54
End: 2024-04-23
Payer: COMMERCIAL

## 2024-04-23 VITALS
RESPIRATION RATE: 18 BRPM | BODY MASS INDEX: 22.76 KG/M2 | SYSTOLIC BLOOD PRESSURE: 94 MMHG | HEIGHT: 67 IN | TEMPERATURE: 97.3 F | WEIGHT: 145 LBS | OXYGEN SATURATION: 98 % | HEART RATE: 105 BPM | DIASTOLIC BLOOD PRESSURE: 65 MMHG

## 2024-04-23 DIAGNOSIS — C18.7 MALIGNANT NEOPLASM OF SIGMOID COLON: ICD-10-CM

## 2024-04-23 DIAGNOSIS — C18.7 MALIGNANT NEOPLASM OF SIGMOID COLON: Primary | ICD-10-CM

## 2024-04-23 LAB
ALBUMIN SERPL-MCNC: 2.6 G/DL (ref 3.5–5.2)
ALBUMIN/GLOB SERPL: 1 G/DL
ALP SERPL-CCNC: 363 U/L (ref 39–117)
ALT SERPL W P-5'-P-CCNC: 187 U/L (ref 1–33)
ANION GAP SERPL CALCULATED.3IONS-SCNC: 10.1 MMOL/L (ref 5–15)
ANISOCYTOSIS BLD QL: ABNORMAL
AST SERPL-CCNC: 196 U/L (ref 1–32)
BASOPHILS # BLD MANUAL: 0.07 10*3/MM3 (ref 0–0.2)
BASOPHILS NFR BLD MANUAL: 2 % (ref 0–1.5)
BILIRUB SERPL-MCNC: 0.9 MG/DL (ref 0–1.2)
BUN SERPL-MCNC: 12 MG/DL (ref 6–20)
BUN/CREAT SERPL: 19 (ref 7–25)
CALCIUM SPEC-SCNC: 8.5 MG/DL (ref 8.6–10.5)
CHLORIDE SERPL-SCNC: 105 MMOL/L (ref 98–107)
CO2 SERPL-SCNC: 26.9 MMOL/L (ref 22–29)
CREAT SERPL-MCNC: 0.63 MG/DL (ref 0.57–1)
DEPRECATED RDW RBC AUTO: 89.1 FL (ref 37–54)
EGFRCR SERPLBLD CKD-EPI 2021: 106.9 ML/MIN/1.73
ERYTHROCYTE [DISTWIDTH] IN BLOOD BY AUTOMATED COUNT: 26.4 % (ref 12.3–15.4)
GLOBULIN UR ELPH-MCNC: 2.7 GM/DL
GLUCOSE SERPL-MCNC: 90 MG/DL (ref 65–99)
HCT VFR BLD AUTO: 35 % (ref 34–46.6)
HGB BLD-MCNC: 11.6 G/DL (ref 12–15.9)
HYPOCHROMIA BLD QL: ABNORMAL
LYMPHOCYTES # BLD MANUAL: 0.96 10*3/MM3 (ref 0.7–3.1)
LYMPHOCYTES NFR BLD MANUAL: 13 % (ref 5–12)
MCH RBC QN AUTO: 31.3 PG (ref 26.6–33)
MCHC RBC AUTO-ENTMCNC: 33.1 G/DL (ref 31.5–35.7)
MCV RBC AUTO: 94.3 FL (ref 79–97)
MONOCYTES # BLD: 0.48 10*3/MM3 (ref 0.1–0.9)
NEUTROPHILS # BLD AUTO: 2.17 10*3/MM3 (ref 1.7–7)
NEUTROPHILS NFR BLD MANUAL: 58 % (ref 42.7–76)
NEUTS BAND NFR BLD MANUAL: 1 % (ref 0–5)
PLAT MORPH BLD: NORMAL
PLATELET # BLD AUTO: 332 10*3/MM3 (ref 140–450)
PMV BLD AUTO: 9.4 FL (ref 6–12)
POTASSIUM SERPL-SCNC: 3.4 MMOL/L (ref 3.5–5.2)
PROT SERPL-MCNC: 5.3 G/DL (ref 6–8.5)
RBC # BLD AUTO: 3.71 10*6/MM3 (ref 3.77–5.28)
SODIUM SERPL-SCNC: 142 MMOL/L (ref 136–145)
VARIANT LYMPHS NFR BLD MANUAL: 26 % (ref 19.6–45.3)
WBC NRBC COR # BLD AUTO: 3.68 10*3/MM3 (ref 3.4–10.8)

## 2024-04-23 PROCEDURE — 36415 COLL VENOUS BLD VENIPUNCTURE: CPT | Performed by: INTERNAL MEDICINE

## 2024-04-23 PROCEDURE — 99214 OFFICE O/P EST MOD 30 MIN: CPT | Performed by: INTERNAL MEDICINE

## 2024-04-23 PROCEDURE — 85025 COMPLETE CBC W/AUTO DIFF WBC: CPT | Performed by: INTERNAL MEDICINE

## 2024-04-23 PROCEDURE — 85007 BL SMEAR W/DIFF WBC COUNT: CPT | Performed by: INTERNAL MEDICINE

## 2024-04-23 PROCEDURE — 80053 COMPREHEN METABOLIC PANEL: CPT | Performed by: INTERNAL MEDICINE

## 2024-04-23 NOTE — PROGRESS NOTES
Venipuncture Blood Specimen Collection  Venipuncture performed in right arm by Lakeisha Waggoner MA with good hemostasis. Patient tolerated the procedure well without complications.   04/23/24   Lakeisha Waggoner MA

## 2024-04-23 NOTE — PROGRESS NOTES
Name:  Vangie Carney  :  1970  Date:  2024     REFERRING PHYSICIAN  Shari Aguirre MD    PRIMARY CARE PROVIDER  Urvashi Basurto, ANURAG    REASON FOR FOLLOWUP  1. Malignant neoplasm of sigmoid colon      CHIEF COMPLAINT  Intermittent nausea and vomiting following each cycle of palliative, infusional 5-FU/leucovorin, improved, but still persistent, since starting Sancuso.    Dear Ms. Basurto,    HISTORY OF PRESENT ILLNESS:   I saw Ms. Carney in follow up today in our medical oncology clinic. As you are aware, she is a pleasant, 53 y.o., white female with minimal past medical history who was in her usual state of health until 2023 when she developed more frequent epigastric pains and BRBPR. She was referred to local gastroenterology, who performed an endoscopic exam that identified a tumor in the sigmoid colon. Biopsies were consistent with a moderately differentiated adenocarcinoma. She was referred to [facility] colorectal surgery in [place], and she underwent an LAR on 2023 for LAR. This procedure went very well, and a diverting ostomy was not required. She was subsequently referred to our clinic for further evaluation and management. At the time of her initial consultation with us (on 2023), we discussed how ~six months of adjuvant chemotherapy was now indicated and recommended as the current standard of care. Following a long discussion regarding the potential risks vs. benefits, she was initially agreeable to undergoing powerport placement and proceeding with [id number] FOLFOX; however, she subsequently changed her mind, canceled her scheduled powerport appointment and was lost to routine follow up in both ours and [facility] colorectal surgery's clinics. You re-referred her to our clinic in 2024; because, unfortunately, since ~late 2023, she had been feeling overall steadily worse, with recurrent and progressive constipation and back pain. The results of a CT of the  "abdomen and pelvis performed on 01/17/2024 for further evaluation were consistent with recurrent, and now metastatic, disease. At the time of her follow up appointments in late January/early February 2024, she was agreeable to undergoing powerport placement and beginning first-line, palliative treatment with q9cinrwg, infusional 5-FU/leucovorin.    INTERIM HISTORY:  Ms. Carney returns to clinic today for follow up of (now metastatic) colorectal cancer again accompanied by her daughter. Following powerport placement, she began palliative, w9otloxa infusional 5-FU/leucovorin the week of 02/19/2024. She has now received a total of four (4) cycles. Following the most recent (the fourth) cycle, she again spent the better part of the week after receiving chemotherapy intermittently nauseated. Today, despite an extra week off (the scheduled fifth cycle was postponed last week), she reports today that she is still feeling \"pretty rough\"; although she is at least a little bit better. The Sancuso patches continue to keep her symptoms at least somewhat manageable. She has no new complaints today.    Past Medical History:   Diagnosis Date    Gallbladder attack     GERD (gastroesophageal reflux disease)     Hearing aid worn     History of ear infections     Malignant neoplasm of sigmoid colon 3/14/2023    Seasonal allergies     Wears glasses        Past Surgical History:   Procedure Laterality Date    CHOLECYSTECTOMY      COLON RESECTION N/A 3/14/2023    Procedure: COLON RESECTION LOW ANTERIOR LAPAROSCOPIC WITH DAVINCI ROBOT;  Surgeon: Shari Aguirre MD;  Location: [facility];  Service: Robotics - DaVinci;  Laterality: N/A;    COLONOSCOPY N/A 2/15/2023    Procedure: COLONOSCOPY FOR SCREENING;  Surgeon: Giselle Iniguez MD;  Location: [facility] OR;  Service: Gastroenterology;  Laterality: N/A;    ENDOSCOPY N/A 2/15/2023    Procedure: ESOPHAGOGASTRODUODENOSCOPY WITH BIOPSY;  Surgeon: Giselle Iniguez MD;  " Location:  [facility] OR;  Service: Gastroenterology;  Laterality: N/A;    LAPAROSCOPIC TUBAL LIGATION Bilateral     MIDDLE EAR SURGERY      PORTACATH PLACEMENT N/A 2024    Procedure: INSERTION OF PORTACATH;  Surgeon: Jaylen Leal MD;  Location: [facility] OR;  Service: General;  Laterality: N/A;       Social History     Socioeconomic History    Marital status:    Tobacco Use    Smoking status: Former     Current packs/day: 0.00     Average packs/day: 1 pack/day for 30.0 years (30.0 ttl pk-yrs)     Types: Cigarettes     Start date:      Quit date:      Years since quittin.3     Passive exposure: Never    Smokeless tobacco: Never   Vaping Use    Vaping status: Every Day    Substances: Nicotine, Flavoring    Devices: Refillable tank   Substance and Sexual Activity    Alcohol use: Never    Drug use: Defer    Sexual activity: Defer     Birth control/protection: None       Family History   Problem Relation Age of Onset    Cancer Mother     Cancer Father     Cancer Sister     Cancer Brother     Cancer Maternal Grandmother     Cancer Maternal Grandfather     Breast cancer Neg Hx        Allergies   Allergen Reactions    Keflex [Cephalexin] Nausea Only     Beta lactam allergy details  Antibiotic reaction: nausea  Age at reaction: adult  Dose to reaction time: (!) minutes  Reason for antibiotic: unknown  Epinephrine required for reaction?: no  Tolerated antibiotics: augmentin, amoxicillin          Current Outpatient Medications   Medication Sig Dispense Refill    Apixaban Starter Pack tablet therapy pack Take two 5 mg tablets by mouth every 12 hours for 7 days. Followed by one 5 mg tablet every 12 hours. (Dispense starter pack if available) 74 tablet 0    granisetron (Sancuso) 3.1 MG/24HR Place 1 patch on the skin once every 7 days. 4 patch 3    HYDROcodone-acetaminophen (NORCO) 5-325 MG per tablet Take 1-2 tablets by mouth Every 8 (Eight) Hours As Needed for Mild Pain.      lactulose (CHRONULAC) 10  "GM/15ML solution Take 30 mL by mouth 2 (Two) Times a Day As Needed (for constipation).      lidocaine-prilocaine (EMLA) 2.5-2.5 % cream Apply to port-a-cath site 30 minutes prior to arrival at infusion center. Cover with plastic wrap. 30 g 1    Linzess 72 MCG capsule capsule Take 1 capsule by mouth Daily.      metoclopramide (REGLAN) 10 MG tablet Take 1 tablet by mouth 3 (Three) Times a Day As Needed (for nausea).      ondansetron (ZOFRAN) 8 MG tablet Take 1 tablet by mouth Every 8 (Eight) Hours As Needed for Nausea or Vomiting.      pantoprazole (PROTONIX) 40 MG EC tablet Take 1 tablet by mouth Daily.      sennosides-docusate (PERICOLACE) 8.6-50 MG per tablet Take 2 tablets by mouth 2 (Two) Times a Day As Needed for Constipation.       No current facility-administered medications for this visit.     REVIEW OF SYSTEMS  CONSTITUTIONAL:  No fever, chills or night sweats. Progressive fatigue with ongoing, palliative chemotherapy.  EYES:  No blurry vision, diplopia or other vision changes.  ENT:  No hearing loss, nosebleeds or sore throat.  CARDIOVASCULAR:  No palpitations, arrhythmia, syncopal episodes or edema.  PULMONARY:  No hemoptysis, wheezing, chronic cough or shortness of breath.  GASTROINTESTINAL:  As per the HPI above.  GENITOURINARY:  No hematuria, kidney stones or frequent urination.  MUSCULOSKELETAL:  As per the HPI above.  INTEGUMENTARY: No rashes or pruritus.  ENDOCRINE:  No excessive thirst or hot flashes.  HEMATOLOGIC:  No history of free bleeding, spontaneous bleeding or clotting.  IMMUNOLOGIC:  No allergies or frequent infections.  NEUROLOGIC: No numbness, tingling, seizures or weakness.  PSYCHIATRIC:  No anxiety or depression.    PHYSICAL EXAMINATION  BP 94/65   Pulse 105   Temp 97.3 °F (36.3 °C) (Temporal)   Resp 18   Ht 170.2 cm (67\")   Wt 65.8 kg (145 lb)   LMP 06/15/2016   SpO2 98%   BMI 22.71 kg/m²     Pain Score:  Pain Score    04/23/24 0808   PainSc: 0-No pain     PHQ-Score " Total:  PHQ-9 Total Score:      ECO  GENERAL:  A well-developed, well-nourished, white female in no acute distress, mild to moderately chronically ill-appearing, once again sitting in a wheelchair.  HEENT:  Pupils equally round and reactive to light. Extraocular muscles intact.  CARDIOVASCULAR:  Regular rate and rhythm.  No murmurs, gallops or rubs.  LUNGS:  Clear to auscultation bilaterally.  ABDOMEN:  Soft, nontender, nondistended with positive bowel sounds.  EXTREMITIES:  No clubbing, cyanosis or edema bilaterally.  SKIN:  No rashes or petechiae. Powerport in place.  NEURO:  Cranial nerves grossly intact. No focal deficits.  PSYCH:  Alert and oriented x3.    LABORATORY  Lab Results   Component Value Date    WBC 3.68 2024    HGB 11.6 (L) 2024    HCT 35.0 2024    MCV 94.3 2024     2024    NEUTROABS 2.17 2024       Lab Results   Component Value Date     2024    K 3.4 (L) 2024     2024    CO2 26.9 2024    BUN 12 2024    CREATININE 0.63 2024    GLUCOSE 90 2024    CALCIUM 8.5 (L) 2024     (H) 2024     (H) 2024    ALKPHOS 363 (H) 2024    BILITOT 0.9 2024    PROTEINTOT 5.3 (L) 2024    ALBUMIN 2.6 (L) 2024     CBC (2024): WBCs: 3.68; HgB: 11.6; Hct: 35.0; platelets: 332; MCV: 94.3  CBC (2024): WBCs: 2.63; HgB: 12.3; Hct: 36.9; platelets: 348  CBC (2024): WBCs: 2.03; HgB: 11.2; Hct: 33.4; platelets: 163; MCV: 91.8  CBC (2024): WBCs: 3.55; HgB: 12.2; Hct: 35.3; platelets: 164  CBC (2024): WBCs: 6.28; HgB: 14.1; Hct: 42.1; platelets: 319  CBC (2024): WBCs: 3.32; HgB: 10.9; Hct: 35.5; platelets: 251; MCV: 90.3  CBC (2024): WBCs: 17.29; HgB: 10.5; Hct: 34.0; platelets: 451; MCV: 85.2  CBC (2024): WBCs: 5.34; HgB: 8.3; Hct: 27.3; platelets: 235; MCV: 91.3  CBC (2023): WBCs: 4.78; HgB: 9.1; Hct: 28.4; platelets: 195; MCV:  100.0    CEA (04/16/2024): 2.67 ng/mL  CEA (03/19/2024): 1.90 ng/mL  CEA (03/07/2024): 1.59 ng/mL  CEA (01/23/2024): 1.56 ng/mL  CEA (03/13/2023): 1.17 ng/mL    Iron panel (03/19/2024): serum iron: 74; % saturation: 30; TIBC: 243; ferritin: 391.9 ng/mL  Iron panel (01/23/2024): serum iron: 22; % saturation: 5; TIBC: 443; ferritin: 9.74 ng/mL    IMAGING  CT abdomen and pelvis without contrast (02/15/2023):  Impression:  1) Mild narrowing in the sigmoid colon. Recommend direct visualization.  2) Other findings [are nonacute].    CT chest without contrast (03/06/2023):  Impression: No evidence of metastatic disease in the chest.    CT chest with contrast (04/07/2023, compared to 03/06/2023):  Impression: No evidence of metastatic disease to the chest. Stable exam.    CT abdomen and pelvis with contrast (04/07/2023, compared to 02/15/2023):  Impression:  1) Interval post surgical changes noted from partial resection of the sigmoid colon region.  2) 3.1 cm predominantly gas-filled collection in the left presacral space near the anastomosis site that may reflect postoperative changes. Possibility of small fistulous connection not excluded within the anastomosis.  3) No ileus or bowel obstruction.  4) No solid organ lesions or adenopathy identified.    CT abdomen and pelvis with and without contrast (01/17/2024):  Impression:  1) Interval development of metastatic disease.  2) Specifically, soft tissue metastatic implants are noted in the omentum, left lower quadrant, upper presacral space, and left lower presacral space immediately adjacent to the anastomosis site. Soft tissue implant within umbilical hernia fat.  3) Development of 5.5 mm nodule right lower lobe suspicious for metastatic lung nodule.  4) Interval postsurgical changes from sigmoid resection.  5) Mild fatty infiltration of liver. No focal liver lesions.  6) Other incidental/nonacute findings.    CT abdomen and pelvis without contrast  (03/07/2024):  Impression: Dilated fluid filled small bowel loops with decompressed mid and distal ileum suggestive of small bowel obstruction.    CT chest with contrast (03/08/2024, compared to 04/07/2023):  Impression: New left upper lobe 1.2 cm pulmonary nodule.    CT abdomen and pelvis with contrast (03/08/2024, compared to 03/07/2024):  Impression:  1) Oral contrast is seen within some very decompressed mid and distal ileal small bowel loops suggestive of likely probably high-grade partial obstruction.  2) New soft tissue density lesion from 11/09/2022 in the left lower abdomen anterior to the psoas muscle measuring 1.5 cm.    CT chest, abdomen and pelvis with contrast (04/21/2024, compared to 03/08/2024 and 01/17/2024):  Impression:  1) Filling defect in the right lower lobe pulmonary artery consistent with an embolus.  2) Interval decrease in size of the previously noted left upper lobe nodule now measuring 8 mm.  3) New small left effusion and opacity in the medial left lower lobe which may be infectious or inflammatory in nature. Short interval follow up is recommended to ensure resolution.  4) Interval improvement in the previously noted soft tissue density mesenteric masses consistent with a treatment response.  5) Multiple dilated loops of small bowel consistent with at least a partial obstruction.  The liver is diffusely hypodense consistent with fatty infiltration. No focal hepatic lesions identified.    PATHOLOGY  Sigmoid colon and upper rectum, low anterior resection (03/14/2023):  Adenocarcinoma, moderately differentiated, invasive through muscularis propria into pericolonic soft tissue. Lymphovascular invasion present. Surgical margins negative for carcinoma. Two out of fifteen (2/15) lymph nodes involved by metastatic carcinoma with extranodal extension present. Tumor size: 3.5 x 2.5 x 1.4 cm. Intact MSI expression per previous biopsy. [id number] (stage IIIB).    Molecular profiling ([id number]),  tissue (sigmoid colon/upper rectum) and liquid (peripheral blood):  PD-L1: 22%; KRAS G12D; PIK3CA E545K; TP53 Y220H; MSI-High NOT detected.    IMPRESSION AND PLAN  Ms. Carney is a 53 y.o., white female with:  Colon adenocarcinoma: Initially diagnosed in February 2023 and status post resection of the sigmoid colon and upper rectum on 03/14/2023. The final, surgical pathology from this procedure was consistent with stage IIIB ([id number]) disease. Two out of fifteen (2/15) lymph nodes were involved with metastatic disease, but all surgical margins were negative. I had a long discussion with the patient and her  at the time of that initial consultation in our clinic (on 03/30/2023) regarding this diagnosis and its prognosis. We discussed how, while her surgery went overall very well, her chance of eventually developing a relapse in this malignancy was, unfortunately, significant; and the purpose of adjuvant chemotherapy was to reduce this chance by as much as possible (perhaps by as much as 10%). Particularly given her stage III disease, young age and multiple, poor risk factors (left-sided tumor, lymphovascular invasion present, etc.), six months of d6wovhoh FOLFOX (or XELOX) is the preferred regimen; and, following a long discussion regarding the potential risks vs. benefits of this treatment regimen at that time, she was initially agreeable to the former (FOLFOX). We referred her back to local surgery for powerport placement; however, per her preference, she ultimately never followed through with either this (powerport placement) or starting the adjuvant chemotherapy, and she was subsequently lost to routine follow up in both ours and [facility] colorectal surgery's clinics. She apparently overall still did very well for the next ~six to seven months; but, by late October 2023, she was experiencing recurrent constipation and back pain, and a repeat CT of the abdomen and pelvis performed on 01/17/2024 (and summarized  above) was, unfortunately, consistent with recurrent, and now metastatic, disease, with the development of multiple, soft tissue implants within the omentum/peritoneum and, probably, at least one metastatic lung lesion (in the right lower lobe) as well. Both around that time and since, I have had multiple, long discussions with the patient (+/- her  or daughter) regarding this updated diagnosis and, in general terms, its prognosis. She and her family remain aware that this disease is now recurrent, by definition, stage IV, and consequently, incurable. That said, particularly given her continued, good performance status, it was/is potentially treatable; and sustained remissions are possible. Some of the pertinent results of molecular profiling ([id number]) that were performed on both tissue (March 2023's partial colectomy specimen(s)) and liquid (peripheral blood drawn in January 2024) biopsies in early 2024 are summarized above. Following a long discussion in early February 2024 regarding its potential risks vs. benefits, she was agreeable to having a powerport placed and beginning i0fnnifi 5-FU/leucovorin. She began this regimen on 02/19/2024, and she has now completed a total of four (4) cycles. Following the second cycle, she was briefly admitted to [facility] [place] for a partial bowel obstruction; but this clinically resolved with an NG tube and nonsurgical management. Following the most recent (the fourth) cycle, she has, unfortunately, continued to struggle with this regimen, with persistently increased fatigue and nausea. She reports today that, following an additional week off (the fifth cycle was postponed last week), she is finally feeling at least a little better; however, she remains significantly fatigued. Meanwhile, repeat CT scans performed on 04/21/2024 (and summarized above) are consistent with an overall response in her disease from the 5-FU; however, her LFTs remain elevated despite the liver still  "appearing unremarkable on these recent scans. Given all of this, I had a long discussion with the patient and her  in clinic today. In short, although her disease has responded, the risks of any additional cycles of (at least the current dose of) 5-FU/leucovorin are now becoming too great; and a change in treatment is therefore recommended. We will attempt to get Avastin added to her palliative management plan between now and next week, at which time we will resume palliative, systemic therapy with this new agent (along with a further dose reduction in the 5-FU). We will see her back in our clinic in three weeks, on the day of the second cycle of Avastin, with a CBC, CMP and CEA for a symptom/toxicity check.  Anemia: Repeat CBCs from 01/23/2024 showed a HgB of 8.3 g/dL and a ferritin level of 9.7 ng/mL. As she was not tolerating oral iron replacement very well (due to constipation), we gave her a cycle of IV Feraheme in early February 2024. Her HgB and indices have recently still been much improved since then. Continue to monitor.  Gastric dysmotility: She previously elaborated that she thinks an intermittent sensation that food just \"gets stuck\" in her stomach contributes to the periodic N/V she has experienced with the first couple of cycles of palliative chemotherapy. Continue Reglan 10 mg TID prn. Continue to monitor.  Nausea/vomiting: Multifactorial, with ongoing, palliative chemotherapy and omental/peritoneal metastases from issue #1 both contributing. She had to be hospitalized for several days last month due to a partial bowel obstruction; however, this resolved with nonsurgical management alone. Continue Sancuso TD patches. As discussed above, Avastin is now being added to her palliative treatment regimen (with a further dose reduction in the 5-FU). Continue to monitor.  Pulmonary embolus: Incidental note was made of a filling defect in the right lower lobe pulmonary artery on the most recent repeat " CT of the chest (performed on 04/21/2024 and summarized above). Continue BID Eliquis (which she is currently still tolerating well).  The patient and her  were in agreement with these plans.    It is a pleasure to participate in Ms. Carney's care. Please do not hesitate to call with any questions or concerns that you may have.    A total of 30 minutes were spent coordinating this patient’s care in clinic today; more than 50% of this time was face-to-face with the patient and her , reviewing her interim medical history, discussing the results of the recent repeat CT scans and counseling on the current treatment and followup plan. All questions were answered to their satisfaction.    FOLLOW UP  Continue BID Eliquis. Continue Reglan 10 mg TID prn. Continue qweekly Sancuso TD. Again defer this week's planned cycle of palliative, [id number] infusional 5-FU/leucovorin (the 5th one) until next week, with the addition of [id number] bevacizumab at that time (along with a further dose reduction in the 5-FU). Return to our clinic in 3 weeks (~early to mid-May), on the day of the 2nd cycle of bevacizumab, with a CBC, CMP and CEA.            This document was electronically signed by ROWENA Hanks MD April 23, 2024 11:13 EDT      CC: ANURAG Mishra MD Karen S. Jennings-Conklin, MD

## 2024-05-01 ENCOUNTER — LAB (OUTPATIENT)
Dept: ONCOLOGY | Facility: HOSPITAL | Age: 54
End: 2024-05-01
Payer: COMMERCIAL

## 2024-05-01 ENCOUNTER — OFFICE VISIT (OUTPATIENT)
Dept: ONCOLOGY | Facility: CLINIC | Age: 54
End: 2024-05-01
Payer: COMMERCIAL

## 2024-05-01 ENCOUNTER — INFUSION (OUTPATIENT)
Dept: ONCOLOGY | Facility: HOSPITAL | Age: 54
End: 2024-05-01
Payer: COMMERCIAL

## 2024-05-01 VITALS
TEMPERATURE: 97.1 F | OXYGEN SATURATION: 96 % | DIASTOLIC BLOOD PRESSURE: 68 MMHG | BODY MASS INDEX: 21.96 KG/M2 | HEART RATE: 106 BPM | WEIGHT: 140.21 LBS | RESPIRATION RATE: 18 BRPM | SYSTOLIC BLOOD PRESSURE: 99 MMHG

## 2024-05-01 VITALS
SYSTOLIC BLOOD PRESSURE: 99 MMHG | RESPIRATION RATE: 18 BRPM | WEIGHT: 140.2 LBS | OXYGEN SATURATION: 96 % | TEMPERATURE: 97.1 F | HEIGHT: 67 IN | DIASTOLIC BLOOD PRESSURE: 68 MMHG | HEART RATE: 106 BPM | BODY MASS INDEX: 22 KG/M2

## 2024-05-01 DIAGNOSIS — C18.7 MALIGNANT NEOPLASM OF SIGMOID COLON: ICD-10-CM

## 2024-05-01 DIAGNOSIS — C18.7 MALIGNANT NEOPLASM OF SIGMOID COLON: Primary | ICD-10-CM

## 2024-05-01 DIAGNOSIS — E86.0 DEHYDRATION: Primary | ICD-10-CM

## 2024-05-01 DIAGNOSIS — Z95.828 PORT-A-CATH IN PLACE: ICD-10-CM

## 2024-05-01 DIAGNOSIS — I26.99 PULMONARY EMBOLUS, RIGHT: ICD-10-CM

## 2024-05-01 DIAGNOSIS — E87.6 HYPOKALEMIA: ICD-10-CM

## 2024-05-01 DIAGNOSIS — R63.8 DECREASED ORAL INTAKE: ICD-10-CM

## 2024-05-01 DIAGNOSIS — R60.0 LOWER EXTREMITY EDEMA: ICD-10-CM

## 2024-05-01 DIAGNOSIS — N39.0 URINARY TRACT INFECTION WITHOUT HEMATURIA, SITE UNSPECIFIED: ICD-10-CM

## 2024-05-01 LAB
ALBUMIN SERPL-MCNC: 2.6 G/DL (ref 3.5–5.2)
ALBUMIN/GLOB SERPL: 1.2 G/DL
ALP SERPL-CCNC: 236 U/L (ref 39–117)
ALT SERPL W P-5'-P-CCNC: 116 U/L (ref 1–33)
ANION GAP SERPL CALCULATED.3IONS-SCNC: 12.1 MMOL/L (ref 5–15)
ANISOCYTOSIS BLD QL: NORMAL
AST SERPL-CCNC: 165 U/L (ref 1–32)
BASOPHILS # BLD AUTO: 0.05 10*3/MM3 (ref 0–0.2)
BASOPHILS NFR BLD AUTO: 0.7 % (ref 0–1.5)
BILIRUB SERPL-MCNC: 0.9 MG/DL (ref 0–1.2)
BILIRUB UR QL STRIP: ABNORMAL
BUN SERPL-MCNC: 10 MG/DL (ref 6–20)
BUN/CREAT SERPL: 20.8 (ref 7–25)
CALCIUM SPEC-SCNC: 8.2 MG/DL (ref 8.6–10.5)
CHLORIDE SERPL-SCNC: 104 MMOL/L (ref 98–107)
CLARITY UR: ABNORMAL
CO2 SERPL-SCNC: 23.9 MMOL/L (ref 22–29)
COLOR UR: ABNORMAL
CREAT SERPL-MCNC: 0.48 MG/DL (ref 0.57–1)
DEPRECATED RDW RBC AUTO: 86.4 FL (ref 37–54)
EGFRCR SERPLBLD CKD-EPI 2021: 114.1 ML/MIN/1.73
EOSINOPHIL # BLD AUTO: 0.01 10*3/MM3 (ref 0–0.4)
EOSINOPHIL NFR BLD AUTO: 0.1 % (ref 0.3–6.2)
ERYTHROCYTE [DISTWIDTH] IN BLOOD BY AUTOMATED COUNT: 23.9 % (ref 12.3–15.4)
GLOBULIN UR ELPH-MCNC: 2.1 GM/DL
GLUCOSE SERPL-MCNC: 69 MG/DL (ref 65–99)
GLUCOSE UR STRIP-MCNC: NEGATIVE MG/DL
HCT VFR BLD AUTO: 31.5 % (ref 34–46.6)
HGB BLD-MCNC: 10.3 G/DL (ref 12–15.9)
HGB UR QL STRIP.AUTO: NEGATIVE
HYPOCHROMIA BLD QL: NORMAL
IMM GRANULOCYTES # BLD AUTO: 0.02 10*3/MM3 (ref 0–0.05)
IMM GRANULOCYTES NFR BLD AUTO: 0.3 % (ref 0–0.5)
KETONES UR QL STRIP: ABNORMAL
LEUKOCYTE ESTERASE UR QL STRIP.AUTO: ABNORMAL
LYMPHOCYTES # BLD AUTO: 1.06 10*3/MM3 (ref 0.7–3.1)
LYMPHOCYTES NFR BLD AUTO: 14.8 % (ref 19.6–45.3)
MACROCYTES BLD QL SMEAR: NORMAL
MCH RBC QN AUTO: 32.3 PG (ref 26.6–33)
MCHC RBC AUTO-ENTMCNC: 32.7 G/DL (ref 31.5–35.7)
MCV RBC AUTO: 98.7 FL (ref 79–97)
MONOCYTES # BLD AUTO: 0.34 10*3/MM3 (ref 0.1–0.9)
MONOCYTES NFR BLD AUTO: 4.8 % (ref 5–12)
NEUTROPHILS NFR BLD AUTO: 5.67 10*3/MM3 (ref 1.7–7)
NEUTROPHILS NFR BLD AUTO: 79.3 % (ref 42.7–76)
NITRITE UR QL STRIP: POSITIVE
NRBC BLD AUTO-RTO: 0 /100 WBC (ref 0–0.2)
PH UR STRIP.AUTO: 5.5 [PH] (ref 5–8)
PLAT MORPH BLD: NORMAL
PLATELET # BLD AUTO: 218 10*3/MM3 (ref 140–450)
PMV BLD AUTO: 10.6 FL (ref 6–12)
POTASSIUM SERPL-SCNC: 3.3 MMOL/L (ref 3.5–5.2)
PROT SERPL-MCNC: 4.7 G/DL (ref 6–8.5)
PROT UR QL STRIP: ABNORMAL
RBC # BLD AUTO: 3.19 10*6/MM3 (ref 3.77–5.28)
SODIUM SERPL-SCNC: 140 MMOL/L (ref 136–145)
SP GR UR STRIP: 1.02 (ref 1–1.03)
UROBILINOGEN UR QL STRIP: ABNORMAL
WBC NRBC COR # BLD AUTO: 7.15 10*3/MM3 (ref 3.4–10.8)

## 2024-05-01 PROCEDURE — 81003 URINALYSIS AUTO W/O SCOPE: CPT

## 2024-05-01 PROCEDURE — 85007 BL SMEAR W/DIFF WBC COUNT: CPT

## 2024-05-01 PROCEDURE — 96413 CHEMO IV INFUSION 1 HR: CPT

## 2024-05-01 PROCEDURE — 25810000003 SODIUM CHLORIDE 0.9 % SOLUTION

## 2024-05-01 PROCEDURE — 85025 COMPLETE CBC W/AUTO DIFF WBC: CPT

## 2024-05-01 PROCEDURE — 25010000002 HEPARIN LOCK FLUSH PER 10 UNITS

## 2024-05-01 PROCEDURE — 80053 COMPREHEN METABOLIC PANEL: CPT

## 2024-05-01 PROCEDURE — 25010000002 BEVACIZUMAB PER 10 MG: Performed by: INTERNAL MEDICINE

## 2024-05-01 RX ORDER — SODIUM CHLORIDE 0.9 % (FLUSH) 0.9 %
10 SYRINGE (ML) INJECTION AS NEEDED
Status: DISCONTINUED | OUTPATIENT
Start: 2024-05-01 | End: 2024-05-01 | Stop reason: HOSPADM

## 2024-05-01 RX ORDER — SODIUM CHLORIDE 9 MG/ML
1000 INJECTION, SOLUTION INTRAVENOUS ONCE
Status: COMPLETED | OUTPATIENT
Start: 2024-05-01 | End: 2024-05-01

## 2024-05-01 RX ORDER — NITROFURANTOIN 25; 75 MG/1; MG/1
100 CAPSULE ORAL 2 TIMES DAILY
Qty: 10 CAPSULE | Refills: 0 | Status: SHIPPED | OUTPATIENT
Start: 2024-05-01 | End: 2024-05-06

## 2024-05-01 RX ORDER — HEPARIN SODIUM (PORCINE) LOCK FLUSH IV SOLN 100 UNIT/ML 100 UNIT/ML
500 SOLUTION INTRAVENOUS AS NEEDED
OUTPATIENT
Start: 2024-05-01

## 2024-05-01 RX ORDER — SODIUM CHLORIDE 0.9 % (FLUSH) 0.9 %
10 SYRINGE (ML) INJECTION AS NEEDED
OUTPATIENT
Start: 2024-05-01

## 2024-05-01 RX ORDER — POTASSIUM CHLORIDE 20 MEQ/1
40 TABLET, EXTENDED RELEASE ORAL ONCE
Status: COMPLETED | OUTPATIENT
Start: 2024-05-01 | End: 2024-05-01

## 2024-05-01 RX ORDER — HEPARIN SODIUM (PORCINE) LOCK FLUSH IV SOLN 100 UNIT/ML 100 UNIT/ML
300 SOLUTION INTRAVENOUS ONCE
OUTPATIENT
Start: 2024-05-01

## 2024-05-01 RX ORDER — HEPARIN SODIUM (PORCINE) LOCK FLUSH IV SOLN 100 UNIT/ML 100 UNIT/ML
500 SOLUTION INTRAVENOUS AS NEEDED
Status: DISCONTINUED | OUTPATIENT
Start: 2024-05-01 | End: 2024-05-01 | Stop reason: HOSPADM

## 2024-05-01 RX ORDER — SODIUM CHLORIDE 0.9 % (FLUSH) 0.9 %
20 SYRINGE (ML) INJECTION AS NEEDED
OUTPATIENT
Start: 2024-05-01

## 2024-05-01 RX ORDER — SODIUM CHLORIDE 9 MG/ML
20 INJECTION, SOLUTION INTRAVENOUS ONCE
Status: DISCONTINUED | OUTPATIENT
Start: 2024-05-01 | End: 2024-05-01 | Stop reason: HOSPADM

## 2024-05-01 RX ADMIN — POTASSIUM CHLORIDE 40 MEQ: 1500 TABLET, EXTENDED RELEASE ORAL at 13:30

## 2024-05-01 RX ADMIN — BEVACIZUMAB 320 MG: 400 INJECTION, SOLUTION INTRAVENOUS at 12:48

## 2024-05-01 RX ADMIN — Medication 10 ML: at 14:05

## 2024-05-01 RX ADMIN — SODIUM CHLORIDE 1000 ML: 9 INJECTION, SOLUTION INTRAVENOUS at 12:17

## 2024-05-01 RX ADMIN — Medication 500 UNITS: at 14:05

## 2024-05-01 NOTE — PROGRESS NOTES
CHEMOTHERAPY PREPARATION    Vangie Carney  [id number]  1970    Chief Complaint: chemo education     History of present illness:  Vangie Carney is a 53 y.o. year old female who is here today for chemotherapy preparation and needs assessment. The patient has been diagnosed with malignant neoplasm of sigmoid colon and is scheduled to begin treatment with Avastin every 2 weeks.     She has several complaints today.  She was recently sent to the ED for a clot in the right lower lung.  She was started on Eliquis starter pack and took her last dose of this today and is wondering if she needs to continue taking this.  She is also not eating and drinking well (patient was recently discontinued from leucovorin/5-FU).  She is also having some left lower extremity swelling.  She has swelling on the right as well, but worse on the left.      Oncology History:    Oncology/Hematology History   Malignant neoplasm of sigmoid colon   3/14/2023 Initial Diagnosis    Malignant neoplasm of sigmoid colon (HCC)     3/29/2023 Cancer Staged    Staging form: Colon And Rectum, AJCC 8th Edition  - Pathologic: Stage IIIB (pT3, pN1b, cM0) - Signed by ROWENA Hanks MD on 3/29/2023     4/5/2023 - 4/5/2023 Chemotherapy    OP COLON [id number] OXALIplatin / Leucovorin / Fluorouracil     2/19/2024 -  Chemotherapy    OP COLORECTAL Bevacizumab (leucovorin/5FU discontinued at cycle 5)         Past Medical History:   Diagnosis Date    Gallbladder attack     GERD (gastroesophageal reflux disease)     Hearing aid worn     History of ear infections     Malignant neoplasm of sigmoid colon 3/14/2023    Seasonal allergies     Wears glasses        Past Surgical History:   Procedure Laterality Date    CHOLECYSTECTOMY      COLON RESECTION N/A 3/14/2023    Procedure: COLON RESECTION LOW ANTERIOR LAPAROSCOPIC WITH DAVINCI ROBOT;  Surgeon: Shari Aguirre MD;  Location: [facility];  Service: Robotics - DaVinci;  Laterality: N/A;    COLONOSCOPY N/A  2/15/2023    Procedure: COLONOSCOPY FOR SCREENING;  Surgeon: Giselle Iniguez MD;  Location: [facility] OR;  Service: Gastroenterology;  Laterality: N/A;    ENDOSCOPY N/A 2/15/2023    Procedure: ESOPHAGOGASTRODUODENOSCOPY WITH BIOPSY;  Surgeon: Giselle Iniguez MD;  Location:  [facility] OR;  Service: Gastroenterology;  Laterality: N/A;    LAPAROSCOPIC TUBAL LIGATION Bilateral     MIDDLE EAR SURGERY      PORTACATH PLACEMENT N/A 2/14/2024    Procedure: INSERTION OF PORTACATH;  Surgeon: Jaylen Leal MD;  Location:  [facility] OR;  Service: General;  Laterality: N/A;       Family History   Problem Relation Age of Onset    Cancer Mother     Cancer Father     Cancer Sister     Cancer Brother     Cancer Maternal Grandmother     Cancer Maternal Grandfather     Breast cancer Neg Hx        Medications: The current medication list was reviewed and reconciled.     Allergies:  is allergic to keflex [cephalexin].    Review of Systems:  A comprehensive 14 point review of systems was performed. Significant findings as mentioned above. All other systems reviewed and are negative.        Physical Exam:    Vitals:    05/01/24 1122   BP: 99/68   Pulse: 106   Resp: 18   Temp: 97.1 °F (36.2 °C)   SpO2: 96%     Vitals:    05/01/24 1122   PainSc: 0-No pain          ECOG: (2) Ambulatory & Capable of Self Care, Unable to Carry Out Work Activity, Up & About Greater Than 50% of Waking Hours    General: Well developed, well nourished. In no acute distress.  Using wheelchair today.  HEENT: Pupils equally round and reactive to light. Extraocular muscles intact.  Cardiovascular: Regular rate and rhythm. No murmurs, rubs or gallops.  Lungs: Clear to auscultation bilaterally.  Abdomen: Soft, nontender, nondistended. Normoactive bowel sounds x 4 quadrants.  Extremities: No clubbing, cyanosis.  Pitting edema to lower extremities, 2+ on the left and 1+ on the right.  Skin: Warm, dry, intact.  Jaundiced in appearance.  Neuro: Grossly  "non-focal exam.  Psych: Alert and oriented x 3.             NEEDS ASSESSMENTS    Genetics  The patient's new diagnosis and family history have been reviewed for genetic counseling needs. A genetic referral is not recommended.     Barriers to care  A barriers form was also completed by the patient today. We discussed services offered by our facility to help her have adequate access to care. The patient was given the name and card for our Oncology Social Worker, Deepthi Garcia. Based upon barriers assessment today, the patient will not require a follow-up call from the  to further discuss needs.     VAD Assessment  The patient and I discussed planned intervenous chemotherapy as well as other IV treatments that are often needed throughout the course of treatment. These may include, but are not limited to blood transfusions, antibiotics, and IV hydration. The vasculature does not appear to be adequate for multiple peripheral IVs throughout their treatment course. Discussed risks and benefits of VADs. Patient currently has a Port-A-Cath in place.    Advanced Care Planning  The patient and I discussed advanced care planning, \"Conversations that Matter\".   This service was offered, free of charge, for development of advance directives with a certified ACP facilitator.  The patient does not have an up-to-date advanced directive. This document is not on file with our office. The patient is not interested in an appointment with one of our facilitators to create or update their advanced directives.      Palliative Care  The patient and I discussed palliative care services. Palliative care is not the same as Hospice care. This is specialized medical care for people living with serious illness with the goal of improving quality of life for the patient and their family. [facility] has partnered with [facility] to offer our patients outpatient palliative care early along with their treatment to assist in " coordination of care, symptom management, pain management, and medical decision making.  Oncology criteria for palliative care referral is not met at this time. The patient is not interested in a palliative care consultation.     Additional Referral needs  none      CHEMOTHERAPY EDUCATION    Booklets Given: Chemotherapy and You [x]  Eating Hints [x]    Sexuality/Fertility Books []      Chemotherapy/Biotherapy Education Sheets: (list all that apply)  nausea management, acid reflux management, diarrhea management, Cancer resourse contacts information, skin and mouth care, and vaccination information                                                                                                                                                                 Chemotherapy Regimen:   Treatment Plans       Name Type Hold Status Plan Dates Plan Provider       Active    OP SUPPORTIVE Ferumoxytol 510 mg ONCOLOGY SUPPORTIVE CARE 1 On Automatic Hold  2/16/2024 - Present ANURAG Ledesma    OP COLORECTAL Bevacizumab (leucovorin/5FU discontinued at cycle 5) ONCOLOGY TREATMENT Not on Hold  2/6/2024 - Present ROWENA Hanks MD                     TOPICS EDUCATION PROVIDED COMMENTS   ANEMIA:  role of RBC, cause, s/s, ways to manage, role of transfusion [x]    THROMBOCYTOPENIA:  role of platelet, cause, s/s, ways to prevent bleeding, things to avoid, when to seek help [x]    NEUTROPENIA:  role of WBC, cause, infection precautions, s/s of infection, when to call MD [x]    NUTRITION & APPETITE CHANGES:  importance of maintaining healthy diet & weight, ways to manage to improve intake, dietary consult, exercise regimen [x]    DIARRHEA:  causes, s/s of dehydration, ways to manage, dietary changes, when to call MD [x]    CONSTIPATION:  causes, ways to manage, dietary changes, when to call MD [x]    NAUSEA & VOMITING:  cause, use of antiemetics, dietary changes, when to call MD [x]    MOUTH SORES:  causes, oral care, ways to  manage [x]    ALOPECIA:  cause, ways to manage, resources [x]    INFERTILITY & SEXUALITY:  causes, fertility preservation options, sexuality changes, ways to manage, importance of birth control [x]    NERVOUS SYSTEM CHANGES:  causes, s/s, neuropathies, cognitive changes, ways to manage [x]    PAIN:  causes, ways to manage [x]    SKIN & NAIL CHANGES:  cause, s/s, ways to manage [x]    ORGAN TOXICITIES:  cause, s/s, need for diagnostic tests, labs, when to notify MD [x]    SURVIVORSHIP:  distress, distress assessment, secondary malignancies, early/late effects, follow-up, social issues, social support [x]    HOME CARE:  use of spill kits, storing of PO chemo, how to manage bodily fluids [x]    MISCELLANEOUS:  drug interactions, administration, vesicant, et [x]        Assessment and Plan:    Diagnoses and all orders for this visit:    1. Malignant neoplasm of sigmoid colon (Primary)    2. Pulmonary embolus, right    3. Lower extremity edema    4. Urinary tract infection without hematuria, site unspecified    5. Decreased oral intake    6. Hypokalemia    Other orders  -     apixaban (ELIQUIS) 5 MG tablet tablet; Take 1 tablet by mouth 2 (Two) Times a Day.  Dispense: 60 tablet; Refill: 6        The patient and I have reviewed their new cancer diagnosis and scheduled treatment plan. Needs assessment was completed including genetics, barriers to care, VAD evaluation, advanced care planning, and palliative care services. Referrals have been ordered as appropriate based upon our evaluation and patient desires.     Chemotherapy teaching was also completed today as documented above. Adequate time was given to answer all questions to her satisfaction. Patient and family are aware of their care team members and contact information if they have questions or problems throughout the treatment course. Needs assessments and education has been completed. The patient is adequately prepared to begin treatment as scheduled.     Reviewed  with patient education regarding EMLA cream, Sancuso patch, Zofran.  Patient already has prescriptions for all of these where she had prior treatment.  No refills needed today.     I advised the patient that she can take Tylenol or Ibuprofen as needed for aches/pains related to cancer/treatment. I also advised patient mick could use Senakot or Miralax as needed for constipation or Imodium as needed for diarrhea.       I reviewed with the patient the care team members. I also reviewed the option of the acute care visits provided through our oncology office for evaluation and management of symptoms related to treatment. Patient was provided with phone number to call during regular office hours [phone] press #1 and for treatment related questions call [phone] to speak to a nurse. If after hours or on the weekend call [phone] and ask to page the MD on call for [facility] Oncology services.     Patient is in agreement to begin every 2 weekly Avastin at the recommendation of Dr. Hanks. This treatment plan has been fully reviewed with the patient and written informed consent has been obtained.     2. Right lower lobe embolus  -  She was recently sent to the ED for a clot in the right lower lung.  She was started on Eliquis starter pack and took her last dose of this today and is wondering if she needs to continue taking this.    - Patient to continue taking Eliquis 5 mg twice daily.  Rx for this given today.  She will need to continue therapy for at least 6 months to 1 year.    3. Lower extremity edema  - Patient has noted lower extremity edema.  She states this is not new but left lower extremity is worse than the right today, which she states is new.  - Upon exam she does have 1+ pitting edema in the right lower extremity, with 2+ pitting edema to the left lower extremity.  - Denies any shortness of breath, chest pain, palpitations or any heart or lung related changes.  - She reports that she has been  elevating her feet at home.  - Encouraged oral hydration, also giving 1 L IV fluids for supportive care today.  - Encouraged elevating her extremities above the level of her heart.  - Labs today mostly unremarkable.  LFTs are elevated but this has been an ongoing issue for her.  - Instructed her that if she were to develop any new shortness of breath, palpitations, increased swelling, or if symptoms were to become worse or not improve to let us know we will be happy to see her back.      4. UTI  - Urinalysis on today's labs show UTI.  Patient states no issues with dysuria.  However, she is having some urinary hesitancy.  Will start patient on Macrobid 100 mg twice daily for 5 days.    5. Decreased oral intake  - Patient has had decreased oral intake.  Will give 1 L IV fluids for supportive care today.  Encouraged diet supplementation with boost.    6. Hypokalemia  - Potassium low today at 3.3.  Will give 40 mEq p.o. potassium while patient is here at the infusion center.        I spent 60 minutes with Vangie Carney today.  In the office today, more than 50% of this time was spent face-to-face with her  in counseling / coordination of care, reviewing her interim medical history and counseling on the current treatment plan.  All questions were answered to her satisfaction.       Electronically Signed by: ANURAG Buchanan , May 1, 2024 14:22 EDT

## 2024-05-08 ENCOUNTER — TELEPHONE (OUTPATIENT)
Dept: ONCOLOGY | Facility: CLINIC | Age: 54
End: 2024-05-08
Payer: COMMERCIAL

## 2024-05-08 RX ORDER — NYSTATIN 100000 U/G
1 CREAM TOPICAL 2 TIMES DAILY
Qty: 15 G | Refills: 0 | Status: SHIPPED | OUTPATIENT
Start: 2024-05-08

## 2024-05-08 RX ORDER — FLUCONAZOLE 150 MG/1
150 TABLET ORAL DAILY
Qty: 3 TABLET | Refills: 0 | Status: SHIPPED | OUTPATIENT
Start: 2024-05-08 | End: 2024-05-11

## 2024-05-09 DIAGNOSIS — C18.7 MALIGNANT NEOPLASM OF SIGMOID COLON: Primary | ICD-10-CM

## 2024-05-09 RX ORDER — SODIUM CHLORIDE 9 MG/ML
20 INJECTION, SOLUTION INTRAVENOUS ONCE
OUTPATIENT
Start: 2024-07-24

## 2024-05-09 RX ORDER — SODIUM CHLORIDE 9 MG/ML
20 INJECTION, SOLUTION INTRAVENOUS ONCE
OUTPATIENT
Start: 2024-07-10

## 2024-05-10 ENCOUNTER — TELEPHONE (OUTPATIENT)
Dept: ONCOLOGY | Facility: CLINIC | Age: 54
End: 2024-05-10
Payer: COMMERCIAL

## 2024-05-10 RX ORDER — DOXYCYCLINE HYCLATE 100 MG/1
100 CAPSULE ORAL 2 TIMES DAILY
Qty: 14 CAPSULE | Refills: 0 | Status: CANCELLED | OUTPATIENT
Start: 2024-05-10 | End: 2024-05-17

## 2024-05-10 RX ORDER — VALACYCLOVIR HYDROCHLORIDE 500 MG/1
500 TABLET, FILM COATED ORAL 2 TIMES DAILY
Qty: 14 TABLET | Refills: 0 | Status: CANCELLED | OUTPATIENT
Start: 2024-05-10 | End: 2024-05-17

## 2024-05-10 NOTE — TELEPHONE ENCOUNTER
Called a few days ago about swelling and rash on left side of vagina. Pt advised it was a blood vessel that burst last night, the bleeding has not stopped and the area is very sore. Pt wants to know if she needs to do anything else.

## 2024-05-10 NOTE — TELEPHONE ENCOUNTER
Patient reported the spot on the L side of her vagina bursted last night and has been slowly oozing blood since. Patient currently on Eliquis for history of PE. She reported she experienced relief when it popped but the site is sore. Patient denies history of herpes.     RN discussed with MD on call, Dr. Rodriguez, who recommends patient see gynecologist for further evaluation and workup. In the meantime, MD is going to treat patient with antibiotics. Patient agreeable to plan of care and said she would call to schedule an appointment with gynecologist. RN encouraged her to call our office with any further questions or concerns in the meantime.

## 2024-05-15 ENCOUNTER — LAB (OUTPATIENT)
Dept: ONCOLOGY | Facility: HOSPITAL | Age: 54
End: 2024-05-15
Payer: COMMERCIAL

## 2024-05-15 ENCOUNTER — OFFICE VISIT (OUTPATIENT)
Dept: ONCOLOGY | Facility: CLINIC | Age: 54
End: 2024-05-15
Payer: COMMERCIAL

## 2024-05-15 ENCOUNTER — INFUSION (OUTPATIENT)
Dept: ONCOLOGY | Facility: HOSPITAL | Age: 54
End: 2024-05-15
Payer: COMMERCIAL

## 2024-05-15 VITALS
BODY MASS INDEX: 21.57 KG/M2 | OXYGEN SATURATION: 96 % | HEART RATE: 117 BPM | RESPIRATION RATE: 18 BRPM | DIASTOLIC BLOOD PRESSURE: 70 MMHG | TEMPERATURE: 97.6 F | SYSTOLIC BLOOD PRESSURE: 100 MMHG | WEIGHT: 137.7 LBS

## 2024-05-15 VITALS
HEART RATE: 117 BPM | OXYGEN SATURATION: 96 % | WEIGHT: 137.7 LBS | RESPIRATION RATE: 18 BRPM | TEMPERATURE: 97.6 F | BODY MASS INDEX: 21.57 KG/M2 | SYSTOLIC BLOOD PRESSURE: 100 MMHG | DIASTOLIC BLOOD PRESSURE: 70 MMHG

## 2024-05-15 DIAGNOSIS — D50.9 IRON DEFICIENCY ANEMIA, UNSPECIFIED IRON DEFICIENCY ANEMIA TYPE: Primary | ICD-10-CM

## 2024-05-15 DIAGNOSIS — C18.7 MALIGNANT NEOPLASM OF SIGMOID COLON: ICD-10-CM

## 2024-05-15 DIAGNOSIS — Z95.828 PORT-A-CATH IN PLACE: ICD-10-CM

## 2024-05-15 DIAGNOSIS — K90.9 MALABSORPTION OF IRON: ICD-10-CM

## 2024-05-15 DIAGNOSIS — C18.7 MALIGNANT NEOPLASM OF SIGMOID COLON: Primary | ICD-10-CM

## 2024-05-15 DIAGNOSIS — D50.9 IRON DEFICIENCY ANEMIA, UNSPECIFIED IRON DEFICIENCY ANEMIA TYPE: ICD-10-CM

## 2024-05-15 LAB
ALBUMIN SERPL-MCNC: 2.6 G/DL (ref 3.5–5.2)
ALBUMIN/GLOB SERPL: 1 G/DL
ALP SERPL-CCNC: 260 U/L (ref 39–117)
ALT SERPL W P-5'-P-CCNC: 84 U/L (ref 1–33)
ANION GAP SERPL CALCULATED.3IONS-SCNC: 10.7 MMOL/L (ref 5–15)
ANISOCYTOSIS BLD QL: NORMAL
AST SERPL-CCNC: 82 U/L (ref 1–32)
BASOPHILS # BLD AUTO: 0.03 10*3/MM3 (ref 0–0.2)
BASOPHILS NFR BLD AUTO: 0.4 % (ref 0–1.5)
BILIRUB SERPL-MCNC: 1 MG/DL (ref 0–1.2)
BILIRUB UR QL STRIP: ABNORMAL
BUN SERPL-MCNC: 8 MG/DL (ref 6–20)
BUN/CREAT SERPL: 16.7 (ref 7–25)
CALCIUM SPEC-SCNC: 8.4 MG/DL (ref 8.6–10.5)
CHLORIDE SERPL-SCNC: 106 MMOL/L (ref 98–107)
CLARITY UR: ABNORMAL
CO2 SERPL-SCNC: 26.3 MMOL/L (ref 22–29)
COLOR UR: ABNORMAL
CREAT SERPL-MCNC: 0.48 MG/DL (ref 0.57–1)
DEPRECATED RDW RBC AUTO: 71.5 FL (ref 37–54)
EGFRCR SERPLBLD CKD-EPI 2021: 114.1 ML/MIN/1.73
EOSINOPHIL # BLD AUTO: 0.03 10*3/MM3 (ref 0–0.4)
EOSINOPHIL NFR BLD AUTO: 0.4 % (ref 0.3–6.2)
ERYTHROCYTE [DISTWIDTH] IN BLOOD BY AUTOMATED COUNT: 18.8 % (ref 12.3–15.4)
FERRITIN SERPL-MCNC: 753.5 NG/ML (ref 13–150)
GLOBULIN UR ELPH-MCNC: 2.6 GM/DL
GLUCOSE SERPL-MCNC: 102 MG/DL (ref 65–99)
GLUCOSE UR STRIP-MCNC: NEGATIVE MG/DL
HCT VFR BLD AUTO: 32.2 % (ref 34–46.6)
HGB BLD-MCNC: 10.8 G/DL (ref 12–15.9)
HGB UR QL STRIP.AUTO: ABNORMAL
IMM GRANULOCYTES # BLD AUTO: 0.02 10*3/MM3 (ref 0–0.05)
IMM GRANULOCYTES NFR BLD AUTO: 0.3 % (ref 0–0.5)
IRON 24H UR-MRATE: 77 MCG/DL (ref 37–145)
IRON SATN MFR SERPL: 74 % (ref 20–50)
KETONES UR QL STRIP: ABNORMAL
LARGE PLATELETS: NORMAL
LEUKOCYTE ESTERASE UR QL STRIP.AUTO: ABNORMAL
LYMPHOCYTES # BLD AUTO: 1.42 10*3/MM3 (ref 0.7–3.1)
LYMPHOCYTES NFR BLD AUTO: 20.1 % (ref 19.6–45.3)
MACROCYTES BLD QL SMEAR: NORMAL
MCH RBC QN AUTO: 34.7 PG (ref 26.6–33)
MCHC RBC AUTO-ENTMCNC: 33.5 G/DL (ref 31.5–35.7)
MCV RBC AUTO: 103.5 FL (ref 79–97)
MONOCYTES # BLD AUTO: 0.33 10*3/MM3 (ref 0.1–0.9)
MONOCYTES NFR BLD AUTO: 4.7 % (ref 5–12)
NEUTROPHILS NFR BLD AUTO: 5.24 10*3/MM3 (ref 1.7–7)
NEUTROPHILS NFR BLD AUTO: 74.1 % (ref 42.7–76)
NITRITE UR QL STRIP: POSITIVE
NRBC BLD AUTO-RTO: 0 /100 WBC (ref 0–0.2)
PH UR STRIP.AUTO: 5.5 [PH] (ref 5–8)
PLATELET # BLD AUTO: 302 10*3/MM3 (ref 140–450)
PMV BLD AUTO: 9.5 FL (ref 6–12)
POTASSIUM SERPL-SCNC: 3.4 MMOL/L (ref 3.5–5.2)
PROT SERPL-MCNC: 5.2 G/DL (ref 6–8.5)
PROT UR QL STRIP: ABNORMAL
RBC # BLD AUTO: 3.11 10*6/MM3 (ref 3.77–5.28)
SODIUM SERPL-SCNC: 143 MMOL/L (ref 136–145)
SP GR UR STRIP: >=1.03 (ref 1–1.03)
TIBC SERPL-MCNC: 104 MCG/DL (ref 298–536)
TRANSFERRIN SERPL-MCNC: 70 MG/DL (ref 200–360)
UROBILINOGEN UR QL STRIP: ABNORMAL
WBC NRBC COR # BLD AUTO: 7.07 10*3/MM3 (ref 3.4–10.8)

## 2024-05-15 PROCEDURE — 83540 ASSAY OF IRON: CPT

## 2024-05-15 PROCEDURE — 84466 ASSAY OF TRANSFERRIN: CPT

## 2024-05-15 PROCEDURE — 25010000002 BEVACIZUMAB PER 10 MG: Performed by: INTERNAL MEDICINE

## 2024-05-15 PROCEDURE — 82728 ASSAY OF FERRITIN: CPT

## 2024-05-15 PROCEDURE — 25010000002 HEPARIN LOCK FLUSH PER 10 UNITS

## 2024-05-15 PROCEDURE — 85025 COMPLETE CBC W/AUTO DIFF WBC: CPT

## 2024-05-15 PROCEDURE — 81003 URINALYSIS AUTO W/O SCOPE: CPT

## 2024-05-15 PROCEDURE — 96413 CHEMO IV INFUSION 1 HR: CPT

## 2024-05-15 PROCEDURE — 85007 BL SMEAR W/DIFF WBC COUNT: CPT

## 2024-05-15 PROCEDURE — 25810000003 SODIUM CHLORIDE 0.9 % SOLUTION: Performed by: INTERNAL MEDICINE

## 2024-05-15 PROCEDURE — 99214 OFFICE O/P EST MOD 30 MIN: CPT | Performed by: NURSE PRACTITIONER

## 2024-05-15 PROCEDURE — 80053 COMPREHEN METABOLIC PANEL: CPT

## 2024-05-15 RX ORDER — SODIUM CHLORIDE 0.9 % (FLUSH) 0.9 %
10 SYRINGE (ML) INJECTION AS NEEDED
OUTPATIENT
Start: 2024-05-15

## 2024-05-15 RX ORDER — SODIUM CHLORIDE 0.9 % (FLUSH) 0.9 %
10 SYRINGE (ML) INJECTION AS NEEDED
Status: DISCONTINUED | OUTPATIENT
Start: 2024-05-15 | End: 2024-05-15 | Stop reason: HOSPADM

## 2024-05-15 RX ORDER — HEPARIN SODIUM (PORCINE) LOCK FLUSH IV SOLN 100 UNIT/ML 100 UNIT/ML
300 SOLUTION INTRAVENOUS ONCE
OUTPATIENT
Start: 2024-05-15

## 2024-05-15 RX ORDER — SODIUM CHLORIDE 9 MG/ML
20 INJECTION, SOLUTION INTRAVENOUS ONCE
Status: COMPLETED | OUTPATIENT
Start: 2024-05-15 | End: 2024-05-15

## 2024-05-15 RX ORDER — HEPARIN SODIUM (PORCINE) LOCK FLUSH IV SOLN 100 UNIT/ML 100 UNIT/ML
500 SOLUTION INTRAVENOUS AS NEEDED
OUTPATIENT
Start: 2024-05-15

## 2024-05-15 RX ORDER — SODIUM CHLORIDE 0.9 % (FLUSH) 0.9 %
20 SYRINGE (ML) INJECTION AS NEEDED
OUTPATIENT
Start: 2024-05-15

## 2024-05-15 RX ORDER — HEPARIN SODIUM (PORCINE) LOCK FLUSH IV SOLN 100 UNIT/ML 100 UNIT/ML
500 SOLUTION INTRAVENOUS AS NEEDED
Status: DISCONTINUED | OUTPATIENT
Start: 2024-05-15 | End: 2024-05-15 | Stop reason: HOSPADM

## 2024-05-15 RX ADMIN — HEPARIN 500 UNITS: 100 SYRINGE at 14:53

## 2024-05-15 RX ADMIN — SODIUM CHLORIDE 20 ML/HR: 9 INJECTION, SOLUTION INTRAVENOUS at 14:21

## 2024-05-15 RX ADMIN — Medication 10 ML: at 14:53

## 2024-05-15 RX ADMIN — BEVACIZUMAB 310 MG: 400 INJECTION, SOLUTION INTRAVENOUS at 14:21

## 2024-05-15 NOTE — PROGRESS NOTES
Name:  Vangie Carney  :  1970  Date:  2024     REFERRING PHYSICIAN  Shari Aguirre MD    PRIMARY CARE PROVIDER  Urvashi Basurto, ANURAG    REASON FOR FOLLOWUP  1. Iron deficiency anemia, unspecified iron deficiency anemia type    2. Malignant neoplasm of sigmoid colon    3. Malabsorption of iron        CHIEF COMPLAINT  Fatigue    Dear Ms. Basurto,    HISTORY OF PRESENT ILLNESS:   I saw Ms. Carney in follow up today in our medical oncology clinic. As you are aware, she is a pleasant, 53 y.o., white female with minimal past medical history who was in her usual state of health until 2023 when she developed more frequent epigastric pains and BRBPR. She was referred to local gastroenterology, who performed an endoscopic exam that identified a tumor in the sigmoid colon. Biopsies were consistent with a moderately differentiated adenocarcinoma. She was referred to [facility] colorectal surgery in [place], and she underwent an LAR on 2023 for LAR. This procedure went very well, and a diverting ostomy was not required. She was subsequently referred to our clinic for further evaluation and management. At the time of her initial consultation with us (on 2023), we discussed how ~six months of adjuvant chemotherapy was now indicated and recommended as the current standard of care. Following a long discussion regarding the potential risks vs. benefits, she was initially agreeable to undergoing powerport placement and proceeding with [id number] FOLFOX; however, she subsequently changed her mind, canceled her scheduled powerport appointment and was lost to routine follow up in both ours and [facility] colorectal surgery's clinics. You re-referred her to our clinic in 2024; because, unfortunately, since ~late 2023, she had been feeling overall steadily worse, with recurrent and progressive constipation and back pain. The results of a CT of the abdomen and pelvis performed on 2024 for  further evaluation were consistent with recurrent, and now metastatic, disease. At the time of her follow up appointments in late January/early February 2024, she was agreeable to undergoing powerport placement and beginning first-line, palliative treatment with [id number], infusional 5-FU/leucovorin.    INTERIM HISTORY:  Ms. Carney returns to clinic today for follow up of (now metastatic) colorectal cancer again accompanied by her . Following powerport placement, she began palliative, s2nsagki infusional 5-FU/leucovorin the week of 02/19/2024. She has now received a total of four (4) cycles. Following the (the fourth) cycle, she again spent the better part of the week after receiving chemotherapy intermittently nauseated. Given her significant toxicities with Leucovorin/5FU it was recommended to change treatment and she was started on palliative management with [id number] Avastin. She received her initial cycle of Avastin only on 05/01/2024 and reports tolerating this well. She currently denies any nausea/vomiting or diarrhea. She continues to use Sancuso patch and takes Compazine if needed. She reports an improved appetite and hydrating well. Her main complaint is fatigue but she also believes this is improving daily. She is without any other specific complaints at this time.    Past Medical History:   Diagnosis Date    Gallbladder attack     GERD (gastroesophageal reflux disease)     Hearing aid worn     History of ear infections     Malignant neoplasm of sigmoid colon 3/14/2023    Seasonal allergies     Wears glasses        Past Surgical History:   Procedure Laterality Date    CHOLECYSTECTOMY      COLON RESECTION N/A 3/14/2023    Procedure: COLON RESECTION LOW ANTERIOR LAPAROSCOPIC WITH DAVINCI ROBOT;  Surgeon: Shari Aguirre MD;  Location: [facility];  Service: Robotics - DaVinci;  Laterality: N/A;    COLONOSCOPY N/A 2/15/2023    Procedure: COLONOSCOPY FOR SCREENING;  Surgeon: Giselle Iniguez MD;   Location: [facility] OR;  Service: Gastroenterology;  Laterality: N/A;    ENDOSCOPY N/A 2/15/2023    Procedure: ESOPHAGOGASTRODUODENOSCOPY WITH BIOPSY;  Surgeon: Giselle Iniguez MD;  Location: [facility] OR;  Service: Gastroenterology;  Laterality: N/A;    LAPAROSCOPIC TUBAL LIGATION Bilateral     MIDDLE EAR SURGERY      PORTACATH PLACEMENT N/A 2024    Procedure: INSERTION OF PORTACATH;  Surgeon: Jaylen Leal MD;  Location: [facility] OR;  Service: General;  Laterality: N/A;       Social History     Socioeconomic History    Marital status:    Tobacco Use    Smoking status: Former     Current packs/day: 0.00     Average packs/day: 1 pack/day for 30.0 years (30.0 ttl pk-yrs)     Types: Cigarettes     Start date:      Quit date:      Years since quittin.3     Passive exposure: Never    Smokeless tobacco: Never   Vaping Use    Vaping status: Every Day    Substances: Nicotine, Flavoring    Devices: Refillable tank   Substance and Sexual Activity    Alcohol use: Never    Drug use: Defer    Sexual activity: Defer     Birth control/protection: None       Family History   Problem Relation Age of Onset    Cancer Mother     Cancer Father     Cancer Sister     Cancer Brother     Cancer Maternal Grandmother     Cancer Maternal Grandfather     Breast cancer Neg Hx        Allergies   Allergen Reactions    Keflex [Cephalexin] Nausea Only     Beta lactam allergy details  Antibiotic reaction: nausea  Age at reaction: adult  Dose to reaction time: (!) minutes  Reason for antibiotic: unknown  Epinephrine required for reaction?: no  Tolerated antibiotics: augmentin, amoxicillin          Current Outpatient Medications   Medication Sig Dispense Refill    apixaban (ELIQUIS) 5 MG tablet tablet Take 1 tablet by mouth 2 (Two) Times a Day. 60 tablet 6    Apixaban Starter Pack tablet therapy pack Take two 5 mg tablets by mouth every 12 hours for 7 days. Followed by one 5 mg tablet every 12 hours. (Dispense starter  pack if available) 74 tablet 0    granisetron (Sancuso) 3.1 MG/24HR Place 1 patch on the skin once every 7 days. 4 patch 3    HYDROcodone-acetaminophen (NORCO) 5-325 MG per tablet Take 1-2 tablets by mouth Every 8 (Eight) Hours As Needed for Mild Pain.      lactulose (CHRONULAC) 10 GM/15ML solution Take 30 mL by mouth 2 (Two) Times a Day As Needed (for constipation).      lidocaine-prilocaine (EMLA) 2.5-2.5 % cream Apply to port-a-cath site 30 minutes prior to arrival at infusion center. Cover with plastic wrap. 30 g 1    Linzess 72 MCG capsule capsule Take 1 capsule by mouth Daily.      metoclopramide (REGLAN) 10 MG tablet Take 1 tablet by mouth 3 (Three) Times a Day As Needed (for nausea).      nystatin (MYCOSTATIN) 030483 UNIT/GM cream Apply 1 Application topically to the appropriate area as directed 2 (Two) Times a Day. 15 g 0    ondansetron (ZOFRAN) 8 MG tablet Take 1 tablet by mouth Every 8 (Eight) Hours As Needed for Nausea or Vomiting.      pantoprazole (PROTONIX) 40 MG EC tablet Take 1 tablet by mouth Daily.      sennosides-docusate (PERICOLACE) 8.6-50 MG per tablet Take 2 tablets by mouth 2 (Two) Times a Day As Needed for Constipation.       No current facility-administered medications for this visit.     REVIEW OF SYSTEMS  CONSTITUTIONAL:  No fever, chills or night sweats. Progressive fatigue with ongoing, palliative chemotherapy.  EYES:  No blurry vision, diplopia or other vision changes.  ENT:  No hearing loss, nosebleeds or sore throat.  CARDIOVASCULAR:  No palpitations, arrhythmia, syncopal episodes or edema.  PULMONARY:  No hemoptysis, wheezing, chronic cough or shortness of breath.  GASTROINTESTINAL:  As per the HPI above.  GENITOURINARY:  No hematuria, kidney stones or frequent urination.  MUSCULOSKELETAL:  As per the HPI above.  INTEGUMENTARY: No rashes or pruritus.  ENDOCRINE:  No excessive thirst or hot flashes.  HEMATOLOGIC:  No history of free bleeding, spontaneous bleeding or  clotting.  IMMUNOLOGIC:  No allergies or frequent infections.  NEUROLOGIC: No numbness, tingling, seizures or weakness.  PSYCHIATRIC:  No anxiety or depression.    PHYSICAL EXAMINATION  /70   Pulse 117   Temp 97.6 °F (36.4 °C) (Oral)   Resp 18   Wt 62.5 kg (137 lb 11.2 oz)   LMP 06/15/2016   SpO2 96%   BMI 21.57 kg/m²     Pain Score:  Pain Score    05/15/24 1311   PainSc: 0-No pain     PHQ-Score Total:  PHQ-9 Total Score:      ECO  GENERAL:  A well-developed, well-nourished, white female in no acute distress. Appears well.   HEENT:  Pupils equally round and reactive to light. Extraocular muscles intact.  CARDIOVASCULAR:  Regular rate and rhythm.  No murmurs, gallops or rubs.  LUNGS:  Clear to auscultation bilaterally.  ABDOMEN:  Soft, nontender, nondistended with positive bowel sounds.  EXTREMITIES:  No clubbing, cyanosis or edema bilaterally.  SKIN:  No rashes or petechiae. Powerport in place.  NEURO:  Cranial nerves grossly intact. No focal deficits.  PSYCH:  Alert and oriented x3.    LABORATORY  Lab Results   Component Value Date    WBC 7.07 05/15/2024    HGB 10.8 (L) 05/15/2024    HCT 32.2 (L) 05/15/2024    .5 (H) 05/15/2024     05/15/2024    NEUTROABS 5.24 05/15/2024       Lab Results   Component Value Date     05/15/2024    K 3.4 (L) 05/15/2024     05/15/2024    CO2 26.3 05/15/2024    BUN 8 05/15/2024    CREATININE 0.48 (L) 05/15/2024    GLUCOSE 102 (H) 05/15/2024    CALCIUM 8.4 (L) 05/15/2024    AST 82 (H) 05/15/2024    ALT 84 (H) 05/15/2024    ALKPHOS 260 (H) 05/15/2024    BILITOT 1.0 05/15/2024    PROTEINTOT 5.2 (L) 05/15/2024    ALBUMIN 2.6 (L) 05/15/2024     CBC (05/15/2024): WBCs: 7.07; Hgb: 10.8; Hct: 32.2; platelets: 302; MCV: 103.5  CBC (2024): WBCs: 3.68; HgB: 11.6; Hct: 35.0; platelets: 332; MCV: 94.3  CBC (2024): WBCs: 2.63; HgB: 12.3; Hct: 36.9; platelets: 348  CBC (2024): WBCs: 2.03; HgB: 11.2; Hct: 33.4; platelets: 163; MCV: 91.8  CBC  (04/02/2024): WBCs: 3.55; HgB: 12.2; Hct: 35.3; platelets: 164  CBC (03/19/2024): WBCs: 6.28; HgB: 14.1; Hct: 42.1; platelets: 319  CBC (03/05/2024): WBCs: 3.32; HgB: 10.9; Hct: 35.5; platelets: 251; MCV: 90.3  CBC (02/19/2024): WBCs: 17.29; HgB: 10.5; Hct: 34.0; platelets: 451; MCV: 85.2  CBC (01/23/2024): WBCs: 5.34; HgB: 8.3; Hct: 27.3; platelets: 235; MCV: 91.3  CBC (03/16/2023): WBCs: 4.78; HgB: 9.1; Hct: 28.4; platelets: 195; MCV: 100.0    CEA (04/16/2024): 2.67 ng/mL  CEA (03/19/2024): 1.90 ng/mL  CEA (03/07/2024): 1.59 ng/mL  CEA (01/23/2024): 1.56 ng/mL  CEA (03/13/2023): 1.17 ng/mL    Iron panel (05/15/2024): serum iron: 77; % saturation: 74; TIBC: 104: ferritin: 753.50 ng/mL  Iron panel (03/19/2024): serum iron: 74; % saturation: 30; TIBC: 243; ferritin: 391.9 ng/mL  Iron panel (01/23/2024): serum iron: 22; % saturation: 5; TIBC: 443; ferritin: 9.74 ng/mL    IMAGING  CT abdomen and pelvis without contrast (02/15/2023):  Impression:  1) Mild narrowing in the sigmoid colon. Recommend direct visualization.  2) Other findings [are nonacute].    CT chest without contrast (03/06/2023):  Impression: No evidence of metastatic disease in the chest.    CT chest with contrast (04/07/2023, compared to 03/06/2023):  Impression: No evidence of metastatic disease to the chest. Stable exam.    CT abdomen and pelvis with contrast (04/07/2023, compared to 02/15/2023):  Impression:  1) Interval post surgical changes noted from partial resection of the sigmoid colon region.  2) 3.1 cm predominantly gas-filled collection in the left presacral space near the anastomosis site that may reflect postoperative changes. Possibility of small fistulous connection not excluded within the anastomosis.  3) No ileus or bowel obstruction.  4) No solid organ lesions or adenopathy identified.    CT abdomen and pelvis with and without contrast (01/17/2024):  Impression:  1) Interval development of metastatic disease.  2) Specifically, soft  tissue metastatic implants are noted in the omentum, left lower quadrant, upper presacral space, and left lower presacral space immediately adjacent to the anastomosis site. Soft tissue implant within umbilical hernia fat.  3) Development of 5.5 mm nodule right lower lobe suspicious for metastatic lung nodule.  4) Interval postsurgical changes from sigmoid resection.  5) Mild fatty infiltration of liver. No focal liver lesions.  6) Other incidental/nonacute findings.    CT abdomen and pelvis without contrast (03/07/2024):  Impression: Dilated fluid filled small bowel loops with decompressed mid and distal ileum suggestive of small bowel obstruction.    CT chest with contrast (03/08/2024, compared to 04/07/2023):  Impression: New left upper lobe 1.2 cm pulmonary nodule.    CT abdomen and pelvis with contrast (03/08/2024, compared to 03/07/2024):  Impression:  1) Oral contrast is seen within some very decompressed mid and distal ileal small bowel loops suggestive of likely probably high-grade partial obstruction.  2) New soft tissue density lesion from 11/09/2022 in the left lower abdomen anterior to the psoas muscle measuring 1.5 cm.    CT chest, abdomen and pelvis with contrast (04/21/2024, compared to 03/08/2024 and 01/17/2024):  Impression:  1) Filling defect in the right lower lobe pulmonary artery consistent with an embolus.  2) Interval decrease in size of the previously noted left upper lobe nodule now measuring 8 mm.  3) New small left effusion and opacity in the medial left lower lobe which may be infectious or inflammatory in nature. Short interval follow up is recommended to ensure resolution.  4) Interval improvement in the previously noted soft tissue density mesenteric masses consistent with a treatment response.  5) Multiple dilated loops of small bowel consistent with at least a partial obstruction.  The liver is diffusely hypodense consistent with fatty infiltration. No focal hepatic lesions  identified.    PATHOLOGY  Sigmoid colon and upper rectum, low anterior resection (03/14/2023):  Adenocarcinoma, moderately differentiated, invasive through muscularis propria into pericolonic soft tissue. Lymphovascular invasion present. Surgical margins negative for carcinoma. Two out of fifteen (2/15) lymph nodes involved by metastatic carcinoma with extranodal extension present. Tumor size: 3.5 x 2.5 x 1.4 cm. Intact MSI expression per previous biopsy. [id number] (stage IIIB).    Molecular profiling ([id number]), tissue (sigmoid colon/upper rectum) and liquid (peripheral blood):  PD-L1: 22%; KRAS G12D; PIK3CA E545K; TP53 Y220H; MSI-High NOT detected.    IMPRESSION AND PLAN  Ms. Carney is a 53 y.o., white female with:  Colon adenocarcinoma: Initially diagnosed in February 2023 and status post resection of the sigmoid colon and upper rectum on 03/14/2023. The final, surgical pathology from this procedure was consistent with stage IIIB (hD1mT6n) disease. Two out of fifteen (2/15) lymph nodes were involved with metastatic disease, but all surgical margins were negative. Dr. Hanks had a long discussion with the patient and her  at the time of that initial consultation in our clinic (on 03/30/2023) regarding this diagnosis and its prognosis. We discussed how, while her surgery went overall very well, her chance of eventually developing a relapse in this malignancy was, unfortunately, significant; and the purpose of adjuvant chemotherapy was to reduce this chance by as much as possible (perhaps by as much as 10%). Particularly given her stage III disease, young age and multiple, poor risk factors (left-sided tumor, lymphovascular invasion present, etc.), six months of [id number] FOLFOX (or XELOX) is the preferred regimen; and, following a long discussion regarding the potential risks vs. benefits of this treatment regimen at that time, she was initially agreeable to the former (FOLFOX). We referred her back to local  surgery for powerport placement; however, per her preference, she ultimately never followed through with either this (powerport placement) or starting the adjuvant chemotherapy, and she was subsequently lost to routine follow up in both [facility] and [facility] colorectal surgery's clinics. She apparently overall still did very well for the next ~six to seven months; but, by late October 2023, she was experiencing recurrent constipation and back pain, and a repeat CT of the abdomen and pelvis performed on 01/17/2024 (and summarized above) was, unfortunately, consistent with recurrent, and now metastatic, disease, with the development of multiple, soft tissue implants within the omentum/peritoneum and, probably, at least one metastatic lung lesion (in the right lower lobe) as well. Both around that time and since, Dr. Hanks has had multiple, long discussions with the patient (+/- her  or daughter) regarding this updated diagnosis and, in general terms, its prognosis. She and her family remain aware that this disease is now recurrent, by definition, stage IV, and consequently, incurable. That said, particularly given her continued, good performance status, it was/is potentially treatable; and sustained remissions are possible. Some of the pertinent results of molecular profiling ([id number]) that were performed on both tissue (March 2023's partial colectomy specimen(s)) and liquid (peripheral blood drawn in January 2024) biopsies in early 2024 are summarized above. Following a long discussion in early February 2024 regarding its potential risks vs. benefits, she was agreeable to having a powerport placed and beginning [id number] 5-FU/leucovorin. She began this regimen on 02/19/2024, and she has now completed a total of four (4) cycles. Following the second cycle, she was briefly admitted to [facility] for a partial bowel obstruction; but this clinically resolved with an NG tube and nonsurgical management. Following the most  "recent (the fourth) cycle, she has, unfortunately, continued to struggle with this regimen, with persistently increased fatigue and nausea. Meanwhile, repeat CT scans performed on 04/21/2024 (and summarized above) are consistent with an overall response in her disease from the 5-FU; however, her LFTs remain elevated despite the liver still appearing unremarkable on these recent scans. Given all of this, Dr. Hanks had a long discussion with the patient and her  in clinic on 04/23/2024. In short, although her disease has responded, the risks of any additional cycles of (at least the current dose of) 5-FU/leucovorin are now becoming too great; and a change in treatment is therefore recommended. Avastin was added to her palliative management plan and she received her initial cycle on 05/01/2024. Overall, she reports that she tolerated palliative Avastin well without any noticeable side effects. She is no longer struggling with nausea/vomiting or diarrhea. We will proceed with today's planned dose, cycle #2 Avastin.  We will see her back in our clinic in two weeks, on the day of the third cycle of Avastin, with a CBC, CMP and CEA for a symptom/toxicity check.  Anemia: Repeat CBCs from 01/23/2024 showed a HgB of 8.3 g/dL and a ferritin level of 9.7 ng/mL. As she was not tolerating oral iron replacement very well (due to constipation), we gave her a cycle of IV Feraheme in early February 2024. Her HgB and indices have recently still been much improved since then. Continue to monitor.  Gastric dysmotility: She previously elaborated that she thinks an intermittent sensation that food just \"gets stuck\" in her stomach contributes to the periodic N/V she has experienced with the first couple of cycles of palliative chemotherapy. Continue Reglan 10 mg TID prn. Continue to monitor.  Nausea/vomiting: Multifactorial, with ongoing, palliative chemotherapy and omental/peritoneal metastases from issue #1 both contributing. She " had to be hospitalized for several days last month due to a partial bowel obstruction; however, this resolved with nonsurgical management alone. Continue Sancuso TD patches. As discussed above, Avastin is now being added to her palliative treatment regimen (with a further dose reduction in the 5-FU). Continue to monitor.  Pulmonary embolus: Incidental note was made of a filling defect in the right lower lobe pulmonary artery on the most recent repeat CT of the chest (performed on 04/21/2024 and summarized above). Continue BID Eliquis (which she is currently still tolerating well).  The patient and her  were in agreement with these plans.    It is a pleasure to participate in Ms. Carney's care. Please do not hesitate to call with any questions or concerns that you may have.    A total of 30 minutes were spent coordinating this patient’s care in clinic today; more than 50% of this time was face-to-face with the patient and her , reviewing her interim medical history, and counseling on the current treatment and followup plan. All questions were answered to their satisfaction.    FOLLOW UP  Continue BID Eliquis. Continue Reglan 10 mg TID prn. Continue qweekly Sancuso TD. Proceed with today's planned dose cycle #2 Avastin as planned. Return to our clinic in 2 weeks, on the day of the 3rd cycle of bevacizumab, with a CBC, CMP and CEA.            This document was electronically signed by ANURAG Hooker May 16, 2024 08:58 EDT      CC: ANURAG Mishra MD Karen S. Jennings-Conklin, MD

## 2024-05-30 ENCOUNTER — APPOINTMENT (OUTPATIENT)
Dept: ONCOLOGY | Facility: HOSPITAL | Age: 54
End: 2024-05-30
Payer: COMMERCIAL

## 2024-05-30 ENCOUNTER — INFUSION (OUTPATIENT)
Dept: ONCOLOGY | Facility: HOSPITAL | Age: 54
End: 2024-05-30
Payer: COMMERCIAL

## 2024-05-30 ENCOUNTER — LAB (OUTPATIENT)
Dept: ONCOLOGY | Facility: CLINIC | Age: 54
End: 2024-05-30
Payer: COMMERCIAL

## 2024-05-30 ENCOUNTER — OFFICE VISIT (OUTPATIENT)
Dept: ONCOLOGY | Facility: CLINIC | Age: 54
End: 2024-05-30
Payer: COMMERCIAL

## 2024-05-30 VITALS
DIASTOLIC BLOOD PRESSURE: 77 MMHG | OXYGEN SATURATION: 100 % | HEIGHT: 67 IN | WEIGHT: 131.6 LBS | RESPIRATION RATE: 18 BRPM | TEMPERATURE: 97.3 F | SYSTOLIC BLOOD PRESSURE: 99 MMHG | BODY MASS INDEX: 20.65 KG/M2 | HEART RATE: 107 BPM

## 2024-05-30 VITALS
SYSTOLIC BLOOD PRESSURE: 102 MMHG | OXYGEN SATURATION: 96 % | RESPIRATION RATE: 18 BRPM | WEIGHT: 132 LBS | HEART RATE: 115 BPM | DIASTOLIC BLOOD PRESSURE: 64 MMHG | BODY MASS INDEX: 20.67 KG/M2 | TEMPERATURE: 97.4 F

## 2024-05-30 DIAGNOSIS — C18.7 MALIGNANT NEOPLASM OF SIGMOID COLON: Primary | ICD-10-CM

## 2024-05-30 DIAGNOSIS — Z95.828 PORT-A-CATH IN PLACE: ICD-10-CM

## 2024-05-30 DIAGNOSIS — R53.81 DEBILITY: ICD-10-CM

## 2024-05-30 LAB
ALBUMIN SERPL-MCNC: 2.6 G/DL (ref 3.5–5.2)
ALBUMIN/GLOB SERPL: 1.2 G/DL
ALP SERPL-CCNC: 198 U/L (ref 39–117)
ALT SERPL W P-5'-P-CCNC: 71 U/L (ref 1–33)
ANION GAP SERPL CALCULATED.3IONS-SCNC: 9.9 MMOL/L (ref 5–15)
AST SERPL-CCNC: 111 U/L (ref 1–32)
BASOPHILS # BLD AUTO: 0.05 10*3/MM3 (ref 0–0.2)
BASOPHILS NFR BLD AUTO: 0.9 % (ref 0–1.5)
BILIRUB SERPL-MCNC: 1 MG/DL (ref 0–1.2)
BILIRUB UR QL STRIP: ABNORMAL
BUN SERPL-MCNC: 11 MG/DL (ref 6–20)
BUN/CREAT SERPL: 21.2 (ref 7–25)
CALCIUM SPEC-SCNC: 8.4 MG/DL (ref 8.6–10.5)
CEA SERPL-MCNC: 2.78 NG/ML
CHLORIDE SERPL-SCNC: 106 MMOL/L (ref 98–107)
CLARITY UR: ABNORMAL
CO2 SERPL-SCNC: 26.1 MMOL/L (ref 22–29)
COLOR UR: ABNORMAL
CREAT SERPL-MCNC: 0.52 MG/DL (ref 0.57–1)
DEPRECATED RDW RBC AUTO: 61.1 FL (ref 37–54)
EGFRCR SERPLBLD CKD-EPI 2021: 111.9 ML/MIN/1.73
EOSINOPHIL # BLD AUTO: 0.01 10*3/MM3 (ref 0–0.4)
EOSINOPHIL NFR BLD AUTO: 0.2 % (ref 0.3–6.2)
ERYTHROCYTE [DISTWIDTH] IN BLOOD BY AUTOMATED COUNT: 15.3 % (ref 12.3–15.4)
GLOBULIN UR ELPH-MCNC: 2.2 GM/DL
GLUCOSE SERPL-MCNC: 83 MG/DL (ref 65–99)
GLUCOSE UR STRIP-MCNC: NEGATIVE MG/DL
HCT VFR BLD AUTO: 36.3 % (ref 34–46.6)
HGB BLD-MCNC: 11.9 G/DL (ref 12–15.9)
HGB UR QL STRIP.AUTO: NEGATIVE
IMM GRANULOCYTES # BLD AUTO: 0.01 10*3/MM3 (ref 0–0.05)
IMM GRANULOCYTES NFR BLD AUTO: 0.2 % (ref 0–0.5)
KETONES UR QL STRIP: ABNORMAL
LEUKOCYTE ESTERASE UR QL STRIP.AUTO: NEGATIVE
LYMPHOCYTES # BLD AUTO: 1.29 10*3/MM3 (ref 0.7–3.1)
LYMPHOCYTES NFR BLD AUTO: 24.2 % (ref 19.6–45.3)
MCH RBC QN AUTO: 35.2 PG (ref 26.6–33)
MCHC RBC AUTO-ENTMCNC: 32.8 G/DL (ref 31.5–35.7)
MCV RBC AUTO: 107.4 FL (ref 79–97)
MONOCYTES # BLD AUTO: 0.28 10*3/MM3 (ref 0.1–0.9)
MONOCYTES NFR BLD AUTO: 5.3 % (ref 5–12)
NEUTROPHILS NFR BLD AUTO: 3.68 10*3/MM3 (ref 1.7–7)
NEUTROPHILS NFR BLD AUTO: 69.2 % (ref 42.7–76)
NITRITE UR QL STRIP: POSITIVE
NRBC BLD AUTO-RTO: 0 /100 WBC (ref 0–0.2)
PH UR STRIP.AUTO: 5.5 [PH] (ref 5–8)
PLATELET # BLD AUTO: 224 10*3/MM3 (ref 140–450)
PMV BLD AUTO: 9.4 FL (ref 6–12)
POTASSIUM SERPL-SCNC: 3.3 MMOL/L (ref 3.5–5.2)
PROT SERPL-MCNC: 4.8 G/DL (ref 6–8.5)
PROT UR QL STRIP: ABNORMAL
RBC # BLD AUTO: 3.38 10*6/MM3 (ref 3.77–5.28)
SODIUM SERPL-SCNC: 142 MMOL/L (ref 136–145)
SP GR UR STRIP: >=1.03 (ref 1–1.03)
UROBILINOGEN UR QL STRIP: ABNORMAL
WBC NRBC COR # BLD AUTO: 5.32 10*3/MM3 (ref 3.4–10.8)

## 2024-05-30 PROCEDURE — 82378 CARCINOEMBRYONIC ANTIGEN: CPT | Performed by: INTERNAL MEDICINE

## 2024-05-30 PROCEDURE — 25010000002 HEPARIN LOCK FLUSH PER 10 UNITS

## 2024-05-30 PROCEDURE — 96413 CHEMO IV INFUSION 1 HR: CPT

## 2024-05-30 PROCEDURE — 25010000002 BEVACIZUMAB 100 MG/4ML SOLUTION 4 ML VIAL: Performed by: INTERNAL MEDICINE

## 2024-05-30 PROCEDURE — 80053 COMPREHEN METABOLIC PANEL: CPT | Performed by: INTERNAL MEDICINE

## 2024-05-30 PROCEDURE — 99214 OFFICE O/P EST MOD 30 MIN: CPT | Performed by: INTERNAL MEDICINE

## 2024-05-30 PROCEDURE — 81003 URINALYSIS AUTO W/O SCOPE: CPT | Performed by: INTERNAL MEDICINE

## 2024-05-30 PROCEDURE — 25810000003 SODIUM CHLORIDE 0.9 % SOLUTION: Performed by: INTERNAL MEDICINE

## 2024-05-30 PROCEDURE — 85025 COMPLETE CBC W/AUTO DIFF WBC: CPT | Performed by: INTERNAL MEDICINE

## 2024-05-30 RX ORDER — SODIUM CHLORIDE 0.9 % (FLUSH) 0.9 %
10 SYRINGE (ML) INJECTION AS NEEDED
Status: DISCONTINUED | OUTPATIENT
Start: 2024-05-30 | End: 2024-05-30 | Stop reason: HOSPADM

## 2024-05-30 RX ORDER — HEPARIN SODIUM (PORCINE) LOCK FLUSH IV SOLN 100 UNIT/ML 100 UNIT/ML
300 SOLUTION INTRAVENOUS ONCE
OUTPATIENT
Start: 2024-05-30

## 2024-05-30 RX ORDER — HEPARIN SODIUM (PORCINE) LOCK FLUSH IV SOLN 100 UNIT/ML 100 UNIT/ML
500 SOLUTION INTRAVENOUS AS NEEDED
Status: DISCONTINUED | OUTPATIENT
Start: 2024-05-30 | End: 2024-05-30 | Stop reason: HOSPADM

## 2024-05-30 RX ORDER — SODIUM CHLORIDE 9 MG/ML
20 INJECTION, SOLUTION INTRAVENOUS ONCE
Status: COMPLETED | OUTPATIENT
Start: 2024-05-30 | End: 2024-05-30

## 2024-05-30 RX ORDER — SODIUM CHLORIDE 0.9 % (FLUSH) 0.9 %
10 SYRINGE (ML) INJECTION AS NEEDED
OUTPATIENT
Start: 2024-05-30

## 2024-05-30 RX ORDER — HEPARIN SODIUM (PORCINE) LOCK FLUSH IV SOLN 100 UNIT/ML 100 UNIT/ML
500 SOLUTION INTRAVENOUS AS NEEDED
OUTPATIENT
Start: 2024-05-30

## 2024-05-30 RX ORDER — SODIUM CHLORIDE 0.9 % (FLUSH) 0.9 %
20 SYRINGE (ML) INJECTION AS NEEDED
OUTPATIENT
Start: 2024-05-30

## 2024-05-30 RX ADMIN — Medication 10 ML: at 10:40

## 2024-05-30 RX ADMIN — SODIUM CHLORIDE 20 ML/HR: 9 INJECTION, SOLUTION INTRAVENOUS at 10:07

## 2024-05-30 RX ADMIN — Medication 500 UNITS: at 10:40

## 2024-05-30 RX ADMIN — BEVACIZUMAB 300 MG: 100 INJECTION, SOLUTION INTRAVENOUS at 10:07

## 2024-05-30 NOTE — PROGRESS NOTES
Venipuncture Blood Specimen Collection  Venipuncture performed in RIGHT ARM by Lakeisha Waggoner MA with good hemostasis. Patient tolerated the procedure well without complications.   05/30/24   Lakeisha Waggoner MA

## 2024-05-30 NOTE — PROGRESS NOTES
Name:  Vangie Carney  :  1970  Date:  2024     REFERRING PHYSICIAN  Shari Aguirre MD    PRIMARY CARE PROVIDER  Urvashi Basurto, ANURAG    REASON FOR FOLLOWUP  1. Malignant neoplasm of sigmoid colon      CHIEF COMPLAINT  Fatigue.    Dear Ms. Basurto,    HISTORY OF PRESENT ILLNESS:   I saw Ms. Carney in follow up today in our medical oncology clinic. As you are aware, she is a pleasant, 53 y.o., white female with minimal past medical history who was in her usual state of health until 2023 when she developed more frequent epigastric pains and BRBPR. She was referred to local gastroenterology, who performed an endoscopic exam that identified a tumor in the sigmoid colon. Biopsies were consistent with a moderately differentiated adenocarcinoma. She was referred to [facility] colorectal surgery in [place], and she underwent an LAR on 2023 for LAR. This procedure went very well, and a diverting ostomy was not required. She was subsequently referred to our clinic for further evaluation and management. At the time of her initial consultation with us (on 2023), we discussed how ~six months of adjuvant chemotherapy was now indicated and recommended as the current standard of care. Following a long discussion regarding the potential risks vs. benefits, she was initially agreeable to undergoing powerport placement and proceeding with r5trxfpm FOLFOX; however, she subsequently changed her mind, canceled her scheduled powerport appointment and was lost to routine follow up in both ours and [facility] colorectal surgery's clinics. You re-referred her to our clinic in 2024; because, unfortunately, since ~late 2023, she had been feeling overall steadily worse, with recurrent and progressive constipation and back pain. The results of a CT of the abdomen and pelvis performed on 2024 for further evaluation were consistent with recurrent, and now metastatic, disease. At the time of her  follow up appointments in late January/early February 2024, she was agreeable to undergoing powerport placement and beginning first-line, palliative treatment with p5mtbsjx, infusional 5-FU/leucovorin.    INTERIM HISTORY:  Ms. Carney returns to clinic today for follow up of (now metastatic) colorectal cancer again accompanied by her . Following powerport placement, she began palliative, n2mhwcpp infusional 5-FU/leucovorin the week of 02/19/2024; and she received a total of four (4) cycles between then and early April 2024. Following the (the fourth) cycle, she again spent the better part of the week after receiving chemotherapy persistently nauseated and profoundly fatigued. Given her significant and persistent toxicities, the 5-FU/Leucovorin was dropped starting with the fifth cycle; and we have been proceeding with ongoing, palliative, h9wqdwsl Avastin alone. She received her initial cycle of g0lyavqu Avastin only on 05/01/2024, has completed a total of two (2) cycles; and she reiterates today that, with this change, she has recently been feeling quite a bit better. She remains fatigued and her appetite is still somewhat decreased, but her nausea as all but resolved. For convenience, her  again brought her into clinic today via a wheelchair; however, she has been getting around better and starting to do routine chores again at home. She has no new or other specific complaints today.    Past Medical History:   Diagnosis Date    Gallbladder attack     GERD (gastroesophageal reflux disease)     Hearing aid worn     History of ear infections     Malignant neoplasm of sigmoid colon 3/14/2023    Seasonal allergies     Wears glasses        Past Surgical History:   Procedure Laterality Date    CHOLECYSTECTOMY      COLON RESECTION N/A 3/14/2023    Procedure: COLON RESECTION LOW ANTERIOR LAPAROSCOPIC WITH DAVINCI ROBOT;  Surgeon: Shari Aguirre MD;  Location: [facility];  Service: Robotics - DaVinci;   Laterality: N/A;    COLONOSCOPY N/A 2/15/2023    Procedure: COLONOSCOPY FOR SCREENING;  Surgeon: Giselle Iniguez MD;  Location: [facility] OR;  Service: Gastroenterology;  Laterality: N/A;    ENDOSCOPY N/A 2/15/2023    Procedure: ESOPHAGOGASTRODUODENOSCOPY WITH BIOPSY;  Surgeon: Giselle Iniguez MD;  Location: [facility] OR;  Service: Gastroenterology;  Laterality: N/A;    LAPAROSCOPIC TUBAL LIGATION Bilateral     MIDDLE EAR SURGERY      PORTACATH PLACEMENT N/A 2024    Procedure: INSERTION OF PORTACATH;  Surgeon: Jaylen Leal MD;  Location: [facility] OR;  Service: General;  Laterality: N/A;       Social History     Socioeconomic History    Marital status:    Tobacco Use    Smoking status: Former     Current packs/day: 0.00     Average packs/day: 1 pack/day for 30.0 years (30.0 ttl pk-yrs)     Types: Cigarettes     Start date:      Quit date:      Years since quittin.4     Passive exposure: Never    Smokeless tobacco: Never   Vaping Use    Vaping status: Every Day    Substances: Nicotine, Flavoring    Devices: Refillable tank   Substance and Sexual Activity    Alcohol use: Never    Drug use: Defer    Sexual activity: Defer     Birth control/protection: None       Family History   Problem Relation Age of Onset    Cancer Mother     Cancer Father     Cancer Sister     Cancer Brother     Cancer Maternal Grandmother     Cancer Maternal Grandfather     Breast cancer Neg Hx        Allergies   Allergen Reactions    Keflex [Cephalexin] Nausea Only     Beta lactam allergy details  Antibiotic reaction: nausea  Age at reaction: adult  Dose to reaction time: (!) minutes  Reason for antibiotic: unknown  Epinephrine required for reaction?: no  Tolerated antibiotics: augmentin, amoxicillin          Current Outpatient Medications   Medication Sig Dispense Refill    apixaban (ELIQUIS) 5 MG tablet tablet Take 1 tablet by mouth 2 (Two) Times a Day. 60 tablet 6    Apixaban Starter Pack tablet  therapy pack Take two 5 mg tablets by mouth every 12 hours for 7 days. Followed by one 5 mg tablet every 12 hours. (Dispense starter pack if available) 74 tablet 0    granisetron (Sancuso) 3.1 MG/24HR Place 1 patch on the skin once every 7 days. 4 patch 3    HYDROcodone-acetaminophen (NORCO) 5-325 MG per tablet Take 1-2 tablets by mouth Every 8 (Eight) Hours As Needed for Mild Pain.      lactulose (CHRONULAC) 10 GM/15ML solution Take 30 mL by mouth 2 (Two) Times a Day As Needed (for constipation).      lidocaine-prilocaine (EMLA) 2.5-2.5 % cream Apply to port-a-cath site 30 minutes prior to arrival at infusion center. Cover with plastic wrap. 30 g 1    Linzess 72 MCG capsule capsule Take 1 capsule by mouth Daily.      metoclopramide (REGLAN) 10 MG tablet Take 1 tablet by mouth 3 (Three) Times a Day As Needed (for nausea).      nystatin (MYCOSTATIN) 478153 UNIT/GM cream Apply 1 Application topically to the appropriate area as directed 2 (Two) Times a Day. 15 g 0    ondansetron (ZOFRAN) 8 MG tablet Take 1 tablet by mouth Every 8 (Eight) Hours As Needed for Nausea or Vomiting.      pantoprazole (PROTONIX) 40 MG EC tablet Take 1 tablet by mouth Daily.      sennosides-docusate (PERICOLACE) 8.6-50 MG per tablet Take 2 tablets by mouth 2 (Two) Times a Day As Needed for Constipation.       No current facility-administered medications for this visit.     Facility-Administered Medications Ordered in Other Visits   Medication Dose Route Frequency Provider Last Rate Last Admin    heparin injection 500 Units  500 Units Intravenous PRN Jesusita Osborn APRN   500 Units at 05/30/24 1040    sodium chloride 0.9 % flush 10 mL  10 mL Intravenous PRN Jesusita Osborn APRN   10 mL at 05/30/24 1040     REVIEW OF SYSTEMS  CONSTITUTIONAL:  No fever, chills or night sweats. Recently improved, but still ongoing, fatigue, as per the HPI above.  EYES:  No blurry vision, diplopia or other vision changes.  ENT:  No hearing loss,  "nosebleeds or sore throat.  CARDIOVASCULAR:  No palpitations, arrhythmia, syncopal episodes or edema.  PULMONARY:  No hemoptysis, wheezing, chronic cough or shortness of breath.  GASTROINTESTINAL:  As per the HPI above.  GENITOURINARY:  No hematuria, kidney stones or frequent urination.  MUSCULOSKELETAL:  As per the HPI above.  INTEGUMENTARY: No rashes or pruritus.  ENDOCRINE:  No excessive thirst or hot flashes.  HEMATOLOGIC:  No history of free bleeding, spontaneous bleeding or clotting.  IMMUNOLOGIC:  No allergies or frequent infections.  NEUROLOGIC: No numbness, tingling, seizures or weakness.  PSYCHIATRIC:  No anxiety or depression.    PHYSICAL EXAMINATION  BP 99/77   Pulse 107   Temp 97.3 °F (36.3 °C) (Temporal)   Resp 18   Ht 170.2 cm (67\")   Wt 59.7 kg (131 lb 9.6 oz)   LMP 06/15/2016   SpO2 100%   BMI 20.61 kg/m²     Pain Score:  Pain Score    24 0829   PainSc: 0-No pain     PHQ-Score Total:  PHQ-9 Total Score:      ECO  GENERAL:  A well-developed, well-nourished, white female in no acute distress, sitting in a wheelchair, less chronically ill-appearing now compared to a month or two ago.  HEENT:  Pupils equally round and reactive to light. Extraocular muscles intact.  CARDIOVASCULAR:  Regular rate and rhythm.  No murmurs, gallops or rubs.  LUNGS:  Clear to auscultation bilaterally.  ABDOMEN:  Soft, nontender, nondistended with positive bowel sounds.  EXTREMITIES:  No clubbing, cyanosis or edema bilaterally.  SKIN:  No rashes or petechiae. Powerport in place.  NEURO:  Cranial nerves grossly intact. No focal deficits.  PSYCH:  Alert and oriented x3.    LABORATORY  Lab Results   Component Value Date    WBC 5.32 2024    HGB 11.9 (L) 2024    HCT 36.3 2024    .4 (H) 2024     2024    NEUTROABS 3.68 2024       Lab Results   Component Value Date     2024    K 3.3 (L) 2024     2024    CO2 26.1 2024    BUN 11 " 05/30/2024    CREATININE 0.52 (L) 05/30/2024    GLUCOSE 83 05/30/2024    CALCIUM 8.4 (L) 05/30/2024     (H) 05/30/2024    ALT 71 (H) 05/30/2024    ALKPHOS 198 (H) 05/30/2024    BILITOT 1.0 05/30/2024    PROTEINTOT 4.8 (L) 05/30/2024    ALBUMIN 2.6 (L) 05/30/2024     CBC (05/30/2024): WBCs: 5.32; HgB: 11.9; Hct: 36.3; platelets: 224; MCV: 107.4  CBC (05/15/2024): WBCs: 7.07; Hgb: 10.8; Hct: 32.2; platelets: 302; MCV: 103.5  CBC (04/23/2024): WBCs: 3.68; HgB: 11.6; Hct: 35.0; platelets: 332; MCV: 94.3  CBC (04/21/2024): WBCs: 2.63; HgB: 12.3; Hct: 36.9; platelets: 348  CBC (04/16/2024): WBCs: 2.03; HgB: 11.2; Hct: 33.4; platelets: 163; MCV: 91.8  CBC (04/02/2024): WBCs: 3.55; HgB: 12.2; Hct: 35.3; platelets: 164  CBC (03/19/2024): WBCs: 6.28; HgB: 14.1; Hct: 42.1; platelets: 319  CBC (03/05/2024): WBCs: 3.32; HgB: 10.9; Hct: 35.5; platelets: 251; MCV: 90.3  CBC (02/19/2024): WBCs: 17.29; HgB: 10.5; Hct: 34.0; platelets: 451; MCV: 85.2  CBC (01/23/2024): WBCs: 5.34; HgB: 8.3; Hct: 27.3; platelets: 235; MCV: 91.3  CBC (03/16/2023): WBCs: 4.78; HgB: 9.1; Hct: 28.4; platelets: 195; MCV: 100.0    CEA (05/30/2024): pending  CEA (04/16/2024): 2.67 ng/mL  CEA (03/19/2024): 1.90 ng/mL  CEA (03/07/2024): 1.59 ng/mL  CEA (01/23/2024): 1.56 ng/mL  CEA (03/13/2023): 1.17 ng/mL    Iron panel (05/15/2024): serum iron: 77; % saturation: 74; TIBC: 104: ferritin: 753.50 ng/mL  Iron panel (03/19/2024): serum iron: 74; % saturation: 30; TIBC: 243; ferritin: 391.9 ng/mL  Iron panel (01/23/2024): serum iron: 22; % saturation: 5; TIBC: 443; ferritin: 9.74 ng/mL    IMAGING  CT abdomen and pelvis without contrast (02/15/2023):  Impression:  1) Mild narrowing in the sigmoid colon. Recommend direct visualization.  2) Other findings [are nonacute].    CT chest without contrast (03/06/2023):  Impression: No evidence of metastatic disease in the chest.    CT chest with contrast (04/07/2023, compared to 03/06/2023):  Impression: No evidence  of metastatic disease to the chest. Stable exam.    CT abdomen and pelvis with contrast (04/07/2023, compared to 02/15/2023):  Impression:  1) Interval post surgical changes noted from partial resection of the sigmoid colon region.  2) 3.1 cm predominantly gas-filled collection in the left presacral space near the anastomosis site that may reflect postoperative changes. Possibility of small fistulous connection not excluded within the anastomosis.  3) No ileus or bowel obstruction.  4) No solid organ lesions or adenopathy identified.    CT abdomen and pelvis with and without contrast (01/17/2024):  Impression:  1) Interval development of metastatic disease.  2) Specifically, soft tissue metastatic implants are noted in the omentum, left lower quadrant, upper presacral space, and left lower presacral space immediately adjacent to the anastomosis site. Soft tissue implant within umbilical hernia fat.  3) Development of 5.5 mm nodule right lower lobe suspicious for metastatic lung nodule.  4) Interval postsurgical changes from sigmoid resection.  5) Mild fatty infiltration of liver. No focal liver lesions.  6) Other incidental/nonacute findings.    CT abdomen and pelvis without contrast (03/07/2024):  Impression: Dilated fluid filled small bowel loops with decompressed mid and distal ileum suggestive of small bowel obstruction.    CT chest with contrast (03/08/2024, compared to 04/07/2023):  Impression: New left upper lobe 1.2 cm pulmonary nodule.    CT abdomen and pelvis with contrast (03/08/2024, compared to 03/07/2024):  Impression:  1) Oral contrast is seen within some very decompressed mid and distal ileal small bowel loops suggestive of likely probably high-grade partial obstruction.  2) New soft tissue density lesion from 11/09/2022 in the left lower abdomen anterior to the psoas muscle measuring 1.5 cm.    CT chest, abdomen and pelvis with contrast (04/21/2024, compared to 03/08/2024 and  01/17/2024):  Impression:  1) Filling defect in the right lower lobe pulmonary artery consistent with an embolus.  2) Interval decrease in size of the previously noted left upper lobe nodule now measuring 8 mm.  3) New small left effusion and opacity in the medial left lower lobe which may be infectious or inflammatory in nature. Short interval follow up is recommended to ensure resolution.  4) Interval improvement in the previously noted soft tissue density mesenteric masses consistent with a treatment response.  5) Multiple dilated loops of small bowel consistent with at least a partial obstruction.  The liver is diffusely hypodense consistent with fatty infiltration. No focal hepatic lesions identified.    PATHOLOGY  Sigmoid colon and upper rectum, low anterior resection (03/14/2023):  Adenocarcinoma, moderately differentiated, invasive through muscularis propria into pericolonic soft tissue. Lymphovascular invasion present. Surgical margins negative for carcinoma. Two out of fifteen (2/15) lymph nodes involved by metastatic carcinoma with extranodal extension present. Tumor size: 3.5 x 2.5 x 1.4 cm. Intact MSI expression per previous biopsy. iP1yD1x (stage IIIB).    Molecular profiling ([id number]), tissue (sigmoid colon/upper rectum) and liquid (peripheral blood):  PD-L1: 22%; KRAS G12D; PIK3CA E545K; TP53 Y220H; MSI-High NOT detected.    IMPRESSION AND PLAN  Ms. Carney is a 53 y.o., white female with:  Colon adenocarcinoma: Initially diagnosed in February 2023 and status post resection of the sigmoid colon and upper rectum on 03/14/2023. The final, surgical pathology from this procedure was consistent with stage IIIB ([id number]) disease. Two out of fifteen (2/15) lymph nodes were involved with metastatic disease, but all surgical margins were negative. I had a long discussion with the patient and her  at the time of that initial consultation in our clinic (on 03/30/2023) regarding this diagnosis and its  prognosis. We discussed how, while her surgery went overall very well, her chance of eventually developing a relapse in this malignancy was, unfortunately, significant; and the purpose of adjuvant chemotherapy was to reduce this chance by as much as possible (perhaps by as much as 10%). Particularly given her stage III disease, young age and multiple, poor risk factors (left-sided tumor, lymphovascular invasion present, etc.), six months of u2dqwrmu FOLFOX (or XELOX) is the preferred regimen; and, following a long discussion regarding the potential risks vs. benefits of this treatment regimen at that time, she was initially agreeable to the former (FOLFOX). We referred her back to local surgery for powerport placement; however, per her preference, she ultimately never followed through with either this (powerport placement) or starting the adjuvant chemotherapy, and she was subsequently lost to routine follow up in both ours and [facility] colorectal surgery's clinics. She apparently overall still did very well for the next ~six to seven months; but, by late October 2023, she was experiencing recurrent constipation and back pain, and a repeat CT of the abdomen and pelvis performed on 01/17/2024 (and summarized above) was, unfortunately, consistent with recurrent, and now metastatic, disease, with the development of multiple, soft tissue implants within the omentum/peritoneum and, probably, at least one metastatic lung lesion (in the right lower lobe) as well. Both around that time and since, I have had multiple, long discussions with the patient (+/- her  or daughter) regarding this updated diagnosis and, in general terms, its prognosis. She and her family remain aware that this disease is now recurrent, by definition, stage IV, and consequently, incurable. That said, particularly given her continued, good performance status, it was/is potentially treatable; and sustained remissions are possible. Some of the  pertinent results of molecular profiling ([id number]) that were performed on both tissue (March 2023's partial colectomy specimen(s)) and liquid (peripheral blood drawn in January 2024) biopsies in early 2024 are summarized above. Following a long discussion in early February 2024 regarding its potential risks vs. benefits, she was agreeable to having a powerport placed and beginning t3ofordx 5-FU/leucovorin. She began this regimen on 02/19/2024, and she completed a total of four (4) cycles. Following the second cycle, she was briefly admitted to [facility] for a partial bowel obstruction; but this clinically resolved with an NG tube and nonsurgical management. Following the fourth cycle, she continued to struggle with this regimen, with profoundly increased fatigue and persistent nausea. Meanwhile, repeat CT scans performed on 04/21/2024 (and summarized above) were consistent with an overall response in her disease from the 5-FU; however, her LFTs remained elevated despite the liver still appearing unremarkable on these recent scans. Given all of this, I had a long discussion with the patient and her  in clinic on 04/23/2024. In short, although her disease has responded, the risks of any additional cycles of 5-FU/leucovorin were becoming too great; and a change in her palliative treatment regimen was therefore recommended at that time. The 5-FU/leucovorin was discontinued, and she received an initial cycle of e0vyojww Avastin (by itself) on 05/01/2024. She has now received two (2) cycles of this new therapy; and she reiterates today that she continues to tolerate it much much better (than she did the 5-FU). She is no longer struggling with nausea/vomiting or diarrhea; and, while her poor appetite and fatigue are still problematic, these symptoms have been improving also. We will proceed with this current treatment plan (with the third cycle of l6taqxac Avastin today).  We will see her back in our clinic in one  "week, on the day of the fifth cycle of Avastin, with a CBC, CMP and CEA for another symptom/toxicity check. We will repeat CT scans again in late July.  Anemia: Repeat CBCs from 01/23/2024 showed a HgB of 8.3 g/dL and a ferritin level of 9.7 ng/mL. As she was not tolerating oral iron replacement very well (due to constipation), we gave her a cycle of IV Feraheme in early February 2024. Her HgB and indices have recently still been much improved since then. Continue to monitor.  Gastric dysmotility: She previously elaborated that she thinks an intermittent sensation that food just \"gets stuck\" in her stomach contributes to the periodic N/V she has experienced with the first couple of cycles of palliative chemotherapy. Continue Reglan 10 mg TID prn. Continue to monitor.  Nausea/vomiting: Multifactorial, with ongoing, palliative chemotherapy and omental/peritoneal metastases from issue #1 both contributing. She had to be hospitalized for several days earlier this Spring due to a partial bowel obstruction; however, this resolved with nonsurgical management alone. As discussed above, now that Avastin has been added to her palliative treatment regimen; and the 5-FU/leucovorin has been discontinued, this issue has recently been all but resolved. She was instructed to hold her Sancuso patches and to see how she does without them (but to continue using prn Zofran and/or compazine as needed, of course). Continue to monitor.  Pulmonary embolus: Incidental note was made of a filling defect in the right lower lobe pulmonary artery on the repeat CT of the chest performed on 04/21/2024 (and summarized above). Continue BID Eliquis (which she is currently still tolerating well).  Debilitation: Secondary to a few months of not tolerating 5-FU/leucovorin very well. As discussed above, now that the 5-FU has been discontinued; and we are proceeding with Avastin alone for the ongoing, palliative treatment of issue #1, this issue has been " improving. However, she would likely still benefit from some outpatient physical therapy (and a referral for this was placed today). Continue to monitor.  The patient and her  were in agreement with these plans.    It is a pleasure to participate in Ms. Carney's care. Please do not hesitate to call with any questions or concerns that you may have.    A total of 30 minutes were spent coordinating this patient’s care in clinic today; more than 50% of this time was face-to-face with the patient and her , reviewing her interim medical history, discussing the results of recent labwork and counseling on the current treatment and followup plan. All questions were answered to their satisfaction.    FOLLOW UP  Continue BID Eliquis. Continue Reglan 10 mg TID prn. Discontinue qweekly Sancuso TD (at least for now). Continue prn Zofran and compazine. Referral placed to outpatient physical therapy. Proceed with palliative, single-agent, v5iiwdyo bevacizumab, as planned (with the 3rd cycle today). Return to our clinic in 1 month, on the day of the 5th cycle of bevacizumab, with a CBC, CMP and CEA.          This document was electronically signed by ROWENA Hanks MD May 30, 2024 12:36 EDT      CC: ANURAG Mishra MD Karen S. Jennings-Conklin, MD

## 2024-06-06 ENCOUNTER — TELEPHONE (OUTPATIENT)
Dept: ONCOLOGY | Facility: CLINIC | Age: 54
End: 2024-06-06
Payer: COMMERCIAL

## 2024-06-06 NOTE — TELEPHONE ENCOUNTER
Caller: Vangie Carney    Relationship: Self    Best call back number: [phone]       Who are you requesting to speak with (clinical staff, provider,  specific staff member): OUTGOING REFERRALS      What was the call regarding: PATIENT IS ELECTING TO HOLD REFERRAL FOR PHYSICAL THERAPY AT THIS TIME

## 2024-06-12 ENCOUNTER — LAB (OUTPATIENT)
Dept: ONCOLOGY | Facility: HOSPITAL | Age: 54
End: 2024-06-12
Payer: COMMERCIAL

## 2024-06-12 ENCOUNTER — INFUSION (OUTPATIENT)
Dept: ONCOLOGY | Facility: HOSPITAL | Age: 54
End: 2024-06-12
Payer: COMMERCIAL

## 2024-06-12 VITALS
WEIGHT: 128.4 LBS | RESPIRATION RATE: 18 BRPM | BODY MASS INDEX: 20.11 KG/M2 | DIASTOLIC BLOOD PRESSURE: 81 MMHG | TEMPERATURE: 97.3 F | OXYGEN SATURATION: 96 % | SYSTOLIC BLOOD PRESSURE: 108 MMHG | HEART RATE: 143 BPM

## 2024-06-12 DIAGNOSIS — C18.7 MALIGNANT NEOPLASM OF SIGMOID COLON: ICD-10-CM

## 2024-06-12 DIAGNOSIS — Z95.828 PORT-A-CATH IN PLACE: ICD-10-CM

## 2024-06-12 DIAGNOSIS — C18.7 MALIGNANT NEOPLASM OF SIGMOID COLON: Primary | ICD-10-CM

## 2024-06-12 LAB
ALBUMIN SERPL-MCNC: 2.4 G/DL (ref 3.5–5.2)
ALBUMIN/GLOB SERPL: 1 G/DL
ALP SERPL-CCNC: 206 U/L (ref 39–117)
ALT SERPL W P-5'-P-CCNC: 62 U/L (ref 1–33)
ANION GAP SERPL CALCULATED.3IONS-SCNC: 13.6 MMOL/L (ref 5–15)
AST SERPL-CCNC: 102 U/L (ref 1–32)
BASOPHILS # BLD AUTO: 0.05 10*3/MM3 (ref 0–0.2)
BASOPHILS NFR BLD AUTO: 0.7 % (ref 0–1.5)
BILIRUB SERPL-MCNC: 1.1 MG/DL (ref 0–1.2)
BILIRUB UR QL STRIP: ABNORMAL
BUN SERPL-MCNC: 11 MG/DL (ref 6–20)
BUN/CREAT SERPL: 20 (ref 7–25)
CALCIUM SPEC-SCNC: 8.4 MG/DL (ref 8.6–10.5)
CHLORIDE SERPL-SCNC: 102 MMOL/L (ref 98–107)
CLARITY UR: ABNORMAL
CO2 SERPL-SCNC: 22.4 MMOL/L (ref 22–29)
COLOR UR: ABNORMAL
CREAT SERPL-MCNC: 0.55 MG/DL (ref 0.57–1)
DEPRECATED RDW RBC AUTO: 54.5 FL (ref 37–54)
EGFRCR SERPLBLD CKD-EPI 2021: 109.8 ML/MIN/1.73
EOSINOPHIL # BLD AUTO: 0.02 10*3/MM3 (ref 0–0.4)
EOSINOPHIL NFR BLD AUTO: 0.3 % (ref 0.3–6.2)
ERYTHROCYTE [DISTWIDTH] IN BLOOD BY AUTOMATED COUNT: 13.8 % (ref 12.3–15.4)
GLOBULIN UR ELPH-MCNC: 2.4 GM/DL
GLUCOSE SERPL-MCNC: 95 MG/DL (ref 65–99)
GLUCOSE UR STRIP-MCNC: NEGATIVE MG/DL
HCT VFR BLD AUTO: 37.3 % (ref 34–46.6)
HGB BLD-MCNC: 12.4 G/DL (ref 12–15.9)
HGB UR QL STRIP.AUTO: NEGATIVE
IMM GRANULOCYTES # BLD AUTO: 0.01 10*3/MM3 (ref 0–0.05)
IMM GRANULOCYTES NFR BLD AUTO: 0.1 % (ref 0–0.5)
KETONES UR QL STRIP: ABNORMAL
LEUKOCYTE ESTERASE UR QL STRIP.AUTO: ABNORMAL
LYMPHOCYTES # BLD AUTO: 1.37 10*3/MM3 (ref 0.7–3.1)
LYMPHOCYTES NFR BLD AUTO: 19.6 % (ref 19.6–45.3)
MCH RBC QN AUTO: 34.9 PG (ref 26.6–33)
MCHC RBC AUTO-ENTMCNC: 33.2 G/DL (ref 31.5–35.7)
MCV RBC AUTO: 105.1 FL (ref 79–97)
MONOCYTES # BLD AUTO: 0.44 10*3/MM3 (ref 0.1–0.9)
MONOCYTES NFR BLD AUTO: 6.3 % (ref 5–12)
NEUTROPHILS NFR BLD AUTO: 5.09 10*3/MM3 (ref 1.7–7)
NEUTROPHILS NFR BLD AUTO: 73 % (ref 42.7–76)
NITRITE UR QL STRIP: POSITIVE
NRBC BLD AUTO-RTO: 0 /100 WBC (ref 0–0.2)
PH UR STRIP.AUTO: 5.5 [PH] (ref 5–8)
PLATELET # BLD AUTO: 274 10*3/MM3 (ref 140–450)
PMV BLD AUTO: 9.7 FL (ref 6–12)
POTASSIUM SERPL-SCNC: 3.7 MMOL/L (ref 3.5–5.2)
PROT SERPL-MCNC: 4.8 G/DL (ref 6–8.5)
PROT UR QL STRIP: ABNORMAL
RBC # BLD AUTO: 3.55 10*6/MM3 (ref 3.77–5.28)
SODIUM SERPL-SCNC: 138 MMOL/L (ref 136–145)
SP GR UR STRIP: >1.03 (ref 1–1.03)
UROBILINOGEN UR QL STRIP: ABNORMAL
WBC NRBC COR # BLD AUTO: 6.98 10*3/MM3 (ref 3.4–10.8)

## 2024-06-12 PROCEDURE — 25010000002 HEPARIN LOCK FLUSH PER 10 UNITS

## 2024-06-12 PROCEDURE — 81003 URINALYSIS AUTO W/O SCOPE: CPT

## 2024-06-12 PROCEDURE — 96413 CHEMO IV INFUSION 1 HR: CPT

## 2024-06-12 PROCEDURE — 80053 COMPREHEN METABOLIC PANEL: CPT

## 2024-06-12 PROCEDURE — 85025 COMPLETE CBC W/AUTO DIFF WBC: CPT

## 2024-06-12 PROCEDURE — 96375 TX/PRO/DX INJ NEW DRUG ADDON: CPT

## 2024-06-12 PROCEDURE — 25810000003 SODIUM CHLORIDE 0.9 % SOLUTION: Performed by: INTERNAL MEDICINE

## 2024-06-12 PROCEDURE — 25010000002 PROCHLORPERAZINE 10 MG/2ML SOLUTION

## 2024-06-12 PROCEDURE — 25010000002 BEVACIZUMAB 100 MG/4ML SOLUTION 4 ML VIAL: Performed by: INTERNAL MEDICINE

## 2024-06-12 RX ORDER — SODIUM CHLORIDE 0.9 % (FLUSH) 0.9 %
10 SYRINGE (ML) INJECTION AS NEEDED
Status: DISCONTINUED | OUTPATIENT
Start: 2024-06-12 | End: 2024-06-12 | Stop reason: HOSPADM

## 2024-06-12 RX ORDER — HEPARIN SODIUM (PORCINE) LOCK FLUSH IV SOLN 100 UNIT/ML 100 UNIT/ML
500 SOLUTION INTRAVENOUS AS NEEDED
Status: DISCONTINUED | OUTPATIENT
Start: 2024-06-12 | End: 2024-06-12 | Stop reason: HOSPADM

## 2024-06-12 RX ORDER — HEPARIN SODIUM (PORCINE) LOCK FLUSH IV SOLN 100 UNIT/ML 100 UNIT/ML
300 SOLUTION INTRAVENOUS ONCE
OUTPATIENT
Start: 2024-06-12

## 2024-06-12 RX ORDER — PROCHLORPERAZINE EDISYLATE 5 MG/ML
10 INJECTION INTRAMUSCULAR; INTRAVENOUS ONCE
Status: COMPLETED | OUTPATIENT
Start: 2024-06-12 | End: 2024-06-12

## 2024-06-12 RX ORDER — SODIUM CHLORIDE 9 MG/ML
20 INJECTION, SOLUTION INTRAVENOUS ONCE
Status: COMPLETED | OUTPATIENT
Start: 2024-06-12 | End: 2024-06-12

## 2024-06-12 RX ORDER — SODIUM CHLORIDE 0.9 % (FLUSH) 0.9 %
20 SYRINGE (ML) INJECTION AS NEEDED
OUTPATIENT
Start: 2024-06-12

## 2024-06-12 RX ORDER — SODIUM CHLORIDE 0.9 % (FLUSH) 0.9 %
10 SYRINGE (ML) INJECTION AS NEEDED
OUTPATIENT
Start: 2024-06-12

## 2024-06-12 RX ORDER — HEPARIN SODIUM (PORCINE) LOCK FLUSH IV SOLN 100 UNIT/ML 100 UNIT/ML
500 SOLUTION INTRAVENOUS AS NEEDED
OUTPATIENT
Start: 2024-06-12

## 2024-06-12 RX ADMIN — BEVACIZUMAB 300 MG: 100 INJECTION, SOLUTION INTRAVENOUS at 13:21

## 2024-06-12 RX ADMIN — Medication 500 UNITS: at 13:54

## 2024-06-12 RX ADMIN — Medication 10 ML: at 13:54

## 2024-06-12 RX ADMIN — PROCHLORPERAZINE EDISYLATE 10 MG: 5 INJECTION INTRAMUSCULAR; INTRAVENOUS at 13:06

## 2024-06-12 RX ADMIN — SODIUM CHLORIDE 20 ML/HR: 9 INJECTION, SOLUTION INTRAVENOUS at 13:06

## 2024-06-26 ENCOUNTER — INFUSION (OUTPATIENT)
Dept: ONCOLOGY | Facility: HOSPITAL | Age: 54
End: 2024-06-26
Payer: COMMERCIAL

## 2024-06-26 ENCOUNTER — LAB (OUTPATIENT)
Dept: ONCOLOGY | Facility: HOSPITAL | Age: 54
End: 2024-06-26
Payer: COMMERCIAL

## 2024-06-26 ENCOUNTER — OFFICE VISIT (OUTPATIENT)
Dept: ONCOLOGY | Facility: CLINIC | Age: 54
End: 2024-06-26
Payer: COMMERCIAL

## 2024-06-26 VITALS
BODY MASS INDEX: 19.58 KG/M2 | TEMPERATURE: 97.1 F | OXYGEN SATURATION: 100 % | SYSTOLIC BLOOD PRESSURE: 97 MMHG | RESPIRATION RATE: 18 BRPM | HEART RATE: 98 BPM | WEIGHT: 125 LBS | DIASTOLIC BLOOD PRESSURE: 64 MMHG

## 2024-06-26 VITALS
TEMPERATURE: 97.1 F | RESPIRATION RATE: 18 BRPM | DIASTOLIC BLOOD PRESSURE: 64 MMHG | OXYGEN SATURATION: 100 % | SYSTOLIC BLOOD PRESSURE: 97 MMHG | HEART RATE: 144 BPM | WEIGHT: 125 LBS | BODY MASS INDEX: 19.58 KG/M2

## 2024-06-26 DIAGNOSIS — Z95.828 PORT-A-CATH IN PLACE: ICD-10-CM

## 2024-06-26 DIAGNOSIS — C18.7 MALIGNANT NEOPLASM OF SIGMOID COLON: ICD-10-CM

## 2024-06-26 DIAGNOSIS — E86.0 DEHYDRATION: Primary | ICD-10-CM

## 2024-06-26 DIAGNOSIS — R11.0 NAUSEA: ICD-10-CM

## 2024-06-26 DIAGNOSIS — C18.7 MALIGNANT NEOPLASM OF SIGMOID COLON: Primary | ICD-10-CM

## 2024-06-26 LAB
ALBUMIN SERPL-MCNC: 2.1 G/DL (ref 3.5–5.2)
ALBUMIN/GLOB SERPL: 0.8 G/DL
ALP SERPL-CCNC: 235 U/L (ref 39–117)
ALT SERPL W P-5'-P-CCNC: 51 U/L (ref 1–33)
ANION GAP SERPL CALCULATED.3IONS-SCNC: 13.1 MMOL/L (ref 5–15)
AST SERPL-CCNC: 63 U/L (ref 1–32)
BASOPHILS # BLD AUTO: 0.04 10*3/MM3 (ref 0–0.2)
BASOPHILS NFR BLD AUTO: 0.7 % (ref 0–1.5)
BILIRUB SERPL-MCNC: 1.2 MG/DL (ref 0–1.2)
BILIRUB UR QL STRIP: ABNORMAL
BUN SERPL-MCNC: 12 MG/DL (ref 6–20)
BUN/CREAT SERPL: 24.5 (ref 7–25)
CALCIUM SPEC-SCNC: 8 MG/DL (ref 8.6–10.5)
CHLORIDE SERPL-SCNC: 99 MMOL/L (ref 98–107)
CLARITY UR: ABNORMAL
CO2 SERPL-SCNC: 23.9 MMOL/L (ref 22–29)
COLOR UR: ABNORMAL
CREAT SERPL-MCNC: 0.49 MG/DL (ref 0.57–1)
DEPRECATED RDW RBC AUTO: 53.7 FL (ref 37–54)
EGFRCR SERPLBLD CKD-EPI 2021: 112.9 ML/MIN/1.73
EOSINOPHIL # BLD AUTO: 0 10*3/MM3 (ref 0–0.4)
EOSINOPHIL NFR BLD AUTO: 0 % (ref 0.3–6.2)
ERYTHROCYTE [DISTWIDTH] IN BLOOD BY AUTOMATED COUNT: 14.5 % (ref 12.3–15.4)
GLOBULIN UR ELPH-MCNC: 2.5 GM/DL
GLUCOSE SERPL-MCNC: 81 MG/DL (ref 65–99)
GLUCOSE UR STRIP-MCNC: NEGATIVE MG/DL
HCT VFR BLD AUTO: 37.2 % (ref 34–46.6)
HGB BLD-MCNC: 12.4 G/DL (ref 12–15.9)
HGB UR QL STRIP.AUTO: ABNORMAL
IMM GRANULOCYTES # BLD AUTO: 0.03 10*3/MM3 (ref 0–0.05)
IMM GRANULOCYTES NFR BLD AUTO: 0.5 % (ref 0–0.5)
KETONES UR QL STRIP: ABNORMAL
LEUKOCYTE ESTERASE UR QL STRIP.AUTO: ABNORMAL
LYMPHOCYTES # BLD AUTO: 1.32 10*3/MM3 (ref 0.7–3.1)
LYMPHOCYTES NFR BLD AUTO: 21.5 % (ref 19.6–45.3)
MCH RBC QN AUTO: 33.9 PG (ref 26.6–33)
MCHC RBC AUTO-ENTMCNC: 33.3 G/DL (ref 31.5–35.7)
MCV RBC AUTO: 101.6 FL (ref 79–97)
MONOCYTES # BLD AUTO: 0.4 10*3/MM3 (ref 0.1–0.9)
MONOCYTES NFR BLD AUTO: 6.5 % (ref 5–12)
NEUTROPHILS NFR BLD AUTO: 4.34 10*3/MM3 (ref 1.7–7)
NEUTROPHILS NFR BLD AUTO: 70.8 % (ref 42.7–76)
NITRITE UR QL STRIP: POSITIVE
NRBC BLD AUTO-RTO: 0 /100 WBC (ref 0–0.2)
PH UR STRIP.AUTO: 6 [PH] (ref 5–8)
PLATELET # BLD AUTO: 266 10*3/MM3 (ref 140–450)
PMV BLD AUTO: 8.9 FL (ref 6–12)
POTASSIUM SERPL-SCNC: 3.8 MMOL/L (ref 3.5–5.2)
PROT SERPL-MCNC: 4.6 G/DL (ref 6–8.5)
PROT UR QL STRIP: ABNORMAL
RBC # BLD AUTO: 3.66 10*6/MM3 (ref 3.77–5.28)
SODIUM SERPL-SCNC: 136 MMOL/L (ref 136–145)
SP GR UR STRIP: >1.03 (ref 1–1.03)
UROBILINOGEN UR QL STRIP: ABNORMAL
WBC NRBC COR # BLD AUTO: 6.13 10*3/MM3 (ref 3.4–10.8)

## 2024-06-26 PROCEDURE — 99214 OFFICE O/P EST MOD 30 MIN: CPT | Performed by: NURSE PRACTITIONER

## 2024-06-26 PROCEDURE — 96375 TX/PRO/DX INJ NEW DRUG ADDON: CPT

## 2024-06-26 PROCEDURE — 25010000002 ONDANSETRON PER 1 MG: Performed by: NURSE PRACTITIONER

## 2024-06-26 PROCEDURE — 81003 URINALYSIS AUTO W/O SCOPE: CPT

## 2024-06-26 PROCEDURE — 80053 COMPREHEN METABOLIC PANEL: CPT

## 2024-06-26 PROCEDURE — 25010000002 HEPARIN LOCK FLUSH PER 10 UNITS

## 2024-06-26 PROCEDURE — 85025 COMPLETE CBC W/AUTO DIFF WBC: CPT

## 2024-06-26 PROCEDURE — 96413 CHEMO IV INFUSION 1 HR: CPT

## 2024-06-26 PROCEDURE — 96361 HYDRATE IV INFUSION ADD-ON: CPT

## 2024-06-26 PROCEDURE — 25810000003 SODIUM CHLORIDE 0.9 % SOLUTION: Performed by: NURSE PRACTITIONER

## 2024-06-26 PROCEDURE — 25810000003 SODIUM CHLORIDE 0.9 % SOLUTION: Performed by: INTERNAL MEDICINE

## 2024-06-26 PROCEDURE — 25010000002 BEVACIZUMAB PER 10 MG: Performed by: INTERNAL MEDICINE

## 2024-06-26 RX ORDER — SODIUM CHLORIDE 0.9 % (FLUSH) 0.9 %
10 SYRINGE (ML) INJECTION AS NEEDED
OUTPATIENT
Start: 2024-06-26

## 2024-06-26 RX ORDER — SODIUM CHLORIDE 9 MG/ML
1000 INJECTION, SOLUTION INTRAVENOUS ONCE
Status: COMPLETED | OUTPATIENT
Start: 2024-06-26 | End: 2024-06-26

## 2024-06-26 RX ORDER — HEPARIN SODIUM (PORCINE) LOCK FLUSH IV SOLN 100 UNIT/ML 100 UNIT/ML
500 SOLUTION INTRAVENOUS AS NEEDED
OUTPATIENT
Start: 2024-06-26

## 2024-06-26 RX ORDER — SODIUM CHLORIDE 9 MG/ML
20 INJECTION, SOLUTION INTRAVENOUS ONCE
Status: COMPLETED | OUTPATIENT
Start: 2024-06-26 | End: 2024-06-26

## 2024-06-26 RX ORDER — DICYCLOMINE HYDROCHLORIDE 10 MG/1
10 CAPSULE ORAL
Qty: 60 CAPSULE | Refills: 1 | Status: ON HOLD | OUTPATIENT
Start: 2024-06-26

## 2024-06-26 RX ORDER — SODIUM CHLORIDE 0.9 % (FLUSH) 0.9 %
20 SYRINGE (ML) INJECTION AS NEEDED
OUTPATIENT
Start: 2024-06-26

## 2024-06-26 RX ORDER — SODIUM CHLORIDE 0.9 % (FLUSH) 0.9 %
10 SYRINGE (ML) INJECTION AS NEEDED
Status: DISCONTINUED | OUTPATIENT
Start: 2024-06-26 | End: 2024-06-26 | Stop reason: HOSPADM

## 2024-06-26 RX ORDER — PANTOPRAZOLE SODIUM 40 MG/1
40 TABLET, DELAYED RELEASE ORAL DAILY
Qty: 30 TABLET | Refills: 5 | Status: ON HOLD | OUTPATIENT
Start: 2024-06-26

## 2024-06-26 RX ORDER — HEPARIN SODIUM (PORCINE) LOCK FLUSH IV SOLN 100 UNIT/ML 100 UNIT/ML
300 SOLUTION INTRAVENOUS ONCE
OUTPATIENT
Start: 2024-06-26

## 2024-06-26 RX ORDER — HEPARIN SODIUM (PORCINE) LOCK FLUSH IV SOLN 100 UNIT/ML 100 UNIT/ML
500 SOLUTION INTRAVENOUS AS NEEDED
Status: DISCONTINUED | OUTPATIENT
Start: 2024-06-26 | End: 2024-06-26 | Stop reason: HOSPADM

## 2024-06-26 RX ORDER — SULFAMETHOXAZOLE AND TRIMETHOPRIM 800; 160 MG/1; MG/1
1 TABLET ORAL 2 TIMES DAILY
Qty: 20 TABLET | Refills: 0 | Status: SHIPPED | OUTPATIENT
Start: 2024-06-26 | End: 2024-06-27

## 2024-06-26 RX ADMIN — ONDANSETRON 16 MG: 2 INJECTION INTRAMUSCULAR; INTRAVENOUS at 12:22

## 2024-06-26 RX ADMIN — BEVACIZUMAB 340 MG: 400 INJECTION, SOLUTION INTRAVENOUS at 13:40

## 2024-06-26 RX ADMIN — SODIUM CHLORIDE 20 ML/HR: 9 INJECTION, SOLUTION INTRAVENOUS at 13:40

## 2024-06-26 RX ADMIN — Medication 10 ML: at 14:10

## 2024-06-26 RX ADMIN — HEPARIN 500 UNITS: 100 SYRINGE at 14:10

## 2024-06-26 RX ADMIN — SODIUM CHLORIDE 1000 ML: 9 INJECTION, SOLUTION INTRAVENOUS at 12:22

## 2024-06-26 NOTE — PROGRESS NOTES
Name:  Vangie Carney  :  1970  Date:  2024     REFERRING PHYSICIAN  Shari Aguirre MD    PRIMARY CARE PROVIDER  Urvashi Basurto, ANURAG    REASON FOR FOLLOWUP  1. Malignant neoplasm of sigmoid colon        CHIEF COMPLAINT  Fatigue.    Dear Ms. Basurto,    HISTORY OF PRESENT ILLNESS:   I saw Ms. Carney in follow up today in our medical oncology clinic. As you are aware, she is a pleasant, 54 y.o., white female with minimal past medical history who was in her usual state of health until 2023 when she developed more frequent epigastric pains and BRBPR. She was referred to local gastroenterology, who performed an endoscopic exam that identified a tumor in the sigmoid colon. Biopsies were consistent with a moderately differentiated adenocarcinoma. She was referred to [facility] colorectal surgery in [place], and she underwent an LAR on 2023 for LAR. This procedure went very well, and a diverting ostomy was not required. She was subsequently referred to our clinic for further evaluation and management. At the time of her initial consultation with us (on 2023), we discussed how ~six months of adjuvant chemotherapy was now indicated and recommended as the current standard of care. Following a long discussion regarding the potential risks vs. benefits, she was initially agreeable to undergoing powerport placement and proceeding with y7oubsor FOLFOX; however, she subsequently changed her mind, canceled her scheduled powerport appointment and was lost to routine follow up in both ours and [facility] colorectal surgery's clinics. You re-referred her to our clinic in 2024; because, unfortunately, since ~late 2023, she had been feeling overall steadily worse, with recurrent and progressive constipation and back pain. The results of a CT of the abdomen and pelvis performed on 2024 for further evaluation were consistent with recurrent, and now metastatic, disease. At the time of her  follow up appointments in late January/early February 2024, she was agreeable to undergoing powerport placement and beginning first-line, palliative treatment with w8kcotzc, infusional 5-FU/leucovorin.    INTERIM HISTORY:  Ms. Carney returns to clinic today for follow up of (now metastatic) colorectal cancer accompanied by her daughter. Following powerport placement, she began palliative, c2ctruco infusional 5-FU/leucovorin the week of 02/19/2024; and she received a total of four (4) cycles between then and early April 2024. Following the (the fourth) cycle, she again spent the better part of the week after receiving chemotherapy persistently nauseated and profoundly fatigued. Given her significant and persistent toxicities, the 5-FU/Leucovorin was dropped starting with the fifth cycle; and we have been proceeding with ongoing, palliative, w7zorxbq Avastin alone. She received her initial cycle of l7uzgyyn Avastin only on 05/01/2024, has completed a total of four (4) cycles; and she reiterates today that, with this change, she has recently been feeling quite a bit better. She remains fatigued and her appetite is still somewhat decreased, and she has mild, intermittent nausea. She reports normal bowel movements. She has crampy intermittent abdominal pain.  She has no new or other specific complaints today.    Past Medical History:   Diagnosis Date    Gallbladder attack     GERD (gastroesophageal reflux disease)     Hearing aid worn     History of ear infections     Malignant neoplasm of sigmoid colon 3/14/2023    Seasonal allergies     Wears glasses        Past Surgical History:   Procedure Laterality Date    CHOLECYSTECTOMY      COLON RESECTION N/A 3/14/2023    Procedure: COLON RESECTION LOW ANTERIOR LAPAROSCOPIC WITH DAVINCI ROBOT;  Surgeon: Shari Aguirre MD;  Location: [facility];  Service: Robotics - DaVinci;  Laterality: N/A;    COLONOSCOPY N/A 2/15/2023    Procedure: COLONOSCOPY FOR SCREENING;  Surgeon:  Giselle Iniguez MD;  Location: [facility] OR;  Service: Gastroenterology;  Laterality: N/A;    ENDOSCOPY N/A 2/15/2023    Procedure: ESOPHAGOGASTRODUODENOSCOPY WITH BIOPSY;  Surgeon: Giselle Iniguez MD;  Location: [facility] OR;  Service: Gastroenterology;  Laterality: N/A;    LAPAROSCOPIC TUBAL LIGATION Bilateral     MIDDLE EAR SURGERY      PORTACATH PLACEMENT N/A 2024    Procedure: INSERTION OF PORTACATH;  Surgeon: Jaylen Leal MD;  Location: [facility] OR;  Service: General;  Laterality: N/A;       Social History     Socioeconomic History    Marital status:    Tobacco Use    Smoking status: Former     Current packs/day: 0.00     Average packs/day: 1 pack/day for 30.0 years (30.0 ttl pk-yrs)     Types: Cigarettes     Start date:      Quit date:      Years since quittin.4     Passive exposure: Never    Smokeless tobacco: Never   Vaping Use    Vaping status: Every Day    Substances: Nicotine, Flavoring    Devices: RefLiveRSVPble tank   Substance and Sexual Activity    Alcohol use: Never    Drug use: Defer    Sexual activity: Defer     Birth control/protection: None       Family History   Problem Relation Age of Onset    Cancer Mother     Cancer Father     Cancer Sister     Cancer Brother     Cancer Maternal Grandmother     Cancer Maternal Grandfather     Breast cancer Neg Hx        Allergies   Allergen Reactions    Keflex [Cephalexin] Nausea Only     Beta lactam allergy details  Antibiotic reaction: nausea  Age at reaction: adult  Dose to reaction time: (!) minutes  Reason for antibiotic: unknown  Epinephrine required for reaction?: no  Tolerated antibiotics: augmentin, amoxicillin          Current Outpatient Medications   Medication Sig Dispense Refill    apixaban (ELIQUIS) 5 MG tablet tablet Take 1 tablet by mouth 2 (Two) Times a Day. 60 tablet 6    Apixaban Starter Pack tablet therapy pack Take two 5 mg tablets by mouth every 12 hours for 7 days. Followed by one 5 mg tablet  every 12 hours. (Dispense starter pack if available) 74 tablet 0    granisetron (Sancuso) 3.1 MG/24HR Place 1 patch on the skin once every 7 days. 4 patch 3    HYDROcodone-acetaminophen (NORCO) 5-325 MG per tablet Take 1-2 tablets by mouth Every 8 (Eight) Hours As Needed for Mild Pain.      lactulose (CHRONULAC) 10 GM/15ML solution Take 30 mL by mouth 2 (Two) Times a Day As Needed (for constipation).      lidocaine-prilocaine (EMLA) 2.5-2.5 % cream Apply to port-a-cath site 30 minutes prior to arrival at infusion center. Cover with plastic wrap. 30 g 1    Linzess 72 MCG capsule capsule Take 1 capsule by mouth Daily.      metoclopramide (REGLAN) 10 MG tablet Take 1 tablet by mouth 3 (Three) Times a Day As Needed (for nausea).      nystatin (MYCOSTATIN) 081137 UNIT/GM cream Apply 1 Application topically to the appropriate area as directed 2 (Two) Times a Day. 15 g 0    ondansetron (ZOFRAN) 8 MG tablet Take 1 tablet by mouth Every 8 (Eight) Hours As Needed for Nausea or Vomiting.      pantoprazole (PROTONIX) 40 MG EC tablet Take 1 tablet by mouth Daily.      sennosides-docusate (PERICOLACE) 8.6-50 MG per tablet Take 2 tablets by mouth 2 (Two) Times a Day As Needed for Constipation.       No current facility-administered medications for this visit.     Facility-Administered Medications Ordered in Other Visits   Medication Dose Route Frequency Provider Last Rate Last Admin    bevacizumab (AVASTIN) 340 mg in sodium chloride 0.9 % 113.6 mL chemo IVPB  340 mg Intravenous Once ROWENA Hnaks MD        sodium chloride 0.9 % infusion  20 mL/hr Intravenous Once ROWENA Hanks MD         REVIEW OF SYSTEMS  CONSTITUTIONAL:  No fever, chills or night sweats. Recently improved, but still ongoing, fatigue, as per the HPI above.  EYES:  No blurry vision, diplopia or other vision changes.  ENT:  No hearing loss, nosebleeds or sore throat.  CARDIOVASCULAR:  No palpitations, arrhythmia, syncopal episodes or edema.  PULMONARY:  No  hemoptysis, wheezing, chronic cough or shortness of breath.  GASTROINTESTINAL:  As per the HPI above.  GENITOURINARY:  No hematuria, kidney stones or frequent urination.  MUSCULOSKELETAL:  As per the HPI above.  INTEGUMENTARY: No rashes or pruritus.  ENDOCRINE:  No excessive thirst or hot flashes.  HEMATOLOGIC:  No history of free bleeding, spontaneous bleeding or clotting.  IMMUNOLOGIC:  No allergies or frequent infections.  NEUROLOGIC: No numbness, tingling, seizures or weakness.  PSYCHIATRIC:  No anxiety or depression.    PHYSICAL EXAMINATION  BP 97/64   Pulse (!) 144   Temp 97.1 °F (36.2 °C) (Temporal)   Resp 18   Wt 68.9 kg (151 lb 14.4 oz)   LMP 06/15/2016   SpO2 100%   BMI 23.79 kg/m²     Pain Score:  Pain Score    24 1225   PainSc: 0-No pain     PHQ-Score Total:  PHQ-9 Total Score:      ECO  GENERAL:  A well-developed, well-nourished, white female in no acute distress, sitting in a wheelchair, less chronically ill-appearing now compared to a month or two ago.  HEENT:  Pupils equally round and reactive to light. Extraocular muscles intact.  CARDIOVASCULAR:  Regular rate and rhythm.  No murmurs, gallops or rubs.  LUNGS:  Clear to auscultation bilaterally.  ABDOMEN:  Soft, nontender, nondistended with positive bowel sounds.  EXTREMITIES:  No clubbing, cyanosis or edema bilaterally.  SKIN:  No rashes or petechiae. Powerport in place.  NEURO:  Cranial nerves grossly intact. No focal deficits.  PSYCH:  Alert and oriented x3.    LABORATORY  Lab Results   Component Value Date    WBC 6.13 2024    HGB 12.4 2024    HCT 37.2 2024    .6 (H) 2024     2024    NEUTROABS 4.34 2024       Lab Results   Component Value Date     2024    K 3.8 2024    CL 99 2024    CO2 23.9 2024    BUN 12 2024    CREATININE 0.49 (L) 2024    GLUCOSE 81 2024    CALCIUM 8.0 (L) 2024    AST 63 (H) 2024    ALT 51 (H)  06/26/2024    ALKPHOS 235 (H) 06/26/2024    BILITOT 1.2 06/26/2024    PROTEINTOT 4.6 (L) 06/26/2024    ALBUMIN 2.1 (L) 06/26/2024     CBC (06/26/2024): WBCs: 6.13; HgB: 12.4; Hct: 37.2; platelets: 266; MCV: 101.6  CBC (05/30/2024): WBCs: 5.32; HgB: 11.9; Hct: 36.3; platelets: 224; MCV: 107.4  CBC (05/15/2024): WBCs: 7.07; Hgb: 10.8; Hct: 32.2; platelets: 302; MCV: 103.5  CBC (04/23/2024): WBCs: 3.68; HgB: 11.6; Hct: 35.0; platelets: 332; MCV: 94.3  CBC (04/21/2024): WBCs: 2.63; HgB: 12.3; Hct: 36.9; platelets: 348  CBC (04/16/2024): WBCs: 2.03; HgB: 11.2; Hct: 33.4; platelets: 163; MCV: 91.8  CBC (04/02/2024): WBCs: 3.55; HgB: 12.2; Hct: 35.3; platelets: 164  CBC (03/19/2024): WBCs: 6.28; HgB: 14.1; Hct: 42.1; platelets: 319  CBC (03/05/2024): WBCs: 3.32; HgB: 10.9; Hct: 35.5; platelets: 251; MCV: 90.3  CBC (02/19/2024): WBCs: 17.29; HgB: 10.5; Hct: 34.0; platelets: 451; MCV: 85.2  CBC (01/23/2024): WBCs: 5.34; HgB: 8.3; Hct: 27.3; platelets: 235; MCV: 91.3  CBC (03/16/2023): WBCs: 4.78; HgB: 9.1; Hct: 28.4; platelets: 195; MCV: 100.0    CEA (05/30/2024): 2.78 ng/mL  CEA (04/16/2024): 2.67 ng/mL  CEA (03/19/2024): 1.90 ng/mL  CEA (03/07/2024): 1.59 ng/mL  CEA (01/23/2024): 1.56 ng/mL  CEA (03/13/2023): 1.17 ng/mL    Iron panel (05/15/2024): serum iron: 77; % saturation: 74; TIBC: 104: ferritin: 753.50 ng/mL  Iron panel (03/19/2024): serum iron: 74; % saturation: 30; TIBC: 243; ferritin: 391.9 ng/mL  Iron panel (01/23/2024): serum iron: 22; % saturation: 5; TIBC: 443; ferritin: 9.74 ng/mL    IMAGING  CT abdomen and pelvis without contrast (02/15/2023):  Impression:  1) Mild narrowing in the sigmoid colon. Recommend direct visualization.  2) Other findings [are nonacute].    CT chest without contrast (03/06/2023):  Impression: No evidence of metastatic disease in the chest.    CT chest with contrast (04/07/2023, compared to 03/06/2023):  Impression: No evidence of metastatic disease to the chest. Stable exam.    CT  abdomen and pelvis with contrast (04/07/2023, compared to 02/15/2023):  Impression:  1) Interval post surgical changes noted from partial resection of the sigmoid colon region.  2) 3.1 cm predominantly gas-filled collection in the left presacral space near the anastomosis site that may reflect postoperative changes. Possibility of small fistulous connection not excluded within the anastomosis.  3) No ileus or bowel obstruction.  4) No solid organ lesions or adenopathy identified.    CT abdomen and pelvis with and without contrast (01/17/2024):  Impression:  1) Interval development of metastatic disease.  2) Specifically, soft tissue metastatic implants are noted in the omentum, left lower quadrant, upper presacral space, and left lower presacral space immediately adjacent to the anastomosis site. Soft tissue implant within umbilical hernia fat.  3) Development of 5.5 mm nodule right lower lobe suspicious for metastatic lung nodule.  4) Interval postsurgical changes from sigmoid resection.  5) Mild fatty infiltration of liver. No focal liver lesions.  6) Other incidental/nonacute findings.    CT abdomen and pelvis without contrast (03/07/2024):  Impression: Dilated fluid filled small bowel loops with decompressed mid and distal ileum suggestive of small bowel obstruction.    CT chest with contrast (03/08/2024, compared to 04/07/2023):  Impression: New left upper lobe 1.2 cm pulmonary nodule.    CT abdomen and pelvis with contrast (03/08/2024, compared to 03/07/2024):  Impression:  1) Oral contrast is seen within some very decompressed mid and distal ileal small bowel loops suggestive of likely probably high-grade partial obstruction.  2) New soft tissue density lesion from 11/09/2022 in the left lower abdomen anterior to the psoas muscle measuring 1.5 cm.    CT chest, abdomen and pelvis with contrast (04/21/2024, compared to 03/08/2024 and 01/17/2024):  Impression:  1) Filling defect in the right lower lobe pulmonary  artery consistent with an embolus.  2) Interval decrease in size of the previously noted left upper lobe nodule now measuring 8 mm.  3) New small left effusion and opacity in the medial left lower lobe which may be infectious or inflammatory in nature. Short interval follow up is recommended to ensure resolution.  4) Interval improvement in the previously noted soft tissue density mesenteric masses consistent with a treatment response.  5) Multiple dilated loops of small bowel consistent with at least a partial obstruction.  The liver is diffusely hypodense consistent with fatty infiltration. No focal hepatic lesions identified.    PATHOLOGY  Sigmoid colon and upper rectum, low anterior resection (03/14/2023):  Adenocarcinoma, moderately differentiated, invasive through muscularis propria into pericolonic soft tissue. Lymphovascular invasion present. Surgical margins negative for carcinoma. Two out of fifteen (2/15) lymph nodes involved by metastatic carcinoma with extranodal extension present. Tumor size: 3.5 x 2.5 x 1.4 cm. Intact MSI expression per previous biopsy. dU1fN1b (stage IIIB).    Molecular profiling ([id number]), tissue (sigmoid colon/upper rectum) and liquid (peripheral blood):  PD-L1: 22%; KRAS G12D; PIK3CA E545K; TP53 Y220H; MSI-High NOT detected.    IMPRESSION AND PLAN  Ms. Carney is a 54 y.o., white female with:  Colon adenocarcinoma: Initially diagnosed in February 2023 and status post resection of the sigmoid colon and upper rectum on 03/14/2023. The final, surgical pathology from this procedure was consistent with stage IIIB (yM2fG7s) disease. Two out of fifteen (2/15) lymph nodes were involved with metastatic disease, but all surgical margins were negative. Dr. Hanks had a long discussion with the patient and her  at the time of that initial consultation in our clinic (on 03/30/2023) regarding this diagnosis and its prognosis. We discussed how, while her surgery went overall very well,  her chance of eventually developing a relapse in this malignancy was, unfortunately, significant; and the purpose of adjuvant chemotherapy was to reduce this chance by as much as possible (perhaps by as much as 10%). Particularly given her stage III disease, young age and multiple, poor risk factors (left-sided tumor, lymphovascular invasion present, etc.), six months of e4gnbylk FOLFOX (or XELOX) is the preferred regimen; and, following a long discussion regarding the potential risks vs. benefits of this treatment regimen at that time, she was initially agreeable to the former (FOLFOX). We referred her back to local surgery for powerport placement; however, per her preference, she ultimately never followed through with either this (powerport placement) or starting the adjuvant chemotherapy, and she was subsequently lost to routine follow up in both ours and [facility] colorectal surgery's clinics. She apparently overall still did very well for the next ~six to seven months; but, by late October 2023, she was experiencing recurrent constipation and back pain, and a repeat CT of the abdomen and pelvis performed on 01/17/2024 (and summarized above) was, unfortunately, consistent with recurrent, and now metastatic, disease, with the development of multiple, soft tissue implants within the omentum/peritoneum and, probably, at least one metastatic lung lesion (in the right lower lobe) as well. Both around that time and since, Dr. Hanks has had multiple, long discussions with the patient (+/- her  or daughter) regarding this updated diagnosis and, in general terms, its prognosis. She and her family remain aware that this disease is now recurrent, by definition, stage IV, and consequently, incurable. That said, particularly given her continued, good performance status, it was/is potentially treatable; and sustained remissions are possible. Some of the pertinent results of molecular profiling ([id number]) that were  performed on both tissue (March 2023's partial colectomy specimen(s)) and liquid (peripheral blood drawn in January 2024) biopsies in early 2024 are summarized above. Following a long discussion in early February 2024 regarding its potential risks vs. benefits, she was agreeable to having a powerport placed and beginning z1icjall 5-FU/leucovorin. She began this regimen on 02/19/2024, and she completed a total of four (4) cycles. Following the second cycle, she was briefly admitted to [facility] for a partial bowel obstruction; but this clinically resolved with an NG tube and nonsurgical management. Following the fourth cycle, she continued to struggle with this regimen, with profoundly increased fatigue and persistent nausea. Meanwhile, repeat CT scans performed on 04/21/2024 (and summarized above) were consistent with an overall response in her disease from the 5-FU; however, her LFTs remained elevated despite the liver still appearing unremarkable on these recent scans. Given all of this, Dr. Hanks had a long discussion with the patient and her  in clinic on 04/23/2024. In short, although her disease has responded, the risks of any additional cycles of 5-FU/leucovorin were becoming too great; and a change in her palliative treatment regimen was therefore recommended at that time. The 5-FU/leucovorin was discontinued, and she received an initial cycle of p9ssbcei Avastin (by itself) on 05/01/2024. She has now received four (4) cycles of this new therapy; and she reiterates today that she continues to tolerate it much much better (than she did the 5-FU). She is no longer struggling with nausea/vomiting or diarrhea; and, while her poor appetite and fatigue are still problematic, these symptoms have been improving also. We will proceed with this current treatment plan (with the fifth cycle of e6umybap Avastin today).  We will see her back in our clinic in  two weeks, on the day of the sixth cycle of Avastin, with a  "CBC, CMP and CEA for another symptom/toxicity check. We will repeat CT scans again in late July.  Anemia: Repeat CBCs from 01/23/2024 showed a HgB of 8.3 g/dL and a ferritin level of 9.7 ng/mL. As she was not tolerating oral iron replacement very well (due to constipation), we gave her a cycle of IV Feraheme in early February 2024. Her HgB and indices have recently still been much improved since then. Continue to monitor.  Gastric dysmotility: She previously elaborated that she thinks an intermittent sensation that food just \"gets stuck\" in her stomach contributes to the periodic N/V she has experienced with the first couple of cycles of palliative chemotherapy. Continue Reglan 10 mg TID prn. Continue to monitor.  Nausea/vomiting: Multifactorial, with ongoing, palliative chemotherapy and omental/peritoneal metastases from issue #1 both contributing. She had to be hospitalized for several days earlier this Spring due to a partial bowel obstruction; however, this resolved with nonsurgical management alone. As discussed above, now that Avastin has been added to her palliative treatment regimen; and the 5-FU/leucovorin has been discontinued, this issue has recently been all but resolved. She was instructed to hold her Sancuso patches but within the last week due to increasing nausea she reapplied the Sancuso patch which has been helpful. Advised to continue and to use Compazine as needed. Continue to monitor.  Pulmonary embolus: Incidental note was made of a filling defect in the right lower lobe pulmonary artery on the repeat CT of the chest performed on 04/21/2024 (and summarized above). Continue BID Eliquis (which she is currently still tolerating well).  Debilitation: Secondary to a few months of not tolerating 5-FU/leucovorin very well. As discussed above, now that the 5-FU has been discontinued; and we are proceeding with Avastin alone for the ongoing, palliative treatment of issue #1, this issue has been " improving. However, she would likely still benefit from some outpatient physical therapy (and a referral for this was previously placed). However, patient states that she has not been able to keep this appointment. Continue to monitor.  Abdominal Pain/Spasms: Generalized and intermittent. RX provided today for Bentyl as needed. Will monitor.   Abnormal Urine: RX provided today for Bactrim DS BID x 10 days.  Decreased Oral Intake: Will give one liter NS with treatment today. Weight is stable. Advised to continue to supplement with Boost as tolerated. Also recommended Delta 8 gummies as needed. Will monitor.   The patient and her daughter were in agreement with these plans.    It is a pleasure to participate in Ms. Carney's care. Please do not hesitate to call with any questions or concerns that you may have.      I spent 30 minutes caring for Vangie on this date of service. This time includes time spent by me in the following activities: preparing for the visit, reviewing tests, performing a medically appropriate examination and/or evaluation, counseling and educating the patient/family/caregiver, referring and communicating with other health care professionals, documenting information in the medical record, independently interpreting results and communicating that information with the patient/family/caregiver, care coordination, ordering medications, obtaining a separately obtained history, and reviewing a separately obtained history.         FOLLOW UP  Continue BID Eliquis. Continue Reglan 10 mg TID prn. Continue qweekly Sancuso TD. Continue prn compazine.  Proceed with palliative, single-agent, [id number] bevacizumab, as planned (with the 5th cycle today). Return to our clinic in 2 weeks, on the day of the 6th cycle of bevacizumab, with a CBC, CMP and CEA.          This document was electronically signed by ANURAG Hooker June 26, 2024 13:30 EDT      CC: ANURAG Mishra MD Karen  JUVENAL Iniguez MD

## 2024-06-27 ENCOUNTER — TELEPHONE (OUTPATIENT)
Dept: ONCOLOGY | Facility: CLINIC | Age: 54
End: 2024-06-27
Payer: COMMERCIAL

## 2024-06-27 RX ORDER — SULFAMETHOXAZOLE AND TRIMETHOPRIM 200; 40 MG/5ML; MG/5ML
20 SUSPENSION ORAL 2 TIMES DAILY
Qty: 400 ML | Refills: 0 | Status: ON HOLD | OUTPATIENT
Start: 2024-06-27 | End: 2024-07-08

## 2024-06-27 NOTE — TELEPHONE ENCOUNTER
Caller: Vangie Carney    Relationship: Self    Best call back number: [phone]    What is the best time to reach you: ANY    Who are you requesting to speak with (clinical staff, provider,  specific staff member): CLINICAL    What was the call regarding: VANGIE WAS PRESCRIBED sulfamethoxazole-trimethoprim (Bactrim DS) 800-160 MG per tablet YESTERDAY. SHE STATES THAT THE PILL IS SO BIG SHE CAN BARLEY GET IT DOWN  SHE IS ASKING IF THERE IS SOMETHING ELSE SHE CAN GET CALLED IN       PLEASE ADVISE     [facility]

## 2024-07-01 ENCOUNTER — APPOINTMENT (OUTPATIENT)
Dept: CT IMAGING | Facility: HOSPITAL | Age: 54
End: 2024-07-01
Payer: COMMERCIAL

## 2024-07-01 ENCOUNTER — APPOINTMENT (OUTPATIENT)
Dept: GENERAL RADIOLOGY | Facility: HOSPITAL | Age: 54
End: 2024-07-01
Payer: COMMERCIAL

## 2024-07-01 ENCOUNTER — HOSPITAL ENCOUNTER (INPATIENT)
Facility: HOSPITAL | Age: 54
LOS: 12 days | Discharge: HOSPICE | End: 2024-07-13
Attending: EMERGENCY MEDICINE | Admitting: HOSPITALIST
Payer: COMMERCIAL

## 2024-07-01 DIAGNOSIS — C78.7 METASTATIC COLON CANCER TO LIVER: ICD-10-CM

## 2024-07-01 DIAGNOSIS — C18.7 MALIGNANT NEOPLASM OF SIGMOID COLON: ICD-10-CM

## 2024-07-01 DIAGNOSIS — A41.9 SEPSIS WITHOUT ACUTE ORGAN DYSFUNCTION, DUE TO UNSPECIFIED ORGANISM: Primary | ICD-10-CM

## 2024-07-01 DIAGNOSIS — C18.9 METASTATIC COLON CANCER TO LIVER: ICD-10-CM

## 2024-07-01 DIAGNOSIS — N39.0 URINARY TRACT INFECTION WITHOUT HEMATURIA, SITE UNSPECIFIED: ICD-10-CM

## 2024-07-01 PROBLEM — R65.21 SEPTIC SHOCK DUE TO UNDETERMINED ORGANISM: Status: ACTIVE | Noted: 2024-07-01

## 2024-07-01 LAB
ALBUMIN SERPL-MCNC: 1.8 G/DL (ref 3.5–5.2)
ALBUMIN/GLOB SERPL: 0.9 G/DL
ALP SERPL-CCNC: 249 U/L (ref 39–117)
ALT SERPL W P-5'-P-CCNC: 65 U/L (ref 1–33)
ANION GAP SERPL CALCULATED.3IONS-SCNC: 13.4 MMOL/L (ref 5–15)
AST SERPL-CCNC: 77 U/L (ref 1–32)
BACTERIA UR QL AUTO: ABNORMAL /HPF
BASOPHILS # BLD AUTO: 0.01 10*3/MM3 (ref 0–0.2)
BASOPHILS NFR BLD AUTO: 0.1 % (ref 0–1.5)
BILIRUB CONJ SERPL-MCNC: 1.5 MG/DL (ref 0–0.3)
BILIRUB INDIRECT SERPL-MCNC: 0.2 MG/DL
BILIRUB SERPL-MCNC: 1.7 MG/DL (ref 0–1.2)
BILIRUB SERPL-MCNC: 1.8 MG/DL (ref 0–1.2)
BILIRUB UR QL STRIP: ABNORMAL
BUN SERPL-MCNC: 22 MG/DL (ref 6–20)
BUN/CREAT SERPL: 15.7 (ref 7–25)
CALCIUM SPEC-SCNC: 7.9 MG/DL (ref 8.6–10.5)
CHLORIDE SERPL-SCNC: 99 MMOL/L (ref 98–107)
CLARITY UR: ABNORMAL
CO2 SERPL-SCNC: 20.6 MMOL/L (ref 22–29)
COLOR UR: ABNORMAL
CREAT SERPL-MCNC: 1.4 MG/DL (ref 0.57–1)
D-LACTATE SERPL-SCNC: 3.3 MMOL/L (ref 0.5–2)
D-LACTATE SERPL-SCNC: 4.9 MMOL/L (ref 0.5–2)
DEPRECATED RDW RBC AUTO: 57.3 FL (ref 37–54)
EGFRCR SERPLBLD CKD-EPI 2021: 45.1 ML/MIN/1.73
EOSINOPHIL # BLD AUTO: 0 10*3/MM3 (ref 0–0.4)
EOSINOPHIL NFR BLD AUTO: 0 % (ref 0.3–6.2)
ERYTHROCYTE [DISTWIDTH] IN BLOOD BY AUTOMATED COUNT: 15.5 % (ref 12.3–15.4)
GLOBULIN UR ELPH-MCNC: 2.1 GM/DL
GLUCOSE BLDC GLUCOMTR-MCNC: 187 MG/DL (ref 70–130)
GLUCOSE BLDC GLUCOMTR-MCNC: 20 MG/DL (ref 70–130)
GLUCOSE BLDC GLUCOMTR-MCNC: 72 MG/DL (ref 70–130)
GLUCOSE BLDC GLUCOMTR-MCNC: 76 MG/DL (ref 70–130)
GLUCOSE BLDC GLUCOMTR-MCNC: 83 MG/DL (ref 70–130)
GLUCOSE SERPL-MCNC: 22 MG/DL (ref 65–99)
GLUCOSE UR STRIP-MCNC: NEGATIVE MG/DL
HCT VFR BLD AUTO: 28.7 % (ref 34–46.6)
HGB BLD-MCNC: 9.8 G/DL (ref 12–15.9)
HGB UR QL STRIP.AUTO: ABNORMAL
HOLD SPECIMEN: NORMAL
HYALINE CASTS UR QL AUTO: ABNORMAL /LPF
IMM GRANULOCYTES # BLD AUTO: 0.04 10*3/MM3 (ref 0–0.05)
IMM GRANULOCYTES NFR BLD AUTO: 0.5 % (ref 0–0.5)
KETONES UR QL STRIP: ABNORMAL
LEUKOCYTE ESTERASE UR QL STRIP.AUTO: ABNORMAL
LYMPHOCYTES # BLD AUTO: 1.22 10*3/MM3 (ref 0.7–3.1)
LYMPHOCYTES NFR BLD AUTO: 14.7 % (ref 19.6–45.3)
MAGNESIUM SERPL-MCNC: 1.8 MG/DL (ref 1.6–2.6)
MCH RBC QN AUTO: 34.6 PG (ref 26.6–33)
MCHC RBC AUTO-ENTMCNC: 34.1 G/DL (ref 31.5–35.7)
MCV RBC AUTO: 101.4 FL (ref 79–97)
MONOCYTES # BLD AUTO: 0.3 10*3/MM3 (ref 0.1–0.9)
MONOCYTES NFR BLD AUTO: 3.6 % (ref 5–12)
NEUTROPHILS NFR BLD AUTO: 6.74 10*3/MM3 (ref 1.7–7)
NEUTROPHILS NFR BLD AUTO: 81.1 % (ref 42.7–76)
NITRITE UR QL STRIP: POSITIVE
NRBC BLD AUTO-RTO: 0 /100 WBC (ref 0–0.2)
PH UR STRIP.AUTO: <=5 [PH] (ref 5–8)
PLATELET # BLD AUTO: 155 10*3/MM3 (ref 140–450)
PMV BLD AUTO: 9.6 FL (ref 6–12)
POTASSIUM SERPL-SCNC: 3.9 MMOL/L (ref 3.5–5.2)
PROT SERPL-MCNC: 3.9 G/DL (ref 6–8.5)
PROT UR QL STRIP: ABNORMAL
QT INTERVAL: 296 MS
QTC INTERVAL: 398 MS
RBC # BLD AUTO: 2.83 10*6/MM3 (ref 3.77–5.28)
RBC # UR STRIP: ABNORMAL /HPF
REF LAB TEST METHOD: ABNORMAL
SODIUM SERPL-SCNC: 133 MMOL/L (ref 136–145)
SP GR UR STRIP: 1.02 (ref 1–1.03)
SQUAMOUS #/AREA URNS HPF: ABNORMAL /HPF
TROPONIN T SERPL HS-MCNC: 12 NG/L
UROBILINOGEN UR QL STRIP: ABNORMAL
WBC # UR STRIP: ABNORMAL /HPF
WBC NRBC COR # BLD AUTO: 8.31 10*3/MM3 (ref 3.4–10.8)
WHOLE BLOOD HOLD COAG: NORMAL
WHOLE BLOOD HOLD SPECIMEN: NORMAL

## 2024-07-01 PROCEDURE — 87040 BLOOD CULTURE FOR BACTERIA: CPT | Performed by: EMERGENCY MEDICINE

## 2024-07-01 PROCEDURE — 25010000002 CEFTRIAXONE PER 250 MG: Performed by: EMERGENCY MEDICINE

## 2024-07-01 PROCEDURE — 80053 COMPREHEN METABOLIC PANEL: CPT

## 2024-07-01 PROCEDURE — P9612 CATHETERIZE FOR URINE SPEC: HCPCS

## 2024-07-01 PROCEDURE — 83735 ASSAY OF MAGNESIUM: CPT

## 2024-07-01 PROCEDURE — 25810000003 SODIUM CHLORIDE 0.9 % SOLUTION: Performed by: STUDENT IN AN ORGANIZED HEALTH CARE EDUCATION/TRAINING PROGRAM

## 2024-07-01 PROCEDURE — 25810000003 SODIUM CHLORIDE 0.9 % SOLUTION: Performed by: EMERGENCY MEDICINE

## 2024-07-01 PROCEDURE — 82248 BILIRUBIN DIRECT: CPT | Performed by: STUDENT IN AN ORGANIZED HEALTH CARE EDUCATION/TRAINING PROGRAM

## 2024-07-01 PROCEDURE — 84484 ASSAY OF TROPONIN QUANT: CPT

## 2024-07-01 PROCEDURE — 93005 ELECTROCARDIOGRAM TRACING: CPT

## 2024-07-01 PROCEDURE — 74176 CT ABD & PELVIS W/O CONTRAST: CPT

## 2024-07-01 PROCEDURE — 71045 X-RAY EXAM CHEST 1 VIEW: CPT

## 2024-07-01 PROCEDURE — 82247 BILIRUBIN TOTAL: CPT | Performed by: STUDENT IN AN ORGANIZED HEALTH CARE EDUCATION/TRAINING PROGRAM

## 2024-07-01 PROCEDURE — 81001 URINALYSIS AUTO W/SCOPE: CPT

## 2024-07-01 PROCEDURE — 99223 1ST HOSP IP/OBS HIGH 75: CPT | Performed by: STUDENT IN AN ORGANIZED HEALTH CARE EDUCATION/TRAINING PROGRAM

## 2024-07-01 PROCEDURE — 71045 X-RAY EXAM CHEST 1 VIEW: CPT | Performed by: RADIOLOGY

## 2024-07-01 PROCEDURE — 87186 SC STD MICRODIL/AGAR DIL: CPT | Performed by: EMERGENCY MEDICINE

## 2024-07-01 PROCEDURE — 85025 COMPLETE CBC W/AUTO DIFF WBC: CPT

## 2024-07-01 PROCEDURE — 93010 ELECTROCARDIOGRAM REPORT: CPT | Performed by: INTERNAL MEDICINE

## 2024-07-01 PROCEDURE — 87077 CULTURE AEROBIC IDENTIFY: CPT | Performed by: EMERGENCY MEDICINE

## 2024-07-01 PROCEDURE — 83605 ASSAY OF LACTIC ACID: CPT | Performed by: EMERGENCY MEDICINE

## 2024-07-01 PROCEDURE — 82948 REAGENT STRIP/BLOOD GLUCOSE: CPT

## 2024-07-01 PROCEDURE — 36415 COLL VENOUS BLD VENIPUNCTURE: CPT | Performed by: EMERGENCY MEDICINE

## 2024-07-01 PROCEDURE — 87086 URINE CULTURE/COLONY COUNT: CPT | Performed by: EMERGENCY MEDICINE

## 2024-07-01 PROCEDURE — 99291 CRITICAL CARE FIRST HOUR: CPT

## 2024-07-01 RX ORDER — BISACODYL 10 MG
10 SUPPOSITORY, RECTAL RECTAL DAILY PRN
Status: DISCONTINUED | OUTPATIENT
Start: 2024-07-01 | End: 2024-07-05

## 2024-07-01 RX ORDER — NOREPINEPHRINE BITARTRATE 0.03 MG/ML
.02-.3 INJECTION, SOLUTION INTRAVENOUS
Status: DISCONTINUED | OUTPATIENT
Start: 2024-07-01 | End: 2024-07-03

## 2024-07-01 RX ORDER — SODIUM CHLORIDE 9 MG/ML
40 INJECTION, SOLUTION INTRAVENOUS AS NEEDED
Status: DISCONTINUED | OUTPATIENT
Start: 2024-07-01 | End: 2024-07-13 | Stop reason: HOSPADM

## 2024-07-01 RX ORDER — SODIUM CHLORIDE 0.9 % (FLUSH) 0.9 %
20 SYRINGE (ML) INJECTION AS NEEDED
Status: DISCONTINUED | OUTPATIENT
Start: 2024-07-01 | End: 2024-07-13 | Stop reason: HOSPADM

## 2024-07-01 RX ORDER — DEXTROSE MONOHYDRATE 25 G/50ML
INJECTION, SOLUTION INTRAVENOUS
Status: COMPLETED
Start: 2024-07-01 | End: 2024-07-01

## 2024-07-01 RX ORDER — LIDOCAINE 40 MG/G
CREAM TOPICAL AS NEEDED
Status: CANCELLED | OUTPATIENT
Start: 2024-07-01

## 2024-07-01 RX ORDER — MAGNESIUM HYDROXIDE/ALUMINUM HYDROXICE/SIMETHICONE 120; 1200; 1200 MG/30ML; MG/30ML; MG/30ML
10 SUSPENSION ORAL ONCE
Status: DISCONTINUED | OUTPATIENT
Start: 2024-07-01 | End: 2024-07-01

## 2024-07-01 RX ORDER — DEXTROSE MONOHYDRATE 25 G/50ML
25 INJECTION, SOLUTION INTRAVENOUS ONCE
Status: COMPLETED | OUTPATIENT
Start: 2024-07-01 | End: 2024-07-01

## 2024-07-01 RX ORDER — DEXTROSE MONOHYDRATE AND SODIUM CHLORIDE 5; .45 G/100ML; G/100ML
30 INJECTION, SOLUTION INTRAVENOUS CONTINUOUS
Status: DISCONTINUED | OUTPATIENT
Start: 2024-07-01 | End: 2024-07-10

## 2024-07-01 RX ORDER — SODIUM CHLORIDE 0.9 % (FLUSH) 0.9 %
10 SYRINGE (ML) INJECTION AS NEEDED
Status: DISCONTINUED | OUTPATIENT
Start: 2024-07-01 | End: 2024-07-13 | Stop reason: HOSPADM

## 2024-07-01 RX ORDER — NITROGLYCERIN 0.4 MG/1
0.4 TABLET SUBLINGUAL
Status: DISCONTINUED | OUTPATIENT
Start: 2024-07-01 | End: 2024-07-13 | Stop reason: HOSPADM

## 2024-07-01 RX ORDER — HEPARIN SODIUM (PORCINE) LOCK FLUSH IV SOLN 100 UNIT/ML 100 UNIT/ML
5 SOLUTION INTRAVENOUS AS NEEDED
Status: DISCONTINUED | OUTPATIENT
Start: 2024-07-01 | End: 2024-07-13 | Stop reason: HOSPADM

## 2024-07-01 RX ORDER — BISACODYL 5 MG/1
5 TABLET, DELAYED RELEASE ORAL DAILY PRN
Status: DISCONTINUED | OUTPATIENT
Start: 2024-07-01 | End: 2024-07-05

## 2024-07-01 RX ORDER — SODIUM CHLORIDE 0.9 % (FLUSH) 0.9 %
10 SYRINGE (ML) INJECTION EVERY 12 HOURS SCHEDULED
Status: DISCONTINUED | OUTPATIENT
Start: 2024-07-01 | End: 2024-07-13 | Stop reason: HOSPADM

## 2024-07-01 RX ORDER — SULFAMETHOXAZOLE AND TRIMETHOPRIM 200; 40 MG/5ML; MG/5ML
20 SUSPENSION ORAL 2 TIMES DAILY
Status: CANCELLED | OUTPATIENT
Start: 2024-07-01 | End: 2024-07-08

## 2024-07-01 RX ORDER — ALUMINA, MAGNESIA, AND SIMETHICONE 2400; 2400; 240 MG/30ML; MG/30ML; MG/30ML
5 SUSPENSION ORAL EVERY 6 HOURS PRN
Status: DISCONTINUED | OUTPATIENT
Start: 2024-07-01 | End: 2024-07-01

## 2024-07-01 RX ORDER — AMOXICILLIN 250 MG
2 CAPSULE ORAL 2 TIMES DAILY PRN
Status: DISCONTINUED | OUTPATIENT
Start: 2024-07-01 | End: 2024-07-05

## 2024-07-01 RX ORDER — ALUMINA, MAGNESIA, AND SIMETHICONE 2400; 2400; 240 MG/30ML; MG/30ML; MG/30ML
5 SUSPENSION ORAL ONCE
Status: COMPLETED | OUTPATIENT
Start: 2024-07-01 | End: 2024-07-01

## 2024-07-01 RX ORDER — SODIUM CHLORIDE 9 MG/ML
125 INJECTION, SOLUTION INTRAVENOUS CONTINUOUS
Status: DISCONTINUED | OUTPATIENT
Start: 2024-07-01 | End: 2024-07-02

## 2024-07-01 RX ORDER — POLYETHYLENE GLYCOL 3350 17 G/17G
17 POWDER, FOR SOLUTION ORAL DAILY PRN
Status: DISCONTINUED | OUTPATIENT
Start: 2024-07-01 | End: 2024-07-05

## 2024-07-01 RX ADMIN — DEXTROSE AND SODIUM CHLORIDE 200 ML/HR: 5; 450 INJECTION, SOLUTION INTRAVENOUS at 13:55

## 2024-07-01 RX ADMIN — SODIUM CHLORIDE 125 ML/HR: 9 INJECTION, SOLUTION INTRAVENOUS at 19:45

## 2024-07-01 RX ADMIN — SODIUM CHLORIDE 2000 MG: 9 INJECTION, SOLUTION INTRAVENOUS at 18:37

## 2024-07-01 RX ADMIN — ALUMINUM HYDROXIDE, MAGNESIUM HYDROXIDE, DIMETHICONE 5 ML: 400; 400; 40 SUSPENSION ORAL at 18:37

## 2024-07-01 RX ADMIN — Medication 0.24 MCG/KG/MIN: at 23:25

## 2024-07-01 RX ADMIN — SODIUM CHLORIDE 125 ML/HR: 9 INJECTION, SOLUTION INTRAVENOUS at 18:27

## 2024-07-01 RX ADMIN — DEXTROSE MONOHYDRATE 50 ML: 25 INJECTION, SOLUTION INTRAVENOUS at 11:28

## 2024-07-01 RX ADMIN — SODIUM CHLORIDE 1000 ML: 9 INJECTION, SOLUTION INTRAVENOUS at 17:45

## 2024-07-01 RX ADMIN — DEXTROSE MONOHYDRATE AND SODIUM CHLORIDE: 5; .45 INJECTION, SOLUTION INTRAVENOUS at 12:21

## 2024-07-01 RX ADMIN — Medication 0.02 MCG/KG/MIN: at 19:03

## 2024-07-01 RX ADMIN — DEXTROSE MONOHYDRATE 25 G: 25 INJECTION, SOLUTION INTRAVENOUS at 12:49

## 2024-07-01 RX ADMIN — Medication 0.26 MCG/KG/MIN: at 23:47

## 2024-07-01 NOTE — CASE MANAGEMENT/SOCIAL WORK
Discharge Planning Assessment  [facility]     Patient Name: Vangie Carney  MRN: [id number]  Today's Date: 7/1/2024    Admit Date: 7/1/2024    Plan: Spoke with patient at bedside. Patient lives at home with Red Carney Spouse [phone]  and will return at discharge. Patient has no POA or Living Will. Patient uses a standard walker, BSC and straight cane that was given to her. Patient has no oxygen of HH. Patient is requesting a wheelchair at discharge. Patients PCP Urvashi Basurto and pharmacy [place], [place]. Patients family will transport home at discharge.   Discharge Needs Assessment       Row Name 07/01/24 1833       Living Environment    People in Home spouse    Name(s) of People in Home Red Carney Spouse   [phone]    Potentially Unsafe Housing Conditions none    In the past 12 months has the electric, gas, oil, or water company threatened to shut off services in your home? No    Primary Care Provided by spouse/significant other;child(alfred)    Provides Primary Care For no one, unable/limited ability to care for self    Family Caregiver if Needed spouse    Family Caregiver Names Red Carney Spouse   [phone]    Quality of Family Relationships helpful;involved;supportive    Able to Return to Prior Arrangements yes       Resource/Environmental Concerns    Resource/Environmental Concerns none    Transportation Concerns none       Transportation Needs    In the past 12 months, has lack of transportation kept you from medical appointments or from getting medications? no    In the past 12 months, has lack of transportation kept you from meetings, work, or from getting things needed for daily living? No       Food Insecurity    Within the past 12 months, you worried that your food would run out before you got the money to buy more. Never true    Within the past 12 months, the food you bought just didn't last and you didn't have money to get more. Never true       Transition Planning     Patient/Family Anticipates Transition to home with family    Patient/Family Anticipated Services at Transition durable medical equipment    Transportation Anticipated family or friend will provide       Discharge Needs Assessment    Readmission Within the Last 30 Days no previous admission in last 30 days    Equipment Currently Used at Home walker, standard;commode;cane, straight    Concerns to be Addressed discharge planning    Anticipated Changes Related to Illness none    Equipment Needed After Discharge walker, standard                   Discharge Plan       Row Name 07/01/24 7764       Plan    Plan Spoke with patient at bedside. Patient lives at home with Red Carney Spouse [phone]  and will return at discharge. Patient has no POA or Living Will. Patient uses a standard walker, BSC and straight cane that was given to her. Patient has no oxygen of HH. Patient is requesting a wheelchair at discharge. Patients PCP Urvashi Basurto and pharmacy [place], [place]. Patients family will transport home at discharge.    Patient/Family in Agreement with Plan yes                  Continued Care and Services - Admitted Since 7/1/2024    No active coordination exists for this encounter.       Expected Discharge Date and Time       Expected Discharge Date Expected Discharge Time    Jul 4, 2024            Demographic Summary       Row Name 07/01/24 5690       General Information    Admission Type inpatient    Arrived From home    Referral Source emergency department    Reason for Consult discharge planning    Preferred Language English                 Savanna Franklin

## 2024-07-01 NOTE — ED PROVIDER NOTES
"Subjective   History of Present Illness  54-year-old white female complains of \"weakness.  Patient has a history of metastatic colon cancer with her last Avastin treatment 5 days ago with Dr. Hanks.  For the past 3 to 4 days, she has been extremely weak.  She she denied any nausea, vomiting, diarrhea.  She has occasional abdominal pain.  She has not been eating and drinking well.  She was diagnosed with UTI at her last visit, but was not able to take Bactrim due to the size of the pills.  She had her prescription changed and has been taking liquid antibiotic for the past 2 days.      Review of Systems   All other systems reviewed and are negative.      Past Medical History:   Diagnosis Date    Gallbladder attack     GERD (gastroesophageal reflux disease)     Hearing aid worn     History of ear infections     Malignant neoplasm of sigmoid colon 3/14/2023    Seasonal allergies     Wears glasses        Allergies   Allergen Reactions    Keflex [Cephalexin] Nausea Only     Beta lactam allergy details  Antibiotic reaction: nausea  Age at reaction: adult  Dose to reaction time: (!) minutes  Reason for antibiotic: unknown  Epinephrine required for reaction?: no  Tolerated antibiotics: augmentin, amoxicillin          Past Surgical History:   Procedure Laterality Date    CHOLECYSTECTOMY      COLON RESECTION N/A 3/14/2023    Procedure: COLON RESECTION LOW ANTERIOR LAPAROSCOPIC WITH DAVINCI ROBOT;  Surgeon: Shari Aguirre MD;  Location: [facility] OR;  Service: Robotics - DaVinci;  Laterality: N/A;    COLONOSCOPY N/A 2/15/2023    Procedure: COLONOSCOPY FOR SCREENING;  Surgeon: Giselle Iniguez MD;  Location: [facility] OR;  Service: Gastroenterology;  Laterality: N/A;    ENDOSCOPY N/A 2/15/2023    Procedure: ESOPHAGOGASTRODUODENOSCOPY WITH BIOPSY;  Surgeon: Giselle Iniguez MD;  Location: [facility] OR;  Service: Gastroenterology;  Laterality: N/A;    LAPAROSCOPIC TUBAL LIGATION Bilateral     MIDDLE EAR SURGERY      " PORTACATH PLACEMENT N/A 2024    Procedure: INSERTION OF PORTACATH;  Surgeon: Jaylen Leal MD;  Location: [facility];  Service: General;  Laterality: N/A;       Family History   Problem Relation Age of Onset    Cancer Mother     Cancer Father     Cancer Sister     Cancer Brother     Cancer Maternal Grandmother     Cancer Maternal Grandfather     Breast cancer Neg Hx        Social History     Socioeconomic History    Marital status:    Tobacco Use    Smoking status: Former     Current packs/day: 0.00     Average packs/day: 1 pack/day for 30.0 years (30.0 ttl pk-yrs)     Types: Cigarettes     Start date:      Quit date:      Years since quittin.5     Passive exposure: Never    Smokeless tobacco: Never   Vaping Use    Vaping status: Every Day    Substances: Nicotine, Flavoring    Devices: Refillable tank   Substance and Sexual Activity    Alcohol use: Never    Drug use: Defer    Sexual activity: Defer     Birth control/protection: None           Objective   Physical Exam  Vitals and nursing note reviewed. Exam conducted with a chaperone present.   Constitutional:       Appearance: Normal appearance. She is normal weight.   HENT:      Head: Normocephalic and atraumatic.      Mouth/Throat:      Mouth: Mucous membranes are dry.      Pharynx: Oropharynx is clear.   Cardiovascular:      Rate and Rhythm: Normal rate and regular rhythm.      Heart sounds: Normal heart sounds. No murmur heard.     No friction rub. No gallop.   Pulmonary:      Effort: Pulmonary effort is normal. No respiratory distress.      Breath sounds: Normal breath sounds. No wheezing, rhonchi or rales.   Chest:      Chest wall: No tenderness.   Abdominal:      General: Bowel sounds are normal. There is distension.      Palpations: Abdomen is soft.      Tenderness: There is no abdominal tenderness.   Musculoskeletal:         General: Normal range of motion.      Right lower leg: No edema.      Left lower leg: No edema.   Skin:      General: Skin is warm and dry.   Neurological:      General: No focal deficit present.      Mental Status: She is oriented to person, place, and time. She is lethargic.      Comments: No focal deficits   Psychiatric:         Mood and Affect: Mood normal.         Behavior: Behavior normal.       Results for orders placed or performed during the hospital encounter of 07/01/24   Comprehensive Metabolic Panel    Specimen: Blood   Result Value Ref Range    Glucose 22 (C) 65 - 99 mg/dL    BUN 22 (H) 6 - 20 mg/dL    Creatinine 1.40 (H) 0.57 - 1.00 mg/dL    Sodium 133 (L) 136 - 145 mmol/L    Potassium 3.9 3.5 - 5.2 mmol/L    Chloride 99 98 - 107 mmol/L    CO2 20.6 (L) 22.0 - 29.0 mmol/L    Calcium 7.9 (L) 8.6 - 10.5 mg/dL    Total Protein 3.9 (L) 6.0 - 8.5 g/dL    Albumin 1.8 (L) 3.5 - 5.2 g/dL    ALT (SGPT) 65 (H) 1 - 33 U/L    AST (SGOT) 77 (H) 1 - 32 U/L    Alkaline Phosphatase 249 (H) 39 - 117 U/L    Total Bilirubin 1.8 (H) 0.0 - 1.2 mg/dL    Globulin 2.1 gm/dL    A/G Ratio 0.9 g/dL    BUN/Creatinine Ratio 15.7 7.0 - 25.0    Anion Gap 13.4 5.0 - 15.0 mmol/L    eGFR 45.1 (L) >60.0 mL/min/1.73   Single High Sensitivity Troponin T    Specimen: Blood   Result Value Ref Range    HS Troponin T 12 <14 ng/L   Magnesium    Specimen: Blood   Result Value Ref Range    Magnesium 1.8 1.6 - 2.6 mg/dL   Urinalysis With Microscopic If Indicated (No Culture) - Straight Cath    Specimen: Straight Cath; Urine   Result Value Ref Range    Color, UA Orange (A) Yellow, Straw    Appearance, UA Cloudy (A) Clear    pH, UA <=5.0 5.0 - 8.0    Specific Gravity, UA 1.025 1.005 - 1.030    Glucose, UA Negative Negative    Ketones, UA Trace (A) Negative    Bilirubin, UA Large (3+) (A) Negative    Blood, UA Trace (A) Negative    Protein, UA 30 mg/dL (1+) (A) Negative    Leuk Esterase, UA Small (1+) (A) Negative    Nitrite, UA Positive (A) Negative    Urobilinogen, UA 1.0 E.U./dL 0.2 - 1.0 E.U./dL   CBC Auto Differential    Specimen: Blood   Result  Value Ref Range    WBC 8.31 3.40 - 10.80 10*3/mm3    RBC 2.83 (L) 3.77 - 5.28 10*6/mm3    Hemoglobin 9.8 (L) 12.0 - 15.9 g/dL    Hematocrit 28.7 (L) 34.0 - 46.6 %    .4 (H) 79.0 - 97.0 fL    MCH 34.6 (H) 26.6 - 33.0 pg    MCHC 34.1 31.5 - 35.7 g/dL    RDW 15.5 (H) 12.3 - 15.4 %    RDW-SD 57.3 (H) 37.0 - 54.0 fl    MPV 9.6 6.0 - 12.0 fL    Platelets 155 140 - 450 10*3/mm3    Neutrophil % 81.1 (H) 42.7 - 76.0 %    Lymphocyte % 14.7 (L) 19.6 - 45.3 %    Monocyte % 3.6 (L) 5.0 - 12.0 %    Eosinophil % 0.0 (L) 0.3 - 6.2 %    Basophil % 0.1 0.0 - 1.5 %    Immature Grans % 0.5 0.0 - 0.5 %    Neutrophils, Absolute 6.74 1.70 - 7.00 10*3/mm3    Lymphocytes, Absolute 1.22 0.70 - 3.10 10*3/mm3    Monocytes, Absolute 0.30 0.10 - 0.90 10*3/mm3    Eosinophils, Absolute 0.00 0.00 - 0.40 10*3/mm3    Basophils, Absolute 0.01 0.00 - 0.20 10*3/mm3    Immature Grans, Absolute 0.04 0.00 - 0.05 10*3/mm3    nRBC 0.0 0.0 - 0.2 /100 WBC   Urinalysis, Microscopic Only - Straight Cath    Specimen: Straight Cath; Urine   Result Value Ref Range    RBC, UA 0-2 None Seen, 0-2 /HPF    WBC, UA 21-50 (A) None Seen, 0-2 /HPF    Bacteria, UA 4+ (A) None Seen /HPF    Squamous Epithelial Cells, UA 0-2 None Seen, 0-2 /HPF    Hyaline Casts, UA None Seen None Seen /LPF    Methodology Manual Light Microscopy    Conde Urine Culture Tube - Straight Cath    Specimen: Straight Cath; Urine   Result Value Ref Range    Extra Tube Hold for add-ons.    POC Glucose Once    Specimen: Blood   Result Value Ref Range    Glucose 20 (C) 70 - 130 mg/dL   POC Glucose Once    Specimen: Blood   Result Value Ref Range    Glucose 72 70 - 130 mg/dL   POC Glucose Once    Specimen: Blood   Result Value Ref Range    Glucose 83 70 - 130 mg/dL   POC Glucose Once    Specimen: Blood   Result Value Ref Range    Glucose 187 (H) 70 - 130 mg/dL   ECG 12 Lead ED Triage Standing Order; Weak / Dizzy / AMS   Result Value Ref Range    QT Interval 296 ms    QTC Interval 398 ms   Green Top  (Gel)   Result Value Ref Range    Extra Tube Hold for add-ons.    Lavender Top   Result Value Ref Range    Extra Tube hold for add-on    Gold Top - SST   Result Value Ref Range    Extra Tube Hold for add-ons.    Light Blue Top   Result Value Ref Range    Extra Tube Hold for add-ons.      XR Chest 1 View    Result Date: 7/1/2024  Narrative: XR CHEST 1 VW-  CLINICAL INDICATION: Weak/Dizzy/AMS triage protocol   COMPARISON: 3/7/2024  TECHNIQUE: Single frontal view of the chest.  FINDINGS:  LUNGS: Lungs are adequately aerated.  HEART AND MEDIASTINUM: Heart and mediastinal contours are unremarkable   SKELETON: Bony and soft tissue structures are unremarkable.  Central line tip is stable NG tube is been removed      Impression: No radiographic evidence of acute cardiac or pulmonary disease.    This report was finalized on 7/1/2024 11:46 AM by Dr. Edmond Vasquez MD.         Procedures       FocusSE[employer] SHOCK FOCUSED EXAM ATTESTATION    I attest that I have reassessed tissue perfusion after the fluid bolus given.    Pranay Villaseñor MD  07/01/24  18:28 EDT      ED Course  ED Course as of 07/01/24 1828 Mon Jul 01, 2024   1319 ECG 12 Lead ED Triage Standing Order; Weak / Dizzy / AMS  Sinus tachycardia.  Rate 109.  Normal axis.  Normal QT interval.  Diffuse low voltage.  Q wave in lead V1 through V3.  No ST elevation or depression.  Abnormal EKG. [BC]   1822 Discussed with Dr. Allen.  Patient admitted, see orders.  Case with Dr. Hanks who will follow patient in consult. [BC]      ED Course User Index  [BC] Pranay Villaseñor MD                                             Medical Decision Making  Problems Addressed:  Metastatic colon cancer to liver: complicated acute illness or injury  Sepsis without acute organ dysfunction, due to unspecified organism: complicated acute illness or injury  Urinary tract infection without hematuria, site unspecified: complicated acute illness or injury    Amount and/or Complexity of Data  Reviewed  Labs: ordered.  ECG/medicine tests:  Decision-making details documented in ED Course.    Risk  OTC drugs.  Prescription drug management.  Decision regarding hospitalization.    Critical Care  Total time providing critical care: 45 minutes        Final diagnoses:   Sepsis without acute organ dysfunction, due to unspecified organism   Urinary tract infection without hematuria, site unspecified   Metastatic colon cancer to liver       ED Disposition  ED Disposition       ED Disposition   Decision to Admit    Condition   --    Comment   Level of Care: Progressive Care [20]   Diagnosis: Septic shock due to undetermined organism [[id number]]   Certification: I Certify That Inpatient Hospital Services Are Medically Necessary For Greater Than 2 Midnights                 No follow-up provider specified.       Medication List      No changes were made to your prescriptions during this visit.            Pranay Villaseñor MD  07/01/24 1825       Pranay Villaseñor MD  07/01/24 1826       Pranay Villaseñor MD  07/01/24 1826

## 2024-07-01 NOTE — H&P
[facility]    Patient Identification:  Name:  Vangie Carney  Age:  54 y.o.  Sex:  female  :  1970  MRN:  [id number]   Admit Date: 2024   Visit Number:  [id number]  Room number:  102/02  Primary Care Physician:  Urvashi Basurto APRN     Subjective     Chief complaint:    Chief Complaint   Patient presents with    Weakness - Generalized       History of presenting illness:   Mrs. Vangie Carney is our 54 year old female patient with past medical history significant for metastatic colorectal adenocarcinoma s/p LAR currently receiving palliative chemotherapy, pulmonary embolism on therapeutic anticoagulation, and gastric dysmotility who presented to the ED today with complaint of generalized weakness.     Patient reports that she has been having worsening fatigue/weakness for the past three days, as well as nausea and vomiting. She complains of dysuria present for about the past four days and per review of most recent Oncology note she was prescribed bactrim on 24. ER physician reported she told him she was unable to take the pills due to difficulty swallowing them, so a prescription for liquid bactrim was sent in instead, but that she had only been able to take 1-2 days worth of it so far. Patient reports that the fatigue is worse with exertion, even getting up out of bed. She also reports mild shortness of breath that is worse with exertion. In the ER patient was found to be hypotensive and this did not significantly improve with IV fluid resuscitation, so vasopressors have been ordered by ER physician.     Review of Systems   Constitutional:  Positive for fatigue. Negative for fever.   HENT:  Positive for trouble swallowing. Negative for congestion.    Respiratory:  Positive for shortness of breath. Negative for cough.    Cardiovascular:  Positive for leg swelling. Negative for chest pain.        Chronic leg swelling which she says has been  "unchanged x3 months   Gastrointestinal:  Positive for abdominal pain, nausea and vomiting.        \"Stomach cramping\"   Genitourinary:  Positive for decreased urine volume and dysuria.   Musculoskeletal:  Negative for arthralgias and myalgias.   Skin:  Negative for rash and wound.   Neurological:  Positive for weakness. Negative for dizziness.   Psychiatric/Behavioral:  Negative for agitation and confusion.      Past Medical History:   Diagnosis Date    Gallbladder attack     GERD (gastroesophageal reflux disease)     Hearing aid worn     History of ear infections     Malignant neoplasm of sigmoid colon 3/14/2023    Seasonal allergies     Wears glasses      Past Surgical History:   Procedure Laterality Date    CHOLECYSTECTOMY      COLON RESECTION N/A 3/14/2023    Procedure: COLON RESECTION LOW ANTERIOR LAPAROSCOPIC WITH DAVINCI ROBOT;  Surgeon: Shari Aguirre MD;  Location: [facility] OR;  Service: Robotics - DaVinci;  Laterality: N/A;    COLONOSCOPY N/A 2/15/2023    Procedure: COLONOSCOPY FOR SCREENING;  Surgeon: Giselle Iniguez MD;  Location: [facility] OR;  Service: Gastroenterology;  Laterality: N/A;    ENDOSCOPY N/A 2/15/2023    Procedure: ESOPHAGOGASTRODUODENOSCOPY WITH BIOPSY;  Surgeon: Giselle Iniguez MD;  Location: [facility] OR;  Service: Gastroenterology;  Laterality: N/A;    LAPAROSCOPIC TUBAL LIGATION Bilateral     MIDDLE EAR SURGERY      PORTACATH PLACEMENT N/A 2/14/2024    Procedure: INSERTION OF PORTACATH;  Surgeon: Jaylen Leal MD;  Location: [facility] OR;  Service: General;  Laterality: N/A;     Family History   Problem Relation Age of Onset    Cancer Mother     Cancer Father     Cancer Sister     Cancer Brother     Cancer Maternal Grandmother     Cancer Maternal Grandfather     Breast cancer Neg Hx      Social History     Socioeconomic History    Marital status:    Tobacco Use    Smoking status: Former     Current packs/day: 0.00     Average packs/day: 1 pack/day for 30.0 " years (30.0 ttl pk-yrs)     Types: Cigarettes     Start date:      Quit date: 2017     Years since quittin.5     Passive exposure: Never    Smokeless tobacco: Never   Vaping Use    Vaping status: Every Day    Substances: Nicotine, Flavoring    Devices: Refillable tank   Substance and Sexual Activity    Alcohol use: Never    Drug use: Defer    Sexual activity: Defer     Birth control/protection: None       Allergies:  Keflex [cephalexin]  Medications below are reported home medications pulling from within the system; at this time, these medications have not been reconciled unless otherwise specified and are in the verification process for further verifcation as current home medications.    Prior to Admission Medications       Prescriptions Last Dose Informant Patient Reported? Taking?    apixaban (ELIQUIS) 5 MG tablet tablet 2024 Spouse/Significant Other, Pharmacy No Yes    Take 1 tablet by mouth 2 (Two) Times a Day.    dicyclomine (BENTYL) 10 MG capsule 2024 Spouse/Significant Other, Pharmacy No Yes    Take 1 capsule by mouth 4 (Four) Times a Day Before Meals & at Bedtime.    granisetron (Sancuso) 3.1 MG/24HR 2024 Spouse/Significant Other, Pharmacy No No    Place 1 patch on the skin once every 7 days.    HYDROcodone-acetaminophen (NORCO) 5-325 MG per tablet Past Week Spouse/Significant Other, Pharmacy Yes Yes    Take 1-2 tablets by mouth Every 8 (Eight) Hours As Needed for Mild Pain.    lidocaine-prilocaine (EMLA) 2.5-2.5 % cream Past Month Spouse/Significant Other, Pharmacy No Yes    Apply to port-a-cath site 30 minutes prior to arrival at infusion center. Cover with plastic wrap.    metoclopramide (REGLAN) 10 MG tablet Past Week Spouse/Significant Other, Pharmacy Yes Yes    Take 1 tablet by mouth 3 (Three) Times a Day As Needed (for nausea).    nystatin (MYCOSTATIN) 135191 UNIT/GM cream Past Month Spouse/Significant Other, Pharmacy No Yes    Apply 1 Application topically to the appropriate  area as directed 2 (Two) Times a Day.    ondansetron (ZOFRAN) 8 MG tablet Past Week Spouse/Significant Other, Pharmacy Yes Yes    Take 1 tablet by mouth Every 8 (Eight) Hours As Needed for Nausea or Vomiting.    pantoprazole (PROTONIX) 40 MG EC tablet 6/30/2024 Spouse/Significant Other, Pharmacy No Yes    Take 1 tablet by mouth Daily.    sennosides-docusate (PERICOLACE) 8.6-50 MG per tablet Past Week Spouse/Significant Other, Pharmacy Yes Yes    Take 2 tablets by mouth 2 (Two) Times a Day As Needed for Constipation.    sulfamethoxazole-trimethoprim (BACTRIM,SEPTRA) 200-40 MG/5ML suspension 6/30/2024 Spouse/Significant Other, Pharmacy No Yes    Take 20 mL by mouth 2 (Two) Times a Day for 10 days.          Objective     Vital Signs:  Temp:  [97.6 °F (36.4 °C)] 97.6 °F (36.4 °C)  Heart Rate:  [105-123] 120  Resp:  [18] 18  BP: ()/(49-77) 80/55    Mean Arterial Pressure (Non-Invasive) for the past 24 hrs (Last 3 readings):   Noninvasive MAP (mmHg)   07/01/24 1850 63   07/01/24 1840 60   07/01/24 1830 63     SpO2:  [71 %-100 %] 100 %  on   ;      Body mass index is 19.58 kg/m².    Wt Readings from Last 3 Encounters:   07/01/24 56.7 kg (125 lb)   06/26/24 56.7 kg (125 lb)   06/26/24 56.7 kg (125 lb)        Physical Exam:   Constitutional:  Well-developed and well-nourished.  No acute distress.      HENT:  Head:  Normocephalic and atraumatic.  Mouth:  Moist mucous membranes.    Eyes:  Conjunctivae and EOM are normal. No scleral icterus.    Neck:  Neck supple.     Cardiovascular:  Mild tachycardia, regular rhythm, and normal heart sounds. No murmur.  Pulmonary/Chest:  Normal rate and effort. Breath sounds clear to auscultation bilaterally with good air movement.  Abdominal:  Soft. Mild distention, mild to moderate RUQ tenderness without rigidity or guarding. Normal bowel sounds present.  Musculoskeletal:  No tenderness and no deformity.  No red or swollen joints anywhere.    Neurological: Awake, alert, no focal  deficit on gross examination. No slurred speech or facial droop.   Skin:  Skin is warm and dry. No rash noted. Appeared slightly jaundiced.  Peripheral vascular:  No cyanosis, 1+ lower extremity edema bilaterally.    EKG:  sinus tachycardia, rate 110, nonspecific t wave changes  ECG 12 Lead ED Triage Standing Order; Weak / Dizzy / AMS   Final Result   Test Reason : ED Triage Standing Order~   Blood Pressure :   */*   mmHG   Vent. Rate : 109 BPM     Atrial Rate : 109 BPM      P-R Int : 166 ms          QRS Dur :  66 ms       QT Int : 296 ms       P-R-T Axes :  67  57  63 degrees      QTc Int : 398 ms      Sinus tachycardia   Low voltage QRS   Cannot rule out Anterior infarct (cited on or before 07-MAR-2024)   Abnormal ECG   When compared with ECG of 21-APR-2024 16:27,   Questionable change in QRS axis   Nonspecific T wave abnormality now evident in Inferior leads   Nonspecific T wave abnormality, worse in Lateral leads   Confirmed by Giuseppe Nava ([id number]) on 7/1/2024 11:50:43 AM      Referred By: CURD           Confirmed By: Giuseppe Nava          Telemetry:  sinus tachycardia rate 110s    Labs:  Results from last 7 days   Lab Units 07/01/24  1818 07/01/24  1125 06/26/24  1159   LACTATE mmol/L 4.9*  --   --    WBC 10*3/mm3  --  8.31 6.13   HEMOGLOBIN g/dL  --  9.8* 12.4   HEMATOCRIT %  --  28.7* 37.2   MCV fL  --  101.4* 101.6*   MCHC g/dL  --  34.1 33.3   PLATELETS 10*3/mm3  --  155 266         Results from last 7 days   Lab Units 07/01/24  1125 06/26/24  1159   SODIUM mmol/L 133* 136   POTASSIUM mmol/L 3.9 3.8   MAGNESIUM mg/dL 1.8  --    CHLORIDE mmol/L 99 99   CO2 mmol/L 20.6* 23.9   BUN mg/dL 22* 12   CREATININE mg/dL 1.40* 0.49*   CALCIUM mg/dL 7.9* 8.0*   GLUCOSE mg/dL 22* 81   ALBUMIN g/dL 1.8* 2.1*   BILIRUBIN mg/dL 1.8* 1.2   ALK PHOS U/L 249* 235*   AST (SGOT) U/L 77* 63*   ALT (SGPT) U/L 65* 51*   Estimated Creatinine Clearance: 41.6 mL/min (A) (by C-G formula based on SCr of 1.4 mg/dL (H)).    No results  "found for: \"AMMONIA\"  Results from last 7 days   Lab Units 07/01/24  1125   HSTROP T ng/L 12         Glucose   Date/Time Value Ref Range Status   07/01/2024 1535 187 (H) 70 - 130 mg/dL Final   07/01/2024 1329 83 70 - 130 mg/dL Final   07/01/2024 1159 72 70 - 130 mg/dL Final   07/01/2024 1127 20 (C) 70 - 130 mg/dL Final     No results found for: \"TSH\", \"FREET4\"  Lab Results   Component Value Date    PREGTESTUR Negative 05/15/2015     Pain Management Panel           No data to display              Brief Urine Lab Results  (Last result in the past 365 days)        Color   Clarity   Blood   Leuk Est   Nitrite   Protein   CREAT   Urine HCG        07/01/24 1614 [place]   Cloudy   Trace   Small (1+)   Positive   30 mg/dL (1+)                   I have personally looked at the labs and they are summarized above.    Detailed radiology reports for the last 24 hours:    Imaging Results (Last 24 Hours)       Procedure Component Value Units Date/Time    XR Chest 1 View [[id number]] Collected: 07/01/24 1145     Updated: 07/01/24 1148    Narrative:      XR CHEST 1 VW-     CLINICAL INDICATION: Weak/Dizzy/AMS triage protocol        COMPARISON: 3/7/2024     TECHNIQUE: Single frontal view of the chest.     FINDINGS:     LUNGS: Lungs are adequately aerated.      HEART AND MEDIASTINUM: Heart and mediastinal contours are unremarkable        SKELETON: Bony and soft tissue structures are unremarkable.     Central line tip is stable  NG tube is been removed       Impression:      No radiographic evidence of acute cardiac or pulmonary disease.           This report was finalized on 7/1/2024 11:46 AM by Dr. Edmond Vasquez MD.             Final impressions for the last 30 days of radiology reports:    XR Chest 1 View    Result Date: 7/1/2024  No radiographic evidence of acute cardiac or pulmonary disease.    This report was finalized on 7/1/2024 11:46 AM by Dr. Edmond Vasquez MD.         Assessment & Plan      #Septic shock secondary to UTI  #Mild " hyperbilirubinemia  #Hypoglycemia  #Transaminitis  #Metastatic colorectal cancer on palliative chemotherapy  #Acute kidney injury with oliguria, likely secondary to ATN from septic shock  - Urine appeared grossly abnormal. She has had minimal output in the ER (reportedly about 70cc via straight catheterization) despite IV fluid resuscitation. Monitor strict intake/output. Repeat chemistry panel and CBC in a.m.  - Hypoglycemia resolved after IV dextrose. Monitor q2h accuchecks and continue D5 1/2NS for now. Unclear whether this is from sepsis or due to liver failure in the setting of metastatic disease and fatty infiltration of the liver. AST, ALT, and alk phos all appeared similar to prior labs, but on physical exam patient is tender in the RUQ and appears jaundiced so I have ordered a stat CT of the abdomen. Total bilirubin was elevated at 1.8. I have ordered total and direct bilirubin to further evaluate.   - Empiric antibiotics started for UTI in the ER with ceftriaxone. Continue same. Follow up on blood cultures and urine cultures.   - Continue vasopressor support with levophed with goal MAP >65  - ER physician spoke with patient's oncologist, Dr. Hanks, and said that he recommends goals of care discussion depending on patient's progress with current treatment and he has agreed to follow along. I discussed code status with patient, witnessed by a family member present at bedside, and patient stated she would not want CPR or intubation in the event of respiratory or cardiac failure/arrest.     #Pulmonary embolism, currently on therapeutic anticoagulation  #Acute on chronic anemia  - No increased oxygen requirement at this time. Continue home eliquis when reconciled by pharmacy. We will need to monitor blood counts closely with repeat cbc in a.m. as hemoglobin decreased from 12.4 on 6/26 to 9.8 today. Hemoglobin may have been falsely elevated previously due to hemoconcentration, as labs from May 2024 were more in  the 10-11 range. No active bleeding noted.     No Active Pharmacologic or Mechanical VTE Prophylaxis AND No VTE Prophylaxis Contraindications Documented   - Select Appropriate VTE ProphylaxisActive VTE Prophylaxis  Mechanical:        Start        Signed and Held  Maintain Sequential Compression Device  Continuous                              Dispo: admit INPATIENT status due to the need for care which can only be reasonably provided in an hospital setting such as aggressive/expedited ancillary services and/or consultation services, the necessity for IV medications, close physician monitoring and/or the possible need for procedures.  In such, I feel patient’s risk for adverse outcomes and need for care warrant INPATIENT evaluation and predict the patient’s care encounter to likely last beyond 2 midnights. Patient is high risk due to septic shock.    Yeimi Allen DO  [facility]  07/01/24  19:02 EDT

## 2024-07-02 ENCOUNTER — APPOINTMENT (OUTPATIENT)
Dept: CARDIOLOGY | Facility: HOSPITAL | Age: 54
End: 2024-07-02
Payer: COMMERCIAL

## 2024-07-02 LAB
ALBUMIN SERPL-MCNC: 1.5 G/DL (ref 3.5–5.2)
ALBUMIN/GLOB SERPL: 0.7 G/DL
ALP SERPL-CCNC: 237 U/L (ref 39–117)
ALT SERPL W P-5'-P-CCNC: 67 U/L (ref 1–33)
ANION GAP SERPL CALCULATED.3IONS-SCNC: 13.2 MMOL/L (ref 5–15)
AST SERPL-CCNC: 78 U/L (ref 1–32)
BASOPHILS # BLD AUTO: 0.01 10*3/MM3 (ref 0–0.2)
BASOPHILS NFR BLD AUTO: 0.1 % (ref 0–1.5)
BH CV ECHO MEAS - ACS: 2 CM
BH CV ECHO MEAS - AO MAX PG: 4.8 MMHG
BH CV ECHO MEAS - AO MEAN PG: 2 MMHG
BH CV ECHO MEAS - AO ROOT DIAM: 3.3 CM
BH CV ECHO MEAS - AO V2 MAX: 110 CM/SEC
BH CV ECHO MEAS - AO V2 VTI: 15.8 CM
BH CV ECHO MEAS - EDV(CUBED): 51.7 ML
BH CV ECHO MEAS - EDV(MOD-SP4): 38.3 ML
BH CV ECHO MEAS - EF(MOD-SP4): 67.1 %
BH CV ECHO MEAS - ESV(CUBED): 20.9 ML
BH CV ECHO MEAS - ESV(MOD-SP4): 12.6 ML
BH CV ECHO MEAS - FS: 26 %
BH CV ECHO MEAS - IVS/LVPW: 0.88 CM
BH CV ECHO MEAS - IVSD: 0.88 CM
BH CV ECHO MEAS - LA DIMENSION: 1.5 CM
BH CV ECHO MEAS - LAT PEAK E' VEL: 6.7 CM/SEC
BH CV ECHO MEAS - LV DIASTOLIC VOL/BSA (35-75): 21.9 CM2
BH CV ECHO MEAS - LV MASS(C)D: 104.1 GRAMS
BH CV ECHO MEAS - LV SYSTOLIC VOL/BSA (12-30): 7.2 CM2
BH CV ECHO MEAS - LVIDD: 3.7 CM
BH CV ECHO MEAS - LVIDS: 2.8 CM
BH CV ECHO MEAS - LVOT AREA: 3.1 CM2
BH CV ECHO MEAS - LVOT DIAM: 2 CM
BH CV ECHO MEAS - LVPWD: 1 CM
BH CV ECHO MEAS - MED PEAK E' VEL: 6.9 CM/SEC
BH CV ECHO MEAS - MV A MAX VEL: 67.3 CM/SEC
BH CV ECHO MEAS - MV E MAX VEL: 63.8 CM/SEC
BH CV ECHO MEAS - MV E/A: 0.95
BH CV ECHO MEAS - PA ACC TIME: 0.1 SEC
BH CV ECHO MEAS - SV(MOD-SP4): 25.7 ML
BH CV ECHO MEAS - SVI(MOD-SP4): 14.7 ML/M2
BH CV ECHO MEAS - TAPSE (>1.6): 2.07 CM
BH CV ECHO MEASUREMENTS AVERAGE E/E' RATIO: 9.38
BILIRUB SERPL-MCNC: 1.5 MG/DL (ref 0–1.2)
BUN SERPL-MCNC: 22 MG/DL (ref 6–20)
BUN/CREAT SERPL: 15.9 (ref 7–25)
CALCIUM SPEC-SCNC: 7.2 MG/DL (ref 8.6–10.5)
CHLORIDE SERPL-SCNC: 103 MMOL/L (ref 98–107)
CO2 SERPL-SCNC: 16.8 MMOL/L (ref 22–29)
CREAT SERPL-MCNC: 1.38 MG/DL (ref 0.57–1)
D-LACTATE SERPL-SCNC: 2 MMOL/L (ref 0.5–2)
DEPRECATED RDW RBC AUTO: 55.8 FL (ref 37–54)
EGFRCR SERPLBLD CKD-EPI 2021: 45.9 ML/MIN/1.73
EOSINOPHIL # BLD AUTO: 0 10*3/MM3 (ref 0–0.4)
EOSINOPHIL NFR BLD AUTO: 0 % (ref 0.3–6.2)
ERYTHROCYTE [DISTWIDTH] IN BLOOD BY AUTOMATED COUNT: 15.2 % (ref 12.3–15.4)
GLOBULIN UR ELPH-MCNC: 2.1 GM/DL
GLUCOSE BLDC GLUCOMTR-MCNC: 130 MG/DL (ref 70–130)
GLUCOSE BLDC GLUCOMTR-MCNC: 134 MG/DL (ref 70–130)
GLUCOSE BLDC GLUCOMTR-MCNC: 135 MG/DL (ref 70–130)
GLUCOSE BLDC GLUCOMTR-MCNC: 143 MG/DL (ref 70–130)
GLUCOSE BLDC GLUCOMTR-MCNC: 146 MG/DL (ref 70–130)
GLUCOSE BLDC GLUCOMTR-MCNC: 167 MG/DL (ref 70–130)
GLUCOSE BLDC GLUCOMTR-MCNC: 70 MG/DL (ref 70–130)
GLUCOSE SERPL-MCNC: 75 MG/DL (ref 65–99)
HCT VFR BLD AUTO: 27.8 % (ref 34–46.6)
HGB BLD-MCNC: 9.6 G/DL (ref 12–15.9)
IMM GRANULOCYTES # BLD AUTO: 0.04 10*3/MM3 (ref 0–0.05)
IMM GRANULOCYTES NFR BLD AUTO: 0.3 % (ref 0–0.5)
LEFT ATRIUM VOLUME INDEX: 8.2 ML/M2
LYMPHOCYTES # BLD AUTO: 1.94 10*3/MM3 (ref 0.7–3.1)
LYMPHOCYTES NFR BLD AUTO: 16.8 % (ref 19.6–45.3)
MCH RBC QN AUTO: 35.2 PG (ref 26.6–33)
MCHC RBC AUTO-ENTMCNC: 34.5 G/DL (ref 31.5–35.7)
MCV RBC AUTO: 101.8 FL (ref 79–97)
MONOCYTES # BLD AUTO: 0.5 10*3/MM3 (ref 0.1–0.9)
MONOCYTES NFR BLD AUTO: 4.3 % (ref 5–12)
NEUTROPHILS NFR BLD AUTO: 78.5 % (ref 42.7–76)
NEUTROPHILS NFR BLD AUTO: 9.08 10*3/MM3 (ref 1.7–7)
NRBC BLD AUTO-RTO: 0 /100 WBC (ref 0–0.2)
PLATELET # BLD AUTO: 173 10*3/MM3 (ref 140–450)
PMV BLD AUTO: 9.7 FL (ref 6–12)
POTASSIUM SERPL-SCNC: 3.8 MMOL/L (ref 3.5–5.2)
PROT SERPL-MCNC: 3.6 G/DL (ref 6–8.5)
RBC # BLD AUTO: 2.73 10*6/MM3 (ref 3.77–5.28)
SODIUM SERPL-SCNC: 133 MMOL/L (ref 136–145)
WBC NRBC COR # BLD AUTO: 11.57 10*3/MM3 (ref 3.4–10.8)

## 2024-07-02 PROCEDURE — 36410 VNPNXR 3YR/> PHY/QHP DX/THER: CPT

## 2024-07-02 PROCEDURE — 80053 COMPREHEN METABOLIC PANEL: CPT | Performed by: STUDENT IN AN ORGANIZED HEALTH CARE EDUCATION/TRAINING PROGRAM

## 2024-07-02 PROCEDURE — 99222 1ST HOSP IP/OBS MODERATE 55: CPT | Performed by: INTERNAL MEDICINE

## 2024-07-02 PROCEDURE — 85025 COMPLETE CBC W/AUTO DIFF WBC: CPT | Performed by: STUDENT IN AN ORGANIZED HEALTH CARE EDUCATION/TRAINING PROGRAM

## 2024-07-02 PROCEDURE — 25010000002 PIPERACILLIN SOD-TAZOBACTAM PER 1 G: Performed by: STUDENT IN AN ORGANIZED HEALTH CARE EDUCATION/TRAINING PROGRAM

## 2024-07-02 PROCEDURE — 99232 SBSQ HOSP IP/OBS MODERATE 35: CPT | Performed by: STUDENT IN AN ORGANIZED HEALTH CARE EDUCATION/TRAINING PROGRAM

## 2024-07-02 PROCEDURE — 99221 1ST HOSP IP/OBS SF/LOW 40: CPT | Performed by: SURGERY

## 2024-07-02 PROCEDURE — C1751 CATH, INF, PER/CENT/MIDLINE: HCPCS

## 2024-07-02 PROCEDURE — 93306 TTE W/DOPPLER COMPLETE: CPT

## 2024-07-02 PROCEDURE — 25010000002 ENOXAPARIN PER 10 MG: Performed by: STUDENT IN AN ORGANIZED HEALTH CARE EDUCATION/TRAINING PROGRAM

## 2024-07-02 PROCEDURE — 83605 ASSAY OF LACTIC ACID: CPT | Performed by: EMERGENCY MEDICINE

## 2024-07-02 PROCEDURE — 82948 REAGENT STRIP/BLOOD GLUCOSE: CPT

## 2024-07-02 PROCEDURE — 93306 TTE W/DOPPLER COMPLETE: CPT | Performed by: INTERNAL MEDICINE

## 2024-07-02 PROCEDURE — 74176 CT ABD & PELVIS W/O CONTRAST: CPT | Performed by: RADIOLOGY

## 2024-07-02 RX ORDER — METOCLOPRAMIDE 10 MG/1
5 TABLET ORAL 3 TIMES DAILY PRN
Status: DISCONTINUED | OUTPATIENT
Start: 2024-07-02 | End: 2024-07-11

## 2024-07-02 RX ORDER — DEXTROSE MONOHYDRATE 25 G/50ML
INJECTION, SOLUTION INTRAVENOUS
Status: COMPLETED
Start: 2024-07-02 | End: 2024-07-02

## 2024-07-02 RX ORDER — DICYCLOMINE HYDROCHLORIDE 10 MG/1
10 CAPSULE ORAL
Status: DISCONTINUED | OUTPATIENT
Start: 2024-07-02 | End: 2024-07-09

## 2024-07-02 RX ORDER — SODIUM CHLORIDE 0.9 % (FLUSH) 0.9 %
10 SYRINGE (ML) INJECTION AS NEEDED
Status: DISCONTINUED | OUTPATIENT
Start: 2024-07-02 | End: 2024-07-13 | Stop reason: HOSPADM

## 2024-07-02 RX ORDER — PANTOPRAZOLE SODIUM 40 MG/1
40 TABLET, DELAYED RELEASE ORAL
Status: DISCONTINUED | OUTPATIENT
Start: 2024-07-03 | End: 2024-07-05

## 2024-07-02 RX ORDER — ONDANSETRON 4 MG/1
8 TABLET, FILM COATED ORAL EVERY 8 HOURS PRN
Status: DISCONTINUED | OUTPATIENT
Start: 2024-07-02 | End: 2024-07-04

## 2024-07-02 RX ORDER — CASTOR OIL AND BALSAM, PERU 788; 87 MG/G; MG/G
1 OINTMENT TOPICAL EVERY 12 HOURS SCHEDULED
Status: DISCONTINUED | OUTPATIENT
Start: 2024-07-02 | End: 2024-07-13 | Stop reason: HOSPADM

## 2024-07-02 RX ORDER — HYDROCODONE BITARTRATE AND ACETAMINOPHEN 5; 325 MG/1; MG/1
1 TABLET ORAL EVERY 8 HOURS PRN
Status: DISCONTINUED | OUTPATIENT
Start: 2024-07-02 | End: 2024-07-13 | Stop reason: HOSPADM

## 2024-07-02 RX ORDER — DIPHENHYDRAMINE HYDROCHLORIDE AND LIDOCAINE HYDROCHLORIDE AND ALUMINUM HYDROXIDE AND MAGNESIUM HYDRO
5 KIT EVERY 6 HOURS
Status: DISCONTINUED | OUTPATIENT
Start: 2024-07-02 | End: 2024-07-03

## 2024-07-02 RX ORDER — SODIUM CHLORIDE 9 MG/ML
40 INJECTION, SOLUTION INTRAVENOUS AS NEEDED
Status: DISCONTINUED | OUTPATIENT
Start: 2024-07-02 | End: 2024-07-13 | Stop reason: HOSPADM

## 2024-07-02 RX ORDER — DEXTROSE MONOHYDRATE 25 G/50ML
25 INJECTION, SOLUTION INTRAVENOUS
Status: DISCONTINUED | OUTPATIENT
Start: 2024-07-02 | End: 2024-07-13 | Stop reason: HOSPADM

## 2024-07-02 RX ORDER — GLUCAGON 1 MG/ML
1 KIT INJECTION
Status: DISCONTINUED | OUTPATIENT
Start: 2024-07-02 | End: 2024-07-13 | Stop reason: HOSPADM

## 2024-07-02 RX ORDER — ENOXAPARIN SODIUM 100 MG/ML
1 INJECTION SUBCUTANEOUS EVERY 12 HOURS
Status: DISCONTINUED | OUTPATIENT
Start: 2024-07-02 | End: 2024-07-09

## 2024-07-02 RX ORDER — NYSTATIN 100000 U/G
1 CREAM TOPICAL 2 TIMES DAILY
Status: DISCONTINUED | OUTPATIENT
Start: 2024-07-02 | End: 2024-07-13 | Stop reason: HOSPADM

## 2024-07-02 RX ORDER — SODIUM CHLORIDE 0.9 % (FLUSH) 0.9 %
10 SYRINGE (ML) INJECTION EVERY 12 HOURS SCHEDULED
Status: DISCONTINUED | OUTPATIENT
Start: 2024-07-02 | End: 2024-07-13 | Stop reason: HOSPADM

## 2024-07-02 RX ORDER — NICOTINE POLACRILEX 4 MG
15 LOZENGE BUCCAL
Status: DISCONTINUED | OUTPATIENT
Start: 2024-07-02 | End: 2024-07-13 | Stop reason: HOSPADM

## 2024-07-02 RX ADMIN — DEXTROSE AND SODIUM CHLORIDE 125 ML/HR: 5; 450 INJECTION, SOLUTION INTRAVENOUS at 09:31

## 2024-07-02 RX ADMIN — PIPERACILLIN AND TAZOBACTAM 4.5 G: 4; .5 INJECTION, POWDER, FOR SOLUTION INTRAVENOUS at 10:22

## 2024-07-02 RX ADMIN — Medication 10 ML: at 22:45

## 2024-07-02 RX ADMIN — DIPHENHYDRAMINE HYDROCHLORIDE AND LIDOCAINE HYDROCHLORIDE AND ALUMINUM HYDROXIDE AND MAGNESIUM HYDRO 5 ML: KIT at 15:09

## 2024-07-02 RX ADMIN — MUPIROCIN 1 APPLICATION: 20 OINTMENT TOPICAL at 22:48

## 2024-07-02 RX ADMIN — Medication 1 APPLICATION: at 22:46

## 2024-07-02 RX ADMIN — Medication 1 APPLICATION: at 13:05

## 2024-07-02 RX ADMIN — PIPERACILLIN AND TAZOBACTAM 4.5 G: 4; .5 INJECTION, POWDER, FOR SOLUTION INTRAVENOUS at 18:03

## 2024-07-02 RX ADMIN — Medication 10 ML: at 08:11

## 2024-07-02 RX ADMIN — Medication 0.3 MCG/KG/MIN: at 06:00

## 2024-07-02 RX ADMIN — NYSTATIN 1 APPLICATION: 100000 CREAM TOPICAL at 22:47

## 2024-07-02 RX ADMIN — DEXTROSE AND SODIUM CHLORIDE 125 ML/HR: 5; 450 INJECTION, SOLUTION INTRAVENOUS at 18:05

## 2024-07-02 RX ADMIN — DEXTROSE MONOHYDRATE 25 G: 25 INJECTION, SOLUTION INTRAVENOUS at 00:58

## 2024-07-02 RX ADMIN — DIPHENHYDRAMINE HYDROCHLORIDE AND LIDOCAINE HYDROCHLORIDE AND ALUMINUM HYDROXIDE AND MAGNESIUM HYDRO 5 ML: KIT at 22:47

## 2024-07-02 RX ADMIN — ENOXAPARIN SODIUM 60 MG: 60 INJECTION SUBCUTANEOUS at 18:38

## 2024-07-02 RX ADMIN — Medication 10 ML: at 22:44

## 2024-07-02 RX ADMIN — Medication 0.24 MCG/KG/MIN: at 15:09

## 2024-07-02 RX ADMIN — Medication 0.3 MCG/KG/MIN: at 00:20

## 2024-07-02 NOTE — CONSULTS
Date:  07/02/24  Referring Provider:   Reason for Consultation:     Patient Care Team:  Urvashi Basurto APRN as PCP - General (Nurse Practitioner)  Francine Garcia MSW as     Chief complaint: Generalized weakness    History of present illness:  Vangie Carney is a 54 y.o. year-old female seen today at the request of Dr. Allen  for evaluation and treatment of septic shock.      Ms. Carney has history of metastatic colon adenocarcinoma on palliative chemotherapy with q2w Bevacizumab (last cycle 6/26/24) who presents with nausea, vomiting, PO intolerance, and abdominal distention. On presentation, she was noted to be hypotensive and tachycardic, started on antibiotics (currently on ceftriaxone) and pressors (currently on levophed 0.3 mcg/kg/min). Urinalysis +, cultures are pending. On presentation CT A/P with multiple dilated loops of small bowel consistent with small bowel obstruction, right pleural effusion and pericardial effusion, fatty infiltration of liver.     She is in bed this AM,  at bedside. Denies abdominal pain, does endorse abdominal distention. Last bowel movement was 6/30, was passing flatus yesterday. Nausea and vomiting has subsided.     We discussed goals of care- patient would like time to make decision. She and her  report pt was able to ambulate prior to presentation. This is a sudden decline compared to her baseline. She does endorse burning sensation in her mouth and throat, which prevents her from eating at home.     Oncology history: Pt of Dr. Hanks, please see his last note for more details, but in brief:  February 2023: Developed BRBPR, colonoscopy identified tumor in sigmoid colon, biopsy moderately differentiated adenocarcinoma.   March 2023: Underwent LAR at [place]   March 2023: Met with Dr. Hanks regarding adjuvant chemotherapy- lost to follow up  January 2024: Presented to o/p clinic due to constipation, back pain. CT with recurrent/metastatic  disease  February-2024: Received palliative 5-FU/Leucovorin total of 4 cycles. However, due to intolerance therapy was changed   May 2024-Present: on palliative bevacizumab (4 cycles total). Last one 24           History  Past Medical History:   Diagnosis Date    Gallbladder attack     GERD (gastroesophageal reflux disease)     Hearing aid worn     History of ear infections     Malignant neoplasm of sigmoid colon 3/14/2023    Seasonal allergies     Wears glasses        Past Surgical History:   Procedure Laterality Date    CHOLECYSTECTOMY      COLON RESECTION N/A 3/14/2023    Procedure: COLON RESECTION LOW ANTERIOR LAPAROSCOPIC WITH DAVINCI ROBOT;  Surgeon: Shari Aguirre MD;  Location:  [facility];  Service: Robotics - DaVinci;  Laterality: N/A;    COLONOSCOPY N/A 2/15/2023    Procedure: COLONOSCOPY FOR SCREENING;  Surgeon: Giselle Iniguez MD;  Location:  [facility];  Service: Gastroenterology;  Laterality: N/A;    ENDOSCOPY N/A 2/15/2023    Procedure: ESOPHAGOGASTRODUODENOSCOPY WITH BIOPSY;  Surgeon: Giselle Iniguez MD;  Location: [facility] OR;  Service: Gastroenterology;  Laterality: N/A;    LAPAROSCOPIC TUBAL LIGATION Bilateral     MIDDLE EAR SURGERY      PORTACATH PLACEMENT N/A 2024    Procedure: INSERTION OF PORTACATH;  Surgeon: Jaylen Leal MD;  Location: [facility] OR;  Service: General;  Laterality: N/A;       Family History   Problem Relation Age of Onset    Cancer Mother     Cancer Father     Cancer Sister     Cancer Brother     Cancer Maternal Grandmother     Cancer Maternal Grandfather     Breast cancer Neg Hx        Social History     Tobacco Use    Smoking status: Former     Current packs/day: 0.00     Average packs/day: 1 pack/day for 30.0 years (30.0 ttl pk-yrs)     Types: Cigarettes     Start date:      Quit date: 2017     Years since quittin.5     Passive exposure: Never    Smokeless tobacco: Never   Vaping Use    Vaping status: Every Day    Substances:  Nicotine, Flavoring    Devices: VPEP tank   Substance Use Topics    Alcohol use: Never    Drug use: Defer       Allergies:  Keflex [cephalexin]    Outpatient Medications:  Medications Prior to Admission   Medication Sig Dispense Refill Last Dose    apixaban (ELIQUIS) 5 MG tablet tablet Take 1 tablet by mouth 2 (Two) Times a Day. 60 tablet 6 6/30/2024    dicyclomine (BENTYL) 10 MG capsule Take 1 capsule by mouth 4 (Four) Times a Day Before Meals & at Bedtime. 60 capsule 1 6/30/2024    HYDROcodone-acetaminophen (NORCO) 5-325 MG per tablet Take 1-2 tablets by mouth Every 8 (Eight) Hours As Needed for Mild Pain.   Past Week    lidocaine-prilocaine (EMLA) 2.5-2.5 % cream Apply to port-a-cath site 30 minutes prior to arrival at infusion center. Cover with plastic wrap. 30 g 1 Past Month    metoclopramide (REGLAN) 10 MG tablet Take 1 tablet by mouth 3 (Three) Times a Day As Needed (for nausea).   Past Week    nystatin (MYCOSTATIN) 079960 UNIT/GM cream Apply 1 Application topically to the appropriate area as directed 2 (Two) Times a Day. 15 g 0 Past Month    ondansetron (ZOFRAN) 8 MG tablet Take 1 tablet by mouth Every 8 (Eight) Hours As Needed for Nausea or Vomiting.   Past Week    pantoprazole (PROTONIX) 40 MG EC tablet Take 1 tablet by mouth Daily. 30 tablet 5 6/30/2024    sennosides-docusate (PERICOLACE) 8.6-50 MG per tablet Take 2 tablets by mouth 2 (Two) Times a Day As Needed for Constipation.   Past Week    sulfamethoxazole-trimethoprim (BACTRIM,SEPTRA) 200-40 MG/5ML suspension Take 20 mL by mouth 2 (Two) Times a Day for 10 days. 400 mL 0 6/30/2024    granisetron (Sancuso) 3.1 MG/24HR Place 1 patch on the skin once every 7 days. 4 patch 3 6/26/2024       Inpatient Medications:  Scheduled Meds:  cefTRIAXone, 2,000 mg, Intravenous, Q24H  sodium chloride, 10 mL, Intravenous, Q12H  sodium chloride, 10 mL, Intravenous, Q12H  sodium chloride, 10 mL, Intravenous, Q12H        Continuous Infusions:  dextrose 5 % and  sodium chloride 0.45 %, 125 mL/hr, Last Rate: 125 mL/hr (07/02/24 0058)  norepinephrine, 0.02-0.3 mcg/kg/min, Last Rate: 0.299 mcg/kg/min (07/02/24 0600)        PRN Meds:    senna-docusate sodium **AND** polyethylene glycol **AND** bisacodyl **AND** bisacodyl    dextrose    dextrose    glucagon (human recombinant)    heparin    nitroglycerin    sodium chloride    Access VAD **AND** sodium chloride    sodium chloride    sodium chloride    sodium chloride    sodium chloride    sodium chloride    sodium chloride    sodium chloride    Review of Systems  A comprehensive 14 point review of systems was performed.  Significant findings as mentioned above.  All other systems reviewed and are negative.       Physical Exam:  Vital Signs   Patient Vitals for the past 24 hrs:   BP Temp Temp src Pulse Resp SpO2 Height Weight   07/02/24 0800 -- 97.9 °F (36.6 °C) Axillary -- -- -- -- --   07/02/24 0600 91/67 -- -- 111 13 98 % -- --   07/02/24 0545 (!) 89/62 -- -- 114 -- 98 % -- --   07/02/24 0530 (!) 84/57 -- -- 110 -- 97 % -- --   07/02/24 0515 (!) 85/60 -- -- 115 -- 98 % -- --   07/02/24 0500 (!) 84/69 -- -- 109 14 98 % -- --   07/02/24 0445 (!) 82/58 -- -- 111 -- 97 % -- --   07/02/24 0430 (!) 82/64 -- -- 109 -- 98 % -- --   07/02/24 0415 (!) 81/61 -- -- 108 -- 97 % -- --   07/02/24 0400 (!) 82/60 97.4 °F (36.3 °C) Oral 107 13 98 % -- 64.6 kg (142 lb 6.7 oz)   07/02/24 0345 90/61 -- -- 106 -- 99 % -- --   07/02/24 0315 92/58 -- -- 115 -- 97 % -- --   07/02/24 0300 (!) 89/67 -- -- 118 14 98 % -- --   07/02/24 0245 90/54 -- -- 111 -- 99 % -- --   07/02/24 0230 (!) 89/69 -- -- 111 -- 98 % -- --   07/02/24 0215 (!) 83/62 -- -- 120 -- 99 % -- --   07/02/24 0200 (!) 85/65 -- -- 113 17 98 % -- --   07/02/24 0145 95/62 -- -- 108 -- 100 % -- --   07/02/24 0130 97/55 -- -- 107 -- 99 % -- --   07/02/24 0115 (!) 87/69 -- -- 110 -- 100 % -- --   07/02/24 0100 (!) 87/56 -- -- 117 16 99 % -- --   07/02/24 0045 94/59 -- -- 114 -- 100 % -- --    07/02/24 0030 (!) 78/56 -- -- 97 -- 97 % -- --   07/02/24 0020 (!) 81/53 -- -- 100 -- -- -- --   07/02/24 0015 (!) 81/53 -- -- 102 -- 100 % -- --   07/02/24 0000 (!) 81/54 97.4 °F (36.3 °C) Oral 100 16 100 % -- --   07/01/24 2347 (!) 72/39 -- -- 106 -- -- -- --   07/01/24 2345 (!) 72/39 -- -- 112 -- 100 % -- --   07/01/24 2330 (!) 79/56 -- -- 104 -- 100 % -- --   07/01/24 2325 (!) 85/50 -- -- 104 14 -- -- --   07/01/24 2245 (!) 73/61 -- -- 112 -- 97 % -- --   07/01/24 2230 (!) 73/59 -- -- 107 -- 98 % -- --   07/01/24 2215 92/57 -- -- 112 -- 99 % -- --   07/01/24 2200 (!) 79/61 -- -- 112 -- 100 % -- --   07/01/24 2149 (!) 72/55 -- -- 108 -- -- -- --   07/01/24 2145 (!) 72/55 -- -- 110 -- 99 % -- --   07/01/24 2130 (!) 73/60 -- -- 109 -- 100 % -- --   07/01/24 2115 (!) 81/58 -- -- 98 -- 99 % -- --   07/01/24 2100 (!) 84/59 -- -- 104 15 99 % -- --   07/01/24 2047 (!) 71/48 -- -- 105 -- -- -- --   07/01/24 2045 (!) 71/48 -- -- 105 -- 98 % -- --   07/01/24 2034 (!) 72/49 -- -- 112 -- -- -- --   07/01/24 2030 (!) 60/47 -- -- 111 -- 99 % -- --   07/01/24 2015 (!) 84/49 -- -- 112 -- 100 % -- --   07/01/24 2012 (!) 72/51 -- -- 111 -- -- -- --   07/01/24 2000 (!) 72/51 97.3 °F (36.3 °C) Oral 120 14 90 % -- --   07/01/24 1946 (!) 73/59 -- -- 113 -- -- -- --   07/01/24 1945 (!) 73/59 -- -- 117 -- 100 % -- --   07/01/24 1850 (!) 80/55 -- -- 120 -- 100 % -- --   07/01/24 1840 (!) 84/49 -- -- 117 -- 100 % -- --   07/01/24 1830 (!) 83/55 -- -- 117 -- 100 % -- --   07/01/24 1820 (!) 80/51 -- -- 116 -- 97 % -- --   07/01/24 1810 (!) 87/62 -- -- (!) 122 -- (!) 71 % -- --   07/01/24 1800 (!) 82/58 -- -- 118 -- 100 % -- --   07/01/24 1730 94/61 -- -- 105 -- 100 % -- --   07/01/24 1700 (!) 81/59 -- -- 118 -- 100 % -- --   07/01/24 1650 (!) 89/71 -- -- 117 -- 99 % -- --   07/01/24 1640 (!) 85/58 -- -- 114 -- 100 % -- --   07/01/24 1630 (!) 84/61 -- -- 110 -- 100 % -- --   07/01/24 1620 91/67 -- -- 113 -- 100 % -- --   07/01/24 1610 (!)  "80/59 -- -- 118 -- 100 % -- --   07/01/24 1600 (!) 85/60 -- -- 114 -- 98 % -- --   07/01/24 1551 -- -- -- 113 -- -- -- --   07/01/24 1550 (!) 82/57 -- -- -- -- 92 % -- --   07/01/24 1540 91/61 -- -- 117 -- 100 % -- --   07/01/24 1530 (!) 81/57 -- -- 117 -- 100 % -- --   07/01/24 1520 (!) 87/57 -- -- 120 -- 100 % -- --   07/01/24 1500 (!) 80/59 -- -- 120 -- 100 % -- --   07/01/24 1440 (!) 84/62 -- -- 120 -- 91 % -- --   07/01/24 1430 (!) 83/58 -- -- 108 -- 93 % -- --   07/01/24 1420 (!) 88/57 -- -- 112 -- 100 % -- --   07/01/24 1410 (!) 88/60 -- -- 109 -- 100 % -- --   07/01/24 1400 (!) 87/61 -- -- 117 -- 100 % -- --   07/01/24 1340 (!) 74/58 -- -- 114 -- 100 % -- --   07/01/24 1331 -- -- -- -- -- 91 % -- --   07/01/24 1330 (!) 75/59 -- -- 118 -- -- -- --   07/01/24 1310 (!) 85/60 -- -- 113 -- 100 % -- --   07/01/24 1300 (!) 82/62 -- -- 110 -- 94 % -- --   07/01/24 1253 (!) 87/57 -- -- (!) 123 -- 98 % -- --   07/01/24 1250 (!) 82/60 -- -- (!) 122 -- (!) 85 % -- --   07/01/24 1240 (!) 84/66 -- -- 118 -- -- -- --   07/01/24 1230 (!) 85/62 -- -- 113 -- 97 % -- --   07/01/24 1220 99/73 -- -- 114 -- 96 % -- --   07/01/24 1210 104/77 -- -- 116 -- 94 % -- --   07/01/24 1150 94/58 -- -- 105 -- 100 % -- --   07/01/24 1114 (!) 79/54 97.6 °F (36.4 °C) Oral 107 18 99 % 170.2 cm (67\") 56.7 kg (125 lb)       General:  Awake, alert and oriented, in bed  HEENT:  Pupils are equal, round and reactive to light and accommodation  Neck:  No JVD, thyromegaly or lymphadenopathy  CV:  Regular rate and rhythm, no murmurs, rubs or gallops  Resp:  Lungs are clear to auscultation bilaterally  Abd:  Soft, non-tender, +distended, bowel sounds minimal, no organomegaly  Ext:  No clubbing, cyanosis or edema  Lymph:  No cervical, supraclavicular, axillary, inguinal or femoral adenopathy  Neuro:  MS as above, CN II-XII intact, grossly non-focal exam      Labs / Studies:  Lab Results   Component Value Date    WBC 11.57 (H) 07/02/2024    HGB 9.6 (L) " 07/02/2024    HCT 27.8 (L) 07/02/2024    .8 (H) 07/02/2024    RDW 15.2 07/02/2024     07/02/2024    NEUTRORELPCT 78.5 (H) 07/02/2024    LYMPHORELPCT 16.8 (L) 07/02/2024    MONORELPCT 4.3 (L) 07/02/2024    EOSRELPCT 0.0 (L) 07/02/2024    BASORELPCT 0.1 07/02/2024    NEUTROABS 9.08 (H) 07/02/2024    LYMPHSABS 1.94 07/02/2024       Lab Results   Component Value Date     (L) 07/02/2024    K 3.8 07/02/2024    CO2 16.8 (L) 07/02/2024     07/02/2024    BUN 22 (H) 07/02/2024    CREATININE 1.38 (H) 07/02/2024    EGFRIFNONA 79 09/10/2021    GLUCOSE 75 07/02/2024    CALCIUM 7.2 (L) 07/02/2024    ALKPHOS 237 (H) 07/02/2024    AST 78 (H) 07/02/2024    ALT 67 (H) 07/02/2024    BILITOT 1.5 (H) 07/02/2024    ALBUMIN 1.5 (L) 07/02/2024    PROTEINTOT 3.6 (L) 07/02/2024    MG 1.8 07/01/2024    PHOS 3.3 03/11/2024       Imaging Results (Last 72 Hours)       Procedure Component Value Units Date/Time    CT Abdomen Pelvis Without Contrast [[id number]] Collected: 07/02/24 0028     Updated: 07/02/24 0035    Narrative:      EXAMINATION: CT abdomen and pelvis without contrast     CLINICAL HISTORY: Abdominal distention.     COMPARISON: 4/21/2024     TECHNIQUE: Contiguous 5 mm thick slices were obtained through the  abdomen and pelvis without contrast. Coronal and sagittal reformats were  performed.     FINDINGS:     ABDOMEN:  At least a small right pleural effusion is noted, incompletely imaged.  Similarly there is at least a small pericardial effusion, also  incompletely imaged. These are new when compared with the prior study.     The liver is diffusely low in attenuation in a pattern suggesting fatty  infiltration. There has been prior cholecystectomy. No hydronephrosis  either kidney. No definite ureteral calculus. The unenhanced spleen,  adrenal glands and pancreas appear grossly normal.     PELVIS:  Postsurgical changes are noted within the sigmoid region where a suture  line is present. There are multiple  dilated loops of small bowel with  fecalization of small bowel contents. The findings are consistent with  at least high-grade partial small bowel obstruction. No free air is  identified to suggest perforation. A small amount of free fluid is  noted. No loculated collection is identified to suggest an abscess.       Impression:      1. Multiple dilated loops of small bowel consistent with small bowel  obstruction.  2. Right pleural effusion and pericardial effusion, incompletely imaged.  These are new when compared to the prior study.  3. Fatty infiltration of the liver.  4. No hydronephrosis or ureteral calculus.     This report was finalized on 7/2/2024 12:33 AM by Trenton Zurita MD.       XR Chest 1 View [[id number]] Collected: 07/01/24 1145     Updated: 07/01/24 1148    Narrative:      XR CHEST 1 VW-     CLINICAL INDICATION: Weak/Dizzy/AMS triage protocol        COMPARISON: 3/7/2024     TECHNIQUE: Single frontal view of the chest.     FINDINGS:     LUNGS: Lungs are adequately aerated.      HEART AND MEDIASTINUM: Heart and mediastinal contours are unremarkable        SKELETON: Bony and soft tissue structures are unremarkable.     Central line tip is stable  NG tube is been removed       Impression:      No radiographic evidence of acute cardiac or pulmonary disease.           This report was finalized on 7/1/2024 11:46 AM by Dr. Edmond Vasquez MD.                 Assessment & Plan:  Vangie Carney is a 54 y.o. year-old female presenting with     Septic shock  Small bowel obstruction   -Patient with hypotension, tachycardia, and positive urinalysis. Currently on antibiotics and pressors (0.3 mcg/kg/min)  -Blood and urine cultures pending   -Per patient and - last BM 6/30, passing flatus 7/1  -General surgery consulted- appreciate assistance. Would recommend minimally invasive procedure as patient recently received bevacizumab 6/26, which is associated with perforation and poor wound healing     3. Metastatic  colon adenocarcinoma   -Primary oncologist, Dr. Hanks  -Patient with localized descending/sigmoid moderately differentiated adenocarcinoma March 2023 status post APR. Opted out of adjuvant chemotherapy. Presented 1/2024 with recurrent/metastatic cancer  -Received 4 cycles of 5-FU/leucovorin 2-4/2024, switched to palliative bevacizumab due to intolerance, received 4 cycles thus far  -Goals of care was discussed by Dr. Allen- switched to DNR-DNI.We discussed that this morning, patient and  did not seem ready for further palliative discussion. Would recommend palliative care consult      Thank you for the consultation and allowing us to participate in the care of Ms. Carney. Please do not hesitate to contact us with any questions or concerns.           Dominga Rodriguez MD  07/02/24  08:28 EDT    I spent 60 minutes with Vangie Carney today.  More than 50% of the time was spent in counseling / coordination of care for the above problems.

## 2024-07-02 NOTE — PLAN OF CARE
Goal Outcome Evaluation:  Plan of Care Reviewed With: patient        Progress: no change  Outcome Evaluation: Pt remains AOx4 on room air. Levophed gtt infusing. Colmenares in place. Minimal UOP. MD notified.  at bedside. Afebrile. VSS. Call light in reach. Care ongoing.

## 2024-07-02 NOTE — CONSULTS
Assessment:  Diagnosis: septic shock due to undetermined organism    Allergies   Allergen Reactions    Keflex [Cephalexin] Nausea Only     Beta lactam allergy details  Antibiotic reaction: nausea  Age at reaction: adult  Dose to reaction time: (!) minutes  Reason for antibiotic: unknown  Epinephrine required for reaction?: no  Tolerated antibiotics: augmentin, amoxicillin          Order Date/Time: 7/2/2024 1238  Indications: difficult acces; iv abx  LABS:  Lab Results   Component Value Date    INR 0.91 04/21/2024    PROTIME 12.8 04/21/2024     Lab Results   Component Value Date    PTT 25.9 (L) 04/21/2024     Lab Results   Component Value Date    WBC 11.57 (H) 07/02/2024    HGB 9.6 (L) 07/02/2024    HCT 27.8 (L) 07/02/2024    .8 (H) 07/02/2024     07/02/2024     Lab Results   Component Value Date    BUN 22 (H) 07/02/2024     Lab Results   Component Value Date    CREATININE 1.38 (H) 07/02/2024     Lab Results   Component Value Date    EGFRIFNONA 79 09/10/2021     Labs Reviewed: all labs reviewed    Contraindications for PICC/Midline:  No contraindications noted    US PIV or Midline requested per consult. Ultrasound used to assess upper extremity vasculature. Patient vasculature does not currently support US PIV placement at this time.    Recommendations:  PowerGlide Pro Midline Catheter; 18 g; 10 cm  Upper Left Brachial    Procedure Time Out:  Time out Time: 1425  Correct Patient Identity: Yes  Correct Surgical Side and Site Are Marked: Yes  Agreement on Procedure to be done: Yes  Antibiotic Given: N/A  RN:DANELLE Peters RN    PowerGlide Pro Midline Catheter placed with ultrasound guidance and verified by blood return. Minimal blood loss noted. Catheter flushes easily. Blood return noted. Patient nurse, Leonor OLSEN, made aware that midline is in upper L arm and ready for use.      Barbara Peters RN

## 2024-07-02 NOTE — PROGRESS NOTES
[facility] HOSPITALIST PROGRESS NOTE     Patient Identification:  Name:  Vangie Carney  Age:  54 y.o.  Sex:  female  :  1970  MRN:  [id number]  Visit Number:  [id number]  ROOM: [address]     Primary Care Provider:  Urvashi Basurto APRN    Length of stay in inpatient status:  1    Subjective     Chief Compliant:    Chief Complaint   Patient presents with    Weakness - Generalized       History of Presenting Illness:    Patient was seen and examined this morning with her  and her a.m. nurse present at bedside. Patient denies abdominal pain this morning and has not had vomiting today, but  says she has reported nausea that occurs with movement. She complains of pain in her mouth and slightly in her upper throat.  says that she had magic mouthwash at home, but she did not like to use it. She says she is agreeable to try it again now. She denies shortness of breath. I provided updates on patient's condition to patient/ at bedside and discussed code status again due to concerns voiced to nursing staff by patient's . We discussed CPR/intubation, defibrillation, etc. and patient and  were both in agreement for DNR/DNI.    Objective     Current Hospital Meds:piperacillin-tazobactam, 4.5 g, Intravenous, Once  piperacillin-tazobactam, 4.5 g, Intravenous, Q8H  sodium chloride, 10 mL, Intravenous, Q12H  sodium chloride, 10 mL, Intravenous, Q12H  sodium chloride, 10 mL, Intravenous, Q12H    dextrose 5 % and sodium chloride 0.45 %, 125 mL/hr, Last Rate: 125 mL/hr (24 0931)  norepinephrine, 0.02-0.3 mcg/kg/min, Last Rate: 0.299 mcg/kg/min (24 0600)        Current Antimicrobial Therapy:  Anti-Infectives (From admission, onward)      Ordered     Dose/Rate Route Frequency Start Stop    24 0940  piperacillin-tazobactam (ZOSYN) IVPB 4.5 g IVPB in 100 mL NS (VTB)        Ordering Provider: Yeimi Allen DO    4.5 g  over 4 Hours Intravenous Every 8  Hours 07/02/24 1700 07/09/24 1659    07/02/24 0940  piperacillin-tazobactam (ZOSYN) IVPB 4.5 g IVPB in 100 mL NS (VTB)        Ordering Provider: Yeimi Allen DO    4.5 g  over 30 Minutes Intravenous Once 07/02/24 1030      07/01/24 1758  cefTRIAXone (ROCEPHIN) 2,000 mg in sodium chloride 0.9 % 100 mL IVPB-VTB        Ordering Provider: Pranay Villaseñor MD    2,000 mg  200 mL/hr over 30 Minutes Intravenous Once 07/01/24 1814 07/01/24 1907          Current Diuretic Therapy:  Diuretics (From admission, onward)      None          ----------------------------------------------------------------------------------------------------------------------  Vital Signs:  Temp:  [97.3 °F (36.3 °C)-97.9 °F (36.6 °C)] 97.9 °F (36.6 °C)  Heart Rate:  [] 88  Resp:  [13-18] 13  BP: ()/(39-77) 96/62  SpO2:  [71 %-100 %] 99 %  on   ;      Body mass index is 22.31 kg/m².    Wt Readings from Last 3 Encounters:   07/02/24 64.6 kg (142 lb 6.7 oz)   06/26/24 56.7 kg (125 lb)   06/26/24 56.7 kg (125 lb)     Intake & Output (last 3 days)         06/29 0701 06/30 0700 06/30 0701 07/01 0700 07/01 0701  07/02 0700 07/02 0701  07/03 0700    P.O.    0    I.V. (mL/kg)   803.5 (12.4)     Total Intake(mL/kg)   803.5 (12.4) 0 (0)    Net   +803.5 0            Urine Unmeasured Occurrence   1 x           NPO Diet NPO Type: Strict NPO  ----------------------------------------------------------------------------------------------------------------------  Physical exam:   Constitutional:  Well-developed and well-nourished. Appears acutely and chronically ill. No acute distress.      HENT:  Head:  Normocephalic and atraumatic.    Cardiovascular:  Normal rate, regular rhythm, no murmur  Pulmonary/Chest:  No respiratory distress, breath sounds clear to auscultation in anterior lung fields bilaterally  Abdominal:  Soft. Abdomen is distended. Mild tenderness in RUQ and epigastric region. No rigidity or guarding.   Musculoskeletal:  No tenderness, and  "no deformity.     Neurological: Lethargic, but wakes to verbal stimuli and responds to questions appropriately. No focal deficits noted on gross examination.    Skin:  Skin is warm and dry. Appears slightly jaundiced.  Peripheral vascular:  No cyanosis. Trace upper extremity edema bilaterally, 2+ lower extremity edema bilaterally.  Psychiatric: Appropriate mood, flattened affect     ----------------------------------------------------------------------------------------------------------------------  Results from last 7 days   Lab Units 07/02/24  0029 07/01/24  2113 07/01/24  1818 07/01/24  1125 06/26/24  1159   LACTATE mmol/L 2.0 3.3* 4.9*  --   --    WBC 10*3/mm3 11.57*  --   --  8.31 6.13   HEMOGLOBIN g/dL 9.6*  --   --  9.8* 12.4   HEMATOCRIT % 27.8*  --   --  28.7* 37.2   MCV fL 101.8*  --   --  101.4* 101.6*   MCHC g/dL 34.5  --   --  34.1 33.3   PLATELETS 10*3/mm3 173  --   --  155 266         Results from last 7 days   Lab Units 07/02/24  0029 07/01/24  1125 06/26/24  1159   SODIUM mmol/L 133* 133* 136   POTASSIUM mmol/L 3.8 3.9 3.8   MAGNESIUM mg/dL  --  1.8  --    CHLORIDE mmol/L 103 99 99   CO2 mmol/L 16.8* 20.6* 23.9   BUN mg/dL 22* 22* 12   CREATININE mg/dL 1.38* 1.40* 0.49*   CALCIUM mg/dL 7.2* 7.9* 8.0*   GLUCOSE mg/dL 75 22* 81   ALBUMIN g/dL 1.5* 1.8* 2.1*   BILIRUBIN mg/dL 1.5* 1.7*  1.8* 1.2   ALK PHOS U/L 237* 249* 235*   AST (SGOT) U/L 78* 77* 63*   ALT (SGPT) U/L 67* 65* 51*   Estimated Creatinine Clearance: 48.1 mL/min (A) (by C-G formula based on SCr of 1.38 mg/dL (H)).  No results found for: \"AMMONIA\"  Results from last 7 days   Lab Units 07/01/24  1125   HSTROP T ng/L 12             Glucose   Date/Time Value Ref Range Status   07/02/2024 0757 146 (H) 70 - 130 mg/dL Final   07/02/2024 0604 134 (H) 70 - 130 mg/dL Final   07/02/2024 0218 167 (H) 70 - 130 mg/dL Final   07/02/2024 0023 70 70 - 130 mg/dL Final   07/01/2024 2247 76 70 - 130 mg/dL Final   07/01/2024 1535 187 (H) 70 - 130 mg/dL " "Final   07/01/2024 1329 83 70 - 130 mg/dL Final   07/01/2024 1159 72 70 - 130 mg/dL Final     No results found for: \"TSH\", \"FREET4\"  Lab Results   Component Value Date    PREGTESTUR Negative 05/15/2015     Pain Management Panel           No data to display              Brief Urine Lab Results  (Last result in the past 365 days)        Color   Clarity   Blood   Leuk Est   Nitrite   Protein   CREAT   Urine HCG        07/01/24 1614 [place]   Cloudy   Trace   Small (1+)   Positive   30 mg/dL (1+)                   Results from last 7 days   Lab Units 07/02/24  0029 07/01/24  2113 07/01/24  1818   LACTATE mmol/L 2.0 3.3* 4.9*       I have personally looked at the labs and they are summarized above.  ----------------------------------------------------------------------------------------------------------------------  Detailed radiology reports for the last 24 hours:  Imaging Results (Last 24 Hours)       Procedure Component Value Units Date/Time    CT Abdomen Pelvis Without Contrast [[id number]] Collected: 07/02/24 0028     Updated: 07/02/24 0035    Narrative:      EXAMINATION: CT abdomen and pelvis without contrast     CLINICAL HISTORY: Abdominal distention.     COMPARISON: 4/21/2024     TECHNIQUE: Contiguous 5 mm thick slices were obtained through the  abdomen and pelvis without contrast. Coronal and sagittal reformats were  performed.     FINDINGS:     ABDOMEN:  At least a small right pleural effusion is noted, incompletely imaged.  Similarly there is at least a small pericardial effusion, also  incompletely imaged. These are new when compared with the prior study.     The liver is diffusely low in attenuation in a pattern suggesting fatty  infiltration. There has been prior cholecystectomy. No hydronephrosis  either kidney. No definite ureteral calculus. The unenhanced spleen,  adrenal glands and pancreas appear grossly normal.     PELVIS:  Postsurgical changes are noted within the sigmoid region where a suture  line " is present. There are multiple dilated loops of small bowel with  fecalization of small bowel contents. The findings are consistent with  at least high-grade partial small bowel obstruction. No free air is  identified to suggest perforation. A small amount of free fluid is  noted. No loculated collection is identified to suggest an abscess.       Impression:      1. Multiple dilated loops of small bowel consistent with small bowel  obstruction.  2. Right pleural effusion and pericardial effusion, incompletely imaged.  These are new when compared to the prior study.  3. Fatty infiltration of the liver.  4. No hydronephrosis or ureteral calculus.     This report was finalized on 7/2/2024 12:33 AM by Trenton Zurita MD.       XR Chest 1 View [[id number]] Collected: 07/01/24 1145     Updated: 07/01/24 1148    Narrative:      XR CHEST 1 VW-     CLINICAL INDICATION: Weak/Dizzy/AMS triage protocol        COMPARISON: 3/7/2024     TECHNIQUE: Single frontal view of the chest.     FINDINGS:     LUNGS: Lungs are adequately aerated.      HEART AND MEDIASTINUM: Heart and mediastinal contours are unremarkable        SKELETON: Bony and soft tissue structures are unremarkable.     Central line tip is stable  NG tube is been removed       Impression:      No radiographic evidence of acute cardiac or pulmonary disease.           This report was finalized on 7/1/2024 11:46 AM by Dr. Edmond Vasquez MD.             Assessment & Plan      #Septic shock secondary to UTI  #Mild hyperbilirubinemia  #Hypoglycemia  #Transaminitis  #Metastatic colorectal cancer on palliative chemotherapy  #Acute kidney injury with oliguria, likely secondary to ATN from septic shock  #Small bowel obstruction, likely related to peritoneal metastases  - Still requiring levophed, but this has been able to be reduced slightly from max dose. Continue vasopressors to keep MAP >65.  - Antibiotics escalated from ceftriaxone to zosyn to cover for intraabdominal infection in  "the setting of small bowel obstruction and UTI.   - General Surgery consulted, appreciate assistance. Patient is not currently vomiting. If this returns then would plan to place NG tube for decompression.   - Hypoglycemia has resolved, but glucose has only been in the 130-140 range most of the day and she is NPO, so will still require D5 1/2NS infusion.   - Oncology and Palliative Care consulted, appreciate assistance. Goals of care have been discussed at length with patient and spouse. Patient has been very clear that she wishes to be DNR/DNI and spouse has been in agreement, but then later states that if condition deteriorated that he would want us to \"do everything.\" She remains critically ill and has poor long term prognosis with known metastatic malignancy and recurrent bowel obstructions. Will continue to discuss clinical condition and prognosis with patient and family.  - ALISE has improved minimally (creatinine 1.40-->1.38). Continue supportive care with IV fluids, avoid nephrotoxins, and treat urinary retention.   - Colmenares catheter was placed today due to patient needing straight catheterization multiple times since yesterday. Nursing staff reported patient has discomfort and feeling of \"pressure\" in abdomen after catheter placement, and although it is draining and flushing well bladder scan appears to show a distended bladder. Urology consult requested, appreciate assistance.   - Transaminitis is relatively unchanged from yesterday. Possibly due to combination of NOLEN and septic shock. Fatty infiltration of the liver noted on CT.   - BMP in a.m. Monitor LFTs q24-48 hours    #Pulmonary embolism, currently on therapeutic anticoagulation  #Acute on chronic anemia  - Holding home eliquis due to being NPO in setting of bowel obstruction and also difficulty swallowing.  - H+H stable today and no reports of active bleeding. Lovenox has been ordered in place of home eliquis for now.  - CBC in a.m.    #Mouth " pain  #Difficulty swallowing  - No visible oral candidiasis on physical exam. I have ordered magic mouthwash q6h. Request SLP evaluation.   Edited by: Yeimi Allen DO at 7/2/2024 0903     Dispo: pending clinical progress    Yeimi Allen DO  [facility]  07/02/24  09:41 EDT

## 2024-07-02 NOTE — NURSING NOTE
Wound consult for skin breakdown to the RT inner gluteal and the midline gluteal cleft.     Gluteal cleft present with a linear fissure measuring 0.5 x 0.2 x 0.1 and is a result of moisture associated skin damage The wound bed is pink and moist and the agnieszka wound skin is pink, moist and blanchable. New order for Venelex BID and leave open to air.    RT inner gluteal presents as a DTI with the epidermis remaining in tact. Area measures 1.7 x 0.8 x 0 There agnieszka wound skin is pink and blanchable. New order for Venelex BID and leave open to air.   07/02/24 1038   Wound 07/01/24 2001 Right gluteal Pressure Injury   Placement Date/Time: 07/01/24 2001   Present on Original Admission: Yes  Side: Right  Location: gluteal  Primary Wound Type: Pressure Injury   Wound Image   (see media)   Pressure Injury Stage DTPI   Dressing Appearance open to air   Closure None   Base non-blanchable;maroon/purple   Periwound intact;redness;pink   Periwound Temperature warm   Periwound Skin Turgor soft   Wound Length (cm) 1.7 cm   Wound Width (cm) 0.8 cm   Wound Depth (cm) 0 cm   Wound Surface Area (cm^2) 1.36 cm^2   Wound Volume (cm^3) 0 cm^3   Drainage Amount none   Care, Wound   (Venelex BID and open to air)   Dressing Care open to air   Periwound Care dry periwound area maintained   Wound 07/01/24 2001 coccyx Pressure Injury   Placement Date/Time: 07/01/24 2001   Present on Original Admission: Yes  Location: coccyx  Primary Wound Type: Pressure Injury   Wound Image   (see media)   Pressure Injury Stage   (linear fissure)   Dressing Appearance open to air   Closure None   Base non-blanchable;red   Red (%), Wound Tissue Color 100   Periwound warm;pink;moist   Periwound Temperature warm   Periwound Skin Turgor soft   Edges open   Wound Length (cm) 0.5 cm   Wound Width (cm) 0.2 cm   Wound Depth (cm) 0.1 cm   Wound Surface Area (cm^2) 0.1 cm^2   Wound Volume (cm^3) 0.01 cm^3   Drainage Characteristics/Odor clear   Drainage Amount scant   Care,  Wound   (Venelex BID and open to air)   Dressing Care open to air

## 2024-07-02 NOTE — CONSULTS
Patient Name:  Vangie Carney  YOB: 1970  [id number]       Patient Care Team:  Urvashi Basurto APRN as PCP - General (Nurse Practitioner)  Francine Garcia MSW as       General Surgery Consult Note     Date of Consultation: 07/02/24    Consulting Physician : Yeimi Allen DO    Reason for Consult : Small bowel obstruction    Subjective     I have been asked to see  Vangie Carney , a 54 y.o. female in consultation for a small bowel obstruction.  She has a history of metastatic colon cancer.  She has previously been admitted for malignant bowel obstructions and has been managed with nonoperative management.  She presented to the emergency department with generalized weakness.  CT scan was performed which showed ascites and evidence of peritoneal carcinomatosis as well as dilated loops of small bowel.      Allergy:   Allergies   Allergen Reactions    Keflex [Cephalexin] Nausea Only     Beta lactam allergy details  Antibiotic reaction: nausea  Age at reaction: adult  Dose to reaction time: (!) minutes  Reason for antibiotic: unknown  Epinephrine required for reaction?: no  Tolerated antibiotics: augmentin, amoxicillin          Medications:  castor oil-balsam peru, 1 Application, Topical, Q12H  First Mouthwash (Magic Mouthwash), 5 mL, Swish & Spit, Q6H  mupirocin, 1 application , Each Nare, BID  piperacillin-tazobactam, 4.5 g, Intravenous, Q8H  sodium chloride, 10 mL, Intravenous, Q12H  sodium chloride, 10 mL, Intravenous, Q12H  sodium chloride, 10 mL, Intravenous, Q12H  sodium chloride, 10 mL, Intravenous, Q12H      dextrose 5 % and sodium chloride 0.45 %, 125 mL/hr, Last Rate: 125 mL/hr (07/02/24 0931)  norepinephrine, 0.02-0.3 mcg/kg/min, Last Rate: 0.22 mcg/kg/min (07/02/24 1540)      No current facility-administered medications on file prior to encounter.     Current Outpatient Medications on File Prior to Encounter   Medication Sig    apixaban (ELIQUIS) 5 MG tablet tablet Take  1 tablet by mouth 2 (Two) Times a Day.    dicyclomine (BENTYL) 10 MG capsule Take 1 capsule by mouth 4 (Four) Times a Day Before Meals & at Bedtime.    HYDROcodone-acetaminophen (NORCO) 5-325 MG per tablet Take 1-2 tablets by mouth Every 8 (Eight) Hours As Needed for Mild Pain.    lidocaine-prilocaine (EMLA) 2.5-2.5 % cream Apply to port-a-cath site 30 minutes prior to arrival at infusion center. Cover with plastic wrap.    metoclopramide (REGLAN) 10 MG tablet Take 1 tablet by mouth 3 (Three) Times a Day As Needed (for nausea).    nystatin (MYCOSTATIN) 574927 UNIT/GM cream Apply 1 Application topically to the appropriate area as directed 2 (Two) Times a Day.    ondansetron (ZOFRAN) 8 MG tablet Take 1 tablet by mouth Every 8 (Eight) Hours As Needed for Nausea or Vomiting.    pantoprazole (PROTONIX) 40 MG EC tablet Take 1 tablet by mouth Daily.    sennosides-docusate (PERICOLACE) 8.6-50 MG per tablet Take 2 tablets by mouth 2 (Two) Times a Day As Needed for Constipation.    sulfamethoxazole-trimethoprim (BACTRIM,SEPTRA) 200-40 MG/5ML suspension Take 20 mL by mouth 2 (Two) Times a Day for 10 days.    granisetron (Sancuso) 3.1 MG/24HR Place 1 patch on the skin once every 7 days.       PMHx:   Past Medical History:   Diagnosis Date    Gallbladder attack     GERD (gastroesophageal reflux disease)     Hearing aid worn     History of ear infections     Malignant neoplasm of sigmoid colon 3/14/2023    Seasonal allergies     Wears glasses        Past Surgical History:  Low anterior resection    Family History: Noncontributory     Social History:  Noncontributory     Review of Systems   Pertinent items are noted in HPI     Constitutional: Fatigue, malaise   Eyes: Denies visual changes    Cardiovascular: Denies chest pain, palpitations   Pulmonary: Denies cough or shortness of breath   Abdominal/ GI: Abdominal distention, nausea, vomiting   Genitourinary: Denies dysuria or hematuria   Musculoskeletal: Denies any but chronic  "joint aches, pains or deformities   Psychiatric: No recent mood changes   Neurologic: No paresthesias or loss of function          Objective     Physical Exam:      Vital Signs  BP 94/63   Pulse 110   Temp 97.5 °F (36.4 °C) (Axillary)   Resp 12   Ht 170.2 cm (67\")   Wt 64.6 kg (142 lb 6.7 oz)   LMP 06/15/2016   SpO2 98%   BMI 22.31 kg/m²     Intake/Output Summary (Last 24 hours) at 7/2/2024 1542  Last data filed at 7/2/2024 1200  Gross per 24 hour   Intake 903.54 ml   Output --   Net 903.54 ml         Physical Exam:    Head: Normocephalic, atraumatic.   Eyes: Pupils equal, round, react to light   Mouth: No lesions noted  CV: Regular rate and rhythm   Lungs: Bilateral chest rise and fall, no use of accessory muscles   Abdomen: Soft, nontender, distended  Extremities:  No cyanosis, clubbing or edema bilaterally   Neurologic: No gross deficits      Assessment and Plan:    Problem List Items Addressed This Visit    None  Visit Diagnoses       Sepsis without acute organ dysfunction, due to unspecified organism    -  Primary    Urinary tract infection without hematuria, site unspecified        Relevant Medications    cefTRIAXone (ROCEPHIN) 2,000 mg in sodium chloride 0.9 % 100 mL IVPB-VTB (Completed)    piperacillin-tazobactam (ZOSYN) IVPB 4.5 g IVPB in 100 mL NS (VTB) (Completed)    piperacillin-tazobactam (ZOSYN) IVPB 4.5 g IVPB in 100 mL NS (VTB) (Start on 7/2/2024  5:00 PM)    Metastatic colon cancer to liver                 Active Hospital Problems    Diagnosis  POA    **Septic shock due to undetermined organism [A41.9, R65.21]  Yes      Resolved Hospital Problems   No resolved problems to display.      Ms. Carney is a 54-year-old female with history of metastatic colon cancer.  She is admitted with concern for small bowel obstruction secondary to malignant obstruction.  Review of CT scan showed evidence of ascites and peritoneal carcinomatosis.  At this point, symptom management is a primary goal.  She is not " actively vomiting on my evaluation now.  Should she worsen or have significant vomiting, NG tube is recommended.    Michelle Frias MD  07/02/24  15:42 EDT

## 2024-07-02 NOTE — PLAN OF CARE
Goal Outcome Evaluation:  Plan of Care Reviewed With: patient           Outcome Evaluation: Patient welcomed to progressive care this shift. Alert and oriented. Levophed gtt infusing. PRN dextrose given for hypoglycemia.  at bedside. No complaints at this time. Remains NPO per orders. Will continue POC

## 2024-07-02 NOTE — PAYOR COMM NOTE
"[facility]  NPI:[id number]    Utilization Review  Contact: Liss Ritchie RN  Phone: [phone]  Fax:[phone]    INITIATE INPATIENT AUTHORIZATION      Jan Solano (54 y.o. Female)       Date of Birth   1970    Social Security Number       Address   [address] [place] [address]    Home Phone   [phone]    MRN   [id number]       Yazidi   Yazdanism    Marital Status                               Admission Date   24    Admission Type   Emergency    Admitting Provider   Carlos Castro MD    Attending Provider   Yeimi Allen DO    Department, Room/Bed   [facility] PROGRESS CARE, P220/1P       Discharge Date       Discharge Disposition       Discharge Destination                                 Attending Provider: Yeimi Allen DO    Allergies: Keflex [Cephalexin]    Isolation: None   Infection: None   Code Status: No CPR    Ht: 170.2 cm (67\")   Wt: 64.6 kg (142 lb 6.7 oz)    Admission Cmt: None   Principal Problem: Septic shock due to undetermined organism [A41.9,R65.21]                   Active Insurance as of 2024       Primary Coverage       Payor Plan Insurance Group Employer/Plan Group    [facility] BLUE [employer] EMPLOYEE [id number]       Payor Plan Address Payor Plan Phone Number Payor Plan Fax Number Effective Dates    [address] [phone]  2018 - None Entered    [place] [address]         Subscriber Name Subscriber Birth Date Member ID       JAN SOLANO 1970 [id number]                     Emergency Contacts        (Rel.) Home Phone Work Phone Mobile Phone    Red Solano (Spouse) -- -- [phone]                 History & Physical        Yeimi Allen DO at 24 1902              [facility] HOSPITALIST HISTORY AND PHYSICAL    Patient Identification:  Name:  Jan Solano  Age:  54 y.o.  Sex:  female  :  1970  MRN:  [id number]   Admit Date: " "7/1/2024   Visit Number:  [id number]  Room number:  102/02  Primary Care Physician:  Urvashi Basurto APRN     Subjective     Chief complaint:    Chief Complaint   Patient presents with    Weakness - Generalized       History of presenting illness:   Mrs. Vangie Carney is our 54 year old female patient with past medical history significant for metastatic colorectal adenocarcinoma s/p LAR currently receiving palliative chemotherapy, pulmonary embolism on therapeutic anticoagulation, and gastric dysmotility who presented to the ED today with complaint of generalized weakness.     Patient reports that she has been having worsening fatigue/weakness for the past three days, as well as nausea and vomiting. She complains of dysuria present for about the past four days and per review of most recent Oncology note she was prescribed bactrim on 6/26/24. ER physician reported she told him she was unable to take the pills due to difficulty swallowing them, so a prescription for liquid bactrim was sent in instead, but that she had only been able to take 1-2 days worth of it so far. Patient reports that the fatigue is worse with exertion, even getting up out of bed. She also reports mild shortness of breath that is worse with exertion. In the ER patient was found to be hypotensive and this did not significantly improve with IV fluid resuscitation, so vasopressors have been ordered by ER physician.     Review of Systems   Constitutional:  Positive for fatigue. Negative for fever.   HENT:  Positive for trouble swallowing. Negative for congestion.    Respiratory:  Positive for shortness of breath. Negative for cough.    Cardiovascular:  Positive for leg swelling. Negative for chest pain.        Chronic leg swelling which she says has been unchanged x3 months   Gastrointestinal:  Positive for abdominal pain, nausea and vomiting.        \"Stomach cramping\"   Genitourinary:  Positive for decreased urine volume and dysuria. "   Musculoskeletal:  Negative for arthralgias and myalgias.   Skin:  Negative for rash and wound.   Neurological:  Positive for weakness. Negative for dizziness.   Psychiatric/Behavioral:  Negative for agitation and confusion.      Past Medical History:   Diagnosis Date    Gallbladder attack     GERD (gastroesophageal reflux disease)     Hearing aid worn     History of ear infections     Malignant neoplasm of sigmoid colon 3/14/2023    Seasonal allergies     Wears glasses      Past Surgical History:   Procedure Laterality Date    CHOLECYSTECTOMY      COLON RESECTION N/A 3/14/2023    Procedure: COLON RESECTION LOW ANTERIOR LAPAROSCOPIC WITH DAVINCI ROBOT;  Surgeon: Shari Aguirre MD;  Location: [facility] OR;  Service: Robotics - DaVinci;  Laterality: N/A;    COLONOSCOPY N/A 2/15/2023    Procedure: COLONOSCOPY FOR SCREENING;  Surgeon: Giselle Iniguez MD;  Location: [facility] OR;  Service: Gastroenterology;  Laterality: N/A;    ENDOSCOPY N/A 2/15/2023    Procedure: ESOPHAGOGASTRODUODENOSCOPY WITH BIOPSY;  Surgeon: Giselle Iniguez MD;  Location: [facility] OR;  Service: Gastroenterology;  Laterality: N/A;    LAPAROSCOPIC TUBAL LIGATION Bilateral     MIDDLE EAR SURGERY      PORTACATH PLACEMENT N/A 2024    Procedure: INSERTION OF PORTACATH;  Surgeon: Jaylen Leal MD;  Location: [facility] OR;  Service: General;  Laterality: N/A;     Family History   Problem Relation Age of Onset    Cancer Mother     Cancer Father     Cancer Sister     Cancer Brother     Cancer Maternal Grandmother     Cancer Maternal Grandfather     Breast cancer Neg Hx      Social History     Socioeconomic History    Marital status:    Tobacco Use    Smoking status: Former     Current packs/day: 0.00     Average packs/day: 1 pack/day for 30.0 years (30.0 ttl pk-yrs)     Types: Cigarettes     Start date:      Quit date: 2017     Years since quittin.5     Passive exposure: Never    Smokeless tobacco: Never   Vaping Use     Vaping status: Every Day    Substances: Nicotine, Flavoring    Devices: Refillable tank   Substance and Sexual Activity    Alcohol use: Never    Drug use: Defer    Sexual activity: Defer     Birth control/protection: None       Allergies:  Keflex [cephalexin]  Medications below are reported home medications pulling from within the system; at this time, these medications have not been reconciled unless otherwise specified and are in the verification process for further verifcation as current home medications.    Prior to Admission Medications       Prescriptions Last Dose Informant Patient Reported? Taking?    apixaban (ELIQUIS) 5 MG tablet tablet 6/30/2024 Spouse/Significant Other, Pharmacy No Yes    Take 1 tablet by mouth 2 (Two) Times a Day.    dicyclomine (BENTYL) 10 MG capsule 6/30/2024 Spouse/Significant Other, Pharmacy No Yes    Take 1 capsule by mouth 4 (Four) Times a Day Before Meals & at Bedtime.    granisetron (Sancuso) 3.1 MG/24HR 6/26/2024 Spouse/Significant Other, Pharmacy No No    Place 1 patch on the skin once every 7 days.    HYDROcodone-acetaminophen (NORCO) 5-325 MG per tablet Past Week Spouse/Significant Other, Pharmacy Yes Yes    Take 1-2 tablets by mouth Every 8 (Eight) Hours As Needed for Mild Pain.    lidocaine-prilocaine (EMLA) 2.5-2.5 % cream Past Month Spouse/Significant Other, Pharmacy No Yes    Apply to port-a-cath site 30 minutes prior to arrival at infusion center. Cover with plastic wrap.    metoclopramide (REGLAN) 10 MG tablet Past Week Spouse/Significant Other, Pharmacy Yes Yes    Take 1 tablet by mouth 3 (Three) Times a Day As Needed (for nausea).    nystatin (MYCOSTATIN) 910490 UNIT/GM cream Past Month Spouse/Significant Other, Pharmacy No Yes    Apply 1 Application topically to the appropriate area as directed 2 (Two) Times a Day.    ondansetron (ZOFRAN) 8 MG tablet Past Week Spouse/Significant Other, Pharmacy Yes Yes    Take 1 tablet by mouth Every 8 (Eight) Hours As Needed for  Nausea or Vomiting.    pantoprazole (PROTONIX) 40 MG EC tablet 6/30/2024 Spouse/Significant Other, Pharmacy No Yes    Take 1 tablet by mouth Daily.    sennosides-docusate (PERICOLACE) 8.6-50 MG per tablet Past Week Spouse/Significant Other, Pharmacy Yes Yes    Take 2 tablets by mouth 2 (Two) Times a Day As Needed for Constipation.    sulfamethoxazole-trimethoprim (BACTRIM,SEPTRA) 200-40 MG/5ML suspension 6/30/2024 Spouse/Significant Other, Pharmacy No Yes    Take 20 mL by mouth 2 (Two) Times a Day for 10 days.          Objective     Vital Signs:  Temp:  [97.6 °F (36.4 °C)] 97.6 °F (36.4 °C)  Heart Rate:  [105-123] 120  Resp:  [18] 18  BP: ()/(49-77) 80/55    Mean Arterial Pressure (Non-Invasive) for the past 24 hrs (Last 3 readings):   Noninvasive MAP (mmHg)   07/01/24 1850 63   07/01/24 1840 60   07/01/24 1830 63     SpO2:  [71 %-100 %] 100 %  on   ;      Body mass index is 19.58 kg/m².    Wt Readings from Last 3 Encounters:   07/01/24 56.7 kg (125 lb)   06/26/24 56.7 kg (125 lb)   06/26/24 56.7 kg (125 lb)        Physical Exam:   Constitutional:  Well-developed and well-nourished.  No acute distress.      HENT:  Head:  Normocephalic and atraumatic.  Mouth:  Moist mucous membranes.    Eyes:  Conjunctivae and EOM are normal. No scleral icterus.    Neck:  Neck supple.     Cardiovascular:  Mild tachycardia, regular rhythm, and normal heart sounds. No murmur.  Pulmonary/Chest:  Normal rate and effort. Breath sounds clear to auscultation bilaterally with good air movement.  Abdominal:  Soft. Mild distention, mild to moderate RUQ tenderness without rigidity or guarding. Normal bowel sounds present.  Musculoskeletal:  No tenderness and no deformity.  No red or swollen joints anywhere.    Neurological: Awake, alert, no focal deficit on gross examination. No slurred speech or facial droop.   Skin:  Skin is warm and dry. No rash noted. Appeared slightly jaundiced.  Peripheral vascular:  No cyanosis, 1+ lower extremity  "edema bilaterally.    EKG:  sinus tachycardia, rate 110, nonspecific t wave changes  ECG 12 Lead ED Triage Standing Order; Weak / Dizzy / AMS   Final Result   Test Reason : ED Triage Standing Order~   Blood Pressure :   */*   mmHG   Vent. Rate : 109 BPM     Atrial Rate : 109 BPM      P-R Int : 166 ms          QRS Dur :  66 ms       QT Int : 296 ms       P-R-T Axes :  67  57  63 degrees      QTc Int : 398 ms      Sinus tachycardia   Low voltage QRS   Cannot rule out Anterior infarct (cited on or before 07-MAR-2024)   Abnormal ECG   When compared with ECG of 21-APR-2024 16:27,   Questionable change in QRS axis   Nonspecific T wave abnormality now evident in Inferior leads   Nonspecific T wave abnormality, worse in Lateral leads   Confirmed by Giuseppe Nava ([id number]) on 7/1/2024 11:50:43 AM      Referred By: CURD           Confirmed By: Giuseppe Nava          Telemetry:  sinus tachycardia rate 110s    Labs:  Results from last 7 days   Lab Units 07/01/24  1818 07/01/24  1125 06/26/24  1159   LACTATE mmol/L 4.9*  --   --    WBC 10*3/mm3  --  8.31 6.13   HEMOGLOBIN g/dL  --  9.8* 12.4   HEMATOCRIT %  --  28.7* 37.2   MCV fL  --  101.4* 101.6*   MCHC g/dL  --  34.1 33.3   PLATELETS 10*3/mm3  --  155 266         Results from last 7 days   Lab Units 07/01/24  1125 06/26/24  1159   SODIUM mmol/L 133* 136   POTASSIUM mmol/L 3.9 3.8   MAGNESIUM mg/dL 1.8  --    CHLORIDE mmol/L 99 99   CO2 mmol/L 20.6* 23.9   BUN mg/dL 22* 12   CREATININE mg/dL 1.40* 0.49*   CALCIUM mg/dL 7.9* 8.0*   GLUCOSE mg/dL 22* 81   ALBUMIN g/dL 1.8* 2.1*   BILIRUBIN mg/dL 1.8* 1.2   ALK PHOS U/L 249* 235*   AST (SGOT) U/L 77* 63*   ALT (SGPT) U/L 65* 51*   Estimated Creatinine Clearance: 41.6 mL/min (A) (by C-G formula based on SCr of 1.4 mg/dL (H)).    No results found for: \"AMMONIA\"  Results from last 7 days   Lab Units 07/01/24  1125   HSTROP T ng/L 12         Glucose   Date/Time Value Ref Range Status   07/01/2024 1535 187 (H) 70 - 130 mg/dL Final " "  07/01/2024 1329 83 70 - 130 mg/dL Final   07/01/2024 1159 72 70 - 130 mg/dL Final   07/01/2024 1127 20 (C) 70 - 130 mg/dL Final     No results found for: \"TSH\", \"FREET4\"  Lab Results   Component Value Date    PREGTESTUR Negative 05/15/2015     Pain Management Panel           No data to display              Brief Urine Lab Results  (Last result in the past 365 days)        Color   Clarity   Blood   Leuk Est   Nitrite   Protein   CREAT   Urine HCG        07/01/24 1614 [place]   Cloudy   Trace   Small (1+)   Positive   30 mg/dL (1+)                   I have personally looked at the labs and they are summarized above.    Detailed radiology reports for the last 24 hours:    Imaging Results (Last 24 Hours)       Procedure Component Value Units Date/Time    XR Chest 1 View [[id number]] Collected: 07/01/24 1145     Updated: 07/01/24 1148    Narrative:      XR CHEST 1 VW-     CLINICAL INDICATION: Weak/Dizzy/AMS triage protocol        COMPARISON: 3/7/2024     TECHNIQUE: Single frontal view of the chest.     FINDINGS:     LUNGS: Lungs are adequately aerated.      HEART AND MEDIASTINUM: Heart and mediastinal contours are unremarkable        SKELETON: Bony and soft tissue structures are unremarkable.     Central line tip is stable  NG tube is been removed       Impression:      No radiographic evidence of acute cardiac or pulmonary disease.           This report was finalized on 7/1/2024 11:46 AM by Dr. Edmond Vasquez MD.             Final impressions for the last 30 days of radiology reports:    XR Chest 1 View    Result Date: 7/1/2024  No radiographic evidence of acute cardiac or pulmonary disease.    This report was finalized on 7/1/2024 11:46 AM by Dr. Edmond Vasquez MD.         Assessment & Plan      #Septic shock secondary to UTI  #Mild hyperbilirubinemia  #Hypoglycemia  #Transaminitis  #Metastatic colorectal cancer on palliative chemotherapy  #Acute kidney injury with oliguria, likely secondary to ATN from septic shock  - " Urine appeared grossly abnormal. She has had minimal output in the ER (reportedly about 70cc via straight catheterization) despite IV fluid resuscitation. Monitor strict intake/output. Repeat chemistry panel and CBC in a.m.  - Hypoglycemia resolved after IV dextrose. Monitor q2h accuchecks and continue D5 1/2NS for now. Unclear whether this is from sepsis or due to liver failure in the setting of metastatic disease and fatty infiltration of the liver. AST, ALT, and alk phos all appeared similar to prior labs, but on physical exam patient is tender in the RUQ and appears jaundiced so I have ordered a stat CT of the abdomen. Total bilirubin was elevated at 1.8. I have ordered total and direct bilirubin to further evaluate.   - Empiric antibiotics started for UTI in the ER with ceftriaxone. Continue same. Follow up on blood cultures and urine cultures.   - Continue vasopressor support with levophed with goal MAP >65  - ER physician spoke with patient's oncologist, Dr. Hanks, and said that he recommends goals of care discussion depending on patient's progress with current treatment and he has agreed to follow along. I discussed code status with patient, witnessed by a family member present at bedside, and patient stated she would not want CPR or intubation in the event of respiratory or cardiac failure/arrest.     #Pulmonary embolism, currently on therapeutic anticoagulation  #Acute on chronic anemia  - No increased oxygen requirement at this time. Continue home eliquis when reconciled by pharmacy. We will need to monitor blood counts closely with repeat cbc in a.m. as hemoglobin decreased from 12.4 on 6/26 to 9.8 today. Hemoglobin may have been falsely elevated previously due to hemoconcentration, as labs from May 2024 were more in the 10-11 range. No active bleeding noted.     No Active Pharmacologic or Mechanical VTE Prophylaxis AND No VTE Prophylaxis Contraindications Documented   - Select Appropriate VTE  "ProphylaxisActive VTE Prophylaxis  Mechanical:        Start        Signed and Held  Maintain Sequential Compression Device  Continuous                              Dispo: admit INPATIENT status due to the need for care which can only be reasonably provided in an hospital setting such as aggressive/expedited ancillary services and/or consultation services, the necessity for IV medications, close physician monitoring and/or the possible need for procedures.  In such, I feel patient’s risk for adverse outcomes and need for care warrant INPATIENT evaluation and predict the patient’s care encounter to likely last beyond 2 midnights. Patient is high risk due to septic shock.    Yeimi Allen DO  [facility]  07/01/24  19:02 EDT     Electronically signed by Yeimi Allen DO at 07/01/24 1936          Emergency Department Notes        Natacha Quesada RN at 07/01/24 1853          Report given to Gideon OLSEN- PCU    Electronically signed by Natacha Quesada RN at 07/01/24 1853       Tatiana Hernandez at 07/01/24 1351          Patient attempted to urinate and was unsuccessful at this time.    Electronically signed by Tatiana Hernandez at 07/01/24 1351       Natacha Quesada RN at 07/01/24 1320          Called pharmacy to request ordered dextrose 5%.    Electronically signed by Natacha Quesada RN at 07/01/24 1344       Pranay Villaseñor MD at 07/01/24 1203          Subjective   History of Present Illness  54-year-old white female complains of \"weakness.  Patient has a history of metastatic colon cancer with her last Avastin treatment 5 days ago with Dr. Hanks.  For the past 3 to 4 days, she has been extremely weak.  She she denied any nausea, vomiting, diarrhea.  She has occasional abdominal pain.  She has not been eating and drinking well.  She was diagnosed with UTI at her last visit, but was not able to take Bactrim due to the size of the pills.  She had her prescription changed and has " been taking liquid antibiotic for the past 2 days.      Review of Systems   All other systems reviewed and are negative.      Past Medical History:   Diagnosis Date    Gallbladder attack     GERD (gastroesophageal reflux disease)     Hearing aid worn     History of ear infections     Malignant neoplasm of sigmoid colon 3/14/2023    Seasonal allergies     Wears glasses        Allergies   Allergen Reactions    Keflex [Cephalexin] Nausea Only     Beta lactam allergy details  Antibiotic reaction: nausea  Age at reaction: adult  Dose to reaction time: (!) minutes  Reason for antibiotic: unknown  Epinephrine required for reaction?: no  Tolerated antibiotics: augmentin, amoxicillin          Past Surgical History:   Procedure Laterality Date    CHOLECYSTECTOMY      COLON RESECTION N/A 3/14/2023    Procedure: COLON RESECTION LOW ANTERIOR LAPAROSCOPIC WITH DAVINCI ROBOT;  Surgeon: Shari Aguirre MD;  Location:  [facility] OR;  Service: Robotics - DaVinci;  Laterality: N/A;    COLONOSCOPY N/A 2/15/2023    Procedure: COLONOSCOPY FOR SCREENING;  Surgeon: Giselle Iniguez MD;  Location: [facility] OR;  Service: Gastroenterology;  Laterality: N/A;    ENDOSCOPY N/A 2/15/2023    Procedure: ESOPHAGOGASTRODUODENOSCOPY WITH BIOPSY;  Surgeon: Giselle Iniguez MD;  Location: [facility] OR;  Service: Gastroenterology;  Laterality: N/A;    LAPAROSCOPIC TUBAL LIGATION Bilateral     MIDDLE EAR SURGERY      PORTACATH PLACEMENT N/A 2/14/2024    Procedure: INSERTION OF PORTACATH;  Surgeon: Jaylen Leal MD;  Location: [facility] OR;  Service: General;  Laterality: N/A;       Family History   Problem Relation Age of Onset    Cancer Mother     Cancer Father     Cancer Sister     Cancer Brother     Cancer Maternal Grandmother     Cancer Maternal Grandfather     Breast cancer Neg Hx        Social History     Socioeconomic History    Marital status:    Tobacco Use    Smoking status: Former     Current packs/day: 0.00     Average  packs/day: 1 pack/day for 30.0 years (30.0 ttl pk-yrs)     Types: Cigarettes     Start date:      Quit date: 2017     Years since quittin.5     Passive exposure: Never    Smokeless tobacco: Never   Vaping Use    Vaping status: Every Day    Substances: Nicotine, Flavoring    Devices: Refillable tank   Substance and Sexual Activity    Alcohol use: Never    Drug use: Defer    Sexual activity: Defer     Birth control/protection: None           Objective   Physical Exam  Vitals and nursing note reviewed. Exam conducted with a chaperone present.   Constitutional:       Appearance: Normal appearance. She is normal weight.   HENT:      Head: Normocephalic and atraumatic.      Mouth/Throat:      Mouth: Mucous membranes are dry.      Pharynx: Oropharynx is clear.   Cardiovascular:      Rate and Rhythm: Normal rate and regular rhythm.      Heart sounds: Normal heart sounds. No murmur heard.     No friction rub. No gallop.   Pulmonary:      Effort: Pulmonary effort is normal. No respiratory distress.      Breath sounds: Normal breath sounds. No wheezing, rhonchi or rales.   Chest:      Chest wall: No tenderness.   Abdominal:      General: Bowel sounds are normal. There is distension.      Palpations: Abdomen is soft.      Tenderness: There is no abdominal tenderness.   Musculoskeletal:         General: Normal range of motion.      Right lower leg: No edema.      Left lower leg: No edema.   Skin:     General: Skin is warm and dry.   Neurological:      General: No focal deficit present.      Mental Status: She is oriented to person, place, and time. She is lethargic.      Comments: No focal deficits   Psychiatric:         Mood and Affect: Mood normal.         Behavior: Behavior normal.       Results for orders placed or performed during the hospital encounter of 24   Comprehensive Metabolic Panel    Specimen: Blood   Result Value Ref Range    Glucose 22 (C) 65 - 99 mg/dL    BUN 22 (H) 6 - 20 mg/dL    Creatinine 1.40  (H) 0.57 - 1.00 mg/dL    Sodium 133 (L) 136 - 145 mmol/L    Potassium 3.9 3.5 - 5.2 mmol/L    Chloride 99 98 - 107 mmol/L    CO2 20.6 (L) 22.0 - 29.0 mmol/L    Calcium 7.9 (L) 8.6 - 10.5 mg/dL    Total Protein 3.9 (L) 6.0 - 8.5 g/dL    Albumin 1.8 (L) 3.5 - 5.2 g/dL    ALT (SGPT) 65 (H) 1 - 33 U/L    AST (SGOT) 77 (H) 1 - 32 U/L    Alkaline Phosphatase 249 (H) 39 - 117 U/L    Total Bilirubin 1.8 (H) 0.0 - 1.2 mg/dL    Globulin 2.1 gm/dL    A/G Ratio 0.9 g/dL    BUN/Creatinine Ratio 15.7 7.0 - 25.0    Anion Gap 13.4 5.0 - 15.0 mmol/L    eGFR 45.1 (L) >60.0 mL/min/1.73   Single High Sensitivity Troponin T    Specimen: Blood   Result Value Ref Range    HS Troponin T 12 <14 ng/L   Magnesium    Specimen: Blood   Result Value Ref Range    Magnesium 1.8 1.6 - 2.6 mg/dL   Urinalysis With Microscopic If Indicated (No Culture) - Straight Cath    Specimen: Straight Cath; Urine   Result Value Ref Range    Color, UA Orange (A) Yellow, Straw    Appearance, UA Cloudy (A) Clear    pH, UA <=5.0 5.0 - 8.0    Specific Gravity, UA 1.025 1.005 - 1.030    Glucose, UA Negative Negative    Ketones, UA Trace (A) Negative    Bilirubin, UA Large (3+) (A) Negative    Blood, UA Trace (A) Negative    Protein, UA 30 mg/dL (1+) (A) Negative    Leuk Esterase, UA Small (1+) (A) Negative    Nitrite, UA Positive (A) Negative    Urobilinogen, UA 1.0 E.U./dL 0.2 - 1.0 E.U./dL   CBC Auto Differential    Specimen: Blood   Result Value Ref Range    WBC 8.31 3.40 - 10.80 10*3/mm3    RBC 2.83 (L) 3.77 - 5.28 10*6/mm3    Hemoglobin 9.8 (L) 12.0 - 15.9 g/dL    Hematocrit 28.7 (L) 34.0 - 46.6 %    .4 (H) 79.0 - 97.0 fL    MCH 34.6 (H) 26.6 - 33.0 pg    MCHC 34.1 31.5 - 35.7 g/dL    RDW 15.5 (H) 12.3 - 15.4 %    RDW-SD 57.3 (H) 37.0 - 54.0 fl    MPV 9.6 6.0 - 12.0 fL    Platelets 155 140 - 450 10*3/mm3    Neutrophil % 81.1 (H) 42.7 - 76.0 %    Lymphocyte % 14.7 (L) 19.6 - 45.3 %    Monocyte % 3.6 (L) 5.0 - 12.0 %    Eosinophil % 0.0 (L) 0.3 - 6.2 %     Basophil % 0.1 0.0 - 1.5 %    Immature Grans % 0.5 0.0 - 0.5 %    Neutrophils, Absolute 6.74 1.70 - 7.00 10*3/mm3    Lymphocytes, Absolute 1.22 0.70 - 3.10 10*3/mm3    Monocytes, Absolute 0.30 0.10 - 0.90 10*3/mm3    Eosinophils, Absolute 0.00 0.00 - 0.40 10*3/mm3    Basophils, Absolute 0.01 0.00 - 0.20 10*3/mm3    Immature Grans, Absolute 0.04 0.00 - 0.05 10*3/mm3    nRBC 0.0 0.0 - 0.2 /100 WBC   Urinalysis, Microscopic Only - Straight Cath    Specimen: Straight Cath; Urine   Result Value Ref Range    RBC, UA 0-2 None Seen, 0-2 /HPF    WBC, UA 21-50 (A) None Seen, 0-2 /HPF    Bacteria, UA 4+ (A) None Seen /HPF    Squamous Epithelial Cells, UA 0-2 None Seen, 0-2 /HPF    Hyaline Casts, UA None Seen None Seen /LPF    Methodology Manual Light Microscopy    [facility] Urine Culture Tube - Straight Cath    Specimen: Straight Cath; Urine   Result Value Ref Range    Extra Tube Hold for add-ons.    POC Glucose Once    Specimen: Blood   Result Value Ref Range    Glucose 20 (C) 70 - 130 mg/dL   POC Glucose Once    Specimen: Blood   Result Value Ref Range    Glucose 72 70 - 130 mg/dL   POC Glucose Once    Specimen: Blood   Result Value Ref Range    Glucose 83 70 - 130 mg/dL   POC Glucose Once    Specimen: Blood   Result Value Ref Range    Glucose 187 (H) 70 - 130 mg/dL   ECG 12 Lead ED Triage Standing Order; Weak / Dizzy / AMS   Result Value Ref Range    QT Interval 296 ms    QTC Interval 398 ms   Green Top (Gel)   Result Value Ref Range    Extra Tube Hold for add-ons.    Lavender Top   Result Value Ref Range    Extra Tube hold for add-on    Gold Top - SST   Result Value Ref Range    Extra Tube Hold for add-ons.    Light Blue Top   Result Value Ref Range    Extra Tube Hold for add-ons.      XR Chest 1 View    Result Date: 7/1/2024  Narrative: XR CHEST 1 VW-  CLINICAL INDICATION: Weak/Dizzy/AMS triage protocol   COMPARISON: 3/7/2024  TECHNIQUE: Single frontal view of the chest.  FINDINGS:  LUNGS: Lungs are adequately aerated.   HEART AND MEDIASTINUM: Heart and mediastinal contours are unremarkable   SKELETON: Bony and soft tissue structures are unremarkable.  Central line tip is stable NG tube is been removed      Impression: No radiographic evidence of acute cardiac or pulmonary disease.    This report was finalized on 7/1/2024 11:46 AM by Dr. Edmond Vasquez MD.         Procedures      [facility] SHOCK FOCUSED EXAM ATTESTATION    I attest that I have reassessed tissue perfusion after the fluid bolus given.    Pranay Villaseñor MD  07/01/24  18:28 EDT      ED Course  ED Course as of 07/01/24 1828 Mon Jul 01, 2024   1319 ECG 12 Lead ED Triage Standing Order; Weak / Dizzy / AMS  Sinus tachycardia.  Rate 109.  Normal axis.  Normal QT interval.  Diffuse low voltage.  Q wave in lead V1 through V3.  No ST elevation or depression.  Abnormal EKG. [BC]   1822 Discussed with Dr. Allen.  Patient admitted, see orders.  Case with Dr. Hanks who will follow patient in consult. [BC]      ED Course User Index  [BC] Pranay Villaseñor MD                                             Medical Decision Making  Problems Addressed:  Metastatic colon cancer to liver: complicated acute illness or injury  Sepsis without acute organ dysfunction, due to unspecified organism: complicated acute illness or injury  Urinary tract infection without hematuria, site unspecified: complicated acute illness or injury    Amount and/or Complexity of Data Reviewed  Labs: ordered.  ECG/medicine tests:  Decision-making details documented in ED Course.    Risk  OTC drugs.  Prescription drug management.  Decision regarding hospitalization.    Critical Care  Total time providing critical care: 45 minutes        Final diagnoses:   Sepsis without acute organ dysfunction, due to unspecified organism   Urinary tract infection without hematuria, site unspecified   Metastatic colon cancer to liver       ED Disposition  ED Disposition       ED Disposition   Decision to Admit    Condition   --     Comment   Level of Care: Progressive Care [20]   Diagnosis: Septic shock due to undetermined organism [[id number]]   Certification: I Certify That Inpatient Hospital Services Are Medically Necessary For Greater Than 2 Midnights                 No follow-up provider specified.       Medication List      No changes were made to your prescriptions during this visit.            Pranay Villaseñor MD  07/01/24 1825       Pranay Villaseñor MD  07/01/24 1826       Pranay Villaseñor MD  07/01/24 1828      Electronically signed by Pranay Villaseñor MD at 07/01/24 1828       Tatiana Hernandez at 07/01/24 1128          Patient's glucose was 20 mg/dL, Dr. Villaseñor made aware at bedside.    Electronically signed by Tatiana Hernandez at 07/01/24 1128       Tatiana Hernandez at 07/01/24 1117          EKG completed by tech @ 1117 and given to Dr. Villaseñor @ 1119.    Electronically signed by Tatiana Hernandze at 07/01/24 1119       Vital Signs (last 2 days)       Date/Time Temp Temp src Pulse Resp BP Patient Position SpO2    07/02/24 0600 -- -- 111 13 91/67 -- 98    07/02/24 0545 -- -- 114 -- 89/62 -- 98    07/02/24 0530 -- -- 110 -- 84/57 -- 97    07/02/24 0515 -- -- 115 -- 85/60 -- 98    07/02/24 0500 -- -- 109 14 84/69 -- 98    07/02/24 0445 -- -- 111 -- 82/58 -- 97    07/02/24 0430 -- -- 109 -- 82/64 -- 98    07/02/24 0415 -- -- 108 -- 81/61 -- 97    07/02/24 0400 97.4 (36.3) Oral 107 13 82/60 -- 98    07/02/24 0345 -- -- 106 -- 90/61 -- 99    07/02/24 0315 -- -- 115 -- 92/58 -- 97    07/02/24 0300 -- -- 118 14 89/67 -- 98    07/02/24 0245 -- -- 111 -- 90/54 -- 99    07/02/24 0230 -- -- 111 -- 89/69 -- 98    07/02/24 0215 -- -- 120 -- 83/62 -- 99    07/02/24 0200 -- -- 113 17 85/65 -- 98    07/02/24 0145 -- -- 108 -- 95/62 -- 100    07/02/24 0130 -- -- 107 -- 97/55 -- 99    07/02/24 0115 -- -- 110 -- 87/69 -- 100    07/02/24 0100 -- -- 117 16 87/56 -- 99    07/02/24 0045 -- -- 114 -- 94/59 -- 100    07/02/24 0030 -- -- 97 -- 78/56 -- 97     07/02/24 0020 -- -- 100 -- 81/53 -- --    07/02/24 0015 -- -- 102 -- 81/53 -- 100    07/02/24 0000 97.4 (36.3) Oral 100 16 81/54 -- 100    07/01/24 2347 -- -- 106 -- 72/39 -- --    07/01/24 2345 -- -- 112 -- 72/39 -- 100    07/01/24 2330 -- -- 104 -- 79/56 -- 100    07/01/24 2325 -- -- 104 14 85/50 -- --    07/01/24 2245 -- -- 112 -- 73/61 -- 97    07/01/24 2230 -- -- 107 -- 73/59 -- 98    07/01/24 2215 -- -- 112 -- 92/57 -- 99    07/01/24 2200 -- -- 112 -- 79/61 -- 100    07/01/24 2149 -- -- 108 -- 72/55 -- --    07/01/24 2145 -- -- 110 -- 72/55 -- 99    07/01/24 2130 -- -- 109 -- 73/60 -- 100    07/01/24 2115 -- -- 98 -- 81/58 -- 99    07/01/24 2100 -- -- 104 15 84/59 -- 99    07/01/24 2047 -- -- 105 -- 71/48 -- --    07/01/24 2045 -- -- 105 -- 71/48 -- 98    07/01/24 2034 -- -- 112 -- 72/49 -- --    07/01/24 2030 -- -- 111 -- 60/47 -- 99    07/01/24 2015 -- -- 112 -- 84/49 -- 100    07/01/24 2012 -- -- 111 -- 72/51 -- --    07/01/24 2000 97.3 (36.3) Oral 120 14 72/51 -- 90    07/01/24 1946 -- -- 113 -- 73/59 -- --    07/01/24 1945 -- -- 117 -- 73/59 -- 100    07/01/24 1850 -- -- 120 -- 80/55 -- 100 07/01/24 1840 -- -- 117 -- 84/49 -- 100 07/01/24 1830 -- -- 117 -- 83/55 -- 100 07/01/24 1820 -- -- 116 -- 80/51 -- 97    07/01/24 1810 -- -- 122 -- 87/62 -- 71    07/01/24 1800 -- -- 118 -- 82/58 -- 100 07/01/24 1730 -- -- 105 -- 94/61 -- 100 07/01/24 1700 -- -- 118 -- 81/59 -- 100 07/01/24 1650 -- -- 117 -- 89/71 -- 99    07/01/24 1640 -- -- 114 -- 85/58 -- 100 07/01/24 1630 -- -- 110 -- 84/61 -- 100 07/01/24 1620 -- -- 113 -- 91/67 -- 100 07/01/24 1610 -- -- 118 -- 80/59 -- 100 07/01/24 1600 -- -- 114 -- 85/60 -- 98    07/01/24 1551 -- -- 113 -- -- -- --    07/01/24 1550 -- -- -- -- 82/57 -- 92    07/01/24 1540 -- -- 117 -- 91/61 -- 100 07/01/24 1530 -- -- 117 -- 81/57 -- 100 07/01/24 1520 -- -- 120 -- 87/57 -- 100 07/01/24 1500 -- -- 120 -- 80/59 -- 100 07/01/24 1440 --  -- 120 -- 84/62 -- 91    07/01/24 1430 -- -- 108 -- 83/58 -- 93    07/01/24 1420 -- -- 112 -- 88/57 -- 100    07/01/24 1410 -- -- 109 -- 88/60 -- 100    07/01/24 1400 -- -- 117 -- 87/61 -- 100    07/01/24 1340 -- -- 114 -- 74/58 -- 100    07/01/24 1331 -- -- -- -- -- -- 91    07/01/24 1330 -- -- 118 -- 75/59 -- --    07/01/24 1310 -- -- 113 -- 85/60 -- 100    07/01/24 1300 -- -- 110 -- 82/62 -- 94    07/01/24 1253 -- -- 123 -- 87/57 -- 98    07/01/24 1250 -- -- 122 -- 82/60 -- 85    07/01/24 1240 -- -- 118 -- 84/66 -- --    07/01/24 1230 -- -- 113 -- 85/62 -- 97    07/01/24 1220 -- -- 114 -- 99/73 -- 96    07/01/24 1210 -- -- 116 -- 104/77 -- 94    07/01/24 1150 -- -- 105 -- 94/58 -- 100    07/01/24 1114 97.6 (36.4) Oral 107 18 79/54 Lying 99          Facility-Administered Medications as of 7/2/2024   Medication Dose Route Frequency Provider Last Rate Last Admin    [COMPLETED] aluminum-magnesium hydroxide-simethicone (MAALOX MAX) 400-400-40 MG/5ML suspension 5 mL  5 mL Oral Once Pranay Villaseñor MD   5 mL at 07/01/24 1837    sennosides-docusate (PERICOLACE) 8.6-50 MG per tablet 2 tablet  2 tablet Oral BID PRN Yeimi Allen DO        And    polyethylene glycol (MIRALAX) packet 17 g  17 g Oral Daily PRN Yeimi Allen DO        And    bisacodyl (DULCOLAX) EC tablet 5 mg  5 mg Oral Daily PRN Yeimi Allen DO        And    bisacodyl (DULCOLAX) suppository 10 mg  10 mg Rectal Daily PRN Yeimi Allen DO        [COMPLETED] cefTRIAXone (ROCEPHIN) 2,000 mg in sodium chloride 0.9 % 100 mL IVPB-VTB  2,000 mg Intravenous Once Pranay Villaseñor  mL/hr at 07/01/24 1837 2,000 mg at 07/01/24 1837    [COMPLETED] dextrose (D50W) (25 g/50 mL) IV injection 25 g  25 g Intravenous Once Pranay Villaseñor MD   25 g at 07/01/24 1249    dextrose (D50W) (25 g/50 mL) IV injection 25 g  25 g Intravenous Q15 Min PRN Carlos Castro MD   25 g at 07/02/24 0058    [COMPLETED] dextrose (D50W) 50 % (25 g/50 mL) IV injection  - ADS  Override Pull        50 mL at 07/01/24 1128    dextrose (GLUTOSE) oral gel 15 g  15 g Oral Q15 Min PRCarlos Mckee MD        [COMPLETED] dextrose 5 % and sodium chloride 0.45 % 500 mL IVPB   Intravenous Once Pranay Villaseñor MD   Stopped at 07/01/24 1445    dextrose 5 % and sodium chloride 0.45 % infusion  125 mL/hr Intravenous Continuous Carlos Castro  mL/hr at 07/02/24 0058 125 mL/hr at 07/02/24 0058    glucagon HCl (Diagnostic) injection 1 mg  1 mg Subcutaneous Q15 Min PRN Carlos Castro MD        heparin injection 500 Units  5 mL Intravenous PRN Carlos Castro MD        nitroglycerin (NITROSTAT) SL tablet 0.4 mg  0.4 mg Sublingual Q5 Min PRN Yeimi Allen DO        norepinephrine (LEVOPHED) 8 mg in 250 mL NS infusion (premix)  0.02-0.3 mcg/kg/min Intravenous Titrated Pranay Villaseñor MD 31.8 mL/hr at 07/02/24 0600 0.299 mcg/kg/min at 07/02/24 0600    [COMPLETED] sodium chloride 0.9 % bolus 1,000 mL  1,000 mL Intravenous Once Pranay Villaseñor MD 2,000 mL/hr at 07/01/24 1745 1,000 mL at 07/01/24 1745    sodium chloride 0.9 % flush 10 mL  10 mL Intravenous PRN Yeimi Allen, DO        sodium chloride 0.9 % flush 10 mL  10 mL Intravenous PRN Cayden Allena, DO        sodium chloride 0.9 % flush 10 mL  10 mL Intravenous Q12H Rika Allensea, DO        sodium chloride 0.9 % flush 10 mL  10 mL Intravenous PRN Cayden Allena, DO        sodium chloride 0.9 % flush 10 mL  10 mL Intravenous Q12H Carlos Castro MD        sodium chloride 0.9 % flush 10 mL  10 mL Intravenous PRN Carlos Castro MD        sodium chloride 0.9 % flush 10 mL  10 mL Intravenous Q12H Carlos Castro MD        sodium chloride 0.9 % flush 10 mL  10 mL Intravenous PRN Carlos Castro MD        sodium chloride 0.9 % flush 20 mL  20 mL Intravenous PRN Carlos Castro MD        sodium chloride 0.9 % infusion 40 mL  40 mL Intravenous MELYN Yeimi Allen DO        sodium chloride 0.9  % infusion 40 mL  40 mL Intravenous Carlos Lozoya MD        sodium chloride 0.9 % infusion 40 mL  40 mL Intravenous Carlos Lozoya MD         Orders (all)        Start     Ordered    07/02/24 0800  Oral Care  2 Times Daily       07/01/24 1945 07/02/24 0700  Adult Transthoracic Echo Complete W/ Cont if Necessary Per Protocol  Once         07/02/24 0158    07/02/24 0621  POC Glucose Once  PROCEDURE ONCE        Comments: Complete no more than 45 minutes prior to patient eating      07/02/24 0604    07/02/24 0600  CBC & Differential  Morning Draw         07/01/24 1949 07/02/24 0600  Comprehensive Metabolic Panel  Morning Draw         07/01/24 1949 07/02/24 0600  Daily CHG Bath While Central Line in Place  Daily       07/01/24 2124 07/02/24 0600  CBC Auto Differential  PROCEDURE ONCE         07/01/24 2202 07/02/24 0240  Deep Tissue Injury Care Q12H  Every 12 Hours        Comments: - Gently Cleanse With Normal Saline  - Cover With Silicone Border Dressing (If Indicated)  - For Frequent Incontinence - Apply Skin Protective Barrier Cream Rather Than Silicone Border Dressing    07/02/24 0239    07/02/24 0240  Follow Pressure Ulcer Prevention Measures Policy  Continuous        Comments: Implement Appropriate Pressure Ulcer Prevention Measures  - Open Order Report to View Full Instructions  Enter Wound LDA & Document Assessment  Add Wound Care Plan  Add Patient Education Per Policy    07/02/24 0239    07/02/24 0240  Notify Provider of Deep Tissue Injury  Per Order Details        Comments: Open Order Report to View Parameters Requiring Provider Notification    07/02/24 0239    07/02/24 0240  Wound Ostomy Eval & Treat  Once         07/02/24 0239    07/02/24 0240  Stage II Pressure Ulcer Care Every Other Day  Every Other Day        Comments: - Gently Cleanse With Normal Saline  - Cover With Silicone Border Dressing (If Indicated)  - For Frequent Incontinence - Apply Skin Protective Barrier  Cream Rather Than Silicone Border Dressing    07/02/24 0239    07/02/24 0240  Follow Pressure Ulcer Prevention Measures Policy  Continuous        Comments: Implement Appropriate Pressure Ulcer Prevention Measures  - Open Order Report to View Full Instructions  Enter Wound LDA & Document Assessment  Add Wound Care Plan  Add Patient Education Per Policy    07/02/24 0239    07/02/24 0235  POC Glucose Once  PROCEDURE ONCE        Comments: Complete no more than 45 minutes prior to patient eating      07/02/24 0218    07/02/24 0233  Wound Ostomy Eval & Treat  Once         07/02/24 0239    07/02/24 0159  NPO Diet NPO Type: Strict NPO  Diet Effective Now         07/02/24 0158    07/02/24 0159  Inpatient General Surgery Consult  Once        Specialty:  General Surgery  Provider:  (Not yet assigned)    07/02/24 0158    07/02/24 0048  dextrose (GLUTOSE) oral gel 15 g  Every 15 Minutes PRN         07/02/24 0048    07/02/24 0048  dextrose (D50W) (25 g/50 mL) IV injection 25 g  Every 15 Minutes PRN         07/02/24 0048    07/02/24 0048  glucagon HCl (Diagnostic) injection 1 mg  Every 15 Minutes PRN         07/02/24 0048    07/02/24 0040  POC Glucose Once  PROCEDURE ONCE        Comments: Complete no more than 45 minutes prior to patient eating      07/02/24 0023    07/02/24 0013  STAT Lactic Acid, Reflex  PROCEDURE ONCE         07/01/24 2157    07/01/24 2303  POC Glucose Once  PROCEDURE ONCE        Comments: Complete no more than 45 minutes prior to patient eating      07/01/24 2247    07/01/24 2215  sodium chloride 0.9 % flush 10 mL  Every 12 Hours Scheduled         07/01/24 2124 07/01/24 2215  sodium chloride 0.9 % flush 10 mL  Every 12 Hours Scheduled         07/01/24 2124 07/01/24 2125  Change Duenas Needle & Transparent Dressing Every 7 Days  Weekly        Comments: Per [facility] Policy    07/01/24 2124 07/01/24 2124  sodium chloride 0.9 % flush 10 mL  As Needed         07/01/24 2124 07/01/24 2124  sodium chloride 0.9  % flush 20 mL  As Needed         07/01/24 2124 07/01/24 2124  sodium chloride 0.9 % infusion 40 mL  As Needed         07/01/24 2124 07/01/24 2124  heparin injection 500 Units  As Needed         07/01/24 2124 07/01/24 2124  Connectors / Hubs Must Be Scrubbed 15 Seconds Using 70% Alcohol Before Access - Allow to Dry Before Accessing Line  Continuous         07/01/24 2124 07/01/24 2123  sodium chloride 0.9 % flush 10 mL  As Needed         07/01/24 2124 07/01/24 2123  sodium chloride 0.9 % infusion 40 mL  As Needed         07/01/24 2124 07/01/24 2118  STAT Lactic Acid, Reflex  PROCEDURE ONCE         07/01/24 1900 07/01/24 2100  sodium chloride 0.9 % flush 10 mL  Every 12 Hours Scheduled         07/01/24 1945 07/01/24 2000  Vital Signs  Every 4 Hours       07/01/24 1945 07/01/24 2000  POC Glucose Finger Q2H  Every 2 Hours      Comments: Complete no more than 45 minutes prior to patient eating      07/01/24 1919 07/01/24 1946  Weigh Patient  Once         07/01/24 1945 07/01/24 1946  Insert Peripheral IV  Once         07/01/24 1945 07/01/24 1946  Saline Lock & Maintain IV Access  Continuous,   Status:  Canceled         07/01/24 1945 07/01/24 1946  Place Sequential Compression Device  Once         07/01/24 1945 07/01/24 1946  Maintain Sequential Compression Device  Continuous         07/01/24 1945 07/01/24 1946  Continuous Cardiac Monitoring  Continuous        Comments: Follow Standing Orders As Outlined in Process Instructions (Open Order Report to View Full Instructions)    07/01/24 1945 07/01/24 1946  Maintain IV Access  Continuous         07/01/24 1945 07/01/24 1946  Telemetry - Place Orders & Notify Provider of Results When Patient Experiences Acute Chest Pain, Dysrhythmia or Respiratory Distress  Continuous        Comments: Open Order Report to View Parameters Requiring Provider Notification    07/01/24 1945 07/01/24 1946  May Be Off Telemetry for Tests  Continuous  "        07/01/24 1945 07/01/24 1946  Continuous Pulse Oximetry  Continuous         07/01/24 1945 07/01/24 1946  Strict Intake & Output  Every Shift       07/01/24 1945 07/01/24 1946  Notify Provider (With Default Parameters)  Continuous        Comments: Open Order Report to View Parameters Requiring Provider Notification    07/01/24 1945 07/01/24 1946  Diet: Regular/House; Fluid Consistency: Thin (IDDSI 0)  Diet Effective Now,   Status:  Canceled         07/01/24 1945 07/01/24 1946  Inpatient Hematology & Oncology Consult  Once        Comments: ER spoke with Dr. Hanks   Specialty:  Hematology and Oncology  Provider:  (Not yet assigned)    07/01/24 1945 07/01/24 1945  nitroglycerin (NITROSTAT) SL tablet 0.4 mg  Every 5 Minutes PRN         07/01/24 1945 07/01/24 1945  sennosides-docusate (PERICOLACE) 8.6-50 MG per tablet 2 tablet  2 Times Daily PRN        Placed in \"And\" Linked Group    07/01/24 1945 07/01/24 1945  polyethylene glycol (MIRALAX) packet 17 g  Daily PRN        Placed in \"And\" Linked Group    07/01/24 1945 07/01/24 1945  bisacodyl (DULCOLAX) EC tablet 5 mg  Daily PRN        Placed in \"And\" Linked Group    07/01/24 1945 07/01/24 1945  bisacodyl (DULCOLAX) suppository 10 mg  Daily PRN        Placed in \"And\" Linked Group    07/01/24 1945 07/01/24 1945  sodium chloride 0.9 % flush 10 mL  As Needed         07/01/24 1945 07/01/24 1945  sodium chloride 0.9 % infusion 40 mL  As Needed         07/01/24 1945 07/01/24 1920  Code Status and Medical Interventions:  Continuous         07/01/24 1919 07/01/24 1919  Bilirubin, Total & Direct  STAT         07/01/24 1918 07/01/24 1915  CT Abdomen Pelvis Without Contrast  1 Time Imaging         07/01/24 1915 07/01/24 1842  aluminum-magnesium hydroxide-simethicone (MAALOX MAX) 400-400-40 MG/5ML suspension 5 mL  Once         07/01/24 1826    07/01/24 1839  norepinephrine (LEVOPHED) 8 mg in 250 mL NS infusion (premix)  Titrated    " "     07/01/24 1823    07/01/24 1815  Inpatient Admission  Once         07/01/24 1824    07/01/24 1814  cefTRIAXone (ROCEPHIN) 2,000 mg in sodium chloride 0.9 % 100 mL IVPB-VTB  Once         07/01/24 1758    07/01/24 1814  sodium chloride 0.9 % infusion  Continuous,   Status:  Discontinued         07/01/24 1758    07/01/24 1812  Urine Culture - Urine, Straight Cath  Once         07/01/24 1811 07/01/24 1804  aluminum-magnesium hydroxide-simethicone (MAALOX/MYLANTA) suspension 10 mL  Once,   Status:  Discontinued         07/01/24 1748 07/01/24 1759  Urinalysis With Culture If Indicated - Straight Cath  Once,   Status:  Canceled         07/01/24 1758 07/01/24 1758  Lactic Acid, Plasma  Once         07/01/24 1758    07/01/24 1758  Blood Culture - Blood, Arm, Right  Once        Placed in \"And\" Linked Group    07/01/24 1758    07/01/24 1758  Blood Culture - Blood, Hand, Right  Once        Placed in \"And\" Linked Group    07/01/24 1758    07/01/24 1756  sodium chloride 0.9 % bolus 1,000 mL  Once         07/01/24 1740    07/01/24 1756  aluminum-magnesium hydroxide-simethicone (MAALOX/MYLANTA) suspension 10 mL  Once,   Status:  Discontinued         07/01/24 1740 07/01/24 1743  aluminum-magnesium hydroxide-simethicone (MAALOX MAX) 400-400-40 MG/5ML suspension 5 mL  Every 6 Hours PRN,   Status:  Discontinued         07/01/24 1747    07/01/24 1639  [place] Urine Culture Tube - Straight Cath  Once         07/01/24 1638    07/01/24 1625  Urinalysis, Microscopic Only - Straight Cath  Once         07/01/24 1624    07/01/24 1542  POC Glucose Once  PROCEDURE ONCE        Comments: Complete no more than 45 minutes prior to patient eating      07/01/24 1535    07/01/24 1403  dextrose 5 % and sodium chloride 0.45 % infusion  Continuous         07/01/24 1347    07/01/24 1337  POC Glucose Once  PROCEDURE ONCE        Comments: Complete no more than 45 minutes prior to patient eating      07/01/24 1329    07/01/24 1333  POC Glucose " "Once  PROCEDURE ONCE        Comments: Complete no more than 45 minutes prior to patient eating      07/01/24 1127    07/01/24 1333  POC Glucose Once  PROCEDURE ONCE        Comments: Complete no more than 45 minutes prior to patient eating      07/01/24 1159    07/01/24 1230  dextrose 5 % and sodium chloride 0.45 % IVPB  Continuous,   Status:  Discontinued        Placed in \"Followed by\" Linked Group    07/01/24 1214    07/01/24 1229  dextrose (D50W) (25 g/50 mL) IV injection 25 g  Once         07/01/24 1213    07/01/24 1227  dextrose 5 % and sodium chloride 0.45 % 500 mL IVPB  Once        Placed in \"Followed by\" Linked Group    07/01/24 1214    07/01/24 1212  Access VAD  Once        Placed in \"And\" Linked Group    07/01/24 1213    07/01/24 1211  sodium chloride 0.9 % flush 10 mL  As Needed        Placed in \"And\" Linked Group    07/01/24 1213    07/01/24 1205  POC Glucose STAT  STAT        Comments: Complete no more than 45 minutes prior to patient eating      07/01/24 1204    07/01/24 1133  Diet: Diabetic; Consistent Carbohydrate; Fluid Consistency: Thin (IDDSI 0)  Diet Effective Now,   Status:  Canceled         07/01/24 1133    07/01/24 1128  dextrose (D50W) 50 % (25 g/50 mL) IV injection  - ADS Override Pull        Note to Pharmacy: Created by cabinet override    07/01/24 1128    07/01/24 1121  NPO Diet NPO Type: Strict NPO  Diet Effective Now,   Status:  Canceled         07/01/24 1120    07/01/24 1121  Undress & Gown  Once         07/01/24 1120    07/01/24 1121  Cardiac Monitoring  Continuous,   Status:  Canceled        Comments: Follow Standing Orders As Outlined in Process Instructions (Open Order Report to View Full Instructions)    07/01/24 1120    07/01/24 1121  Continuous Pulse Oximetry  Continuous,   Status:  Canceled         07/01/24 1120    07/01/24 1121  Vital Signs  Per Hospital Policy         07/01/24 1120    07/01/24 1121  Orthostatic Blood Pressure  Once         07/01/24 1120    07/01/24 1121  Fall " Precautions  Continuous         07/01/24 1120    07/01/24 1121  XR Chest 1 View  1 Time Imaging         07/01/24 1120    07/01/24 1121  ECG 12 Lead ED Triage Standing Order; Weak / Dizzy / AMS  Once         07/01/24 1120    07/01/24 1121  POC Glucose Once  Once        Comments: Complete no more than 45 minutes prior to patient eating      07/01/24 1120    07/01/24 1121  Insert Peripheral IV  Once         07/01/24 1120    07/01/24 1121  [place] Draw  Once         07/01/24 1120    07/01/24 1121  CBC & Differential  Once         07/01/24 1120    07/01/24 1121  Comprehensive Metabolic Panel  Once         07/01/24 1120    07/01/24 1121  Single High Sensitivity Troponin T  Once         07/01/24 1120    07/01/24 1121  Magnesium  Once         07/01/24 1120    07/01/24 1121  Urinalysis With Microscopic If Indicated (No Culture) - Straight Cath  Once         07/01/24 1120    07/01/24 1121  Green Top (Gel)  PROCEDURE ONCE         07/01/24 1120    07/01/24 1121  Lavender Top  PROCEDURE ONCE         07/01/24 1120    07/01/24 1121  Gold Top - SST  PROCEDURE ONCE         07/01/24 1120    07/01/24 1121  Light Blue Top  PROCEDURE ONCE         07/01/24 1120    07/01/24 1121  CBC Auto Differential  PROCEDURE ONCE         07/01/24 1120    07/01/24 1120  sodium chloride 0.9 % flush 10 mL  As Needed         07/01/24 1120    Unscheduled  Oxygen Therapy- Nasal Cannula; Titrate 1-6 LPM Per SpO2; 90 - 95%  Continuous PRN       07/01/24 1120    Unscheduled  Up With Assistance  As Needed       07/01/24 1945    Unscheduled  Change Dressing to IV Site As Needed When Damp, Loose or Soiled  As Needed       07/01/24 2124    Unscheduled  Change Needleless Connectors  As Needed      Comments: Change Needleless Connectors When:  - Administration Set Changed  - Dressing Changed  - Removed For Any Reason  - Residual Blood or Debris Within Connector  - Prior to Drawing Blood Cultures  - Contamination of Connector  - After Administration of Blood or Blood  Components    07/01/24 2124    Unscheduled  Insert New Peripheral IV  As Needed      Comments: Frequency Per Facility Policy    07/01/24 2124    Unscheduled  Change Duenas Needle As Needed  As Needed       07/01/24 2124    Unscheduled  Follow Hypoglycemia Standing Orders For Blood Glucose <70 & Notify Provider of Treatment  As Needed      Comments: Follow Hypoglycemia Orders As Outlined in Process Instructions (Open Order Report to View Full Instructions)  Notify Provider Any Time Hypoglycemia Treatment is Administered    07/02/24 0048    Unscheduled  Deep Tissue Injury Care PRN  As Needed      Comments: - Gently Cleanse With Normal Saline  - Cover With Silicone Border Dressing (If Indicated)  - For Frequent Incontinence - Apply Skin Protective Barrier Cream Rather Than Silicone Border Dressing    07/02/24 0239    Unscheduled  Stage II Pressure Ulcer Care PRN  As Needed      Comments: - Gently Cleanse With Normal Saline  - Cover With Silicone Border Dressing (If Indicated)  - For Frequent Incontinence - Apply Skin Protective Barrier Cream Rather Than Silicone Border Dressing    07/02/24 0239    Pending  metoclopramide (REGLAN) tablet 10 mg  3 Times Daily PRN         Pending    Pending  ondansetron (ZOFRAN) tablet 8 mg  Every 8 Hours PRN         Pending    Pending  apixaban (ELIQUIS) tablet 5 mg  2 Times Daily         Pending    Pending  nystatin (MYCOSTATIN) 624613 UNIT/GM cream 1 Application  2 Times Daily         Pending    Pending  pantoprazole (PROTONIX) EC tablet 40 mg  Daily         Pending    Pending  dicyclomine (BENTYL) capsule 10 mg  4 Times Daily Before Meals & Nightly         Pending    Pending  sulfamethoxazole-trimethoprim (BACTRIM,SEPTRA) 200-40 MG/5ML suspension 20 mL  2 Times Daily         Pending    Pending  lidocaine (LMX) 4 % cream  As Needed         Pending    Pending  granisetron (SANCUSO) 3.1 MG/24HR 1 patch  Weekly         Pending                  Physician Progress Notes (all)    No notes of  this type exist for this encounter.       Consult Notes (all)    No notes of this type exist for this encounter.

## 2024-07-02 NOTE — CONSULTS
Palliative Care Initial Consult     Attending Physician: Yeimi Allen DO  Referring Provider: Dr. Yeimi Allen     assistance with advance directives and assistance with clarification of goals of care  Code Status: DNR/DNI  Code Status and Medical Interventions:   Ordered at: 07/01/24 1919     Medical Intervention Limits:    No intubation (DNI)     Level Of Support Discussed With:    Patient     Code Status (Patient has no pulse and is not breathing):    No CPR (Do Not Attempt to Resuscitate)     Medical Interventions (Patient has pulse or is breathing):    Limited Support      Advanced Directives: Advance Directive Status: Patient does not have advance directive   Healthcare surrogate: Has not designated , Red Carney Spouse, Pepe Christensen Daughter   Goals of Care: Extensive goals of care conversation performed today with patient and  at bedside with assistance of Aminata Londono RN. Pt verfied DNR/DNI status     HPI:  Vangie Carney is a 54 y.o. female admitted on 7/1/2024 for septic shock. She has a past medical history of GERD, malignant neoplasm of sigmoid colon with colon resection. She has Metastatic colorectal adenocarcinoma s/p LAR currently receiving palliative chemotherapy, pulmonary embolism on therapeutic anticoagulation, and gastric dysmotility. She complains today of fatigue, dysphagia, dyspnea worse with exertion, abdominal pain, nausea and vomiting, decreased UOP, dysuria and increased weakness. She presented to the ED for increased weakness and fatigue over the past few days as well as nausea and vomiting. Her ED labs, sodium 133, BUN 22, creat 1.4, eGFR 45.1, glucose 22, calcium 7.9, alk phos 249, total protein 3.9, albumin 1.8, AST 77, ALT 65, total bilirubin 1.7, wbc 8.31, H&H 9.8/28.7, platelets 155, urine cloudy, >1.030, blood trace, positive nitrates, moderate leukocytes, CT shows multiple dilated loops of small bowel consistent with small bowel obstruction, Right pleural  "effusion and pericardial effusion, incompletely imaged. These are new when compared to the prior study. Fatty infiltration of the liver. No hydronephrosis or ureteral calculus. Today, labs sodium 133, BUN 22, creat 1.38, eGFR 45.9, alk phos 237, total protein 3.6, albumin 1.5, total bilirubin 1.5, wbc 11.57 H&H 9.6/27.8. bp 96/69 hr 112 rr 13 sat 99 . We had a lengthy goals of care conversation with patient and  Red at bedside. Pt originally a DNR/DNI upon admission. There was confusion and concern by RN that there was statements that possibly she didn't want to be a DNR/DNI. We attempted to complete a living will however it was too overwhelming with patient and . His wishes do not clover with her wishes however he reports that he wants to honor her wishes. Pt did change her mind briefly because  wanted her to have chest compressions however after reverification multiple times, pt decided that she wants to remain DNR/DNI and would not want to be intubated or have chest compressions. She is agreeable for cardiac medications to be administered as well as being cardioverted if needed.  still reports that if an emergency occurs \" of course I'm going to say do it all to save her\". He is hesitant about discussing living will with wife and reports that he is overwhelming and primarily confused about discussing wishes. Pt a&ox4. She reports that she would want her  to make her medical decisions and her daughter Pepe Christensen to be an alternative decision maker.         ROS: Negative except as above in HPI.     Past Medical History:   Diagnosis Date    Gallbladder attack     GERD (gastroesophageal reflux disease)     Hearing aid worn     History of ear infections     Malignant neoplasm of sigmoid colon 3/14/2023    Seasonal allergies     Wears glasses      Past Surgical History:   Procedure Laterality Date    CHOLECYSTECTOMY      COLON RESECTION N/A 3/14/2023    Procedure: COLON RESECTION " LOW ANTERIOR LAPAROSCOPIC WITH DAVINCI ROBOT;  Surgeon: Shari Aguirre MD;  Location:  [facility];  Service: Robotics - DaVinci;  Laterality: N/A;    COLONOSCOPY N/A 2/15/2023    Procedure: COLONOSCOPY FOR SCREENING;  Surgeon: Giselle Iniguez MD;  Location:  [facility];  Service: Gastroenterology;  Laterality: N/A;    ENDOSCOPY N/A 2/15/2023    Procedure: ESOPHAGOGASTRODUODENOSCOPY WITH BIOPSY;  Surgeon: Giselle Iniguez MD;  Location:  [facility];  Service: Gastroenterology;  Laterality: N/A;    LAPAROSCOPIC TUBAL LIGATION Bilateral     MIDDLE EAR SURGERY      PORTACATH PLACEMENT N/A 2024    Procedure: INSERTION OF PORTACATH;  Surgeon: Jaylen Leal MD;  Location:  [facility];  Service: General;  Laterality: N/A;     Social History     Socioeconomic History    Marital status:    Tobacco Use    Smoking status: Former     Current packs/day: 0.00     Average packs/day: 1 pack/day for 30.0 years (30.0 ttl pk-yrs)     Types: Cigarettes     Start date:      Quit date:      Years since quittin.5     Passive exposure: Never    Smokeless tobacco: Never   Vaping Use    Vaping status: Every Day    Substances: Nicotine, Flavoring    Devices: Refillable tank   Substance and Sexual Activity    Alcohol use: Never    Drug use: Defer    Sexual activity: Defer     Birth control/protection: None     Family History   Problem Relation Age of Onset    Cancer Mother     Cancer Father     Cancer Sister     Cancer Brother     Cancer Maternal Grandmother     Cancer Maternal Grandfather     Breast cancer Neg Hx        Allergies   Allergen Reactions    Keflex [Cephalexin] Nausea Only     Beta lactam allergy details  Antibiotic reaction: nausea  Age at reaction: adult  Dose to reaction time: (!) minutes  Reason for antibiotic: unknown  Epinephrine required for reaction?: no  Tolerated antibiotics: augmentin, amoxicillin          Current Facility-Administered Medications   Medication Dose Route  Frequency Provider Last Rate Last Admin    sennosides-docusate (PERICOLACE) 8.6-50 MG per tablet 2 tablet  2 tablet Oral BID PRN Yeimi Allen DO        And    polyethylene glycol (MIRALAX) packet 17 g  17 g Oral Daily PRN Yeimi Allen DO        And    bisacodyl (DULCOLAX) EC tablet 5 mg  5 mg Oral Daily PRN Yeimi Allen DO        And    bisacodyl (DULCOLAX) suppository 10 mg  10 mg Rectal Daily PRN Yeimi Allen DO        castor oil-balsam peru (VENELEX) ointment 1 Application  1 Application Topical Q12H Yeimi Allen DO        dextrose (D50W) (25 g/50 mL) IV injection 25 g  25 g Intravenous Q15 Min PRN Carlos Castro MD   25 g at 07/02/24 0058    dextrose (GLUTOSE) oral gel 15 g  15 g Oral Q15 Min PRN Carlos Castro MD        dextrose 5 % and sodium chloride 0.45 % infusion  125 mL/hr Intravenous Continuous Carlos Csatro  mL/hr at 07/02/24 0931 125 mL/hr at 07/02/24 0931    glucagon HCl (Diagnostic) injection 1 mg  1 mg Subcutaneous Q15 Min PRN Carlso Castro MD        heparin injection 500 Units  5 mL Intravenous PRN Carlos Castro MD        nitroglycerin (NITROSTAT) SL tablet 0.4 mg  0.4 mg Sublingual Q5 Min PRN Yeimi Allen DO        norepinephrine (LEVOPHED) 8 mg in 250 mL NS infusion (premix)  0.02-0.3 mcg/kg/min Intravenous Titrated Pranay Villaseñor MD 29.8 mL/hr at 07/02/24 1055 0.28 mcg/kg/min at 07/02/24 1055    piperacillin-tazobactam (ZOSYN) IVPB 4.5 g IVPB in 100 mL NS (VTB)  4.5 g Intravenous Q8H Yeimi Allen DO        sodium chloride 0.9 % flush 10 mL  10 mL Intravenous PRN Yeimi Allen DO        sodium chloride 0.9 % flush 10 mL  10 mL Intravenous PRN Yeimi Allen DO        sodium chloride 0.9 % flush 10 mL  10 mL Intravenous Q12H Yeimi Allen DO   10 mL at 07/02/24 0811    sodium chloride 0.9 % flush 10 mL  10 mL Intravenous PRN Yeimi Allen DO        sodium chloride 0.9 % flush 10 mL  10 mL Intravenous Q12H Carlos Castro  "MD Trell   10 mL at 07/02/24 0811    sodium chloride 0.9 % flush 10 mL  10 mL Intravenous PRN Carlos Castro MD        sodium chloride 0.9 % flush 10 mL  10 mL Intravenous Q12H Carlos Castro MD   10 mL at 07/02/24 0811    sodium chloride 0.9 % flush 10 mL  10 mL Intravenous PRN Carlos Castro MD        sodium chloride 0.9 % flush 20 mL  20 mL Intravenous PRN Carlos Castro MD        sodium chloride 0.9 % infusion 40 mL  40 mL Intravenous PRN Yeimi Allen DO        sodium chloride 0.9 % infusion 40 mL  40 mL Intravenous PRN Carlos Castro MD        sodium chloride 0.9 % infusion 40 mL  40 mL Intravenous PRN Carlos Castro MD         dextrose 5 % and sodium chloride 0.45 %, 125 mL/hr, Last Rate: 125 mL/hr (07/02/24 0931)  norepinephrine, 0.02-0.3 mcg/kg/min, Last Rate: 0.28 mcg/kg/min (07/02/24 1055)        senna-docusate sodium **AND** polyethylene glycol **AND** bisacodyl **AND** bisacodyl    dextrose    dextrose    glucagon (human recombinant)    heparin    nitroglycerin    sodium chloride    Access VAD **AND** sodium chloride    sodium chloride    sodium chloride    sodium chloride    sodium chloride    sodium chloride    sodium chloride    sodium chloride    Current medication reviewed for route, type, dose and frequency and are current per MAR.    Palliative Performance Scale Score:     BP 96/69   Pulse 112   Temp 97.9 °F (36.6 °C) (Axillary)   Resp 13   Ht 170.2 cm (67\")   Wt 64.6 kg (142 lb 6.7 oz)   LMP 06/15/2016   SpO2 99%   BMI 22.31 kg/m²     Intake/Output Summary (Last 24 hours) at 7/2/2024 1144  Last data filed at 7/2/2024 1022  Gross per 24 hour   Intake 903.54 ml   Output --   Net 903.54 ml       PE:  General Appearance:    Chronically ill appearing, drowsy, cooperative, NAD   HEENT:    NC/AT, without obvious abnormality, EOMI, anicteric , dry mucous membranes    Neck:   supple, trachea midline, no JVD   Lungs:     CTAB without w/r/r, " diminished bilaterally     Heart:    tachycardia, normal S1 and S2, no M/R/G   Abdomen:     Soft, NT, Distended, hypoactive ABS , clay catheter in place, urine tea colored   Extremities:   Moves all extremities, no edema   Pulses:   Pulses palpable and equal bilaterally   Skin:   Jaundiced    Neurologic:   A/Ox3, cooperative, drowsy   Psych:   Calm, appropriate     Labs:   Results from last 7 days   Lab Units 07/02/24  0029   WBC 10*3/mm3 11.57*   HEMOGLOBIN g/dL 9.6*   HEMATOCRIT % 27.8*   PLATELETS 10*3/mm3 173     Results from last 7 days   Lab Units 07/02/24  0029   SODIUM mmol/L 133*   POTASSIUM mmol/L 3.8   CHLORIDE mmol/L 103   CO2 mmol/L 16.8*   BUN mg/dL 22*   CREATININE mg/dL 1.38*   GLUCOSE mg/dL 75   CALCIUM mg/dL 7.2*     Results from last 7 days   Lab Units 07/02/24  0029   SODIUM mmol/L 133*   POTASSIUM mmol/L 3.8   CHLORIDE mmol/L 103   CO2 mmol/L 16.8*   BUN mg/dL 22*   CREATININE mg/dL 1.38*   CALCIUM mg/dL 7.2*   BILIRUBIN mg/dL 1.5*   ALK PHOS U/L 237*   ALT (SGPT) U/L 67*   AST (SGOT) U/L 78*   GLUCOSE mg/dL 75     Imaging Results (Last 72 Hours)       Procedure Component Value Units Date/Time    CT Abdomen Pelvis Without Contrast [[id number]] Collected: 07/02/24 0028     Updated: 07/02/24 0035    Narrative:      EXAMINATION: CT abdomen and pelvis without contrast     CLINICAL HISTORY: Abdominal distention.     COMPARISON: 4/21/2024     TECHNIQUE: Contiguous 5 mm thick slices were obtained through the  abdomen and pelvis without contrast. Coronal and sagittal reformats were  performed.     FINDINGS:     ABDOMEN:  At least a small right pleural effusion is noted, incompletely imaged.  Similarly there is at least a small pericardial effusion, also  incompletely imaged. These are new when compared with the prior study.     The liver is diffusely low in attenuation in a pattern suggesting fatty  infiltration. There has been prior cholecystectomy. No hydronephrosis  either kidney. No definite  ureteral calculus. The unenhanced spleen,  adrenal glands and pancreas appear grossly normal.     PELVIS:  Postsurgical changes are noted within the sigmoid region where a suture  line is present. There are multiple dilated loops of small bowel with  fecalization of small bowel contents. The findings are consistent with  at least high-grade partial small bowel obstruction. No free air is  identified to suggest perforation. A small amount of free fluid is  noted. No loculated collection is identified to suggest an abscess.       Impression:      1. Multiple dilated loops of small bowel consistent with small bowel  obstruction.  2. Right pleural effusion and pericardial effusion, incompletely imaged.  These are new when compared to the prior study.  3. Fatty infiltration of the liver.  4. No hydronephrosis or ureteral calculus.     This report was finalized on 7/2/2024 12:33 AM by Trenton Zurita MD.       XR Chest 1 View [[id number]] Collected: 07/01/24 1145     Updated: 07/01/24 1148    Narrative:      XR CHEST 1 VW-     CLINICAL INDICATION: Weak/Dizzy/AMS triage protocol        COMPARISON: 3/7/2024     TECHNIQUE: Single frontal view of the chest.     FINDINGS:     LUNGS: Lungs are adequately aerated.      HEART AND MEDIASTINUM: Heart and mediastinal contours are unremarkable        SKELETON: Bony and soft tissue structures are unremarkable.     Central line tip is stable  NG tube is been removed       Impression:      No radiographic evidence of acute cardiac or pulmonary disease.           This report was finalized on 7/1/2024 11:46 AM by Dr. Edmond Vasquez MD.               Diagnostics: Reviewed    A: Vangie Carney is a 54 y.o. female admitted on 7/1/2024 for septic shock. She has a past medical history of GERD, malignant neoplasm of sigmoid colon with colon resection. She has Metastatic colorectal adenocarcinoma s/p LAR currently receiving palliative chemotherapy, pulmonary embolism on therapeutic  "anticoagulation, and gastric dysmotility. She complains today of fatigue, dysphagia, dyspnea worse with exertion, abdominal pain, nausea and vomiting, decreased UOP, dysuria and increased weakness. She presented to the ED for increased weakness and fatigue over the past few days as well as nausea and vomiting. Her ED labs, sodium 133, BUN 22, creat 1.4, eGFR 45.1, glucose 22, calcium 7.9, alk phos 249, total protein 3.9, albumin 1.8, AST 77, ALT 65, total bilirubin 1.7, wbc 8.31, H&H 9.8/28.7, platelets 155, urine cloudy, >1.030, blood trace, positive nitrates, moderate leukocytes, CT shows multiple dilated loops of small bowel consistent with small bowel obstruction, Right pleural effusion and pericardial effusion, incompletely imaged. These are new when compared to the prior study. Fatty infiltration of the liver. No hydronephrosis or ureteral calculus. Today, labs sodium 133, BUN 22, creat 1.38, eGFR 45.9, alk phos 237, total protein 3.6, albumin 1.5, total bilirubin 1.5, wbc 11.57 H&H 9.6/27.8. bp 96/69 hr 112 rr 13 sat 99 . We had a lengthy goals of care conversation with patient and  Red at bedside. Pt originally a DNR/DNI upon admission. There was confusion and concern by RN that there was statements that possibly she didn't want to be a DNR/DNI. We attempted to complete a living will however it was too overwhelming with patient and . His wishes do not clover with her wishes however he reports that he wants to honor her wishes. Pt did change her mind briefly because  wanted her to have chest compressions however after reverification multiple times, pt decided that she wants to remain DNR/DNI and would not want to be intubated or have chest compressions. She is agreeable for cardiac medications to be administered as well as being cardioverted if needed.  still reports that if an emergency occurs \" of course I'm going to say do it all to save her\". He is hesitant about discussing " living will with wife and reports that he is overwhelming and primarily confused about discussing wishes. Pt a&ox4. She reports that she would want her  to make her medical decisions and her daughter Pepe Christensen to be an alternative decision maker.       P: Very length goals of care conversation with patient and . Aminata Londono, RN also in room during conversation. Patient made herself DNR/DNI after verification multiple times.  still has hesitations about her wishes. We attempted to complete living will however all information was overwhelming and decided to hold off on living will for now. Will assist with disposition as needed. Will continue to provide symptomatic and supportive palliative care for patient and family.     We appreciate the consult and the opportunity to participate in Vangie Carney's care. We will continue to follow along. Please do not hesitate to contact us regarding further symptom management or goals of care needs, including after hours or on weekends via our on call provider at [phone].     Time: 75 minutes spent reviewing medical and medication records, assessing and examining patient, discussing with family, answering questions, providing some guidance about a plan and documentation of care, and coordinating care with other healthcare members, with > 50% time spent face to face.     Elen Hernandez, APRN    7/2/2024

## 2024-07-03 LAB
ANION GAP SERPL CALCULATED.3IONS-SCNC: 11.1 MMOL/L (ref 5–15)
BACTERIA SPEC AEROBE CULT: ABNORMAL
BUN SERPL-MCNC: 19 MG/DL (ref 6–20)
BUN/CREAT SERPL: 19.4 (ref 7–25)
CALCIUM SPEC-SCNC: 7.3 MG/DL (ref 8.6–10.5)
CHLORIDE SERPL-SCNC: 104 MMOL/L (ref 98–107)
CO2 SERPL-SCNC: 17.9 MMOL/L (ref 22–29)
CREAT SERPL-MCNC: 0.98 MG/DL (ref 0.57–1)
DEPRECATED RDW RBC AUTO: 51.8 FL (ref 37–54)
EGFRCR SERPLBLD CKD-EPI 2021: 69.2 ML/MIN/1.73
ERYTHROCYTE [DISTWIDTH] IN BLOOD BY AUTOMATED COUNT: 14.3 % (ref 12.3–15.4)
GLUCOSE BLDC GLUCOMTR-MCNC: 105 MG/DL (ref 70–130)
GLUCOSE BLDC GLUCOMTR-MCNC: 116 MG/DL (ref 70–130)
GLUCOSE BLDC GLUCOMTR-MCNC: 131 MG/DL (ref 70–130)
GLUCOSE BLDC GLUCOMTR-MCNC: 145 MG/DL (ref 70–130)
GLUCOSE SERPL-MCNC: 98 MG/DL (ref 65–99)
HCT VFR BLD AUTO: 26.2 % (ref 34–46.6)
HGB BLD-MCNC: 9.1 G/DL (ref 12–15.9)
MAGNESIUM SERPL-MCNC: 1.5 MG/DL (ref 1.6–2.6)
MCH RBC QN AUTO: 34.2 PG (ref 26.6–33)
MCHC RBC AUTO-ENTMCNC: 34.7 G/DL (ref 31.5–35.7)
MCV RBC AUTO: 98.5 FL (ref 79–97)
PLATELET # BLD AUTO: 135 10*3/MM3 (ref 140–450)
PMV BLD AUTO: 9.8 FL (ref 6–12)
POTASSIUM SERPL-SCNC: 3.5 MMOL/L (ref 3.5–5.2)
POTASSIUM SERPL-SCNC: 3.7 MMOL/L (ref 3.5–5.2)
RBC # BLD AUTO: 2.66 10*6/MM3 (ref 3.77–5.28)
SODIUM SERPL-SCNC: 133 MMOL/L (ref 136–145)
WBC NRBC COR # BLD AUTO: 9.25 10*3/MM3 (ref 3.4–10.8)

## 2024-07-03 PROCEDURE — 25010000002 PIPERACILLIN SOD-TAZOBACTAM PER 1 G: Performed by: STUDENT IN AN ORGANIZED HEALTH CARE EDUCATION/TRAINING PROGRAM

## 2024-07-03 PROCEDURE — 99233 SBSQ HOSP IP/OBS HIGH 50: CPT | Performed by: INTERNAL MEDICINE

## 2024-07-03 PROCEDURE — 92610 EVALUATE SWALLOWING FUNCTION: CPT

## 2024-07-03 PROCEDURE — 85027 COMPLETE CBC AUTOMATED: CPT | Performed by: STUDENT IN AN ORGANIZED HEALTH CARE EDUCATION/TRAINING PROGRAM

## 2024-07-03 PROCEDURE — 82948 REAGENT STRIP/BLOOD GLUCOSE: CPT

## 2024-07-03 PROCEDURE — 83735 ASSAY OF MAGNESIUM: CPT | Performed by: STUDENT IN AN ORGANIZED HEALTH CARE EDUCATION/TRAINING PROGRAM

## 2024-07-03 PROCEDURE — 80048 BASIC METABOLIC PNL TOTAL CA: CPT | Performed by: STUDENT IN AN ORGANIZED HEALTH CARE EDUCATION/TRAINING PROGRAM

## 2024-07-03 PROCEDURE — 25010000002 POTASSIUM CHLORIDE PER 2 MEQ: Performed by: STUDENT IN AN ORGANIZED HEALTH CARE EDUCATION/TRAINING PROGRAM

## 2024-07-03 PROCEDURE — 99221 1ST HOSP IP/OBS SF/LOW 40: CPT

## 2024-07-03 PROCEDURE — 84132 ASSAY OF SERUM POTASSIUM: CPT | Performed by: STUDENT IN AN ORGANIZED HEALTH CARE EDUCATION/TRAINING PROGRAM

## 2024-07-03 PROCEDURE — 99232 SBSQ HOSP IP/OBS MODERATE 35: CPT | Performed by: STUDENT IN AN ORGANIZED HEALTH CARE EDUCATION/TRAINING PROGRAM

## 2024-07-03 PROCEDURE — 25010000002 ENOXAPARIN PER 10 MG: Performed by: STUDENT IN AN ORGANIZED HEALTH CARE EDUCATION/TRAINING PROGRAM

## 2024-07-03 PROCEDURE — 25010000002 MAGNESIUM SULFATE 2 GM/50ML SOLUTION: Performed by: STUDENT IN AN ORGANIZED HEALTH CARE EDUCATION/TRAINING PROGRAM

## 2024-07-03 RX ORDER — NOREPINEPHRINE BITARTRATE 0.03 MG/ML
.02-.3 INJECTION, SOLUTION INTRAVENOUS
Status: DISCONTINUED | OUTPATIENT
Start: 2024-07-03 | End: 2024-07-13 | Stop reason: HOSPADM

## 2024-07-03 RX ORDER — DIPHENHYDRAMINE HYDROCHLORIDE AND LIDOCAINE HYDROCHLORIDE AND ALUMINUM HYDROXIDE AND MAGNESIUM HYDRO
5 KIT EVERY 6 HOURS PRN
Status: DISCONTINUED | OUTPATIENT
Start: 2024-07-03 | End: 2024-07-13 | Stop reason: HOSPADM

## 2024-07-03 RX ORDER — MAGNESIUM SULFATE HEPTAHYDRATE 40 MG/ML
2 INJECTION, SOLUTION INTRAVENOUS
Status: COMPLETED | OUTPATIENT
Start: 2024-07-03 | End: 2024-07-03

## 2024-07-03 RX ORDER — CALCIUM CARBONATE 500 MG/1
1 TABLET, CHEWABLE ORAL 3 TIMES DAILY PRN
Status: DISCONTINUED | OUTPATIENT
Start: 2024-07-03 | End: 2024-07-13 | Stop reason: HOSPADM

## 2024-07-03 RX ORDER — POTASSIUM CHLORIDE 29.8 MG/ML
20 INJECTION INTRAVENOUS
Status: COMPLETED | OUTPATIENT
Start: 2024-07-03 | End: 2024-07-03

## 2024-07-03 RX ADMIN — Medication 10 ML: at 09:22

## 2024-07-03 RX ADMIN — ENOXAPARIN SODIUM 60 MG: 60 INJECTION SUBCUTANEOUS at 17:47

## 2024-07-03 RX ADMIN — Medication 0.2 MCG/KG/MIN: at 20:03

## 2024-07-03 RX ADMIN — Medication 10 ML: at 09:21

## 2024-07-03 RX ADMIN — MUPIROCIN 1 APPLICATION: 20 OINTMENT TOPICAL at 09:21

## 2024-07-03 RX ADMIN — DIPHENHYDRAMINE HYDROCHLORIDE AND LIDOCAINE HYDROCHLORIDE AND ALUMINUM HYDROXIDE AND MAGNESIUM HYDRO 5 ML: KIT at 20:04

## 2024-07-03 RX ADMIN — MAGNESIUM SULFATE HEPTAHYDRATE 2 G: 40 INJECTION, SOLUTION INTRAVENOUS at 09:26

## 2024-07-03 RX ADMIN — MAGNESIUM SULFATE HEPTAHYDRATE 2 G: 40 INJECTION, SOLUTION INTRAVENOUS at 12:24

## 2024-07-03 RX ADMIN — ENOXAPARIN SODIUM 60 MG: 60 INJECTION SUBCUTANEOUS at 05:58

## 2024-07-03 RX ADMIN — Medication 1 APPLICATION: at 20:04

## 2024-07-03 RX ADMIN — MAGNESIUM SULFATE HEPTAHYDRATE 2 G: 40 INJECTION, SOLUTION INTRAVENOUS at 10:50

## 2024-07-03 RX ADMIN — PIPERACILLIN AND TAZOBACTAM 4.5 G: 4; .5 INJECTION, POWDER, FOR SOLUTION INTRAVENOUS at 17:47

## 2024-07-03 RX ADMIN — MUPIROCIN 1 APPLICATION: 20 OINTMENT TOPICAL at 20:04

## 2024-07-03 RX ADMIN — NYSTATIN 1 APPLICATION: 100000 CREAM TOPICAL at 09:21

## 2024-07-03 RX ADMIN — Medication 0.2 MCG/KG/MIN: at 05:58

## 2024-07-03 RX ADMIN — POTASSIUM CHLORIDE 20 MEQ: 29.8 INJECTION, SOLUTION INTRAVENOUS at 09:26

## 2024-07-03 RX ADMIN — NYSTATIN 500000 UNITS: 100000 SUSPENSION ORAL at 14:52

## 2024-07-03 RX ADMIN — DIPHENHYDRAMINE HYDROCHLORIDE AND LIDOCAINE HYDROCHLORIDE AND ALUMINUM HYDROXIDE AND MAGNESIUM HYDRO 5 ML: KIT at 09:26

## 2024-07-03 RX ADMIN — NYSTATIN 500000 UNITS: 100000 SUSPENSION ORAL at 20:04

## 2024-07-03 RX ADMIN — DEXTROSE AND SODIUM CHLORIDE 125 ML/HR: 5; 450 INJECTION, SOLUTION INTRAVENOUS at 09:30

## 2024-07-03 RX ADMIN — DIPHENHYDRAMINE HYDROCHLORIDE AND LIDOCAINE HYDROCHLORIDE AND ALUMINUM HYDROXIDE AND MAGNESIUM HYDRO 5 ML: KIT at 14:52

## 2024-07-03 RX ADMIN — PIPERACILLIN AND TAZOBACTAM 4.5 G: 4; .5 INJECTION, POWDER, FOR SOLUTION INTRAVENOUS at 09:26

## 2024-07-03 RX ADMIN — POTASSIUM CHLORIDE 20 MEQ: 29.8 INJECTION, SOLUTION INTRAVENOUS at 10:38

## 2024-07-03 RX ADMIN — PIPERACILLIN AND TAZOBACTAM 4.5 G: 4; .5 INJECTION, POWDER, FOR SOLUTION INTRAVENOUS at 00:10

## 2024-07-03 RX ADMIN — Medication 1 APPLICATION: at 09:21

## 2024-07-03 RX ADMIN — NYSTATIN 1 APPLICATION: 100000 CREAM TOPICAL at 20:04

## 2024-07-03 NOTE — CONSULTS
Name:  Vangie Carney  :  1970    DATE OF ADMISSION  2024    DATE OF CONSULT  7/3/2024     REFERRING PHYSICIAN  * No referring provider recorded for this case *    PRIMARY CARE PHYSICIAN  Urvashi Basurto APRN    REASON FOR CONSULT  Colmenares catheter complication    CHIEF COMPLAINT  Chief Complaint   Patient presents with    Weakness - Generalized       HISTORY OF PRESENT ILLNESS:   Vanige Carney is a 54 y.o. female who has a past medical history significant for metastatic colorectal adenocarcinoma status post LAR currently receiving palliative chemotherapy, pulmonary embolism on therapeutic anticoagulation, and gastric dysmotility who presented to the emergency department with generalized weakness.  She had a CT scan of the abdomen pelvis completed on 2024 that showed multiple dilated loops of small bowel consistent with small bowel obstruction.  She had right pleural effusions and cardiac fusions.  Patient's bladder was unremarkable and there was no signs of hydronephrosis or ureteral calculus.  Most recent lab work shows a normal creatinine 1.98, GFR 69.2, and BUN of 19.  CBC shows improvement in white blood cell count to 9.25.  Urine analysis showed trace ketones, 3+ bilirubin, trace blood, 1+ protein, 1+ leukocytes, and positive nitrites.  Microscopic UA showed 21-50 white blood cells and 4+ bacteria.  Her urine culture showing 50,000 colony-forming's of gram-negative bacilli.  Due to patient's increasing difficulty urinating indwelling Colmenares catheter was placed yesterday.  Nursing staff report that they received urine return however patient was complaining of pain and postvoid residual at that time revealed roughly 400 to 500 mL.  She has had intermittent bladder scans with her most recent bladder scan revealing roughly 132 mL.  She had very minimal output in her catheter.    I saw Vangie Carney in their hospital room this morning.  Patient was lying in bed acutely ill appearing.  Blood  pressure remains low at 88/58.  Heart rate is normal at 93.  She remains afebrile at 98.1.  Patient reports some intermittent bladder/pelvic pain.  She denies any severe pain/pressure.  States she has had weak urinary stream with feelings of incomplete emptying at home.  Currently has a indwelling Colmenares catheter in place with roughly 50 to 100 mL of yellow urine noted in collection bag.  There is yellow urine noted within collection tubing as well.  I did carefully irrigate the patient's catheter at bedside with roughly 200 mL of sterile water and received yellow urine in return with no notable resistance.  Catheter draining appropriately after irrigation.  Patient did not have any significant pain and tolerated procedure well.    PAST MEDICAL HISTORY  Past Medical History:   Diagnosis Date    Gallbladder attack     GERD (gastroesophageal reflux disease)     Hearing aid worn     History of ear infections     Malignant neoplasm of sigmoid colon 3/14/2023    Seasonal allergies     Wears glasses        PAST SURGICAL HISTORY  Past Surgical History:   Procedure Laterality Date    CHOLECYSTECTOMY      COLON RESECTION N/A 3/14/2023    Procedure: COLON RESECTION LOW ANTERIOR LAPAROSCOPIC WITH DAVINCI ROBOT;  Surgeon: Shari Aguirre MD;  Location:  [facility] OR;  Service: Robotics - DaVinci;  Laterality: N/A;    COLONOSCOPY N/A 2/15/2023    Procedure: COLONOSCOPY FOR SCREENING;  Surgeon: Giselle Iniguez MD;  Location: [facility] OR;  Service: Gastroenterology;  Laterality: N/A;    ENDOSCOPY N/A 2/15/2023    Procedure: ESOPHAGOGASTRODUODENOSCOPY WITH BIOPSY;  Surgeon: Giselle Iniguez MD;  Location: [facility] OR;  Service: Gastroenterology;  Laterality: N/A;    LAPAROSCOPIC TUBAL LIGATION Bilateral     MIDDLE EAR SURGERY      PORTACATH PLACEMENT N/A 2/14/2024    Procedure: INSERTION OF PORTACATH;  Surgeon: Jaylen Leal MD;  Location: [facility] OR;  Service: General;  Laterality: N/A;       SOCIAL  HISTORY  Social History     Socioeconomic History    Marital status:    Tobacco Use    Smoking status: Former     Current packs/day: 0.00     Average packs/day: 1 pack/day for 30.0 years (30.0 ttl pk-yrs)     Types: Cigarettes     Start date:      Quit date: 2017     Years since quittin.5     Passive exposure: Never    Smokeless tobacco: Never   Vaping Use    Vaping status: Every Day    Substances: Nicotine, Flavoring    Devices: Refillable tank   Substance and Sexual Activity    Alcohol use: Never    Drug use: Defer    Sexual activity: Defer     Birth control/protection: None       FAMILY HISTORY  Family History   Problem Relation Age of Onset    Cancer Mother     Cancer Father     Cancer Sister     Cancer Brother     Cancer Maternal Grandmother     Cancer Maternal Grandfather     Breast cancer Neg Hx        ALLERGIES  Allergies   Allergen Reactions    Keflex [Cephalexin] Nausea Only     Beta lactam allergy details  Antibiotic reaction: nausea  Age at reaction: adult  Dose to reaction time: (!) minutes  Reason for antibiotic: unknown  Epinephrine required for reaction?: no  Tolerated antibiotics: augmentin, amoxicillin        INPATIENT MEDICATIONS  Current Facility-Administered Medications   Medication Dose Route Frequency Provider Last Rate Last Admin    [Held by provider] apixaban (ELIQUIS) tablet 5 mg  5 mg Oral BID Yeimi Allen DO        sennosides-docusate (PERICOLACE) 8.6-50 MG per tablet 2 tablet  2 tablet Oral BID PRN Yeimi Allen DO        And    polyethylene glycol (MIRALAX) packet 17 g  17 g Oral Daily PRN Yeimi Allen DO        And    bisacodyl (DULCOLAX) EC tablet 5 mg  5 mg Oral Daily PRN Yeimi Allen DO        And    bisacodyl (DULCOLAX) suppository 10 mg  10 mg Rectal Daily PRN Yeimi Allen DO        castor oil-balsam peru (VENELEX) ointment 1 Application  1 Application Topical Q12H Yeimi Allen DO   1 Application at 24 2246    dextrose (D50W) (25 g/50 mL) IV  injection 25 g  25 g Intravenous Q15 Min PRN Carlos Castro MD   25 g at 07/02/24 0058    dextrose (GLUTOSE) oral gel 15 g  15 g Oral Q15 Min PRN Carlos Castro MD        dextrose 5 % and sodium chloride 0.45 % infusion  125 mL/hr Intravenous Continuous Carlos Castro  mL/hr at 07/02/24 1805 125 mL/hr at 07/02/24 1805    [Held by provider] dicyclomine (BENTYL) capsule 10 mg  10 mg Oral 4x Daily AC & at Bedtime Yeimi Allen DO        Enoxaparin Sodium (LOVENOX) syringe 60 mg  1 mg/kg Subcutaneous Q12H Yeimi Allen DO   60 mg at 07/03/24 0558    First Mouthwash (Magic Mouthwash) 5 mL  5 mL Swish & Spit Q6H Yeimi Allen DO   5 mL at 07/02/24 2247    glucagon HCl (Diagnostic) injection 1 mg  1 mg Subcutaneous Q15 Min PRN Carlos Castro MD        granisetron (SANCUSO) 3.1 MG/24HR 1 patch  1 patch Transdermal Weekly Yeimi Allen DO        heparin injection 500 Units  5 mL Intravenous PRN Carlos Castro MD        HYDROcodone-acetaminophen (NORCO) 5-325 MG per tablet 1 tablet  1 tablet Oral Q8H PRN Yeimi Allen DO        Magnesium Standard Dose Replacement - Follow Nurse / BPA Driven Protocol   Does not apply PRN Yeimi Allen DO        magnesium sulfate 2g/50 mL (PREMIX) infusion  2 g Intravenous Q2H Yeimi Allen DO        [Held by provider] metoclopramide (REGLAN) tablet 5 mg  5 mg Oral TID PRN Yeimi Allen DO        mupirocin (BACTROBAN) 2 % nasal ointment 1 Application  1 application  Each Nare BID Yeimi Allen DO   1 Application at 07/02/24 2248    nitroglycerin (NITROSTAT) SL tablet 0.4 mg  0.4 mg Sublingual Q5 Min PRN Yeimi Allen DO        norepinephrine (LEVOPHED) 8 mg in 250 mL NS infusion (premix)  0.02-0.3 mcg/kg/min Intravenous Titrated Yeimi Allen DO 21.3 mL/hr at 07/03/24 0558 0.2 mcg/kg/min at 07/03/24 0558    nystatin (MYCOSTATIN) 920988 UNIT/GM cream 1 Application  1 Application Topical BID Yeimi Allen,    1 Application at  07/02/24 2247    ondansetron (ZOFRAN) tablet 8 mg  8 mg Oral Q8H PRN Yeimi Allen, DO        [Held by provider] pantoprazole (PROTONIX) EC tablet 40 mg  40 mg Oral QAM AC Yeimi Allen, DO        piperacillin-tazobactam (ZOSYN) IVPB 4.5 g IVPB in 100 mL NS (VTB)  4.5 g Intravenous Q8H Yeimi Allen, DO   4.5 g at 07/03/24 0010    potassium chloride 20 mEq in 50 mL IVPB  20 mEq Intravenous Q1H Yeimi Allen, DO        Potassium Replacement - Follow Nurse / BPA Driven Protocol   Does not apply PRN Cayden Allena, DO        sodium chloride 0.9 % flush 10 mL  10 mL Intravenous PRN Cayden Allena, DO        sodium chloride 0.9 % flush 10 mL  10 mL Intravenous PRN Cayden Allena, DO        sodium chloride 0.9 % flush 10 mL  10 mL Intravenous Q12H Yeimi Allen, DO   10 mL at 07/02/24 2244    sodium chloride 0.9 % flush 10 mL  10 mL Intravenous PRN Rika Allensea, DO        sodium chloride 0.9 % flush 10 mL  10 mL Intravenous Q12H Carlos Castro MD   10 mL at 07/02/24 2245    sodium chloride 0.9 % flush 10 mL  10 mL Intravenous PRN Carlos Castro MD        sodium chloride 0.9 % flush 10 mL  10 mL Intravenous Q12H Carlos Castro MD   10 mL at 07/02/24 2245    sodium chloride 0.9 % flush 10 mL  10 mL Intravenous PRN Carlos Castro MD        sodium chloride 0.9 % flush 10 mL  10 mL Intravenous Q12H Yeimi Allen, DO   10 mL at 07/02/24 2245    sodium chloride 0.9 % flush 10 mL  10 mL Intravenous PRN Rika Allensea, DO        sodium chloride 0.9 % flush 20 mL  20 mL Intravenous PRN Carlos Castro MD        sodium chloride 0.9 % infusion 40 mL  40 mL Intravenous PRN Yeimi Allen, DO        sodium chloride 0.9 % infusion 40 mL  40 mL Intravenous PRN Carlos Castro MD        sodium chloride 0.9 % infusion 40 mL  40 mL Intravenous PRN Carlos Castro MD        sodium chloride 0.9 % infusion 40 mL  40 mL Intravenous PRN Yeimi Allen, DO         PHYSICAL  "EXAMINATION    BP (!) 88/58   Pulse 93   Temp 98.1 °F (36.7 °C) (Oral)   Resp 17   Ht 170.2 cm (67\")   Wt 66.5 kg (146 lb 9.7 oz)   LMP 06/15/2016   SpO2 99%   BMI 22.96 kg/m²     GENERAL: Acutely ill-appearing underdeveloped white female in no acute distress.  HEENT:  Pupils equally round and reactive to light.  Extraocular muscles intact.  CARDIOVASCULAR:  Regular rate and rhythm.  Muffled heart sounds.  LUNGS: Decreased breath sounds bilaterally  ABDOMEN:  Soft, nontender, nondistended with positive bowel sounds.  : Indwelling Colmenares catheter in place with minimal yellow urinary output noted in collection tubing and bag.  Urine yellow in color with no gross hematuria or sediments.  NEURO:  Cranial nerves grossly intact.  No focal deficits.  PSYCH:  Alert and oriented x3.    LABORATORY     WBC   Date Value Ref Range Status   07/03/2024 9.25 3.40 - 10.80 10*3/mm3 Final     RBC   Date Value Ref Range Status   07/03/2024 2.66 (L) 3.77 - 5.28 10*6/mm3 Final     Hemoglobin   Date Value Ref Range Status   07/03/2024 9.1 (L) 12.0 - 15.9 g/dL Final     Hematocrit   Date Value Ref Range Status   07/03/2024 26.2 (L) 34.0 - 46.6 % Final     MCV   Date Value Ref Range Status   07/03/2024 98.5 (H) 79.0 - 97.0 fL Final     MCH   Date Value Ref Range Status   07/03/2024 34.2 (H) 26.6 - 33.0 pg Final     MCHC   Date Value Ref Range Status   07/03/2024 34.7 31.5 - 35.7 g/dL Final     RDW   Date Value Ref Range Status   07/03/2024 14.3 12.3 - 15.4 % Final     RDW-SD   Date Value Ref Range Status   07/03/2024 51.8 37.0 - 54.0 fl Final     MPV   Date Value Ref Range Status   07/03/2024 9.8 6.0 - 12.0 fL Final     Platelets   Date Value Ref Range Status   07/03/2024 135 (L) 140 - 450 10*3/mm3 Final     Neutrophil %   Date Value Ref Range Status   07/02/2024 78.5 (H) 42.7 - 76.0 % Final     Lymphocyte %   Date Value Ref Range Status   07/02/2024 16.8 (L) 19.6 - 45.3 % Final     Monocyte %   Date Value Ref Range Status "   07/02/2024 4.3 (L) 5.0 - 12.0 % Final     Eosinophil %   Date Value Ref Range Status   07/02/2024 0.0 (L) 0.3 - 6.2 % Final     Basophil %   Date Value Ref Range Status   07/02/2024 0.1 0.0 - 1.5 % Final     Immature Grans %   Date Value Ref Range Status   07/02/2024 0.3 0.0 - 0.5 % Final     Neutrophils, Absolute   Date Value Ref Range Status   07/02/2024 9.08 (H) 1.70 - 7.00 10*3/mm3 Final     Lymphocytes, Absolute   Date Value Ref Range Status   07/02/2024 1.94 0.70 - 3.10 10*3/mm3 Final     Monocytes, Absolute   Date Value Ref Range Status   07/02/2024 0.50 0.10 - 0.90 10*3/mm3 Final     Eosinophils, Absolute   Date Value Ref Range Status   07/02/2024 0.00 0.00 - 0.40 10*3/mm3 Final     Basophils, Absolute   Date Value Ref Range Status   07/02/2024 0.01 0.00 - 0.20 10*3/mm3 Final     Immature Grans, Absolute   Date Value Ref Range Status   07/02/2024 0.04 0.00 - 0.05 10*3/mm3 Final     nRBC   Date Value Ref Range Status   07/02/2024 0.0 0.0 - 0.2 /100 WBC Final       Glucose   Date Value Ref Range Status   07/03/2024 98 65 - 99 mg/dL Final     Sodium   Date Value Ref Range Status   07/03/2024 133 (L) 136 - 145 mmol/L Final     Potassium   Date Value Ref Range Status   07/03/2024 3.5 3.5 - 5.2 mmol/L Final     Comment:     Slight hemolysis detected by analyzer. Result may be falsely elevated.     CO2   Date Value Ref Range Status   07/03/2024 17.9 (L) 22.0 - 29.0 mmol/L Final     Chloride   Date Value Ref Range Status   07/03/2024 104 98 - 107 mmol/L Final     Anion Gap   Date Value Ref Range Status   07/03/2024 11.1 5.0 - 15.0 mmol/L Final     Creatinine   Date Value Ref Range Status   07/03/2024 0.98 0.57 - 1.00 mg/dL Final     BUN   Date Value Ref Range Status   07/03/2024 19 6 - 20 mg/dL Final     BUN/Creatinine Ratio   Date Value Ref Range Status   07/03/2024 19.4 7.0 - 25.0 Final     Calcium   Date Value Ref Range Status   07/03/2024 7.3 (L) 8.6 - 10.5 mg/dL Final     Alkaline Phosphatase   Date Value Ref  "Range Status   07/02/2024 237 (H) 39 - 117 U/L Final     Total Protein   Date Value Ref Range Status   07/02/2024 3.6 (L) 6.0 - 8.5 g/dL Final     ALT (SGPT)   Date Value Ref Range Status   07/02/2024 67 (H) 1 - 33 U/L Final     AST (SGOT)   Date Value Ref Range Status   07/02/2024 78 (H) 1 - 32 U/L Final     Total Bilirubin   Date Value Ref Range Status   07/02/2024 1.5 (H) 0.0 - 1.2 mg/dL Final     Albumin   Date Value Ref Range Status   07/02/2024 1.5 (L) 3.5 - 5.2 g/dL Final     Globulin   Date Value Ref Range Status   07/02/2024 2.1 gm/dL Final       Magnesium   Date Value Ref Range Status   07/03/2024 1.5 (L) 1.6 - 2.6 mg/dL Final     No results found for: \"LDH\", \"URICACID\"     IMAGING  Imaging Results (Last 72 Hours)       Procedure Component Value Units Date/Time    CT Abdomen Pelvis Without Contrast [[id number]] Collected: 07/02/24 0028     Updated: 07/02/24 0035    Narrative:      EXAMINATION: CT abdomen and pelvis without contrast     CLINICAL HISTORY: Abdominal distention.     COMPARISON: 4/21/2024     TECHNIQUE: Contiguous 5 mm thick slices were obtained through the  abdomen and pelvis without contrast. Coronal and sagittal reformats were  performed.     FINDINGS:     ABDOMEN:  At least a small right pleural effusion is noted, incompletely imaged.  Similarly there is at least a small pericardial effusion, also  incompletely imaged. These are new when compared with the prior study.     The liver is diffusely low in attenuation in a pattern suggesting fatty  infiltration. There has been prior cholecystectomy. No hydronephrosis  either kidney. No definite ureteral calculus. The unenhanced spleen,  adrenal glands and pancreas appear grossly normal.     PELVIS:  Postsurgical changes are noted within the sigmoid region where a suture  line is present. There are multiple dilated loops of small bowel with  fecalization of small bowel contents. The findings are consistent with  at least high-grade partial small " bowel obstruction. No free air is  identified to suggest perforation. A small amount of free fluid is  noted. No loculated collection is identified to suggest an abscess.       Impression:      1. Multiple dilated loops of small bowel consistent with small bowel  obstruction.  2. Right pleural effusion and pericardial effusion, incompletely imaged.  These are new when compared to the prior study.  3. Fatty infiltration of the liver.  4. No hydronephrosis or ureteral calculus.     This report was finalized on 7/2/2024 12:33 AM by Trenton Zurita MD.       XR Chest 1 View [[id number]] Collected: 07/01/24 1145     Updated: 07/01/24 1148    Narrative:      XR CHEST 1 VW-     CLINICAL INDICATION: Weak/Dizzy/AMS triage protocol        COMPARISON: 3/7/2024     TECHNIQUE: Single frontal view of the chest.     FINDINGS:     LUNGS: Lungs are adequately aerated.      HEART AND MEDIASTINUM: Heart and mediastinal contours are unremarkable        SKELETON: Bony and soft tissue structures are unremarkable.     Central line tip is stable  NG tube is been removed       Impression:      No radiographic evidence of acute cardiac or pulmonary disease.           This report was finalized on 7/1/2024 11:46 AM by Dr. Edmond Vasquez MD.               CT Abdomen and Pelvis: Results for orders placed during the hospital encounter of 01/17/24    CT Abdomen Pelvis With & Without Contrast    Narrative  EXAM:  CT Abdomen and Pelvis Without and With Intravenous Contrast    EXAM DATE:  1/17/2024 1:36 PM    CLINICAL HISTORY:  R10.84; R10.84-Generalized abdominal pain    TECHNIQUE:  Axial computed tomography images of the abdomen and pelvis without and  with intravenous contrast.  Sagittal and coronal reformatted images were  created and reviewed.  This CT exam was performed using one or more of  the following dose reduction techniques:  automated exposure control,  adjustment of the mA and/or kV according to patient size, and/or use of  iterative  reconstruction technique.    COMPARISON:  4/7/2023    FINDINGS:  Lung bases:  Interval development of 5.5 mm nodule right lower lobe.  Dedicated chest CT is warranted for further characterization.    ABDOMEN:  Liver:  Unremarkable as visualized.  No focal liver lesions are noted.  Gallbladder and bile ducts:  Cholecystectomy.  No ductal dilation.  Pancreas:  Unremarkable as visualized.  No mass.  No ductal dilation.  Spleen:  Unremarkable as visualized.  No splenomegaly.  Adrenals:  Unremarkable as visualized.  No mass.  Kidneys and ureters:  Unremarkable as visualized.  No solid mass.  No  obstructing stones.  No hydronephrosis.  Stomach and bowel:  There is abnormal soft tissue density in the upper  pelvis superior to the colonic anastomosis site and is predominantly in  the presacral space, 30.4 mm x 31.7 mm. Also suspicious for metastatic  disease.  Postsurgical changes are noted for sigmoid resection with  unremarkable appearance of anastomosis.  No obstruction.  No mucosal  thickening.    PELVIS:  Appendix:  The appendix extends towards the midline and approximates  the presacral space soft tissue implant.  Bladder:  Unremarkable as visualized.  No mass.  No stones.  Reproductive:  Unremarkable as visualized.    ABDOMEN and PELVIS:  Intraperitoneal space:  Soft tissue implant in the left omentum is  approximately 14.1 mm and new since previous exam also suspicious for  metastatic disease. 22.9 mm soft tissue implant in the left presacral  space immediately adjacent to the colonic anastomosis. No free air.  No  significant fluid collection.  Bones/joints:  No acute fracture.  No dislocation.  Soft tissues:  Umbilical hernia is noted which contains fat and a  small soft tissue nodule that is approximately 14.2 mm and is new since  the previous exam. Suspicious for metastatic implant.  Soft tissue  implant along the left psoas muscle surface is 16.1 mm compatible with  metastatic implant.  Vasculature:   Unremarkable as visualized.  No abdominal aortic  aneurysm.  Lymph nodes:  Unremarkable as visualized.  No retroperitoneal  adenopathy identified.    Impression  1.  Interval development of metastatic disease.  2.  Specifically, soft tissue metastatic implants are noted in the  omentum, left lower quadrant, upper presacral space, and left lower  presacral space immediately adjacent to the anastomosis site as detailed  above. Soft tissue implant within umbilical hernia fat.  3.  Development of 5.5 mm nodule right lower lobe suspicious for  metastatic lung nodule.  4.  Interval postsurgical changes from sigmoid resection.  5.  Mild fatty infiltration of liver. No focal liver lesions.  6.  Other incidental/nonacute findings detailed above.      This report was finalized on 1/17/2024 2:11 PM by Dr. Sarabjit Noe MD.       CT Stone Protocol: No results found for this or any previous visit.       KUB: Results for orders placed during the hospital encounter of 11/09/22    XR Abdomen KUB    Narrative  EXAM:  XR Abdomen, 1 View    EXAM DATE:  11/9/2022 1:12 PM    CLINICAL HISTORY:  constipation    TECHNIQUE:  Frontal supine view of the abdomen/pelvis.    COMPARISON:  No relevant prior studies available.    FINDINGS:  Gastrointestinal tract:  Mild volume fecal retention in the colon  consistent with constipation.  No dilation.  Bones/joints:  Unremarkable.    Impression  Mild volume fecal retention in the colon consistent with constipation.    This report was finalized on 11/9/2022 1:43 PM by Dr. Sarabjit Noe MD.   \    LABS:   Admission on 07/01/2024   Component Date Value Ref Range Status    QT Interval 07/01/2024 296  ms Final    QTC Interval 07/01/2024 398  ms Final    Glucose 07/01/2024 22 (C)  65 - 99 mg/dL Final    BUN 07/01/2024 22 (H)  6 - 20 mg/dL Final    Creatinine 07/01/2024 1.40 (H)  0.57 - 1.00 mg/dL Final    Sodium 07/01/2024 133 (L)  136 - 145 mmol/L Final    Potassium 07/01/2024 3.9  3.5 - 5.2 mmol/L  Final    Chloride 07/01/2024 99  98 - 107 mmol/L Final    CO2 07/01/2024 20.6 (L)  22.0 - 29.0 mmol/L Final    Calcium 07/01/2024 7.9 (L)  8.6 - 10.5 mg/dL Final    Total Protein 07/01/2024 3.9 (L)  6.0 - 8.5 g/dL Final    Albumin 07/01/2024 1.8 (L)  3.5 - 5.2 g/dL Final    ALT (SGPT) 07/01/2024 65 (H)  1 - 33 U/L Final    AST (SGOT) 07/01/2024 77 (H)  1 - 32 U/L Final    Alkaline Phosphatase 07/01/2024 249 (H)  39 - 117 U/L Final    Total Bilirubin 07/01/2024 1.8 (H)  0.0 - 1.2 mg/dL Final    Globulin 07/01/2024 2.1  gm/dL Final    A/G Ratio 07/01/2024 0.9  g/dL Final    BUN/Creatinine Ratio 07/01/2024 15.7  7.0 - 25.0 Final    Anion Gap 07/01/2024 13.4  5.0 - 15.0 mmol/L Final    eGFR 07/01/2024 45.1 (L)  >60.0 mL/min/1.73 Final    HS Troponin T 07/01/2024 12  <14 ng/L Final    Magnesium 07/01/2024 1.8  1.6 - 2.6 mg/dL Final    Color, UA 07/01/2024 Orange (A)  Yellow, Straw Final    Appearance, UA 07/01/2024 Cloudy (A)  Clear Final    pH, UA 07/01/2024 <=5.0  5.0 - 8.0 Final    Specific Gravity, UA 07/01/2024 1.025  1.005 - 1.030 Final    Glucose, UA 07/01/2024 Negative  Negative Final    Ketones, UA 07/01/2024 Trace (A)  Negative Final    Bilirubin, UA 07/01/2024 Large (3+) (A)  Negative Final    Blood, UA 07/01/2024 Trace (A)  Negative Final    Protein, UA 07/01/2024 30 mg/dL (1+) (A)  Negative Final    Leuk Esterase, UA 07/01/2024 Small (1+) (A)  Negative Final    Nitrite, UA 07/01/2024 Positive (A)  Negative Final    Urobilinogen, UA 07/01/2024 1.0 E.U./dL  0.2 - 1.0 E.U./dL Final    Extra Tube 07/01/2024 Hold for add-ons.   Final    Auto resulted.    Extra Tube 07/01/2024 hold for add-on   Final    Auto resulted    Extra Tube 07/01/2024 Hold for add-ons.   Final    Auto resulted.    Extra Tube 07/01/2024 Hold for add-ons.   Final    Auto resulted    WBC 07/01/2024 8.31  3.40 - 10.80 10*3/mm3 Final    RBC 07/01/2024 2.83 (L)  3.77 - 5.28 10*6/mm3 Final    Hemoglobin 07/01/2024 9.8 (L)  12.0 - 15.9 g/dL Final     Hematocrit 07/01/2024 28.7 (L)  34.0 - 46.6 % Final    MCV 07/01/2024 101.4 (H)  79.0 - 97.0 fL Final    MCH 07/01/2024 34.6 (H)  26.6 - 33.0 pg Final    MCHC 07/01/2024 34.1  31.5 - 35.7 g/dL Final    RDW 07/01/2024 15.5 (H)  12.3 - 15.4 % Final    RDW-SD 07/01/2024 57.3 (H)  37.0 - 54.0 fl Final    MPV 07/01/2024 9.6  6.0 - 12.0 fL Final    Platelets 07/01/2024 155  140 - 450 10*3/mm3 Final    Neutrophil % 07/01/2024 81.1 (H)  42.7 - 76.0 % Final    Lymphocyte % 07/01/2024 14.7 (L)  19.6 - 45.3 % Final    Monocyte % 07/01/2024 3.6 (L)  5.0 - 12.0 % Final    Eosinophil % 07/01/2024 0.0 (L)  0.3 - 6.2 % Final    Basophil % 07/01/2024 0.1  0.0 - 1.5 % Final    Immature Grans % 07/01/2024 0.5  0.0 - 0.5 % Final    Neutrophils, Absolute 07/01/2024 6.74  1.70 - 7.00 10*3/mm3 Final    Lymphocytes, Absolute 07/01/2024 1.22  0.70 - 3.10 10*3/mm3 Final    Monocytes, Absolute 07/01/2024 0.30  0.10 - 0.90 10*3/mm3 Final    Eosinophils, Absolute 07/01/2024 0.00  0.00 - 0.40 10*3/mm3 Final    Basophils, Absolute 07/01/2024 0.01  0.00 - 0.20 10*3/mm3 Final    Immature Grans, Absolute 07/01/2024 0.04  0.00 - 0.05 10*3/mm3 Final    nRBC 07/01/2024 0.0  0.0 - 0.2 /100 WBC Final    Glucose 07/01/2024 20 (C)  70 - 130 mg/dL Final    Glucose 07/01/2024 72  70 - 130 mg/dL Final    Glucose 07/01/2024 83  70 - 130 mg/dL Final    Glucose 07/01/2024 187 (H)  70 - 130 mg/dL Final    RBC, UA 07/01/2024 0-2  None Seen, 0-2 /HPF Final    WBC, UA 07/01/2024 21-50 (A)  None Seen, 0-2 /HPF Final    Bacteria, UA 07/01/2024 4+ (A)  None Seen /HPF Final    Squamous Epithelial Cells, UA 07/01/2024 0-2  None Seen, 0-2 /HPF Final    Hyaline Casts, UA 07/01/2024 None Seen  None Seen /LPF Final    Methodology 07/01/2024 Manual Light Microscopy   Final    Extra Tube 07/01/2024 Hold for add-ons.   Final    Auto resulted.    Lactate 07/01/2024 4.9 (C)  0.5 - 2.0 mmol/L Final    Blood Culture 07/01/2024 No growth at 24 hours   Preliminary    Blood Culture  07/01/2024 No growth at 24 hours   Preliminary    Urine Culture 07/01/2024 50,000 CFU/mL Gram Negative Bacilli (A)   Preliminary    Lactate 07/01/2024 3.3 (C)  0.5 - 2.0 mmol/L Final    Total Bilirubin 07/01/2024 1.7 (H)  0.0 - 1.2 mg/dL Final    Bilirubin, Direct 07/01/2024 1.5 (H)  0.0 - 0.3 mg/dL Final    Bilirubin, Indirect 07/01/2024 0.2  mg/dL Final    Lactate 07/02/2024 2.0  0.5 - 2.0 mmol/L Final    Glucose 07/02/2024 75  65 - 99 mg/dL Final    BUN 07/02/2024 22 (H)  6 - 20 mg/dL Final    Creatinine 07/02/2024 1.38 (H)  0.57 - 1.00 mg/dL Final    Sodium 07/02/2024 133 (L)  136 - 145 mmol/L Final    Potassium 07/02/2024 3.8  3.5 - 5.2 mmol/L Final    Chloride 07/02/2024 103  98 - 107 mmol/L Final    CO2 07/02/2024 16.8 (L)  22.0 - 29.0 mmol/L Final    Calcium 07/02/2024 7.2 (L)  8.6 - 10.5 mg/dL Final    Total Protein 07/02/2024 3.6 (L)  6.0 - 8.5 g/dL Final    Albumin 07/02/2024 1.5 (L)  3.5 - 5.2 g/dL Final    ALT (SGPT) 07/02/2024 67 (H)  1 - 33 U/L Final    AST (SGOT) 07/02/2024 78 (H)  1 - 32 U/L Final    Alkaline Phosphatase 07/02/2024 237 (H)  39 - 117 U/L Final    Total Bilirubin 07/02/2024 1.5 (H)  0.0 - 1.2 mg/dL Final    Globulin 07/02/2024 2.1  gm/dL Final    A/G Ratio 07/02/2024 0.7  g/dL Final    BUN/Creatinine Ratio 07/02/2024 15.9  7.0 - 25.0 Final    Anion Gap 07/02/2024 13.2  5.0 - 15.0 mmol/L Final    eGFR 07/02/2024 45.9 (L)  >60.0 mL/min/1.73 Final    WBC 07/02/2024 11.57 (H)  3.40 - 10.80 10*3/mm3 Final    RBC 07/02/2024 2.73 (L)  3.77 - 5.28 10*6/mm3 Final    Hemoglobin 07/02/2024 9.6 (L)  12.0 - 15.9 g/dL Final    Hematocrit 07/02/2024 27.8 (L)  34.0 - 46.6 % Final    MCV 07/02/2024 101.8 (H)  79.0 - 97.0 fL Final    MCH 07/02/2024 35.2 (H)  26.6 - 33.0 pg Final    MCHC 07/02/2024 34.5  31.5 - 35.7 g/dL Final    RDW 07/02/2024 15.2  12.3 - 15.4 % Final    RDW-SD 07/02/2024 55.8 (H)  37.0 - 54.0 fl Final    MPV 07/02/2024 9.7  6.0 - 12.0 fL Final    Platelets 07/02/2024 173  140 - 450  10*3/mm3 Final    Neutrophil % 07/02/2024 78.5 (H)  42.7 - 76.0 % Final    Lymphocyte % 07/02/2024 16.8 (L)  19.6 - 45.3 % Final    Monocyte % 07/02/2024 4.3 (L)  5.0 - 12.0 % Final    Eosinophil % 07/02/2024 0.0 (L)  0.3 - 6.2 % Final    Basophil % 07/02/2024 0.1  0.0 - 1.5 % Final    Immature Grans % 07/02/2024 0.3  0.0 - 0.5 % Final    Neutrophils, Absolute 07/02/2024 9.08 (H)  1.70 - 7.00 10*3/mm3 Final    Lymphocytes, Absolute 07/02/2024 1.94  0.70 - 3.10 10*3/mm3 Final    Monocytes, Absolute 07/02/2024 0.50  0.10 - 0.90 10*3/mm3 Final    Eosinophils, Absolute 07/02/2024 0.00  0.00 - 0.40 10*3/mm3 Final    Basophils, Absolute 07/02/2024 0.01  0.00 - 0.20 10*3/mm3 Final    Immature Grans, Absolute 07/02/2024 0.04  0.00 - 0.05 10*3/mm3 Final    nRBC 07/02/2024 0.0  0.0 - 0.2 /100 WBC Final    Glucose 07/01/2024 76  70 - 130 mg/dL Final    Glucose 07/02/2024 70  70 - 130 mg/dL Final    LVIDd 07/02/2024 3.7  cm Final    LVIDs 07/02/2024 2.8  cm Final    IVSd 07/02/2024 0.88  cm Final    LVPWd 07/02/2024 1.00  cm Final    FS 07/02/2024 26.0  % Final    IVS/LVPW 07/02/2024 0.88  cm Final    ESV(cubed) 07/02/2024 20.9  ml Final    LV Sys Vol (BSA corrected) 07/02/2024 7.2  cm2 Final    EDV(cubed) 07/02/2024 51.7  ml Final    LV Mas Vol (BSA corrected) 07/02/2024 21.9  cm2 Final    LV mass(C)d 07/02/2024 104.1  grams Final    LVOT area 07/02/2024 3.1  cm2 Final    LVOT diam 07/02/2024 2.00  cm Final    EDV(MOD-sp4) 07/02/2024 38.3  ml Final    ESV(MOD-sp4) 07/02/2024 12.6  ml Final    SV(MOD-sp4) 07/02/2024 25.7  ml Final    SVi(MOD-SP4) 07/02/2024 14.7  ml/m2 Final    EF(MOD-sp4) 07/02/2024 67.1  % Final    MV E max mendoza 07/02/2024 63.8  cm/sec Final    MV A max mendoza 07/02/2024 67.3  cm/sec Final    MV E/A 07/02/2024 0.95   Final    LA ESV Index (BP) 07/02/2024 8.2  ml/m2 Final    Med Peak E' Mendoza 07/02/2024 6.9  cm/sec Final    Lat Peak E' Mendoza 07/02/2024 6.7  cm/sec Final    Avg E/e' ratio 07/02/2024 9.38   Final     TAPSE (>1.6) 07/02/2024 2.07  cm Final    LA dimension (2D)  07/02/2024 1.50  cm Final    Ao pk libia 07/02/2024 110.0  cm/sec Final    Ao max PG 07/02/2024 4.8  mmHg Final    Ao mean PG 07/02/2024 2.00  mmHg Final    Ao V2 VTI 07/02/2024 15.8  cm Final    PA acc time 07/02/2024 0.10  sec Final    Ao root diam 07/02/2024 3.3  cm Final    ACS 07/02/2024 2.00  cm Final    Glucose 07/02/2024 167 (H)  70 - 130 mg/dL Final    Glucose 07/02/2024 134 (H)  70 - 130 mg/dL Final    Glucose 07/02/2024 146 (H)  70 - 130 mg/dL Final    Glucose 07/02/2024 130  70 - 130 mg/dL Final    Glucose 07/02/2024 143 (H)  70 - 130 mg/dL Final    Glucose 07/02/2024 135 (H)  70 - 130 mg/dL Final    WBC 07/03/2024 9.25  3.40 - 10.80 10*3/mm3 Final    RBC 07/03/2024 2.66 (L)  3.77 - 5.28 10*6/mm3 Final    Hemoglobin 07/03/2024 9.1 (L)  12.0 - 15.9 g/dL Final    Hematocrit 07/03/2024 26.2 (L)  34.0 - 46.6 % Final    MCV 07/03/2024 98.5 (H)  79.0 - 97.0 fL Final    MCH 07/03/2024 34.2 (H)  26.6 - 33.0 pg Final    MCHC 07/03/2024 34.7  31.5 - 35.7 g/dL Final    RDW 07/03/2024 14.3  12.3 - 15.4 % Final    RDW-SD 07/03/2024 51.8  37.0 - 54.0 fl Final    MPV 07/03/2024 9.8  6.0 - 12.0 fL Final    Platelets 07/03/2024 135 (L)  140 - 450 10*3/mm3 Final    Glucose 07/03/2024 98  65 - 99 mg/dL Final    BUN 07/03/2024 19  6 - 20 mg/dL Final    Creatinine 07/03/2024 0.98  0.57 - 1.00 mg/dL Final    Sodium 07/03/2024 133 (L)  136 - 145 mmol/L Final    Potassium 07/03/2024 3.5  3.5 - 5.2 mmol/L Final    Slight hemolysis detected by analyzer. Result may be falsely elevated.    Chloride 07/03/2024 104  98 - 107 mmol/L Final    CO2 07/03/2024 17.9 (L)  22.0 - 29.0 mmol/L Final    Calcium 07/03/2024 7.3 (L)  8.6 - 10.5 mg/dL Final    BUN/Creatinine Ratio 07/03/2024 19.4  7.0 - 25.0 Final    Anion Gap 07/03/2024 11.1  5.0 - 15.0 mmol/L Final    eGFR 07/03/2024 69.2  >60.0 mL/min/1.73 Final    Magnesium 07/03/2024 1.5 (L)  1.6 - 2.6 mg/dL Final    Glucose 07/03/2024  116  70 - 130 mg/dL Final    Glucose 07/03/2024 131 (H)  70 - 130 mg/dL Final   Lab on 06/26/2024   Component Date Value Ref Range Status    Glucose 06/26/2024 81  65 - 99 mg/dL Final    BUN 06/26/2024 12  6 - 20 mg/dL Final    Creatinine 06/26/2024 0.49 (L)  0.57 - 1.00 mg/dL Final    Sodium 06/26/2024 136  136 - 145 mmol/L Final    Potassium 06/26/2024 3.8  3.5 - 5.2 mmol/L Final    Chloride 06/26/2024 99  98 - 107 mmol/L Final    CO2 06/26/2024 23.9  22.0 - 29.0 mmol/L Final    Calcium 06/26/2024 8.0 (L)  8.6 - 10.5 mg/dL Final    Total Protein 06/26/2024 4.6 (L)  6.0 - 8.5 g/dL Final    Albumin 06/26/2024 2.1 (L)  3.5 - 5.2 g/dL Final    ALT (SGPT) 06/26/2024 51 (H)  1 - 33 U/L Final    AST (SGOT) 06/26/2024 63 (H)  1 - 32 U/L Final    Alkaline Phosphatase 06/26/2024 235 (H)  39 - 117 U/L Final    Total Bilirubin 06/26/2024 1.2  0.0 - 1.2 mg/dL Final    Globulin 06/26/2024 2.5  gm/dL Final    A/G Ratio 06/26/2024 0.8  g/dL Final    BUN/Creatinine Ratio 06/26/2024 24.5  7.0 - 25.0 Final    Anion Gap 06/26/2024 13.1  5.0 - 15.0 mmol/L Final    eGFR 06/26/2024 112.9  >60.0 mL/min/1.73 Final    Color, UA 06/26/2024 Orange (A)  Yellow, Straw Final    Appearance, UA 06/26/2024 Cloudy (A)  Clear Final    pH, UA 06/26/2024 6.0  5.0 - 8.0 Final    Specific Gravity, UA 06/26/2024 >1.030 (H)  1.005 - 1.030 Final    Glucose, UA 06/26/2024 Negative  Negative Final    Ketones, UA 06/26/2024 15 mg/dL (1+) (A)  Negative Final    Bilirubin, UA 06/26/2024 Moderate (2+) (A)  Negative Final    Blood, UA 06/26/2024 Trace (A)  Negative Final    Protein, UA 06/26/2024 30 mg/dL (1+) (A)  Negative Final    Leuk Esterase, UA 06/26/2024 Moderate (2+) (A)  Negative Final    Nitrite, UA 06/26/2024 Positive (A)  Negative Final    Urobilinogen, UA 06/26/2024 1.0 E.U./dL  0.2 - 1.0 E.U./dL Final    WBC 06/26/2024 6.13  3.40 - 10.80 10*3/mm3 Final    RBC 06/26/2024 3.66 (L)  3.77 - 5.28 10*6/mm3 Final    Hemoglobin 06/26/2024 12.4  12.0 - 15.9  g/dL Final    Hematocrit 06/26/2024 37.2  34.0 - 46.6 % Final    MCV 06/26/2024 101.6 (H)  79.0 - 97.0 fL Final    MCH 06/26/2024 33.9 (H)  26.6 - 33.0 pg Final    MCHC 06/26/2024 33.3  31.5 - 35.7 g/dL Final    RDW 06/26/2024 14.5  12.3 - 15.4 % Final    RDW-SD 06/26/2024 53.7  37.0 - 54.0 fl Final    MPV 06/26/2024 8.9  6.0 - 12.0 fL Final    Platelets 06/26/2024 266  140 - 450 10*3/mm3 Final    Neutrophil % 06/26/2024 70.8  42.7 - 76.0 % Final    Lymphocyte % 06/26/2024 21.5  19.6 - 45.3 % Final    Monocyte % 06/26/2024 6.5  5.0 - 12.0 % Final    Eosinophil % 06/26/2024 0.0 (L)  0.3 - 6.2 % Final    Basophil % 06/26/2024 0.7  0.0 - 1.5 % Final    Immature Grans % 06/26/2024 0.5  0.0 - 0.5 % Final    Neutrophils, Absolute 06/26/2024 4.34  1.70 - 7.00 10*3/mm3 Final    Lymphocytes, Absolute 06/26/2024 1.32  0.70 - 3.10 10*3/mm3 Final    Monocytes, Absolute 06/26/2024 0.40  0.10 - 0.90 10*3/mm3 Final    Eosinophils, Absolute 06/26/2024 0.00  0.00 - 0.40 10*3/mm3 Final    Basophils, Absolute 06/26/2024 0.04  0.00 - 0.20 10*3/mm3 Final    Immature Grans, Absolute 06/26/2024 0.03  0.00 - 0.05 10*3/mm3 Final    nRBC 06/26/2024 0.0  0.0 - 0.2 /100 WBC Final   Infusion on 06/12/2024   Component Date Value Ref Range Status    Glucose 06/12/2024 95  65 - 99 mg/dL Final    BUN 06/12/2024 11  6 - 20 mg/dL Final    Creatinine 06/12/2024 0.55 (L)  0.57 - 1.00 mg/dL Final    Sodium 06/12/2024 138  136 - 145 mmol/L Final    Potassium 06/12/2024 3.7  3.5 - 5.2 mmol/L Final    Chloride 06/12/2024 102  98 - 107 mmol/L Final    CO2 06/12/2024 22.4  22.0 - 29.0 mmol/L Final    Calcium 06/12/2024 8.4 (L)  8.6 - 10.5 mg/dL Final    Total Protein 06/12/2024 4.8 (L)  6.0 - 8.5 g/dL Final    Albumin 06/12/2024 2.4 (L)  3.5 - 5.2 g/dL Final    ALT (SGPT) 06/12/2024 62 (H)  1 - 33 U/L Final    AST (SGOT) 06/12/2024 102 (H)  1 - 32 U/L Final    Alkaline Phosphatase 06/12/2024 206 (H)  39 - 117 U/L Final    Total Bilirubin 06/12/2024 1.1  0.0  - 1.2 mg/dL Final    Globulin 06/12/2024 2.4  gm/dL Final    A/G Ratio 06/12/2024 1.0  g/dL Final    BUN/Creatinine Ratio 06/12/2024 20.0  7.0 - 25.0 Final    Anion Gap 06/12/2024 13.6  5.0 - 15.0 mmol/L Final    eGFR 06/12/2024 109.8  >60.0 mL/min/1.73 Final    Color, UA 06/12/2024 Dark Yellow (A)  Yellow, Straw Final    Appearance, UA 06/12/2024 Turbid (A)  Clear Final    pH, UA 06/12/2024 5.5  5.0 - 8.0 Final    Specific Gravity, UA 06/12/2024 >1.030 (H)  1.005 - 1.030 Final    Glucose, UA 06/12/2024 Negative  Negative Final    Ketones, UA 06/12/2024 15 mg/dL (1+) (A)  Negative Final    Bilirubin, UA 06/12/2024 Moderate (2+) (A)  Negative Final    Blood, UA 06/12/2024 Negative  Negative Final    Protein, UA 06/12/2024 30 mg/dL (1+) (A)  Negative Final    Leuk Esterase, UA 06/12/2024 Moderate (2+) (A)  Negative Final    Nitrite, UA 06/12/2024 Positive (A)  Negative Final    Urobilinogen, UA 06/12/2024 1.0 E.U./dL  0.2 - 1.0 E.U./dL Final    WBC 06/12/2024 6.98  3.40 - 10.80 10*3/mm3 Final    RBC 06/12/2024 3.55 (L)  3.77 - 5.28 10*6/mm3 Final    Hemoglobin 06/12/2024 12.4  12.0 - 15.9 g/dL Final    Hematocrit 06/12/2024 37.3  34.0 - 46.6 % Final    MCV 06/12/2024 105.1 (H)  79.0 - 97.0 fL Final    MCH 06/12/2024 34.9 (H)  26.6 - 33.0 pg Final    MCHC 06/12/2024 33.2  31.5 - 35.7 g/dL Final    RDW 06/12/2024 13.8  12.3 - 15.4 % Final    RDW-SD 06/12/2024 54.5 (H)  37.0 - 54.0 fl Final    MPV 06/12/2024 9.7  6.0 - 12.0 fL Final    Platelets 06/12/2024 274  140 - 450 10*3/mm3 Final    Neutrophil % 06/12/2024 73.0  42.7 - 76.0 % Final    Lymphocyte % 06/12/2024 19.6  19.6 - 45.3 % Final    Monocyte % 06/12/2024 6.3  5.0 - 12.0 % Final    Eosinophil % 06/12/2024 0.3  0.3 - 6.2 % Final    Basophil % 06/12/2024 0.7  0.0 - 1.5 % Final    Immature Grans % 06/12/2024 0.1  0.0 - 0.5 % Final    Neutrophils, Absolute 06/12/2024 5.09  1.70 - 7.00 10*3/mm3 Final    Lymphocytes, Absolute 06/12/2024 1.37  0.70 - 3.10 10*3/mm3  Final    Monocytes, Absolute 06/12/2024 0.44  0.10 - 0.90 10*3/mm3 Final    Eosinophils, Absolute 06/12/2024 0.02  0.00 - 0.40 10*3/mm3 Final    Basophils, Absolute 06/12/2024 0.05  0.00 - 0.20 10*3/mm3 Final    Immature Grans, Absolute 06/12/2024 0.01  0.00 - 0.05 10*3/mm3 Final    nRBC 06/12/2024 0.0  0.0 - 0.2 /100 WBC Final   Lab on 05/30/2024   Component Date Value Ref Range Status    Glucose 05/30/2024 83  65 - 99 mg/dL Final    BUN 05/30/2024 11  6 - 20 mg/dL Final    Creatinine 05/30/2024 0.52 (L)  0.57 - 1.00 mg/dL Final    Sodium 05/30/2024 142  136 - 145 mmol/L Final    Potassium 05/30/2024 3.3 (L)  3.5 - 5.2 mmol/L Final    Chloride 05/30/2024 106  98 - 107 mmol/L Final    CO2 05/30/2024 26.1  22.0 - 29.0 mmol/L Final    Calcium 05/30/2024 8.4 (L)  8.6 - 10.5 mg/dL Final    Total Protein 05/30/2024 4.8 (L)  6.0 - 8.5 g/dL Final    Albumin 05/30/2024 2.6 (L)  3.5 - 5.2 g/dL Final    ALT (SGPT) 05/30/2024 71 (H)  1 - 33 U/L Final    AST (SGOT) 05/30/2024 111 (H)  1 - 32 U/L Final    Alkaline Phosphatase 05/30/2024 198 (H)  39 - 117 U/L Final    Total Bilirubin 05/30/2024 1.0  0.0 - 1.2 mg/dL Final    Globulin 05/30/2024 2.2  gm/dL Final    A/G Ratio 05/30/2024 1.2  g/dL Final    BUN/Creatinine Ratio 05/30/2024 21.2  7.0 - 25.0 Final    Anion Gap 05/30/2024 9.9  5.0 - 15.0 mmol/L Final    eGFR 05/30/2024 111.9  >60.0 mL/min/1.73 Final    Color, UA 05/30/2024 Dark Yellow (A)  Yellow, Straw Final    Appearance, UA 05/30/2024 Turbid (A)  Clear Final    pH, UA 05/30/2024 5.5  5.0 - 8.0 Final    Specific Gravity, UA 05/30/2024 >=1.030  1.005 - 1.030 Final    Glucose, UA 05/30/2024 Negative  Negative Final    Ketones, UA 05/30/2024 15 mg/dL (1+) (A)  Negative Final    Bilirubin, UA 05/30/2024 Moderate (2+) (A)  Negative Final    Blood, UA 05/30/2024 Negative  Negative Final    Protein, UA 05/30/2024 30 mg/dL (1+) (A)  Negative Final    Leuk Esterase, UA 05/30/2024 Negative  Negative Final    Nitrite, UA  05/30/2024 Positive (A)  Negative Final    Urobilinogen, UA 05/30/2024 1.0 E.U./dL  0.2 - 1.0 E.U./dL Final    CEA 05/30/2024 2.78  ng/mL Final    WBC 05/30/2024 5.32  3.40 - 10.80 10*3/mm3 Final    RBC 05/30/2024 3.38 (L)  3.77 - 5.28 10*6/mm3 Final    Hemoglobin 05/30/2024 11.9 (L)  12.0 - 15.9 g/dL Final    Hematocrit 05/30/2024 36.3  34.0 - 46.6 % Final    MCV 05/30/2024 107.4 (H)  79.0 - 97.0 fL Final    MCH 05/30/2024 35.2 (H)  26.6 - 33.0 pg Final    MCHC 05/30/2024 32.8  31.5 - 35.7 g/dL Final    RDW 05/30/2024 15.3  12.3 - 15.4 % Final    RDW-SD 05/30/2024 61.1 (H)  37.0 - 54.0 fl Final    MPV 05/30/2024 9.4  6.0 - 12.0 fL Final    Platelets 05/30/2024 224  140 - 450 10*3/mm3 Final    Neutrophil % 05/30/2024 69.2  42.7 - 76.0 % Final    Lymphocyte % 05/30/2024 24.2  19.6 - 45.3 % Final    Monocyte % 05/30/2024 5.3  5.0 - 12.0 % Final    Eosinophil % 05/30/2024 0.2 (L)  0.3 - 6.2 % Final    Basophil % 05/30/2024 0.9  0.0 - 1.5 % Final    Immature Grans % 05/30/2024 0.2  0.0 - 0.5 % Final    Neutrophils, Absolute 05/30/2024 3.68  1.70 - 7.00 10*3/mm3 Final    Lymphocytes, Absolute 05/30/2024 1.29  0.70 - 3.10 10*3/mm3 Final    Monocytes, Absolute 05/30/2024 0.28  0.10 - 0.90 10*3/mm3 Final    Eosinophils, Absolute 05/30/2024 0.01  0.00 - 0.40 10*3/mm3 Final    Basophils, Absolute 05/30/2024 0.05  0.00 - 0.20 10*3/mm3 Final    Immature Grans, Absolute 05/30/2024 0.01  0.00 - 0.05 10*3/mm3 Final    nRBC 05/30/2024 0.0  0.0 - 0.2 /100 WBC Final   Lab on 05/15/2024   Component Date Value Ref Range Status    Glucose 05/15/2024 102 (H)  65 - 99 mg/dL Final    BUN 05/15/2024 8  6 - 20 mg/dL Final    Creatinine 05/15/2024 0.48 (L)  0.57 - 1.00 mg/dL Final    Sodium 05/15/2024 143  136 - 145 mmol/L Final    Potassium 05/15/2024 3.4 (L)  3.5 - 5.2 mmol/L Final    Chloride 05/15/2024 106  98 - 107 mmol/L Final    CO2 05/15/2024 26.3  22.0 - 29.0 mmol/L Final    Calcium 05/15/2024 8.4 (L)  8.6 - 10.5 mg/dL Final    Total  Protein 05/15/2024 5.2 (L)  6.0 - 8.5 g/dL Final    Albumin 05/15/2024 2.6 (L)  3.5 - 5.2 g/dL Final    ALT (SGPT) 05/15/2024 84 (H)  1 - 33 U/L Final    AST (SGOT) 05/15/2024 82 (H)  1 - 32 U/L Final    Alkaline Phosphatase 05/15/2024 260 (H)  39 - 117 U/L Final    Total Bilirubin 05/15/2024 1.0  0.0 - 1.2 mg/dL Final    Globulin 05/15/2024 2.6  gm/dL Final    A/G Ratio 05/15/2024 1.0  g/dL Final    BUN/Creatinine Ratio 05/15/2024 16.7  7.0 - 25.0 Final    Anion Gap 05/15/2024 10.7  5.0 - 15.0 mmol/L Final    eGFR 05/15/2024 114.1  >60.0 mL/min/1.73 Final    Color, UA 05/15/2024 Orange (A)  Yellow, Straw Final    Appearance, UA 05/15/2024 Turbid (A)  Clear Final    pH, UA 05/15/2024 5.5  5.0 - 8.0 Final    Specific Gravity, UA 05/15/2024 >=1.030  1.005 - 1.030 Final    Glucose, UA 05/15/2024 Negative  Negative Final    Ketones, UA 05/15/2024 Trace (A)  Negative Final    Bilirubin, UA 05/15/2024 Moderate (2+) (A)  Negative Final    Blood, UA 05/15/2024 Small (1+) (A)  Negative Final    Protein, UA 05/15/2024 30 mg/dL (1+) (A)  Negative Final    Leuk Esterase, UA 05/15/2024 Large (3+) (A)  Negative Final    Nitrite, UA 05/15/2024 Positive (A)  Negative Final    Urobilinogen, UA 05/15/2024 1.0 E.U./dL  0.2 - 1.0 E.U./dL Final    WBC 05/15/2024 7.07  3.40 - 10.80 10*3/mm3 Final    RBC 05/15/2024 3.11 (L)  3.77 - 5.28 10*6/mm3 Final    Hemoglobin 05/15/2024 10.8 (L)  12.0 - 15.9 g/dL Final    Hematocrit 05/15/2024 32.2 (L)  34.0 - 46.6 % Final    MCV 05/15/2024 103.5 (H)  79.0 - 97.0 fL Final    MCH 05/15/2024 34.7 (H)  26.6 - 33.0 pg Final    MCHC 05/15/2024 33.5  31.5 - 35.7 g/dL Final    RDW 05/15/2024 18.8 (H)  12.3 - 15.4 % Final    RDW-SD 05/15/2024 71.5 (H)  37.0 - 54.0 fl Final    MPV 05/15/2024 9.5  6.0 - 12.0 fL Final    Platelets 05/15/2024 302  140 - 450 10*3/mm3 Final    Neutrophil % 05/15/2024 74.1  42.7 - 76.0 % Final    Lymphocyte % 05/15/2024 20.1  19.6 - 45.3 % Final    Monocyte % 05/15/2024 4.7 (L)   5.0 - 12.0 % Final    Eosinophil % 05/15/2024 0.4  0.3 - 6.2 % Final    Basophil % 05/15/2024 0.4  0.0 - 1.5 % Final    Immature Grans % 05/15/2024 0.3  0.0 - 0.5 % Final    Neutrophils, Absolute 05/15/2024 5.24  1.70 - 7.00 10*3/mm3 Final    Lymphocytes, Absolute 05/15/2024 1.42  0.70 - 3.10 10*3/mm3 Final    Monocytes, Absolute 05/15/2024 0.33  0.10 - 0.90 10*3/mm3 Final    Eosinophils, Absolute 05/15/2024 0.03  0.00 - 0.40 10*3/mm3 Final    Basophils, Absolute 05/15/2024 0.03  0.00 - 0.20 10*3/mm3 Final    Immature Grans, Absolute 05/15/2024 0.02  0.00 - 0.05 10*3/mm3 Final    nRBC 05/15/2024 0.0  0.0 - 0.2 /100 WBC Final    Anisocytosis 05/15/2024 Mod/2+  None Seen Final    Macrocytes 05/15/2024 Mod/2+  None Seen Final    Large Platelets 05/15/2024 Slight/1+  None Seen Final    Ferritin 05/15/2024 753.50 (H)  13.00 - 150.00 ng/mL Final    Iron 05/15/2024 77  37 - 145 mcg/dL Final    Iron Saturation (TSAT) 05/15/2024 74 (H)  20 - 50 % Final    Transferrin 05/15/2024 70 (L)  200 - 360 mg/dL Final    TIBC 05/15/2024 104 (L)  298 - 536 mcg/dL Final   Infusion on 05/01/2024   Component Date Value Ref Range Status    Glucose 05/01/2024 69  65 - 99 mg/dL Final    BUN 05/01/2024 10  6 - 20 mg/dL Final    Creatinine 05/01/2024 0.48 (L)  0.57 - 1.00 mg/dL Final    Sodium 05/01/2024 140  136 - 145 mmol/L Final    Potassium 05/01/2024 3.3 (L)  3.5 - 5.2 mmol/L Final    Chloride 05/01/2024 104  98 - 107 mmol/L Final    CO2 05/01/2024 23.9  22.0 - 29.0 mmol/L Final    Calcium 05/01/2024 8.2 (L)  8.6 - 10.5 mg/dL Final    Total Protein 05/01/2024 4.7 (L)  6.0 - 8.5 g/dL Final    Albumin 05/01/2024 2.6 (L)  3.5 - 5.2 g/dL Final    ALT (SGPT) 05/01/2024 116 (H)  1 - 33 U/L Final    AST (SGOT) 05/01/2024 165 (H)  1 - 32 U/L Final    Alkaline Phosphatase 05/01/2024 236 (H)  39 - 117 U/L Final    Total Bilirubin 05/01/2024 0.9  0.0 - 1.2 mg/dL Final    Globulin 05/01/2024 2.1  gm/dL Final    A/G Ratio 05/01/2024 1.2  g/dL Final     BUN/Creatinine Ratio 05/01/2024 20.8  7.0 - 25.0 Final    Anion Gap 05/01/2024 12.1  5.0 - 15.0 mmol/L Final    eGFR 05/01/2024 114.1  >60.0 mL/min/1.73 Final    WBC 05/01/2024 7.15  3.40 - 10.80 10*3/mm3 Final    RBC 05/01/2024 3.19 (L)  3.77 - 5.28 10*6/mm3 Final    Hemoglobin 05/01/2024 10.3 (L)  12.0 - 15.9 g/dL Final    Hematocrit 05/01/2024 31.5 (L)  34.0 - 46.6 % Final    MCV 05/01/2024 98.7 (H)  79.0 - 97.0 fL Final    MCH 05/01/2024 32.3  26.6 - 33.0 pg Final    MCHC 05/01/2024 32.7  31.5 - 35.7 g/dL Final    RDW 05/01/2024 23.9 (H)  12.3 - 15.4 % Final    RDW-SD 05/01/2024 86.4 (H)  37.0 - 54.0 fl Final    MPV 05/01/2024 10.6  6.0 - 12.0 fL Final    Platelets 05/01/2024 218  140 - 450 10*3/mm3 Final    Neutrophil % 05/01/2024 79.3 (H)  42.7 - 76.0 % Final    Lymphocyte % 05/01/2024 14.8 (L)  19.6 - 45.3 % Final    Monocyte % 05/01/2024 4.8 (L)  5.0 - 12.0 % Final    Eosinophil % 05/01/2024 0.1 (L)  0.3 - 6.2 % Final    Basophil % 05/01/2024 0.7  0.0 - 1.5 % Final    Immature Grans % 05/01/2024 0.3  0.0 - 0.5 % Final    Neutrophils, Absolute 05/01/2024 5.67  1.70 - 7.00 10*3/mm3 Final    Lymphocytes, Absolute 05/01/2024 1.06  0.70 - 3.10 10*3/mm3 Final    Monocytes, Absolute 05/01/2024 0.34  0.10 - 0.90 10*3/mm3 Final    Eosinophils, Absolute 05/01/2024 0.01  0.00 - 0.40 10*3/mm3 Final    Basophils, Absolute 05/01/2024 0.05  0.00 - 0.20 10*3/mm3 Final    Immature Grans, Absolute 05/01/2024 0.02  0.00 - 0.05 10*3/mm3 Final    nRBC 05/01/2024 0.0  0.0 - 0.2 /100 WBC Final    Color, UA 05/01/2024 Orange (A)  Yellow, Straw Final    Appearance, UA 05/01/2024 Cloudy (A)  Clear Final    pH, UA 05/01/2024 5.5  5.0 - 8.0 Final    Specific Gravity, UA 05/01/2024 1.024  1.005 - 1.030 Final    Glucose, UA 05/01/2024 Negative  Negative Final    Ketones, UA 05/01/2024 15 mg/dL (1+) (A)  Negative Final    Bilirubin, UA 05/01/2024 Moderate (2+) (A)  Negative Final    Blood, UA 05/01/2024 Negative  Negative Final     Protein, UA 05/01/2024 30 mg/dL (1+) (A)  Negative Final    Leuk Esterase, UA 05/01/2024 Moderate (2+) (A)  Negative Final    Nitrite, UA 05/01/2024 Positive (A)  Negative Final    Urobilinogen, UA 05/01/2024 1.0 E.U./dL  0.2 - 1.0 E.U./dL Final    Anisocytosis 05/01/2024 Large/3+  None Seen Final    Hypochromia 05/01/2024 Mod/2+  None Seen Final    Macrocytes 05/01/2024 Slight/1+  None Seen Final    Platelet Morphology 05/01/2024 Normal  Normal Final   Lab on 04/23/2024   Component Date Value Ref Range Status    Glucose 04/23/2024 90  65 - 99 mg/dL Final    BUN 04/23/2024 12  6 - 20 mg/dL Final    Creatinine 04/23/2024 0.63  0.57 - 1.00 mg/dL Final    Sodium 04/23/2024 142  136 - 145 mmol/L Final    Potassium 04/23/2024 3.4 (L)  3.5 - 5.2 mmol/L Final    Chloride 04/23/2024 105  98 - 107 mmol/L Final    CO2 04/23/2024 26.9  22.0 - 29.0 mmol/L Final    Calcium 04/23/2024 8.5 (L)  8.6 - 10.5 mg/dL Final    Total Protein 04/23/2024 5.3 (L)  6.0 - 8.5 g/dL Final    Albumin 04/23/2024 2.6 (L)  3.5 - 5.2 g/dL Final    ALT (SGPT) 04/23/2024 187 (H)  1 - 33 U/L Final    AST (SGOT) 04/23/2024 196 (H)  1 - 32 U/L Final    Alkaline Phosphatase 04/23/2024 363 (H)  39 - 117 U/L Final    Total Bilirubin 04/23/2024 0.9  0.0 - 1.2 mg/dL Final    Globulin 04/23/2024 2.7  gm/dL Final    A/G Ratio 04/23/2024 1.0  g/dL Final    BUN/Creatinine Ratio 04/23/2024 19.0  7.0 - 25.0 Final    Anion Gap 04/23/2024 10.1  5.0 - 15.0 mmol/L Final    eGFR 04/23/2024 106.9  >60.0 mL/min/1.73 Final    WBC 04/23/2024 3.68  3.40 - 10.80 10*3/mm3 Final    RBC 04/23/2024 3.71 (L)  3.77 - 5.28 10*6/mm3 Final    Hemoglobin 04/23/2024 11.6 (L)  12.0 - 15.9 g/dL Final    Hematocrit 04/23/2024 35.0  34.0 - 46.6 % Final    MCV 04/23/2024 94.3  79.0 - 97.0 fL Final    MCH 04/23/2024 31.3  26.6 - 33.0 pg Final    MCHC 04/23/2024 33.1  31.5 - 35.7 g/dL Final    RDW 04/23/2024 26.4 (H)  12.3 - 15.4 % Final    RDW-SD 04/23/2024 89.1 (H)  37.0 - 54.0 fl Final     MPV 04/23/2024 9.4  6.0 - 12.0 fL Final    Platelets 04/23/2024 332  140 - 450 10*3/mm3 Final    Neutrophil % 04/23/2024 58.0  42.7 - 76.0 % Final    Lymphocyte % 04/23/2024 26.0  19.6 - 45.3 % Final    Atypical/Reactive Lymphocytes noted.      Monocyte % 04/23/2024 13.0 (H)  5.0 - 12.0 % Final    Basophil % 04/23/2024 2.0 (H)  0.0 - 1.5 % Final    Bands %  04/23/2024 1.0  0.0 - 5.0 % Final    Neutrophils Absolute 04/23/2024 2.17  1.70 - 7.00 10*3/mm3 Final    Lymphocytes Absolute 04/23/2024 0.96  0.70 - 3.10 10*3/mm3 Final    Monocytes Absolute 04/23/2024 0.48  0.10 - 0.90 10*3/mm3 Final    Basophils Absolute 04/23/2024 0.07  0.00 - 0.20 10*3/mm3 Final    Anisocytosis 04/23/2024 Large/3+  None Seen Final    Hypochromia 04/23/2024 Mod/2+  None Seen Final    Platelet Morphology 04/23/2024 Normal  Normal Final   Admission on 04/21/2024, Discharged on 04/21/2024   Component Date Value Ref Range Status    Glucose 04/21/2024 87  65 - 99 mg/dL Final    BUN 04/21/2024 13  6 - 20 mg/dL Final    Creatinine 04/21/2024 0.59  0.57 - 1.00 mg/dL Final    Sodium 04/21/2024 140  136 - 145 mmol/L Final    Potassium 04/21/2024 3.5  3.5 - 5.2 mmol/L Final    Chloride 04/21/2024 101  98 - 107 mmol/L Final    CO2 04/21/2024 25.4  22.0 - 29.0 mmol/L Final    Calcium 04/21/2024 9.1  8.6 - 10.5 mg/dL Final    Total Protein 04/21/2024 5.5 (L)  6.0 - 8.5 g/dL Final    Albumin 04/21/2024 2.9 (L)  3.5 - 5.2 g/dL Final    ALT (SGPT) 04/21/2024 195 (H)  1 - 33 U/L Final    AST (SGOT) 04/21/2024 236 (H)  1 - 32 U/L Final    Alkaline Phosphatase 04/21/2024 393 (H)  39 - 117 U/L Final    Total Bilirubin 04/21/2024 1.0  0.0 - 1.2 mg/dL Final    Globulin 04/21/2024 2.6  gm/dL Final    A/G Ratio 04/21/2024 1.1  g/dL Final    BUN/Creatinine Ratio 04/21/2024 22.0  7.0 - 25.0 Final    Anion Gap 04/21/2024 13.6  5.0 - 15.0 mmol/L Final    eGFR 04/21/2024 108.6  >60.0 mL/min/1.73 Final    Protime 04/21/2024 12.8  12.1 - 14.7 Seconds Final    INR 04/21/2024  0.91  0.90 - 1.10 Final    PTT 04/21/2024 25.9 (L)  26.5 - 34.5 seconds Final    Color, UA 04/21/2024 Dark Yellow (A)  Yellow, Straw Final    Appearance, UA 04/21/2024 Slightly Cloudy (A)  Clear Final    pH, UA 04/21/2024 5.0  5.0 - 8.0 Final    Specific Gravity, UA 04/21/2024 1.015  1.005 - 1.030 Final    Glucose, UA 04/21/2024 Negative  Negative Final    Ketones, UA 04/21/2024 Negative  Negative Final    Bilirubin, UA 04/21/2024 Negative  Negative Final    Blood, UA 04/21/2024 Negative  Negative Final    Protein, UA 04/21/2024 Trace (A)  Negative Final    Leuk Esterase, UA 04/21/2024 Negative  Negative Final    Nitrite, UA 04/21/2024 Negative  Negative Final    Urobilinogen, UA 04/21/2024 0.2 E.U./dL  0.2 - 1.0 E.U./dL Final    QT Interval 04/21/2024 318  ms Final    QTC Interval 04/21/2024 408  ms Final    WBC 04/21/2024 2.63 (L)  3.40 - 10.80 10*3/mm3 Final    RBC 04/21/2024 3.90  3.77 - 5.28 10*6/mm3 Final    Hemoglobin 04/21/2024 12.3  12.0 - 15.9 g/dL Final    Hematocrit 04/21/2024 36.9  34.0 - 46.6 % Final    MCV 04/21/2024 94.6  79.0 - 97.0 fL Final    MCH 04/21/2024 31.5  26.6 - 33.0 pg Final    MCHC 04/21/2024 33.3  31.5 - 35.7 g/dL Final    RDW 04/21/2024 26.8 (H)  12.3 - 15.4 % Final    RDW-SD 04/21/2024 90.9 (H)  37.0 - 54.0 fl Final    MPV 04/21/2024 9.6  6.0 - 12.0 fL Final    Platelets 04/21/2024 348  140 - 450 10*3/mm3 Final    Neutrophil % 04/21/2024 33.9 (L)  42.7 - 76.0 % Final    Lymphocyte % 04/21/2024 45.6 (H)  19.6 - 45.3 % Final    Monocyte % 04/21/2024 16.0 (H)  5.0 - 12.0 % Final    Eosinophil % 04/21/2024 1.9  0.3 - 6.2 % Final    Basophil % 04/21/2024 1.1  0.0 - 1.5 % Final    Neutrophils, Absolute 04/21/2024 0.89 (L)  1.70 - 7.00 10*3/mm3 Final    Lymphocytes, Absolute 04/21/2024 1.20  0.70 - 3.10 10*3/mm3 Final    Monocytes, Absolute 04/21/2024 0.42  0.10 - 0.90 10*3/mm3 Final    Eosinophils, Absolute 04/21/2024 0.05  0.00 - 0.40 10*3/mm3 Final    Basophils, Absolute 04/21/2024 0.03   0.00 - 0.20 10*3/mm3 Final    Extra Tube 04/21/2024 Hold for add-ons.   Final    Auto resulted.    Extra Tube 04/21/2024 hold for add-on   Final    Auto resulted    Extra Tube 04/21/2024 Hold for add-ons.   Final    Auto resulted.    Extra Tube 04/21/2024 Hold for add-ons.   Final    Auto resulted    Neutrophil % 04/21/2024 39.0 (L)  42.7 - 76.0 % Final    Lymphocyte % 04/21/2024 52.0 (H)  19.6 - 45.3 % Final    Atypical/Reactive Lymphocytes noted.      Monocyte % 04/21/2024 5.0  5.0 - 12.0 % Final    Eosinophil % 04/21/2024 4.0  0.3 - 6.2 % Final    Neutrophils Absolute 04/21/2024 1.03 (L)  1.70 - 7.00 10*3/mm3 Final    Lymphocytes Absolute 04/21/2024 1.37  0.70 - 3.10 10*3/mm3 Final    Monocytes Absolute 04/21/2024 0.13  0.10 - 0.90 10*3/mm3 Final    Eosinophils Absolute 04/21/2024 0.11  0.00 - 0.40 10*3/mm3 Final    Anisocytosis 04/21/2024 Mod/2+  None Seen Final    Target Cells 04/21/2024 Slight/1+  None Seen Final    Platelet Morphology 04/21/2024 Normal  Normal Final   Lab on 04/16/2024   Component Date Value Ref Range Status    Glucose 04/16/2024 91  65 - 99 mg/dL Final    BUN 04/16/2024 12  6 - 20 mg/dL Final    Creatinine 04/16/2024 0.55 (L)  0.57 - 1.00 mg/dL Final    Sodium 04/16/2024 140  136 - 145 mmol/L Final    Potassium 04/16/2024 3.6  3.5 - 5.2 mmol/L Final    Chloride 04/16/2024 103  98 - 107 mmol/L Final    CO2 04/16/2024 27.8  22.0 - 29.0 mmol/L Final    Calcium 04/16/2024 8.8  8.6 - 10.5 mg/dL Final    Total Protein 04/16/2024 5.0 (L)  6.0 - 8.5 g/dL Final    Albumin 04/16/2024 2.8 (L)  3.5 - 5.2 g/dL Final    ALT (SGPT) 04/16/2024 269 (H)  1 - 33 U/L Final    AST (SGOT) 04/16/2024 119 (H)  1 - 32 U/L Final    Alkaline Phosphatase 04/16/2024 438 (H)  39 - 117 U/L Final    Total Bilirubin 04/16/2024 1.3 (H)  0.0 - 1.2 mg/dL Final    Globulin 04/16/2024 2.2  gm/dL Final    A/G Ratio 04/16/2024 1.3  g/dL Final    BUN/Creatinine Ratio 04/16/2024 21.8  7.0 - 25.0 Final    Anion Gap 04/16/2024 9.2   5.0 - 15.0 mmol/L Final    eGFR 04/16/2024 110.4  >60.0 mL/min/1.73 Final    CEA 04/16/2024 2.67  ng/mL Final    WBC 04/16/2024 2.03 (L)  3.40 - 10.80 10*3/mm3 Final    RBC 04/16/2024 3.64 (L)  3.77 - 5.28 10*6/mm3 Final    Hemoglobin 04/16/2024 11.2 (L)  12.0 - 15.9 g/dL Final    Hematocrit 04/16/2024 33.4 (L)  34.0 - 46.6 % Final    MCV 04/16/2024 91.8  79.0 - 97.0 fL Final    MCH 04/16/2024 30.8  26.6 - 33.0 pg Final    MCHC 04/16/2024 33.5  31.5 - 35.7 g/dL Final    RDW 04/16/2024 27.8 (H)  12.3 - 15.4 % Final    RDW-SD 04/16/2024 93.7 (H)  37.0 - 54.0 fl Final    MPV 04/16/2024 10.0  6.0 - 12.0 fL Final    Platelets 04/16/2024 163  140 - 450 10*3/mm3 Final    Neutrophil % 04/16/2024 53.2  42.7 - 76.0 % Final    Lymphocyte % 04/16/2024 35.0  19.6 - 45.3 % Final    Monocyte % 04/16/2024 10.8  5.0 - 12.0 % Final    Eosinophil % 04/16/2024 0.5  0.3 - 6.2 % Final    Basophil % 04/16/2024 0.5  0.0 - 1.5 % Final    Immature Grans % 04/16/2024 0.0  0.0 - 0.5 % Final    Neutrophils, Absolute 04/16/2024 1.08 (L)  1.70 - 7.00 10*3/mm3 Final    Lymphocytes, Absolute 04/16/2024 0.71  0.70 - 3.10 10*3/mm3 Final    Monocytes, Absolute 04/16/2024 0.22  0.10 - 0.90 10*3/mm3 Final    Eosinophils, Absolute 04/16/2024 0.01  0.00 - 0.40 10*3/mm3 Final    Basophils, Absolute 04/16/2024 0.01  0.00 - 0.20 10*3/mm3 Final    Immature Grans, Absolute 04/16/2024 0.00  0.00 - 0.05 10*3/mm3 Final    nRBC 04/16/2024 0.0  0.0 - 0.2 /100 WBC Final    Anisocytosis 04/16/2024 Large/3+  None Seen Final    Poikilocytes 04/16/2024 Slight/1+  None Seen Final    Stomatocytes 04/16/2024 Slight/1+  None Seen Final    Platelet Morphology 04/16/2024 Normal  Normal Final   Infusion on 04/04/2024   Component Date Value Ref Range Status    Potassium 04/04/2024 2.7 (L)  3.5 - 5.2 mmol/L Final   There may be more visits with results that are not included.        PATHOLOGY  * Cannot find OR log *    IMPRESSION AND PLAN  Vangie Carney is a 54 y.o., white  female with:  Catheter dysfunction/retention -I suspect the patient's residuals on bladder scan are most likely secondary to physiological fluid.  Patient catheter was irrigated at bedside with 200 mL of sterile water and no complication were noted.  I received no resistance with good return.  I recommend to continue with observation.  Patient may undergo repeat bladder scans if pain worsens or she has minimal output through her indwelling Colmenaers catheter.  Patient improved clinically Colmenares catheter been removed for a voiding trial.  I will follow along closely with the patient.  Acute urinary tract infection -patient's urine culture is growing 50,000 colony-forming units of gram-negative bacilli.  Would recommend to continue with Zosyn    The patient was in agreement with these plans.    Thank you for asking us to participate in Vangie Carney's care.  We will continue to follow with you.  Please do not hesitate to call with any questions or concerns that you may have.    A total of 40 minutes were spent coordinating this patient’s care in clinic today; 20 minutes of which were face-to-face with the patient, reviewing medical history and counseling on the current treatment and followup plan.  All questions were answered to patient's satisfaction.         This document has been electronically signed by Romeo Izaguirre PA-C  July 3, 2024 08:14 EDT    Part of this note may be an electronic transcription/translation of spoken language to printed text using the Dragon Dictation System.

## 2024-07-03 NOTE — THERAPY EVALUATION
Acute Care - Speech Language Pathology   Swallow Initial Evaluation [facility]  CLINICAL DYSPHAGIA ASSESSMENT     Patient Name: Vangie Carney  : 1970  MRN: [id number]  Today's Date: 7/3/2024     Admit Date: 2024  Vangie Carney  was seen at bedside this am on PCU-220 to assess safety/efficacy of swallowing fnx, determine safest/least restrictive diet tolerance. She has a PMH significant for metastatic colorectal adenocarcinoma s/p LAR currently receiving palliative chemotherapy, pulmonary embolism on therapeutic anticoagulation, and gastric dysmotility. Ms Carney is unfamiliar to SLP department of [facility] prior to this consultation.     Ms Carney presented to [facility] ED w/ worsening fatigue/weakness over the past three days as well as nausea and vomiting. She additionally reported dysuria for ht epast four days. She had been prescribed bactrim on 24 however had been unable to take the pills due to difficulty swallowing them related to their size. She reported she had not been eating or drinking well. Per EMR review, she reported to oncology MD a burning sensation in her mouth and throat which she stated prevented her from eating at home. Her spouse reported that she had magic mouthwash at home but that she did not like to use it. She reportedly was agreeable to try it again at this time.     She is currently NPO pending this assessment.     Social History     Socioeconomic History    Marital status:    Tobacco Use    Smoking status: Former     Current packs/day: 0.00     Average packs/day: 1 pack/day for 30.0 years (30.0 ttl pk-yrs)     Types: Cigarettes     Start date:      Quit date: 2017     Years since quittin.5     Passive exposure: Never    Smokeless tobacco: Never   Vaping Use    Vaping status: Every Day    Substances: Nicotine, Flavoring    Devices: Refillable tank   Substance and Sexual Activity    Alcohol use: Never    Drug use: Defer    Sexual activity: Defer     Birth  control/protection: None     Imaging:  XR CHEST 1 VW-     CLINICAL INDICATION: Weak/Dizzy/AMS triage protocol        COMPARISON: 3/7/2024     TECHNIQUE: Single frontal view of the chest.     FINDINGS:     LUNGS: Lungs are adequately aerated.      HEART AND MEDIASTINUM: Heart and mediastinal contours are unremarkable        SKELETON: Bony and soft tissue structures are unremarkable.     Central line tip is stable  NG tube is been removed     IMPRESSION:  No radiographic evidence of acute cardiac or pulmonary disease.           This report was finalized on 7/1/2024 11:46 AM by Dr. Edmond Vasquez MD.  Labs:   Latest Reference Range & Units 07/01/24 11:25 07/02/24 00:29 07/03/24 03:15   WBC 3.40 - 10.80 10*3/mm3 8.31 11.57 (H) 9.25   RBC 3.77 - 5.28 10*6/mm3 2.83 (L) 2.73 (L) 2.66 (L)   Hemoglobin 12.0 - 15.9 g/dL 9.8 (L) 9.6 (L) 9.1 (L)   Hematocrit 34.0 - 46.6 % 28.7 (L) 27.8 (L) 26.2 (L)   Platelets 140 - 450 10*3/mm3 155 173 135 (L)   RDW 12.3 - 15.4 % 15.5 (H) 15.2 14.3   MCV 79.0 - 97.0 fL 101.4 (H) 101.8 (H) 98.5 (H)   MCH 26.6 - 33.0 pg 34.6 (H) 35.2 (H) 34.2 (H)   MCHC 31.5 - 35.7 g/dL 34.1 34.5 34.7   MPV 6.0 - 12.0 fL 9.6 9.7 9.8   RDW-SD 37.0 - 54.0 fl 57.3 (H) 55.8 (H) 51.8   (H): Data is abnormally high  (L): Data is abnormally low  Diet Orders (active) (From admission, onward)       Start     Ordered    07/03/24 1052  Diet: Regular/House; Feeding Assistance - Nursing; Texture: Soft to Chew (NDD 3); Soft to Chew: Ground Meat; Fluid Consistency: Thin (IDDSI 0)  Diet Effective Now        Comments: Medications whole in puree, single pill presentations. Break PRN pending size/coating. Crush PRN.    07/03/24 1053                  Ms Carney is observed on room air w/o complications across this assessment.     Upon SLP entry, Ms Carney is positioned centered in bed and slightly reclined. She is a/a and reports generally feeling very weak and fatiguing very quickly. She endorses difficulty swallowing relating to  foods and medications at this time and that her throat has felt like it burns when she swallows. She is agreeable to this assessment and po trials.     She was positioned upright and centered in bed to accept multiple po presentations of ice chips, solid cracker, puree, and thin liquids via spoon, cup, and straw.  She was able to self provide po trials however limited endurance was evidenced and she will require assistance w/ po intake.     Facial/oral structures were symmetrical upon observation. Lingual protrusion revealed no deviation. Oral mucosa were moist, pink, and generally clean but w/ small, pin prick size, scattered white patches on lingual body. Secretions were clear, thin, and well controlled. OROM/AROLDO was mild to moderately weak overall to imitate oral postures. Gag is not assessed. Volitional cough was intact w/ slightly weak intensity, clear in quality, non-productive. Voice was slightly weak in intensity, clear in quality w/ intelligible speech.    Upon po presentations, adequate bolus anticipation and acceptance w/ good labial seal for bolus clearance via spoon bowl, cup rim stability and suction via straw. Bolus formation, manipulation and control were mildly prolonged w/ solid cracker only w/ rotary mastication pattern. A-p transit was timely w/o significant oral residue appreciated. No overt s/s aspiration before the swallow.      Pharyngeal swallow was timely w/ adequate hyolaryngeal elevation per palpation. No overt s/s aspiration evidenced across this evaluation. No silent aspiration suspected. She endorses some discomfort when swallowing although states it is slightly improved from initial presentation.    Visit Dx:     ICD-10-CM ICD-9-CM   1. Sepsis without acute organ dysfunction, due to unspecified organism  A41.9 038.9     995.91   2. Urinary tract infection without hematuria, site unspecified  N39.0 599.0   3. Metastatic colon cancer to liver  C18.9 153.9    C78.7 197.7     Patient  Active Problem List   Diagnosis    Chronic idiopathic constipation    Encounter for screening for malignant neoplasm of colon    Malignant neoplasm of sigmoid colon    GERD without esophagitis    S/P colectomy( robotic lap LAR)    Acute postoperative pain    Iron deficiency anemia    Malabsorption of iron    Port-A-Cath in place    SBO (small bowel obstruction)    Septic shock due to undetermined organism     Past Medical History:   Diagnosis Date    Gallbladder attack     GERD (gastroesophageal reflux disease)     Hearing aid worn     History of ear infections     Malignant neoplasm of sigmoid colon 3/14/2023    Seasonal allergies     Wears glasses      Past Surgical History:   Procedure Laterality Date    CHOLECYSTECTOMY      COLON RESECTION N/A 3/14/2023    Procedure: COLON RESECTION LOW ANTERIOR LAPAROSCOPIC WITH DAVINCI ROBOT;  Surgeon: Shari Aguirre MD;  Location:  [facility];  Service: Robotics - DaVinci;  Laterality: N/A;    COLONOSCOPY N/A 2/15/2023    Procedure: COLONOSCOPY FOR SCREENING;  Surgeon: Giselle Iniguez MD;  Location: [facility] OR;  Service: Gastroenterology;  Laterality: N/A;    ENDOSCOPY N/A 2/15/2023    Procedure: ESOPHAGOGASTRODUODENOSCOPY WITH BIOPSY;  Surgeon: Giselle Iniguez MD;  Location:  [facility] OR;  Service: Gastroenterology;  Laterality: N/A;    LAPAROSCOPIC TUBAL LIGATION Bilateral     MIDDLE EAR SURGERY      PORTACATH PLACEMENT N/A 2/14/2024    Procedure: INSERTION OF PORTACATH;  Surgeon: Jaylen Leal MD;  Location:  [facility] OR;  Service: General;  Laterality: N/A;     Impression:     Ms Carney presented w/ an overall mild to moderately weak but wfl oropharyngeal swallow felt to be related to current fatigue and limited endurance.     She is felt to most benefit from po diet initiation of mechanical soft solids, ground meats, and thin liquids w/ medications whole in puree at this time, single pill presentations and break PRN pending size/coating. She will  benefit from feeding assistance.     Per EMR review, current use of magic mouthwash, oral visual assessment, and discussion w/ Ms Rommel it is felt that she may benefit from further oral candidiasis treatment to address possible extension over lingual base. Please consider candidacy for further treatment.     SLP Recommendation and Plan     1. Soft to chew textures, ground meats, thin liquids.    2. Medicatoins whole in puree. Single pill presentations. Break PRN pending size/coating.    3. Upright and centered for all po intake  4. LUCRECIA precautions.  5. Oral care protocol.  6. Universal aspiration precautions.   7. Consider candidacy for further oral candidiasis treatment for potential extension over lingual base.     SLP to f/u for diet safety/tolerance and potential advancement.     D/w patient results and recommendations w/ verbal agreement.    D/w RN results and recommendations w/ verbal agreement.    Thank you for allowing me to participate in the care of your patient-  Chelsea Brown M.S., CCC-SLP          EDUCATION  The patient has been educated in the following areas:   Dysphagia (Swallowing Impairment) Oral Care/Hydration Modified Diet Instruction.          Time Calculation:          Chelsea Brown MS CCC-SLP  7/3/2024

## 2024-07-03 NOTE — PROGRESS NOTES
"Palliative Care Daily Progress Note     S: Medical record reviewed, followed up with Primary RN Brittany and Dr Allen regarding patient's condition. When I saw Jennifer today she was sitting up in bed resting quietly with her sister Sonja at bedside. Jennifer appeared weak and very flat, she denied pain, nausea, shortness of breath or anxiety at this time. She states she does not have an appetite and sister says she has not for awhile as she gets nauseated if she eats certain foods. I encourage her to try and take it a little more nutrition and offered boost however per sister they cause her abdominal pain and make her vomit.      O:   Palliative Performance Scale Score:     BP 92/66   Pulse 103   Temp 97.5 °F (36.4 °C) (Oral)   Resp 14   Ht 170.2 cm (67\")   Wt 66.5 kg (146 lb 9.7 oz)   LMP 06/15/2016   SpO2 99%   BMI 22.96 kg/m²     Intake/Output Summary (Last 24 hours) at 7/3/2024 1718  Last data filed at 7/3/2024 1632  Gross per 24 hour   Intake 3792.54 ml   Output 645 ml   Net 3147.54 ml       PE:  General Appearance:    Chronically ill appearing, drowsy, cooperative, NAD   HEENT:    NC/AT, without obvious abnormality, EOMI, anicteric , dry mucous membranes    Neck:   supple, trachea midline, no JVD   Lungs:     Unlabored respirations with no rhonchi rales or wheezing noted., diminished bilaterally     Heart:    tachycardia, normal S1 and S2, no M/R/G   Abdomen:     Soft, NT, Distended, hypoactive ABS , clay catheter in place, urine tea colored   Extremities:   Moves all extremities, no edema   Pulses:   Pulses palpable and equal bilaterally   Skin:   Dry and jaundiced    Neurologic:   A/Ox3, cooperative, drowsy   Psych:   Calm, flat, depressed in appeareance         Meds: Reviewed and changes noted    Labs:   Results from last 7 days   Lab Units 07/03/24  0315   WBC 10*3/mm3 9.25   HEMOGLOBIN g/dL 9.1*   HEMATOCRIT % 26.2*   PLATELETS 10*3/mm3 135*     Results from last 7 days   Lab Units 07/03/24  1546 " 07/03/24  0358   SODIUM mmol/L  --  133*   POTASSIUM mmol/L 3.7 3.5   CHLORIDE mmol/L  --  104   CO2 mmol/L  --  17.9*   BUN mg/dL  --  19   CREATININE mg/dL  --  0.98   GLUCOSE mg/dL  --  98   CALCIUM mg/dL  --  7.3*     Results from last 7 days   Lab Units 07/03/24  1546 07/03/24  0358 07/02/24  0029   SODIUM mmol/L  --  133* 133*   POTASSIUM mmol/L 3.7 3.5 3.8   CHLORIDE mmol/L  --  104 103   CO2 mmol/L  --  17.9* 16.8*   BUN mg/dL  --  19 22*   CREATININE mg/dL  --  0.98 1.38*   CALCIUM mg/dL  --  7.3* 7.2*   BILIRUBIN mg/dL  --   --  1.5*   ALK PHOS U/L  --   --  237*   ALT (SGPT) U/L  --   --  67*   AST (SGOT) U/L  --   --  78*   GLUCOSE mg/dL  --  98 75     Imaging Results (Last 72 Hours)       Procedure Component Value Units Date/Time    CT Abdomen Pelvis Without Contrast [[id number]] Collected: 07/02/24 0028     Updated: 07/02/24 0035    Narrative:      EXAMINATION: CT abdomen and pelvis without contrast     CLINICAL HISTORY: Abdominal distention.     COMPARISON: 4/21/2024     TECHNIQUE: Contiguous 5 mm thick slices were obtained through the  abdomen and pelvis without contrast. Coronal and sagittal reformats were  performed.     FINDINGS:     ABDOMEN:  At least a small right pleural effusion is noted, incompletely imaged.  Similarly there is at least a small pericardial effusion, also  incompletely imaged. These are new when compared with the prior study.     The liver is diffusely low in attenuation in a pattern suggesting fatty  infiltration. There has been prior cholecystectomy. No hydronephrosis  either kidney. No definite ureteral calculus. The unenhanced spleen,  adrenal glands and pancreas appear grossly normal.     PELVIS:  Postsurgical changes are noted within the sigmoid region where a suture  line is present. There are multiple dilated loops of small bowel with  fecalization of small bowel contents. The findings are consistent with  at least high-grade partial small bowel obstruction. No free air  is  identified to suggest perforation. A small amount of free fluid is  noted. No loculated collection is identified to suggest an abscess.       Impression:      1. Multiple dilated loops of small bowel consistent with small bowel  obstruction.  2. Right pleural effusion and pericardial effusion, incompletely imaged.  These are new when compared to the prior study.  3. Fatty infiltration of the liver.  4. No hydronephrosis or ureteral calculus.     This report was finalized on 7/2/2024 12:33 AM by Trenton Zurita MD.       XR Chest 1 View [[id number]] Collected: 07/01/24 1145     Updated: 07/01/24 1148    Narrative:      XR CHEST 1 VW-     CLINICAL INDICATION: Weak/Dizzy/AMS triage protocol        COMPARISON: 3/7/2024     TECHNIQUE: Single frontal view of the chest.     FINDINGS:     LUNGS: Lungs are adequately aerated.      HEART AND MEDIASTINUM: Heart and mediastinal contours are unremarkable        SKELETON: Bony and soft tissue structures are unremarkable.     Central line tip is stable  NG tube is been removed       Impression:      No radiographic evidence of acute cardiac or pulmonary disease.           This report was finalized on 7/1/2024 11:46 AM by Dr. Edmond Vasquez MD.                 Diagnostics: Reviewed    A: Vangie Carney is a 54 y.o. female admitted on 7/1/2024 for septic shock. She has a past medical history of GERD, malignant neoplasm of sigmoid colon with colon resection. She has Metastatic colorectal adenocarcinoma s/p LAR currently receiving palliative chemotherapy, pulmonary embolism on therapeutic anticoagulation, and gastric dysmotility. She complains today of fatigue, dysphagia, dyspnea worse with exertion, abdominal pain, nausea and vomiting, decreased UOP, dysuria and increased weakness. She presented to the ED for increased weakness and fatigue over the past few days as well as nausea and vomiting. Her ED labs, sodium 133, BUN 22, creat 1.4, eGFR 45.1, glucose 22, calcium 7.9, alk phos  249, total protein 3.9, albumin 1.8, AST 77, ALT 65, total bilirubin 1.7, wbc 8.31, H&H 9.8/28.7, platelets 155, urine cloudy, >1.030, blood trace, positive nitrates, moderate leukocytes, CT shows multiple dilated loops of small bowel consistent with small bowel obstruction, Right pleural effusion and pericardial effusion, incompletely imaged. These are new when compared to the prior study. Fatty infiltration of the liver. No hydronephrosis or ureteral calculus.       P:  I was able to speak with Jennifer and her sister Sonja at bedside to provide support. I told Jennifer that we were here to support her and what she wanted for her treatment, or did not want. I also told her that we could help with symptom management should she need. I discussed pt with Dr Allen this am and are just awaiting test results before decisions made about treatment.    We will continue to follow along. Please do not hesitate to contact us regarding further sx mgmt or GOC needs, including after hours or on weekends via our on call provider at [phone].     Abeba Nowak, APRN    7/3/2024

## 2024-07-03 NOTE — PROGRESS NOTES
Date:  07/03/24    CC:    Subjective:  Vangie Carney feels better this AM. Has taken sips of water without nausea or vomiting. Continues to pass flatus today.     Evaluated by surgery, recommend NG tube if develops nausea/vomiting.     Discussion with palliative care- clarified patient wants DNR-DNI.       Review Of Systems:  A comprehensive 14-point review of systems performed.  Significant findings as mentioned above.  All other systems reviewed and are negative.      Objective:  Medications:  Scheduled Meds:[Held by provider] apixaban, 5 mg, Oral, BID  castor oil-balsam peru, 1 Application, Topical, Q12H  [Held by provider] dicyclomine, 10 mg, Oral, 4x Daily AC & at Bedtime  enoxaparin, 1 mg/kg, Subcutaneous, Q12H  First Mouthwash (Magic Mouthwash), 5 mL, Swish & Spit, Q6H  granisetron, 1 patch, Transdermal, Weekly  mupirocin, 1 application , Each Nare, BID  nystatin, 5 mL, Swish & Swallow, 4x Daily  nystatin, 1 Application, Topical, BID  [Held by provider] pantoprazole, 40 mg, Oral, QAM AC  piperacillin-tazobactam, 4.5 g, Intravenous, Q8H  sodium chloride, 10 mL, Intravenous, Q12H  sodium chloride, 10 mL, Intravenous, Q12H  sodium chloride, 10 mL, Intravenous, Q12H  sodium chloride, 10 mL, Intravenous, Q12H      Continuous Infusions:dextrose 5 % and sodium chloride 0.45 %, 125 mL/hr, Last Rate: 125 mL/hr (07/03/24 0930)  norepinephrine, 0.02-0.3 mcg/kg/min (Dosing Weight), Last Rate: 0.2 mcg/kg/min (07/03/24 1403)      PRN Meds:.  senna-docusate sodium **AND** polyethylene glycol **AND** bisacodyl **AND** bisacodyl    dextrose    dextrose    glucagon (human recombinant)    heparin    HYDROcodone-acetaminophen    Magnesium Standard Dose Replacement - Follow Nurse / BPA Driven Protocol    [Held by provider] metoclopramide    nitroglycerin    ondansetron    Potassium Replacement - Follow Nurse / BPA Driven Protocol    sodium chloride    Access VAD **AND** sodium chloride    sodium chloride    sodium chloride     sodium chloride    sodium chloride    sodium chloride    sodium chloride    sodium chloride    sodium chloride    sodium chloride    Physical Exam:  Vital Signs     Patient Vitals for the past 24 hrs:   BP Temp Temp src Pulse Resp SpO2 Weight   07/03/24 1600 -- 97.5 °F (36.4 °C) Oral -- -- -- --   07/03/24 1500 94/81 -- -- 102 14 99 % --   07/03/24 1403 (!) 87/67 -- -- 104 -- -- --   07/03/24 1400 (!) 87/67 -- -- 103 14 97 % --   07/03/24 1345 (!) 87/67 -- -- 105 -- 98 % --   07/03/24 1330 (!) 84/63 -- -- 102 -- 97 % --   07/03/24 1315 (!) 89/63 -- -- 103 -- 97 % --   07/03/24 1300 92/65 -- -- 100 14 98 % --   07/03/24 1245 94/74 -- -- 105 -- 98 % --   07/03/24 1230 (!) 88/69 -- -- 101 -- 98 % --   07/03/24 1215 (!) 86/69 -- -- 101 -- 97 % --   07/03/24 1200 94/67 97.3 °F (36.3 °C) Oral 105 14 99 % --   07/03/24 1145 (!) 88/69 -- -- 101 -- 99 % --   07/03/24 1130 98/80 -- -- 110 -- 99 % --   07/03/24 1115 92/69 -- -- 103 -- 98 % --   07/03/24 1100 (!) 89/67 -- -- 103 14 97 % --   07/03/24 1045 (!) 89/69 -- -- 101 -- 99 % --   07/03/24 1030 95/66 -- -- 101 -- 98 % --   07/03/24 1015 94/71 -- -- 101 -- 98 % --   07/03/24 1000 (!) 86/73 -- -- 105 15 99 % --   07/03/24 0945 92/68 -- -- 96 -- 99 % --   07/03/24 0930 91/68 -- -- 100 -- 100 % --   07/03/24 0915 93/66 -- -- 96 -- 98 % --   07/03/24 0900 (!) 88/64 -- -- 99 14 99 % --   07/03/24 0845 (!) 84/62 -- -- 98 -- 99 % --   07/03/24 0830 98/69 -- -- 98 -- 99 % --   07/03/24 0815 (!) 87/70 -- -- 96 -- 99 % --   07/03/24 0800 99/61 97.6 °F (36.4 °C) -- 98 13 99 % --   07/03/24 0745 94/70 -- -- 98 -- 99 % --   07/03/24 0730 95/66 -- -- 115 -- 99 % --   07/03/24 0715 91/63 -- -- 92 -- 98 % --   07/03/24 0700 95/69 -- -- 95 14 99 % --   07/03/24 0600 (!) 88/58 -- -- 93 17 99 % --   07/03/24 0558 (!) 87/61 -- -- 91 -- -- --   07/03/24 0545 (!) 87/61 -- -- 94 -- 100 % --   07/03/24 0530 (!) 88/58 -- -- 98 -- 98 % --   07/03/24 0515 (!) 86/60 -- -- 97 -- 97 % --   07/03/24  0500 (!) 87/61 -- -- 110 14 99 % --   07/03/24 0445 (!) 83/67 -- -- 107 -- 97 % --   07/03/24 0430 90/62 -- -- 112 -- 97 % --   07/03/24 0415 92/62 -- -- 108 -- 98 % --   07/03/24 0400 (!) 86/69 98.1 °F (36.7 °C) Oral 104 13 99 % 66.5 kg (146 lb 9.7 oz)   07/03/24 0345 100/64 -- -- 108 -- 99 % --   07/03/24 0330 97/70 -- -- 105 -- 97 % --   07/03/24 0315 (!) 89/67 -- -- 107 -- 98 % --   07/03/24 0300 92/61 -- -- 108 12 98 % --   07/03/24 0245 (!) 89/66 -- -- 107 -- 100 % --   07/03/24 0230 93/67 -- -- 106 -- 97 % --   07/03/24 0215 (!) 89/66 -- -- 114 -- 95 % --   07/03/24 0200 (!) 85/68 -- -- 104 14 99 % --   07/03/24 0145 (!) 86/67 -- -- 111 -- 94 % --   07/03/24 0130 (!) 89/68 -- -- 108 -- 99 % --   07/03/24 0115 (!) 84/68 -- -- 113 -- 99 % --   07/03/24 0100 90/71 -- -- 113 14 91 % --   07/03/24 0045 (!) 88/69 -- -- 112 -- 98 % --   07/03/24 0030 94/69 -- -- 111 -- 97 % --   07/03/24 0015 91/66 -- -- 109 -- 92 % --   07/03/24 0000 95/65 97.7 °F (36.5 °C) Oral 109 15 96 % --   07/02/24 2345 92/67 -- -- 105 -- 98 % --   07/02/24 2330 97/69 -- -- 110 -- 96 % --   07/02/24 2315 (!) 85/67 -- -- 113 -- 96 % --   07/02/24 2300 92/66 -- -- 112 14 96 % --   07/02/24 2245 94/55 -- -- 114 -- 97 % --   07/02/24 2230 94/68 -- -- 108 -- 96 % --   07/02/24 2215 94/72 -- -- 115 -- 96 % --   07/02/24 2200 93/68 -- -- 112 15 97 % --   07/02/24 2145 93/67 -- -- 105 -- 97 % --   07/02/24 2130 (!) 85/63 -- -- 102 -- 96 % --   07/02/24 2115 93/64 -- -- 104 -- 98 % --   07/02/24 2100 90/70 -- -- 112 16 97 % --   07/02/24 2045 (!) 89/68 -- -- 109 -- 97 % --   07/02/24 2030 (!) 78/54 -- -- 104 -- 96 % --   07/02/24 2015 94/61 -- -- 101 -- 96 % --   07/02/24 2000 (!) 88/56 97.8 °F (36.6 °C) Oral 101 15 96 % --   07/02/24 1900 93/59 -- -- 102 15 98 % --   07/02/24 1800 94/56 -- -- 100 -- -- --   07/02/24 1730 99/56 -- -- 110 -- 99 % --   07/02/24 1715 97/73 -- -- 108 -- 98 % --   07/02/24 1700 94/69 -- -- 101 16 99 % --   07/02/24 1645  (!) 89/70 -- -- 120 -- 96 % --         General:  Awake, alert and oriented, in bed  HEENT:  Pupils are equal, round and reactive to light and accommodation  Neck:  No JVD, thyromegaly or lymphadenopathy  CV:  Regular rate and rhythm, no murmurs, rubs or gallops  Resp:  Lungs are clear to auscultation bilaterally  Abd:  Soft, non-tender, +distended, bowel sounds minimal, no organomegaly  Ext:  No clubbing, cyanosis or edema  Lymph:  No cervical, supraclavicular, axillary, inguinal or femoral adenopathy  Neuro:  MS as above, CN II-XII intact, grossly non-focal exam    Labs / Studies:    Lab Results   Component Value Date    WBC 9.25 07/03/2024    HGB 9.1 (L) 07/03/2024    HCT 26.2 (L) 07/03/2024    MCV 98.5 (H) 07/03/2024    RDW 14.3 07/03/2024     (L) 07/03/2024    NEUTRORELPCT 78.5 (H) 07/02/2024    LYMPHORELPCT 16.8 (L) 07/02/2024    MONORELPCT 4.3 (L) 07/02/2024    EOSRELPCT 0.0 (L) 07/02/2024    BASORELPCT 0.1 07/02/2024    NEUTROABS 9.08 (H) 07/02/2024    LYMPHSABS 1.94 07/02/2024       Lab Results   Component Value Date     (L) 07/03/2024    K 3.7 07/03/2024    CO2 17.9 (L) 07/03/2024     07/03/2024    BUN 19 07/03/2024    CREATININE 0.98 07/03/2024    EGFRIFNONA 79 09/10/2021    GLUCOSE 98 07/03/2024    CALCIUM 7.3 (L) 07/03/2024    ALKPHOS 237 (H) 07/02/2024    AST 78 (H) 07/02/2024    ALT 67 (H) 07/02/2024    BILITOT 1.5 (H) 07/02/2024    ALBUMIN 1.5 (L) 07/02/2024    PROTEINTOT 3.6 (L) 07/02/2024    MG 1.5 (L) 07/03/2024    PHOS 3.3 03/11/2024       Imaging Results (Last 72 Hours)       Procedure Component Value Units Date/Time    CT Abdomen Pelvis Without Contrast [[id number]] Collected: 07/02/24 0028     Updated: 07/02/24 0035    Narrative:      EXAMINATION: CT abdomen and pelvis without contrast     CLINICAL HISTORY: Abdominal distention.     COMPARISON: 4/21/2024     TECHNIQUE: Contiguous 5 mm thick slices were obtained through the  abdomen and pelvis without contrast. Coronal and  sagittal reformats were  performed.     FINDINGS:     ABDOMEN:  At least a small right pleural effusion is noted, incompletely imaged.  Similarly there is at least a small pericardial effusion, also  incompletely imaged. These are new when compared with the prior study.     The liver is diffusely low in attenuation in a pattern suggesting fatty  infiltration. There has been prior cholecystectomy. No hydronephrosis  either kidney. No definite ureteral calculus. The unenhanced spleen,  adrenal glands and pancreas appear grossly normal.     PELVIS:  Postsurgical changes are noted within the sigmoid region where a suture  line is present. There are multiple dilated loops of small bowel with  fecalization of small bowel contents. The findings are consistent with  at least high-grade partial small bowel obstruction. No free air is  identified to suggest perforation. A small amount of free fluid is  noted. No loculated collection is identified to suggest an abscess.       Impression:      1. Multiple dilated loops of small bowel consistent with small bowel  obstruction.  2. Right pleural effusion and pericardial effusion, incompletely imaged.  These are new when compared to the prior study.  3. Fatty infiltration of the liver.  4. No hydronephrosis or ureteral calculus.     This report was finalized on 7/2/2024 12:33 AM by Trenton Zurita MD.       XR Chest 1 View [[id number]] Collected: 07/01/24 1145     Updated: 07/01/24 1148    Narrative:      XR CHEST 1 VW-     CLINICAL INDICATION: Weak/Dizzy/AMS triage protocol        COMPARISON: 3/7/2024     TECHNIQUE: Single frontal view of the chest.     FINDINGS:     LUNGS: Lungs are adequately aerated.      HEART AND MEDIASTINUM: Heart and mediastinal contours are unremarkable        SKELETON: Bony and soft tissue structures are unremarkable.     Central line tip is stable  NG tube is been removed       Impression:      No radiographic evidence of acute cardiac or pulmonary  disease.           This report was finalized on 7/1/2024 11:46 AM by Dr. Edmond Vasquez MD.               Assessment & Plan:  Vangie Carney is a 54 y.o. year-old female with     Septic shock  Small bowel obstruction   -Patient with hypotension, tachycardia, and positive urinalysis. Currently on antibiotics and pressors with levophed   -Blood cultures with NGTD, urine culture with e.coli. Currently on zosyn   -Per patient and - last BM 6/30, passing flatus 7/1 and 7/2  -Plan on symptomatic management at this time.     3. Metastatic colon adenocarcinoma   -Primary oncologist, Dr. Hanks  -Patient with localized descending/sigmoid moderately differentiated adenocarcinoma March 2023 status post APR. Opted out of adjuvant chemotherapy. Presented 1/2024 with recurrent/metastatic cancer  -Received 4 cycles of 5-FU/leucovorin 2-4/2024, switched to palliative bevacizumab due to intolerance, received 4 cycles thus far  -palliative care appreciated. Pt is made DNR-DNI        Thank you for the consultation and allowing us to participate in the care of Ms. Carney. Please do not hesitate to contact us with any questions or concerns.          Dominga Rodriguez MD  07/03/24  16:43 EDT

## 2024-07-03 NOTE — PAYOR COMM NOTE
"[facility]  NPI:[id number]    Utilization Review  Contact: Liss Ritchie RN  Phone: [phone]  Fax:[phone]    CLINICAL UPDATE   [id number]     Jan Solano (54 y.o. Female)       Date of Birth   1970    Social Security Number       Address   [address] [place] [address]    Home Phone   [phone]    MRN   [id number]       [employer]   [place]    Marital Status                               Admission Date   7/1/24    Admission Type   Emergency    Admitting Provider   Carlos Castro MD    Attending Provider   Yeimi Allen DO    Department, Room/Bed   [facility] PROGRESS CARE, P220/1P       Discharge Date       Discharge Disposition       Discharge Destination                                 Attending Provider: Yeimi Allen DO    Allergies: Keflex [Cephalexin]    Isolation: None   Infection: None   Code Status: No CPR    Ht: 170.2 cm (67\")   Wt: 66.5 kg (146 lb 9.7 oz)    Admission Cmt: None   Principal Problem: Septic shock due to undetermined organism [A41.9,R65.21]                   Active Insurance as of 7/1/2024       Primary Coverage       Payor Plan Insurance Group Employer/Plan Group    [facility] [employer] [id number]       Payor Plan Address Payor Plan Phone Number Payor Plan Fax Number Effective Dates    [address] [phone]  1/1/2018 - None Entered    [place] [address]         Subscriber Name Subscriber Birth Date Member ID       JAN SOLANO 1970 [id number]                     Emergency Contacts        (Rel.) Home Phone Work Phone Mobile Phone    Red Solano (Spouse) -- -- [phone]              Physician Progress Notes (last 24 hours)  Notes from 07/02/24 1515 through 07/03/24 1515   No notes of this type exist for this encounter.          Consult Notes (last 24 hours)        Romeo Izaguirre PA-C at 07/03/24 0813        Consult Orders    1. Inpatient " Urology Consult [[id number]] ordered by Yeimi Allen DO at 24 1614              Attestation signed by Khanh De Anda MD at 24 0828    I have reviewed this documentation and agree.                  Name:  Vangie Carney  :  1970    DATE OF ADMISSION  2024    DATE OF CONSULT  7/3/2024     REFERRING PHYSICIAN  * No referring provider recorded for this case *    PRIMARY CARE PHYSICIAN  Urvashi Basurto, APRN    REASON FOR CONSULT  Colmenares catheter complication    CHIEF COMPLAINT  Chief Complaint   Patient presents with    Weakness - Generalized       HISTORY OF PRESENT ILLNESS:   Vangie Carney is a 54 y.o. female who has a past medical history significant for metastatic colorectal adenocarcinoma status post LAR currently receiving palliative chemotherapy, pulmonary embolism on therapeutic anticoagulation, and gastric dysmotility who presented to the emergency department with generalized weakness.  She had a CT scan of the abdomen pelvis completed on 2024 that showed multiple dilated loops of small bowel consistent with small bowel obstruction.  She had right pleural effusions and cardiac fusions.  Patient's bladder was unremarkable and there was no signs of hydronephrosis or ureteral calculus.  Most recent lab work shows a normal creatinine 1.98, GFR 69.2, and BUN of 19.  CBC shows improvement in white blood cell count to 9.25.  Urine analysis showed trace ketones, 3+ bilirubin, trace blood, 1+ protein, 1+ leukocytes, and positive nitrites.  Microscopic UA showed 21-50 white blood cells and 4+ bacteria.  Her urine culture showing 50,000 colony-forming's of gram-negative bacilli.  Due to patient's increasing difficulty urinating indwelling Colmenares catheter was placed yesterday.  Nursing staff report that they received urine return however patient was complaining of pain and postvoid residual at that time revealed roughly 400 to 500 mL.  She has had intermittent bladder scans with  her most recent bladder scan revealing roughly 132 mL.  She had very minimal output in her catheter.    I saw Vangie Carney in their hospital room this morning.  Patient was lying in bed acutely ill appearing.  Blood pressure remains low at 88/58.  Heart rate is normal at 93.  She remains afebrile at 98.1.  Patient reports some intermittent bladder/pelvic pain.  She denies any severe pain/pressure.  States she has had weak urinary stream with feelings of incomplete emptying at home.  Currently has a indwelling Colmenares catheter in place with roughly 50 to 100 mL of yellow urine noted in collection bag.  There is yellow urine noted within collection tubing as well.  I did carefully irrigate the patient's catheter at bedside with roughly 200 mL of sterile water and received yellow urine in return with no notable resistance.  Catheter draining appropriately after irrigation.  Patient did not have any significant pain and tolerated procedure well.    PAST MEDICAL HISTORY  Past Medical History:   Diagnosis Date    Gallbladder attack     GERD (gastroesophageal reflux disease)     Hearing aid worn     History of ear infections     Malignant neoplasm of sigmoid colon 3/14/2023    Seasonal allergies     Wears glasses        PAST SURGICAL HISTORY  Past Surgical History:   Procedure Laterality Date    CHOLECYSTECTOMY      COLON RESECTION N/A 3/14/2023    Procedure: COLON RESECTION LOW ANTERIOR LAPAROSCOPIC WITH DAVINCI ROBOT;  Surgeon: Shari Aguirre MD;  Location: [facility] OR;  Service: Robotics - DaVinci;  Laterality: N/A;    COLONOSCOPY N/A 2/15/2023    Procedure: COLONOSCOPY FOR SCREENING;  Surgeon: Giselle Iniguez MD;  Location: [facility] OR;  Service: Gastroenterology;  Laterality: N/A;    ENDOSCOPY N/A 2/15/2023    Procedure: ESOPHAGOGASTRODUODENOSCOPY WITH BIOPSY;  Surgeon: Giselle Iniguez MD;  Location: [facility] OR;  Service: Gastroenterology;  Laterality: N/A;    LAPAROSCOPIC TUBAL LIGATION Bilateral      MIDDLE EAR SURGERY      PORTACATH PLACEMENT N/A 2024    Procedure: INSERTION OF PORTACATH;  Surgeon: Jaylen Leal MD;  Location: [facility];  Service: General;  Laterality: N/A;       SOCIAL HISTORY  Social History     Socioeconomic History    Marital status:    Tobacco Use    Smoking status: Former     Current packs/day: 0.00     Average packs/day: 1 pack/day for 30.0 years (30.0 ttl pk-yrs)     Types: Cigarettes     Start date:      Quit date:      Years since quittin.5     Passive exposure: Never    Smokeless tobacco: Never   Vaping Use    Vaping status: Every Day    Substances: Nicotine, Flavoring    Devices: Refillable tank   Substance and Sexual Activity    Alcohol use: Never    Drug use: Defer    Sexual activity: Defer     Birth control/protection: None       FAMILY HISTORY  Family History   Problem Relation Age of Onset    Cancer Mother     Cancer Father     Cancer Sister     Cancer Brother     Cancer Maternal Grandmother     Cancer Maternal Grandfather     Breast cancer Neg Hx        ALLERGIES  Allergies   Allergen Reactions    Keflex [Cephalexin] Nausea Only     Beta lactam allergy details  Antibiotic reaction: nausea  Age at reaction: adult  Dose to reaction time: (!) minutes  Reason for antibiotic: unknown  Epinephrine required for reaction?: no  Tolerated antibiotics: augmentin, amoxicillin        INPATIENT MEDICATIONS  Current Facility-Administered Medications   Medication Dose Route Frequency Provider Last Rate Last Admin    [Held by provider] apixaban (ELIQUIS) tablet 5 mg  5 mg Oral BID Yeimi Allen DO        sennosides-docusate (PERICOLACE) 8.6-50 MG per tablet 2 tablet  2 tablet Oral BID PRN Yeimi Allen DO        And    polyethylene glycol (MIRALAX) packet 17 g  17 g Oral Daily PRN Yeimi Allen DO        And    bisacodyl (DULCOLAX) EC tablet 5 mg  5 mg Oral Daily PRN Yeimi Allen DO        And    bisacodyl (DULCOLAX) suppository 10 mg  10 mg Rectal  Daily PRN Yeimi Allen DO        castor oil-balsam peru (VENELEX) ointment 1 Application  1 Application Topical Q12H Yeimi Allen DO   1 Application at 07/02/24 2246    dextrose (D50W) (25 g/50 mL) IV injection 25 g  25 g Intravenous Q15 Min PRN Carlos Castro MD   25 g at 07/02/24 0058    dextrose (GLUTOSE) oral gel 15 g  15 g Oral Q15 Min PRN Carlos Castro MD        dextrose 5 % and sodium chloride 0.45 % infusion  125 mL/hr Intravenous Continuous Carlos Castro  mL/hr at 07/02/24 1805 125 mL/hr at 07/02/24 1805    [Held by provider] dicyclomine (BENTYL) capsule 10 mg  10 mg Oral 4x Daily AC & at Bedtime Yeimi Allen DO        Enoxaparin Sodium (LOVENOX) syringe 60 mg  1 mg/kg Subcutaneous Q12H Yeimi Allen DO   60 mg at 07/03/24 0558    First Mouthwash (Magic Mouthwash) 5 mL  5 mL Swish & Spit Q6H Yeimi Allen DO   5 mL at 07/02/24 2247    glucagon HCl (Diagnostic) injection 1 mg  1 mg Subcutaneous Q15 Min PRN Carlos Castro MD        granisetron (SANCUSO) 3.1 MG/24HR 1 patch  1 patch Transdermal Weekly Yeimi Allen DO        heparin injection 500 Units  5 mL Intravenous PRN Carlos Castro MD        HYDROcodone-acetaminophen (NORCO) 5-325 MG per tablet 1 tablet  1 tablet Oral Q8H PRN Yeimi Allen DO        Magnesium Standard Dose Replacement - Follow Nurse / BPA Driven Protocol   Does not apply PRN Yeimi Allen DO        magnesium sulfate 2g/50 mL (PREMIX) infusion  2 g Intravenous Q2H Yeimi Allen DO        [Held by provider] metoclopramide (REGLAN) tablet 5 mg  5 mg Oral TID PRN Yeimi Allen DO        mupirocin (BACTROBAN) 2 % nasal ointment 1 Application  1 application  Each Nare BID Yeimi Allen DO   1 Application at 07/02/24 2248    nitroglycerin (NITROSTAT) SL tablet 0.4 mg  0.4 mg Sublingual Q5 Min PRN Yeimi Allen DO        norepinephrine (LEVOPHED) 8 mg in 250 mL NS infusion (premix)  0.02-0.3 mcg/kg/min Intravenous  Titrated Yeimi Allen DO 21.3 mL/hr at 07/03/24 0558 0.2 mcg/kg/min at 07/03/24 0558    nystatin (MYCOSTATIN) 657648 UNIT/GM cream 1 Application  1 Application Topical BID Yeimi Allen DO   1 Application at 07/02/24 2247    ondansetron (ZOFRAN) tablet 8 mg  8 mg Oral Q8H PRN Yeimi Allen DO        [Held by provider] pantoprazole (PROTONIX) EC tablet 40 mg  40 mg Oral QAM AC Yeimi Allen DO        piperacillin-tazobactam (ZOSYN) IVPB 4.5 g IVPB in 100 mL NS (VTB)  4.5 g Intravenous Q8H Yeimi Allen DO   4.5 g at 07/03/24 0010    potassium chloride 20 mEq in 50 mL IVPB  20 mEq Intravenous Q1H Yeimi Allen DO        Potassium Replacement - Follow Nurse / BPA Driven Protocol   Does not apply PRN Yeimi Allen DO        sodium chloride 0.9 % flush 10 mL  10 mL Intravenous PRN Yeimi Allen,         sodium chloride 0.9 % flush 10 mL  10 mL Intravenous PRN Yeimi Allen,         sodium chloride 0.9 % flush 10 mL  10 mL Intravenous Q12H Yeimi Allen DO   10 mL at 07/02/24 2244    sodium chloride 0.9 % flush 10 mL  10 mL Intravenous PRN Yeimi Allen,         sodium chloride 0.9 % flush 10 mL  10 mL Intravenous Q12H Carlos Castro MD   10 mL at 07/02/24 2245    sodium chloride 0.9 % flush 10 mL  10 mL Intravenous PRN Carlos Castro MD        sodium chloride 0.9 % flush 10 mL  10 mL Intravenous Q12H Carlos Castro MD   10 mL at 07/02/24 2245    sodium chloride 0.9 % flush 10 mL  10 mL Intravenous PRN Carlos Castro MD        sodium chloride 0.9 % flush 10 mL  10 mL Intravenous Q12H Yeimi Allen DO   10 mL at 07/02/24 2245    sodium chloride 0.9 % flush 10 mL  10 mL Intravenous PRN Yeimi Allen,         sodium chloride 0.9 % flush 20 mL  20 mL Intravenous PRN Carlos Castro MD        sodium chloride 0.9 % infusion 40 mL  40 mL Intravenous MELYN Yeimi Allen DO        sodium chloride 0.9 % infusion 40 mL  40 mL Intravenous PRN Carlos Castro,  "MD        sodium chloride 0.9 % infusion 40 mL  40 mL Intravenous PRN Cralos Castro MD        sodium chloride 0.9 % infusion 40 mL  40 mL Intravenous PRN Yeimi Allen DO         PHYSICAL EXAMINATION    BP (!) 88/58   Pulse 93   Temp 98.1 °F (36.7 °C) (Oral)   Resp 17   Ht 170.2 cm (67\")   Wt 66.5 kg (146 lb 9.7 oz)   LMP 06/15/2016   SpO2 99%   BMI 22.96 kg/m²     GENERAL: Acutely ill-appearing underdeveloped white female in no acute distress.  HEENT:  Pupils equally round and reactive to light.  Extraocular muscles intact.  CARDIOVASCULAR:  Regular rate and rhythm.  Muffled heart sounds.  LUNGS: Decreased breath sounds bilaterally  ABDOMEN:  Soft, nontender, nondistended with positive bowel sounds.  : Indwelling Colmenares catheter in place with minimal yellow urinary output noted in collection tubing and bag.  Urine yellow in color with no gross hematuria or sediments.  NEURO:  Cranial nerves grossly intact.  No focal deficits.  PSYCH:  Alert and oriented x3.    LABORATORY     WBC   Date Value Ref Range Status   07/03/2024 9.25 3.40 - 10.80 10*3/mm3 Final     RBC   Date Value Ref Range Status   07/03/2024 2.66 (L) 3.77 - 5.28 10*6/mm3 Final     Hemoglobin   Date Value Ref Range Status   07/03/2024 9.1 (L) 12.0 - 15.9 g/dL Final     Hematocrit   Date Value Ref Range Status   07/03/2024 26.2 (L) 34.0 - 46.6 % Final     MCV   Date Value Ref Range Status   07/03/2024 98.5 (H) 79.0 - 97.0 fL Final     MCH   Date Value Ref Range Status   07/03/2024 34.2 (H) 26.6 - 33.0 pg Final     MCHC   Date Value Ref Range Status   07/03/2024 34.7 31.5 - 35.7 g/dL Final     RDW   Date Value Ref Range Status   07/03/2024 14.3 12.3 - 15.4 % Final     RDW-SD   Date Value Ref Range Status   07/03/2024 51.8 37.0 - 54.0 fl Final     MPV   Date Value Ref Range Status   07/03/2024 9.8 6.0 - 12.0 fL Final     Platelets   Date Value Ref Range Status   07/03/2024 135 (L) 140 - 450 10*3/mm3 Final     Neutrophil %   Date Value " Ref Range Status   07/02/2024 78.5 (H) 42.7 - 76.0 % Final     Lymphocyte %   Date Value Ref Range Status   07/02/2024 16.8 (L) 19.6 - 45.3 % Final     Monocyte %   Date Value Ref Range Status   07/02/2024 4.3 (L) 5.0 - 12.0 % Final     Eosinophil %   Date Value Ref Range Status   07/02/2024 0.0 (L) 0.3 - 6.2 % Final     Basophil %   Date Value Ref Range Status   07/02/2024 0.1 0.0 - 1.5 % Final     Immature Grans %   Date Value Ref Range Status   07/02/2024 0.3 0.0 - 0.5 % Final     Neutrophils, Absolute   Date Value Ref Range Status   07/02/2024 9.08 (H) 1.70 - 7.00 10*3/mm3 Final     Lymphocytes, Absolute   Date Value Ref Range Status   07/02/2024 1.94 0.70 - 3.10 10*3/mm3 Final     Monocytes, Absolute   Date Value Ref Range Status   07/02/2024 0.50 0.10 - 0.90 10*3/mm3 Final     Eosinophils, Absolute   Date Value Ref Range Status   07/02/2024 0.00 0.00 - 0.40 10*3/mm3 Final     Basophils, Absolute   Date Value Ref Range Status   07/02/2024 0.01 0.00 - 0.20 10*3/mm3 Final     Immature Grans, Absolute   Date Value Ref Range Status   07/02/2024 0.04 0.00 - 0.05 10*3/mm3 Final     nRBC   Date Value Ref Range Status   07/02/2024 0.0 0.0 - 0.2 /100 WBC Final       Glucose   Date Value Ref Range Status   07/03/2024 98 65 - 99 mg/dL Final     Sodium   Date Value Ref Range Status   07/03/2024 133 (L) 136 - 145 mmol/L Final     Potassium   Date Value Ref Range Status   07/03/2024 3.5 3.5 - 5.2 mmol/L Final     Comment:     Slight hemolysis detected by analyzer. Result may be falsely elevated.     CO2   Date Value Ref Range Status   07/03/2024 17.9 (L) 22.0 - 29.0 mmol/L Final     Chloride   Date Value Ref Range Status   07/03/2024 104 98 - 107 mmol/L Final     Anion Gap   Date Value Ref Range Status   07/03/2024 11.1 5.0 - 15.0 mmol/L Final     Creatinine   Date Value Ref Range Status   07/03/2024 0.98 0.57 - 1.00 mg/dL Final     BUN   Date Value Ref Range Status   07/03/2024 19 6 - 20 mg/dL Final     BUN/Creatinine Ratio  "  Date Value Ref Range Status   07/03/2024 19.4 7.0 - 25.0 Final     Calcium   Date Value Ref Range Status   07/03/2024 7.3 (L) 8.6 - 10.5 mg/dL Final     Alkaline Phosphatase   Date Value Ref Range Status   07/02/2024 237 (H) 39 - 117 U/L Final     Total Protein   Date Value Ref Range Status   07/02/2024 3.6 (L) 6.0 - 8.5 g/dL Final     ALT (SGPT)   Date Value Ref Range Status   07/02/2024 67 (H) 1 - 33 U/L Final     AST (SGOT)   Date Value Ref Range Status   07/02/2024 78 (H) 1 - 32 U/L Final     Total Bilirubin   Date Value Ref Range Status   07/02/2024 1.5 (H) 0.0 - 1.2 mg/dL Final     Albumin   Date Value Ref Range Status   07/02/2024 1.5 (L) 3.5 - 5.2 g/dL Final     Globulin   Date Value Ref Range Status   07/02/2024 2.1 gm/dL Final       Magnesium   Date Value Ref Range Status   07/03/2024 1.5 (L) 1.6 - 2.6 mg/dL Final     No results found for: \"LDH\", \"URICACID\"     IMAGING  Imaging Results (Last 72 Hours)       Procedure Component Value Units Date/Time    CT Abdomen Pelvis Without Contrast [[id number]] Collected: 07/02/24 0028     Updated: 07/02/24 0035    Narrative:      EXAMINATION: CT abdomen and pelvis without contrast     CLINICAL HISTORY: Abdominal distention.     COMPARISON: 4/21/2024     TECHNIQUE: Contiguous 5 mm thick slices were obtained through the  abdomen and pelvis without contrast. Coronal and sagittal reformats were  performed.     FINDINGS:     ABDOMEN:  At least a small right pleural effusion is noted, incompletely imaged.  Similarly there is at least a small pericardial effusion, also  incompletely imaged. These are new when compared with the prior study.     The liver is diffusely low in attenuation in a pattern suggesting fatty  infiltration. There has been prior cholecystectomy. No hydronephrosis  either kidney. No definite ureteral calculus. The unenhanced spleen,  adrenal glands and pancreas appear grossly normal.     PELVIS:  Postsurgical changes are noted within the sigmoid region " where a suture  line is present. There are multiple dilated loops of small bowel with  fecalization of small bowel contents. The findings are consistent with  at least high-grade partial small bowel obstruction. No free air is  identified to suggest perforation. A small amount of free fluid is  noted. No loculated collection is identified to suggest an abscess.       Impression:      1. Multiple dilated loops of small bowel consistent with small bowel  obstruction.  2. Right pleural effusion and pericardial effusion, incompletely imaged.  These are new when compared to the prior study.  3. Fatty infiltration of the liver.  4. No hydronephrosis or ureteral calculus.     This report was finalized on 7/2/2024 12:33 AM by Trenton Zurita MD.       XR Chest 1 View [[id number]] Collected: 07/01/24 1145     Updated: 07/01/24 1148    Narrative:      XR CHEST 1 VW-     CLINICAL INDICATION: Weak/Dizzy/AMS triage protocol        COMPARISON: 3/7/2024     TECHNIQUE: Single frontal view of the chest.     FINDINGS:     LUNGS: Lungs are adequately aerated.      HEART AND MEDIASTINUM: Heart and mediastinal contours are unremarkable        SKELETON: Bony and soft tissue structures are unremarkable.     Central line tip is stable  NG tube is been removed       Impression:      No radiographic evidence of acute cardiac or pulmonary disease.           This report was finalized on 7/1/2024 11:46 AM by Dr. Edmond Vasquez MD.               CT Abdomen and Pelvis: Results for orders placed during the hospital encounter of 01/17/24    CT Abdomen Pelvis With & Without Contrast    Narrative  EXAM:  CT Abdomen and Pelvis Without and With Intravenous Contrast    EXAM DATE:  1/17/2024 1:36 PM    CLINICAL HISTORY:  R10.84; R10.84-Generalized abdominal pain    TECHNIQUE:  Axial computed tomography images of the abdomen and pelvis without and  with intravenous contrast.  Sagittal and coronal reformatted images were  created and reviewed.  This CT exam  was performed using one or more of  the following dose reduction techniques:  automated exposure control,  adjustment of the mA and/or kV according to patient size, and/or use of  iterative reconstruction technique.    COMPARISON:  4/7/2023    FINDINGS:  Lung bases:  Interval development of 5.5 mm nodule right lower lobe.  Dedicated chest CT is warranted for further characterization.    ABDOMEN:  Liver:  Unremarkable as visualized.  No focal liver lesions are noted.  Gallbladder and bile ducts:  Cholecystectomy.  No ductal dilation.  Pancreas:  Unremarkable as visualized.  No mass.  No ductal dilation.  Spleen:  Unremarkable as visualized.  No splenomegaly.  Adrenals:  Unremarkable as visualized.  No mass.  Kidneys and ureters:  Unremarkable as visualized.  No solid mass.  No  obstructing stones.  No hydronephrosis.  Stomach and bowel:  There is abnormal soft tissue density in the upper  pelvis superior to the colonic anastomosis site and is predominantly in  the presacral space, 30.4 mm x 31.7 mm. Also suspicious for metastatic  disease.  Postsurgical changes are noted for sigmoid resection with  unremarkable appearance of anastomosis.  No obstruction.  No mucosal  thickening.    PELVIS:  Appendix:  The appendix extends towards the midline and approximates  the presacral space soft tissue implant.  Bladder:  Unremarkable as visualized.  No mass.  No stones.  Reproductive:  Unremarkable as visualized.    ABDOMEN and PELVIS:  Intraperitoneal space:  Soft tissue implant in the left omentum is  approximately 14.1 mm and new since previous exam also suspicious for  metastatic disease. 22.9 mm soft tissue implant in the left presacral  space immediately adjacent to the colonic anastomosis. No free air.  No  significant fluid collection.  Bones/joints:  No acute fracture.  No dislocation.  Soft tissues:  Umbilical hernia is noted which contains fat and a  small soft tissue nodule that is approximately 14.2 mm and is new  since  the previous exam. Suspicious for metastatic implant.  Soft tissue  implant along the left psoas muscle surface is 16.1 mm compatible with  metastatic implant.  Vasculature:  Unremarkable as visualized.  No abdominal aortic  aneurysm.  Lymph nodes:  Unremarkable as visualized.  No retroperitoneal  adenopathy identified.    Impression  1.  Interval development of metastatic disease.  2.  Specifically, soft tissue metastatic implants are noted in the  omentum, left lower quadrant, upper presacral space, and left lower  presacral space immediately adjacent to the anastomosis site as detailed  above. Soft tissue implant within umbilical hernia fat.  3.  Development of 5.5 mm nodule right lower lobe suspicious for  metastatic lung nodule.  4.  Interval postsurgical changes from sigmoid resection.  5.  Mild fatty infiltration of liver. No focal liver lesions.  6.  Other incidental/nonacute findings detailed above.      This report was finalized on 1/17/2024 2:11 PM by Dr. Sarabjit Noe MD.       CT Stone Protocol: No results found for this or any previous visit.       KUB: Results for orders placed during the hospital encounter of 11/09/22    XR Abdomen KUB    Narrative  EXAM:  XR Abdomen, 1 View    EXAM DATE:  11/9/2022 1:12 PM    CLINICAL HISTORY:  constipation    TECHNIQUE:  Frontal supine view of the abdomen/pelvis.    COMPARISON:  No relevant prior studies available.    FINDINGS:  Gastrointestinal tract:  Mild volume fecal retention in the colon  consistent with constipation.  No dilation.  Bones/joints:  Unremarkable.    Impression  Mild volume fecal retention in the colon consistent with constipation.    This report was finalized on 11/9/2022 1:43 PM by Dr. Sarabjit Noe MD.   \    LABS:   Admission on 07/01/2024   Component Date Value Ref Range Status    QT Interval 07/01/2024 296  ms Final    QTC Interval 07/01/2024 398  ms Final    Glucose 07/01/2024 22 (C)  65 - 99 mg/dL Final    BUN 07/01/2024 22 (H)   6 - 20 mg/dL Final    Creatinine 07/01/2024 1.40 (H)  0.57 - 1.00 mg/dL Final    Sodium 07/01/2024 133 (L)  136 - 145 mmol/L Final    Potassium 07/01/2024 3.9  3.5 - 5.2 mmol/L Final    Chloride 07/01/2024 99  98 - 107 mmol/L Final    CO2 07/01/2024 20.6 (L)  22.0 - 29.0 mmol/L Final    Calcium 07/01/2024 7.9 (L)  8.6 - 10.5 mg/dL Final    Total Protein 07/01/2024 3.9 (L)  6.0 - 8.5 g/dL Final    Albumin 07/01/2024 1.8 (L)  3.5 - 5.2 g/dL Final    ALT (SGPT) 07/01/2024 65 (H)  1 - 33 U/L Final    AST (SGOT) 07/01/2024 77 (H)  1 - 32 U/L Final    Alkaline Phosphatase 07/01/2024 249 (H)  39 - 117 U/L Final    Total Bilirubin 07/01/2024 1.8 (H)  0.0 - 1.2 mg/dL Final    Globulin 07/01/2024 2.1  gm/dL Final    A/G Ratio 07/01/2024 0.9  g/dL Final    BUN/Creatinine Ratio 07/01/2024 15.7  7.0 - 25.0 Final    Anion Gap 07/01/2024 13.4  5.0 - 15.0 mmol/L Final    eGFR 07/01/2024 45.1 (L)  >60.0 mL/min/1.73 Final    [facility] Troponin T 07/01/2024 12  <14 ng/L Final    Magnesium 07/01/2024 1.8  1.6 - 2.6 mg/dL Final    Color, UA 07/01/2024 Orange (A)  Yellow, Straw Final    Appearance, UA 07/01/2024 Cloudy (A)  Clear Final    pH, UA 07/01/2024 <=5.0  5.0 - 8.0 Final    Specific Gravity, UA 07/01/2024 1.025  1.005 - 1.030 Final    Glucose, UA 07/01/2024 Negative  Negative Final    Ketones, UA 07/01/2024 Trace (A)  Negative Final    Bilirubin, UA 07/01/2024 Large (3+) (A)  Negative Final    Blood, UA 07/01/2024 Trace (A)  Negative Final    Protein, UA 07/01/2024 30 mg/dL (1+) (A)  Negative Final    Leuk Esterase, UA 07/01/2024 Small (1+) (A)  Negative Final    Nitrite, UA 07/01/2024 Positive (A)  Negative Final    Urobilinogen, UA 07/01/2024 1.0 E.U./dL  0.2 - 1.0 E.U./dL Final    Extra Tube 07/01/2024 Hold for add-ons.   Final    Auto resulted.    Extra Tube 07/01/2024 hold for add-on   Final    Auto resulted    Extra Tube 07/01/2024 Hold for add-ons.   Final    Auto resulted.    Extra Tube 07/01/2024 Hold for add-ons.   Final     Auto resulted    WBC 07/01/2024 8.31  3.40 - 10.80 10*3/mm3 Final    RBC 07/01/2024 2.83 (L)  3.77 - 5.28 10*6/mm3 Final    Hemoglobin 07/01/2024 9.8 (L)  12.0 - 15.9 g/dL Final    Hematocrit 07/01/2024 28.7 (L)  34.0 - 46.6 % Final    MCV 07/01/2024 101.4 (H)  79.0 - 97.0 fL Final    MCH 07/01/2024 34.6 (H)  26.6 - 33.0 pg Final    MCHC 07/01/2024 34.1  31.5 - 35.7 g/dL Final    RDW 07/01/2024 15.5 (H)  12.3 - 15.4 % Final    RDW-SD 07/01/2024 57.3 (H)  37.0 - 54.0 fl Final    MPV 07/01/2024 9.6  6.0 - 12.0 fL Final    Platelets 07/01/2024 155  140 - 450 10*3/mm3 Final    Neutrophil % 07/01/2024 81.1 (H)  42.7 - 76.0 % Final    Lymphocyte % 07/01/2024 14.7 (L)  19.6 - 45.3 % Final    Monocyte % 07/01/2024 3.6 (L)  5.0 - 12.0 % Final    Eosinophil % 07/01/2024 0.0 (L)  0.3 - 6.2 % Final    Basophil % 07/01/2024 0.1  0.0 - 1.5 % Final    Immature Grans % 07/01/2024 0.5  0.0 - 0.5 % Final    Neutrophils, Absolute 07/01/2024 6.74  1.70 - 7.00 10*3/mm3 Final    Lymphocytes, Absolute 07/01/2024 1.22  0.70 - 3.10 10*3/mm3 Final    Monocytes, Absolute 07/01/2024 0.30  0.10 - 0.90 10*3/mm3 Final    Eosinophils, Absolute 07/01/2024 0.00  0.00 - 0.40 10*3/mm3 Final    Basophils, Absolute 07/01/2024 0.01  0.00 - 0.20 10*3/mm3 Final    Immature Grans, Absolute 07/01/2024 0.04  0.00 - 0.05 10*3/mm3 Final    nRBC 07/01/2024 0.0  0.0 - 0.2 /100 WBC Final    Glucose 07/01/2024 20 (C)  70 - 130 mg/dL Final    Glucose 07/01/2024 72  70 - 130 mg/dL Final    Glucose 07/01/2024 83  70 - 130 mg/dL Final    Glucose 07/01/2024 187 (H)  70 - 130 mg/dL Final    RBC, UA 07/01/2024 0-2  None Seen, 0-2 /HPF Final    WBC, UA 07/01/2024 21-50 (A)  None Seen, 0-2 /HPF Final    Bacteria, UA 07/01/2024 4+ (A)  None Seen /HPF Final    Squamous Epithelial Cells, UA 07/01/2024 0-2  None Seen, 0-2 /HPF Final    Hyaline Casts, UA 07/01/2024 None Seen  None Seen /LPF Final    Methodology 07/01/2024 Manual Light Microscopy   Final    Extra Tube 07/01/2024  Hold for add-ons.   Final    Auto resulted.    Lactate 07/01/2024 4.9 (C)  0.5 - 2.0 mmol/L Final    Blood Culture 07/01/2024 No growth at 24 hours   Preliminary    Blood Culture 07/01/2024 No growth at 24 hours   Preliminary    Urine Culture 07/01/2024 50,000 CFU/mL Gram Negative Bacilli (A)   Preliminary    Lactate 07/01/2024 3.3 (C)  0.5 - 2.0 mmol/L Final    Total Bilirubin 07/01/2024 1.7 (H)  0.0 - 1.2 mg/dL Final    Bilirubin, Direct 07/01/2024 1.5 (H)  0.0 - 0.3 mg/dL Final    Bilirubin, Indirect 07/01/2024 0.2  mg/dL Final    Lactate 07/02/2024 2.0  0.5 - 2.0 mmol/L Final    Glucose 07/02/2024 75  65 - 99 mg/dL Final    BUN 07/02/2024 22 (H)  6 - 20 mg/dL Final    Creatinine 07/02/2024 1.38 (H)  0.57 - 1.00 mg/dL Final    Sodium 07/02/2024 133 (L)  136 - 145 mmol/L Final    Potassium 07/02/2024 3.8  3.5 - 5.2 mmol/L Final    Chloride 07/02/2024 103  98 - 107 mmol/L Final    CO2 07/02/2024 16.8 (L)  22.0 - 29.0 mmol/L Final    Calcium 07/02/2024 7.2 (L)  8.6 - 10.5 mg/dL Final    Total Protein 07/02/2024 3.6 (L)  6.0 - 8.5 g/dL Final    Albumin 07/02/2024 1.5 (L)  3.5 - 5.2 g/dL Final    ALT (SGPT) 07/02/2024 67 (H)  1 - 33 U/L Final    AST (SGOT) 07/02/2024 78 (H)  1 - 32 U/L Final    Alkaline Phosphatase 07/02/2024 237 (H)  39 - 117 U/L Final    Total Bilirubin 07/02/2024 1.5 (H)  0.0 - 1.2 mg/dL Final    Globulin 07/02/2024 2.1  gm/dL Final    A/G Ratio 07/02/2024 0.7  g/dL Final    BUN/Creatinine Ratio 07/02/2024 15.9  7.0 - 25.0 Final    Anion Gap 07/02/2024 13.2  5.0 - 15.0 mmol/L Final    eGFR 07/02/2024 45.9 (L)  >60.0 mL/min/1.73 Final    WBC 07/02/2024 11.57 (H)  3.40 - 10.80 10*3/mm3 Final    RBC 07/02/2024 2.73 (L)  3.77 - 5.28 10*6/mm3 Final    Hemoglobin 07/02/2024 9.6 (L)  12.0 - 15.9 g/dL Final    Hematocrit 07/02/2024 27.8 (L)  34.0 - 46.6 % Final    MCV 07/02/2024 101.8 (H)  79.0 - 97.0 fL Final    MCH 07/02/2024 35.2 (H)  26.6 - 33.0 pg Final    MCHC 07/02/2024 34.5  31.5 - 35.7 g/dL Final     RDW 07/02/2024 15.2  12.3 - 15.4 % Final    RDW-SD 07/02/2024 55.8 (H)  37.0 - 54.0 fl Final    MPV 07/02/2024 9.7  6.0 - 12.0 fL Final    Platelets 07/02/2024 173  140 - 450 10*3/mm3 Final    Neutrophil % 07/02/2024 78.5 (H)  42.7 - 76.0 % Final    Lymphocyte % 07/02/2024 16.8 (L)  19.6 - 45.3 % Final    Monocyte % 07/02/2024 4.3 (L)  5.0 - 12.0 % Final    Eosinophil % 07/02/2024 0.0 (L)  0.3 - 6.2 % Final    Basophil % 07/02/2024 0.1  0.0 - 1.5 % Final    Immature Grans % 07/02/2024 0.3  0.0 - 0.5 % Final    Neutrophils, Absolute 07/02/2024 9.08 (H)  1.70 - 7.00 10*3/mm3 Final    Lymphocytes, Absolute 07/02/2024 1.94  0.70 - 3.10 10*3/mm3 Final    Monocytes, Absolute 07/02/2024 0.50  0.10 - 0.90 10*3/mm3 Final    Eosinophils, Absolute 07/02/2024 0.00  0.00 - 0.40 10*3/mm3 Final    Basophils, Absolute 07/02/2024 0.01  0.00 - 0.20 10*3/mm3 Final    Immature Grans, Absolute 07/02/2024 0.04  0.00 - 0.05 10*3/mm3 Final    nRBC 07/02/2024 0.0  0.0 - 0.2 /100 WBC Final    Glucose 07/01/2024 76  70 - 130 mg/dL Final    Glucose 07/02/2024 70  70 - 130 mg/dL Final    LVIDd 07/02/2024 3.7  cm Final    LVIDs 07/02/2024 2.8  cm Final    IVSd 07/02/2024 0.88  cm Final    LVPWd 07/02/2024 1.00  cm Final    FS 07/02/2024 26.0  % Final    IVS/LVPW 07/02/2024 0.88  cm Final    ESV(cubed) 07/02/2024 20.9  ml Final    LV Sys Vol (BSA corrected) 07/02/2024 7.2  cm2 Final    EDV(cubed) 07/02/2024 51.7  ml Final    LV Mas Vol (BSA corrected) 07/02/2024 21.9  cm2 Final    LV mass(C)d 07/02/2024 104.1  grams Final    LVOT area 07/02/2024 3.1  cm2 Final    LVOT diam 07/02/2024 2.00  cm Final    EDV(MOD-sp4) 07/02/2024 38.3  ml Final    ESV(MOD-sp4) 07/02/2024 12.6  ml Final    SV(MOD-sp4) 07/02/2024 25.7  ml Final    SVi(MOD-SP4) 07/02/2024 14.7  ml/m2 Final    EF(MOD-sp4) 07/02/2024 67.1  % Final    MV E max libia 07/02/2024 63.8  cm/sec Final    MV A max libia 07/02/2024 67.3  cm/sec Final    MV E/A 07/02/2024 0.95   Final    LA ESV  Index (BP) 07/02/2024 8.2  ml/m2 Final    Med Peak E' Mendoza 07/02/2024 6.9  cm/sec Final    Lat Peak E' Mendoza 07/02/2024 6.7  cm/sec Final    Avg E/e' ratio 07/02/2024 9.38   Final    TAPSE (>1.6) 07/02/2024 2.07  cm Final    LA dimension (2D)  07/02/2024 1.50  cm Final    Ao pk mendoza 07/02/2024 110.0  cm/sec Final    Ao max PG 07/02/2024 4.8  mmHg Final    Ao mean PG 07/02/2024 2.00  mmHg Final    Ao V2 VTI 07/02/2024 15.8  cm Final    PA acc time 07/02/2024 0.10  sec Final    Ao root diam 07/02/2024 3.3  cm Final    ACS 07/02/2024 2.00  cm Final    Glucose 07/02/2024 167 (H)  70 - 130 mg/dL Final    Glucose 07/02/2024 134 (H)  70 - 130 mg/dL Final    Glucose 07/02/2024 146 (H)  70 - 130 mg/dL Final    Glucose 07/02/2024 130  70 - 130 mg/dL Final    Glucose 07/02/2024 143 (H)  70 - 130 mg/dL Final    Glucose 07/02/2024 135 (H)  70 - 130 mg/dL Final    WBC 07/03/2024 9.25  3.40 - 10.80 10*3/mm3 Final    RBC 07/03/2024 2.66 (L)  3.77 - 5.28 10*6/mm3 Final    Hemoglobin 07/03/2024 9.1 (L)  12.0 - 15.9 g/dL Final    Hematocrit 07/03/2024 26.2 (L)  34.0 - 46.6 % Final    MCV 07/03/2024 98.5 (H)  79.0 - 97.0 fL Final    MCH 07/03/2024 34.2 (H)  26.6 - 33.0 pg Final    MCHC 07/03/2024 34.7  31.5 - 35.7 g/dL Final    RDW 07/03/2024 14.3  12.3 - 15.4 % Final    RDW-SD 07/03/2024 51.8  37.0 - 54.0 fl Final    MPV 07/03/2024 9.8  6.0 - 12.0 fL Final    Platelets 07/03/2024 135 (L)  140 - 450 10*3/mm3 Final    Glucose 07/03/2024 98  65 - 99 mg/dL Final    BUN 07/03/2024 19  6 - 20 mg/dL Final    Creatinine 07/03/2024 0.98  0.57 - 1.00 mg/dL Final    Sodium 07/03/2024 133 (L)  136 - 145 mmol/L Final    Potassium 07/03/2024 3.5  3.5 - 5.2 mmol/L Final    Slight hemolysis detected by analyzer. Result may be falsely elevated.    Chloride 07/03/2024 104  98 - 107 mmol/L Final    CO2 07/03/2024 17.9 (L)  22.0 - 29.0 mmol/L Final    Calcium 07/03/2024 7.3 (L)  8.6 - 10.5 mg/dL Final    BUN/Creatinine Ratio 07/03/2024 19.4  7.0 - 25.0 Final     Anion Gap 07/03/2024 11.1  5.0 - 15.0 mmol/L Final    eGFR 07/03/2024 69.2  >60.0 mL/min/1.73 Final    Magnesium 07/03/2024 1.5 (L)  1.6 - 2.6 mg/dL Final    Glucose 07/03/2024 116  70 - 130 mg/dL Final    Glucose 07/03/2024 131 (H)  70 - 130 mg/dL Final   Lab on 06/26/2024   Component Date Value Ref Range Status    Glucose 06/26/2024 81  65 - 99 mg/dL Final    BUN 06/26/2024 12  6 - 20 mg/dL Final    Creatinine 06/26/2024 0.49 (L)  0.57 - 1.00 mg/dL Final    Sodium 06/26/2024 136  136 - 145 mmol/L Final    Potassium 06/26/2024 3.8  3.5 - 5.2 mmol/L Final    Chloride 06/26/2024 99  98 - 107 mmol/L Final    CO2 06/26/2024 23.9  22.0 - 29.0 mmol/L Final    Calcium 06/26/2024 8.0 (L)  8.6 - 10.5 mg/dL Final    Total Protein 06/26/2024 4.6 (L)  6.0 - 8.5 g/dL Final    Albumin 06/26/2024 2.1 (L)  3.5 - 5.2 g/dL Final    ALT (SGPT) 06/26/2024 51 (H)  1 - 33 U/L Final    AST (SGOT) 06/26/2024 63 (H)  1 - 32 U/L Final    Alkaline Phosphatase 06/26/2024 235 (H)  39 - 117 U/L Final    Total Bilirubin 06/26/2024 1.2  0.0 - 1.2 mg/dL Final    Globulin 06/26/2024 2.5  gm/dL Final    A/G Ratio 06/26/2024 0.8  g/dL Final    BUN/Creatinine Ratio 06/26/2024 24.5  7.0 - 25.0 Final    Anion Gap 06/26/2024 13.1  5.0 - 15.0 mmol/L Final    eGFR 06/26/2024 112.9  >60.0 mL/min/1.73 Final    Color, UA 06/26/2024 Orange (A)  Yellow, Straw Final    Appearance, UA 06/26/2024 Cloudy (A)  Clear Final    pH, UA 06/26/2024 6.0  5.0 - 8.0 Final    Specific Gravity, UA 06/26/2024 >1.030 (H)  1.005 - 1.030 Final    Glucose, UA 06/26/2024 Negative  Negative Final    Ketones, UA 06/26/2024 15 mg/dL (1+) (A)  Negative Final    Bilirubin, UA 06/26/2024 Moderate (2+) (A)  Negative Final    Blood, UA 06/26/2024 Trace (A)  Negative Final    Protein, UA 06/26/2024 30 mg/dL (1+) (A)  Negative Final    Leuk Esterase, UA 06/26/2024 Moderate (2+) (A)  Negative Final    Nitrite, UA 06/26/2024 Positive (A)  Negative Final    Urobilinogen, UA 06/26/2024 1.0  E.U./dL  0.2 - 1.0 E.U./dL Final    WBC 06/26/2024 6.13  3.40 - 10.80 10*3/mm3 Final    RBC 06/26/2024 3.66 (L)  3.77 - 5.28 10*6/mm3 Final    Hemoglobin 06/26/2024 12.4  12.0 - 15.9 g/dL Final    Hematocrit 06/26/2024 37.2  34.0 - 46.6 % Final    MCV 06/26/2024 101.6 (H)  79.0 - 97.0 fL Final    MCH 06/26/2024 33.9 (H)  26.6 - 33.0 pg Final    MCHC 06/26/2024 33.3  31.5 - 35.7 g/dL Final    RDW 06/26/2024 14.5  12.3 - 15.4 % Final    RDW-SD 06/26/2024 53.7  37.0 - 54.0 fl Final    MPV 06/26/2024 8.9  6.0 - 12.0 fL Final    Platelets 06/26/2024 266  140 - 450 10*3/mm3 Final    Neutrophil % 06/26/2024 70.8  42.7 - 76.0 % Final    Lymphocyte % 06/26/2024 21.5  19.6 - 45.3 % Final    Monocyte % 06/26/2024 6.5  5.0 - 12.0 % Final    Eosinophil % 06/26/2024 0.0 (L)  0.3 - 6.2 % Final    Basophil % 06/26/2024 0.7  0.0 - 1.5 % Final    Immature Grans % 06/26/2024 0.5  0.0 - 0.5 % Final    Neutrophils, Absolute 06/26/2024 4.34  1.70 - 7.00 10*3/mm3 Final    Lymphocytes, Absolute 06/26/2024 1.32  0.70 - 3.10 10*3/mm3 Final    Monocytes, Absolute 06/26/2024 0.40  0.10 - 0.90 10*3/mm3 Final    Eosinophils, Absolute 06/26/2024 0.00  0.00 - 0.40 10*3/mm3 Final    Basophils, Absolute 06/26/2024 0.04  0.00 - 0.20 10*3/mm3 Final    Immature Grans, Absolute 06/26/2024 0.03  0.00 - 0.05 10*3/mm3 Final    nRBC 06/26/2024 0.0  0.0 - 0.2 /100 WBC Final   Infusion on 06/12/2024   Component Date Value Ref Range Status    Glucose 06/12/2024 95  65 - 99 mg/dL Final    BUN 06/12/2024 11  6 - 20 mg/dL Final    Creatinine 06/12/2024 0.55 (L)  0.57 - 1.00 mg/dL Final    Sodium 06/12/2024 138  136 - 145 mmol/L Final    Potassium 06/12/2024 3.7  3.5 - 5.2 mmol/L Final    Chloride 06/12/2024 102  98 - 107 mmol/L Final    CO2 06/12/2024 22.4  22.0 - 29.0 mmol/L Final    Calcium 06/12/2024 8.4 (L)  8.6 - 10.5 mg/dL Final    Total Protein 06/12/2024 4.8 (L)  6.0 - 8.5 g/dL Final    Albumin 06/12/2024 2.4 (L)  3.5 - 5.2 g/dL Final    ALT (SGPT)  06/12/2024 62 (H)  1 - 33 U/L Final    AST (SGOT) 06/12/2024 102 (H)  1 - 32 U/L Final    Alkaline Phosphatase 06/12/2024 206 (H)  39 - 117 U/L Final    Total Bilirubin 06/12/2024 1.1  0.0 - 1.2 mg/dL Final    Globulin 06/12/2024 2.4  gm/dL Final    A/G Ratio 06/12/2024 1.0  g/dL Final    BUN/Creatinine Ratio 06/12/2024 20.0  7.0 - 25.0 Final    Anion Gap 06/12/2024 13.6  5.0 - 15.0 mmol/L Final    eGFR 06/12/2024 109.8  >60.0 mL/min/1.73 Final    Color, UA 06/12/2024 Dark Yellow (A)  Yellow, Straw Final    Appearance, UA 06/12/2024 Turbid (A)  Clear Final    pH, UA 06/12/2024 5.5  5.0 - 8.0 Final    Specific Gravity, UA 06/12/2024 >1.030 (H)  1.005 - 1.030 Final    Glucose, UA 06/12/2024 Negative  Negative Final    Ketones, UA 06/12/2024 15 mg/dL (1+) (A)  Negative Final    Bilirubin, UA 06/12/2024 Moderate (2+) (A)  Negative Final    Blood, UA 06/12/2024 Negative  Negative Final    Protein, UA 06/12/2024 30 mg/dL (1+) (A)  Negative Final    Leuk Esterase, UA 06/12/2024 Moderate (2+) (A)  Negative Final    Nitrite, UA 06/12/2024 Positive (A)  Negative Final    Urobilinogen, UA 06/12/2024 1.0 E.U./dL  0.2 - 1.0 E.U./dL Final    WBC 06/12/2024 6.98  3.40 - 10.80 10*3/mm3 Final    RBC 06/12/2024 3.55 (L)  3.77 - 5.28 10*6/mm3 Final    Hemoglobin 06/12/2024 12.4  12.0 - 15.9 g/dL Final    Hematocrit 06/12/2024 37.3  34.0 - 46.6 % Final    MCV 06/12/2024 105.1 (H)  79.0 - 97.0 fL Final    MCH 06/12/2024 34.9 (H)  26.6 - 33.0 pg Final    MCHC 06/12/2024 33.2  31.5 - 35.7 g/dL Final    RDW 06/12/2024 13.8  12.3 - 15.4 % Final    RDW-SD 06/12/2024 54.5 (H)  37.0 - 54.0 fl Final    MPV 06/12/2024 9.7  6.0 - 12.0 fL Final    Platelets 06/12/2024 274  140 - 450 10*3/mm3 Final    Neutrophil % 06/12/2024 73.0  42.7 - 76.0 % Final    Lymphocyte % 06/12/2024 19.6  19.6 - 45.3 % Final    Monocyte % 06/12/2024 6.3  5.0 - 12.0 % Final    Eosinophil % 06/12/2024 0.3  0.3 - 6.2 % Final    Basophil % 06/12/2024 0.7  0.0 - 1.5 % Final     Immature Grans % 06/12/2024 0.1  0.0 - 0.5 % Final    Neutrophils, Absolute 06/12/2024 5.09  1.70 - 7.00 10*3/mm3 Final    Lymphocytes, Absolute 06/12/2024 1.37  0.70 - 3.10 10*3/mm3 Final    Monocytes, Absolute 06/12/2024 0.44  0.10 - 0.90 10*3/mm3 Final    Eosinophils, Absolute 06/12/2024 0.02  0.00 - 0.40 10*3/mm3 Final    Basophils, Absolute 06/12/2024 0.05  0.00 - 0.20 10*3/mm3 Final    Immature Grans, Absolute 06/12/2024 0.01  0.00 - 0.05 10*3/mm3 Final    nRBC 06/12/2024 0.0  0.0 - 0.2 /100 WBC Final   Lab on 05/30/2024   Component Date Value Ref Range Status    Glucose 05/30/2024 83  65 - 99 mg/dL Final    BUN 05/30/2024 11  6 - 20 mg/dL Final    Creatinine 05/30/2024 0.52 (L)  0.57 - 1.00 mg/dL Final    Sodium 05/30/2024 142  136 - 145 mmol/L Final    Potassium 05/30/2024 3.3 (L)  3.5 - 5.2 mmol/L Final    Chloride 05/30/2024 106  98 - 107 mmol/L Final    CO2 05/30/2024 26.1  22.0 - 29.0 mmol/L Final    Calcium 05/30/2024 8.4 (L)  8.6 - 10.5 mg/dL Final    Total Protein 05/30/2024 4.8 (L)  6.0 - 8.5 g/dL Final    Albumin 05/30/2024 2.6 (L)  3.5 - 5.2 g/dL Final    ALT (SGPT) 05/30/2024 71 (H)  1 - 33 U/L Final    AST (SGOT) 05/30/2024 111 (H)  1 - 32 U/L Final    Alkaline Phosphatase 05/30/2024 198 (H)  39 - 117 U/L Final    Total Bilirubin 05/30/2024 1.0  0.0 - 1.2 mg/dL Final    Globulin 05/30/2024 2.2  gm/dL Final    A/G Ratio 05/30/2024 1.2  g/dL Final    BUN/Creatinine Ratio 05/30/2024 21.2  7.0 - 25.0 Final    Anion Gap 05/30/2024 9.9  5.0 - 15.0 mmol/L Final    eGFR 05/30/2024 111.9  >60.0 mL/min/1.73 Final    Color, UA 05/30/2024 Dark Yellow (A)  Yellow, Straw Final    Appearance, UA 05/30/2024 Turbid (A)  Clear Final    pH, UA 05/30/2024 5.5  5.0 - 8.0 Final    Specific Gravity, UA 05/30/2024 >=1.030  1.005 - 1.030 Final    Glucose, UA 05/30/2024 Negative  Negative Final    Ketones, UA 05/30/2024 15 mg/dL (1+) (A)  Negative Final    Bilirubin, UA 05/30/2024 Moderate (2+) (A)  Negative Final     Blood, UA 05/30/2024 Negative  Negative Final    Protein, UA 05/30/2024 30 mg/dL (1+) (A)  Negative Final    Leuk Esterase, UA 05/30/2024 Negative  Negative Final    Nitrite, UA 05/30/2024 Positive (A)  Negative Final    Urobilinogen, UA 05/30/2024 1.0 E.U./dL  0.2 - 1.0 E.U./dL Final    CEA 05/30/2024 2.78  ng/mL Final    WBC 05/30/2024 5.32  3.40 - 10.80 10*3/mm3 Final    RBC 05/30/2024 3.38 (L)  3.77 - 5.28 10*6/mm3 Final    Hemoglobin 05/30/2024 11.9 (L)  12.0 - 15.9 g/dL Final    Hematocrit 05/30/2024 36.3  34.0 - 46.6 % Final    MCV 05/30/2024 107.4 (H)  79.0 - 97.0 fL Final    MCH 05/30/2024 35.2 (H)  26.6 - 33.0 pg Final    MCHC 05/30/2024 32.8  31.5 - 35.7 g/dL Final    RDW 05/30/2024 15.3  12.3 - 15.4 % Final    RDW-SD 05/30/2024 61.1 (H)  37.0 - 54.0 fl Final    MPV 05/30/2024 9.4  6.0 - 12.0 fL Final    Platelets 05/30/2024 224  140 - 450 10*3/mm3 Final    Neutrophil % 05/30/2024 69.2  42.7 - 76.0 % Final    Lymphocyte % 05/30/2024 24.2  19.6 - 45.3 % Final    Monocyte % 05/30/2024 5.3  5.0 - 12.0 % Final    Eosinophil % 05/30/2024 0.2 (L)  0.3 - 6.2 % Final    Basophil % 05/30/2024 0.9  0.0 - 1.5 % Final    Immature Grans % 05/30/2024 0.2  0.0 - 0.5 % Final    Neutrophils, Absolute 05/30/2024 3.68  1.70 - 7.00 10*3/mm3 Final    Lymphocytes, Absolute 05/30/2024 1.29  0.70 - 3.10 10*3/mm3 Final    Monocytes, Absolute 05/30/2024 0.28  0.10 - 0.90 10*3/mm3 Final    Eosinophils, Absolute 05/30/2024 0.01  0.00 - 0.40 10*3/mm3 Final    Basophils, Absolute 05/30/2024 0.05  0.00 - 0.20 10*3/mm3 Final    Immature Grans, Absolute 05/30/2024 0.01  0.00 - 0.05 10*3/mm3 Final    nRBC 05/30/2024 0.0  0.0 - 0.2 /100 WBC Final   Lab on 05/15/2024   Component Date Value Ref Range Status    Glucose 05/15/2024 102 (H)  65 - 99 mg/dL Final    BUN 05/15/2024 8  6 - 20 mg/dL Final    Creatinine 05/15/2024 0.48 (L)  0.57 - 1.00 mg/dL Final    Sodium 05/15/2024 143  136 - 145 mmol/L Final    Potassium 05/15/2024 3.4 (L)  3.5 -  5.2 mmol/L Final    Chloride 05/15/2024 106  98 - 107 mmol/L Final    CO2 05/15/2024 26.3  22.0 - 29.0 mmol/L Final    Calcium 05/15/2024 8.4 (L)  8.6 - 10.5 mg/dL Final    Total Protein 05/15/2024 5.2 (L)  6.0 - 8.5 g/dL Final    Albumin 05/15/2024 2.6 (L)  3.5 - 5.2 g/dL Final    ALT (SGPT) 05/15/2024 84 (H)  1 - 33 U/L Final    AST (SGOT) 05/15/2024 82 (H)  1 - 32 U/L Final    Alkaline Phosphatase 05/15/2024 260 (H)  39 - 117 U/L Final    Total Bilirubin 05/15/2024 1.0  0.0 - 1.2 mg/dL Final    Globulin 05/15/2024 2.6  gm/dL Final    A/G Ratio 05/15/2024 1.0  g/dL Final    BUN/Creatinine Ratio 05/15/2024 16.7  7.0 - 25.0 Final    Anion Gap 05/15/2024 10.7  5.0 - 15.0 mmol/L Final    eGFR 05/15/2024 114.1  >60.0 mL/min/1.73 Final    Color, UA 05/15/2024 Orange (A)  Yellow, Straw Final    Appearance, UA 05/15/2024 Turbid (A)  Clear Final    pH, UA 05/15/2024 5.5  5.0 - 8.0 Final    Specific Gravity, UA 05/15/2024 >=1.030  1.005 - 1.030 Final    Glucose, UA 05/15/2024 Negative  Negative Final    Ketones, UA 05/15/2024 Trace (A)  Negative Final    Bilirubin, UA 05/15/2024 Moderate (2+) (A)  Negative Final    Blood, UA 05/15/2024 Small (1+) (A)  Negative Final    Protein, UA 05/15/2024 30 mg/dL (1+) (A)  Negative Final    Leuk Esterase, UA 05/15/2024 Large (3+) (A)  Negative Final    Nitrite, UA 05/15/2024 Positive (A)  Negative Final    Urobilinogen, UA 05/15/2024 1.0 E.U./dL  0.2 - 1.0 E.U./dL Final    WBC 05/15/2024 7.07  3.40 - 10.80 10*3/mm3 Final    RBC 05/15/2024 3.11 (L)  3.77 - 5.28 10*6/mm3 Final    Hemoglobin 05/15/2024 10.8 (L)  12.0 - 15.9 g/dL Final    Hematocrit 05/15/2024 32.2 (L)  34.0 - 46.6 % Final    MCV 05/15/2024 103.5 (H)  79.0 - 97.0 fL Final    MCH 05/15/2024 34.7 (H)  26.6 - 33.0 pg Final    MCHC 05/15/2024 33.5  31.5 - 35.7 g/dL Final    RDW 05/15/2024 18.8 (H)  12.3 - 15.4 % Final    RDW-SD 05/15/2024 71.5 (H)  37.0 - 54.0 fl Final    MPV 05/15/2024 9.5  6.0 - 12.0 fL Final    Platelets  05/15/2024 302  140 - 450 10*3/mm3 Final    Neutrophil % 05/15/2024 74.1  42.7 - 76.0 % Final    Lymphocyte % 05/15/2024 20.1  19.6 - 45.3 % Final    Monocyte % 05/15/2024 4.7 (L)  5.0 - 12.0 % Final    Eosinophil % 05/15/2024 0.4  0.3 - 6.2 % Final    Basophil % 05/15/2024 0.4  0.0 - 1.5 % Final    Immature Grans % 05/15/2024 0.3  0.0 - 0.5 % Final    Neutrophils, Absolute 05/15/2024 5.24  1.70 - 7.00 10*3/mm3 Final    Lymphocytes, Absolute 05/15/2024 1.42  0.70 - 3.10 10*3/mm3 Final    Monocytes, Absolute 05/15/2024 0.33  0.10 - 0.90 10*3/mm3 Final    Eosinophils, Absolute 05/15/2024 0.03  0.00 - 0.40 10*3/mm3 Final    Basophils, Absolute 05/15/2024 0.03  0.00 - 0.20 10*3/mm3 Final    Immature Grans, Absolute 05/15/2024 0.02  0.00 - 0.05 10*3/mm3 Final    nRBC 05/15/2024 0.0  0.0 - 0.2 /100 WBC Final    Anisocytosis 05/15/2024 Mod/2+  None Seen Final    Macrocytes 05/15/2024 Mod/2+  None Seen Final    Large Platelets 05/15/2024 Slight/1+  None Seen Final    Ferritin 05/15/2024 753.50 (H)  13.00 - 150.00 ng/mL Final    Iron 05/15/2024 77  37 - 145 mcg/dL Final    Iron Saturation (TSAT) 05/15/2024 74 (H)  20 - 50 % Final    Transferrin 05/15/2024 70 (L)  200 - 360 mg/dL Final    TIBC 05/15/2024 104 (L)  298 - 536 mcg/dL Final   Infusion on 05/01/2024   Component Date Value Ref Range Status    Glucose 05/01/2024 69  65 - 99 mg/dL Final    BUN 05/01/2024 10  6 - 20 mg/dL Final    Creatinine 05/01/2024 0.48 (L)  0.57 - 1.00 mg/dL Final    Sodium 05/01/2024 140  136 - 145 mmol/L Final    Potassium 05/01/2024 3.3 (L)  3.5 - 5.2 mmol/L Final    Chloride 05/01/2024 104  98 - 107 mmol/L Final    CO2 05/01/2024 23.9  22.0 - 29.0 mmol/L Final    Calcium 05/01/2024 8.2 (L)  8.6 - 10.5 mg/dL Final    Total Protein 05/01/2024 4.7 (L)  6.0 - 8.5 g/dL Final    Albumin 05/01/2024 2.6 (L)  3.5 - 5.2 g/dL Final    ALT (SGPT) 05/01/2024 116 (H)  1 - 33 U/L Final    AST (SGOT) 05/01/2024 165 (H)  1 - 32 U/L Final    Alkaline  Phosphatase 05/01/2024 236 (H)  39 - 117 U/L Final    Total Bilirubin 05/01/2024 0.9  0.0 - 1.2 mg/dL Final    Globulin 05/01/2024 2.1  gm/dL Final    A/G Ratio 05/01/2024 1.2  g/dL Final    BUN/Creatinine Ratio 05/01/2024 20.8  7.0 - 25.0 Final    Anion Gap 05/01/2024 12.1  5.0 - 15.0 mmol/L Final    eGFR 05/01/2024 114.1  >60.0 mL/min/1.73 Final    WBC 05/01/2024 7.15  3.40 - 10.80 10*3/mm3 Final    RBC 05/01/2024 3.19 (L)  3.77 - 5.28 10*6/mm3 Final    Hemoglobin 05/01/2024 10.3 (L)  12.0 - 15.9 g/dL Final    Hematocrit 05/01/2024 31.5 (L)  34.0 - 46.6 % Final    MCV 05/01/2024 98.7 (H)  79.0 - 97.0 fL Final    MCH 05/01/2024 32.3  26.6 - 33.0 pg Final    MCHC 05/01/2024 32.7  31.5 - 35.7 g/dL Final    RDW 05/01/2024 23.9 (H)  12.3 - 15.4 % Final    RDW-SD 05/01/2024 86.4 (H)  37.0 - 54.0 fl Final    MPV 05/01/2024 10.6  6.0 - 12.0 fL Final    Platelets 05/01/2024 218  140 - 450 10*3/mm3 Final    Neutrophil % 05/01/2024 79.3 (H)  42.7 - 76.0 % Final    Lymphocyte % 05/01/2024 14.8 (L)  19.6 - 45.3 % Final    Monocyte % 05/01/2024 4.8 (L)  5.0 - 12.0 % Final    Eosinophil % 05/01/2024 0.1 (L)  0.3 - 6.2 % Final    Basophil % 05/01/2024 0.7  0.0 - 1.5 % Final    Immature Grans % 05/01/2024 0.3  0.0 - 0.5 % Final    Neutrophils, Absolute 05/01/2024 5.67  1.70 - 7.00 10*3/mm3 Final    Lymphocytes, Absolute 05/01/2024 1.06  0.70 - 3.10 10*3/mm3 Final    Monocytes, Absolute 05/01/2024 0.34  0.10 - 0.90 10*3/mm3 Final    Eosinophils, Absolute 05/01/2024 0.01  0.00 - 0.40 10*3/mm3 Final    Basophils, Absolute 05/01/2024 0.05  0.00 - 0.20 10*3/mm3 Final    Immature Grans, Absolute 05/01/2024 0.02  0.00 - 0.05 10*3/mm3 Final    nRBC 05/01/2024 0.0  0.0 - 0.2 /100 WBC Final    Color, UA 05/01/2024 Orange (A)  Yellow, Straw Final    Appearance, UA 05/01/2024 Cloudy (A)  Clear Final    pH, UA 05/01/2024 5.5  5.0 - 8.0 Final    Specific Gravity, UA 05/01/2024 1.024  1.005 - 1.030 Final    Glucose, UA 05/01/2024 Negative   Negative Final    Ketones, UA 05/01/2024 15 mg/dL (1+) (A)  Negative Final    Bilirubin, UA 05/01/2024 Moderate (2+) (A)  Negative Final    Blood, UA 05/01/2024 Negative  Negative Final    Protein, UA 05/01/2024 30 mg/dL (1+) (A)  Negative Final    Leuk Esterase, UA 05/01/2024 Moderate (2+) (A)  Negative Final    Nitrite, UA 05/01/2024 Positive (A)  Negative Final    Urobilinogen, UA 05/01/2024 1.0 E.U./dL  0.2 - 1.0 E.U./dL Final    Anisocytosis 05/01/2024 Large/3+  None Seen Final    Hypochromia 05/01/2024 Mod/2+  None Seen Final    Macrocytes 05/01/2024 Slight/1+  None Seen Final    Platelet Morphology 05/01/2024 Normal  Normal Final   Lab on 04/23/2024   Component Date Value Ref Range Status    Glucose 04/23/2024 90  65 - 99 mg/dL Final    BUN 04/23/2024 12  6 - 20 mg/dL Final    Creatinine 04/23/2024 0.63  0.57 - 1.00 mg/dL Final    Sodium 04/23/2024 142  136 - 145 mmol/L Final    Potassium 04/23/2024 3.4 (L)  3.5 - 5.2 mmol/L Final    Chloride 04/23/2024 105  98 - 107 mmol/L Final    CO2 04/23/2024 26.9  22.0 - 29.0 mmol/L Final    Calcium 04/23/2024 8.5 (L)  8.6 - 10.5 mg/dL Final    Total Protein 04/23/2024 5.3 (L)  6.0 - 8.5 g/dL Final    Albumin 04/23/2024 2.6 (L)  3.5 - 5.2 g/dL Final    ALT (SGPT) 04/23/2024 187 (H)  1 - 33 U/L Final    AST (SGOT) 04/23/2024 196 (H)  1 - 32 U/L Final    Alkaline Phosphatase 04/23/2024 363 (H)  39 - 117 U/L Final    Total Bilirubin 04/23/2024 0.9  0.0 - 1.2 mg/dL Final    Globulin 04/23/2024 2.7  gm/dL Final    A/G Ratio 04/23/2024 1.0  g/dL Final    BUN/Creatinine Ratio 04/23/2024 19.0  7.0 - 25.0 Final    Anion Gap 04/23/2024 10.1  5.0 - 15.0 mmol/L Final    eGFR 04/23/2024 106.9  >60.0 mL/min/1.73 Final    WBC 04/23/2024 3.68  3.40 - 10.80 10*3/mm3 Final    RBC 04/23/2024 3.71 (L)  3.77 - 5.28 10*6/mm3 Final    Hemoglobin 04/23/2024 11.6 (L)  12.0 - 15.9 g/dL Final    Hematocrit 04/23/2024 35.0  34.0 - 46.6 % Final    MCV 04/23/2024 94.3  79.0 - 97.0 fL Final    MCH  04/23/2024 31.3  26.6 - 33.0 pg Final    MCHC 04/23/2024 33.1  31.5 - 35.7 g/dL Final    RDW 04/23/2024 26.4 (H)  12.3 - 15.4 % Final    RDW-SD 04/23/2024 89.1 (H)  37.0 - 54.0 fl Final    MPV 04/23/2024 9.4  6.0 - 12.0 fL Final    Platelets 04/23/2024 332  140 - 450 10*3/mm3 Final    Neutrophil % 04/23/2024 58.0  42.7 - 76.0 % Final    Lymphocyte % 04/23/2024 26.0  19.6 - 45.3 % Final    Atypical/Reactive Lymphocytes noted.      Monocyte % 04/23/2024 13.0 (H)  5.0 - 12.0 % Final    Basophil % 04/23/2024 2.0 (H)  0.0 - 1.5 % Final    Bands %  04/23/2024 1.0  0.0 - 5.0 % Final    Neutrophils Absolute 04/23/2024 2.17  1.70 - 7.00 10*3/mm3 Final    Lymphocytes Absolute 04/23/2024 0.96  0.70 - 3.10 10*3/mm3 Final    Monocytes Absolute 04/23/2024 0.48  0.10 - 0.90 10*3/mm3 Final    Basophils Absolute 04/23/2024 0.07  0.00 - 0.20 10*3/mm3 Final    Anisocytosis 04/23/2024 Large/3+  None Seen Final    Hypochromia 04/23/2024 Mod/2+  None Seen Final    Platelet Morphology 04/23/2024 Normal  Normal Final   Admission on 04/21/2024, Discharged on 04/21/2024   Component Date Value Ref Range Status    Glucose 04/21/2024 87  65 - 99 mg/dL Final    BUN 04/21/2024 13  6 - 20 mg/dL Final    Creatinine 04/21/2024 0.59  0.57 - 1.00 mg/dL Final    Sodium 04/21/2024 140  136 - 145 mmol/L Final    Potassium 04/21/2024 3.5  3.5 - 5.2 mmol/L Final    Chloride 04/21/2024 101  98 - 107 mmol/L Final    CO2 04/21/2024 25.4  22.0 - 29.0 mmol/L Final    Calcium 04/21/2024 9.1  8.6 - 10.5 mg/dL Final    Total Protein 04/21/2024 5.5 (L)  6.0 - 8.5 g/dL Final    Albumin 04/21/2024 2.9 (L)  3.5 - 5.2 g/dL Final    ALT (SGPT) 04/21/2024 195 (H)  1 - 33 U/L Final    AST (SGOT) 04/21/2024 236 (H)  1 - 32 U/L Final    Alkaline Phosphatase 04/21/2024 393 (H)  39 - 117 U/L Final    Total Bilirubin 04/21/2024 1.0  0.0 - 1.2 mg/dL Final    Globulin 04/21/2024 2.6  gm/dL Final    A/G Ratio 04/21/2024 1.1  g/dL Final    BUN/Creatinine Ratio 04/21/2024 22.0  7.0  - 25.0 Final    Anion Gap 04/21/2024 13.6  5.0 - 15.0 mmol/L Final    eGFR 04/21/2024 108.6  >60.0 mL/min/1.73 Final    Protime 04/21/2024 12.8  12.1 - 14.7 Seconds Final    INR 04/21/2024 0.91  0.90 - 1.10 Final    PTT 04/21/2024 25.9 (L)  26.5 - 34.5 seconds Final    Color, UA 04/21/2024 Dark Yellow (A)  Yellow, Straw Final    Appearance, UA 04/21/2024 Slightly Cloudy (A)  Clear Final    pH, UA 04/21/2024 5.0  5.0 - 8.0 Final    Specific Gravity, UA 04/21/2024 1.015  1.005 - 1.030 Final    Glucose, UA 04/21/2024 Negative  Negative Final    Ketones, UA 04/21/2024 Negative  Negative Final    Bilirubin, UA 04/21/2024 Negative  Negative Final    Blood, UA 04/21/2024 Negative  Negative Final    Protein, UA 04/21/2024 Trace (A)  Negative Final    Leuk Esterase, UA 04/21/2024 Negative  Negative Final    Nitrite, UA 04/21/2024 Negative  Negative Final    Urobilinogen, UA 04/21/2024 0.2 E.U./dL  0.2 - 1.0 E.U./dL Final    QT Interval 04/21/2024 318  ms Final    QTC Interval 04/21/2024 408  ms Final    WBC 04/21/2024 2.63 (L)  3.40 - 10.80 10*3/mm3 Final    RBC 04/21/2024 3.90  3.77 - 5.28 10*6/mm3 Final    Hemoglobin 04/21/2024 12.3  12.0 - 15.9 g/dL Final    Hematocrit 04/21/2024 36.9  34.0 - 46.6 % Final    MCV 04/21/2024 94.6  79.0 - 97.0 fL Final    MCH 04/21/2024 31.5  26.6 - 33.0 pg Final    MCHC 04/21/2024 33.3  31.5 - 35.7 g/dL Final    RDW 04/21/2024 26.8 (H)  12.3 - 15.4 % Final    RDW-SD 04/21/2024 90.9 (H)  37.0 - 54.0 fl Final    MPV 04/21/2024 9.6  6.0 - 12.0 fL Final    Platelets 04/21/2024 348  140 - 450 10*3/mm3 Final    Neutrophil % 04/21/2024 33.9 (L)  42.7 - 76.0 % Final    Lymphocyte % 04/21/2024 45.6 (H)  19.6 - 45.3 % Final    Monocyte % 04/21/2024 16.0 (H)  5.0 - 12.0 % Final    Eosinophil % 04/21/2024 1.9  0.3 - 6.2 % Final    Basophil % 04/21/2024 1.1  0.0 - 1.5 % Final    Neutrophils, Absolute 04/21/2024 0.89 (L)  1.70 - 7.00 10*3/mm3 Final    Lymphocytes, Absolute 04/21/2024 1.20  0.70 - 3.10  10*3/mm3 Final    Monocytes, Absolute 04/21/2024 0.42  0.10 - 0.90 10*3/mm3 Final    Eosinophils, Absolute 04/21/2024 0.05  0.00 - 0.40 10*3/mm3 Final    Basophils, Absolute 04/21/2024 0.03  0.00 - 0.20 10*3/mm3 Final    Extra Tube 04/21/2024 Hold for add-ons.   Final    Auto resulted.    Extra Tube 04/21/2024 hold for add-on   Final    Auto resulted    Extra Tube 04/21/2024 Hold for add-ons.   Final    Auto resulted.    Extra Tube 04/21/2024 Hold for add-ons.   Final    Auto resulted    Neutrophil % 04/21/2024 39.0 (L)  42.7 - 76.0 % Final    Lymphocyte % 04/21/2024 52.0 (H)  19.6 - 45.3 % Final    Atypical/Reactive Lymphocytes noted.      Monocyte % 04/21/2024 5.0  5.0 - 12.0 % Final    Eosinophil % 04/21/2024 4.0  0.3 - 6.2 % Final    Neutrophils Absolute 04/21/2024 1.03 (L)  1.70 - 7.00 10*3/mm3 Final    Lymphocytes Absolute 04/21/2024 1.37  0.70 - 3.10 10*3/mm3 Final    Monocytes Absolute 04/21/2024 0.13  0.10 - 0.90 10*3/mm3 Final    Eosinophils Absolute 04/21/2024 0.11  0.00 - 0.40 10*3/mm3 Final    Anisocytosis 04/21/2024 Mod/2+  None Seen Final    Target Cells 04/21/2024 Slight/1+  None Seen Final    Platelet Morphology 04/21/2024 Normal  Normal Final   Lab on 04/16/2024   Component Date Value Ref Range Status    Glucose 04/16/2024 91  65 - 99 mg/dL Final    BUN 04/16/2024 12  6 - 20 mg/dL Final    Creatinine 04/16/2024 0.55 (L)  0.57 - 1.00 mg/dL Final    Sodium 04/16/2024 140  136 - 145 mmol/L Final    Potassium 04/16/2024 3.6  3.5 - 5.2 mmol/L Final    Chloride 04/16/2024 103  98 - 107 mmol/L Final    CO2 04/16/2024 27.8  22.0 - 29.0 mmol/L Final    Calcium 04/16/2024 8.8  8.6 - 10.5 mg/dL Final    Total Protein 04/16/2024 5.0 (L)  6.0 - 8.5 g/dL Final    Albumin 04/16/2024 2.8 (L)  3.5 - 5.2 g/dL Final    ALT (SGPT) 04/16/2024 269 (H)  1 - 33 U/L Final    AST (SGOT) 04/16/2024 119 (H)  1 - 32 U/L Final    Alkaline Phosphatase 04/16/2024 438 (H)  39 - 117 U/L Final    Total Bilirubin 04/16/2024 1.3 (H)   0.0 - 1.2 mg/dL Final    Globulin 04/16/2024 2.2  gm/dL Final    A/G Ratio 04/16/2024 1.3  g/dL Final    BUN/Creatinine Ratio 04/16/2024 21.8  7.0 - 25.0 Final    Anion Gap 04/16/2024 9.2  5.0 - 15.0 mmol/L Final    eGFR 04/16/2024 110.4  >60.0 mL/min/1.73 Final    CEA 04/16/2024 2.67  ng/mL Final    WBC 04/16/2024 2.03 (L)  3.40 - 10.80 10*3/mm3 Final    RBC 04/16/2024 3.64 (L)  3.77 - 5.28 10*6/mm3 Final    Hemoglobin 04/16/2024 11.2 (L)  12.0 - 15.9 g/dL Final    Hematocrit 04/16/2024 33.4 (L)  34.0 - 46.6 % Final    MCV 04/16/2024 91.8  79.0 - 97.0 fL Final    MCH 04/16/2024 30.8  26.6 - 33.0 pg Final    MCHC 04/16/2024 33.5  31.5 - 35.7 g/dL Final    RDW 04/16/2024 27.8 (H)  12.3 - 15.4 % Final    RDW-SD 04/16/2024 93.7 (H)  37.0 - 54.0 fl Final    MPV 04/16/2024 10.0  6.0 - 12.0 fL Final    Platelets 04/16/2024 163  140 - 450 10*3/mm3 Final    Neutrophil % 04/16/2024 53.2  42.7 - 76.0 % Final    Lymphocyte % 04/16/2024 35.0  19.6 - 45.3 % Final    Monocyte % 04/16/2024 10.8  5.0 - 12.0 % Final    Eosinophil % 04/16/2024 0.5  0.3 - 6.2 % Final    Basophil % 04/16/2024 0.5  0.0 - 1.5 % Final    Immature Grans % 04/16/2024 0.0  0.0 - 0.5 % Final    Neutrophils, Absolute 04/16/2024 1.08 (L)  1.70 - 7.00 10*3/mm3 Final    Lymphocytes, Absolute 04/16/2024 0.71  0.70 - 3.10 10*3/mm3 Final    Monocytes, Absolute 04/16/2024 0.22  0.10 - 0.90 10*3/mm3 Final    Eosinophils, Absolute 04/16/2024 0.01  0.00 - 0.40 10*3/mm3 Final    Basophils, Absolute 04/16/2024 0.01  0.00 - 0.20 10*3/mm3 Final    Immature Grans, Absolute 04/16/2024 0.00  0.00 - 0.05 10*3/mm3 Final    nRBC 04/16/2024 0.0  0.0 - 0.2 /100 WBC Final    Anisocytosis 04/16/2024 Large/3+  None Seen Final    Poikilocytes 04/16/2024 Slight/1+  None Seen Final    Stomatocytes 04/16/2024 Slight/1+  None Seen Final    Platelet Morphology 04/16/2024 Normal  Normal Final   Infusion on 04/04/2024   Component Date Value Ref Range Status    Potassium 04/04/2024 2.7 (L)   3.5 - 5.2 mmol/L Final   There may be more visits with results that are not included.        PATHOLOGY  * Cannot find OR log *    IMPRESSION AND PLAN  Vangie Carney is a 54 y.o., white female with:  Catheter dysfunction/retention -I suspect the patient's residuals on bladder scan are most likely secondary to physiological fluid.  Patient catheter was irrigated at bedside with 200 mL of sterile water and no complication were noted.  I received no resistance with good return.  I recommend to continue with observation.  Patient may undergo repeat bladder scans if pain worsens or she has minimal output through her indwelling Colmenares catheter.  Patient improved clinically Colmenares catheter been removed for a voiding trial.  I will follow along closely with the patient.  Acute urinary tract infection -patient's urine culture is growing 50,000 colony-forming units of gram-negative bacilli.  Would recommend to continue with Zosyn    The patient was in agreement with these plans.    Thank you for asking us to participate in Vangie Carney's care.  We will continue to follow with you.  Please do not hesitate to call with any questions or concerns that you may have.    A total of 40 minutes were spent coordinating this patient’s care in clinic today; 20 minutes of which were face-to-face with the patient, reviewing medical history and counseling on the current treatment and followup plan.  All questions were answered to patient's satisfaction.         This document has been electronically signed by Romeo Izaguirre PA-C  July 3, 2024 08:14 EDT    Part of this note may be an electronic transcription/translation of spoken language to printed text using the Dragon Dictation System.    Electronically signed by Khanh De Anda MD at 07/03/24 0832       Michelle Frias MD at 07/02/24 1542        Consult Orders    1. Inpatient General Surgery Consult [[id number]] ordered by Carlos Castro MD at 07/02/24 0153                  Patient Name:  Vangie Carney  YOB: 1970  [id number]       Patient Care Team:  Urvashi Basurto APRN as PCP - General (Nurse Practitioner)  Francine Garcia MSW as       General Surgery Consult Note     Date of Consultation: 07/02/24    Consulting Physician : Yeimi Allen DO    Reason for Consult : Small bowel obstruction    Subjective     I have been asked to see  Vangie Carney , a 54 y.o. female in consultation for a small bowel obstruction.  She has a history of metastatic colon cancer.  She has previously been admitted for malignant bowel obstructions and has been managed with nonoperative management.  She presented to the emergency department with generalized weakness.  CT scan was performed which showed ascites and evidence of peritoneal carcinomatosis as well as dilated loops of small bowel.      Allergy:   Allergies   Allergen Reactions    Keflex [Cephalexin] Nausea Only     Beta lactam allergy details  Antibiotic reaction: nausea  Age at reaction: adult  Dose to reaction time: (!) minutes  Reason for antibiotic: unknown  Epinephrine required for reaction?: no  Tolerated antibiotics: augmentin, amoxicillin          Medications:  castor oil-balsam peru, 1 Application, Topical, Q12H  First Mouthwash (Magic Mouthwash), 5 mL, Swish & Spit, Q6H  mupirocin, 1 application , Each Nare, BID  piperacillin-tazobactam, 4.5 g, Intravenous, Q8H  sodium chloride, 10 mL, Intravenous, Q12H  sodium chloride, 10 mL, Intravenous, Q12H  sodium chloride, 10 mL, Intravenous, Q12H  sodium chloride, 10 mL, Intravenous, Q12H      dextrose 5 % and sodium chloride 0.45 %, 125 mL/hr, Last Rate: 125 mL/hr (07/02/24 0931)  norepinephrine, 0.02-0.3 mcg/kg/min, Last Rate: 0.22 mcg/kg/min (07/02/24 1540)      No current facility-administered medications on file prior to encounter.     Current Outpatient Medications on File Prior to Encounter   Medication Sig    apixaban (ELIQUIS) 5 MG tablet  tablet Take 1 tablet by mouth 2 (Two) Times a Day.    dicyclomine (BENTYL) 10 MG capsule Take 1 capsule by mouth 4 (Four) Times a Day Before Meals & at Bedtime.    HYDROcodone-acetaminophen (NORCO) 5-325 MG per tablet Take 1-2 tablets by mouth Every 8 (Eight) Hours As Needed for Mild Pain.    lidocaine-prilocaine (EMLA) 2.5-2.5 % cream Apply to port-a-cath site 30 minutes prior to arrival at infusion center. Cover with plastic wrap.    metoclopramide (REGLAN) 10 MG tablet Take 1 tablet by mouth 3 (Three) Times a Day As Needed (for nausea).    nystatin (MYCOSTATIN) 747738 UNIT/GM cream Apply 1 Application topically to the appropriate area as directed 2 (Two) Times a Day.    ondansetron (ZOFRAN) 8 MG tablet Take 1 tablet by mouth Every 8 (Eight) Hours As Needed for Nausea or Vomiting.    pantoprazole (PROTONIX) 40 MG EC tablet Take 1 tablet by mouth Daily.    sennosides-docusate (PERICOLACE) 8.6-50 MG per tablet Take 2 tablets by mouth 2 (Two) Times a Day As Needed for Constipation.    sulfamethoxazole-trimethoprim (BACTRIM,SEPTRA) 200-40 MG/5ML suspension Take 20 mL by mouth 2 (Two) Times a Day for 10 days.    granisetron (Sancuso) 3.1 MG/24HR Place 1 patch on the skin once every 7 days.       PMHx:   Past Medical History:   Diagnosis Date    Gallbladder attack     GERD (gastroesophageal reflux disease)     Hearing aid worn     History of ear infections     Malignant neoplasm of sigmoid colon 3/14/2023    Seasonal allergies     Wears glasses        Past Surgical History:  Low anterior resection    Family History: Noncontributory     Social History:  Noncontributory     Review of Systems   Pertinent items are noted in HPI     Constitutional: Fatigue, malaise   Eyes: Denies visual changes    Cardiovascular: Denies chest pain, palpitations   Pulmonary: Denies cough or shortness of breath   Abdominal/ GI: Abdominal distention, nausea, vomiting   Genitourinary: Denies dysuria or hematuria   Musculoskeletal: Denies any but  "chronic joint aches, pains or deformities   Psychiatric: No recent mood changes   Neurologic: No paresthesias or loss of function         Objective     Physical Exam:      Vital Signs  BP 94/63   Pulse 110   Temp 97.5 °F (36.4 °C) (Axillary)   Resp 12   Ht 170.2 cm (67\")   Wt 64.6 kg (142 lb 6.7 oz)   LMP 06/15/2016   SpO2 98%   BMI 22.31 kg/m²     Intake/Output Summary (Last 24 hours) at 7/2/2024 1542  Last data filed at 7/2/2024 1200  Gross per 24 hour   Intake 903.54 ml   Output --   Net 903.54 ml         Physical Exam:    Head: Normocephalic, atraumatic.   Eyes: Pupils equal, round, react to light   Mouth: No lesions noted  CV: Regular rate and rhythm   Lungs: Bilateral chest rise and fall, no use of accessory muscles   Abdomen: Soft, nontender, distended  Extremities:  No cyanosis, clubbing or edema bilaterally   Neurologic: No gross deficits      Assessment and Plan:    Problem List Items Addressed This Visit    None  Visit Diagnoses       Sepsis without acute organ dysfunction, due to unspecified organism    -  Primary    Urinary tract infection without hematuria, site unspecified        Relevant Medications    cefTRIAXone (ROCEPHIN) 2,000 mg in sodium chloride 0.9 % 100 mL IVPB-VTB (Completed)    piperacillin-tazobactam (ZOSYN) IVPB 4.5 g IVPB in 100 mL NS (VTB) (Completed)    piperacillin-tazobactam (ZOSYN) IVPB 4.5 g IVPB in 100 mL NS (VTB) (Start on 7/2/2024  5:00 PM)    Metastatic colon cancer to liver                 Active Hospital Problems    Diagnosis  POA    **Septic shock due to undetermined organism [A41.9, R65.21]  Yes      Resolved Hospital Problems   No resolved problems to display.      Ms. Carney is a 54-year-old female with history of metastatic colon cancer.  She is admitted with concern for small bowel obstruction secondary to malignant obstruction.  Review of CT scan showed evidence of ascites and peritoneal carcinomatosis.  At this point, symptom management is a primary goal.  She " is not actively vomiting on my evaluation now.  Should she worsen or have significant vomiting, NG tube is recommended.    Michelle Frias MD  07/02/24  15:42 EDT          Electronically signed by Michelle Frias MD at 07/02/24 5826

## 2024-07-03 NOTE — PLAN OF CARE
Problem: Adult Inpatient Plan of Care  Goal: Plan of Care Review  Outcome: Ongoing, Progressing     Pt resting comfortably on RA. VSS on Levo @ 2mcg. Has not been titrated this shift. Family at bedside. Colmenares is still not draining very well. Bladder scan showed over 200mL. Irriagted and pt had 100mL UOP. Urologist assessed catheter this AM and stated it is functioning. Pt states she no longer feels like she needs to urinate. No questions or concerns at this time.

## 2024-07-03 NOTE — PLAN OF CARE
Goal Outcome Evaluation:  Plan of Care Reviewed With: patient        Progress: no change  Outcome Evaluation: Pt AO on RA, VSS, Levo infusing see MAR, Colmenares in place with little urine output hospitalist notified q4 bladder scans done and manual irrigation per hospitalist until urology can see patient in the morning. Family at bedside patient currently resting at this time. Bed in lowest position and call light within reach.      Problem: Skin Injury Risk Increased  Goal: Skin Health and Integrity  Outcome: Ongoing, Progressing  Intervention: Optimize Skin Protection  Recent Flowsheet Documentation  Taken 7/3/2024 0230 by Pepe Matias RN  Pressure Reduction Techniques:   frequent weight shift encouraged   weight shift assistance provided   pressure points protected  Pressure Reduction Devices:   heel offloading device utilized   pressure-redistributing mattress utilized  Skin Protection:   adhesive use limited   incontinence pads utilized   protective footwear used   pulse oximeter probe site changed   transparent dressing maintained   skin-to-skin areas padded   tubing/devices free from skin contact  Taken 7/2/2024 2100 by Pepe Matias, RN  Pressure Reduction Techniques:   frequent weight shift encouraged   weight shift assistance provided   pressure points protected   positioned off wounds  Pressure Reduction Devices:   heel offloading device utilized   pressure-redistributing mattress utilized  Skin Protection:   adhesive use limited   incontinence pads utilized   protective footwear used   pulse oximeter probe site changed   skin-to-skin areas padded   transparent dressing maintained   tubing/devices free from skin contact     Problem: Fall Injury Risk  Goal: Absence of Fall and Fall-Related Injury  Outcome: Ongoing, Progressing  Intervention: Identify and Manage Contributors  Recent Flowsheet Documentation  Taken 7/3/2024 0230 by Pepe Matias, RN  Self-Care Promotion: independence encouraged  Taken  7/3/2024 0100 by Pepe Matias RN  Medication Review/Management: medications reviewed  Taken 7/2/2024 2100 by Pepe Matias RN  Self-Care Promotion: independence encouraged  Intervention: Promote Injury-Free Environment  Recent Flowsheet Documentation  Taken 7/3/2024 0100 by Pepe Matias RN  Safety Promotion/Fall Prevention: safety round/check completed  Taken 7/2/2024 2300 by Pepe Matias RN  Safety Promotion/Fall Prevention: safety round/check completed  Taken 7/2/2024 2100 by Pepe Matias RN  Safety Promotion/Fall Prevention: safety round/check completed  Taken 7/2/2024 1900 by Pepe Matias RN  Safety Promotion/Fall Prevention: safety round/check completed     Problem: Adult Inpatient Plan of Care  Goal: Plan of Care Review  Outcome: Ongoing, Progressing  Flowsheets (Taken 7/3/2024 0410)  Progress: no change  Plan of Care Reviewed With: patient  Outcome Evaluation: Pt AO on RA, VSS, Levo infusing see MAR, Colmenares in place with little urine output hospitalist notified q4 bladder scans done and manual irrigation per hospitalist until urology can see patient in the morning. Family at bedside patient currently resting at this time. Bed in lowest position and call light within reach.  Goal: Patient-Specific Goal (Individualized)  Outcome: Ongoing, Progressing  Goal: Absence of Hospital-Acquired Illness or Injury  Outcome: Ongoing, Progressing  Intervention: Identify and Manage Fall Risk  Recent Flowsheet Documentation  Taken 7/3/2024 0100 by Pepe Matias RN  Safety Promotion/Fall Prevention: safety round/check completed  Taken 7/2/2024 2300 by Pepe Matias RN  Safety Promotion/Fall Prevention: safety round/check completed  Taken 7/2/2024 2100 by Pepe Matias RN  Safety Promotion/Fall Prevention: safety round/check completed  Taken 7/2/2024 1900 by Pepe Matias RN  Safety Promotion/Fall Prevention: safety round/check completed  Intervention: Prevent Skin Injury  Recent  Flowsheet Documentation  Taken 7/3/2024 0230 by Pepe Matias RN  Skin Protection:   adhesive use limited   incontinence pads utilized   protective footwear used   pulse oximeter probe site changed   transparent dressing maintained   skin-to-skin areas padded   tubing/devices free from skin contact  Taken 7/2/2024 2100 by Pepe Matias RN  Skin Protection:   adhesive use limited   incontinence pads utilized   protective footwear used   pulse oximeter probe site changed   skin-to-skin areas padded   transparent dressing maintained   tubing/devices free from skin contact  Intervention: Prevent and Manage VTE (Venous Thromboembolism) Risk  Recent Flowsheet Documentation  Taken 7/3/2024 0230 by Pepe Matias RN  Activity Management: bedrest  VTE Prevention/Management: (See MAR) other (see comments)  Taken 7/2/2024 2100 by Pepe Matias RN  Activity Management: bedrest  VTE Prevention/Management: (See MAR) other (see comments)  Range of Motion: active ROM (range of motion) encouraged  Intervention: Prevent Infection  Recent Flowsheet Documentation  Taken 7/3/2024 0300 by Pepe Matias RN  Infection Prevention:   rest/sleep promoted   single patient room provided  Taken 7/3/2024 0100 by Pepe Matias RN  Infection Prevention:   rest/sleep promoted   single patient room provided  Taken 7/2/2024 2300 by Pepe Matias RN  Infection Prevention:   single patient room provided   rest/sleep promoted  Taken 7/2/2024 2100 by Pepe Matias RN  Infection Prevention:   single patient room provided   rest/sleep promoted  Taken 7/2/2024 1900 by Pepe Matias RN  Infection Prevention:   single patient room provided   rest/sleep promoted  Goal: Optimal Comfort and Wellbeing  Outcome: Ongoing, Progressing  Intervention: Monitor Pain and Promote Comfort  Recent Flowsheet Documentation  Taken 7/3/2024 0230 by Pepe Matias RN  Pain Management Interventions: care clustered  Taken 7/2/2024 2100 by  Pepe Matias, RN  Pain Management Interventions:   care clustered   see MAR  Intervention: Provide Person-Centered Care  Recent Flowsheet Documentation  Taken 7/3/2024 0230 by Pepe Matias RN  Trust Relationship/Rapport:   care explained   choices provided   thoughts/feelings acknowledged   reassurance provided  Taken 7/2/2024 2100 by Pepe Matias, RN  Trust Relationship/Rapport:   care explained   choices provided   thoughts/feelings acknowledged   reassurance provided  Goal: Readiness for Transition of Care  Outcome: Ongoing, Progressing

## 2024-07-04 LAB
ALBUMIN SERPL-MCNC: 1.7 G/DL (ref 3.5–5.2)
ALBUMIN/GLOB SERPL: 0.9 G/DL
ALP SERPL-CCNC: 227 U/L (ref 39–117)
ALT SERPL W P-5'-P-CCNC: 72 U/L (ref 1–33)
ANION GAP SERPL CALCULATED.3IONS-SCNC: 11.3 MMOL/L (ref 5–15)
ANION GAP SERPL CALCULATED.3IONS-SCNC: 8.4 MMOL/L (ref 5–15)
AST SERPL-CCNC: 47 U/L (ref 1–32)
BILIRUB SERPL-MCNC: 2.7 MG/DL (ref 0–1.2)
BUN SERPL-MCNC: 12 MG/DL (ref 6–20)
BUN SERPL-MCNC: 14 MG/DL (ref 6–20)
BUN/CREAT SERPL: 23.7 (ref 7–25)
BUN/CREAT SERPL: 24 (ref 7–25)
CALCIUM SPEC-SCNC: 7 MG/DL (ref 8.6–10.5)
CALCIUM SPEC-SCNC: 7.1 MG/DL (ref 8.6–10.5)
CHLORIDE SERPL-SCNC: 105 MMOL/L (ref 98–107)
CHLORIDE SERPL-SCNC: 107 MMOL/L (ref 98–107)
CO2 SERPL-SCNC: 17.6 MMOL/L (ref 22–29)
CO2 SERPL-SCNC: 17.7 MMOL/L (ref 22–29)
CREAT SERPL-MCNC: 0.5 MG/DL (ref 0.57–1)
CREAT SERPL-MCNC: 0.59 MG/DL (ref 0.57–1)
DEPRECATED RDW RBC AUTO: 51.8 FL (ref 37–54)
DEPRECATED RDW RBC AUTO: 53 FL (ref 37–54)
EGFRCR SERPLBLD CKD-EPI 2021: 107.9 ML/MIN/1.73
EGFRCR SERPLBLD CKD-EPI 2021: 112.3 ML/MIN/1.73
ERYTHROCYTE [DISTWIDTH] IN BLOOD BY AUTOMATED COUNT: 14.2 % (ref 12.3–15.4)
ERYTHROCYTE [DISTWIDTH] IN BLOOD BY AUTOMATED COUNT: 14.4 % (ref 12.3–15.4)
GLOBULIN UR ELPH-MCNC: 2 GM/DL
GLUCOSE BLDC GLUCOMTR-MCNC: 120 MG/DL (ref 70–130)
GLUCOSE BLDC GLUCOMTR-MCNC: 121 MG/DL (ref 70–130)
GLUCOSE BLDC GLUCOMTR-MCNC: 123 MG/DL (ref 70–130)
GLUCOSE BLDC GLUCOMTR-MCNC: 137 MG/DL (ref 70–130)
GLUCOSE SERPL-MCNC: 101 MG/DL (ref 65–99)
GLUCOSE SERPL-MCNC: 126 MG/DL (ref 65–99)
HCT VFR BLD AUTO: 27.7 % (ref 34–46.6)
HCT VFR BLD AUTO: 28.4 % (ref 34–46.6)
HGB BLD-MCNC: 9.1 G/DL (ref 12–15.9)
HGB BLD-MCNC: 9.7 G/DL (ref 12–15.9)
MAGNESIUM SERPL-MCNC: 2.7 MG/DL (ref 1.6–2.6)
MCH RBC QN AUTO: 33.6 PG (ref 26.6–33)
MCH RBC QN AUTO: 34.4 PG (ref 26.6–33)
MCHC RBC AUTO-ENTMCNC: 32.9 G/DL (ref 31.5–35.7)
MCHC RBC AUTO-ENTMCNC: 34.2 G/DL (ref 31.5–35.7)
MCV RBC AUTO: 100.7 FL (ref 79–97)
MCV RBC AUTO: 102.2 FL (ref 79–97)
PLATELET # BLD AUTO: 123 10*3/MM3 (ref 140–450)
PLATELET # BLD AUTO: 134 10*3/MM3 (ref 140–450)
PMV BLD AUTO: 10 FL (ref 6–12)
PMV BLD AUTO: 9.7 FL (ref 6–12)
POTASSIUM SERPL-SCNC: 3.5 MMOL/L (ref 3.5–5.2)
POTASSIUM SERPL-SCNC: 4.4 MMOL/L (ref 3.5–5.2)
PROT SERPL-MCNC: 3.7 G/DL (ref 6–8.5)
RBC # BLD AUTO: 2.71 10*6/MM3 (ref 3.77–5.28)
RBC # BLD AUTO: 2.82 10*6/MM3 (ref 3.77–5.28)
SODIUM SERPL-SCNC: 133 MMOL/L (ref 136–145)
SODIUM SERPL-SCNC: 134 MMOL/L (ref 136–145)
WBC NRBC COR # BLD AUTO: 6.06 10*3/MM3 (ref 3.4–10.8)
WBC NRBC COR # BLD AUTO: 7.04 10*3/MM3 (ref 3.4–10.8)

## 2024-07-04 PROCEDURE — 25010000002 FLUCONAZOLE PER 200 MG: Performed by: STUDENT IN AN ORGANIZED HEALTH CARE EDUCATION/TRAINING PROGRAM

## 2024-07-04 PROCEDURE — 82948 REAGENT STRIP/BLOOD GLUCOSE: CPT

## 2024-07-04 PROCEDURE — 92610 EVALUATE SWALLOWING FUNCTION: CPT

## 2024-07-04 PROCEDURE — 99232 SBSQ HOSP IP/OBS MODERATE 35: CPT | Performed by: STUDENT IN AN ORGANIZED HEALTH CARE EDUCATION/TRAINING PROGRAM

## 2024-07-04 PROCEDURE — 25010000002 ENOXAPARIN PER 10 MG: Performed by: STUDENT IN AN ORGANIZED HEALTH CARE EDUCATION/TRAINING PROGRAM

## 2024-07-04 PROCEDURE — 83735 ASSAY OF MAGNESIUM: CPT | Performed by: STUDENT IN AN ORGANIZED HEALTH CARE EDUCATION/TRAINING PROGRAM

## 2024-07-04 PROCEDURE — 80053 COMPREHEN METABOLIC PANEL: CPT | Performed by: STUDENT IN AN ORGANIZED HEALTH CARE EDUCATION/TRAINING PROGRAM

## 2024-07-04 PROCEDURE — 85027 COMPLETE CBC AUTOMATED: CPT | Performed by: STUDENT IN AN ORGANIZED HEALTH CARE EDUCATION/TRAINING PROGRAM

## 2024-07-04 PROCEDURE — 25010000002 PIPERACILLIN SOD-TAZOBACTAM PER 1 G: Performed by: STUDENT IN AN ORGANIZED HEALTH CARE EDUCATION/TRAINING PROGRAM

## 2024-07-04 PROCEDURE — 25010000002 POTASSIUM CHLORIDE 10 MEQ/100ML SOLUTION: Performed by: STUDENT IN AN ORGANIZED HEALTH CARE EDUCATION/TRAINING PROGRAM

## 2024-07-04 PROCEDURE — 25010000002 ONDANSETRON PER 1 MG: Performed by: STUDENT IN AN ORGANIZED HEALTH CARE EDUCATION/TRAINING PROGRAM

## 2024-07-04 RX ORDER — ONDANSETRON 4 MG/1
4 TABLET, ORALLY DISINTEGRATING ORAL EVERY 6 HOURS PRN
Status: DISCONTINUED | OUTPATIENT
Start: 2024-07-04 | End: 2024-07-13 | Stop reason: HOSPADM

## 2024-07-04 RX ORDER — POTASSIUM CHLORIDE 20 MEQ/1
40 TABLET, EXTENDED RELEASE ORAL EVERY 4 HOURS
Status: DISCONTINUED | OUTPATIENT
Start: 2024-07-04 | End: 2024-07-04 | Stop reason: ALTCHOICE

## 2024-07-04 RX ORDER — FLUCONAZOLE 2 MG/ML
400 INJECTION, SOLUTION INTRAVENOUS ONCE
Status: COMPLETED | OUTPATIENT
Start: 2024-07-04 | End: 2024-07-04

## 2024-07-04 RX ORDER — ONDANSETRON 2 MG/ML
4 INJECTION INTRAMUSCULAR; INTRAVENOUS EVERY 6 HOURS PRN
Status: DISCONTINUED | OUTPATIENT
Start: 2024-07-04 | End: 2024-07-13 | Stop reason: HOSPADM

## 2024-07-04 RX ORDER — POTASSIUM CHLORIDE 7.45 MG/ML
10 INJECTION INTRAVENOUS
Status: COMPLETED | OUTPATIENT
Start: 2024-07-04 | End: 2024-07-04

## 2024-07-04 RX ORDER — FLUCONAZOLE 2 MG/ML
200 INJECTION, SOLUTION INTRAVENOUS EVERY 24 HOURS
Status: DISCONTINUED | OUTPATIENT
Start: 2024-07-05 | End: 2024-07-13 | Stop reason: HOSPADM

## 2024-07-04 RX ADMIN — ENOXAPARIN SODIUM 60 MG: 60 INJECTION SUBCUTANEOUS at 06:27

## 2024-07-04 RX ADMIN — NYSTATIN 1 APPLICATION: 100000 CREAM TOPICAL at 20:50

## 2024-07-04 RX ADMIN — PIPERACILLIN AND TAZOBACTAM 4.5 G: 4; .5 INJECTION, POWDER, FOR SOLUTION INTRAVENOUS at 00:41

## 2024-07-04 RX ADMIN — Medication 0.1 MCG/KG/MIN: at 12:24

## 2024-07-04 RX ADMIN — PIPERACILLIN AND TAZOBACTAM 4.5 G: 4; .5 INJECTION, POWDER, FOR SOLUTION INTRAVENOUS at 18:21

## 2024-07-04 RX ADMIN — PIPERACILLIN AND TAZOBACTAM 4.5 G: 4; .5 INJECTION, POWDER, FOR SOLUTION INTRAVENOUS at 08:45

## 2024-07-04 RX ADMIN — POTASSIUM CHLORIDE 10 MEQ: 7.46 INJECTION, SOLUTION INTRAVENOUS at 12:24

## 2024-07-04 RX ADMIN — Medication 1 APPLICATION: at 20:49

## 2024-07-04 RX ADMIN — Medication 10 ML: at 20:50

## 2024-07-04 RX ADMIN — Medication 1 APPLICATION: at 08:45

## 2024-07-04 RX ADMIN — ENOXAPARIN SODIUM 60 MG: 60 INJECTION SUBCUTANEOUS at 18:20

## 2024-07-04 RX ADMIN — MUPIROCIN 1 APPLICATION: 20 OINTMENT TOPICAL at 08:45

## 2024-07-04 RX ADMIN — Medication 10 ML: at 08:45

## 2024-07-04 RX ADMIN — ANTACID TABLETS 1 TABLET: 500 TABLET, CHEWABLE ORAL at 03:14

## 2024-07-04 RX ADMIN — DEXTROSE AND SODIUM CHLORIDE 100 ML/HR: 5; 450 INJECTION, SOLUTION INTRAVENOUS at 06:27

## 2024-07-04 RX ADMIN — POTASSIUM CHLORIDE 40 MEQ: 1500 TABLET, EXTENDED RELEASE ORAL at 08:44

## 2024-07-04 RX ADMIN — POTASSIUM CHLORIDE 10 MEQ: 7.46 INJECTION, SOLUTION INTRAVENOUS at 14:44

## 2024-07-04 RX ADMIN — DEXTROSE AND SODIUM CHLORIDE 100 ML/HR: 5; 450 INJECTION, SOLUTION INTRAVENOUS at 15:40

## 2024-07-04 RX ADMIN — PANTOPRAZOLE SODIUM 40 MG: 40 TABLET, DELAYED RELEASE ORAL at 08:44

## 2024-07-04 RX ADMIN — POTASSIUM CHLORIDE 10 MEQ: 7.46 INJECTION, SOLUTION INTRAVENOUS at 13:25

## 2024-07-04 RX ADMIN — POTASSIUM CHLORIDE 10 MEQ: 7.46 INJECTION, SOLUTION INTRAVENOUS at 15:37

## 2024-07-04 RX ADMIN — MUPIROCIN 1 APPLICATION: 20 OINTMENT TOPICAL at 20:50

## 2024-07-04 RX ADMIN — FLUCONAZOLE 400 MG: 400 INJECTION, SOLUTION INTRAVENOUS at 12:24

## 2024-07-04 RX ADMIN — Medication 10 ML: at 20:51

## 2024-07-04 RX ADMIN — NYSTATIN 1 APPLICATION: 100000 CREAM TOPICAL at 08:45

## 2024-07-04 RX ADMIN — ONDANSETRON 4 MG: 2 INJECTION INTRAMUSCULAR; INTRAVENOUS at 12:22

## 2024-07-04 NOTE — PLAN OF CARE
Goal Outcome Evaluation:  Plan of Care Reviewed With: patient           Outcome Evaluation: Patient is resting in bed, VSS on RA.  at bedside. Levophed gtt infusing per orders. No acute changes or complaints, will continue plan of care

## 2024-07-04 NOTE — PLAN OF CARE
Goal Outcome Evaluation:              Outcome Evaluation: Pt A+Ox4. VSS, afebrile, on RA. Levophed infusing at 0.2. D5 w 1/2 NS infusing at 100mL/hr. Pt had episode of nausea and vomiting, made NPO except sips with medications due to this. Bed in lowest position, alarm on. Call light within reach. Pt resting at this time and denies any requests.

## 2024-07-04 NOTE — SIGNIFICANT NOTE
07/04/24 1131   OTHER   Discipline physical therapist   Rehab Time/Intention   Session Not Performed unable to evaluate, medical status change

## 2024-07-04 NOTE — PROGRESS NOTES
[facility] HOSPITALIST PROGRESS NOTE     Patient Identification:  Name:  Vangie Carney  Age:  54 y.o.  Sex:  female  :  1970  MRN:  [id number]  Visit Number:  [id number]  ROOM: [address]     Primary Care Provider:  Urvashi Basurto APRN    Length of stay in inpatient status:  2    Subjective     Chief Compliant:    Chief Complaint   Patient presents with    Weakness - Generalized       History of Presenting Illness:    Patient seen and examined this morning with family members present at bedside. Patient said she felt tired, but slightly better than yesterday. She denied any vomiting or abdominal pain. She has not had a bowel movement but reports she has been passing flatus. No acute events noted overnight.     Objective     Current Hospital Meds:[Held by provider] apixaban, 5 mg, Oral, BID  castor oil-balsam peru, 1 Application, Topical, Q12H  [Held by provider] dicyclomine, 10 mg, Oral, 4x Daily AC & at Bedtime  enoxaparin, 1 mg/kg, Subcutaneous, Q12H  granisetron, 1 patch, Transdermal, Weekly  mupirocin, 1 application , Each Nare, BID  nystatin, 5 mL, Swish & Swallow, 4x Daily  nystatin, 1 Application, Topical, BID  [Held by provider] pantoprazole, 40 mg, Oral, QAM AC  piperacillin-tazobactam, 4.5 g, Intravenous, Q8H  sodium chloride, 10 mL, Intravenous, Q12H  sodium chloride, 10 mL, Intravenous, Q12H  sodium chloride, 10 mL, Intravenous, Q12H  sodium chloride, 10 mL, Intravenous, Q12H    dextrose 5 % and sodium chloride 0.45 %, 100 mL/hr, Last Rate: 125 mL/hr (24 0930)  norepinephrine, 0.02-0.3 mcg/kg/min (Dosing Weight), Last Rate: 0.2 mcg/kg/min (24)        Current Antimicrobial Therapy:  Anti-Infectives (From admission, onward)      Ordered     Dose/Rate Route Frequency Start Stop    24 0940  piperacillin-tazobactam (ZOSYN) IVPB 4.5 g IVPB in 100 mL NS (VTB)        Ordering Provider: Yeimi Allen DO    4.5 g  over 4 Hours Intravenous Every 8 Hours 24  1700 07/09/24 1659    07/02/24 0940  piperacillin-tazobactam (ZOSYN) IVPB 4.5 g IVPB in 100 mL NS (VTB)        Ordering Provider: Yeimi Allen DO    4.5 g  over 30 Minutes Intravenous Once 07/02/24 1030 07/02/24 1052    07/01/24 1758  cefTRIAXone (ROCEPHIN) 2,000 mg in sodium chloride 0.9 % 100 mL IVPB-VTB        Ordering Provider: Pranay Villaseñor MD    2,000 mg  200 mL/hr over 30 Minutes Intravenous Once 07/01/24 1814 07/01/24 1907          Current Diuretic Therapy:  Diuretics (From admission, onward)      None          ----------------------------------------------------------------------------------------------------------------------  Vital Signs:  Temp:  [97.3 °F (36.3 °C)-98.7 °F (37.1 °C)] 98.7 °F (37.1 °C)  Heart Rate:  [] 101  Resp:  [11-17] 14  BP: ()/(55-93) 93/74  SpO2:  [91 %-100 %] 100 %  on   ;   Device (Oxygen Therapy): room air  Body mass index is 22.96 kg/m².    Wt Readings from Last 3 Encounters:   07/03/24 66.5 kg (146 lb 9.7 oz)   06/26/24 56.7 kg (125 lb)   06/26/24 56.7 kg (125 lb)     Intake & Output (last 3 days)         07/01 0701  07/02 0700 07/02 0701 07/03 0700 07/03 0701  07/04 0700    P.O.  0     I.V. (mL/kg) 803.5 (12.4) 3732.5 (56.1) 1451 (25.6)    Other   60    IV Piggyback  100     Total Intake(mL/kg) 803.5 (12.4) 3832.5 (57.6) 1511 (26.6)    Urine (mL/kg/hr)  525 (0.3) 270 (0.3)    Stool  0     Total Output  525 270    Net +803.5 +3307.5 +1241           Urine Unmeasured Occurrence 1 x      Stool Unmeasured Occurrence  0 x           Diet: Regular/House; Feeding Assistance - Nursing; Texture: Soft to Chew (NDD 3); Soft to Chew: Ground Meat; Fluid Consistency: Thin (IDDSI 0)  ----------------------------------------------------------------------------------------------------------------------  Physical exam:     Constitutional:  Well-developed and well-nourished. Appears acutely and chronically ill. No acute distress.      HENT:  Head:  Normocephalic and atraumatic.     Cardiovascular:  Normal rate, regular rhythm, no murmur  Pulmonary/Chest:  No respiratory distress, breath sounds clear to auscultation in anterior lung fields bilaterally  Abdominal:  Soft. Abdomen is mildly distended, but better than previous exam. Mild RUQ tenderness to palpation without rigidity or guarding.  Musculoskeletal:  No tenderness, and no deformity.     Neurological: Sleeping, but wakes to verbal stimuli and responds to questions appropriately. No focal deficits noted on gross examination.    Skin:  Skin is warm and dry. Appears slightly jaundiced.  Peripheral vascular:  No cyanosis. Trace upper extremity edema bilaterally, 1-2+ lower extremity edema bilaterally.  Psychiatric: Appropriate mood, flattened affect   Edited by: Yeimi Allen DO at 7/3/2024 2149  ----------------------------------------------------------------------------------------------------------------------  Results from last 7 days   Lab Units 07/03/24  0315 07/02/24  0029 07/01/24  2113 07/01/24  1818 07/01/24  1125   LACTATE mmol/L  --  2.0 3.3* 4.9*  --    WBC 10*3/mm3 9.25 11.57*  --   --  8.31   HEMOGLOBIN g/dL 9.1* 9.6*  --   --  9.8*   HEMATOCRIT % 26.2* 27.8*  --   --  28.7*   MCV fL 98.5* 101.8*  --   --  101.4*   MCHC g/dL 34.7 34.5  --   --  34.1   PLATELETS 10*3/mm3 135* 173  --   --  155         Results from last 7 days   Lab Units 07/03/24  1546 07/03/24  0358 07/02/24  0029 07/01/24  1125   SODIUM mmol/L  --  133* 133* 133*   POTASSIUM mmol/L 3.7 3.5 3.8 3.9   MAGNESIUM mg/dL  --  1.5*  --  1.8   CHLORIDE mmol/L  --  104 103 99   CO2 mmol/L  --  17.9* 16.8* 20.6*   BUN mg/dL  --  19 22* 22*   CREATININE mg/dL  --  0.98 1.38* 1.40*   CALCIUM mg/dL  --  7.3* 7.2* 7.9*   GLUCOSE mg/dL  --  98 75 22*   ALBUMIN g/dL  --   --  1.5* 1.8*   BILIRUBIN mg/dL  --   --  1.5* 1.7*  1.8*   ALK PHOS U/L  --   --  237* 249*   AST (SGOT) U/L  --   --  78* 77*   ALT (SGPT) U/L  --   --  67* 65*   Estimated Creatinine Clearance:  "69.7 mL/min (by C-G formula based on SCr of 0.98 mg/dL).  No results found for: \"AMMONIA\"  Results from last 7 days   Lab Units 07/01/24  1125   HSTROP T ng/L 12             Glucose   Date/Time Value Ref Range Status   07/03/2024 1751 145 (H) 70 - 130 mg/dL Final   07/03/2024 1220 105 70 - 130 mg/dL Final   07/03/2024 0603 131 (H) 70 - 130 mg/dL Final   07/03/2024 0045 116 70 - 130 mg/dL Final   07/02/2024 1559 135 (H) 70 - 130 mg/dL Final   07/02/2024 1306 143 (H) 70 - 130 mg/dL Final   07/02/2024 1053 130 70 - 130 mg/dL Final   07/02/2024 0757 146 (H) 70 - 130 mg/dL Final     No results found for: \"TSH\", \"FREET4\"  Lab Results   Component Value Date    PREGTESTUR Negative 05/15/2015     Pain Management Panel           No data to display              Brief Urine Lab Results  (Last result in the past 365 days)        Color   Clarity   Blood   Leuk Est   Nitrite   Protein   CREAT   Urine HCG        07/01/24 1614 [place]   Cloudy   Trace   Small (1+)   Positive   30 mg/dL (1+)                 Blood Culture   Date Value Ref Range Status   07/01/2024 No growth at 2 days  Preliminary   07/01/2024 No growth at 2 days  Preliminary     Urine Culture   Date Value Ref Range Status   07/01/2024 50,000 CFU/mL Escherichia coli (A)  Final     No results found for: \"WOUNDCX\"  No results found for: \"STOOLCX\"  No results found for: \"RESPCX\"  No results found for: \"AFBCX\"  Results from last 7 days   Lab Units 07/02/24  0029 07/01/24  2113 07/01/24  1818   LACTATE mmol/L 2.0 3.3* 4.9*       I have personally looked at the labs and they are summarized above.  ----------------------------------------------------------------------------------------------------------------------  Detailed radiology reports for the last 24 hours:  Imaging Results (Last 24 Hours)       ** No results found for the last 24 hours. **          Assessment & Plan    #Septic shock secondary to UTI  #Mild hyperbilirubinemia  #Hypoglycemia  #Transaminitis  #Metastatic " "colorectal cancer on palliative chemotherapy  #Acute kidney injury with oliguria, likely secondary to ATN from septic shock  #Small bowel obstruction, likely related to peritoneal metastases  - Still requiring levophed, but have been able to wean this gradually. Continue vasopressors to keep MAP >65.  - Antibiotics escalated from ceftriaxone to zosyn after admission to cover for intraabdominal infection in the setting of small bowel obstruction and UTI.   - General Surgery consulted, appreciate assistance. Patient is not currently vomiting. She has not had a bowel movement since admission but is passing flatus. Diet started today after SLP evaluation, but patient counseled to start slow with oral intake. If she has recurrence of nausea and vomiting then we will make her NPO again and likely place an NG tube for decompression.   - Hypoglycemia has resolved, but she continues to have poor PO intake and borderline low blood sugars intermittently. Continue D5 1/2NS but decrease rate to 100cc/hr due to mild hyponatremia (which is stable).  - Oncology and Palliative Care consulted, appreciate assistance. Goals of care have been discussed at length with patient and spouse. Patient has been very clear that she wishes to be DNR/DNI and spouse has been in agreement, but then later stated that if condition deteriorated that he would want us to \"do everything.\" She remains critically ill and has poor long term prognosis with known metastatic malignancy and recurrent bowel obstructions. Small amount of improvement noted today. Will continue to discuss clinical condition and prognosis with patient and family.  - ALISE has improved with creatinine down to 0.98. Continue supportive care with IV fluids, avoid nephrotoxins, and treat urinary retention.   - Colmenares catheter was placed due to patient needing straight catheterization multiple times. Nursing staff reported patient had discomfort and feeling of \"pressure\" in abdomen after " catheter placement, and although it was draining and flushing well bladder scan appeared to show a distended bladder. Urology consult requested, appreciate assistance. Urology has evaluated and catheter appears to be working well.  - Transaminitis stable on previous labs. Possibly due to combination of NOLEN and septic shock. Fatty infiltration of the liver noted on CT.   - BMP in a.m. Monitor LFTs q24-48 hours    #Pulmonary embolism, currently on therapeutic anticoagulation  #Acute on chronic anemia  - Holding home eliquis due to difficulty swallowing.  - H+H stable today and no reports of active bleeding. Lovenox has been ordered in place of home eliquis for now.  - CBC in a.m.    #Mouth pain  #Difficulty swallowing  - No visible oral candidiasis on physical exam, but concern for esophageal candidiasis per SLP evaluation. I have ordered magic mouthwash q6h prn. Also ordered nystatin swish and swallow 4x daily. Appreciate SLP assistance. Soft to chew diet has been ordered. Patient and family counseled that she should start slow with oral intake to make sure that nausea/vomiting do not return with this in the setting of bowel obstruction as discussed above.  Edited by: Yeimi Allen DO at 7/3/2024 2149    Active VTE Prophylaxis  Pharmacologic:        Start     Dose Route Frequency Stop    07/02/24 2100  [Held by provider]  apixaban (ELIQUIS) tablet 5 mg        (On hold since yesterday at 1717 until manually unheld; held by Yeimi Allen DOHold Reason: NPO)   On hold since yesterday at 1717 until manually unheld   Hold reason: NPO    5 mg PO 2 Times Daily --    07/02/24 1815  Enoxaparin Sodium (LOVENOX) syringe 60 mg         1 mg/kg SC Every 12 Hours --                  Mechanical:        Start        07/01/24 1946  Maintain Sequential Compression Device  Continuous                              Dispo: pending clinical progress    Yeimi Allen DO  [facility]  07/03/24  21:49 EDT

## 2024-07-04 NOTE — PROGRESS NOTES
[facility] HOSPITALIST PROGRESS NOTE     Patient Identification:  Name:  Vangie Carney  Age:  54 y.o.  Sex:  female  :  1970  MRN:  [id number]  Visit Number:  [id number]  ROOM: [address]     Primary Care Provider:  Urvashi Basurto APRN    Length of stay in inpatient status:  3    Subjective     Chief Compliant:    Chief Complaint   Patient presents with    Weakness - Generalized       History of Presenting Illness:    Patient seen and examined this morning with her  and a.m. nurse present at bedside. Patient reports nausea and vomited up the water that she used to take her potassium supplement this morning. She has not had any other episodes of vomiting. She still has not had a bowel movement but is passing flatus.    Objective     Current Hospital Meds:[Held by provider] apixaban, 5 mg, Oral, BID  castor oil-balsam peru, 1 Application, Topical, Q12H  [Held by provider] dicyclomine, 10 mg, Oral, 4x Daily AC & at Bedtime  enoxaparin, 1 mg/kg, Subcutaneous, Q12H  granisetron, 1 patch, Transdermal, Weekly  mupirocin, 1 application , Each Nare, BID  nystatin, 5 mL, Swish & Swallow, 4x Daily  nystatin, 1 Application, Topical, BID  pantoprazole, 40 mg, Oral, QAM AC  piperacillin-tazobactam, 4.5 g, Intravenous, Q8H  potassium chloride ER, 40 mEq, Oral, Q4H  sodium chloride, 10 mL, Intravenous, Q12H  sodium chloride, 10 mL, Intravenous, Q12H  sodium chloride, 10 mL, Intravenous, Q12H  sodium chloride, 10 mL, Intravenous, Q12H    dextrose 5 % and sodium chloride 0.45 %, 100 mL/hr, Last Rate: 100 mL/hr (24 0627)  norepinephrine, 0.02-0.3 mcg/kg/min (Dosing Weight), Last Rate: 0.02 mcg/kg/min (24 1000)        Current Antimicrobial Therapy:  Anti-Infectives (From admission, onward)      Ordered     Dose/Rate Route Frequency Start Stop    24 0940  piperacillin-tazobactam (ZOSYN) IVPB 4.5 g IVPB in 100 mL NS (VTB)        Ordering Provider: Yeimi Allen DO    4.5 g  over 4  Hours Intravenous Every 8 Hours 07/02/24 1700 07/09/24 1659    07/02/24 0940  piperacillin-tazobactam (ZOSYN) IVPB 4.5 g IVPB in 100 mL NS (VTB)        Ordering Provider: Yeimi Allen DO    4.5 g  over 30 Minutes Intravenous Once 07/02/24 1030 07/02/24 1052    07/01/24 1758  cefTRIAXone (ROCEPHIN) 2,000 mg in sodium chloride 0.9 % 100 mL IVPB-VTB        Ordering Provider: Pranay Villaseñor MD    2,000 mg  200 mL/hr over 30 Minutes Intravenous Once 07/01/24 1814 07/01/24 1907          Current Diuretic Therapy:  Diuretics (From admission, onward)      None          ----------------------------------------------------------------------------------------------------------------------  Vital Signs:  Temp:  [97.3 °F (36.3 °C)-98.7 °F (37.1 °C)] 97.5 °F (36.4 °C)  Heart Rate:  [] 85  Resp:  [11-17] 12  BP: ()/(35-93) 88/65  SpO2:  [95 %-100 %] 99 %  on   ;   Device (Oxygen Therapy): room air  Body mass index is 22.96 kg/m².    Wt Readings from Last 3 Encounters:   07/04/24 66.5 kg (146 lb 9.7 oz)   06/26/24 56.7 kg (125 lb)   06/26/24 56.7 kg (125 lb)     Intake & Output (last 3 days)         07/01 0701  07/02 0700 07/02 0701  07/03 0700 07/03 0701  07/04 0700 07/04 0701  07/05 0700    P.O.  0  120    I.V. (mL/kg) 803.5 (12.4) 3732.5 (56.1) 2385.7 (42.1)     Other   60     IV Piggyback  100      Total Intake(mL/kg) 803.5 (12.4) 3832.5 (57.6) 2445.7 (43.1) 120 (2.1)    Urine (mL/kg/hr)  525 (0.3) 420 (0.3)     Stool  0      Total Output  525 420     Net +803.5 +3307.5 +2025.7 +120            Urine Unmeasured Occurrence 1 x       Stool Unmeasured Occurrence  0 x            Diet: Regular/House; Feeding Assistance - Nursing; Texture: Soft to Chew (NDD 3); Soft to Chew: Ground Meat; Fluid Consistency: Thin (IDDSI 0)  ----------------------------------------------------------------------------------------------------------------------  Physical exam:   Constitutional:  Well-developed and well-nourished. Appears  acutely and chronically ill. Appears tired. No acute distress.      HENT:  Head:  Normocephalic and atraumatic.    Cardiovascular:  Normal rate, regular rhythm, no murmur  Pulmonary/Chest:  No respiratory distress, breath sounds clear to auscultation in anterior lung fields bilaterally  Abdominal:  Soft. Abdomen is moderately distended, with mild RUQ tenderness to palpation. Normal bowel sounds present.  Musculoskeletal:  No tenderness, and no deformity.     Neurological: Awake, alert, no focal deficit on gross examination. No slurred speech or facial droop.    Skin:  Skin is warm and dry. Appears slightly jaundiced.  Peripheral vascular:  No cyanosis. Trace to 1+ upper extremity edema bilaterally, 2+ lower extremity edema bilaterally.  Psychiatric: Appropriate mood, flattened affect   Edited by: Yeimi Allen DO at 7/4/2024 1053  ----------------------------------------------------------------------------------------------------------------------  Results from last 7 days   Lab Units 07/04/24  0644 07/03/24  0315 07/02/24  0029 07/01/24  2113 07/01/24  1818   LACTATE mmol/L  --   --  2.0 3.3* 4.9*   WBC 10*3/mm3 7.04 9.25 11.57*  --   --    HEMOGLOBIN g/dL 9.7* 9.1* 9.6*  --   --    HEMATOCRIT % 28.4* 26.2* 27.8*  --   --    MCV fL 100.7* 98.5* 101.8*  --   --    MCHC g/dL 34.2 34.7 34.5  --   --    PLATELETS 10*3/mm3 134* 135* 173  --   --          Results from last 7 days   Lab Units 07/04/24  0644 07/03/24  1546 07/03/24  0358 07/02/24  0029 07/01/24  1125   SODIUM mmol/L 134*  --  133* 133* 133*   POTASSIUM mmol/L 3.5 3.7 3.5 3.8 3.9   MAGNESIUM mg/dL 2.7*  --  1.5*  --  1.8   CHLORIDE mmol/L 105  --  104 103 99   CO2 mmol/L 17.7*  --  17.9* 16.8* 20.6*   BUN mg/dL 14  --  19 22* 22*   CREATININE mg/dL 0.59  --  0.98 1.38* 1.40*   CALCIUM mg/dL 7.1*  --  7.3* 7.2* 7.9*   GLUCOSE mg/dL 126*  --  98 75 22*   ALBUMIN g/dL 1.7*  --   --  1.5* 1.8*   BILIRUBIN mg/dL 2.7*  --   --  1.5* 1.7*  1.8*   ALK PHOS U/L  "227*  --   --  237* 249*   AST (SGOT) U/L 47*  --   --  78* 77*   ALT (SGPT) U/L 72*  --   --  67* 65*   Estimated Creatinine Clearance: 115.8 mL/min (by C-G formula based on SCr of 0.59 mg/dL).  No results found for: \"AMMONIA\"  Results from last 7 days   Lab Units 07/01/24  1125   HSTROP T ng/L 12             Glucose   Date/Time Value Ref Range Status   07/04/2024 0629 120 70 - 130 mg/dL Final   07/04/2024 0044 121 70 - 130 mg/dL Final   07/03/2024 1751 145 (H) 70 - 130 mg/dL Final   07/03/2024 1220 105 70 - 130 mg/dL Final   07/03/2024 0603 131 (H) 70 - 130 mg/dL Final   07/03/2024 0045 116 70 - 130 mg/dL Final   07/02/2024 1559 135 (H) 70 - 130 mg/dL Final   07/02/2024 1306 143 (H) 70 - 130 mg/dL Final     No results found for: \"TSH\", \"FREET4\"  Lab Results   Component Value Date    PREGTESTUR Negative 05/15/2015     Pain Management Panel           No data to display              Brief Urine Lab Results  (Last result in the past 365 days)        Color   Clarity   Blood   Leuk Est   Nitrite   Protein   CREAT   Urine HCG        07/01/24 1614 [place]   Cloudy   Trace   Small (1+)   Positive   30 mg/dL (1+)                 Blood Culture   Date Value Ref Range Status   07/01/2024 No growth at 2 days  Preliminary   07/01/2024 No growth at 2 days  Preliminary     Urine Culture   Date Value Ref Range Status   07/01/2024 50,000 CFU/mL Escherichia coli (A)  Final     No results found for: \"WOUNDCX\"  No results found for: \"STOOLCX\"  No results found for: \"RESPCX\"  No results found for: \"AFBCX\"  Results from last 7 days   Lab Units 07/02/24  0029 07/01/24  2113 07/01/24  1818   LACTATE mmol/L 2.0 3.3* 4.9*       I have personally looked at the labs and they are summarized above.  ----------------------------------------------------------------------------------------------------------------------  Detailed radiology reports for the last 24 hours:  Imaging Results (Last 24 Hours)       ** No results found for the last 24 hours. " "**          Assessment & Plan    #Septic shock secondary to UTI  #Mild hyperbilirubinemia  #Hypoglycemia  #Transaminitis  #Metastatic colorectal cancer on palliative chemotherapy  #Acute kidney injury with oliguria, likely secondary to ATN from septic shock  #Small bowel obstruction, likely related to peritoneal metastases  - Weaned off levophed this morning. Continue to monitor BP closely and restart vasopressors if needed to keep MAP >65.  - Antibiotics escalated from ceftriaxone to zosyn after admission to cover for intraabdominal infection in the setting of small bowel obstruction and UTI. Urine culture has grown E coli resistant to ampicillin, levaquin, and bactrim. Culture was sensitive to cephalosporins and zosyn. Blood cultures are negative for growth at 2 days. Deescalate antibiotics back to ceftriaxone and plan for a likely 7 day course, pending clinical progress.   - General Surgery consulted, appreciate assistance. She has not had a bowel movement since admission but is passing flatus. Diet started yesterday after SLP evaluation, and patient was counseled to start slow with oral intake. Unfortunately she had an episode of vomiting this morning. It seemed to be only water that she drank to take her potassium supplement, so we will hold off on NG tube placement for right now and keep her NPO. If symptoms have not improved by the afternoon then NG tube placement is likely warranted.  - Hypoglycemia has resolved, but she continues to have poor PO intake and borderline low blood sugars intermittently. Continue D5 1/2NS at 100cc/hr. Hyponatremia is improving with sodium level 134 today.  - Oncology and Palliative Care consulted, appreciate assistance. Goals of care have been discussed at length with patient and spouse. Patient has been very clear that she wishes to be DNR/DNI and spouse has been in agreement, but then later stated that if condition deteriorated that he would want us to \"do everything.\" She " "remains critically ill and has poor long term prognosis with known metastatic malignancy and recurrent bowel obstructions. Will continue to discuss clinical condition and prognosis with patient and family.  - ALISE has resolved with creatinine now down to 0.59. Continue supportive care, avoid nephrotoxins, and treat urinary retention.   - Colmenares catheter was placed due to patient needing straight catheterization multiple times. Nursing staff reported patient had discomfort and feeling of \"pressure\" in abdomen after catheter placement, and although it was draining and flushing well bladder scan appeared to show a distended bladder. Urology consult requested, appreciate assistance. Urology has evaluated and catheter now appears to be working well.  - Transaminitis is relatively stable. Alk phos has decreased from 249-->227, AST decreased from 77-->47. ALT increased slightly from 65 to 72 and bilirubin has increased from 1.7 to 2.7. Suspect this is due to combination of NOLEN and septic shock. Fatty infiltration of the liver noted on CT.   - BMP in a.m. Monitor LFTs q24-48 hours    #Pulmonary embolism, currently on therapeutic anticoagulation  #Acute on chronic anemia  - Holding home eliquis due to difficulty swallowing and bowel obstruction.  - H+H stable and no reports of active bleeding. Lovenox has been ordered in place of home eliquis for now.  - CBC in a.m.    #Mouth pain  #Difficulty swallowing  #Suspected esophageal candidiasis  - No visible oral candidiasis on physical exam, but concern for esophageal candidiasis per SLP evaluation. I have ordered magic mouthwash q6h prn. Nystatin swish and swallow was started yesterday, but patient is not able to tolerate it this morning due to nausea. I will order IV fluconazole instead for suspected esophageal candidiasis.  Edited by: Yeimi Allen DO at 7/4/2024 1053    Active VTE Prophylaxis  Pharmacologic:        Start     Dose Route Frequency Stop    07/02/24 2100  [Held " by provider]  apixaban (ELIQUIS) tablet 5 mg        (On hold since Tue 7/2/2024 at 1717 until manually unheld; held by Yeimi Allen DOHold Reason: NPO)   On hold since Tue 7/2/2024 at 1717 until manually unheld   Hold reason: NPO    5 mg PO 2 Times Daily --    07/02/24 1815  Enoxaparin Sodium (LOVENOX) syringe 60 mg         1 mg/kg SC Every 12 Hours --                  Mechanical:        Start        07/01/24 1946  Maintain Sequential Compression Device  Continuous                              Dispo: pending clinical progress, prognosis guarded    Yeimi Allen DO  [facility]  07/04/24  10:54 EDT

## 2024-07-04 NOTE — THERAPY RE-EVALUATION
"Acute Care - Speech Language Pathology   Swallow Re-Assessment [facility]     Patient Name: Vangie Carney  : 1970  MRN: [id number]  Today's Date: 2024               Admit Date: 2024  Vangie Carney  was seen at bedside this am on PCU-220 to assess safety/efficacy of swallowing fnx, determine safest/least restrictive diet tolerance. She has a PMH significant for metastatic colorectal adenocarcinoma s/p LAR currently receiving palliative chemotherapy, pulmonary embolism on therapeutic anticoagulation, and gastric dysmotility. Ms Carney is unfamiliar to SLP department of [facility] prior to this consultation.      Ms Carney presented to [facility] ED w/ worsening fatigue/weakness over the past three days as well as nausea and vomiting. She additionally reported dysuria for ht epast four days. She had been prescribed bactrim on 24 however had been unable to take the pills due to difficulty swallowing them related to their size. She reported she had not been eating or drinking well. Per EMR review, she reported to oncology MD a burning sensation in her mouth and throat which she stated prevented her from eating at home. Her spouse reported that she had magic mouthwash at home but that she did not like to use it. She reportedly was agreeable to try it again at this time.      She is s/p clinical dysphagia assessment on 7/3/24 at which time she was evidenced w/ the following:  \"an overall mild to moderately weak but wfl oropharyngeal swallow felt to be related to current fatigue and limited endurance. She is felt to most benefit from po diet initiation of mechanical soft solids, ground meats, and thin liquids w/ medications whole in puree at this time, single pill presentations and break PRN pending size/coating. She will benefit from feeding assistance\".     She is re-assessed this date for diet safety/tolerance of current textures.     Social History     Socioeconomic History    Marital status:    Tobacco " Use    Smoking status: Former     Current packs/day: 0.00     Average packs/day: 1 pack/day for 30.0 years (30.0 ttl pk-yrs)     Types: Cigarettes     Start date:      Quit date: 2017     Years since quittin.5     Passive exposure: Never    Smokeless tobacco: Never   Vaping Use    Vaping status: Every Day    Substances: Nicotine, Flavoring    Devices: Refillable tank   Substance and Sexual Activity    Alcohol use: Never    Drug use: Defer    Sexual activity: Defer     Birth control/protection: None     Diet Orders (active) (From admission, onward)       Start     Ordered    24 1052  Diet: Regular/House; Feeding Assistance - Nursing; Texture: Soft to Chew (NDD 3); Soft to Chew: Ground Meat; Fluid Consistency: Thin (IDDSI 0)  Diet Effective Now        Comments: Medications whole in puree, single pill presentations. Break PRN pending size/coating. Crush PRN.    24 1053                  She is currently observed on room air w/o complications across this assessment.     Upon SLP entry, Ms Carney is reclined in bed w/ family member present and attentive at bedside. Ms Carney reports some nausea this am. Reports she has been drinking water w/ small sips. She continues to require assistance w/ feeding.     Upon po presentations, adequate bolus anticipation and acceptance w/ good labial seal for bolus clearance for suction via straw. No overt s/s aspiration before the swallow.      Pharyngeal swallow was timely w/ adequate hyolaryngeal elevation per palpation. No s/s concerning for aspiration of these consistencies.     Visit Dx:     ICD-10-CM ICD-9-CM   1. Sepsis without acute organ dysfunction, due to unspecified organism  A41.9 038.9     995.91   2. Urinary tract infection without hematuria, site unspecified  N39.0 599.0   3. Metastatic colon cancer to liver  C18.9 153.9    C78.7 197.7     Patient Active Problem List   Diagnosis    Chronic idiopathic constipation    Encounter for screening for malignant  neoplasm of colon    Malignant neoplasm of sigmoid colon    GERD without esophagitis    S/P colectomy( robotic lap LAR)    Acute postoperative pain    Iron deficiency anemia    Malabsorption of iron    Port-A-Cath in place    SBO (small bowel obstruction)    Septic shock due to undetermined organism     Past Medical History:   Diagnosis Date    Gallbladder attack     GERD (gastroesophageal reflux disease)     Hearing aid worn     History of ear infections     Malignant neoplasm of sigmoid colon 3/14/2023    Seasonal allergies     Wears glasses      Past Surgical History:   Procedure Laterality Date    CHOLECYSTECTOMY      COLON RESECTION N/A 3/14/2023    Procedure: COLON RESECTION LOW ANTERIOR LAPAROSCOPIC WITH DAVINCI ROBOT;  Surgeon: Shari Aguirre MD;  Location:  [facility];  Service: Robotics - DaVinci;  Laterality: N/A;    COLONOSCOPY N/A 2/15/2023    Procedure: COLONOSCOPY FOR SCREENING;  Surgeon: Giselle Iniguez MD;  Location:  [facility];  Service: Gastroenterology;  Laterality: N/A;    ENDOSCOPY N/A 2/15/2023    Procedure: ESOPHAGOGASTRODUODENOSCOPY WITH BIOPSY;  Surgeon: Giselle Iniguez MD;  Location:  [facility];  Service: Gastroenterology;  Laterality: N/A;    LAPAROSCOPIC TUBAL LIGATION Bilateral     MIDDLE EAR SURGERY      PORTACATH PLACEMENT N/A 2/14/2024    Procedure: INSERTION OF PORTACATH;  Surgeon: Jaylen Leal MD;  Location: [facility] OR;  Service: General;  Laterality: N/A;     Impression:     Ms Carney presented w/ a generally weak but otherwise wfl oropharygeal swallow w/o s/s aspiration evidenced. She is felt to most benefit from continuation of po diet w/ thin liquids. Diet textures deferred to MD.    SLP Recommendation and Plan     1. Po diet to include thin liquids.    2. Medications per MD.    3. Upright and centered for all po intake  4. LUCRECIA precautions.  5. Oral care protocol.    No further formal SLP f/u warranted/recommended at this time.    D/w patient results and  recommendations w/ verbal agreement.    D/w RN results and recommendations w/ verbal agreement.    Thank you for allowing me to participate in the care of your patient-  Chelsea Brown M.S., CCC-SLP        EDUCATION  The patient has been educated in the following areas:   Dysphagia (Swallowing Impairment) Oral Care/Hydration.        Time Calculation:       Therapy Charges for Today       Code Description Service Date Service Provider Modifiers Qty    [id number] HC ST EVAL ORAL PHARYNG SWALLOW 4 7/3/2024 Chelsea Brown, MS CCC-SLP GN 1                 Chelsea Brown MS CCC-SLP  7/4/2024

## 2024-07-05 LAB
CA-I SERPL ISE-MCNC: 0.96 MMOL/L (ref 1.12–1.32)
GLUCOSE BLDC GLUCOMTR-MCNC: 104 MG/DL (ref 70–130)
GLUCOSE BLDC GLUCOMTR-MCNC: 121 MG/DL (ref 70–130)
GLUCOSE BLDC GLUCOMTR-MCNC: 125 MG/DL (ref 70–130)
GLUCOSE BLDC GLUCOMTR-MCNC: 126 MG/DL (ref 70–130)
GLUCOSE BLDC GLUCOMTR-MCNC: 96 MG/DL (ref 70–130)

## 2024-07-05 PROCEDURE — 25010000002 PIPERACILLIN SOD-TAZOBACTAM PER 1 G: Performed by: STUDENT IN AN ORGANIZED HEALTH CARE EDUCATION/TRAINING PROGRAM

## 2024-07-05 PROCEDURE — 99232 SBSQ HOSP IP/OBS MODERATE 35: CPT | Performed by: STUDENT IN AN ORGANIZED HEALTH CARE EDUCATION/TRAINING PROGRAM

## 2024-07-05 PROCEDURE — 25010000002 FLUCONAZOLE PER 200 MG: Performed by: STUDENT IN AN ORGANIZED HEALTH CARE EDUCATION/TRAINING PROGRAM

## 2024-07-05 PROCEDURE — 82330 ASSAY OF CALCIUM: CPT | Performed by: STUDENT IN AN ORGANIZED HEALTH CARE EDUCATION/TRAINING PROGRAM

## 2024-07-05 PROCEDURE — 25010000002 ENOXAPARIN PER 10 MG: Performed by: STUDENT IN AN ORGANIZED HEALTH CARE EDUCATION/TRAINING PROGRAM

## 2024-07-05 PROCEDURE — 99232 SBSQ HOSP IP/OBS MODERATE 35: CPT | Performed by: INTERNAL MEDICINE

## 2024-07-05 PROCEDURE — 82948 REAGENT STRIP/BLOOD GLUCOSE: CPT

## 2024-07-05 RX ORDER — BISACODYL 5 MG/1
5 TABLET, DELAYED RELEASE ORAL DAILY PRN
Status: DISCONTINUED | OUTPATIENT
Start: 2024-07-05 | End: 2024-07-07

## 2024-07-05 RX ORDER — POLYETHYLENE GLYCOL 3350 17 G/17G
17 POWDER, FOR SOLUTION ORAL DAILY
Status: DISCONTINUED | OUTPATIENT
Start: 2024-07-06 | End: 2024-07-07

## 2024-07-05 RX ORDER — BISACODYL 10 MG
10 SUPPOSITORY, RECTAL RECTAL DAILY PRN
Status: DISCONTINUED | OUTPATIENT
Start: 2024-07-05 | End: 2024-07-07

## 2024-07-05 RX ORDER — AMOXICILLIN 250 MG
2 CAPSULE ORAL 2 TIMES DAILY PRN
Status: DISCONTINUED | OUTPATIENT
Start: 2024-07-05 | End: 2024-07-07

## 2024-07-05 RX ORDER — PANTOPRAZOLE SODIUM 40 MG/10ML
40 INJECTION, POWDER, LYOPHILIZED, FOR SOLUTION INTRAVENOUS
Status: DISCONTINUED | OUTPATIENT
Start: 2024-07-05 | End: 2024-07-10

## 2024-07-05 RX ADMIN — Medication 10 ML: at 20:51

## 2024-07-05 RX ADMIN — Medication 0.22 MCG/KG/MIN: at 12:57

## 2024-07-05 RX ADMIN — FLUCONAZOLE 200 MG: 2 INJECTION, SOLUTION INTRAVENOUS at 11:50

## 2024-07-05 RX ADMIN — Medication 10 ML: at 09:12

## 2024-07-05 RX ADMIN — MUPIROCIN 1 APPLICATION: 20 OINTMENT TOPICAL at 20:50

## 2024-07-05 RX ADMIN — PANTOPRAZOLE SODIUM 40 MG: 40 INJECTION, POWDER, FOR SOLUTION INTRAVENOUS at 17:18

## 2024-07-05 RX ADMIN — NYSTATIN 1 APPLICATION: 100000 CREAM TOPICAL at 09:12

## 2024-07-05 RX ADMIN — Medication 1 APPLICATION: at 20:51

## 2024-07-05 RX ADMIN — MUPIROCIN 1 APPLICATION: 20 OINTMENT TOPICAL at 09:12

## 2024-07-05 RX ADMIN — PIPERACILLIN AND TAZOBACTAM 4.5 G: 4; .5 INJECTION, POWDER, FOR SOLUTION INTRAVENOUS at 00:31

## 2024-07-05 RX ADMIN — Medication 0.2 MCG/KG/MIN: at 02:35

## 2024-07-05 RX ADMIN — Medication 1 APPLICATION: at 09:12

## 2024-07-05 RX ADMIN — NYSTATIN 1 APPLICATION: 100000 CREAM TOPICAL at 20:51

## 2024-07-05 RX ADMIN — PIPERACILLIN AND TAZOBACTAM 4.5 G: 4; .5 INJECTION, POWDER, FOR SOLUTION INTRAVENOUS at 09:12

## 2024-07-05 RX ADMIN — PIPERACILLIN AND TAZOBACTAM 4.5 G: 4; .5 INJECTION, POWDER, FOR SOLUTION INTRAVENOUS at 17:18

## 2024-07-05 RX ADMIN — DEXTROSE AND SODIUM CHLORIDE 100 ML/HR: 5; 450 INJECTION, SOLUTION INTRAVENOUS at 07:16

## 2024-07-05 RX ADMIN — ENOXAPARIN SODIUM 60 MG: 60 INJECTION SUBCUTANEOUS at 05:21

## 2024-07-05 NOTE — PLAN OF CARE
Goal Outcome Evaluation:           Progress: no change  Outcome Evaluation: Pt is A/Ox4. VSS. RA. Levophed and D5 w 1/2 NS remain infusing per orders. Pt has no requests at this time. Bed in lowest position, call light within reach, and bed alarm on.         Problem: Skin Injury Risk Increased  Goal: Skin Health and Integrity  Outcome: Ongoing, Progressing  Intervention: Promote and Optimize Oral Intake  Recent Flowsheet Documentation  Taken 7/4/2024 2000 by Brisa Allen RN  Oral Nutrition Promotion: rest periods promoted  Intervention: Optimize Skin Protection  Recent Flowsheet Documentation  Taken 7/5/2024 0200 by Brisa Allen RN  Pressure Reduction Techniques:   frequent weight shift encouraged   weight shift assistance provided  Pressure Reduction Devices:   positioning supports utilized   pressure-redistributing mattress utilized  Skin Protection:   adhesive use limited   incontinence pads utilized   transparent dressing maintained   tubing/devices free from skin contact  Taken 7/4/2024 2000 by Brisa Allen RN  Pressure Reduction Techniques:   frequent weight shift encouraged   weight shift assistance provided  Pressure Reduction Devices:   positioning supports utilized   pressure-redistributing mattress utilized  Skin Protection:   adhesive use limited   incontinence pads utilized   transparent dressing maintained   tubing/devices free from skin contact     Problem: Fall Injury Risk  Goal: Absence of Fall and Fall-Related Injury  Outcome: Ongoing, Progressing  Intervention: Identify and Manage Contributors  Recent Flowsheet Documentation  Taken 7/5/2024 0300 by Brisa Allen RN  Medication Review/Management: medications reviewed  Taken 7/5/2024 0100 by Brisa Allen RN  Medication Review/Management: medications reviewed  Taken 7/4/2024 2300 by Brisa Allen RN  Medication Review/Management: medications reviewed  Taken 7/4/2024 2100 by Brisa Allen RN  Medication Review/Management: medications  reviewed  Taken 7/4/2024 1900 by Brisa Allen RN  Medication Review/Management: medications reviewed  Intervention: Promote Injury-Free Environment  Recent Flowsheet Documentation  Taken 7/5/2024 0300 by Brisa Allen RN  Safety Promotion/Fall Prevention:   activity supervised   assistive device/personal items within reach   clutter free environment maintained   room organization consistent   safety round/check completed  Taken 7/5/2024 0100 by Brisa Allen RN  Safety Promotion/Fall Prevention:   activity supervised   assistive device/personal items within reach   clutter free environment maintained   room organization consistent   safety round/check completed  Taken 7/4/2024 2300 by Brisa Allen RN  Safety Promotion/Fall Prevention:   activity supervised   assistive device/personal items within reach   clutter free environment maintained   room organization consistent  Taken 7/4/2024 2100 by Brisa Allen RN  Safety Promotion/Fall Prevention:   activity supervised   assistive device/personal items within reach   clutter free environment maintained   room organization consistent   safety round/check completed  Taken 7/4/2024 1900 by Brisa Allen RN  Safety Promotion/Fall Prevention:   activity supervised   clutter free environment maintained   assistive device/personal items within reach   fall prevention program maintained   room organization consistent   safety round/check completed     Problem: Adult Inpatient Plan of Care  Goal: Plan of Care Review  Outcome: Ongoing, Progressing  Flowsheets  Taken 7/5/2024 0434 by Brisa Allen RN  Progress: no change  Outcome Evaluation: Pt is A/Ox4. VSS. RA. Levophed and D5 w 1/2 NS remain infusing per orders. Pt has no requests at this time. Bed in lowest position, call light within reach, and bed alarm on.  Taken 7/4/2024 0510 by Lesa Ponce RN  Plan of Care Reviewed With: patient  Goal: Patient-Specific Goal (Individualized)  Outcome: Ongoing,  Progressing  Goal: Absence of Hospital-Acquired Illness or Injury  Outcome: Ongoing, Progressing  Intervention: Identify and Manage Fall Risk  Recent Flowsheet Documentation  Taken 7/5/2024 0300 by Brisa Allen RN  Safety Promotion/Fall Prevention:   activity supervised   assistive device/personal items within reach   clutter free environment maintained   room organization consistent   safety round/check completed  Taken 7/5/2024 0100 by Brisa Allen RN  Safety Promotion/Fall Prevention:   activity supervised   assistive device/personal items within reach   clutter free environment maintained   room organization consistent   safety round/check completed  Taken 7/4/2024 2300 by Brisa Allen RN  Safety Promotion/Fall Prevention:   activity supervised   assistive device/personal items within reach   clutter free environment maintained   room organization consistent  Taken 7/4/2024 2100 by Brisa Allen RN  Safety Promotion/Fall Prevention:   activity supervised   assistive device/personal items within reach   clutter free environment maintained   room organization consistent   safety round/check completed  Taken 7/4/2024 1900 by Brisa Allen RN  Safety Promotion/Fall Prevention:   activity supervised   clutter free environment maintained   assistive device/personal items within reach   fall prevention program maintained   room organization consistent   safety round/check completed  Intervention: Prevent Skin Injury  Recent Flowsheet Documentation  Taken 7/5/2024 0200 by Brisa Allen RN  Skin Protection:   adhesive use limited   incontinence pads utilized   transparent dressing maintained   tubing/devices free from skin contact  Taken 7/4/2024 2000 by Brisa Allen RN  Skin Protection:   adhesive use limited   incontinence pads utilized   transparent dressing maintained   tubing/devices free from skin contact  Intervention: Prevent and Manage VTE (Venous Thromboembolism) Risk  Recent Flowsheet  Documentation  Taken 7/5/2024 0200 by Brisa Allen RN  VTE Prevention/Management: (see MAR) other (see comments)  Taken 7/4/2024 2000 by Brisa Allen RN  VTE Prevention/Management: (see MAR) other (see comments)  Range of Motion: active ROM (range of motion) encouraged  Intervention: Prevent Infection  Recent Flowsheet Documentation  Taken 7/5/2024 0300 by Brisa Allen RN  Infection Prevention:   single patient room provided   rest/sleep promoted  Taken 7/5/2024 0100 by Brisa Allen RN  Infection Prevention:   rest/sleep promoted   single patient room provided  Taken 7/4/2024 2300 by Brisa Allen RN  Infection Prevention:   rest/sleep promoted   single patient room provided  Taken 7/4/2024 2100 by Brisa Allen RN  Infection Prevention:   single patient room provided   rest/sleep promoted  Taken 7/4/2024 1900 by Brisa Allen RN  Infection Prevention:   single patient room provided   rest/sleep promoted  Goal: Optimal Comfort and Wellbeing  Outcome: Ongoing, Progressing  Intervention: Provide Person-Centered Care  Recent Flowsheet Documentation  Taken 7/5/2024 0200 by Brisa Allen RN  Trust Relationship/Rapport:   care explained   choices provided   empathic listening provided   emotional support provided   thoughts/feelings acknowledged  Taken 7/4/2024 2000 by Brisa Allen RN  Trust Relationship/Rapport:   care explained   choices provided   emotional support provided   questions answered   thoughts/feelings acknowledged  Goal: Readiness for Transition of Care  Outcome: Ongoing, Progressing

## 2024-07-05 NOTE — PROGRESS NOTES
Antimicrobial Length of Therapy:'    Day 4 of 7 Zosyn  Day 2 of 14 fluconazole    Thank you.  Coretta White, Pharm.D.  7/5/2024  14:01 EDT

## 2024-07-05 NOTE — PLAN OF CARE
Goal Outcome Evaluation:              Outcome Evaluation: Pt remains A+Ox4. Afebrile, on RA. Levophed infusing at 0.2. D5 with 1/2 NS infusing at 100mL/hr. Pt tolerating clear liquids at this time. Bed in lowest position, alarm on. Call light within reach. Pt denies any requests at this time.

## 2024-07-05 NOTE — CASE MANAGEMENT/SOCIAL WORK
Continued Stay Note  NATHAN Varghese     Patient Name: Vangie Carney  MRN: [id number]  Today's Date: 7/5/2024    Admit Date: 7/1/2024    Plan: CM spoke with patient's  who states he plan is still to return home @ discharge and spouse will provide transporation.  Patient does not utilize Home Health and is not sure if will be needed at discharge at this time but lives in [facility] if offered.  Patient has a Walker, BSC [employer] Cane that was given to her.  Patient does request a WC @ discharge and would like for it to be narrow to get around in her house.  No other issues or concerns are noted at this time.  CM will continue to follow and assist with any discharge needs.   Discharge Plan       Row Name 07/05/24 1944       Plan    Plan CM spoke with patient's  who states he plan is still to return home @ discharge and spouse will provide transporation.  Patient does not utilize Home Health and is not sure if will be needed at discharge at this time but lives in [facility] if offered.  Patient has a Walker, BSC & Cane that was given to her.  Patient does request a WC @ discharge and would like for it to be narrow to get around in her house.  No other issues or concerns are noted at this time.  CM will continue to follow and assist with any discharge needs.    Plan Comments 7/3:  BP 84//77, CLD, IV Diflucan, Protonix, Zosyn, D5 1/2 NS, Levo, Na+ 133,Cr 0.50,  Mag 2.7, AST 47, ALT 72, Bili 2.7, Plt 123, Pt had episode of vomiting yest 2/2 PO pill, Pt has not had BM, Pt is tolerating CLD, Pt is DNR/DNI-KLS                   Discharge Codes    No documentation.                 Expected Discharge Date and Time       Expected Discharge Date Expected Discharge Time    Jul 4, 2024               Vangie Wilson RN

## 2024-07-05 NOTE — PROGRESS NOTES
Date:  07/05/24    CC:    Subjective:  Vangie Carney feels better this AM. Had an episode of vomiting yesterday, which she believes was due to an oral pill. Has not had recurrent episodes of nausea/vomiting since. Denies abdominal distention. Passing flatus, no BM. Has not attempted sitting up.     On pressors with levophed at 0.22 mcg/kg/min.     Review Of Systems:  A comprehensive 14-point review of systems performed.  Significant findings as mentioned above.  All other systems reviewed and are negative.      Objective:  Medications:  Scheduled Meds:[Held by provider] apixaban, 5 mg, Oral, BID  castor oil-balsam peru, 1 Application, Topical, Q12H  [Held by provider] dicyclomine, 10 mg, Oral, 4x Daily AC & at Bedtime  enoxaparin, 1 mg/kg, Subcutaneous, Q12H  fluconazole, 200 mg, Intravenous, Q24H  granisetron, 1 patch, Transdermal, Weekly  mupirocin, 1 application , Each Nare, BID  nystatin, 1 Application, Topical, BID  pantoprazole, 40 mg, Oral, QAM AC  piperacillin-tazobactam, 4.5 g, Intravenous, Q8H  sodium chloride, 10 mL, Intravenous, Q12H  sodium chloride, 10 mL, Intravenous, Q12H  sodium chloride, 10 mL, Intravenous, Q12H  sodium chloride, 10 mL, Intravenous, Q12H      Continuous Infusions:dextrose 5 % and sodium chloride 0.45 %, 100 mL/hr, Last Rate: 100 mL/hr (07/05/24 0716)  norepinephrine, 0.02-0.3 mcg/kg/min (Dosing Weight), Last Rate: 0.22 mcg/kg/min (07/05/24 0745)      PRN Meds:.  senna-docusate sodium **AND** polyethylene glycol **AND** bisacodyl **AND** bisacodyl    calcium carbonate    dextrose    dextrose    First Mouthwash (Magic Mouthwash)    glucagon (human recombinant)    heparin    HYDROcodone-acetaminophen    Magnesium Standard Dose Replacement - Follow Nurse / BPA Driven Protocol    [Held by provider] metoclopramide    nitroglycerin    ondansetron ODT **OR** ondansetron    Potassium Replacement - Follow Nurse / BPA Driven Protocol    sodium chloride    Access VAD **AND** sodium  chloride    sodium chloride    sodium chloride    sodium chloride    sodium chloride    sodium chloride    sodium chloride    sodium chloride    sodium chloride    sodium chloride    Physical Exam:  Vital Signs     Patient Vitals for the past 24 hrs:   BP Temp Temp src Pulse Resp SpO2 Weight   07/05/24 0800 -- 97.5 °F (36.4 °C) Oral -- -- -- --   07/05/24 0745 (!) 84/55 -- -- 72 13 98 % --   07/05/24 0730 (!) 89/56 -- -- 71 12 99 % --   07/05/24 0715 (!) 84/57 -- -- 79 12 100 % --   07/05/24 0700 (!) 81/57 -- -- 76 13 100 % --   07/05/24 0600 99/71 -- -- 74 13 99 % --   07/05/24 0530 103/71 -- -- 80 -- 100 % --   07/05/24 0500 98/73 -- -- 71 11 98 % --   07/05/24 0430 90/64 -- -- 74 -- 98 % --   07/05/24 0400 (!) 89/65 98.4 °F (36.9 °C) -- 76 15 100 % 66 kg (145 lb 8.1 oz)   07/05/24 0300 95/62 -- -- 71 13 99 % --   07/05/24 0235 93/64 -- -- 73 -- -- --   07/05/24 0200 98/71 -- -- 84 13 98 % --   07/05/24 0130 94/65 -- -- 80 -- 99 % --   07/05/24 0100 100/69 -- -- 79 16 98 % --   07/05/24 0030 94/70 -- -- 83 -- 100 % --   07/05/24 0000 92/56 97.6 °F (36.4 °C) Axillary 85 14 99 % --   07/04/24 2330 (!) 85/64 -- -- 77 14 99 % --   07/04/24 2300 (!) 86/58 -- -- 82 14 98 % --   07/04/24 2230 94/68 -- -- 87 13 100 % --   07/04/24 2215 95/54 -- -- 90 12 100 % --   07/04/24 2200 (!) 89/64 -- -- 84 15 98 % --   07/04/24 2130 95/66 -- -- 88 15 99 % --   07/04/24 2100 99/69 -- -- 86 12 99 % --   07/04/24 2030 93/62 -- -- 82 -- 99 % --   07/04/24 2000 94/63 98.7 °F (37.1 °C) -- 86 15 99 % --   07/04/24 1930 92/62 -- -- 80 -- 99 % --   07/04/24 1900 94/64 -- -- 86 10 100 % --   07/04/24 1830 100/60 -- -- 82 10 99 % --   07/04/24 1815 93/63 -- -- 80 13 100 % --   07/04/24 1800 (!) 89/66 -- -- 80 15 100 % --   07/04/24 1745 94/67 -- -- 77 15 100 % --   07/04/24 1730 99/65 -- -- 76 13 100 % --   07/04/24 1715 93/70 -- -- 80 18 99 % --   07/04/24 1700 97/70 -- -- 82 10 99 % --   07/04/24 1645 93/65 -- -- 90 13 100 % --   07/04/24  1630 99/69 -- -- 78 15 100 % --   07/04/24 1615 95/65 -- -- 79 20 98 % --   07/04/24 1600 96/57 97.1 °F (36.2 °C) Axillary 77 13 98 % --   07/04/24 1545 98/67 -- -- 73 15 99 % --   07/04/24 1530 (!) 84/60 -- -- 77 13 99 % --   07/04/24 1515 (!) 86/60 -- -- 75 17 99 % --   07/04/24 1500 96/62 -- -- 74 13 99 % --   07/04/24 1445 96/63 -- -- 73 10 98 % --   07/04/24 1430 92/69 -- -- 78 11 100 % --   07/04/24 1415 (!) 88/67 -- -- 73 15 98 % --   07/04/24 1400 (!) 88/63 -- -- 79 19 96 % --   07/04/24 1345 90/69 -- -- 82 13 99 % --   07/04/24 1330 99/70 -- -- 88 14 99 % --   07/04/24 1315 (!) 89/62 -- -- 89 13 98 % --   07/04/24 1300 (!) 85/68 -- -- 91 12 98 % --   07/04/24 1245 93/64 -- -- 88 13 99 % --   07/04/24 1230 103/73 -- -- 98 14 99 % --   07/04/24 1224 90/61 -- -- 97 12 99 % --   07/04/24 1215 90/61 -- -- 98 12 98 % --   07/04/24 1200 (!) 77/62 97.5 °F (36.4 °C) Oral 105 12 99 % --   07/04/24 1145 (!) 79/65 -- -- 115 11 98 % --   07/04/24 1130 (!) 79/56 -- -- 99 9 98 % --   07/04/24 1115 (!) 87/57 -- -- 91 13 98 % --   07/04/24 1114 (!) 87/57 -- -- 91 13 98 % --   07/04/24 1100 (!) 87/62 -- -- 89 12 99 % --   07/04/24 1045 90/65 -- -- 88 15 99 % --   07/04/24 1030 (!) 88/65 -- -- 85 12 99 % --   07/04/24 1015 93/56 -- -- 86 12 100 % --   07/04/24 1000 (!) 79/61 -- -- 100 12 99 % --   07/04/24 0945 (!) 84/57 -- -- 111 14 99 % --   07/04/24 0930 96/62 -- -- 105 12 98 % --         General:  Awake, alert and oriented, in bed  HEENT:  Pupils are equal, round and reactive to light and accommodation  Neck:  No JVD, thyromegaly or lymphadenopathy  CV:  Regular rate and rhythm, no murmurs, rubs or gallops  Resp:  Lungs are clear to auscultation bilaterally  Abd:  Soft, non-tender, +distended, bowel sounds minimal, no organomegaly  Ext:  No clubbing, cyanosis or edema  Lymph:  No cervical, supraclavicular, axillary, inguinal or femoral adenopathy  Neuro:  MS as above, CN II-XII intact, grossly non-focal exam    Labs /  Studies:    Lab Results   Component Value Date    WBC 6.06 07/04/2024    HGB 9.1 (L) 07/04/2024    HCT 27.7 (L) 07/04/2024    .2 (H) 07/04/2024    RDW 14.4 07/04/2024     (L) 07/04/2024    NEUTRORELPCT 78.5 (H) 07/02/2024    LYMPHORELPCT 16.8 (L) 07/02/2024    MONORELPCT 4.3 (L) 07/02/2024    EOSRELPCT 0.0 (L) 07/02/2024    BASORELPCT 0.1 07/02/2024    NEUTROABS 9.08 (H) 07/02/2024    LYMPHSABS 1.94 07/02/2024       Lab Results   Component Value Date     (L) 07/04/2024    K 4.4 07/04/2024    CO2 17.6 (L) 07/04/2024     07/04/2024    BUN 12 07/04/2024    CREATININE 0.50 (L) 07/04/2024    EGFRIFNONA 79 09/10/2021    GLUCOSE 101 (H) 07/04/2024    CALCIUM 7.0 (L) 07/04/2024    ALKPHOS 227 (H) 07/04/2024    AST 47 (H) 07/04/2024    ALT 72 (H) 07/04/2024    BILITOT 2.7 (H) 07/04/2024    ALBUMIN 1.7 (L) 07/04/2024    PROTEINTOT 3.7 (L) 07/04/2024    MG 2.7 (H) 07/04/2024    PHOS 3.3 03/11/2024       Imaging Results (Last 72 Hours)       ** No results found for the last 72 hours. **            Assessment & Plan:  Vangie Carney is a 54 y.o. year-old female with     Septic shock  Small bowel obstruction   -Patient with hypotension, tachycardia, and positive urinalysis. Currently on antibiotics and pressors with levophed   -Blood cultures with NGTD, urine culture with e.coli. Currently on zosyn   -Per patient and - last BM 6/30, passing flatus currently. Would re introduce orals/liquids   -Plan on symptomatic management at this time.     3. Metastatic colon adenocarcinoma   -Primary oncologist, Dr. Hanks  -Patient with localized descending/sigmoid moderately differentiated adenocarcinoma March 2023 status post APR. Opted out of adjuvant chemotherapy. Presented 1/2024 with recurrent/metastatic cancer  -Received 4 cycles of 5-FU/leucovorin 2-4/2024, switched to palliative bevacizumab due to intolerance, received 4 cycles thus far  -palliative care appreciated. Pt is made DNR-DNI        Thank  you for the consultation and allowing us to participate in the care of Ms. Carney. Please do not hesitate to contact us with any questions or concerns.          Dominga Rodriguez MD  07/05/24  09:25 EDT

## 2024-07-05 NOTE — PROGRESS NOTES
"Palliative Care Daily Progress Note     S: Medical record reviewed, followed up with Primary RN Johana and Dr Allen regarding patient's condition. When I saw Jennifer today she was more awake today and did not appear to be as flat today. She did not appear to be in pain at this time, she said she was not in pain, was not nauseated, and did not appear to be short of breath. Her  Red was at bedside      O:   Palliative Performance Scale Score:     BP 90/70 (BP Location: Right arm, Patient Position: Lying)   Pulse 100   Temp 97.6 °F (36.4 °C) (Oral)   Resp 17   Ht 170.2 cm (67\")   Wt 66 kg (145 lb 8.1 oz)   LMP 06/15/2016   SpO2 99%   BMI 22.79 kg/m²     Intake/Output Summary (Last 24 hours) at 7/5/2024 1750  Last data filed at 7/5/2024 1718  Gross per 24 hour   Intake 2802.75 ml   Output 530 ml   Net 2272.75 ml       PE:  General Appearance:    Chronically ill appearing, awake, alert, cooperative, NAD   HEENT:    NC/AT, without obvious abnormality, EOMI, anicteric , dry mucous membranes    Neck:   supple, trachea midline, no JVD   Lungs:     Unlabored respirations with no rhonchi rales or wheezing noted., diminished bilaterally     Heart:    tachycardia, normal S1 and S2, no M/R/G   Abdomen:     Soft, NT, Distended, hypoactive ABS , clay catheter in place, urine tea colored   Extremities:   Moves all extremities, no edema   Pulses:   Pulses palpable and equal bilaterally   Skin:   Dry and jaundiced    Neurologic:   A/Ox3, cooperative, drowsy   Psych:   Calm, more appropriate         Meds: Reviewed and changes noted    Labs:   Results from last 7 days   Lab Units 07/04/24  2243   WBC 10*3/mm3 6.06   HEMOGLOBIN g/dL 9.1*   HEMATOCRIT % 27.7*   PLATELETS 10*3/mm3 123*     Results from last 7 days   Lab Units 07/04/24  2243   SODIUM mmol/L 133*   POTASSIUM mmol/L 4.4   CHLORIDE mmol/L 107   CO2 mmol/L 17.6*   BUN mg/dL 12   CREATININE mg/dL 0.50*   GLUCOSE mg/dL 101*   CALCIUM mg/dL 7.0*     Results from last 7 " days   Lab Units 07/04/24  2243 07/04/24  0644   SODIUM mmol/L 133* 134*   POTASSIUM mmol/L 4.4 3.5   CHLORIDE mmol/L 107 105   CO2 mmol/L 17.6* 17.7*   BUN mg/dL 12 14   CREATININE mg/dL 0.50* 0.59   CALCIUM mg/dL 7.0* 7.1*   BILIRUBIN mg/dL  --  2.7*   ALK PHOS U/L  --  227*   ALT (SGPT) U/L  --  72*   AST (SGOT) U/L  --  47*   GLUCOSE mg/dL 101* 126*     Imaging Results (Last 72 Hours)       ** No results found for the last 72 hours. **              Diagnostics: Reviewed    A: Vangie Carney is a 54 y.o.female admitted on 7/1/2024 for septic shock. She has a past medical history of GERD, malignant neoplasm of sigmoid colon with colon resection. She has Metastatic colorectal adenocarcinoma s/p LAR currently receiving palliative chemotherapy, pulmonary embolism on therapeutic anticoagulation, and gastric dysmotility. She complains today of fatigue, dysphagia, dyspnea worse with exertion, abdominal pain, nausea and vomiting, decreased UOP, dysuria and increased weakness. She presented to the ED for increased weakness and fatigue over the past few days as well as nausea and vomiting. Her ED labs, sodium 133, BUN 22, creat 1.4, eGFR 45.1, glucose 22, calcium 7.9, alk phos 249, total protein 3.9, albumin 1.8, AST 77, ALT 65, total bilirubin 1.7, wbc 8.31, H&H 9.8/28.7, platelets 155, urine cloudy, >1.030, blood trace, positive nitrates, moderate leukocytes, CT shows multiple dilated loops of small bowel consistent with small bowel obstruction, Right pleural effusion and pericardial effusion, incompletely imaged. These are new when compared to the prior study. Fatty infiltration of the liver. No hydronephrosis or ureteral calculus.   Today 7/5/2024  T97.5, HR 68, RR 18, BP of 113/77 and was saturating 97% on room air.    P:  I was able to have a conversation with pts  Red to provide support and discussed Jennifer's GOC, he stated that he knows Jennifer had expressed that she would not want chest compressions nor would  she want to be put on a ventilator, he did say that they would want IV medications and shock/cardioversion if necessary, I checked her orders and they are correct. I told Red we were here for Jennifer but were also here for him as well for support. I discussed patient and this conversation with Dr Allen and Johana RN.    We will continue to follow along. Please do not hesitate to contact us regarding further sx mgmt or GOC needs, including after hours or on weekends via our on call provider at [phone].     Abeba Nowak, APRN    7/5/2024

## 2024-07-06 LAB
BACTERIA SPEC AEROBE CULT: NORMAL
BACTERIA SPEC AEROBE CULT: NORMAL
GLUCOSE BLDC GLUCOMTR-MCNC: 80 MG/DL (ref 70–130)
GLUCOSE BLDC GLUCOMTR-MCNC: 96 MG/DL (ref 70–130)
GLUCOSE BLDC GLUCOMTR-MCNC: 99 MG/DL (ref 70–130)

## 2024-07-06 PROCEDURE — 82948 REAGENT STRIP/BLOOD GLUCOSE: CPT

## 2024-07-06 PROCEDURE — 25010000002 ENOXAPARIN PER 10 MG: Performed by: STUDENT IN AN ORGANIZED HEALTH CARE EDUCATION/TRAINING PROGRAM

## 2024-07-06 PROCEDURE — 93010 ELECTROCARDIOGRAM REPORT: CPT | Performed by: INTERNAL MEDICINE

## 2024-07-06 PROCEDURE — 25010000002 FLUCONAZOLE PER 200 MG: Performed by: STUDENT IN AN ORGANIZED HEALTH CARE EDUCATION/TRAINING PROGRAM

## 2024-07-06 PROCEDURE — 99232 SBSQ HOSP IP/OBS MODERATE 35: CPT | Performed by: STUDENT IN AN ORGANIZED HEALTH CARE EDUCATION/TRAINING PROGRAM

## 2024-07-06 PROCEDURE — 93005 ELECTROCARDIOGRAM TRACING: CPT | Performed by: STUDENT IN AN ORGANIZED HEALTH CARE EDUCATION/TRAINING PROGRAM

## 2024-07-06 PROCEDURE — 25010000002 PIPERACILLIN SOD-TAZOBACTAM PER 1 G: Performed by: STUDENT IN AN ORGANIZED HEALTH CARE EDUCATION/TRAINING PROGRAM

## 2024-07-06 RX ADMIN — BISACODYL 10 MG: 10 SUPPOSITORY RECTAL at 15:59

## 2024-07-06 RX ADMIN — Medication 10 ML: at 08:22

## 2024-07-06 RX ADMIN — Medication 10 ML: at 21:33

## 2024-07-06 RX ADMIN — PANTOPRAZOLE SODIUM 40 MG: 40 INJECTION, POWDER, FOR SOLUTION INTRAVENOUS at 06:49

## 2024-07-06 RX ADMIN — ENOXAPARIN SODIUM 60 MG: 60 INJECTION SUBCUTANEOUS at 17:24

## 2024-07-06 RX ADMIN — DEXTROSE AND SODIUM CHLORIDE 100 ML/HR: 5; 450 INJECTION, SOLUTION INTRAVENOUS at 00:50

## 2024-07-06 RX ADMIN — POLYETHYLENE GLYCOL (3350) 17 G: 17 POWDER, FOR SOLUTION ORAL at 08:21

## 2024-07-06 RX ADMIN — MUPIROCIN 1 APPLICATION: 20 OINTMENT TOPICAL at 21:32

## 2024-07-06 RX ADMIN — DOCUSATE SODIUM 50 MG AND SENNOSIDES 8.6 MG 2 TABLET: 8.6; 5 TABLET, FILM COATED ORAL at 13:28

## 2024-07-06 RX ADMIN — NYSTATIN 1 APPLICATION: 100000 CREAM TOPICAL at 21:32

## 2024-07-06 RX ADMIN — ENOXAPARIN SODIUM 60 MG: 60 INJECTION SUBCUTANEOUS at 06:49

## 2024-07-06 RX ADMIN — PIPERACILLIN AND TAZOBACTAM 4.5 G: 4; .5 INJECTION, POWDER, FOR SOLUTION INTRAVENOUS at 17:23

## 2024-07-06 RX ADMIN — PIPERACILLIN AND TAZOBACTAM 4.5 G: 4; .5 INJECTION, POWDER, FOR SOLUTION INTRAVENOUS at 00:56

## 2024-07-06 RX ADMIN — Medication 1 APPLICATION: at 08:22

## 2024-07-06 RX ADMIN — FLUCONAZOLE 200 MG: 2 INJECTION, SOLUTION INTRAVENOUS at 11:58

## 2024-07-06 RX ADMIN — MUPIROCIN 1 APPLICATION: 20 OINTMENT TOPICAL at 08:22

## 2024-07-06 RX ADMIN — Medication 0.08 MCG/KG/MIN: at 00:50

## 2024-07-06 RX ADMIN — Medication 1 APPLICATION: at 21:32

## 2024-07-06 RX ADMIN — NYSTATIN 1 APPLICATION: 100000 CREAM TOPICAL at 08:21

## 2024-07-06 RX ADMIN — PIPERACILLIN AND TAZOBACTAM 4.5 G: 4; .5 INJECTION, POWDER, FOR SOLUTION INTRAVENOUS at 08:21

## 2024-07-06 NOTE — PROGRESS NOTES
"    [facility] HOSPITALIST PROGRESS NOTE     Patient Identification:  Name:  Vangie Carney  Age:  54 y.o.  Sex:  female  :  1970  MRN:  [id number]  Visit Number:  [id number]  ROOM: [address]     Primary Care Provider:  Urvashi Basurto APRN    Length of stay in inpatient status:  5    Subjective     Chief Compliant:    Chief Complaint   Patient presents with    Weakness - Generalized       History of Presenting Illness:  Patient seen and examined today with family member present at bedside.  Patient reports that she feels too \"full\"to eat anything for lunch, but has not had any nausea or vomiting today.  Still no bowel movement.    Objective     Current Hospital Meds:[Held by provider] apixaban, 5 mg, Oral, BID  castor oil-balsam peru, 1 Application, Topical, Q12H  [Held by provider] dicyclomine, 10 mg, Oral, 4x Daily AC & at Bedtime  enoxaparin, 1 mg/kg, Subcutaneous, Q12H  fluconazole, 200 mg, Intravenous, Q24H  granisetron, 1 patch, Transdermal, Weekly  mupirocin, 1 application , Each Nare, BID  nystatin, 1 Application, Topical, BID  pantoprazole, 40 mg, Intravenous, Q AM  piperacillin-tazobactam, 4.5 g, Intravenous, Q8H  polyethylene glycol, 17 g, Oral, Daily  sodium chloride, 10 mL, Intravenous, Q12H  sodium chloride, 10 mL, Intravenous, Q12H  sodium chloride, 10 mL, Intravenous, Q12H  sodium chloride, 10 mL, Intravenous, Q12H    dextrose 5 % and sodium chloride 0.45 %, 100 mL/hr, Last Rate: 100 mL/hr (24 0050)  norepinephrine, 0.02-0.3 mcg/kg/min (Dosing Weight), Last Rate: 0.02 mcg/kg/min (24 1333)        Current Antimicrobial Therapy:  Anti-Infectives (From admission, onward)      Ordered     Dose/Rate Route Frequency Start Stop    24 1107  fluconazole (DIFLUCAN) IVPB 200 mg        Ordering Provider: Yeimi Allen DO   Placed in \"Followed by\" Linked Group    200 mg  100 mL/hr over 60 Minutes Intravenous Every 24 Hours 24 1200 24 1159    24 1107  " "fluconazole (DIFLUCAN) IVPB 400 mg        Ordering Provider: Yeimi Allen DO   Placed in \"Followed by\" Linked Group    400 mg  100 mL/hr over 120 Minutes Intravenous Once 07/04/24 1200 07/04/24 1424    07/02/24 0940  piperacillin-tazobactam (ZOSYN) IVPB 4.5 g IVPB in 100 mL NS (VTB)        Ordering Provider: Yeimi Allen DO    4.5 g  over 4 Hours Intravenous Every 8 Hours 07/02/24 1700 07/09/24 1659    07/02/24 0940  piperacillin-tazobactam (ZOSYN) IVPB 4.5 g IVPB in 100 mL NS (VTB)        Ordering Provider: Yeimi Allen DO    4.5 g  over 30 Minutes Intravenous Once 07/02/24 1030 07/02/24 1052    07/01/24 1758  cefTRIAXone (ROCEPHIN) 2,000 mg in sodium chloride 0.9 % 100 mL IVPB-VTB        Ordering Provider: Pranay Villaseñor MD    2,000 mg  200 mL/hr over 30 Minutes Intravenous Once 07/01/24 1814 07/01/24 1907          Current Diuretic Therapy:  Diuretics (From admission, onward)      None          ----------------------------------------------------------------------------------------------------------------------  Vital Signs:  Temp:  [97.5 °F (36.4 °C)-98 °F (36.7 °C)] 97.5 °F (36.4 °C)  Heart Rate:  [] 105  Resp:  [12-17] 14  BP: ()/(52-87) 95/65  SpO2:  [96 %-100 %] 99 %  on   ;   Device (Oxygen Therapy): room air  Body mass index is 22.79 kg/m².    Wt Readings from Last 3 Encounters:   07/06/24 66 kg (145 lb 8.1 oz)   06/26/24 56.7 kg (125 lb)   06/26/24 56.7 kg (125 lb)     Intake & Output (last 3 days)         07/03 0701 07/04 0700 07/04 0701 07/05 0700 07/05 0701 07/06 0700 07/06 0701 07/07 0700    P.O.  120 360 120    I.V. (mL/kg) 2385.7 (42.1) 3020.7 (53.3) 1252.8 (22.1)     Other 60       IV Piggyback    200    Total Intake(mL/kg) 2445.7 (43.1) 3140.7 (55.4) 1612.8 (28.4) 320 (5.6)    Urine (mL/kg/hr) 420 (0.3) 650 (0.5) 480 (0.4)     Stool        Total Output 420 650 480     Net +2025.7 +2490.7 +1132.8 +320                  Diet: Liquid; Full Liquid; Fluid Consistency: Thin (IDDSI " 0)  ----------------------------------------------------------------------------------------------------------------------  Physical exam:     Constitutional:  Well-developed and well-nourished. Appears acutely and chronically ill. Fatigued, but similar to yesterday. No acute distress.      HENT:  Head:  Normocephalic and atraumatic.    Cardiovascular:  Normal rate, regular rhythm, no murmur  Pulmonary/Chest:  No respiratory distress, breath sounds clear to auscultation in anterior lung fields bilaterally  Abdominal:  Soft. Abdomen is mildly distended, with no tenderness to palpation. Normal bowel sounds present  Musculoskeletal:  No tenderness, and no deformity.     Neurological: Awake, alert, no focal deficit on gross examination. No slurred speech or facial droop.    Skin:  Skin is warm and dry. Appears slightly jaundiced.  Peripheral vascular:  No cyanosis. 1 to 2+ upper extremity edema bilaterally, 2+ lower extremity edema bilaterally.  Psychiatric: Appropriate mood, slightly flattened affect   Edited by: Yeimi Allen DO at 7/6/2024 1545  ----------------------------------------------------------------------------------------------------------------------  Results from last 7 days   Lab Units 07/04/24  2243 07/04/24  0644 07/03/24  0315 07/02/24  0029 07/01/24 2113 07/01/24  1818   LACTATE mmol/L  --   --   --  2.0 3.3* 4.9*   WBC 10*3/mm3 6.06 7.04 9.25 11.57*  --   --    HEMOGLOBIN g/dL 9.1* 9.7* 9.1* 9.6*  --   --    HEMATOCRIT % 27.7* 28.4* 26.2* 27.8*  --   --    MCV fL 102.2* 100.7* 98.5* 101.8*  --   --    MCHC g/dL 32.9 34.2 34.7 34.5  --   --    PLATELETS 10*3/mm3 123* 134* 135* 173  --   --          Results from last 7 days   Lab Units 07/05/24  0852 07/04/24  2243 07/04/24  0644 07/03/24  1546 07/03/24  0358 07/02/24  0029 07/01/24  1125   SODIUM mmol/L  --  133* 134*  --  133* 133* 133*   POTASSIUM mmol/L  --  4.4 3.5 3.7 3.5 3.8 3.9   MAGNESIUM mg/dL  --   --  2.7*  --  1.5*  --  1.8   CHLORIDE  "mmol/L  --  107 105  --  104 103 99   CO2 mmol/L  --  17.6* 17.7*  --  17.9* 16.8* 20.6*   BUN mg/dL  --  12 14  --  19 22* 22*   CREATININE mg/dL  --  0.50* 0.59  --  0.98 1.38* 1.40*   CALCIUM mg/dL  --  7.0* 7.1*  --  7.3* 7.2* 7.9*   IONIZED CALCIUM mmol/L 0.96*  --   --   --   --   --   --    GLUCOSE mg/dL  --  101* 126*  --  98 75 22*   ALBUMIN g/dL  --   --  1.7*  --   --  1.5* 1.8*   BILIRUBIN mg/dL  --   --  2.7*  --   --  1.5* 1.7*  1.8*   ALK PHOS U/L  --   --  227*  --   --  237* 249*   AST (SGOT) U/L  --   --  47*  --   --  78* 77*   ALT (SGPT) U/L  --   --  72*  --   --  67* 65*   Estimated Creatinine Clearance: 135.6 mL/min (A) (by C-G formula based on SCr of 0.5 mg/dL (L)).  No results found for: \"AMMONIA\"  Results from last 7 days   Lab Units 07/01/24  1125   HSTROP T ng/L 12             Glucose   Date/Time Value Ref Range Status   07/06/2024 1201 80 70 - 130 mg/dL Final   07/06/2024 0655 96 70 - 130 mg/dL Final   07/05/2024 2330 126 70 - 130 mg/dL Final   07/05/2024 1723 125 70 - 130 mg/dL Final   07/05/2024 1149 104 70 - 130 mg/dL Final   07/05/2024 0525 121 70 - 130 mg/dL Final   07/04/2024 2358 96 70 - 130 mg/dL Final   07/04/2024 1823 137 (H) 70 - 130 mg/dL Final     No results found for: \"TSH\", \"FREET4\"  Lab Results   Component Value Date    PREGTESTUR Negative 05/15/2015     Pain Management Panel           No data to display              Brief Urine Lab Results  (Last result in the past 365 days)        Color   Clarity   Blood   Leuk Est   Nitrite   Protein   CREAT   Urine HCG        07/01/24 1614 [place]   Cloudy   Trace   Small (1+)   Positive   30 mg/dL (1+)                 Blood Culture   Date Value Ref Range Status   07/01/2024 No growth at 4 days  Preliminary   07/01/2024 No growth at 4 days  Preliminary     Urine Culture   Date Value Ref Range Status   07/01/2024 50,000 CFU/mL Escherichia coli (A)  Final     No results found for: \"WOUNDCX\"  No results found for: \"STOOLCX\"  No results " "found for: \"RESPCX\"  No results found for: \"AFBCX\"  Results from last 7 days   Lab Units 07/02/24  0029 07/01/24  2113 07/01/24  1818   LACTATE mmol/L 2.0 3.3* 4.9*       I have personally looked at the labs and they are summarized above.  ----------------------------------------------------------------------------------------------------------------------  Detailed radiology reports for the last 24 hours:  Imaging Results (Last 24 Hours)       ** No results found for the last 24 hours. **          Assessment & Plan    #Septic shock secondary to UTI  #Mild hyperbilirubinemia  #Hypoglycemia  #Transaminitis  #Metastatic colorectal cancer on palliative chemotherapy  #Acute kidney injury with oliguria, likely secondary to ATN from septic shock  #Small bowel obstruction, likely related to peritoneal metastases  #Hypoalbuminemia  - Continuing to wean levophed. Continue to monitor BP closely and restart vasopressors if needed to keep MAP >65.  - Antibiotics escalated from ceftriaxone to zosyn after admission to cover for intraabdominal infection in the setting of small bowel obstruction and UTI. Urine culture has grown E coli resistant to ampicillin, levaquin, and bactrim. Culture was sensitive to cephalosporins and zosyn. Blood cultures are negative for growth at 2 days. Antibiotics have been deescalated back to ceftriaxone with plan for a likely 7 day course, pending clinical progress.   - General Surgery evaluated her previously, appreciate assistance. She has not had a bowel movement since admission but is passing flatus.   - Tolerating clear liquids without vomiting but reports feeling too bloated to eat/drink any more. Continue daily miralax. Give senna-docusate today and consider dulcolax suppository if no improvement. Advance diet as tolerated. Plan for NG tube for decompression if nausea/vomiting returns.  - Hypoglycemia has resolved, but she continues to have poor PO intake and borderline low blood sugars " "intermittently. Glucose has been in the  range today. Continue D5 1/2NS at 100cc/hr. Edema is relatively stable and hypoalbuminemia is likely contributing. Improving this will be a challenge as she is not able to tolerate much PO intake and has very poor nutrition. Hyponatremia fairly stable, monitor with periodic labs. I am hopeful that we can decrease or discontinue IV fluids if able to get her to have a bowel movement and advance diet within the next 24-48 hours.  - Oncology and Palliative Care consulted, appreciate assistance. Poor long term prognosis with known metastatic malignancy and recurrent bowel obstructions. Will continue to discuss clinical condition and prognosis with patient and family.  - ALISE has resolved. Continue supportive care, avoid nephrotoxins, and treat urinary retention.   - Oclmenares catheter was placed due to patient needing straight catheterization multiple times. Nursing staff reported patient had discomfort and feeling of \"pressure\" in abdomen after catheter placement, and although it was draining and flushing well bladder scan appeared to show a distended bladder. Urology consult requested, appreciate assistance. Urology has evaluated and catheter now appears to be working well.  - Transaminitis has been relatively stable. Alk phos decreased from 249-->227, AST decreased from 77-->47. ALT increased slightly from 65 to 72 and bilirubin increased from 1.7 to 2.7. Suspect this is due to combination of NOLEN and septic shock. Fatty infiltration of the liver noted on CT.   - CMP in a.m.    #Pulmonary embolism, currently on therapeutic anticoagulation  #Acute on chronic anemia  - Holding home eliquis due to difficulty swallowing and bowel obstruction.  - H+H stable and no reports of active bleeding. Lovenox has been ordered in place of home eliquis for now until able to consistently tolerate oral intake.  - CBC in a.m.    #Mouth pain  #Difficulty swallowing  - No visible oral candidiasis " on physical exam, but concern for esophageal candidiasis per SLP evaluation. I have ordered magic mouthwash q6h prn. Nystatin swish and swallow was started, but patient was not able to tolerate it due to nausea and vomiting. Continue IV fluconazole instead for suspected esophageal candidiasis. Symptoms are improving. Continue for a 14 day course.  Edited by: Yeimi Allen DO at 7/6/2024 1545    Active VTE Prophylaxis  Pharmacologic:        Start     Dose Route Frequency Stop    07/02/24 2100  [Held by provider]  apixaban (ELIQUIS) tablet 5 mg        (On hold since Tue 7/2/2024 at 1717 until manually unheld; held by Yeimi Allne DOHold Reason: NPO)   On hold since Tue 7/2/2024 at 1717 until manually unheld (Needs Review)   Hold reason: NPO    5 mg PO 2 Times Daily --    07/02/24 1815  Enoxaparin Sodium (LOVENOX) syringe 60 mg         1 mg/kg SC Every 12 Hours --                  Mechanical:        Start        07/01/24 1946  Maintain Sequential Compression Device  Continuous                              Dispo: Pending clinical progress    Yeimi Allen DO  [facility]  07/06/24  15:46 EDT

## 2024-07-06 NOTE — PLAN OF CARE
Goal Outcome Evaluation:  Plan of Care Reviewed With: patient        Progress: no change  Outcome Evaluation: pt alert and oriented. pt on RA, tolerating well. Pt on 0.02 of levo, otherwise VSS. PRN bowel regimen given. No request at this time. Will continue to follow plan of care til 7p.

## 2024-07-06 NOTE — PROGRESS NOTES
"    Psychiatric HOSPITALIST PROGRESS NOTE     Patient Identification:  Name:  Vangie Carney  Age:  54 y.o.  Sex:  female  :  1970  MRN:  [id number]  Visit Number:  [id number]  ROOM: [address]     Primary Care Provider:  Urvashi Basurto APRN    Length of stay in inpatient status:  4    Subjective     Chief Compliant:    Chief Complaint   Patient presents with    Weakness - Generalized       History of Presenting Illness:    Patient seen and examined this morning with her  and ANURAG Waldrop with palliative care present. Patient says she is feeling slightly better today and denied nausea or abdominal pain. No vomiting since yesterday. No acute events noted overnight. Still intermittently requiring levophed.     Objective     Current Hospital Meds:[Held by provider] apixaban, 5 mg, Oral, BID  castor oil-balsam peru, 1 Application, Topical, Q12H  [Held by provider] dicyclomine, 10 mg, Oral, 4x Daily AC & at Bedtime  enoxaparin, 1 mg/kg, Subcutaneous, Q12H  fluconazole, 200 mg, Intravenous, Q24H  granisetron, 1 patch, Transdermal, Weekly  mupirocin, 1 application , Each Nare, BID  nystatin, 1 Application, Topical, BID  pantoprazole, 40 mg, Intravenous, Q AM  piperacillin-tazobactam, 4.5 g, Intravenous, Q8H  [START ON 2024] polyethylene glycol, 17 g, Oral, Daily  sodium chloride, 10 mL, Intravenous, Q12H  sodium chloride, 10 mL, Intravenous, Q12H  sodium chloride, 10 mL, Intravenous, Q12H  sodium chloride, 10 mL, Intravenous, Q12H    dextrose 5 % and sodium chloride 0.45 %, 100 mL/hr, Last Rate: 100 mL/hr (24 0716)  norepinephrine, 0.02-0.3 mcg/kg/min (Dosing Weight), Last Rate: 0.2 mcg/kg/min (24 1715)        Current Antimicrobial Therapy:  Anti-Infectives (From admission, onward)      Ordered     Dose/Rate Route Frequency Start Stop    24 1107  fluconazole (DIFLUCAN) IVPB 200 mg        Ordering Provider: Yeimi Allen DO   Placed in \"Followed by\" Linked Group    " "200 mg  100 mL/hr over 60 Minutes Intravenous Every 24 Hours 07/05/24 1200 07/18/24 1159    07/04/24 1107  fluconazole (DIFLUCAN) IVPB 400 mg        Ordering Provider: Yeimi Allen DO   Placed in \"Followed by\" Linked Group    400 mg  100 mL/hr over 120 Minutes Intravenous Once 07/04/24 1200 07/04/24 1424    07/02/24 0940  piperacillin-tazobactam (ZOSYN) IVPB 4.5 g IVPB in 100 mL NS (VTB)        Ordering Provider: Yeimi Allen DO    4.5 g  over 4 Hours Intravenous Every 8 Hours 07/02/24 1700 07/09/24 1659    07/02/24 0940  piperacillin-tazobactam (ZOSYN) IVPB 4.5 g IVPB in 100 mL NS (VTB)        Ordering Provider: Yeimi Allen DO    4.5 g  over 30 Minutes Intravenous Once 07/02/24 1030 07/02/24 1052    07/01/24 1758  cefTRIAXone (ROCEPHIN) 2,000 mg in sodium chloride 0.9 % 100 mL IVPB-VTB        Ordering Provider: Pranay Villaseñor MD    2,000 mg  200 mL/hr over 30 Minutes Intravenous Once 07/01/24 1814 07/01/24 1907          Current Diuretic Therapy:  Diuretics (From admission, onward)      None          ----------------------------------------------------------------------------------------------------------------------  Vital Signs:  Temp:  [97.5 °F (36.4 °C)-98.4 °F (36.9 °C)] 97.6 °F (36.4 °C)  Heart Rate:  [] 86  Resp:  [10-17] 15  BP: ()/(54-90) 105/77  SpO2:  [97 %-100 %] 99 %  on   ;   Device (Oxygen Therapy): room air  Body mass index is 22.79 kg/m².    Wt Readings from Last 3 Encounters:   07/05/24 66 kg (145 lb 8.1 oz)   06/26/24 56.7 kg (125 lb)   06/26/24 56.7 kg (125 lb)     Intake & Output (last 3 days)         07/03 0701 07/04 0700 07/04 0701 07/05 0700 07/05 0701 07/06 0700    P.O.  120 360    I.V. (mL/kg) 2385.7 (42.1) 3020.7 (53.3) 1252.8 (22.1)    Other 60      IV Piggyback       Total Intake(mL/kg) 2445.7 (43.1) 3140.7 (55.4) 1612.8 (28.4)    Urine (mL/kg/hr) 420 (0.3) 650 (0.5) 230 (0.3)    Stool       Total Output 420 650 230    Net +2025.7 +2490.7 +1382.8             "     Diet: Liquid; Clear Liquid; Fluid Consistency: Thin (IDDSI 0)  ----------------------------------------------------------------------------------------------------------------------  Physical exam:     Constitutional:  Well-developed and well-nourished. Appears acutely and chronically ill. Appears slightly less fatigued today. No acute distress.      HENT:  Head:  Normocephalic and atraumatic.    Cardiovascular:  Normal rate, regular rhythm, no murmur  Pulmonary/Chest:  No respiratory distress, breath sounds clear to auscultation in anterior lung fields bilaterally  Abdominal:  Soft. Abdomen is mildly distended, with no tenderness to palpation. Hypoactive bowel sounds present  Musculoskeletal:  No tenderness, and no deformity.     Neurological: Awake, alert, no focal deficit on gross examination. No slurred speech or facial droop.    Skin:  Skin is warm and dry. Appears slightly jaundiced.  Peripheral vascular:  No cyanosis. Trace to 2+ upper extremity edema bilaterally, 2+ lower extremity edema bilaterally.  Psychiatric: Appropriate mood, slightly flattened affect   Edited by: Yeimi Allen DO at 7/5/2024 2039  ----------------------------------------------------------------------------------------------------------------------  Results from last 7 days   Lab Units 07/04/24  2243 07/04/24  0644 07/03/24  0315 07/02/24  0029 07/01/24 2113 07/01/24  1818   LACTATE mmol/L  --   --   --  2.0 3.3* 4.9*   WBC 10*3/mm3 6.06 7.04 9.25 11.57*  --   --    HEMOGLOBIN g/dL 9.1* 9.7* 9.1* 9.6*  --   --    HEMATOCRIT % 27.7* 28.4* 26.2* 27.8*  --   --    MCV fL 102.2* 100.7* 98.5* 101.8*  --   --    MCHC g/dL 32.9 34.2 34.7 34.5  --   --    PLATELETS 10*3/mm3 123* 134* 135* 173  --   --          Results from last 7 days   Lab Units 07/05/24  0852 07/04/24  2243 07/04/24  0644 07/03/24  1546 07/03/24  0358 07/02/24  0029 07/01/24  1125   SODIUM mmol/L  --  133* 134*  --  133* 133* 133*   POTASSIUM mmol/L  --  4.4 3.5 3.7  "3.5 3.8 3.9   MAGNESIUM mg/dL  --   --  2.7*  --  1.5*  --  1.8   CHLORIDE mmol/L  --  107 105  --  104 103 99   CO2 mmol/L  --  17.6* 17.7*  --  17.9* 16.8* 20.6*   BUN mg/dL  --  12 14  --  19 22* 22*   CREATININE mg/dL  --  0.50* 0.59  --  0.98 1.38* 1.40*   CALCIUM mg/dL  --  7.0* 7.1*  --  7.3* 7.2* 7.9*   IONIZED CALCIUM mmol/L 0.96*  --   --   --   --   --   --    GLUCOSE mg/dL  --  101* 126*  --  98 75 22*   ALBUMIN g/dL  --   --  1.7*  --   --  1.5* 1.8*   BILIRUBIN mg/dL  --   --  2.7*  --   --  1.5* 1.7*  1.8*   ALK PHOS U/L  --   --  227*  --   --  237* 249*   AST (SGOT) U/L  --   --  47*  --   --  78* 77*   ALT (SGPT) U/L  --   --  72*  --   --  67* 65*   Estimated Creatinine Clearance: 135.6 mL/min (A) (by C-G formula based on SCr of 0.5 mg/dL (L)).  No results found for: \"AMMONIA\"  Results from last 7 days   Lab Units 07/01/24  1125   HSTROP T ng/L 12             Glucose   Date/Time Value Ref Range Status   07/05/2024 1723 125 70 - 130 mg/dL Final   07/05/2024 1149 104 70 - 130 mg/dL Final   07/05/2024 0525 121 70 - 130 mg/dL Final   07/04/2024 2358 96 70 - 130 mg/dL Final   07/04/2024 1823 137 (H) 70 - 130 mg/dL Final   07/04/2024 1233 123 70 - 130 mg/dL Final   07/04/2024 0629 120 70 - 130 mg/dL Final   07/04/2024 0044 121 70 - 130 mg/dL Final     No results found for: \"TSH\", \"FREET4\"  Lab Results   Component Value Date    PREGTESTUR Negative 05/15/2015     Pain Management Panel           No data to display              Brief Urine Lab Results  (Last result in the past 365 days)        Color   Clarity   Blood   Leuk Est   Nitrite   Protein   CREAT   Urine HCG        07/01/24 1614 [place]   Cloudy   Trace   Small (1+)   Positive   30 mg/dL (1+)                 Blood Culture   Date Value Ref Range Status   07/01/2024 No growth at 4 days  Preliminary   07/01/2024 No growth at 4 days  Preliminary     Urine Culture   Date Value Ref Range Status   07/01/2024 50,000 CFU/mL Escherichia coli (A)  Final " "    No results found for: \"WOUNDCX\"  No results found for: \"STOOLCX\"  No results found for: \"RESPCX\"  No results found for: \"AFBCX\"  Results from last 7 days   Lab Units 07/02/24  0029 07/01/24  2113 07/01/24  1818   LACTATE mmol/L 2.0 3.3* 4.9*       I have personally looked at the labs and they are summarized above.  ----------------------------------------------------------------------------------------------------------------------  Detailed radiology reports for the last 24 hours:  Imaging Results (Last 24 Hours)       ** No results found for the last 24 hours. **          Assessment & Plan    #Septic shock secondary to UTI  #Mild hyperbilirubinemia  #Hypoglycemia  #Transaminitis  #Metastatic colorectal cancer on palliative chemotherapy  #Acute kidney injury with oliguria, likely secondary to ATN from septic shock  #Small bowel obstruction, likely related to peritoneal metastases  - Intermittently requiring levophed. Continue to monitor BP closely and restart vasopressors if needed to keep MAP >65.  - Antibiotics escalated from ceftriaxone to zosyn after admission to cover for intraabdominal infection in the setting of small bowel obstruction and UTI. Urine culture has grown E coli resistant to ampicillin, levaquin, and bactrim. Culture was sensitive to cephalosporins and zosyn. Blood cultures are negative for growth at 2 days. Antibiotics have been deescalated back to ceftriaxone with plan for a likely 7 day course, pending clinical progress.   - General Surgery evaluated her previously, appreciate assistance. She has not had a bowel movement since admission but is passing flatus.   - Diet re-attempted today and she has tolerated clear liquids so far. Start daily miralax as she has still not had a bowel movement since admission.  - Hypoglycemia has resolved, but she continues to have poor PO intake and borderline low blood sugars intermittently. Continue D5 1/2NS at 100cc/hr. Hyponatremia fairly stable, sodium " "level 133 today.  - Oncology and Palliative Care consulted, appreciate assistance. Goals of care have been discussed at length with patient and spouse. Patient has been very clear that she wishes to be DNR/DNI and spouse has been in agreement, but then later stated that if condition deteriorated that he would want us to \"do everything.\" She remains critically ill and has poor long term prognosis with known metastatic malignancy and recurrent bowel obstructions. Will continue to discuss clinical condition and prognosis with patient and family.  - ALISE has resolved. Continue supportive care, avoid nephrotoxins, and treat urinary retention.   - Colmenares catheter was placed due to patient needing straight catheterization multiple times. Nursing staff reported patient had discomfort and feeling of \"pressure\" in abdomen after catheter placement, and although it was draining and flushing well bladder scan appeared to show a distended bladder. Urology consult requested, appreciate assistance. Urology has evaluated and catheter now appears to be working well.  - Transaminitis has been relatively stable. Alk phos decreased from 249-->227, AST decreased from 77-->47. ALT increased slightly from 65 to 72 and bilirubin increased from 1.7 to 2.7. Suspect this is due to combination of NOLEN and septic shock. Fatty infiltration of the liver noted on CT.   - BMP in a.m. Monitor LFTs q24-48 hours    #Pulmonary embolism, currently on therapeutic anticoagulation  #Acute on chronic anemia  - Holding home eliquis due to difficulty swallowing and bowel obstruction.  - H+H stable and no reports of active bleeding. Lovenox has been ordered in place of home eliquis for now.  - CBC in a.m.    #Mouth pain  #Difficulty swallowing  - No visible oral candidiasis on physical exam, but concern for esophageal candidiasis per SLP evaluation. I have ordered magic mouthwash q6h prn. Nystatin swish and swallow was started, but patient was not able to " tolerate it due to nausea and vomiting. Continue IV fluconazole instead for suspected esophageal candidiasis. Symptoms are improving today.  Edited by: Yeimi Allen DO at 7/5/2024 2039    Active VTE Prophylaxis  Pharmacologic:        Start     Dose Route Frequency Stop    07/02/24 2100  [Held by provider]  apixaban (ELIQUIS) tablet 5 mg        (On hold since Tue 7/2/2024 at 1717 until manually unheld; held by Yeimi Allen DOHold Reason: NPO)   On hold since Tue 7/2/2024 at 1717 until manually unheld (Needs Review)   Hold reason: NPO    5 mg PO 2 Times Daily --    07/02/24 1815  Enoxaparin Sodium (LOVENOX) syringe 60 mg         1 mg/kg SC Every 12 Hours --                  Mechanical:        Start        07/01/24 1946  Maintain Sequential Compression Device  Continuous                            Dispo: pending clinical progress    Yeimi Allen DO  [facility]  07/05/24  20:39 EDT

## 2024-07-06 NOTE — PLAN OF CARE
Goal Outcome Evaluation:              Outcome Evaluation: Pt A/Ox4. RA. ST on monitor. Levo gtt and IVF infusing. Afebrile. Adequate UOP. VSS. No complaints throughout shift. Bed in lowest position with wheels locked. Bed alarm set and call light within reach. Plan of care continued.

## 2024-07-07 LAB
ALBUMIN SERPL-MCNC: 1.5 G/DL (ref 3.5–5.2)
ALBUMIN/GLOB SERPL: 0.8 G/DL
ALP SERPL-CCNC: 204 U/L (ref 39–117)
ALT SERPL W P-5'-P-CCNC: 64 U/L (ref 1–33)
ANION GAP SERPL CALCULATED.3IONS-SCNC: 8.1 MMOL/L (ref 5–15)
AST SERPL-CCNC: 58 U/L (ref 1–32)
BILIRUB SERPL-MCNC: 2.5 MG/DL (ref 0–1.2)
BUN SERPL-MCNC: 9 MG/DL (ref 6–20)
BUN/CREAT SERPL: 18.8 (ref 7–25)
CALCIUM SPEC-SCNC: 6.9 MG/DL (ref 8.6–10.5)
CHLORIDE SERPL-SCNC: 107 MMOL/L (ref 98–107)
CO2 SERPL-SCNC: 16.9 MMOL/L (ref 22–29)
CREAT SERPL-MCNC: 0.48 MG/DL (ref 0.57–1)
DEPRECATED RDW RBC AUTO: 55.7 FL (ref 37–54)
EGFRCR SERPLBLD CKD-EPI 2021: 113.4 ML/MIN/1.73
ERYTHROCYTE [DISTWIDTH] IN BLOOD BY AUTOMATED COUNT: 14.6 % (ref 12.3–15.4)
GLOBULIN UR ELPH-MCNC: 2 GM/DL
GLUCOSE BLDC GLUCOMTR-MCNC: 104 MG/DL (ref 70–130)
GLUCOSE BLDC GLUCOMTR-MCNC: 107 MG/DL (ref 70–130)
GLUCOSE BLDC GLUCOMTR-MCNC: 81 MG/DL (ref 70–130)
GLUCOSE BLDC GLUCOMTR-MCNC: 93 MG/DL (ref 70–130)
GLUCOSE SERPL-MCNC: 87 MG/DL (ref 65–99)
HCT VFR BLD AUTO: 25.8 % (ref 34–46.6)
HGB BLD-MCNC: 8.7 G/DL (ref 12–15.9)
MCH RBC QN AUTO: 35.5 PG (ref 26.6–33)
MCHC RBC AUTO-ENTMCNC: 33.7 G/DL (ref 31.5–35.7)
MCV RBC AUTO: 105.3 FL (ref 79–97)
PLATELET # BLD AUTO: 147 10*3/MM3 (ref 140–450)
PMV BLD AUTO: 10.2 FL (ref 6–12)
POTASSIUM SERPL-SCNC: 4 MMOL/L (ref 3.5–5.2)
PROT SERPL-MCNC: 3.5 G/DL (ref 6–8.5)
QT INTERVAL: 278 MS
QTC INTERVAL: 404 MS
RBC # BLD AUTO: 2.45 10*6/MM3 (ref 3.77–5.28)
SODIUM SERPL-SCNC: 132 MMOL/L (ref 136–145)
WBC NRBC COR # BLD AUTO: 5.24 10*3/MM3 (ref 3.4–10.8)

## 2024-07-07 PROCEDURE — 99231 SBSQ HOSP IP/OBS SF/LOW 25: CPT | Performed by: STUDENT IN AN ORGANIZED HEALTH CARE EDUCATION/TRAINING PROGRAM

## 2024-07-07 PROCEDURE — 25010000002 FLUCONAZOLE PER 200 MG: Performed by: STUDENT IN AN ORGANIZED HEALTH CARE EDUCATION/TRAINING PROGRAM

## 2024-07-07 PROCEDURE — 82728 ASSAY OF FERRITIN: CPT | Performed by: INTERNAL MEDICINE

## 2024-07-07 PROCEDURE — 82948 REAGENT STRIP/BLOOD GLUCOSE: CPT

## 2024-07-07 PROCEDURE — 83540 ASSAY OF IRON: CPT | Performed by: INTERNAL MEDICINE

## 2024-07-07 PROCEDURE — 80053 COMPREHEN METABOLIC PANEL: CPT | Performed by: STUDENT IN AN ORGANIZED HEALTH CARE EDUCATION/TRAINING PROGRAM

## 2024-07-07 PROCEDURE — 25010000002 PIPERACILLIN SOD-TAZOBACTAM PER 1 G: Performed by: STUDENT IN AN ORGANIZED HEALTH CARE EDUCATION/TRAINING PROGRAM

## 2024-07-07 PROCEDURE — 85027 COMPLETE CBC AUTOMATED: CPT | Performed by: STUDENT IN AN ORGANIZED HEALTH CARE EDUCATION/TRAINING PROGRAM

## 2024-07-07 PROCEDURE — 25010000002 ENOXAPARIN PER 10 MG: Performed by: STUDENT IN AN ORGANIZED HEALTH CARE EDUCATION/TRAINING PROGRAM

## 2024-07-07 PROCEDURE — 84466 ASSAY OF TRANSFERRIN: CPT | Performed by: INTERNAL MEDICINE

## 2024-07-07 PROCEDURE — 25010000002 ONDANSETRON PER 1 MG: Performed by: STUDENT IN AN ORGANIZED HEALTH CARE EDUCATION/TRAINING PROGRAM

## 2024-07-07 RX ORDER — BISACODYL 10 MG
10 SUPPOSITORY, RECTAL RECTAL DAILY PRN
Status: DISCONTINUED | OUTPATIENT
Start: 2024-07-07 | End: 2024-07-13 | Stop reason: HOSPADM

## 2024-07-07 RX ORDER — BISACODYL 5 MG/1
5 TABLET, DELAYED RELEASE ORAL DAILY PRN
Status: DISCONTINUED | OUTPATIENT
Start: 2024-07-07 | End: 2024-07-13 | Stop reason: HOSPADM

## 2024-07-07 RX ORDER — BISACODYL 10 MG
10 SUPPOSITORY, RECTAL RECTAL DAILY PRN
Status: DISCONTINUED | OUTPATIENT
Start: 2024-07-07 | End: 2024-07-07

## 2024-07-07 RX ORDER — AMOXICILLIN 250 MG
2 CAPSULE ORAL 2 TIMES DAILY PRN
Status: DISCONTINUED | OUTPATIENT
Start: 2024-07-07 | End: 2024-07-07

## 2024-07-07 RX ORDER — POLYETHYLENE GLYCOL 3350 17 G/17G
17 POWDER, FOR SOLUTION ORAL DAILY PRN
Status: DISCONTINUED | OUTPATIENT
Start: 2024-07-07 | End: 2024-07-13 | Stop reason: HOSPADM

## 2024-07-07 RX ORDER — POLYETHYLENE GLYCOL 3350 17 G/17G
17 POWDER, FOR SOLUTION ORAL DAILY PRN
Status: DISCONTINUED | OUTPATIENT
Start: 2024-07-07 | End: 2024-07-07

## 2024-07-07 RX ORDER — LACTULOSE 10 G/15ML
20 SOLUTION ORAL 2 TIMES DAILY
Status: DISCONTINUED | OUTPATIENT
Start: 2024-07-07 | End: 2024-07-13 | Stop reason: HOSPADM

## 2024-07-07 RX ORDER — AMOXICILLIN 250 MG
2 CAPSULE ORAL 2 TIMES DAILY
Status: DISCONTINUED | OUTPATIENT
Start: 2024-07-07 | End: 2024-07-13 | Stop reason: HOSPADM

## 2024-07-07 RX ORDER — BISACODYL 5 MG/1
5 TABLET, DELAYED RELEASE ORAL DAILY PRN
Status: DISCONTINUED | OUTPATIENT
Start: 2024-07-07 | End: 2024-07-07

## 2024-07-07 RX ADMIN — DEXTROSE AND SODIUM CHLORIDE 100 ML/HR: 5; 450 INJECTION, SOLUTION INTRAVENOUS at 00:07

## 2024-07-07 RX ADMIN — MUPIROCIN 1 APPLICATION: 20 OINTMENT TOPICAL at 08:55

## 2024-07-07 RX ADMIN — Medication 1 APPLICATION: at 20:39

## 2024-07-07 RX ADMIN — DOCUSATE SODIUM 50 MG AND SENNOSIDES 8.6 MG 2 TABLET: 8.6; 5 TABLET, FILM COATED ORAL at 20:39

## 2024-07-07 RX ADMIN — PIPERACILLIN AND TAZOBACTAM 4.5 G: 4; .5 INJECTION, POWDER, FOR SOLUTION INTRAVENOUS at 17:33

## 2024-07-07 RX ADMIN — Medication 10 ML: at 08:56

## 2024-07-07 RX ADMIN — POLYETHYLENE GLYCOL (3350) 17 G: 17 POWDER, FOR SOLUTION ORAL at 08:55

## 2024-07-07 RX ADMIN — ONDANSETRON 4 MG: 2 INJECTION INTRAMUSCULAR; INTRAVENOUS at 09:21

## 2024-07-07 RX ADMIN — Medication 10 ML: at 20:38

## 2024-07-07 RX ADMIN — FLUCONAZOLE 200 MG: 2 INJECTION, SOLUTION INTRAVENOUS at 11:31

## 2024-07-07 RX ADMIN — ENOXAPARIN SODIUM 60 MG: 60 INJECTION SUBCUTANEOUS at 17:33

## 2024-07-07 RX ADMIN — PIPERACILLIN AND TAZOBACTAM 4.5 G: 4; .5 INJECTION, POWDER, FOR SOLUTION INTRAVENOUS at 00:57

## 2024-07-07 RX ADMIN — PANTOPRAZOLE SODIUM 40 MG: 40 INJECTION, POWDER, FOR SOLUTION INTRAVENOUS at 06:43

## 2024-07-07 RX ADMIN — DOCUSATE SODIUM 50 MG AND SENNOSIDES 8.6 MG 2 TABLET: 8.6; 5 TABLET, FILM COATED ORAL at 11:31

## 2024-07-07 RX ADMIN — PIPERACILLIN AND TAZOBACTAM 4.5 G: 4; .5 INJECTION, POWDER, FOR SOLUTION INTRAVENOUS at 08:55

## 2024-07-07 RX ADMIN — Medication 1 APPLICATION: at 08:55

## 2024-07-07 RX ADMIN — NYSTATIN 1 APPLICATION: 100000 CREAM TOPICAL at 20:38

## 2024-07-07 RX ADMIN — NYSTATIN 1 APPLICATION: 100000 CREAM TOPICAL at 08:55

## 2024-07-07 RX ADMIN — ENOXAPARIN SODIUM 60 MG: 60 INJECTION SUBCUTANEOUS at 06:43

## 2024-07-07 RX ADMIN — LACTULOSE 20 G: 20 SOLUTION ORAL at 11:31

## 2024-07-07 RX ADMIN — Medication 10 ML: at 08:55

## 2024-07-07 RX ADMIN — DEXTROSE AND SODIUM CHLORIDE 100 ML/HR: 5; 450 INJECTION, SOLUTION INTRAVENOUS at 13:07

## 2024-07-07 NOTE — PLAN OF CARE
Goal Outcome Evaluation:           Progress: no change  Outcome Evaluation: Pt AOx4. VSS at this time. Patient has no complaints at this time. Bed low, locked, and call light in reach.

## 2024-07-07 NOTE — PLAN OF CARE
Problem: Adult Inpatient Plan of Care  Goal: Plan of Care Review  Outcome: Ongoing, Progressing     Pt resting comfortably on RA. VSS. BP remains stable w/ Levophed turned off. Family is at bedside and supportive of care. No questions or concerns at this time.

## 2024-07-07 NOTE — PROGRESS NOTES
"    [facility] HOSPITALIST PROGRESS NOTE     Patient Identification:  Name:  Vangie Carney  Age:  54 y.o.  Sex:  female  :  1970  MRN:  [id number]  Visit Number:  [id number]  ROOM: [address]     Primary Care Provider:  Urvashi Basurto APRN    Length of stay in inpatient status:  6    Subjective     Chief Compliant:    Chief Complaint   Patient presents with    Weakness - Generalized       History of Presenting Illness:  Patient seen and examined this morning with her  at bedside. Nursing staff reports that she had a very small bowel movement since yesterday. Patient reports feeling nauseated but has not had any vomiting.     Objective     Current Hospital Meds:[Held by provider] apixaban, 5 mg, Oral, BID  castor oil-balsam peru, 1 Application, Topical, Q12H  [Held by provider] dicyclomine, 10 mg, Oral, 4x Daily AC & at Bedtime  enoxaparin, 1 mg/kg, Subcutaneous, Q12H  fluconazole, 200 mg, Intravenous, Q24H  granisetron, 1 patch, Transdermal, Weekly  lactulose, 20 g, Oral, BID  nystatin, 1 Application, Topical, BID  pantoprazole, 40 mg, Intravenous, Q AM  piperacillin-tazobactam, 4.5 g, Intravenous, Q8H  senna-docusate sodium, 2 tablet, Oral, BID  sodium chloride, 10 mL, Intravenous, Q12H  sodium chloride, 10 mL, Intravenous, Q12H  sodium chloride, 10 mL, Intravenous, Q12H  sodium chloride, 10 mL, Intravenous, Q12H    dextrose 5 % and sodium chloride 0.45 %, 75 mL/hr, Last Rate: 100 mL/hr (24 1307)  norepinephrine, 0.02-0.3 mcg/kg/min (Dosing Weight), Last Rate: Stopped (24 1500)        Current Antimicrobial Therapy:  Anti-Infectives (From admission, onward)      Ordered     Dose/Rate Route Frequency Start Stop    24 1107  fluconazole (DIFLUCAN) IVPB 200 mg        Ordering Provider: Yeimi Allen DO   Placed in \"Followed by\" Linked Group    200 mg  100 mL/hr over 60 Minutes Intravenous Every 24 Hours 24 1200 24 1159    24 1107  fluconazole (DIFLUCAN) " "IVPB 400 mg        Ordering Provider: Yeimi Allen DO   Placed in \"Followed by\" Linked Group    400 mg  100 mL/hr over 120 Minutes Intravenous Once 07/04/24 1200 07/04/24 1424    07/02/24 0940  piperacillin-tazobactam (ZOSYN) IVPB 4.5 g IVPB in 100 mL NS (VTB)        Ordering Provider: Yeimi Allen DO    4.5 g  over 4 Hours Intravenous Every 8 Hours 07/02/24 1700 07/09/24 1659    07/02/24 0940  piperacillin-tazobactam (ZOSYN) IVPB 4.5 g IVPB in 100 mL NS (VTB)        Ordering Provider: Yeimi Allen DO    4.5 g  over 30 Minutes Intravenous Once 07/02/24 1030 07/02/24 1052    07/01/24 1758  cefTRIAXone (ROCEPHIN) 2,000 mg in sodium chloride 0.9 % 100 mL IVPB-VTB        Ordering Provider: Pranay Villaseñor MD    2,000 mg  200 mL/hr over 30 Minutes Intravenous Once 07/01/24 1814 07/01/24 1907          Current Diuretic Therapy:  Diuretics (From admission, onward)      None          ----------------------------------------------------------------------------------------------------------------------  Vital Signs:  Temp:  [97.2 °F (36.2 °C)-98.1 °F (36.7 °C)] 97.2 °F (36.2 °C)  Heart Rate:  [] 96  Resp:  [13-20] 15  BP: ()/(51-88) 90/75  SpO2:  [98 %-100 %] 99 %  on   ;   Device (Oxygen Therapy): room air  Body mass index is 22.79 kg/m².    Wt Readings from Last 3 Encounters:   07/07/24 66 kg (145 lb 8.1 oz)   06/26/24 56.7 kg (125 lb)   06/26/24 56.7 kg (125 lb)     Intake & Output (last 3 days)         07/04 0701 07/05 0700 07/05 0701 07/06 0700 07/06 0701 07/07 0700 07/07 0701 07/08 0700    P.O. 120 360 360 360    I.V. (mL/kg) 3020.7 (53.3) 1252.8 (22.1)  2014 (35.5)    Other        IV Piggyback   200     Total Intake(mL/kg) 3140.7 (55.4) 1612.8 (28.4) 560 (9.9) 2374 (41.9)    Urine (mL/kg/hr) 650 (0.5) 480 (0.4) 450 (0.3) 200 (0.3)    Stool   0 0    Total Output 650 480 450 200    Net +2490.7 +1132.8 +110 +2174            Stool Unmeasured Occurrence   2 x 1 x          Diet: Liquid; Full Liquid; " Fluid Consistency: Thin (IDDSI 0)  ----------------------------------------------------------------------------------------------------------------------  Physical exam:     Constitutional:  Well-developed and well-nourished. Appears acutely and chronically ill. Fatigued, but unchanged from previous exams. No acute distress.      HENT:  Head:  Normocephalic and atraumatic.    Cardiovascular:  Normal rate, regular rhythm, no murmur  Pulmonary/Chest:  No respiratory distress, breath sounds clear to auscultation in anterior lung fields bilaterally  Abdominal:  Soft. Abdomen is mildly distended, with no tenderness to palpation. Normal bowel sounds present  Musculoskeletal:  No tenderness, and no deformity.     Neurological: Awake, alert, no focal deficit on gross examination. No slurred speech or facial droop.    Skin:  Skin is warm and dry. Appears slightly jaundiced.  Peripheral vascular:  No cyanosis. 1 to 2+ upper extremity edema bilaterally, 2+ lower extremity edema bilaterally.  Psychiatric: Appropriate mood, slightly flattened affect   Edited by: Yeimi Allen DO at 7/7/2024 1836  ----------------------------------------------------------------------------------------------------------------------  Results from last 7 days   Lab Units 07/07/24  0023 07/04/24  2243 07/04/24  0644 07/03/24  0315 07/02/24 0029 07/01/24 2113 07/01/24  1818   LACTATE mmol/L  --   --   --   --  2.0 3.3* 4.9*   WBC 10*3/mm3 5.24 6.06 7.04   < > 11.57*  --   --    HEMOGLOBIN g/dL 8.7* 9.1* 9.7*   < > 9.6*  --   --    HEMATOCRIT % 25.8* 27.7* 28.4*   < > 27.8*  --   --    MCV fL 105.3* 102.2* 100.7*   < > 101.8*  --   --    MCHC g/dL 33.7 32.9 34.2   < > 34.5  --   --    PLATELETS 10*3/mm3 147 123* 134*   < > 173  --   --     < > = values in this interval not displayed.         Results from last 7 days   Lab Units 07/07/24  0023 07/05/24  0852 07/04/24  2243 07/04/24  0644 07/03/24  1546 07/03/24  0358 07/02/24  0029 07/01/24  1125  "  SODIUM mmol/L 132*  --  133* 134*  --  133* 133* 133*   POTASSIUM mmol/L 4.0  --  4.4 3.5   < > 3.5 3.8 3.9   MAGNESIUM mg/dL  --   --   --  2.7*  --  1.5*  --  1.8   CHLORIDE mmol/L 107  --  107 105  --  104 103 99   CO2 mmol/L 16.9*  --  17.6* 17.7*  --  17.9* 16.8* 20.6*   BUN mg/dL 9  --  12 14  --  19 22* 22*   CREATININE mg/dL 0.48*  --  0.50* 0.59  --  0.98 1.38* 1.40*   CALCIUM mg/dL 6.9*  --  7.0* 7.1*  --  7.3* 7.2* 7.9*   IONIZED CALCIUM mmol/L  --  0.96*  --   --   --   --   --   --    GLUCOSE mg/dL 87  --  101* 126*  --  98 75 22*   ALBUMIN g/dL 1.5*  --   --  1.7*  --   --  1.5* 1.8*   BILIRUBIN mg/dL 2.5*  --   --  2.7*  --   --  1.5* 1.7*  1.8*   ALK PHOS U/L 204*  --   --  227*  --   --  237* 249*   AST (SGOT) U/L 58*  --   --  47*  --   --  78* 77*   ALT (SGPT) U/L 64*  --   --  72*  --   --  67* 65*    < > = values in this interval not displayed.   Estimated Creatinine Clearance: 141.2 mL/min (A) (by C-G formula based on SCr of 0.48 mg/dL (L)).  No results found for: \"AMMONIA\"  Results from last 7 days   Lab Units 07/01/24  1125   HSTROP T ng/L 12             Glucose   Date/Time Value Ref Range Status   07/07/2024 1738 107 70 - 130 mg/dL Final   07/07/2024 1146 104 70 - 130 mg/dL Final   07/07/2024 0708 81 70 - 130 mg/dL Final   07/07/2024 0006 93 70 - 130 mg/dL Final   07/06/2024 1723 99 70 - 130 mg/dL Final   07/06/2024 1201 80 70 - 130 mg/dL Final   07/06/2024 0655 96 70 - 130 mg/dL Final   07/05/2024 2330 126 70 - 130 mg/dL Final     No results found for: \"TSH\", \"FREET4\"  Lab Results   Component Value Date    PREGTESTUR Negative 05/15/2015     Pain Management Panel           No data to display              Brief Urine Lab Results  (Last result in the past 365 days)        Color   Clarity   Blood   Leuk Est   Nitrite   Protein   CREAT   Urine HCG        07/01/24 1614 [place]   Cloudy   Trace   Small (1+)   Positive   30 mg/dL (1+)                 Blood Culture   Date Value Ref Range Status " "  07/01/2024 No growth at 5 days  Final   07/01/2024 No growth at 5 days  Final     Urine Culture   Date Value Ref Range Status   07/01/2024 50,000 CFU/mL Escherichia coli (A)  Final     No results found for: \"WOUNDCX\"  No results found for: \"STOOLCX\"  No results found for: \"RESPCX\"  No results found for: \"AFBCX\"  Results from last 7 days   Lab Units 07/02/24  0029 07/01/24  2113 07/01/24  1818   LACTATE mmol/L 2.0 3.3* 4.9*       I have personally looked at the labs and they are summarized above.  ----------------------------------------------------------------------------------------------------------------------  Detailed radiology reports for the last 24 hours:  Imaging Results (Last 24 Hours)       ** No results found for the last 24 hours. **          Assessment & Plan    #Septic shock secondary to UTI  #Mild hyperbilirubinemia  #Hypoglycemia  #Transaminitis  #Metastatic colorectal cancer on palliative chemotherapy  #Acute kidney injury with oliguria, likely secondary to ATN from septic shock  #Small bowel obstruction, likely related to peritoneal metastases  #Hypoalbuminemia  - Currently off levophed. Systolic BP has been low but MAP is >65. Continue to monitor BP closely and restart vasopressors if needed to keep MAP >65.  - Antibiotics escalated from ceftriaxone to zosyn after admission to cover for intraabdominal infection in the setting of small bowel obstruction and UTI. Urine culture has grown E coli resistant to ampicillin, levaquin, and bactrim. Culture was sensitive to cephalosporins and zosyn. Blood cultures are negative for growth at 5 days. Antibiotics have been deescalated back to ceftriaxone with plan for a likely 7 day course, pending clinical progress.   - General Surgery evaluated her previously, appreciate assistance.   - Tolerating clear liquids without vomiting but is feeling nauseated today. She had a very small bowel movement since last night per nursing report. Continue antiemetics. " "Lactulose started today instead of scheduled miralax to decrease the volume of liquid she has to drink. Continue other scheduled and prn bowel regimen agents.   - Hypoglycemia has resolved, but she continues to have poor PO intake and borderline low blood sugars intermittently. Glucose is in the  range this morning. Hypoalbuminemia is likely contributing to her edema. Improving this will be a challenge as she is not able to tolerate much PO intake and has very poor nutrition.  Hyponatremia fairly stable, monitor with periodic labs. I am hopeful that we can decrease or discontinue IV fluids if able to get her to have a bowel movement and advance diet within the next 24-48 hours. We will attempt to decrease the IV fluids to 75cc/hr to reduce volume overload. Monitor with accuchecks for recurrence of hypoglycemia.  - Oncology and Palliative Care consulted, appreciate assistance. Poor long term prognosis with known metastatic malignancy and recurrent bowel obstructions. Will continue to discuss clinical condition and prognosis with patient and family.  - ALISE has resolved. Continue supportive care, avoid nephrotoxins, and treat urinary retention.   - Colmenares catheter was placed due to patient needing straight catheterization multiple times. Nursing staff reported patient had discomfort and feeling of \"pressure\" in abdomen after catheter placement, and although it was draining and flushing well bladder scan appeared to show a distended bladder. Urology consult requested, appreciate assistance. Urology has evaluated and catheter now appears to be working well.  - Transaminitis has been relatively stable. Suspect this is due to combination of NOLEN and septic shock. Fatty infiltration of the liver noted on CT.   - BMP in a.m.    #Pulmonary embolism, currently on therapeutic anticoagulation  #Acute on chronic anemia  - Holding home eliquis due to difficulty swallowing and bowel obstruction.  - Hemoglobin has trended down " slightly to 8.7. No reports of active bleeding. Lovenox has been ordered in place of home eliquis for now until able to consistently tolerate oral intake.  - CBC in a.m.    #Mouth pain  #Difficulty swallowing  - No visible oral candidiasis on physical exam, but concern for esophageal candidiasis per SLP evaluation. I have ordered magic mouthwash q6h prn. Nystatin swish and swallow was started, but patient was not able to tolerate it due to nausea and vomiting. Continue IV fluconazole instead for suspected esophageal candidiasis. Symptoms are improving. Continue for a 14 day course.  Edited by: Yeimi Allen DO at 7/7/2024 1836    Active VTE Prophylaxis  Pharmacologic:        Start     Dose Route Frequency Stop    07/02/24 2100  [Held by provider]  apixaban (ELIQUIS) tablet 5 mg        (On hold since Tue 7/2/2024 at 1717 until manually unheld; held by Yeimi Allen DOHold Reason: NPO)   On hold since Tue 7/2/2024 at 1717 until manually unheld (Needs Review)   Hold reason: NPO    5 mg PO 2 Times Daily --    07/02/24 1815  Enoxaparin Sodium (LOVENOX) syringe 60 mg         1 mg/kg SC Every 12 Hours --                  Mechanical:        Start        07/01/24 1946  Maintain Sequential Compression Device  Continuous                              Dispo: pending clinical progress, overall prognosis is poor     Yeimi Allen DO  [facility]  07/07/24  18:36 EDT

## 2024-07-08 LAB
ANION GAP SERPL CALCULATED.3IONS-SCNC: 8.4 MMOL/L (ref 5–15)
BUN SERPL-MCNC: 9 MG/DL (ref 6–20)
BUN/CREAT SERPL: 21.4 (ref 7–25)
CALCIUM SPEC-SCNC: 6.9 MG/DL (ref 8.6–10.5)
CHLORIDE SERPL-SCNC: 107 MMOL/L (ref 98–107)
CO2 SERPL-SCNC: 14.6 MMOL/L (ref 22–29)
CREAT SERPL-MCNC: 0.42 MG/DL (ref 0.57–1)
DEPRECATED RDW RBC AUTO: 54.2 FL (ref 37–54)
EGFRCR SERPLBLD CKD-EPI 2021: 117.1 ML/MIN/1.73
ERYTHROCYTE [DISTWIDTH] IN BLOOD BY AUTOMATED COUNT: 14.7 % (ref 12.3–15.4)
FERRITIN SERPL-MCNC: 469.8 NG/ML (ref 13–150)
FOLATE SERPL-MCNC: 7.67 NG/ML (ref 4.78–24.2)
GLUCOSE BLDC GLUCOMTR-MCNC: 103 MG/DL (ref 70–130)
GLUCOSE BLDC GLUCOMTR-MCNC: 105 MG/DL (ref 70–130)
GLUCOSE BLDC GLUCOMTR-MCNC: 113 MG/DL (ref 70–130)
GLUCOSE BLDC GLUCOMTR-MCNC: 123 MG/DL (ref 70–130)
GLUCOSE SERPL-MCNC: 100 MG/DL (ref 65–99)
HCT VFR BLD AUTO: 26.3 % (ref 34–46.6)
HGB BLD-MCNC: 8.7 G/DL (ref 12–15.9)
IRON 24H UR-MRATE: 44 MCG/DL (ref 37–145)
IRON SATN MFR SERPL: 89 % (ref 20–50)
MCH RBC QN AUTO: 34.7 PG (ref 26.6–33)
MCHC RBC AUTO-ENTMCNC: 33.1 G/DL (ref 31.5–35.7)
MCV RBC AUTO: 104.8 FL (ref 79–97)
PLATELET # BLD AUTO: 189 10*3/MM3 (ref 140–450)
PMV BLD AUTO: 10 FL (ref 6–12)
POTASSIUM SERPL-SCNC: 3.7 MMOL/L (ref 3.5–5.2)
RBC # BLD AUTO: 2.51 10*6/MM3 (ref 3.77–5.28)
SODIUM SERPL-SCNC: 130 MMOL/L (ref 136–145)
TIBC SERPL-MCNC: 49 MCG/DL (ref 298–536)
TRANSFERRIN SERPL-MCNC: 33 MG/DL (ref 200–360)
VIT B12 BLD-MCNC: 1883 PG/ML (ref 211–946)
WBC NRBC COR # BLD AUTO: 5.86 10*3/MM3 (ref 3.4–10.8)

## 2024-07-08 PROCEDURE — 99232 SBSQ HOSP IP/OBS MODERATE 35: CPT | Performed by: HOSPITALIST

## 2024-07-08 PROCEDURE — 82948 REAGENT STRIP/BLOOD GLUCOSE: CPT

## 2024-07-08 PROCEDURE — 25010000002 PIPERACILLIN SOD-TAZOBACTAM PER 1 G: Performed by: STUDENT IN AN ORGANIZED HEALTH CARE EDUCATION/TRAINING PROGRAM

## 2024-07-08 PROCEDURE — 25010000002 ENOXAPARIN PER 10 MG: Performed by: STUDENT IN AN ORGANIZED HEALTH CARE EDUCATION/TRAINING PROGRAM

## 2024-07-08 PROCEDURE — 82746 ASSAY OF FOLIC ACID SERUM: CPT | Performed by: INTERNAL MEDICINE

## 2024-07-08 PROCEDURE — 25010000002 ONDANSETRON PER 1 MG: Performed by: STUDENT IN AN ORGANIZED HEALTH CARE EDUCATION/TRAINING PROGRAM

## 2024-07-08 PROCEDURE — 82607 VITAMIN B-12: CPT | Performed by: INTERNAL MEDICINE

## 2024-07-08 PROCEDURE — 99232 SBSQ HOSP IP/OBS MODERATE 35: CPT | Performed by: INTERNAL MEDICINE

## 2024-07-08 PROCEDURE — 25010000002 FLUCONAZOLE PER 200 MG: Performed by: STUDENT IN AN ORGANIZED HEALTH CARE EDUCATION/TRAINING PROGRAM

## 2024-07-08 RX ORDER — PROCHLORPERAZINE EDISYLATE 5 MG/ML
5 INJECTION INTRAMUSCULAR; INTRAVENOUS EVERY 4 HOURS PRN
Status: DISCONTINUED | OUTPATIENT
Start: 2024-07-08 | End: 2024-07-13 | Stop reason: HOSPADM

## 2024-07-08 RX ORDER — PROMETHAZINE HYDROCHLORIDE 12.5 MG/1
12.5 SUPPOSITORY RECTAL EVERY 6 HOURS PRN
Status: DISCONTINUED | OUTPATIENT
Start: 2024-07-08 | End: 2024-07-13 | Stop reason: HOSPADM

## 2024-07-08 RX ADMIN — PIPERACILLIN AND TAZOBACTAM 4.5 G: 4; .5 INJECTION, POWDER, FOR SOLUTION INTRAVENOUS at 00:28

## 2024-07-08 RX ADMIN — ENOXAPARIN SODIUM 60 MG: 60 INJECTION SUBCUTANEOUS at 06:20

## 2024-07-08 RX ADMIN — ENOXAPARIN SODIUM 60 MG: 60 INJECTION SUBCUTANEOUS at 18:09

## 2024-07-08 RX ADMIN — Medication 10 ML: at 08:36

## 2024-07-08 RX ADMIN — Medication 10 ML: at 21:02

## 2024-07-08 RX ADMIN — PIPERACILLIN AND TAZOBACTAM 4.5 G: 4; .5 INJECTION, POWDER, FOR SOLUTION INTRAVENOUS at 18:09

## 2024-07-08 RX ADMIN — Medication 10 ML: at 08:37

## 2024-07-08 RX ADMIN — ONDANSETRON 4 MG: 2 INJECTION INTRAMUSCULAR; INTRAVENOUS at 04:08

## 2024-07-08 RX ADMIN — DEXTROSE AND SODIUM CHLORIDE 30 ML/HR: 5; 450 INJECTION, SOLUTION INTRAVENOUS at 15:15

## 2024-07-08 RX ADMIN — Medication 1 APPLICATION: at 08:37

## 2024-07-08 RX ADMIN — METOCLOPRAMIDE 5 MG: 10 TABLET ORAL at 09:05

## 2024-07-08 RX ADMIN — Medication 1 APPLICATION: at 21:00

## 2024-07-08 RX ADMIN — FLUCONAZOLE 200 MG: 2 INJECTION, SOLUTION INTRAVENOUS at 11:20

## 2024-07-08 RX ADMIN — NYSTATIN 1 APPLICATION: 100000 CREAM TOPICAL at 08:37

## 2024-07-08 RX ADMIN — DEXTROSE AND SODIUM CHLORIDE 75 ML/HR: 5; 450 INJECTION, SOLUTION INTRAVENOUS at 00:46

## 2024-07-08 RX ADMIN — PANTOPRAZOLE SODIUM 40 MG: 40 INJECTION, POWDER, FOR SOLUTION INTRAVENOUS at 06:20

## 2024-07-08 RX ADMIN — PIPERACILLIN AND TAZOBACTAM 4.5 G: 4; .5 INJECTION, POWDER, FOR SOLUTION INTRAVENOUS at 08:36

## 2024-07-08 RX ADMIN — NYSTATIN 1 APPLICATION: 100000 CREAM TOPICAL at 21:00

## 2024-07-08 NOTE — PROGRESS NOTES
[facility] HOSPITALIST PROGRESS NOTE     Patient Identification:  Name:  Vangie Carney  Age:  54 y.o.  Sex:  female  :  1970  MRN:  [id number]  Visit Number:  [id number]  Primary Care Provider:  Urvashi Basurto APRN    Length of stay:  7    Subjective: Patient seen and examined assisted by RAÚL Soto.  Patient is awake and alert, reports she has a rough night due to multiple BM, Brittany reported this morning she has watery stool, intake is poor she only drink boost.  No nausea or vomiting.  No abdominal pain.    Chief Complaint: Nausea and vomiting  ----------------------------------------------------------------------------------------------------------------------  Current Hospital Meds:  [Held by provider] apixaban, 5 mg, Oral, BID  castor oil-balsam peru, 1 Application, Topical, Q12H  [Held by provider] dicyclomine, 10 mg, Oral, 4x Daily AC & at Bedtime  enoxaparin, 1 mg/kg, Subcutaneous, Q12H  fluconazole, 200 mg, Intravenous, Q24H  granisetron, 1 patch, Transdermal, Weekly  lactulose, 20 g, Oral, BID  nystatin, 1 Application, Topical, BID  pantoprazole, 40 mg, Intravenous, Q AM  piperacillin-tazobactam, 4.5 g, Intravenous, Q8H  senna-docusate sodium, 2 tablet, Oral, BID  sodium chloride, 10 mL, Intravenous, Q12H  sodium chloride, 10 mL, Intravenous, Q12H  sodium chloride, 10 mL, Intravenous, Q12H  sodium chloride, 10 mL, Intravenous, Q12H      dextrose 5 % and sodium chloride 0.45 %, 75 mL/hr, Last Rate: 75 mL/hr (24 0046)  norepinephrine, 0.02-0.3 mcg/kg/min (Dosing Weight), Last Rate: Stopped (24 1500)      ----------------------------------------------------------------------------------------------------------------------  Vital Signs:  Temp:  [97.2 °F (36.2 °C)-97.4 °F (36.3 °C)] 97.4 °F (36.3 °C)  Heart Rate:  [] 111  Resp:  [13-20] 15  BP: (75-99)/(62-88) 81/64       Tele: Sinus tachycardia 108 bpm      24  0400 24  0400 24  0400   Weight: 66 kg  (145 lb 8.1 oz) 66 kg (145 lb 8.1 oz) 66 kg (145 lb 8.1 oz)     Body mass index is 22.79 kg/m².    Intake/Output Summary (Last 24 hours) at 7/8/2024 0952  Last data filed at 7/8/2024 0724  Gross per 24 hour   Intake 3544.24 ml   Output 400 ml   Net 3144.24 ml     Diet: Liquid; Full Liquid; Fluid Consistency: Thin (IDDSI 0)  ----------------------------------------------------------------------------------------------------------------------  Physical exam:  General: She looks frail, very pleasant, comfortable,awake, alert, oriented to self, place,   No respiratory distress.    Skin:  Skin is warm and dry. No rash noted. No pallor.    HENT:  Head:  Normocephalic and atraumatic.  Mouth:  Moist mucous membranes.    Eyes:  Conjunctivae and EOM are normal.  Pupils are equal, round, and reactive to light.  She has jaundice  Neck:  Neck supple.  No JVD present.    Pulmonary/Chest: Right Port-A-Cath no respiratory distress, no wheezes, no crackles, with normal breath sounds and good air movement.  Cardiovascular:  Normal rate, regular rhythm and normal heart sounds with no murmur.  Abdominal:  Soft.  Bowel sounds are normal.  Slightly distended no guarding no rigidity, no palpable mass.   Extremities: She has anasarca, she has areas of blister with weeping   Neurological:  Motor strength equal no obvious deficit, sensory grossly intact.   No cranial nerve deficit.  No tongue deviation.  No facial droop.  No slurred speech.    Genitourinary: Colmenares catheter in place  Back:  ----------------------------------------------------------------------------------------------------------------------  ----------------------------------------------------------------------------------------------------------------------  Results from last 7 days   Lab Units 07/01/24  1125   HSTROP T ng/L 12     Results from last 7 days   Lab Units 07/07/24  2357 07/07/24  0023 07/04/24  2243 07/03/24  0315 07/02/24  0029 07/01/24  2113 07/01/24  1818  "  LACTATE mmol/L  --   --   --   --  2.0 3.3* 4.9*   WBC 10*3/mm3 5.86 5.24 6.06   < > 11.57*  --   --    HEMOGLOBIN g/dL 8.7* 8.7* 9.1*   < > 9.6*  --   --    HEMATOCRIT % 26.3* 25.8* 27.7*   < > 27.8*  --   --    MCV fL 104.8* 105.3* 102.2*   < > 101.8*  --   --    MCHC g/dL 33.1 33.7 32.9   < > 34.5  --   --    PLATELETS 10*3/mm3 189 147 123*   < > 173  --   --     < > = values in this interval not displayed.         Results from last 7 days   Lab Units 07/07/24  2357 07/07/24  0023 07/04/24  2243 07/04/24  0644 07/03/24  1546 07/03/24  0358 07/02/24  0029 07/01/24  1125   SODIUM mmol/L 130* 132* 133* 134*  --  133* 133* 133*   POTASSIUM mmol/L 3.7 4.0 4.4 3.5   < > 3.5 3.8 3.9   MAGNESIUM mg/dL  --   --   --  2.7*  --  1.5*  --  1.8   CHLORIDE mmol/L 107 107 107 105  --  104 103 99   CO2 mmol/L 14.6* 16.9* 17.6* 17.7*  --  17.9* 16.8* 20.6*   BUN mg/dL 9 9 12 14  --  19 22* 22*   CREATININE mg/dL 0.42* 0.48* 0.50* 0.59  --  0.98 1.38* 1.40*   CALCIUM mg/dL 6.9* 6.9* 7.0* 7.1*  --  7.3* 7.2* 7.9*   GLUCOSE mg/dL 100* 87 101* 126*  --  98 75 22*   ALBUMIN g/dL  --  1.5*  --  1.7*  --   --  1.5* 1.8*   BILIRUBIN mg/dL  --  2.5*  --  2.7*  --   --  1.5* 1.7*  1.8*   ALK PHOS U/L  --  204*  --  227*  --   --  237* 249*   AST (SGOT) U/L  --  58*  --  47*  --   --  78* 77*   ALT (SGPT) U/L  --  64*  --  72*  --   --  67* 65*    < > = values in this interval not displayed.   Estimated Creatinine Clearance: 161.4 mL/min (A) (by C-G formula based on SCr of 0.42 mg/dL (L)).    No results found for: \"AMMONIA\"      Blood Culture   Date Value Ref Range Status   07/01/2024 No growth at 5 days  Final   07/01/2024 No growth at 5 days  Final     Urine Culture   Date Value Ref Range Status   07/01/2024 50,000 CFU/mL Escherichia coli (A)  Final     No results found for: \"WOUNDCX\"  No results found for: \"STOOLCX\"    I have personally looked at the labs and they are summarized " above.  ----------------------------------------------------------------------------------------------------------------------  Imaging Results (Last 24 Hours)       ** No results found for the last 24 hours. **          ----------------------------------------------------------------------------------------------------------------------  Assessment and Plan:  -Nausea and vomiting with abnormal CT scan of the abdomen pelvis suggestive of small bowel obstruction  -Metastatic colon cancer with recurrence and peritoneal metastasis  -Shock could be multifactorial from UTI as well as third spacing and relative hypovolemia  -Acute kidney injury resolved  -Severe hypoalbuminemia  -Jaundice  -Fatty liver disease  -Acute on chronic anemia no obvious bleeding at this time  -History of PE on chronic anticoagulation currently on Lovenox until able to tolerate p.o.    Continue Zosyn, patient now has BM, restart . p.o. medication as tolerated, decrease IV fluids, avoid nephrotoxic medications, out of bed to chair if tolerates.  Encourage p.o. intake.    Disposition pending      Mila Dias MD  07/08/24  09:52 EDT

## 2024-07-08 NOTE — PLAN OF CARE
Problem: Skin Injury Risk Increased  Goal: Skin Health and Integrity  Outcome: Ongoing, Not Progressing  Intervention: Promote and Optimize Oral Intake  Recent Flowsheet Documentation  Taken 7/8/2024 0834 by Brittany Mena RN  Oral Nutrition Promotion: safe use of adaptive equipment encouraged  Intervention: Optimize Skin Protection  Recent Flowsheet Documentation  Taken 7/8/2024 1414 by Brittany Mena RN  Pressure Reduction Techniques: frequent weight shift encouraged  Pressure Reduction Devices: heel offloading device utilized  Skin Protection: adhesive use limited  Taken 7/8/2024 0834 by Brittany Mena RN  Pressure Reduction Techniques: frequent weight shift encouraged  Pressure Reduction Devices: heel offloading device utilized  Skin Protection: adhesive use limited     Problem: Fall Injury Risk  Goal: Absence of Fall and Fall-Related Injury  Outcome: Ongoing, Not Progressing  Intervention: Identify and Manage Contributors  Recent Flowsheet Documentation  Taken 7/8/2024 1500 by Brittany Mena RN  Medication Review/Management: medications reviewed  Taken 7/8/2024 1414 by Brittany Mena RN  Medication Review/Management: medications reviewed  Taken 7/8/2024 1300 by Brittany Mena RN  Medication Review/Management: medications reviewed  Taken 7/8/2024 1100 by Brittany Mena RN  Medication Review/Management: medications reviewed  Taken 7/8/2024 0900 by Brittany Mena RN  Medication Review/Management: medications reviewed  Taken 7/8/2024 0700 by Brittany Mena RN  Medication Review/Management: medications reviewed  Intervention: Promote Injury-Free Environment  Recent Flowsheet Documentation  Taken 7/8/2024 1500 by Brittany Mena RN  Safety Promotion/Fall Prevention:   safety round/check completed   room organization consistent   fall prevention program maintained   clutter free environment maintained   assistive device/personal items within reach   activity supervised  Taken 7/8/2024 1414 by Brittany Mena  RN  Safety Promotion/Fall Prevention: safety round/check completed  Taken 7/8/2024 1300 by Brittany Mena RN  Safety Promotion/Fall Prevention:   safety round/check completed   room organization consistent   fall prevention program maintained   clutter free environment maintained   assistive device/personal items within reach   activity supervised  Taken 7/8/2024 1100 by Brittany Mena RN  Safety Promotion/Fall Prevention:   safety round/check completed   room organization consistent   fall prevention program maintained   clutter free environment maintained   assistive device/personal items within reach   activity supervised  Taken 7/8/2024 0900 by Brittany Mena RN  Safety Promotion/Fall Prevention:   safety round/check completed   room organization consistent   fall prevention program maintained   clutter free environment maintained   assistive device/personal items within reach   activity supervised  Taken 7/8/2024 0834 by Brittany Mena RN  Safety Promotion/Fall Prevention: safety round/check completed  Taken 7/8/2024 0700 by Brittany Mena RN  Safety Promotion/Fall Prevention:   safety round/check completed   room organization consistent   fall prevention program maintained   clutter free environment maintained   assistive device/personal items within reach   activity supervised     Problem: Adult Inpatient Plan of Care  Goal: Plan of Care Review  Outcome: Ongoing, Not Progressing  Flowsheets (Taken 7/8/2024 1611)  Outcome Evaluation: Patient resting in bed with family at bedside. A&Ox4. Extremely weak. Hypotensive at times. Otherwise VSS. No needs noted at this time.  Goal: Patient-Specific Goal (Individualized)  Outcome: Ongoing, Not Progressing  Goal: Absence of Hospital-Acquired Illness or Injury  Outcome: Ongoing, Not Progressing  Intervention: Identify and Manage Fall Risk  Recent Flowsheet Documentation  Taken 7/8/2024 1500 by Brittany Mena RN  Safety Promotion/Fall Prevention:   safety round/check  completed   room organization consistent   fall prevention program maintained   clutter free environment maintained   assistive device/personal items within reach   activity supervised  Taken 7/8/2024 1414 by Brittany Mena RN  Safety Promotion/Fall Prevention: safety round/check completed  Taken 7/8/2024 1300 by Brittany Mena RN  Safety Promotion/Fall Prevention:   safety round/check completed   room organization consistent   fall prevention program maintained   clutter free environment maintained   assistive device/personal items within reach   activity supervised  Taken 7/8/2024 1100 by Brittany Mena RN  Safety Promotion/Fall Prevention:   safety round/check completed   room organization consistent   fall prevention program maintained   clutter free environment maintained   assistive device/personal items within reach   activity supervised  Taken 7/8/2024 0900 by Brittany Mena RN  Safety Promotion/Fall Prevention:   safety round/check completed   room organization consistent   fall prevention program maintained   clutter free environment maintained   assistive device/personal items within reach   activity supervised  Taken 7/8/2024 0834 by Brittany Mena RN  Safety Promotion/Fall Prevention: safety round/check completed  Taken 7/8/2024 0700 by Brittany Mena RN  Safety Promotion/Fall Prevention:   safety round/check completed   room organization consistent   fall prevention program maintained   clutter free environment maintained   assistive device/personal items within reach   activity supervised  Intervention: Prevent Skin Injury  Recent Flowsheet Documentation  Taken 7/8/2024 1414 by Brittany Mena RN  Skin Protection: adhesive use limited  Taken 7/8/2024 0834 by Brittany Mena RN  Skin Protection: adhesive use limited  Intervention: Prevent and Manage VTE (Venous Thromboembolism) Risk  Recent Flowsheet Documentation  Taken 7/8/2024 1414 by Brittany Mena RN  VTE Prevention/Management: (Lovenox) other (see  comments)  Taken 7/8/2024 0834 by Brittany Mena RN  Activity Management: activity encouraged  VTE Prevention/Management: (Lovenox) other (see comments)  Intervention: Prevent Infection  Recent Flowsheet Documentation  Taken 7/8/2024 1500 by Brittany Mena RN  Infection Prevention: rest/sleep promoted  Taken 7/8/2024 1414 by Brittany Mena RN  Infection Prevention: rest/sleep promoted  Taken 7/8/2024 1300 by Brittany Mena RN  Infection Prevention: rest/sleep promoted  Taken 7/8/2024 1100 by Brittany Mena RN  Infection Prevention: rest/sleep promoted  Taken 7/8/2024 0900 by Brittany Mena RN  Infection Prevention: rest/sleep promoted  Taken 7/8/2024 0834 by Brittany Mena RN  Infection Prevention: rest/sleep promoted  Taken 7/8/2024 0700 by Brittany Mena RN  Infection Prevention: rest/sleep promoted  Goal: Optimal Comfort and Wellbeing  Outcome: Ongoing, Not Progressing  Intervention: Provide Person-Centered Care  Recent Flowsheet Documentation  Taken 7/8/2024 1414 by Brittany Mena RN  Trust Relationship/Rapport:   care explained   choices provided  Taken 7/8/2024 0834 by Brittany Mena RN  Trust Relationship/Rapport:   care explained   choices provided  Goal: Readiness for Transition of Care  Outcome: Ongoing, Not Progressing   Goal Outcome Evaluation:              Outcome Evaluation: Patient resting in bed with family at bedside. A&Ox4. Extremely weak. Hypotensive at times. Otherwise VSS. No needs noted at this time.

## 2024-07-08 NOTE — PROGRESS NOTES
"Palliative Care Daily Progress Note     S: Medical record reviewed, upon entering the room pt was lying in bed with  at bedside. Pt verbalized that she has had frequent bowel movements of liquid stool since being given the stool softners. She does complain of increase nausea that zofran helps sometimes. She denies any pain, no dyspnea, no anxiety. She does report weakness and fatigue.       O:   Palliative Performance Scale Score:     BP (!) 81/64   Pulse 111   Temp 97.4 °F (36.3 °C) (Oral)   Resp 15   Ht 170.2 cm (67\")   Wt 66 kg (145 lb 8.1 oz)   LMP 06/15/2016   SpO2 99%   BMI 22.79 kg/m²     Intake/Output Summary (Last 24 hours) at 7/8/2024 0908  Last data filed at 7/8/2024 0724  Gross per 24 hour   Intake 3544.24 ml   Output 400 ml   Net 3144.24 ml       PE:    General Appearance:    Chronically ill appearing, drowsy, cooperative, NAD   HEENT:    NC/AT, without obvious abnormality, EOMI, anicteric , dry mucous membranes    Neck:   supple, trachea midline, no JVD   Lungs:     CTAB without w/r/r, diminished bilaterally     Heart:    tachycardia, normal S1 and S2, no M/R/G   Abdomen:     Soft, NT, Distended, hypoactive ABS , clay catheter in place, urine tea colored   Extremities:   Moves all extremities, no edema   Pulses:   Pulses palpable and equal bilaterally   Skin:   Jaundiced    Neurologic:   A/Ox3, cooperative, drowsy   Psych:   Calm, appropriate         Meds: Reviewed and changes noted     Labs:   Results from last 7 days   Lab Units 07/07/24  2357   WBC 10*3/mm3 5.86   HEMOGLOBIN g/dL 8.7*   HEMATOCRIT % 26.3*   PLATELETS 10*3/mm3 189     Results from last 7 days   Lab Units 07/07/24  2357   SODIUM mmol/L 130*   POTASSIUM mmol/L 3.7   CHLORIDE mmol/L 107   CO2 mmol/L 14.6*   BUN mg/dL 9   CREATININE mg/dL 0.42*   GLUCOSE mg/dL 100*   CALCIUM mg/dL 6.9*     Results from last 7 days   Lab Units 07/07/24  2357 07/07/24  0023   SODIUM mmol/L 130* 132*   POTASSIUM mmol/L 3.7 4.0   CHLORIDE " mmol/L 107 107   CO2 mmol/L 14.6* 16.9*   BUN mg/dL 9 9   CREATININE mg/dL 0.42* 0.48*   CALCIUM mg/dL 6.9* 6.9*   BILIRUBIN mg/dL  --  2.5*   ALK PHOS U/L  --  204*   ALT (SGPT) U/L  --  64*   AST (SGOT) U/L  --  58*   GLUCOSE mg/dL 100* 87     Imaging Results (Last 72 Hours)       ** No results found for the last 72 hours. **              Diagnostics: Reviewed    A: Vangie Carney is a&ox4, however remains drowsy and jaundiced. She complains mainly of nausea today. Her most recent labs- sodium 130, creat 0.42, calcium 6.9, ferritin 469.80, iron sat 89, transferrin 33, TIBC 49 H&H 8.7/26.3 , RBC 2.51 . BP 99/56 hr 122 rr 12 sat 99%. She has had hypotensive episodes ranging 70/50s. She remains on levophed.       P: Compazine and Phenergan added to PRN regimen for nausea and vomiting.  Will continue to provide symptomatic and supportive care for patient and family. Will assist with disposition as needed. Pt remains DNR/DNI.       We will continue to follow along. Please do not hesitate to contact us regarding further sx mgmt or GOC needs, including after hours or on weekends via our on call provider at [phone].     Elen Hernandez, APRN    7/8/2024

## 2024-07-08 NOTE — PROGRESS NOTES
Date:  07/08/24    CC:    Subjective:  Vangie Carney had multiple bowel movements last night after lactulose was provided. Has been taking in liquids, currently nauseous.     Has not been able to sit up for long periods.     Off levophed this AM. On MIVF 75 cc/hr.           Review Of Systems:  A comprehensive 14-point review of systems performed.  Significant findings as mentioned above.  All other systems reviewed and are negative.      Objective:  Medications:  Scheduled Meds:[Held by provider] apixaban, 5 mg, Oral, BID  castor oil-balsam peru, 1 Application, Topical, Q12H  [Held by provider] dicyclomine, 10 mg, Oral, 4x Daily AC & at Bedtime  enoxaparin, 1 mg/kg, Subcutaneous, Q12H  fluconazole, 200 mg, Intravenous, Q24H  granisetron, 1 patch, Transdermal, Weekly  lactulose, 20 g, Oral, BID  nystatin, 1 Application, Topical, BID  pantoprazole, 40 mg, Intravenous, Q AM  piperacillin-tazobactam, 4.5 g, Intravenous, Q8H  senna-docusate sodium, 2 tablet, Oral, BID  sodium chloride, 10 mL, Intravenous, Q12H  sodium chloride, 10 mL, Intravenous, Q12H  sodium chloride, 10 mL, Intravenous, Q12H  sodium chloride, 10 mL, Intravenous, Q12H      Continuous Infusions:dextrose 5 % and sodium chloride 0.45 %, 75 mL/hr, Last Rate: 75 mL/hr (07/08/24 0046)  norepinephrine, 0.02-0.3 mcg/kg/min (Dosing Weight), Last Rate: Stopped (07/06/24 1500)      PRN Meds:.  senna-docusate sodium **AND** polyethylene glycol **AND** bisacodyl **AND** bisacodyl    calcium carbonate    Calcium Replacement - Follow Nurse / BPA Driven Protocol    dextrose    dextrose    First Mouthwash (Magic Mouthwash)    glucagon (human recombinant)    heparin    HYDROcodone-acetaminophen    Magnesium Standard Dose Replacement - Follow Nurse / BPA Driven Protocol    metoclopramide    nitroglycerin    ondansetron ODT **OR** ondansetron    Potassium Replacement - Follow Nurse / BPA Driven Protocol    sodium chloride    Access VAD **AND** sodium chloride    sodium  chloride    sodium chloride    sodium chloride    sodium chloride    sodium chloride    sodium chloride    sodium chloride    sodium chloride    sodium chloride    Physical Exam:  Vital Signs     Patient Vitals for the past 24 hrs:   BP Temp Temp src Pulse Resp SpO2 Weight   07/08/24 0600 (!) 81/64 -- -- 111 -- 99 % --   07/08/24 0500 (!) 84/66 -- -- 111 -- 97 % --   07/08/24 0400 (!) 81/71 97.4 °F (36.3 °C) Oral 100 -- 99 % 66 kg (145 lb 8.1 oz)   07/08/24 0300 (!) 81/70 -- -- 97 -- 98 % --   07/08/24 0200 (!) 88/71 -- -- 90 -- 98 % --   07/08/24 0100 (!) 77/62 -- -- 89 -- 99 % --   07/08/24 0000 (!) 75/64 97.3 °F (36.3 °C) Oral 90 -- 100 % --   07/07/24 2300 (!) 82/67 -- -- 92 -- 99 % --   07/07/24 2200 (!) 78/65 -- -- 92 -- 99 % --   07/07/24 2100 -- -- -- 100 -- 99 % --   07/07/24 2000 91/69 97.2 °F (36.2 °C) Oral 96 -- 99 % --   07/07/24 1900 (!) 78/64 -- -- 97 -- 98 % --   07/07/24 1800 90/75 -- -- 96 15 99 % --   07/07/24 1700 95/67 -- -- 95 14 99 % --   07/07/24 1600 99/88 -- -- 105 13 98 % --   07/07/24 1500 (!) 85/67 -- -- 97 13 99 % --   07/07/24 1400 98/83 -- -- 117 20 99 % --   07/07/24 1300 (!) 84/67 -- -- 102 13 99 % --   07/07/24 1200 (!) 84/71 97.2 °F (36.2 °C) Axillary 100 14 99 % --   07/07/24 1100 (!) 85/63 -- -- 96 13 100 % --   07/07/24 1000 (!) 89/67 -- -- 104 14 98 % --   07/07/24 0900 93/66 -- -- 101 13 98 % --         General:  Awake, alert and oriented, in bed  HEENT:  Pupils are equal, round and reactive to light and accommodation  Neck:  No JVD, thyromegaly or lymphadenopathy  CV:  Regular rate and rhythm, no murmurs, rubs or gallops  Resp:  Lungs are clear to auscultation bilaterally  Abd:  Soft, non-tender, non distended, bowel sounds minimal, no organomegaly  Ext:  No clubbing, +edema of b/l upper and lower extremities   Lymph:  No cervical, supraclavicular, axillary, inguinal or femoral adenopathy  Neuro:  MS as above, CN II-XII intact, grossly non-focal exam    Labs /  Studies:    Lab Results   Component Value Date    WBC 5.86 07/07/2024    HGB 8.7 (L) 07/07/2024    HCT 26.3 (L) 07/07/2024    .8 (H) 07/07/2024    RDW 14.7 07/07/2024     07/07/2024    NEUTRORELPCT 78.5 (H) 07/02/2024    LYMPHORELPCT 16.8 (L) 07/02/2024    MONORELPCT 4.3 (L) 07/02/2024    EOSRELPCT 0.0 (L) 07/02/2024    BASORELPCT 0.1 07/02/2024    NEUTROABS 9.08 (H) 07/02/2024    LYMPHSABS 1.94 07/02/2024       Lab Results   Component Value Date     (L) 07/07/2024    K 3.7 07/07/2024    CO2 14.6 (L) 07/07/2024     07/07/2024    BUN 9 07/07/2024    CREATININE 0.42 (L) 07/07/2024    EGFRIFNONA 79 09/10/2021    GLUCOSE 100 (H) 07/07/2024    CALCIUM 6.9 (L) 07/07/2024    ALKPHOS 204 (H) 07/07/2024    AST 58 (H) 07/07/2024    ALT 64 (H) 07/07/2024    BILITOT 2.5 (H) 07/07/2024    ALBUMIN 1.5 (L) 07/07/2024    PROTEINTOT 3.5 (L) 07/07/2024    MG 2.7 (H) 07/04/2024    PHOS 3.3 03/11/2024       Imaging Results (Last 72 Hours)       ** No results found for the last 72 hours. **            Assessment & Plan:  Vangie Carney is a 54 y.o. year-old female with     Septic shock  Small bowel obstruction   -Patient with hypotension, tachycardia, and positive urinalysis. Currently on antibiotics and pressors with levophed   -Blood cultures with NGTD, urine culture with e.coli. Currently on zosyn   -Plan on symptomatic management at this time. Tolerating liquids, had BM 7/8     3. Metastatic colon adenocarcinoma   -Primary oncologist, Dr. Hanks  -Patient with localized descending/sigmoid moderately differentiated adenocarcinoma March 2023 status post APR. Opted out of adjuvant chemotherapy. Presented 1/2024 with recurrent/metastatic cancer  -Received 4 cycles of 5-FU/leucovorin 2-4/2024, switched to palliative bevacizumab due to intolerance, received 4 cycles thus far  -palliative care appreciated. Pt is made DNR-DNI        Thank you for the consultation and allowing us to participate in the care of Ms.  Rommel. Please do not hesitate to contact us with any questions or concerns.          Dominga Rodriguez MD  07/08/24  08:51 EDT

## 2024-07-08 NOTE — PLAN OF CARE
Goal Outcome Evaluation:  Plan of Care Reviewed With: patient           Outcome Evaluation: Patient alert and oriented x 4, patient on room air and tolerating well, patient q2 hr turn, patient family member at bedside, bed alarm set, call light within reach, and VSS at this time.

## 2024-07-08 NOTE — CASE MANAGEMENT/SOCIAL WORK
Continued Stay Note  NATHAN Varghese     Patient Name: Vangie Carney  MRN: [id number]  Today's Date: 7/8/2024    Admit Date: 7/1/2024    Plan: CM spoke with patient's sister who was at bedside.  She states that she will go home @ discharge but they haven't said anything about when that will be.  Patient's  had requested Home Health @ discharge and they live in [facility].  Patient has Walker, BSC & Shower Chair that was given to her but  has requested Narrow WC.  Family will provide transportation at discharge.  No other issues or concerns are noted at this time.  CM will continue to follow and assist with any discharge needs.   Discharge Plan       Row Name 07/08/24 9737       Plan    Plan CM spoke with patient's sister who was at bedside.  She states that she will go home @ discharge but they haven't said anything about when that will be.  Patient's  had requested Home Health @ discharge and they live in [facility].  Patient has Walker, BSC & Shower Chair that was given to her but  has requested N[facility] WC.  Family will provide transportation at discharge.  No other issues or concerns are noted at this time.  CM will continue to follow and assist with any discharge needs.    Plan Comments 7/3:  -128 BP 77/55-99/69, FLD, IV Diflucan, Protonix, Zosyn, D5 1/2 NS, Pt had multiple BM last pm after lactulose, C/O nausea that Zofran only helps sometimes, Compazine & Phenergan added PRN, tolerating diet, Pt has not been able to sit up for any length of time, OOB to chair if tolerates, WC consulted for DTI, Pt is extremely weak and hypotensive at times, Pt is DNR/DNI-KLS                   Discharge Codes    No documentation.                 Expected Discharge Date and Time       Expected Discharge Date Expected Discharge Time    Jul 8, 2024               Vangie Wilson RN

## 2024-07-08 NOTE — NURSING NOTE
Wound consult for DTI to bilateral greater trochanters. PT has bruising scattered to upper and lower extremities.     LT  trochanter presents as a DTI with a deep purple discoloration with the epidermis remaining in tact. The agnieszka wound skin is pink and blanchable. PT is very edematous. Wound measures  2.5 x 1.5 x 0 New order for Venelex BID and leave open to air.     RT trochanter presents as a DTI with a red discoloration and the epidermis remaining intact. Area measures 1 x 1.4 x 0 and the agnieszka wound skin is pink and blanchable. New order for Venelex BID and leave open to air.    07/08/24 1137   Wound 07/06/24 1015 Left anterior greater trochanter   Placement Date/Time: 07/06/24 1015   Side: Left  Orientation: anterior  Location: greater trochanter   Wound Image   (see media)   Pressure Injury Stage DTPI   Dressing Appearance open to air   Closure Open to air   Base non-blanchable;purple   Periwound blanchable;pink   Periwound Temperature warm   Periwound Skin Turgor soft   Wound Length (cm) 2.5 cm   Wound Width (cm) 1.5 cm   Wound Depth (cm) 0 cm   Wound Surface Area (cm^2) 3.75 cm^2   Wound Volume (cm^3) 0 cm^3   Drainage Amount none   Dressing Care   (Venelex)   Periwound Care dry periwound area maintained   Wound 07/06/24 1019 Right greater trochanter Pressure Injury   Placement Date/Time: 07/06/24 1019   Present on Original Admission: No  Side: Right  Location: greater trochanter  Primary Wound Type: Pressure Injury   Wound Image   (see media)   Pressure Injury Stage DTPI   Dressing Appearance open to air   Closure Open to air   Base non-blanchable;red   Periwound blanchable   Periwound Temperature warm   Periwound Skin Turgor soft   Wound Length (cm) 1 cm   Wound Width (cm) 1.4 cm   Wound Depth (cm) 0 cm   Wound Surface Area (cm^2) 1.4 cm^2   Wound Volume (cm^3) 0 cm^3   Drainage Amount none   Care, Wound   (Venelex BID open to air)   Dressing Care open to air

## 2024-07-09 LAB
GLUCOSE BLDC GLUCOMTR-MCNC: 107 MG/DL (ref 70–130)
GLUCOSE BLDC GLUCOMTR-MCNC: 111 MG/DL (ref 70–130)
GLUCOSE BLDC GLUCOMTR-MCNC: 84 MG/DL (ref 70–130)
GLUCOSE BLDC GLUCOMTR-MCNC: 84 MG/DL (ref 70–130)

## 2024-07-09 PROCEDURE — 25010000002 PIPERACILLIN SOD-TAZOBACTAM PER 1 G: Performed by: STUDENT IN AN ORGANIZED HEALTH CARE EDUCATION/TRAINING PROGRAM

## 2024-07-09 PROCEDURE — 97162 PT EVAL MOD COMPLEX 30 MIN: CPT

## 2024-07-09 PROCEDURE — 25010000002 ONDANSETRON PER 1 MG: Performed by: STUDENT IN AN ORGANIZED HEALTH CARE EDUCATION/TRAINING PROGRAM

## 2024-07-09 PROCEDURE — 25010000002 ALBUMIN HUMAN 25% PER 50 ML: Performed by: HOSPITALIST

## 2024-07-09 PROCEDURE — 99232 SBSQ HOSP IP/OBS MODERATE 35: CPT | Performed by: HOSPITALIST

## 2024-07-09 PROCEDURE — 25010000002 FLUCONAZOLE PER 200 MG: Performed by: STUDENT IN AN ORGANIZED HEALTH CARE EDUCATION/TRAINING PROGRAM

## 2024-07-09 PROCEDURE — P9047 ALBUMIN (HUMAN), 25%, 50ML: HCPCS | Performed by: HOSPITALIST

## 2024-07-09 PROCEDURE — 25010000002 ENOXAPARIN PER 10 MG: Performed by: STUDENT IN AN ORGANIZED HEALTH CARE EDUCATION/TRAINING PROGRAM

## 2024-07-09 PROCEDURE — 97166 OT EVAL MOD COMPLEX 45 MIN: CPT

## 2024-07-09 PROCEDURE — 82948 REAGENT STRIP/BLOOD GLUCOSE: CPT

## 2024-07-09 RX ORDER — ALBUMIN (HUMAN) 12.5 G/50ML
25 SOLUTION INTRAVENOUS ONCE
Qty: 100 ML | Refills: 0 | Status: COMPLETED | OUTPATIENT
Start: 2024-07-09 | End: 2024-07-09

## 2024-07-09 RX ORDER — ACETAMINOPHEN 500 MG
1000 TABLET ORAL EVERY 8 HOURS PRN
Status: DISCONTINUED | OUTPATIENT
Start: 2024-07-09 | End: 2024-07-13 | Stop reason: HOSPADM

## 2024-07-09 RX ORDER — MIDODRINE HYDROCHLORIDE 2.5 MG/1
5 TABLET ORAL
Status: DISCONTINUED | OUTPATIENT
Start: 2024-07-09 | End: 2024-07-10

## 2024-07-09 RX ADMIN — Medication 10 ML: at 08:53

## 2024-07-09 RX ADMIN — ONDANSETRON 4 MG: 2 INJECTION INTRAMUSCULAR; INTRAVENOUS at 10:19

## 2024-07-09 RX ADMIN — Medication 1 APPLICATION: at 09:04

## 2024-07-09 RX ADMIN — Medication 0.02 MCG/KG/MIN: at 07:59

## 2024-07-09 RX ADMIN — Medication 10 ML: at 20:28

## 2024-07-09 RX ADMIN — ALBUMIN (HUMAN) 25 G: 0.25 INJECTION, SOLUTION INTRAVENOUS at 10:20

## 2024-07-09 RX ADMIN — APIXABAN 5 MG: 5 TABLET, FILM COATED ORAL at 20:25

## 2024-07-09 RX ADMIN — FLUCONAZOLE 200 MG: 2 INJECTION, SOLUTION INTRAVENOUS at 12:18

## 2024-07-09 RX ADMIN — ENOXAPARIN SODIUM 60 MG: 60 INJECTION SUBCUTANEOUS at 06:32

## 2024-07-09 RX ADMIN — ACETAMINOPHEN 1000 MG: 500 TABLET ORAL at 04:03

## 2024-07-09 RX ADMIN — PIPERACILLIN AND TAZOBACTAM 4.5 G: 4; .5 INJECTION, POWDER, FOR SOLUTION INTRAVENOUS at 08:46

## 2024-07-09 RX ADMIN — Medication 1 APPLICATION: at 21:18

## 2024-07-09 RX ADMIN — DOCUSATE SODIUM 50 MG AND SENNOSIDES 8.6 MG 2 TABLET: 8.6; 5 TABLET, FILM COATED ORAL at 20:26

## 2024-07-09 RX ADMIN — NYSTATIN 1 APPLICATION: 100000 CREAM TOPICAL at 09:04

## 2024-07-09 RX ADMIN — PANTOPRAZOLE SODIUM 40 MG: 40 INJECTION, POWDER, FOR SOLUTION INTRAVENOUS at 06:31

## 2024-07-09 RX ADMIN — NYSTATIN 1 APPLICATION: 100000 CREAM TOPICAL at 21:19

## 2024-07-09 RX ADMIN — PIPERACILLIN AND TAZOBACTAM 4.5 G: 4; .5 INJECTION, POWDER, FOR SOLUTION INTRAVENOUS at 01:20

## 2024-07-09 RX ADMIN — CARBIDOPA AND LEVODOPA 5 MG: 50; 200 TABLET, EXTENDED RELEASE ORAL at 17:49

## 2024-07-09 NOTE — PLAN OF CARE
Goal Outcome Evaluation:               PT lying in bed awake VSS, A/OX4. RA tolerating well. See MAR for prn pain medications given. Safety maintained, family at bedside.

## 2024-07-09 NOTE — PROGRESS NOTES
[facility] HOSPITALIST PROGRESS NOTE     Patient Identification:  Name:  Vangie Carney  Age:  54 y.o.  Sex:  female  :  1970  MRN:  [id number]  Visit Number:  [id number]  Primary Care Provider:  Urvashi Basurto APRN    Length of stay:  8    Subjective: Patient seen and examined assisted by RAÚL Mcallister.  Sister at bedside, patient's intake is very minimal and she only drinks boost for breakfast, blood pressure is borderline low, she has multiple bowel movements, he is on very low-dose Levophed, albumin has been ordered to hopefully albumin has been ordered to hopefully improve her blood pressure and taper off pressor support..  Will try her on midodrine instead.  Encouraged patient to increase p.o. intake, will advance diet as tolerated.  She no longer has nausea or vomiting.    Chief Complaint: Nausea and vomiting  ----------------------------------------------------------------------------------------------------------------------  Current [facility] Meds:  apixaban, 5 mg, Oral, BID  castor oil-balsam peru, 1 Application, Topical, Q12H  fluconazole, 200 mg, Intravenous, Q24H  granisetron, 1 patch, Transdermal, Weekly  lactulose, 20 g, Oral, BID  midodrine, 5 mg, Oral, TID AC  nystatin, 1 Application, Topical, BID  pantoprazole, 40 mg, Intravenous, Q AM  senna-docusate sodium, 2 tablet, Oral, BID  sodium chloride, 10 mL, Intravenous, Q12H  sodium chloride, 10 mL, Intravenous, Q12H  sodium chloride, 10 mL, Intravenous, Q12H  sodium chloride, 10 mL, Intravenous, Q12H      dextrose 5 % and sodium chloride 0.45 %, 30 mL/hr, Last Rate: 30 mL/hr (24 9999)  norepinephrine, 0.02-0.3 mcg/kg/min (Dosing Weight), Last Rate: 0.02 mcg/kg/min (24 0759)      ----------------------------------------------------------------------------------------------------------------------  Vital Signs:  Temp:  [97.4 °F (36.3 °C)-98.7 °F (37.1 °C)] 97.4 °F (36.3 °C)  Heart Rate:  [] 84  Resp:  [9-17]  14  BP: ()/(51-83) 98/69       Tele: Armando rhythm 84 bpm      07/07/24  0400 07/08/24 0400 07/09/24 0400   Weight: 66 kg (145 lb 8.1 oz) 66 kg (145 lb 8.1 oz) 66.2 kg (145 lb 15.1 oz)     Body mass index is 22.86 kg/m².    Intake/Output Summary (Last 24 hours) at 7/9/2024 1458  Last data filed at 7/9/2024 1218  Gross per 24 hour   Intake 1721.26 ml   Output 650 ml   Net 1071.26 ml     Diet: Regular/House, Gastrointestinal; Low Irritant; Texture: Soft to Chew (NDD 3); Soft to Chew: Whole Meat; Fluid Consistency: Thin (IDDSI 0)  ----------------------------------------------------------------------------------------------------------------------  Physical exam:  General: Chronically ill-appearing very pleasant and conversant, she is not confused.  Malnourished,   No respiratory distress.    Skin:  Skin is warm and dry. No rash noted. No pallor.    HENT:  Head:  Normocephalic and atraumatic.  Mouth:  Moist mucous membranes.    Eyes:  Conjunctivae and EOM are normal.  Pupils are equal, round, and reactive to light.  He has mild jaundice  Neck:  Neck supple.  No JVD present.    Pulmonary/Chest:  No respiratory distress, no wheezes, no crackles, with normal breath sounds and good air movement.  Cardiovascular:  Normal rate, regular rhythm and normal heart sounds with no murmur.  Abdominal: Protuberant abdomen bowel sounds present soft no guarding no rigidity, she has sinusitis she has anasarca but appears to improved mostly lower half of abdomen.  Extremities: Anasarca with pitting edema both lower extremity.  Upper extremity.    Neurological:  Motor strength equal no obvious deficit, sensory grossly intact.   No cranial nerve deficit.  No tongue deviation.  No facial droop.  No slurred speech.    Genitourinary: Indwelling catheter with dark ham  "urine  Back:  ----------------------------------------------------------------------------------------------------------------------  ----------------------------------------------------------------------------------------------------------------------      Results from last 7 days   Lab Units 07/07/24 2357 07/07/24 0023 07/04/24 2243   WBC 10*3/mm3 5.86 5.24 6.06   HEMOGLOBIN g/dL 8.7* 8.7* 9.1*   HEMATOCRIT % 26.3* 25.8* 27.7*   MCV fL 104.8* 105.3* 102.2*   MCHC g/dL 33.1 33.7 32.9   PLATELETS 10*3/mm3 189 147 123*         Results from last 7 days   Lab Units 07/07/24 2357 07/07/24 0023 07/04/24 2243 07/04/24  0644 07/03/24  1546 07/03/24  0358   SODIUM mmol/L 130* 132* 133* 134*  --  133*   POTASSIUM mmol/L 3.7 4.0 4.4 3.5   < > 3.5   MAGNESIUM mg/dL  --   --   --  2.7*  --  1.5*   CHLORIDE mmol/L 107 107 107 105  --  104   CO2 mmol/L 14.6* 16.9* 17.6* 17.7*  --  17.9*   BUN mg/dL 9 9 12 14  --  19   CREATININE mg/dL 0.42* 0.48* 0.50* 0.59  --  0.98   CALCIUM mg/dL 6.9* 6.9* 7.0* 7.1*  --  7.3*   GLUCOSE mg/dL 100* 87 101* 126*  --  98   ALBUMIN g/dL  --  1.5*  --  1.7*  --   --    BILIRUBIN mg/dL  --  2.5*  --  2.7*  --   --    ALK PHOS U/L  --  204*  --  227*  --   --    AST (SGOT) U/L  --  58*  --  47*  --   --    ALT (SGPT) U/L  --  64*  --  72*  --   --     < > = values in this interval not displayed.   Estimated Creatinine Clearance: 161.9 mL/min (A) (by C-G formula based on SCr of 0.42 mg/dL (L)).    No results found for: \"AMMONIA\"      No results found for: \"BLOODCX\"  No results found for: \"URINECX\"  No results found for: \"WOUNDCX\"  No results found for: \"STOOLCX\"    I have personally looked at the labs and they are summarized above.  ----------------------------------------------------------------------------------------------------------------------  Imaging Results (Last 24 Hours)       ** No results found for the last 24 hours. **      "     ----------------------------------------------------------------------------------------------------------------------  Assessment and Plan:  -Shock multifactorial from sepsis which is improved, third spacing from severe hypoalbuminemia  -Severe malnutrition  -Metastatic colon cancer with peritoneal metastasis  -Acute cystitis without hematuria  -Severe anorexia  -Fatty liver disease  -Jaundice  -History of PE  -Chronic anemia  -Severe hypoalbuminemia  -Possible SBO on CAT scan, patient has multiple bowel movements tolerating diet    Continue Levophed as needed, will give albumin today, start the patient on midodrine, patient encouraged to increase p.o. intake.  With regards to patient's advanced directives this has been verified and discussed with patient today by palliative care together with patient's sister, again she reiterated she does not want to have CPR or intubation, her decision was supported by her sister.  In the meantime's advance diet, DC Lovenox restart her Eliquis,    Overall prognosis is poor.    Disposition pending      Mila Dias MD  07/09/24  14:58 EDT

## 2024-07-09 NOTE — THERAPY EVALUATION
Acute Care - Occupational Therapy Initial Evaluation   [facility]     Patient Name: Vangie Carney  : 1970  MRN: [id number]  Today's Date: 2024             Admit Date: 2024       ICD-10-CM ICD-9-CM   1. Sepsis without acute organ dysfunction, due to unspecified organism  A41.9 038.9     995.91   2. Urinary tract infection without hematuria, site unspecified  N39.0 599.0   3. Metastatic colon cancer to liver  C18.9 153.9    C78.7 197.7     Patient Active Problem List   Diagnosis    Chronic idiopathic constipation    Encounter for screening for malignant neoplasm of colon    Malignant neoplasm of sigmoid colon    GERD without esophagitis    S/P colectomy( robotic lap LAR)    Acute postoperative pain    Iron deficiency anemia    Malabsorption of iron    Port-A-Cath in place    SBO (small bowel obstruction)    Septic shock due to undetermined organism     Past Medical History:   Diagnosis Date    Gallbladder attack     GERD (gastroesophageal reflux disease)     Hearing aid worn     History of ear infections     Malignant neoplasm of sigmoid colon 3/14/2023    Seasonal allergies     Wears glasses      Past Surgical History:   Procedure Laterality Date    CHOLECYSTECTOMY      COLON RESECTION N/A 3/14/2023    Procedure: COLON RESECTION LOW ANTERIOR LAPAROSCOPIC WITH DAVINCI ROBOT;  Surgeon: Shari Aguirre MD;  Location: [facility] OR;  Service: Robotics - DaVinci;  Laterality: N/A;    COLONOSCOPY N/A 2/15/2023    Procedure: COLONOSCOPY FOR SCREENING;  Surgeon: Giselle Iniguez MD;  Location: [facility] OR;  Service: Gastroenterology;  Laterality: N/A;    ENDOSCOPY N/A 2/15/2023    Procedure: ESOPHAGOGASTRODUODENOSCOPY WITH BIOPSY;  Surgeon: Giselle Iniguez MD;  Location: [facility] OR;  Service: Gastroenterology;  Laterality: N/A;    LAPAROSCOPIC TUBAL LIGATION Bilateral     MIDDLE EAR SURGERY      PORTACATH PLACEMENT N/A 2024    Procedure: INSERTION OF PORTACATH;  Surgeon: Jaylen Leal  MD GIORGIO;  Location: [facility];  Service: General;  Laterality: N/A;         OT ASSESSMENT FLOWSHEET (Last 12 Hours)       OT Evaluation and Treatment       Row Name 07/09/24 1580                   OT Time and Intention    Subjective Information complains of;weakness;fatigue  -LM        Document Type evaluation  -LM        Mode of Treatment occupational therapy  -LM        Patient Effort good  -LM           General Information    Patient Profile Reviewed yes  -LM        Prior Level of Function all household mobility;ADL's;transfer;min assist:;independent:  -LM        Equipment Currently Used at Home walker, rolling  -LM        Pertinent History of Current Functional Problem recent significant fxl decline  -LM        Existing Precautions/Restrictions fall  -LM        Barriers to Rehab medically complex;previous functional deficit  -LM           Living Environment    Current Living Arrangements home  -LM        People in Home spouse;child(alfred), adult  -LM           Home Use of Assistive/Adaptive Equipment    Equipment Currently Used at Home walker, rolling;shower chair  bsc  -LM           Cognition    Affect/Mental Status (Cognition) WFL  -LM           Range of Motion (ROM)    Range of Motion ROM is WFL  -LM           Strength (Manual Muscle Testing)    Strength (Manual Muscle Testing) bilateral upper extremities  -LM           Strength Comprehensive (MMT)    Comment, General Manual Muscle Testing (MMT) Assessment 3/5  -LM           Sensory    Additional Documentation Sensory Assessment (Somatosensory) (Group)  -LM           Sensory Assessment (Somatosensory)    Sensory Assessment (Somatosensory) sensation intact  -LM           Activities of Daily Living    BADL Assessment/Intervention bathing;upper body dressing;lower body dressing;grooming;feeding;toileting  -LM           Bathing Assessment/Intervention    [place] Level (Bathing) dependent (less than 25% patient effort)  -LM           Upper Body Dressing  Assessment/Training    [place] Level (Upper Body Dressing) dependent (less than 25% patient effort)  -LM           Lower Body Dressing Assessment/Training    [place] Level (Lower Body Dressing) dependent (less than 25% patient effort)  -LM           Grooming Assessment/Training    [place] Level (Grooming) maximum assist (25% patient effort)  -LM           Self-Feeding Assessment/Training    [place] Level (Feeding) set up  -LM           Toileting Assessment/Training    [place] Level (Toileting) dependent (less than 25% patient effort)  -LM           Transfer Assessment/Treatment    Comment, (Transfers) unable to safely perform due to decreased strength and activity tolerance, sat eob only  -LM           Motor Skills    Motor Skills functional endurance  -LM        Functional Endurance P+  -LM           Wound 07/01/24 2001 Right gluteal Pressure Injury    Wound - Properties Group Placement Date: 07/01/24  -KE Placement Time: 2001  -KE Present on Original Admission: Y  -KE Side: Right  -KE Location: gluteal  -KE Primary Wound Type: Pressure inj  -KE    Retired Wound - Properties Group Placement Date: 07/01/24  -KE Placement Time: 2001  -KE Present on Original Admission: Y  -KE Side: Right  -KE Location: gluteal  -KE Primary Wound Type: Pressure inj  -KE    Retired Wound - Properties Group Date first assessed: 07/01/24  -KE Time first assessed: 2001  -KE Present on Original Admission: Y  -KE Side: Right  -KE Location: gluteal  -KE Primary Wound Type: Pressure inj  -KE       Wound 07/01/24 2001 coccyx Pressure Injury    Wound - Properties Group Placement Date: 07/01/24  -KE Placement Time: 2001  -KE Present on Original Admission: Y  -KE Location: coccyx  -KE Primary Wound Type: Pressure inj  -KE    Retired Wound - Properties Group Placement Date: 07/01/24  -KE Placement Time: 2001  -KE Present on Original Admission: Y  -KE Location: coccyx  -KE Primary Wound Type: Pressure inj  -KE    Retired  Wound - Properties Group Date first assessed: 07/01/24  -KE Time first assessed: 2001 -KE Present on Original Admission: Y  -KE Location: coccyx  -KE Primary Wound Type: Pressure inj  -KE       Wound 07/05/24 0845 Right antecubital Skin Tear    Wound - Properties Group Placement Date: 07/05/24  -LS Placement Time: 0845  -LS Present on Original Admission: N  -LS Side: Right  -LS Location: antecubital  -LS Primary Wound Type: Skin tear  -LS    Retired Wound - Properties Group Placement Date: 07/05/24  -LS Placement Time: 0845  -LS Present on Original Admission: N  -LS Side: Right  -LS Location: antecubital  -LS Primary Wound Type: Skin tear  -LS    Retired Wound - Properties Group Date first assessed: 07/05/24  -LS Time first assessed: 0845  -LS Present on Original Admission: N  -LS Side: Right  -LS Location: antecubital  -LS Primary Wound Type: Skin tear  -LS       Wound 07/06/24 1015 Left anterior greater trochanter    Wound - Properties Group Placement Date: 07/06/24  -BR Placement Time: 1015  -BR Side: Left  -BR Orientation: anterior  -BR Location: greater trochanter  -BR    Retired Wound - Properties Group Placement Date: 07/06/24  -BR Placement Time: 1015  -BR Side: Left  -BR Orientation: anterior  -BR Location: greater trochanter  -BR    Retired Wound - Properties Group Date first assessed: 07/06/24  -BR Time first assessed: 1015  -BR Side: Left  -BR Location: greater trochanter  -BR       Wound 07/06/24 1019 Right greater trochanter Pressure Injury    Wound - Properties Group Placement Date: 07/06/24  -CB Placement Time: 1019  -CB Present on Original Admission: N  -CB Side: Right  -CB Location: greater trochanter  -CB Primary Wound Type: Pressure inj  -CB    Retired Wound - Properties Group Placement Date: 07/06/24  -CB Placement Time: 1019  -CB Present on Original Admission: N  -CB Side: Right  -CB Location: greater trochanter  -CB Primary Wound Type: Pressure inj  -CB    Retired Wound - Properties Group  Date first assessed: 07/06/24  -CB Time first assessed: 1019  -CB Present on Original Admission: N  -CB Side: Right  -CB Location: greater trochanter  -CB Primary Wound Type: Pressure inj  -CB       Plan of Care Review    Plan of Care Reviewed With patient;spouse  -LM           Positioning and Restraints    Post Treatment Position bed  -LM        In Bed call light within reach;encouraged to call for assist;exit alarm on;with family/caregiver  -LM           Therapy Assessment/Plan (OT)    OT Diagnosis debility  -LM        Rehab Potential (OT) fair, will monitor progress closely  -LM        Criteria for Skilled Therapeutic Interventions Met (OT) yes;meets criteria;skilled treatment is necessary  -LM        Therapy Frequency (OT) --  prn and/or to monitor fxl progress  -LM        Problem List (OT) problems related to;balance;mobility  -LM           OT Goals    Transfer Goal Selection (OT) transfer, OT goal 1  -LM        Activity Tolerance Goal Selection (OT) activity tolerance, OT goal 1  -LM           Transfer Goal 1 (OT)    Activity/Assistive Device (Transfer Goal 1, OT) transfers, all  -LM        [place] Level/Cues Needed (Transfer Goal 1, OT) minimum assist (75% or more patient effort);moderate assist (50-74% patient effort)  -LM        Time Frame (Transfer Goal 1, OT) by discharge  -LM            Activity Tolerance Goal 1 (OT)    Activity Tolerance Goal 1 (OT) increased fxl activity tolerance to F- to assist with badl and fxl mobility  -LM        Activity Level (Endurance Goal 1, OT) 15 min activity  -LM        Time Frame (Activity Tolerance Goal 1, OT) by discharge  -LM                  User Key  (r) = Recorded By, (t) = Taken By, (c) = Cosigned By      Initials Name Effective Dates    LM Bia Proctor, OT 06/16/21 -     CB Kristi Cullen RN 09/09/22 -     Farhana Tariq RN 06/09/23 -     Lesa Funez RN 08/23/23 -     Daniela Phillip RN 04/08/24 -                            OT Recommendation  and Plan  Therapy Frequency (OT):  (prn and/or to monitor fxl progress)  Plan of Care Review  Plan of Care Reviewed With: patient, spouse  Plan of Care Reviewed With: patient, spouse        Time Calculation:     Therapy Charges for Today       Code Description Service Date Service Provider Modifiers Qty    [id number] HC OT EVAL MOD COMPLEXITY 4 7/9/2024 Bia Proctor, OT GO 1                 Bia Proctor OT  7/9/2024

## 2024-07-09 NOTE — THERAPY EVALUATION
Acute Care - Physical Therapy Initial Evaluation   Abimael     Patient Name: Vangie Carney  : 1970  MRN: [id number]  Today's Date: 2024   Onset of Illness/Injury or Date of Surgery: 24  Visit Dx:     ICD-10-CM ICD-9-CM   1. Sepsis without acute organ dysfunction, due to unspecified organism  A41.9 038.9     995.91   2. Urinary tract infection without hematuria, site unspecified  N39.0 599.0   3. Metastatic colon cancer to liver  C18.9 153.9    C78.7 197.7     Patient Active Problem List   Diagnosis    Chronic idiopathic constipation    Encounter for screening for malignant neoplasm of colon    Malignant neoplasm of sigmoid colon    GERD without esophagitis    S/P colectomy( robotic lap LAR)    Acute postoperative pain    Iron deficiency anemia    Malabsorption of iron    Port-A-Cath in place    SBO (small bowel obstruction)    Septic shock due to undetermined organism     Past Medical History:   Diagnosis Date    Gallbladder attack     GERD (gastroesophageal reflux disease)     Hearing aid worn     History of ear infections     Malignant neoplasm of sigmoid colon 3/14/2023    Seasonal allergies     Wears glasses      Past Surgical History:   Procedure Laterality Date    CHOLECYSTECTOMY      COLON RESECTION N/A 3/14/2023    Procedure: COLON RESECTION LOW ANTERIOR LAPAROSCOPIC WITH DAVINCI ROBOT;  Surgeon: Shari Aguirre MD;  Location: [facility] OR;  Service: Robotics - DaVinci;  Laterality: N/A;    COLONOSCOPY N/A 2/15/2023    Procedure: COLONOSCOPY FOR SCREENING;  Surgeon: Giselle Iniguez MD;  Location: [facility] OR;  Service: Gastroenterology;  Laterality: N/A;    ENDOSCOPY N/A 2/15/2023    Procedure: ESOPHAGOGASTRODUODENOSCOPY WITH BIOPSY;  Surgeon: Giselle Iniguez MD;  Location: [facility] OR;  Service: Gastroenterology;  Laterality: N/A;    LAPAROSCOPIC TUBAL LIGATION Bilateral     MIDDLE EAR SURGERY      PORTACATH PLACEMENT N/A 2024    Procedure: INSERTION OF PORTACATH;   Surgeon: Jaylen Leal MD;  Location: [facility] OR;  Service: General;  Laterality: N/A;     PT Assessment (Last 12 Hours)       PT Evaluation and Treatment       Row Name 07/09/24 0940          Physical Therapy Time and Intention    Subjective Information complains of;weakness;fatigue;pain  -CT     Document Type evaluation  -CT     Mode of Treatment individual therapy;physical therapy  -CT     Patient Effort good  -CT     Symptoms Noted During/After Treatment fatigue  -CT     Comment Pt and spouse reports pt wsa mostly independent prior to recent decline and admission. Pt is now Max/Dep x 2 for mobility.  -CT       Row Name 07/09/24 0940          General Information    Patient Profile Reviewed yes  -CT     Onset of Illness/Injury or Date of Surgery 07/01/24  -CT     Referring Physician Allen  -CT     Patient Observations alert;cooperative;agree to therapy  -CT     Prior Level of Function independent:;min assist:;all household mobility  -CT     Existing Precautions/Restrictions fall  -CT     Equipment Issued to Patient gait belt  -CT     Risks Reviewed patient:;spouse/S.O.:  -CT     Benefits Reviewed patient:;spouse/S.O.:  -CT     Barriers to Rehab medically complex;previous functional deficit  -CT       Row Name 07/09/24 0940          Living Environment    Current Living Arrangements home  -CT     People in Home spouse;child(alfred), adult  -CT       Row Name 07/09/24 0940          Home Use of Assistive/Adaptive Equipment    Equipment Currently Used at Home shower chair;commode;wheelchair  -CT       Row Name 07/09/24 0940          Cognition    Affect/Mental Status (Cognition) WFL  -CT       Row Name 07/09/24 0940          Range of Motion Comprehensive    Comment, General Range of Motion BLE AAROM grossly WFL  -CT       Row Name 07/09/24 0940          Strength Comprehensive (MMT)    Comment, General Manual Muscle Testing (MMT) Assessment BLE grossly 2 to 2+/5  -CT       Row Name 07/09/24 0940          Bed Mobility     Bed Mobility bed mobility (all) activities  -CT     All Activities, [place] (Bed Mobility) maximum assist (25% patient effort);dependent (less than 25% patient effort);2 person assist  -CT     Bed Mobility, Safety Issues decreased use of arms for pushing/pulling;decreased use of legs for bridging/pushing  -CT     Assistive Device (Bed Mobility) bed rails;draw sheet;head of bed elevated  -CT       Row Name 07/09/24 0940          Transfers    Comment, (Transfers) unable to assess  -CT       Row Name 07/09/24 0940          Balance    Balance Assessment sitting static balance  -CT     Static Sitting Balance maximum assist  -CT     Position, Sitting Balance sitting edge of bed  -CT       Row Name             Wound 07/01/24 2001 Right gluteal Pressure Injury    Wound - Properties Group Placement Date: 07/01/24  -KE Placement Time: 2001  -KE Present on Original Admission: Y  -KE Side: Right  -KE Location: gluteal  -KE Primary Wound Type: Pressure inj  -KE    Retired Wound - Properties Group Placement Date: 07/01/24  -KE Placement Time: 2001  -KE Present on Original Admission: Y  -KE Side: Right  -KE Location: gluteal  -KE Primary Wound Type: Pressure inj  -KE    Retired Wound - Properties Group Date first assessed: 07/01/24  -KE Time first assessed: 2001  -KE Present on Original Admission: Y  -KE Side: Right  -KE Location: gluteal  -KE Primary Wound Type: Pressure inj  -KE      Row Name             Wound 07/01/24 2001 coccyx Pressure Injury    Wound - Properties Group Placement Date: 07/01/24  -KE Placement Time: 2001  -KE Present on Original Admission: Y  -KE Location: coccyx  -KE Primary Wound Type: Pressure inj  -KE    Retired Wound - Properties Group Placement Date: 07/01/24  -KE Placement Time: 2001  -KE Present on Original Admission: Y  -KE Location: coccyx  -KE Primary Wound Type: Pressure inj  -KE    Retired Wound - Properties Group Date first assessed: 07/01/24  -KE Time first assessed: 2001 -KE Present on  Original Admission: Y  -KE Location: coccyx  -KE Primary Wound Type: Pressure inj  -KE      Row Name             Wound 07/05/24 0845 Right antecubital Skin Tear    Wound - Properties Group Placement Date: 07/05/24  -LS Placement Time: 0845  -LS Present on Original Admission: N  -LS Side: Right  -LS Location: antecubital  -LS Primary Wound Type: Skin tear  -LS    Retired Wound - Properties Group Placement Date: 07/05/24  -LS Placement Time: 0845  -LS Present on Original Admission: N  -LS Side: Right  -LS Location: antecubital  -LS Primary Wound Type: Skin tear  -LS    Retired Wound - Properties Group Date first assessed: 07/05/24  -LS Time first assessed: 0845  -LS Present on Original Admission: N  -LS Side: Right  -LS Location: antecubital  -LS Primary Wound Type: Skin tear  -LS      Row Name             Wound 07/06/24 1015 Left anterior greater trochanter    Wound - Properties Group Placement Date: 07/06/24  -BR Placement Time: 1015  -BR Side: Left  -BR Orientation: anterior  -BR Location: greater trochanter  -BR    Retired Wound - Properties Group Placement Date: 07/06/24  -BR Placement Time: 1015  -BR Side: Left  -BR Orientation: anterior  -BR Location: greater trochanter  -BR    Retired Wound - Properties Group Date first assessed: 07/06/24  -BR Time first assessed: 1015  -BR Side: Left  -BR Location: greater trochanter  -BR      Row Name             Wound 07/06/24 1019 Right greater trochanter Pressure Injury    Wound - Properties Group Placement Date: 07/06/24  -CB Placement Time: 1019  -CB Present on Original Admission: N  -CB Side: Right  -CB Location: greater trochanter  -CB Primary Wound Type: Pressure inj  -CB    Retired Wound - Properties Group Placement Date: 07/06/24  -CB Placement Time: 1019  -CB Present on Original Admission: N  -CB Side: Right  -CB Location: greater trochanter  -CB Primary Wound Type: Pressure inj  -CB    Retired Wound - Properties Group Date first assessed: 07/06/24  -CB Time first  assessed: 1019  -CB Present on Original Admission: N  -CB Side: Right  -CB Location: greater trochanter  -CB Primary Wound Type: Pressure inj  -CB      Row Name 07/09/24 0940          Coping    Observed Emotional State calm;cooperative;pleasant  -CT     Verbalized Emotional State acceptance  -CT       Row Name 07/09/24 0940          Plan of Care Review    Plan of Care Reviewed With patient;spouse  -CT       Row Name 07/09/24 0940          Positioning and Restraints    Pre-Treatment Position in bed  -CT     Post Treatment Position bed  -CT     In Bed supine;call light within reach;encouraged to call for assist;exit alarm on;with family/caregiver;side rails up x3  -CT       Row Name 07/09/24 0940          Therapy Assessment/Plan (PT)    Patient/Family Therapy Goals Statement (PT) Pt goals are to return to PLOF  -CT     Functional Level at Time of Evaluation (PT) Max/Dep x 2  -CT     PT Diagnosis (PT) impaired functional mobility  -CT     Rehab Potential (PT) fair, will monitor progress closely  -CT     Criteria for Skilled Interventions Met (PT) yes;skilled treatment is necessary  -CT     Therapy Frequency (PT) 2 times/wk  1-5 times/wk per pt tolerance  -CT     Predicted Duration of Therapy Intervention (PT) length of stay  -CT       Row Name 07/09/24 0940          Therapy Plan Review/Discharge Plan (PT)    Therapy Plan Review (PT) evaluation/treatment results reviewed;care plan/treatment goals reviewed;risks/benefits reviewed;current/potential barriers reviewed;participants voiced agreement with care plan;participants included;patient  -CT       Row Name 07/09/24 0940          Physical Therapy Goals    Bed Mobility Goal Selection (PT) bed mobility, PT goal 1  -CT     Transfer Goal Selection (PT) transfer, PT goal 1  -CT       Row Name 07/09/24 0940          Bed Mobility Goal 1 (PT)    Activity/Assistive Device (Bed Mobility Goal 1, PT) bed mobility activities, all  -CT     [place] Level/Cues Needed (Bed Mobility  Goal 1, PT) moderate assist (50-74% patient effort)  -CT     Time Frame (Bed Mobility Goal 1, PT) by discharge  -CT       Row Name 07/09/24 0940          Transfer Goal 1 (PT)    Activity/Assistive Device (Transfer Goal 1, PT) sit-to-stand/stand-to-sit;bed-to-chair/chair-to-bed  -CT     [place] Level/Cues Needed (Transfer Goal 1, PT) maximum assist (25-49% patient effort)  -CT     Time Frame (Transfer Goal 1, PT) by discharge  -CT               User Key  (r) = Recorded By, (t) = Taken By, (c) = Cosigned By      Initials Name Provider Type    CT Mikki Ramos, PT Physical Therapist    Kristi Abreu, RN Registered Nurse    Farhana Tariq RN Registered Nurse    Lesa Funez RN Registered Nurse    Daniela Phillip RN Registered Nurse                      PT Recommendation and Plan  Anticipated Discharge Disposition (PT): home with 24/7 care  Planned Therapy Interventions (PT): balance training, bed mobility training, gait training, home exercise program, manual therapy techniques, motor coordination training, neuromuscular re-education, patient/family education, postural re-education, strengthening, transfer training  Therapy Frequency (PT): 2 times/wk (1-5 times/wk per pt tolerance)  Plan of Care Reviewed With: patient, spouse       Time Calculation:    PT Charges       Row Name 07/09/24 1459             Time Calculation    PT Received On 07/09/24  -CT      PT Goal Re-Cert Due Date 07/23/24  -CT                User Key  (r) = Recorded By, (t) = Taken By, (c) = Cosigned By      Initials Name Provider Type    CT Mikki Ramos PT Physical Therapist                  Therapy Charges for Today       Code Description Service Date Service Provider Modifiers Qty    41640209686 HC PT EVAL MOD COMPLEXITY 4 7/9/2024 Mikki Ramos, PT GP 1            PT G-Codes  AM-PAC 6 Clicks Score (PT): 8    Mikki Ramos, PT  7/9/2024

## 2024-07-09 NOTE — PLAN OF CARE
Problem: Skin Injury Risk Increased  Goal: Skin Health and Integrity  Outcome: Ongoing, Progressing  Intervention: Promote and Optimize Oral Intake  Flowsheets (Taken 7/9/2024 0230 by Lavelle Mancuso RN)  Oral Nutrition Promotion: safe use of adaptive equipment encouraged  Intervention: Optimize Skin Protection  Flowsheets  Taken 7/9/2024 1600 by Maylin Abdul PCT  Head of Bed (HOB) Positioning: HOB elevated  Taken 7/9/2024 1400 by Ary Valadez RN  Pressure Reduction Techniques:   weight shift assistance provided   pressure points protected   heels elevated off bed   frequent weight shift encouraged   positioned off wounds  Pressure Reduction Devices:   pressure-redistributing mattress utilized   positioning supports utilized   heel offloading device utilized  Skin Protection:   adhesive use limited   tubing/devices free from skin contact  Taken 7/9/2024 1000 by Ary Valadez RN  Head of Bed (HOB) Positioning: HOB at 30-45 degrees  Taken 7/9/2024 0820 by Ary Valadez RN  Pressure Reduction Techniques:   weight shift assistance provided   pressure points protected   positioned off wounds   heels elevated off bed   frequent weight shift encouraged  Pressure Reduction Devices:   pressure-redistributing mattress utilized   positioning supports utilized   heel offloading device utilized  Skin Protection:   adhesive use limited   tubing/devices free from skin contact     Problem: Fall Injury Risk  Goal: Absence of Fall and Fall-Related Injury  Outcome: Ongoing, Progressing  Intervention: Identify and Manage Contributors  Flowsheets  Taken 7/9/2024 0500 by Lavelle Mancuso RN  Medication Review/Management: medications reviewed  Taken 7/5/2024 0845 by Farhana Perez RN  Self-Care Promotion:   BADL personal objects within reach   independence encouraged  Intervention: Promote Injury-Free Environment  Flowsheets  Taken 7/9/2024 1600 by Maylin Abdul PCT  Safety Promotion/Fall Prevention:   activity  supervised   clutter free environment maintained   fall prevention program maintained   lighting adjusted   nonskid shoes/slippers when out of bed   safety round/check completed   room organization consistent  Taken 7/9/2024 1500 by Ary Valadez RN  Safety Promotion/Fall Prevention:   safety round/check completed   fall prevention program maintained  Taken 7/9/2024 1300 by Ary Valadez RN  Safety Promotion/Fall Prevention:   safety round/check completed   fall prevention program maintained  Taken 7/9/2024 1100 by Ary Valadez RN  Safety Promotion/Fall Prevention:   safety round/check completed   fall prevention program maintained  Taken 7/9/2024 1000 by Ary Valadez RN  Safety Promotion/Fall Prevention:   fall prevention program maintained   safety round/check completed  Taken 7/9/2024 0900 by Ary Valadez RN  Safety Promotion/Fall Prevention:   safety round/check completed   fall prevention program maintained  Taken 7/9/2024 0720 by Ary Valadez RN  Safety Promotion/Fall Prevention:   safety round/check completed   fall prevention program maintained     Problem: Adult Inpatient Plan of Care  Goal: Plan of Care Review  Outcome: Ongoing, Progressing  Goal: Patient-Specific Goal (Individualized)  Outcome: Ongoing, Progressing  Goal: Absence of Hospital-Acquired Illness or Injury  Outcome: Ongoing, Progressing  Intervention: Identify and Manage Fall Risk  Recent Flowsheet Documentation  Taken 7/9/2024 1500 by Ary Valadez RN  Safety Promotion/Fall Prevention:   safety round/check completed   fall prevention program maintained  Taken 7/9/2024 1300 by Ary Valadez RN  Safety Promotion/Fall Prevention:   safety round/check completed   fall prevention program maintained  Taken 7/9/2024 1100 by Ary Valadez RN  Safety Promotion/Fall Prevention:   safety round/check completed   fall prevention program maintained  Taken 7/9/2024 1000 by Ary Valadez  RN  Safety Promotion/Fall Prevention:   fall prevention program maintained   safety round/check completed  Taken 7/9/2024 0900 by Ary Valadez RN  Safety Promotion/Fall Prevention:   safety round/check completed   fall prevention program maintained  Taken 7/9/2024 0720 by Ary Valadez RN  Safety Promotion/Fall Prevention:   safety round/check completed   fall prevention program maintained  Intervention: Prevent Skin Injury  Recent Flowsheet Documentation  Taken 7/9/2024 1400 by Ary Valadez RN  Skin Protection:   adhesive use limited   tubing/devices free from skin contact  Taken 7/9/2024 1000 by Ary Valadez RN  Body Position:   left   turned  Taken 7/9/2024 0820 by Ary Valadez RN  Skin Protection:   adhesive use limited   tubing/devices free from skin contact  Intervention: Prevent and Manage VTE (Venous Thromboembolism) Risk  Recent Flowsheet Documentation  Taken 7/9/2024 1400 by Ary Valadez RN  VTE Prevention/Management: (see MAR) other (see comments)  Range of Motion:   active ROM (range of motion) encouraged   ROM (range of motion) performed  Taken 7/9/2024 1000 by Ary Valadez RN  Activity Management: activity encouraged  Taken 7/9/2024 0820 by Ary Valadez RN  VTE Prevention/Management: (lovenox) other (see comments)  Range of Motion: active ROM (range of motion) encouraged  Intervention: Prevent Infection  Recent Flowsheet Documentation  Taken 7/9/2024 1000 by Ary Valadez RN  Infection Prevention:   environmental surveillance performed   rest/sleep promoted   single patient room provided  Goal: Optimal Comfort and Wellbeing  Outcome: Ongoing, Progressing  Intervention: Provide Person-Centered Care  Recent Flowsheet Documentation  Taken 7/9/2024 0820 by Ary Valadez RN  Trust Relationship/Rapport:   care explained   choices provided   emotional support provided   empathic listening provided   questions answered   reassurance provided    questions encouraged   thoughts/feelings acknowledged  Goal: Readiness for Transition of Care  Outcome: Ongoing, Progressing   Goal Outcome Evaluation:   Patient was started on levophed this morning due to MAP < 65.  Pt received one dose of albumin; MD ordering midodrine.  Patient PO appetite is poor.  PO diet upgraded to help promote eating but with minimal change noted.  UOP per FC adequate.  IV antibiotics/fluids per MD order.  Family remains at bedside and have been very supportive.  Palliative continues to follow.

## 2024-07-09 NOTE — PROGRESS NOTES
"Palliative Care Daily Progress Note     S: Medical record reviewed, followed up with Dr. Dias regarding patient's condition. Pt sitting in bed today with sister at bedside. She is much more alert today. She denies any pain, dyspnea, anxiety at this time. She does report diarrhea after being give laxative due to constipation . She reports more than 12 episodes of diarrhea however reports feeling better. She does continue to complain of nausea however, RN was notified.       O:   Palliative Performance Scale Score:     /74   Pulse 90   Temp 97.8 °F (36.6 °C) (Oral)   Resp 16   Ht 170.2 cm (67\")   Wt 66.2 kg (145 lb 15.1 oz)   LMP 06/15/2016   SpO2 100%   BMI 22.86 kg/m²     Intake/Output Summary (Last 24 hours) at 7/9/2024 1129  Last data filed at 7/9/2024 1020  Gross per 24 hour   Intake 1596.26 ml   Output 450 ml   Net 1146.26 ml       PE:    General Appearance:    Chronically ill appearing, drowsy, cooperative, NAD   HEENT:    NC/AT, without obvious abnormality, EOMI, anicteric , dry mucous membranes    Neck:   supple, trachea midline, no JVD   Lungs:     CTAB without w/r/r, diminished bilaterally     Heart:    RRR, normal S1 and S2, no M/R/G   Abdomen:     Soft, NT, Distended, hypoactive ABS , clay catheter in place, urine tea colored   Extremities:   Moves all extremities, 3+ pitting edema BLE    Pulses:   Pulses palpable and equal bilaterally   Skin:   Jaundiced    Neurologic:   A/Ox3, cooperative, drowsy   Psych:   Calm, appropriate         Meds: Reviewed and changes not noted    Labs:   Results from last 7 days   Lab Units 07/07/24  2357   WBC 10*3/mm3 5.86   HEMOGLOBIN g/dL 8.7*   HEMATOCRIT % 26.3*   PLATELETS 10*3/mm3 189     Results from last 7 days   Lab Units 07/07/24  2357   SODIUM mmol/L 130*   POTASSIUM mmol/L 3.7   CHLORIDE mmol/L 107   CO2 mmol/L 14.6*   BUN mg/dL 9   CREATININE mg/dL 0.42*   GLUCOSE mg/dL 100*   CALCIUM mg/dL 6.9*     Results from last 7 days   Lab Units " 07/07/24  2357 07/07/24  0023   SODIUM mmol/L 130* 132*   POTASSIUM mmol/L 3.7 4.0   CHLORIDE mmol/L 107 107   CO2 mmol/L 14.6* 16.9*   BUN mg/dL 9 9   CREATININE mg/dL 0.42* 0.48*   CALCIUM mg/dL 6.9* 6.9*   BILIRUBIN mg/dL  --  2.5*   ALK PHOS U/L  --  204*   ALT (SGPT) U/L  --  64*   AST (SGOT) U/L  --  58*   GLUCOSE mg/dL 100* 87     Imaging Results (Last 72 Hours)       ** No results found for the last 72 hours. **              Diagnostics: Reviewed    A: Vangie Carney is very a&ox4 today. She is not near as drowsy/lethargic as she has been the past few days. She is able to carry on conversation with us and her sister. Dr. Dias approached palliative due to code status concerns. She is currently DNR/DNI, sister concerned about pt wanting CPR if her heart stopped. We verified this again. Bp 100/74 hr 90 rr 16 sat 100, she has had hypotensive episodes most recently 66/51 hr        P: Palliative has had many conversations with patient and family about code status. Pt wanted to go over goals of care again today. Dr. Dias concerned that patient had requested code status conversation for clarification. We had extensive, length conversation about pros and cons of being a full code vs DNR/DNI. Pt very addiment about not wanting to be placed on life support. She and sister had questions about CPR only; risks and benefits discussed about CPR only option. Pt ultimately decided she would want to be a DNR/DNI and sister present for discussion. Pt became nauseated during conversation. We also discussed living will. I feel that all of this information is overwhelming given her age and she has two adult children that she is very worried about, as well as her . Pt to remains DNR/DNI, would only want cardioversion and IV cardiac medications in the case of an emergency. She also reports that she would not want to live in a vegetative state and is more concerned about the quality of her life vs the quantity.        We will continue to follow along. Please do not hesitate to contact us regarding further sx mgmt or GOC needs, including after hours or on weekends via our on call provider at [phone].     Elen Hernandez, APRN    7/9/2024

## 2024-07-10 LAB
A-A DO2: 25.9 MMHG (ref 0–300)
ARTERIAL PATENCY WRIST A: ABNORMAL
ATMOSPHERIC PRESS: 724 MMHG
BASE EXCESS BLDA CALC-SCNC: -5.4 MMOL/L (ref 0–2)
BDY SITE: ABNORMAL
CO2 BLDA-SCNC: 19.6 MMOL/L (ref 22–33)
COHGB MFR BLD: 2.1 % (ref 0–5)
GLUCOSE BLDC GLUCOMTR-MCNC: 102 MG/DL (ref 70–130)
GLUCOSE BLDC GLUCOMTR-MCNC: 102 MG/DL (ref 70–130)
GLUCOSE BLDC GLUCOMTR-MCNC: 110 MG/DL (ref 70–130)
GLUCOSE BLDC GLUCOMTR-MCNC: 129 MG/DL (ref 70–130)
HCO3 BLDA-SCNC: 18.7 MMOL/L (ref 20–26)
HCT VFR BLD CALC: 24.1 % (ref 38–51)
HGB BLDA-MCNC: 7.9 G/DL (ref 13.5–17.5)
INHALED O2 CONCENTRATION: 21 %
Lab: ABNORMAL
METHGB BLD QL: 0.1 % (ref 0–3)
MODALITY: ABNORMAL
OXYHGB MFR BLDV: 95.2 % (ref 94–99)
PCO2 BLDA: 30 MM HG (ref 35–45)
PCO2 TEMP ADJ BLD: ABNORMAL MM[HG]
PH BLDA: 7.4 PH UNITS (ref 7.35–7.45)
PH, TEMP CORRECTED: ABNORMAL
PO2 BLDA: 82.4 MM HG (ref 83–108)
PO2 TEMP ADJ BLD: ABNORMAL MM[HG]
SAO2 % BLDCOA: 97.3 % (ref 94–99)
VENTILATOR MODE: ABNORMAL

## 2024-07-10 PROCEDURE — 99232 SBSQ HOSP IP/OBS MODERATE 35: CPT | Performed by: HOSPITALIST

## 2024-07-10 PROCEDURE — 83050 HGB METHEMOGLOBIN QUAN: CPT

## 2024-07-10 PROCEDURE — 25810000003 DEXTROSE 5% IN LACTATED RINGERS PER 1000 ML: Performed by: HOSPITALIST

## 2024-07-10 PROCEDURE — 82375 ASSAY CARBOXYHB QUANT: CPT

## 2024-07-10 PROCEDURE — 82805 BLOOD GASES W/O2 SATURATION: CPT

## 2024-07-10 PROCEDURE — 82948 REAGENT STRIP/BLOOD GLUCOSE: CPT

## 2024-07-10 PROCEDURE — 25010000002 FLUCONAZOLE PER 200 MG: Performed by: STUDENT IN AN ORGANIZED HEALTH CARE EDUCATION/TRAINING PROGRAM

## 2024-07-10 PROCEDURE — 36600 WITHDRAWAL OF ARTERIAL BLOOD: CPT

## 2024-07-10 PROCEDURE — 99232 SBSQ HOSP IP/OBS MODERATE 35: CPT | Performed by: NURSE PRACTITIONER

## 2024-07-10 RX ORDER — DEXTROSE, SODIUM CHLORIDE, SODIUM LACTATE, POTASSIUM CHLORIDE, AND CALCIUM CHLORIDE 5; .6; .31; .03; .02 G/100ML; G/100ML; G/100ML; G/100ML; G/100ML
50 INJECTION, SOLUTION INTRAVENOUS CONTINUOUS
Status: DISCONTINUED | OUTPATIENT
Start: 2024-07-10 | End: 2024-07-13 | Stop reason: HOSPADM

## 2024-07-10 RX ORDER — MIDODRINE HYDROCHLORIDE 2.5 MG/1
10 TABLET ORAL
Status: DISCONTINUED | OUTPATIENT
Start: 2024-07-10 | End: 2024-07-13 | Stop reason: HOSPADM

## 2024-07-10 RX ORDER — PANTOPRAZOLE SODIUM 40 MG/1
40 TABLET, DELAYED RELEASE ORAL
Status: DISCONTINUED | OUTPATIENT
Start: 2024-07-10 | End: 2024-07-13 | Stop reason: HOSPADM

## 2024-07-10 RX ADMIN — Medication 10 ML: at 21:53

## 2024-07-10 RX ADMIN — SODIUM CHLORIDE, SODIUM LACTATE, POTASSIUM CHLORIDE, CALCIUM CHLORIDE AND DEXTROSE MONOHYDRATE 50 ML/HR: 5; 600; 310; 30; 20 INJECTION, SOLUTION INTRAVENOUS at 09:34

## 2024-07-10 RX ADMIN — NYSTATIN 1 APPLICATION: 100000 CREAM TOPICAL at 21:53

## 2024-07-10 RX ADMIN — Medication 1 APPLICATION: at 21:53

## 2024-07-10 RX ADMIN — APIXABAN 5 MG: 5 TABLET, FILM COATED ORAL at 21:52

## 2024-07-10 RX ADMIN — CARBIDOPA AND LEVODOPA 5 MG: 50; 200 TABLET, EXTENDED RELEASE ORAL at 06:51

## 2024-07-10 RX ADMIN — DEXTROSE AND SODIUM CHLORIDE 30 ML/HR: 5; 450 INJECTION, SOLUTION INTRAVENOUS at 06:10

## 2024-07-10 RX ADMIN — Medication 1 APPLICATION: at 11:42

## 2024-07-10 RX ADMIN — DIPHENHYDRAMINE HYDROCHLORIDE AND LIDOCAINE HYDROCHLORIDE AND ALUMINUM HYDROXIDE AND MAGNESIUM HYDRO 5 ML: KIT at 01:11

## 2024-07-10 RX ADMIN — Medication 10 ML: at 09:38

## 2024-07-10 RX ADMIN — PANTOPRAZOLE SODIUM 40 MG: 40 TABLET, DELAYED RELEASE ORAL at 09:36

## 2024-07-10 RX ADMIN — CARBIDOPA AND LEVODOPA 10 MG: 50; 200 TABLET, EXTENDED RELEASE ORAL at 17:36

## 2024-07-10 RX ADMIN — PANTOPRAZOLE SODIUM 40 MG: 40 INJECTION, POWDER, FOR SOLUTION INTRAVENOUS at 06:03

## 2024-07-10 RX ADMIN — FLUCONAZOLE 200 MG: 2 INJECTION, SOLUTION INTRAVENOUS at 11:41

## 2024-07-10 RX ADMIN — Medication 10 ML: at 09:37

## 2024-07-10 RX ADMIN — NYSTATIN 1 APPLICATION: 100000 CREAM TOPICAL at 11:42

## 2024-07-10 RX ADMIN — CARBIDOPA AND LEVODOPA 10 MG: 50; 200 TABLET, EXTENDED RELEASE ORAL at 13:21

## 2024-07-10 RX ADMIN — APIXABAN 5 MG: 5 TABLET, FILM COATED ORAL at 09:36

## 2024-07-10 NOTE — PROGRESS NOTES
Date:  07/10/24    CC: Weakness    Subjective:  Ms. Carney was seen in her hospital room this morning. She was resting in bed, stating that she had a long night and did not get much rest. She continues to have bowel movements after being given stool softeners. Her sister is at bedside. She is without further specific complaints this AM. BP 91/61. Remains on Levophed.       Review Of Systems:  A comprehensive 14-point review of systems performed. Significant findings as mentioned above.  All other systems reviewed and are negative.      Objective:  Medications:  Scheduled Meds:  apixaban, 5 mg, Oral, BID  castor oil-balsam peru, 1 Application, Topical, Q12H  fluconazole, 200 mg, Intravenous, Q24H  granisetron, 1 patch, Transdermal, Weekly  lactulose, 20 g, Oral, BID  midodrine, 10 mg, Oral, TID AC  nystatin, 1 Application, Topical, BID  pantoprazole, 40 mg, Oral, Q AM  senna-docusate sodium, 2 tablet, Oral, BID  sodium chloride, 10 mL, Intravenous, Q12H  sodium chloride, 10 mL, Intravenous, Q12H  sodium chloride, 10 mL, Intravenous, Q12H  sodium chloride, 10 mL, Intravenous, Q12H      Continuous Infusions:  dextrose 5 % and lactated Ringer's, 50 mL/hr, Last Rate: 50 mL/hr (07/10/24 0934)  dextrose 5 % and sodium chloride 0.45 %, 30 mL/hr, Last Rate: Stopped (07/10/24 0941)  norepinephrine, 0.02-0.3 mcg/kg/min (Dosing Weight), Last Rate: 0.06 mcg/kg/min (07/10/24 0049)      PRN Meds:    acetaminophen    senna-docusate sodium **AND** polyethylene glycol **AND** bisacodyl **AND** bisacodyl    calcium carbonate    Calcium Replacement - Follow Nurse / BPA Driven Protocol    dextrose    dextrose    First Mouthwash (Magic Mouthwash)    glucagon (human recombinant)    heparin    HYDROcodone-acetaminophen    Magnesium Standard Dose Replacement - Follow Nurse / BPA Driven Protocol    metoclopramide    nitroglycerin    ondansetron ODT **OR** ondansetron    Potassium Replacement - Follow Nurse / BPA Driven Protocol     prochlorperazine    promethazine    sodium chloride    Access VAD **AND** sodium chloride    sodium chloride    sodium chloride    sodium chloride    sodium chloride    sodium chloride    sodium chloride    sodium chloride    sodium chloride    sodium chloride      Physical Exam:  Vital Signs     Patient Vitals for the past 24 hrs:   BP Temp Temp src Pulse Resp SpO2 Weight   07/10/24 1000 99/67 -- -- 84 14 98 % --   07/10/24 0930 90/60 -- -- 84 14 98 % --   07/10/24 0900 (!) 79/58 -- -- 75 14 98 % --   07/10/24 0830 95/65 -- -- 84 14 98 % --   07/10/24 0800 90/68 97.5 °F (36.4 °C) Oral 67 12 99 % --   07/10/24 0730 101/77 -- -- 86 14 99 % --   07/10/24 0700 99/77 -- -- 86 12 98 % --   07/10/24 0651 90/68 -- -- 74 -- -- --   07/10/24 0615 103/70 -- -- 85 13 99 % --   07/10/24 0600 (!) 88/62 -- -- 76 12 98 % --   07/10/24 0545 91/67 -- -- 78 13 98 % --   07/10/24 0530 (!) 89/68 -- -- 79 13 99 % --   07/10/24 0515 95/70 -- -- 75 14 99 % --   07/10/24 0500 91/67 -- -- 75 14 99 % --   07/10/24 0430 95/67 -- -- 75 13 100 % --   07/10/24 0400 94/67 97.4 °F (36.3 °C) Oral 79 13 99 % 66 kg (145 lb 8.1 oz)   07/10/24 0330 (!) 86/62 -- -- 73 12 98 % --   07/10/24 0300 92/63 -- -- 77 15 99 % --   07/10/24 0230 90/62 -- -- 73 13 98 % --   07/10/24 0200 (!) 82/60 -- -- 77 12 97 % --   07/10/24 0130 90/63 -- -- 76 14 98 % --   07/10/24 0100 (!) 89/63 -- -- 80 13 98 % --   07/10/24 0049 (!) 87/59 -- -- 83 -- -- --   07/10/24 0030 (!) 88/60 -- -- 78 13 98 % --   07/10/24 0000 (!) 88/57 97.6 °F (36.4 °C) Oral 75 14 98 % --   07/09/24 2330 (!) 81/58 -- -- 73 12 96 % --   07/09/24 2308 (!) 82/56 -- -- 75 -- -- --   07/09/24 2300 (!) 82/56 -- -- 73 15 98 % --   07/09/24 2230 90/61 -- -- 79 15 98 % --   07/09/24 2200 94/66 -- -- 80 13 98 % --   07/09/24 2130 (!) 80/54 -- -- 79 14 97 % --   07/09/24 2100 92/62 -- -- 79 14 99 % --   07/09/24 2030 (!) 85/64 -- -- 84 15 99 % --   07/09/24 2000 (!) 87/60 97.9 °F (36.6 °C) Oral 80 14 100 % --    07/09/24 1930 91/66 -- -- 78 11 100 % --   07/09/24 1900 92/72 -- -- 82 16 99 % --   07/09/24 1845 91/67 -- -- 85 14 100 % --   07/09/24 1830 94/67 -- -- 78 15 100 % --   07/09/24 1815 (!) 89/60 -- -- 82 17 100 % --   07/09/24 1800 (!) 86/61 -- -- 78 14 99 % --   07/09/24 1749 (!) 87/61 -- -- 91 -- -- --   07/09/24 1745 (!) 87/61 -- -- 80 10 100 % --   07/09/24 1730 (!) 86/59 -- -- 77 14 100 % --   07/09/24 1715 (!) 87/57 -- -- 79 14 99 % --   07/09/24 1700 92/67 -- -- 81 19 100 % --   07/09/24 1645 97/66 -- -- 76 13 100 % --   07/09/24 1630 90/67 -- -- 74 14 98 % --   07/09/24 1615 91/67 -- -- 74 13 98 % --   07/09/24 1600 91/65 97.4 °F (36.3 °C) Oral 74 13 99 % --   07/09/24 1545 92/70 -- -- 78 14 99 % --   07/09/24 1530 94/64 -- -- 74 17 99 % --   07/09/24 1515 (!) 86/67 -- -- 81 13 100 % --   07/09/24 1500 97/70 -- -- 81 14 99 % --   07/09/24 1445 (!) 86/69 -- -- 73 13 99 % --   07/09/24 1430 (!) 86/67 -- -- 78 13 99 % --   07/09/24 1415 90/67 -- -- 89 13 98 % --   07/09/24 1400 93/64 -- -- 80 12 99 % --   07/09/24 1345 90/64 -- -- 86 13 98 % --   07/09/24 1330 95/70 -- -- 86 14 98 % --   07/09/24 1315 101/74 -- -- 101 14 99 % --   07/09/24 1300 98/57 -- -- 82 13 99 % --   07/09/24 1245 91/73 -- -- 82 12 99 % --   07/09/24 1230 97/71 -- -- 80 13 100 % --   07/09/24 1215 98/69 -- -- 84 14 99 % --   07/09/24 1200 94/74 97.4 °F (36.3 °C) Oral 89 13 100 % --   07/09/24 1145 102/73 -- -- 82 14 99 % --         General:  Awake, alert and oriented, in bed  HEENT:  Pupils are equal, round and reactive to light and accommodation  Neck:  No JVD, thyromegaly or lymphadenopathy  CV:  Regular rate and rhythm, no murmurs, rubs or gallops  Resp:  Lungs are clear to auscultation bilaterally  Abd:  Soft, non-tender, non distended, bowel sounds minimal, no organomegaly  Ext:  No clubbing, +edema of jesica upper and lower extremities   Lymph:  No cervical, supraclavicular, axillary, inguinal or femoral adenopathy  Neuro:  MS as  above, CN II-XII intact, grossly non-focal exam    Labs / Studies:    Lab Results   Component Value Date    WBC 5.86 07/07/2024    HGB 8.7 (L) 07/07/2024    HCT 26.3 (L) 07/07/2024    .8 (H) 07/07/2024    RDW 14.7 07/07/2024     07/07/2024    NEUTRORELPCT 78.5 (H) 07/02/2024    LYMPHORELPCT 16.8 (L) 07/02/2024    MONORELPCT 4.3 (L) 07/02/2024    EOSRELPCT 0.0 (L) 07/02/2024    BASORELPCT 0.1 07/02/2024    NEUTROABS 9.08 (H) 07/02/2024    LYMPHSABS 1.94 07/02/2024       Lab Results   Component Value Date     (L) 07/07/2024    K 3.7 07/07/2024    CO2 14.6 (L) 07/07/2024     07/07/2024    BUN 9 07/07/2024    CREATININE 0.42 (L) 07/07/2024    EGFRIFNONA 79 09/10/2021    GLUCOSE 100 (H) 07/07/2024    CALCIUM 6.9 (L) 07/07/2024    ALKPHOS 204 (H) 07/07/2024    AST 58 (H) 07/07/2024    ALT 64 (H) 07/07/2024    BILITOT 2.5 (H) 07/07/2024    ALBUMIN 1.5 (L) 07/07/2024    PROTEINTOT 3.5 (L) 07/07/2024    MG 2.7 (H) 07/04/2024    PHOS 3.3 03/11/2024       Imaging Results (Last 72 Hours)       ** No results found for the last 72 hours. **            Assessment & Plan:  Vangie Carney is a 54 y.o. year-old female with     Septic shock  Small bowel obstruction   - Patient with hypotension, tachycardia, and positive urinalysis. Currently on antibiotics and pressors with levophed   - Blood cultures with NGTD, urine culture with e.coli. Currently on zosyn   - Plan on symptomatic management at this time. Continues to be on vasopressors. Attempting to titrate down and begin Midodrine. Tolerating liquids, had BM 7/8     3. Metastatic colon adenocarcinoma   - Currently follows with Dr. Hanks in the outpatient setting.   - Patient with localized descending/sigmoid moderately differentiated adenocarcinoma March 2023 status post APR. Opted out of adjuvant chemotherapy. Presented 1/2024 with recurrent/metastatic cancer  - Received 4 cycles of 5-FU/leucovorin 2-4/2024, switched to palliative bevacizumab due to  intolerance. She has received 4 cycles (last cycle 6/26/24).  - Palliative care following. Patient is DNR/DNI. Appreciate assistance.          Thank you for the consultation and allowing us to participate in the care of Ms. Carney. Please do not hesitate to contact us with any questions or concerns.          Jessie Dahl, APRN  07/10/24  11:37 EDT        A total of 35 minutes were spent helping to coordinate this patient’s care today; ~20 minutes of this time were spent face-to-face with the patient in her hospital room this morning, reviewing her interim medical history and counseling on the current treatment and follow up plans. All questions were answered to her satisfaction.

## 2024-07-10 NOTE — PROGRESS NOTES
[facility] HOSPITALIST PROGRESS NOTE     Patient Identification:  Name:  Vangie Carney  Age:  54 y.o.  Sex:  female  :  1970  MRN:  [id number]  Visit Number:  [id number]  Primary Care Provider:  Urvashi Basurto APRN    Length of stay:  9    Subjective: Patient seen and examined, she was asleep when I came in and sister at bedside, intake is still poor she drinks some of her boost but not persistent.  Still requires low-dose Levophed for borderline hypotension she has good urine output, she was started on midodrine yesterday to hopefully help with her blood pressure.  Bicarb is trending down blood gas ordered revealing respiratory alkalosis with concomitant metabolic acidosis    Chief Complaint: Hypotension nausea and vomiting  ----------------------------------------------------------------------------------------------------------------------  Current [facility] Meds:  apixaban, 5 mg, Oral, BID  castor oil-balsam peru, 1 Application, Topical, Q12H  fluconazole, 200 mg, Intravenous, Q24H  granisetron, 1 patch, Transdermal, Weekly  lactulose, 20 g, Oral, BID  midodrine, 10 mg, Oral, TID AC  nystatin, 1 Application, Topical, BID  pantoprazole, 40 mg, Oral, Q AM  senna-docusate sodium, 2 tablet, Oral, BID  sodium chloride, 10 mL, Intravenous, Q12H  sodium chloride, 10 mL, Intravenous, Q12H  sodium chloride, 10 mL, Intravenous, Q12H  sodium chloride, 10 mL, Intravenous, Q12H      dextrose 5 % and lactated Ringer's, 50 mL/hr  dextrose 5 % and sodium chloride 0.45 %, 30 mL/hr, Last Rate: 30 mL/hr (07/10/24 0610)  norepinephrine, 0.02-0.3 mcg/kg/min (Dosing Weight), Last Rate: 0.06 mcg/kg/min (07/10/24 0049)      ----------------------------------------------------------------------------------------------------------------------  Vital Signs:  Temp:  [97.4 °F (36.3 °C)-97.9 °F (36.6 °C)] 97.5 °F (36.4 °C)  Heart Rate:  [] 74  Resp:  [10-19] 13  BP: ()/(54-83) 90/68       Tele: Sinus  rhythm at 70 bpm      07/08/24  0400 07/09/24  0400 07/10/24  0400   Weight: 66 kg (145 lb 8.1 oz) 66.2 kg (145 lb 15.1 oz) 66 kg (145 lb 8.1 oz)     Body mass index is 22.79 kg/m².    Intake/Output Summary (Last 24 hours) at 7/10/2024 0925  Last data filed at 7/10/2024 0900  Gross per 24 hour   Intake 644.3 ml   Output 850 ml   Net -205.7 ml     Diet: Regular/House, Gastrointestinal; Low Irritant; Texture: Soft to Chew (NDD 3); Soft to Chew: Whole Meat; Fluid Consistency: Thin (IDDSI 0)  ----------------------------------------------------------------------------------------------------------------------  Physical exam:  General: Comfortable,awake, alert, oriented to self, place, and time, No respiratory distress.    Skin:  Skin is warm and dry. No rash noted. No pallor.    HENT:  Head:  Normocephalic and atraumatic.  Mouth:  Moist mucous membranes.    Eyes:  Conjunctivae and EOM are normal.  Pupils are equal, round, and reactive to light.  Mild jaundice  Neck:  Neck supple.  Mild  JVD present.    Pulmonary/Chest:  No respiratory distress, no wheezes, no crackles, with normal breath sounds and good air movement.  Cardiovascular:  Normal rate, regular rhythm and normal heart sounds with no murmur.  Abdominal: Mildly distended, no guarding no rigidity bowel sounds present no tenderness.   Extremities: Edema pitting thighplasty both upper and lower extremity no cyanosis or clubbing   Neurological:  Motor strength equal no obvious deficit, sensory grossly intact.   No cranial nerve deficit.  No tongue deviation.  No facial droop.  No slurred speech.    Genitourinary: Colmenares catheter in place  Back:  ----------------------------------------------------------------------------------------------------------------------  ----------------------------------------------------------------------------------------------------------------------      Results from last 7 days   Lab Units 07/07/24  2357 07/07/24  0023 07/04/24  2243  "  WBC 10*3/mm3 5.86 5.24 6.06   HEMOGLOBIN g/dL 8.7* 8.7* 9.1*   HEMATOCRIT % 26.3* 25.8* 27.7*   MCV fL 104.8* 105.3* 102.2*   MCHC g/dL 33.1 33.7 32.9   PLATELETS 10*3/mm3 189 147 123*     Results from last 7 days   Lab Units 07/10/24  0826   PH, ARTERIAL pH units 7.403   PO2 ART mm Hg 82.4*   PCO2, ARTERIAL mm Hg 30.0*   HCO3 ART mmol/L 18.7*     Results from last 7 days   Lab Units 07/07/24  2357 07/07/24  0023 07/04/24  2243 07/04/24  0644   SODIUM mmol/L 130* 132* 133* 134*   POTASSIUM mmol/L 3.7 4.0 4.4 3.5   MAGNESIUM mg/dL  --   --   --  2.7*   CHLORIDE mmol/L 107 107 107 105   CO2 mmol/L 14.6* 16.9* 17.6* 17.7*   BUN mg/dL 9 9 12 14   CREATININE mg/dL 0.42* 0.48* 0.50* 0.59   CALCIUM mg/dL 6.9* 6.9* 7.0* 7.1*   GLUCOSE mg/dL 100* 87 101* 126*   ALBUMIN g/dL  --  1.5*  --  1.7*   BILIRUBIN mg/dL  --  2.5*  --  2.7*   ALK PHOS U/L  --  204*  --  227*   AST (SGOT) U/L  --  58*  --  47*   ALT (SGPT) U/L  --  64*  --  72*   Estimated Creatinine Clearance: 161.4 mL/min (A) (by C-G formula based on SCr of 0.42 mg/dL (L)).    No results found for: \"AMMONIA\"      No results found for: \"BLOODCX\"  No results found for: \"URINECX\"  No results found for: \"WOUNDCX\"  No results found for: \"STOOLCX\"    I have personally looked at the labs and they are summarized above.  ----------------------------------------------------------------------------------------------------------------------  Imaging Results (Last 24 Hours)       ** No results found for the last 24 hours. **          ----------------------------------------------------------------------------------------------------------------------  Assessment and Plan:  -Shock multifactorial secondary to sepsis as well as intravascular hypovolemic state from anasarca and third spacing  -Anasarca  -Protein caloric malnourished severe  -Metastatic colon cancer  -Possible esophageal candidiasis  -Acute cystitis without hematuria E. coli in culture  -History of PE  -Severe " anorexia  -Stable SBO on CAT scan patient is now moving her bowels    To finish Diflucan and Zosyn in 7 days, patient is again encouraged to increase p.o. intake, if patient is nauseous encouraged small frequent intake high caloric high protein including boost.  Increase midodrine, intermittently requiring Levophed for now.  Patient back on Eliquis.  Change IV fluids to lactated Ringer.    Disposition Home pending      Mila Dias MD  07/10/24  09:25 EDT

## 2024-07-10 NOTE — PLAN OF CARE
Goal Outcome Evaluation:  Plan of Care Reviewed With: patient           Outcome Evaluation: VSS; pt continues on Levophed gtt; Midodrine dose increased this shift; no c/o pain; Pt continues to be weak, family at bedside.

## 2024-07-10 NOTE — PROGRESS NOTES
"Palliative Care Daily Progress Note     S: Medical record reviewed, followed up with Primary RN Dixie and Dr Dias regarding patient's condition. When I saw Vangie this morning she was resting quietly, she appeared to be more awake and alert today, she was alert and oriented. Vangie denied pain, nausea, anxiety, shortness of breath, only sleepy as she said they kept her up all  night. She was not in distress at this time. Sister Sonja was at bedside.      O:   Palliative Performance Scale Score:     BP 99/67   Pulse 84   Temp 97.3 °F (36.3 °C) (Oral)   Resp 14   Ht 170.2 cm (67\")   Wt 66 kg (145 lb 8.1 oz)   LMP 06/15/2016   SpO2 98%   BMI 22.79 kg/m²     Intake/Output Summary (Last 24 hours) at 7/10/2024 1204  Last data filed at 7/10/2024 1141  Gross per 24 hour   Intake 1071.99 ml   Output 650 ml   Net 421.99 ml       PE:  General Appearance:    Chronically ill appearing, awake, alert, cooperative, NAD   HEENT:    NC/AT, without obvious abnormality, EOMI, anicteric , dry mucous membranes    Neck:   supple, trachea midline, no JVD   Lungs:     Unlabored respirations with no rhonchi rales or wheezing noted., diminished bilaterally     Heart:    tachycardia, normal S1 and S2, no M/R/G   Abdomen:     Soft, NT, Distended, hypoactive ABS , clay catheter in place   Extremities:   Moves all extremities, no edema   Pulses:   Pulses palpable and equal bilaterally   Skin:   Dry and jaundiced    Neurologic:   A/Ox3, cooperative, weak   Psych:   Calm, more appropriate, however still flat         Meds: Reviewed and changes noted    Labs:   Results from last 7 days   Lab Units 07/07/24  2357   WBC 10*3/mm3 5.86   HEMOGLOBIN g/dL 8.7*   HEMATOCRIT % 26.3*   PLATELETS 10*3/mm3 189     Results from last 7 days   Lab Units 07/07/24  2357   SODIUM mmol/L 130*   POTASSIUM mmol/L 3.7   CHLORIDE mmol/L 107   CO2 mmol/L 14.6*   BUN mg/dL 9   CREATININE mg/dL 0.42*   GLUCOSE mg/dL 100*   CALCIUM mg/dL 6.9*     Results from " last 7 days   Lab Units 07/07/24  2357 07/07/24  0023   SODIUM mmol/L 130* 132*   POTASSIUM mmol/L 3.7 4.0   CHLORIDE mmol/L 107 107   CO2 mmol/L 14.6* 16.9*   BUN mg/dL 9 9   CREATININE mg/dL 0.42* 0.48*   CALCIUM mg/dL 6.9* 6.9*   BILIRUBIN mg/dL  --  2.5*   ALK PHOS U/L  --  204*   ALT (SGPT) U/L  --  64*   AST (SGOT) U/L  --  58*   GLUCOSE mg/dL 100* 87     Imaging Results (Last 72 Hours)       ** No results found for the last 72 hours. **              Diagnostics: Reviewed    A: Vangie Carney is a 54 y.o. female admitted on 7/1/2024 for septic shock. She has a past medical history of GERD, malignant neoplasm of sigmoid colon with colon resection. She has Metastatic colorectal adenocarcinoma s/p LAR currently receiving palliative chemotherapy, pulmonary embolism on therapeutic anticoagulation, and gastric dysmotility. She complains today of fatigue, dysphagia, dyspnea worse with exertion, abdominal pain, nausea and vomiting, decreased UOP, dysuria and increased weakness. She presented to the ED for increased weakness and fatigue over the past few days as well as nausea and vomiting. Her ED labs, sodium 133, BUN 22, creat 1.4, eGFR 45.1, glucose 22, calcium 7.9, alk phos 249, total protein 3.9, albumin 1.8, AST 77, ALT 65, total bilirubin 1.7, wbc 8.31, H&H 9.8/28.7, platelets 155, urine cloudy, >1.030, blood trace, positive nitrates, moderate leukocytes, CT shows multiple dilated loops of small bowel consistent with small bowel obstruction, Right pleural effusion and pericardial effusion, incompletely imaged. These are new when compared to the prior study. Fatty infiltration of the liver. No hydronephrosis or ureteral calculus.   Today 7/10/2024  T 97.3, HR 84, RR 12, BP of 101/77, and saturation of 98% on room air    P:  I was able to speak with patients sister Sonja at bedside to provide support. I discussed pt with Dr Dias, discussed that her obstruction had cleared and pt was having BM's and that he had  started her on Midodrine to improve her BP's.    We will continue to follow along. Please do not hesitate to contact us regarding further sx mgmt or GOC needs, including after hours or on weekends via our on call provider at [phone].     Abeba Nowak, APRN    7/10/2024

## 2024-07-10 NOTE — PLAN OF CARE
Goal Outcome Evaluation:                 Outcome Evaluation: Levophed currently infusing at 0.06 pt currently maintaining map of 65 or greater. Pt lying in bed resting comfortably. Family at bedside. No new issues noted.

## 2024-07-10 NOTE — CASE MANAGEMENT/SOCIAL WORK
Discharge Planning Assessment  [facility]     Patient Name: Vangie Carney  MRN: [id number]  Today's Date: 7/10/2024    Admit Date: 7/1/2024    Plan: CM spoke with Pt and spouse at bedside.   She states that she will go home @ discharge.  Patient's  had requested Home Health @ discharge and they live in [facility].  Patient has Walker, BSC & Shower Chair that was given to her but  has requested Narrow WC.  Family will provide transportation at discharge.  No other issues or concerns are noted at this time.  CM will continue to follow and assist with any discharge needs.   Discharge Needs Assessment    No documentation.                  Discharge Plan       Row Name 07/10/24 1522       Plan    Plan CM spoke with Pt and spouse at bedside.   She states that she will go home @ discharge.  Patient's  had requested Home Health @ discharge and they live in [facility].  Patient has Walker, BSC & Shower Chair that was given to her but  has requested Narrow WC.  Family will provide transportation at discharge.  No other issues or concerns are noted at this time.  CM will continue to follow and assist with any discharge needs.                      Dixie Milton RN

## 2024-07-11 LAB
ANION GAP SERPL CALCULATED.3IONS-SCNC: 9.6 MMOL/L (ref 5–15)
BASOPHILS # BLD AUTO: 0.04 10*3/MM3 (ref 0–0.2)
BASOPHILS NFR BLD AUTO: 0.7 % (ref 0–1.5)
BUN SERPL-MCNC: 6 MG/DL (ref 6–20)
BUN/CREAT SERPL: 16.2 (ref 7–25)
CALCIUM SPEC-SCNC: 7.5 MG/DL (ref 8.6–10.5)
CHLORIDE SERPL-SCNC: 108 MMOL/L (ref 98–107)
CO2 SERPL-SCNC: 17.4 MMOL/L (ref 22–29)
CREAT SERPL-MCNC: 0.37 MG/DL (ref 0.57–1)
DEPRECATED RDW RBC AUTO: 64.2 FL (ref 37–54)
EGFRCR SERPLBLD CKD-EPI 2021: 120.8 ML/MIN/1.73
EOSINOPHIL # BLD AUTO: 0 10*3/MM3 (ref 0–0.4)
EOSINOPHIL NFR BLD AUTO: 0 % (ref 0.3–6.2)
ERYTHROCYTE [DISTWIDTH] IN BLOOD BY AUTOMATED COUNT: 19.2 % (ref 12.3–15.4)
GLUCOSE BLDC GLUCOMTR-MCNC: 107 MG/DL (ref 70–130)
GLUCOSE BLDC GLUCOMTR-MCNC: 109 MG/DL (ref 70–130)
GLUCOSE BLDC GLUCOMTR-MCNC: 115 MG/DL (ref 70–130)
GLUCOSE SERPL-MCNC: 110 MG/DL (ref 65–99)
HCT VFR BLD AUTO: 26 % (ref 34–46.6)
HGB BLD-MCNC: 8.4 G/DL (ref 12–15.9)
IMM GRANULOCYTES # BLD AUTO: 0.02 10*3/MM3 (ref 0–0.05)
IMM GRANULOCYTES NFR BLD AUTO: 0.4 % (ref 0–0.5)
LYMPHOCYTES # BLD AUTO: 1.59 10*3/MM3 (ref 0.7–3.1)
LYMPHOCYTES NFR BLD AUTO: 29.7 % (ref 19.6–45.3)
MAGNESIUM SERPL-MCNC: 1.8 MG/DL (ref 1.6–2.6)
MCH RBC QN AUTO: 35 PG (ref 26.6–33)
MCHC RBC AUTO-ENTMCNC: 32.3 G/DL (ref 31.5–35.7)
MCV RBC AUTO: 108.3 FL (ref 79–97)
MONOCYTES # BLD AUTO: 0.3 10*3/MM3 (ref 0.1–0.9)
MONOCYTES NFR BLD AUTO: 5.6 % (ref 5–12)
NEUTROPHILS NFR BLD AUTO: 3.41 10*3/MM3 (ref 1.7–7)
NEUTROPHILS NFR BLD AUTO: 63.6 % (ref 42.7–76)
NRBC BLD AUTO-RTO: 0.4 /100 WBC (ref 0–0.2)
PHOSPHATE SERPL-MCNC: 2.1 MG/DL (ref 2.5–4.5)
PLATELET # BLD AUTO: 182 10*3/MM3 (ref 140–450)
PMV BLD AUTO: 9.8 FL (ref 6–12)
POTASSIUM SERPL-SCNC: 3.7 MMOL/L (ref 3.5–5.2)
RBC # BLD AUTO: 2.4 10*6/MM3 (ref 3.77–5.28)
SODIUM SERPL-SCNC: 135 MMOL/L (ref 136–145)
WBC NRBC COR # BLD AUTO: 5.36 10*3/MM3 (ref 3.4–10.8)

## 2024-07-11 PROCEDURE — 99232 SBSQ HOSP IP/OBS MODERATE 35: CPT | Performed by: HOSPITALIST

## 2024-07-11 PROCEDURE — 84100 ASSAY OF PHOSPHORUS: CPT | Performed by: HOSPITALIST

## 2024-07-11 PROCEDURE — 25010000002 ONDANSETRON PER 1 MG: Performed by: STUDENT IN AN ORGANIZED HEALTH CARE EDUCATION/TRAINING PROGRAM

## 2024-07-11 PROCEDURE — 25010000002 FLUCONAZOLE PER 200 MG: Performed by: STUDENT IN AN ORGANIZED HEALTH CARE EDUCATION/TRAINING PROGRAM

## 2024-07-11 PROCEDURE — 25810000003 DEXTROSE 5% IN LACTATED RINGERS PER 1000 ML: Performed by: HOSPITALIST

## 2024-07-11 PROCEDURE — 85025 COMPLETE CBC W/AUTO DIFF WBC: CPT | Performed by: HOSPITALIST

## 2024-07-11 PROCEDURE — 63710000001 ONDANSETRON ODT 4 MG TABLET DISPERSIBLE: Performed by: STUDENT IN AN ORGANIZED HEALTH CARE EDUCATION/TRAINING PROGRAM

## 2024-07-11 PROCEDURE — 80048 BASIC METABOLIC PNL TOTAL CA: CPT | Performed by: HOSPITALIST

## 2024-07-11 PROCEDURE — 83735 ASSAY OF MAGNESIUM: CPT | Performed by: HOSPITALIST

## 2024-07-11 PROCEDURE — 82948 REAGENT STRIP/BLOOD GLUCOSE: CPT

## 2024-07-11 RX ORDER — OLANZAPINE 5 MG/1
5 TABLET, ORALLY DISINTEGRATING ORAL NIGHTLY
Status: DISCONTINUED | OUTPATIENT
Start: 2024-07-11 | End: 2024-07-13 | Stop reason: HOSPADM

## 2024-07-11 RX ADMIN — Medication 10 ML: at 08:17

## 2024-07-11 RX ADMIN — DOCUSATE SODIUM 50 MG AND SENNOSIDES 8.6 MG 2 TABLET: 8.6; 5 TABLET, FILM COATED ORAL at 21:04

## 2024-07-11 RX ADMIN — Medication 10 ML: at 21:06

## 2024-07-11 RX ADMIN — FLUCONAZOLE 200 MG: 2 INJECTION, SOLUTION INTRAVENOUS at 11:24

## 2024-07-11 RX ADMIN — Medication 0.04 MCG/KG/MIN: at 08:31

## 2024-07-11 RX ADMIN — ONDANSETRON 4 MG: 4 TABLET, ORALLY DISINTEGRATING ORAL at 13:34

## 2024-07-11 RX ADMIN — OLANZAPINE 5 MG: 5 TABLET, ORALLY DISINTEGRATING ORAL at 21:04

## 2024-07-11 RX ADMIN — LACTULOSE 20 G: 20 SOLUTION ORAL at 08:16

## 2024-07-11 RX ADMIN — Medication 10 ML: at 21:04

## 2024-07-11 RX ADMIN — SODIUM CHLORIDE, SODIUM LACTATE, POTASSIUM CHLORIDE, CALCIUM CHLORIDE AND DEXTROSE MONOHYDRATE 50 ML/HR: 5; 600; 310; 30; 20 INJECTION, SOLUTION INTRAVENOUS at 06:29

## 2024-07-11 RX ADMIN — CARBIDOPA AND LEVODOPA 10 MG: 50; 200 TABLET, EXTENDED RELEASE ORAL at 06:30

## 2024-07-11 RX ADMIN — Medication 1 APPLICATION: at 08:17

## 2024-07-11 RX ADMIN — APIXABAN 5 MG: 5 TABLET, FILM COATED ORAL at 21:04

## 2024-07-11 RX ADMIN — DOCUSATE SODIUM 50 MG AND SENNOSIDES 8.6 MG 2 TABLET: 8.6; 5 TABLET, FILM COATED ORAL at 08:16

## 2024-07-11 RX ADMIN — NYSTATIN 1 APPLICATION: 100000 CREAM TOPICAL at 08:17

## 2024-07-11 RX ADMIN — Medication 1 APPLICATION: at 23:33

## 2024-07-11 RX ADMIN — DIPHENHYDRAMINE HYDROCHLORIDE AND LIDOCAINE HYDROCHLORIDE AND ALUMINUM HYDROXIDE AND MAGNESIUM HYDRO 5 ML: KIT at 23:33

## 2024-07-11 RX ADMIN — PANTOPRAZOLE SODIUM 40 MG: 40 TABLET, DELAYED RELEASE ORAL at 06:29

## 2024-07-11 RX ADMIN — NYSTATIN 1 APPLICATION: 100000 CREAM TOPICAL at 23:34

## 2024-07-11 RX ADMIN — ONDANSETRON 4 MG: 2 INJECTION INTRAMUSCULAR; INTRAVENOUS at 19:45

## 2024-07-11 RX ADMIN — CARBIDOPA AND LEVODOPA 10 MG: 50; 200 TABLET, EXTENDED RELEASE ORAL at 10:30

## 2024-07-11 RX ADMIN — APIXABAN 5 MG: 5 TABLET, FILM COATED ORAL at 08:16

## 2024-07-11 RX ADMIN — SODIUM CHLORIDE, SODIUM LACTATE, POTASSIUM CHLORIDE, CALCIUM CHLORIDE AND DEXTROSE MONOHYDRATE 50 ML/HR: 5; 600; 310; 30; 20 INJECTION, SOLUTION INTRAVENOUS at 23:34

## 2024-07-11 RX ADMIN — CARBIDOPA AND LEVODOPA 10 MG: 50; 200 TABLET, EXTENDED RELEASE ORAL at 18:50

## 2024-07-11 NOTE — PROGRESS NOTES
[facility] HOSPITALIST PROGRESS NOTE     Patient Identification:  Name:  Vangie Carney  Age:  54 y.o.  Sex:  female  :  1970  MRN:  [id number]  Visit Number:  [id number]  Primary Care Provider:  Urvashi Basurto APRN    Length of stay:  10    Subjective: Patient seen and examined, still very poor intake takes sips of boost,  at bedside, anorexia and poor intake is biggest issue at this time patient is becoming severely malnourished with anasarca.  Patient encouraged again small frequent feeding with boost, discussed with nurse today patient needs to be out of bed to chair, still on Levophed activity tapered, currently on 0.04 mcg/kg/min.    Chief Complaint: Hypotension  ----------------------------------------------------------------------------------------------------------------------  Current Hospital Meds:  apixaban, 5 mg, Oral, BID  castor oil-balsam peru, 1 Application, Topical, Q12H  fluconazole, 200 mg, Intravenous, Q24H  granisetron, 1 patch, Transdermal, Weekly  lactulose, 20 g, Oral, BID  midodrine, 10 mg, Oral, TID AC  nystatin, 1 Application, Topical, BID  OLANZapine zydis, 5 mg, Oral, Nightly  pantoprazole, 40 mg, Oral, Q AM  senna-docusate sodium, 2 tablet, Oral, BID  sodium chloride, 10 mL, Intravenous, Q12H  sodium chloride, 10 mL, Intravenous, Q12H  sodium chloride, 10 mL, Intravenous, Q12H  sodium chloride, 10 mL, Intravenous, Q12H      dextrose 5 % and lactated Ringer's, 50 mL/hr, Last Rate: 50 mL/hr (24 0629)  norepinephrine, 0.02-0.3 mcg/kg/min (Dosing Weight), Last Rate: 0.04 mcg/kg/min (24)      ----------------------------------------------------------------------------------------------------------------------  Vital Signs:  Temp:  [97.3 °F (36.3 °C)-98.1 °F (36.7 °C)] 97.7 °F (36.5 °C)  Heart Rate:  [60-84] 66  Resp:  [12-16] 15  BP: ()/(54-72) 91/66       Tele: Armando rhythm 68 bpm      07/09/24  0400 07/10/24  0400 07/11/24  0400    Weight: 66.2 kg (145 lb 15.1 oz) 66 kg (145 lb 8.1 oz) 66.2 kg (145 lb 15.1 oz)     Body mass index is 22.86 kg/m².    Intake/Output Summary (Last 24 hours) at 7/11/2024 0844  Last data filed at 7/11/2024 0800  Gross per 24 hour   Intake 2345.92 ml   Output 800 ml   Net 1545.92 ml     Diet: Regular/House, Gastrointestinal; Low Irritant; Texture: Soft to Chew (NDD 3); Soft to Chew: Whole Meat; Fluid Consistency: Thin (IDDSI 0)  ----------------------------------------------------------------------------------------------------------------------  Physical exam:  General: Awake and alert cachectic with anasarca.  Very pleasant, not confused.  No respiratory distress.    Skin:  Skin is warm and dry. No rash noted. No pallor.    HENT:  Head:  Normocephalic and atraumatic.  Mouth:  Moist mucous membranes.  No oral thrush  Eyes:  Conjunctivae and EOM are normal.  Pupils are equal, round, and reactive to light.  No scleral icterus.    Neck:  Neck supple.  No JVD present.    Pulmonary/Chest:  No respiratory distress, no wheezes, no crackles, with normal breath sounds and good air movement.  Cardiovascular:  Normal rate, regular rhythm and normal heart sounds with no murmur.  Abdominal: Distended, bowel sounds present no guarding or rigidity.    Extremities: She has anasarca good pedal pulses and radial pulse, no Homans' sign no symmetry of the legs.  Neurological:  Motor strength equal no obvious deficit, sensory grossly intact.   No cranial nerve deficit.  No tongue deviation.  No facial droop.  No slurred speech.    Genitourinary: Colmenares catheter in place  Back:  ----------------------------------------------------------------------------------------------------------------------  ----------------------------------------------------------------------------------------------------------------------      Results from last 7 days   Lab Units 07/11/24  0739 07/07/24  2357 07/07/24  0023   WBC 10*3/mm3 5.36 5.86 5.24  "  HEMOGLOBIN g/dL 8.4* 8.7* 8.7*   HEMATOCRIT % 26.0* 26.3* 25.8*   MCV fL 108.3* 104.8* 105.3*   MCHC g/dL 32.3 33.1 33.7   PLATELETS 10*3/mm3 182 189 147     Results from last 7 days   Lab Units 07/10/24  0826   PH, ARTERIAL pH units 7.403   PO2 ART mm Hg 82.4*   PCO2, ARTERIAL mm Hg 30.0*   HCO3 ART mmol/L 18.7*     Results from last 7 days   Lab Units 07/11/24  0739 07/07/24  2357 07/07/24  0023   SODIUM mmol/L 135* 130* 132*   POTASSIUM mmol/L 3.7 3.7 4.0   MAGNESIUM mg/dL 1.8  --   --    CHLORIDE mmol/L 108* 107 107   CO2 mmol/L 17.4* 14.6* 16.9*   BUN mg/dL 6 9 9   CREATININE mg/dL 0.37* 0.42* 0.48*   CALCIUM mg/dL 7.5* 6.9* 6.9*   GLUCOSE mg/dL 110* 100* 87   ALBUMIN g/dL  --   --  1.5*   BILIRUBIN mg/dL  --   --  2.5*   ALK PHOS U/L  --   --  204*   AST (SGOT) U/L  --   --  58*   ALT (SGPT) U/L  --   --  64*   Estimated Creatinine Clearance: 183.8 mL/min (A) (by C-G formula based on SCr of 0.37 mg/dL (L)).    No results found for: \"AMMONIA\"      No results found for: \"BLOODCX\"  No results found for: \"URINECX\"  No results found for: \"WOUNDCX\"  No results found for: \"STOOLCX\"    I have personally looked at the labs and they are summarized above.  ----------------------------------------------------------------------------------------------------------------------  Imaging Results (Last 24 Hours)       ** No results found for the last 24 hours. **          ----------------------------------------------------------------------------------------------------------------------  Assessment and Plan:  -Hypotension multifactorial, hypoalbuminemia, inactivity,  sepsis  -Metastatic colon cancer with recurrence and peritoneal metastasis   -Acute cystitis without hematuria posttreatment with Zosyn   -Fatty liver disease   -Severe anorexia   -Probable esophageal candidiasis patient on Diflucan  -Severe malnourished cancer related   -History of PE on chronic anticoagulation  -Jaundice  -Possible SBO on CAT scan tolerating " diet positive bowel movements  -Respiratory alkalosis and concomitant non-anion gap metabolic acidosis  -Hyponatremia with hypervolemia    Continue with Levophed taper, midodrine, out of bed to chair today, patient's anorexia is still a problem, will try olanzapine today.  BMP reviewed bicarb is improving after changing IV fluids, sodium also improved.    Discussed with patient and her  today.    Plan discussed with RAÚL Galvez.      Disposition pending      Mila Dias MD  07/11/24  08:44 EDT

## 2024-07-11 NOTE — PLAN OF CARE
Goal Outcome Evaluation:              Outcome Evaluation: BP low midodrine given.  Levophed off.  No issues at this time.  Family at bedside.  Bed in low position with call light in reach.  Bed alarm on.  Care plan ongoing.                                all other ROS negative except as per HPI

## 2024-07-11 NOTE — PROGRESS NOTES
"Palliative Care Daily Progress Note     S: Medical record reviewed, followed up with Primary RN Leonor and Dr Dias. When I saw Jennifer this am she was resting in bed, awake, alert and appeared to have better color, and was feeling better. She denied pain nausea, anxiety, or shortness of breath. She stated that she had a better nights sleep last night.    O:   Palliative Performance Scale Score:     BP (!) 88/69   Pulse 89   Temp 97.4 °F (36.3 °C) (Oral)   Resp 17   Ht 170.2 cm (67\")   Wt 66.2 kg (145 lb 15.1 oz)   LMP 06/15/2016   SpO2 98%   BMI 22.86 kg/m²     Intake/Output Summary (Last 24 hours) at 7/11/2024 1659  Last data filed at 7/11/2024 1600  Gross per 24 hour   Intake 1585.42 ml   Output 400 ml   Net 1185.42 ml       PE:  General Appearance:    Chronically ill appearing, awake, alert, cooperative, NAD   HEENT:    NC/AT, without obvious abnormality, EOMI, anicteric , dry mucous membranes    Neck:   supple, trachea midline, no JVD   Lungs:     Unlabored respirations with no rhonchi rales or wheezing noted., diminished bilaterally     Heart:    tachycardia, normal S1 and S2, no M/R/G   Abdomen:     Soft, NT, Distended, hypoactive ABS , clay catheter in place   Extremities:   Moves all extremities, no edema   Pulses:   Pulses palpable and equal bilaterally   Skin:   Dry and jaundiced    Neurologic:   A/Ox3, cooperative, weak   Psych:   Calm, more appropriate, however still flat        Meds: Reviewed and changes noted    Labs:   Results from last 7 days   Lab Units 07/11/24  0739   WBC 10*3/mm3 5.36   HEMOGLOBIN g/dL 8.4*   HEMATOCRIT % 26.0*   PLATELETS 10*3/mm3 182     Results from last 7 days   Lab Units 07/11/24  0739   SODIUM mmol/L 135*   POTASSIUM mmol/L 3.7   CHLORIDE mmol/L 108*   CO2 mmol/L 17.4*   BUN mg/dL 6   CREATININE mg/dL 0.37*   GLUCOSE mg/dL 110*   CALCIUM mg/dL 7.5*     Results from last 7 days   Lab Units 07/11/24  0739 07/07/24  2357 07/07/24  0023   SODIUM mmol/L 135*   < > 132* "   POTASSIUM mmol/L 3.7   < > 4.0   CHLORIDE mmol/L 108*   < > 107   CO2 mmol/L 17.4*   < > 16.9*   BUN mg/dL 6   < > 9   CREATININE mg/dL 0.37*   < > 0.48*   CALCIUM mg/dL 7.5*   < > 6.9*   BILIRUBIN mg/dL  --   --  2.5*   ALK PHOS U/L  --   --  204*   ALT (SGPT) U/L  --   --  64*   AST (SGOT) U/L  --   --  58*   GLUCOSE mg/dL 110*   < > 87    < > = values in this interval not displayed.     Imaging Results (Last 72 Hours)       ** No results found for the last 72 hours. **              Diagnostics: Reviewed    A: Vangie Carney is a 54 y.o. female admitted on 7/1/2024 for septic shock. She has a past medical history of GERD, malignant neoplasm of sigmoid colon with colon resection. She has Metastatic colorectal adenocarcinoma s/p LAR currently receiving palliative chemotherapy, pulmonary embolism on therapeutic anticoagulation, and gastric dysmotility. She complains today of fatigue, dysphagia, dyspnea worse with exertion, abdominal pain, nausea and vomiting, decreased UOP, dysuria and increased weakness. She presented to the ED for increased weakness and fatigue over the past few days as well as nausea and vomiting. Her ED labs, sodium 133, BUN 22, creat 1.4, eGFR 45.1, glucose 22, calcium 7.9, alk phos 249, total protein 3.9, albumin 1.8, AST 77, ALT 65, total bilirubin 1.7, wbc 8.31, H&H 9.8/28.7, platelets 155, urine cloudy, >1.030, blood trace, positive nitrates, moderate leukocytes, CT shows multiple dilated loops of small bowel consistent with small bowel obstruction, Right pleural effusion and pericardial effusion, incompletely imaged. These are new when compared to the prior study. Fatty infiltration of the liver. No hydronephrosis or ureteral calculus.   Today 7/11/2024  Jennifer's VS T 97.4, HR 89, RR 17, BP of 79/58 and was saturating 96% on room air.    P:  I was able to speak with patients  Red at bedside to update and provide support. I discussed patient with Dr Dias and plan is to attempt to  get pt up, continue midodrine with goal of d/c vasopressor.    We will continue to follow along. Please do not hesitate to contact us regarding further sx mgmt or GOC needs, including after hours or on weekends via our on call provider at [phone].     Abeba Nowak, APRN    7/11/2024

## 2024-07-12 LAB
GLUCOSE BLDC GLUCOMTR-MCNC: 111 MG/DL (ref 70–130)
GLUCOSE BLDC GLUCOMTR-MCNC: 88 MG/DL (ref 70–130)
GLUCOSE BLDC GLUCOMTR-MCNC: 89 MG/DL (ref 70–130)
QT INTERVAL: 310 MS
QTC INTERVAL: 397 MS

## 2024-07-12 PROCEDURE — 25010000002 FLUCONAZOLE PER 200 MG: Performed by: STUDENT IN AN ORGANIZED HEALTH CARE EDUCATION/TRAINING PROGRAM

## 2024-07-12 PROCEDURE — 99232 SBSQ HOSP IP/OBS MODERATE 35: CPT | Performed by: NURSE PRACTITIONER

## 2024-07-12 PROCEDURE — 99232 SBSQ HOSP IP/OBS MODERATE 35: CPT | Performed by: HOSPITALIST

## 2024-07-12 PROCEDURE — 25810000003 DEXTROSE 5% IN LACTATED RINGERS PER 1000 ML: Performed by: HOSPITALIST

## 2024-07-12 PROCEDURE — 93005 ELECTROCARDIOGRAM TRACING: CPT | Performed by: HOSPITALIST

## 2024-07-12 PROCEDURE — 93010 ELECTROCARDIOGRAM REPORT: CPT | Performed by: SPECIALIST

## 2024-07-12 PROCEDURE — 82948 REAGENT STRIP/BLOOD GLUCOSE: CPT

## 2024-07-12 PROCEDURE — 25010000002 ONDANSETRON PER 1 MG: Performed by: STUDENT IN AN ORGANIZED HEALTH CARE EDUCATION/TRAINING PROGRAM

## 2024-07-12 RX ORDER — METOCLOPRAMIDE 10 MG/1
10 TABLET ORAL 3 TIMES DAILY PRN
Qty: 12 TABLET | Refills: 0 | Status: CANCELLED | OUTPATIENT
Start: 2024-07-12

## 2024-07-12 RX ADMIN — ONDANSETRON 4 MG: 2 INJECTION INTRAMUSCULAR; INTRAVENOUS at 19:59

## 2024-07-12 RX ADMIN — NYSTATIN 1 APPLICATION: 100000 CREAM TOPICAL at 08:23

## 2024-07-12 RX ADMIN — CARBIDOPA AND LEVODOPA 10 MG: 50; 200 TABLET, EXTENDED RELEASE ORAL at 06:36

## 2024-07-12 RX ADMIN — Medication 10 ML: at 08:23

## 2024-07-12 RX ADMIN — APIXABAN 5 MG: 5 TABLET, FILM COATED ORAL at 08:22

## 2024-07-12 RX ADMIN — Medication 1 APPLICATION: at 20:05

## 2024-07-12 RX ADMIN — PANTOPRAZOLE SODIUM 40 MG: 40 TABLET, DELAYED RELEASE ORAL at 06:37

## 2024-07-12 RX ADMIN — Medication 10 ML: at 20:05

## 2024-07-12 RX ADMIN — DOCUSATE SODIUM 50 MG AND SENNOSIDES 8.6 MG 2 TABLET: 8.6; 5 TABLET, FILM COATED ORAL at 08:22

## 2024-07-12 RX ADMIN — FLUCONAZOLE 200 MG: 2 INJECTION, SOLUTION INTRAVENOUS at 11:41

## 2024-07-12 RX ADMIN — CARBIDOPA AND LEVODOPA 10 MG: 50; 200 TABLET, EXTENDED RELEASE ORAL at 11:41

## 2024-07-12 RX ADMIN — SODIUM CHLORIDE, SODIUM LACTATE, POTASSIUM CHLORIDE, CALCIUM CHLORIDE AND DEXTROSE MONOHYDRATE 50 ML/HR: 5; 600; 310; 30; 20 INJECTION, SOLUTION INTRAVENOUS at 21:28

## 2024-07-12 RX ADMIN — NYSTATIN 1 APPLICATION: 100000 CREAM TOPICAL at 20:05

## 2024-07-12 RX ADMIN — Medication 1 APPLICATION: at 08:23

## 2024-07-12 RX ADMIN — OLANZAPINE 5 MG: 5 TABLET, ORALLY DISINTEGRATING ORAL at 20:01

## 2024-07-12 RX ADMIN — APIXABAN 5 MG: 5 TABLET, FILM COATED ORAL at 20:01

## 2024-07-12 RX ADMIN — Medication 10 ML: at 20:04

## 2024-07-12 RX ADMIN — CARBIDOPA AND LEVODOPA 10 MG: 50; 200 TABLET, EXTENDED RELEASE ORAL at 16:52

## 2024-07-12 NOTE — PROGRESS NOTES
Date:  07/12/24    CC: Weakness    Subjective:  Ms. Carney was seen in her hospital room this morning. She continues to appear weak. Levophed has been discontinued and she is taking Midodrine with BP ~90/60. She reports poor appetite. She is without specific complaints today.      Review Of Systems:  A comprehensive 14-point review of systems performed. Significant findings as mentioned above.  All other systems reviewed and are negative.      Objective:  Medications:  Scheduled Meds:  apixaban, 5 mg, Oral, BID  castor oil-balsam peru, 1 Application, Topical, Q12H  fluconazole, 200 mg, Intravenous, Q24H  granisetron, 1 patch, Transdermal, Weekly  lactulose, 20 g, Oral, BID  midodrine, 10 mg, Oral, TID AC  nystatin, 1 Application, Topical, BID  OLANZapine zydis, 5 mg, Oral, Nightly  pantoprazole, 40 mg, Oral, Q AM  senna-docusate sodium, 2 tablet, Oral, BID  sodium chloride, 10 mL, Intravenous, Q12H  sodium chloride, 10 mL, Intravenous, Q12H  sodium chloride, 10 mL, Intravenous, Q12H  sodium chloride, 10 mL, Intravenous, Q12H      Continuous Infusions:  dextrose 5 % and lactated Ringer's, 50 mL/hr, Last Rate: 50 mL/hr (07/11/24 2334)  norepinephrine, 0.02-0.3 mcg/kg/min (Dosing Weight), Last Rate: Stopped (07/11/24 1303)      PRN Meds:    acetaminophen    senna-docusate sodium **AND** polyethylene glycol **AND** bisacodyl **AND** bisacodyl    calcium carbonate    Calcium Replacement - Follow Nurse / BPA Driven Protocol    dextrose    dextrose    First Mouthwash (Magic Mouthwash)    glucagon (human recombinant)    heparin    HYDROcodone-acetaminophen    Magnesium Standard Dose Replacement - Follow Nurse / BPA Driven Protocol    nitroglycerin    ondansetron ODT **OR** ondansetron    Potassium Replacement - Follow Nurse / BPA Driven Protocol    prochlorperazine    promethazine    sodium chloride    Access VAD **AND** sodium chloride    sodium chloride    sodium chloride    sodium chloride    sodium chloride    sodium  chloride    sodium chloride    sodium chloride    sodium chloride    sodium chloride      Physical Exam:  Vital Signs     Patient Vitals for the past 24 hrs:   BP Temp Temp src Pulse Resp SpO2   07/12/24 1300 94/71 -- -- 93 12 97 %   07/12/24 1200 98/75 97 °F (36.1 °C) Oral 105 14 97 %   07/12/24 1141 96/74 -- -- 94 -- --   07/12/24 1100 96/74 -- -- 95 14 97 %   07/12/24 1000 100/70 -- -- 105 15 99 %   07/12/24 0900 91/72 -- -- 94 12 98 %   07/12/24 0800 (!) 87/69 97.5 °F (36.4 °C) Oral 97 18 97 %   07/12/24 0700 90/67 -- -- 98 15 97 %   07/12/24 0600 (!) 86/67 -- -- 97 14 98 %   07/12/24 0500 95/81 -- -- 101 14 97 %   07/12/24 0400 (!) 79/68 98.6 °F (37 °C) Oral 94 14 97 %   07/12/24 0300 (!) 84/73 -- -- 94 14 97 %   07/12/24 0100 91/71 -- -- 94 14 97 %   07/12/24 0000 102/89 97.6 °F (36.4 °C) Oral 98 14 98 %   07/11/24 2300 (!) 83/63 -- -- 87 16 95 %   07/11/24 2100 97/66 -- -- 83 16 98 %   07/11/24 2000 97/63 97.6 °F (36.4 °C) Oral 86 16 98 %   07/11/24 1900 90/64 -- -- 99 16 97 %   07/11/24 1850 100/64 -- -- 90 -- --   07/11/24 1800 100/64 -- -- 82 18 96 %   07/11/24 1700 (!) 88/68 -- -- 87 15 96 %   07/11/24 1600 -- 97.4 °F (36.3 °C) Oral -- -- --   07/11/24 1500 (!) 88/69 -- -- 89 17 98 %         General:  Awake, alert and oriented, in bed, ill-appearing.  HEENT:  Pupils are equal, round and reactive to light and accommodation  Neck:  No JVD, thyromegaly or lymphadenopathy  CV:  Regular rate and rhythm, no murmurs, rubs or gallops  Resp:  Lungs are clear to auscultation bilaterally  Abd:  Soft, non-tender, non distended, bowel sounds minimal, no organomegaly  Ext:  No clubbing, +edema of jesica upper and lower extremities   Lymph:  No cervical, supraclavicular, axillary, inguinal or femoral adenopathy  Neuro:  MS as above, CN II-XII intact, grossly non-focal exam    Labs / Studies:    Lab Results   Component Value Date    WBC 5.36 07/11/2024    HGB 8.4 (L) 07/11/2024    HCT 26.0 (L) 07/11/2024    .3 (H)  07/11/2024    RDW 19.2 (H) 07/11/2024     07/11/2024    NEUTRORELPCT 63.6 07/11/2024    LYMPHORELPCT 29.7 07/11/2024    MONORELPCT 5.6 07/11/2024    EOSRELPCT 0.0 (L) 07/11/2024    BASORELPCT 0.7 07/11/2024    NEUTROABS 3.41 07/11/2024    LYMPHSABS 1.59 07/11/2024       Lab Results   Component Value Date     (L) 07/11/2024    K 3.7 07/11/2024    CO2 17.4 (L) 07/11/2024     (H) 07/11/2024    BUN 6 07/11/2024    CREATININE 0.37 (L) 07/11/2024    EGFRIFNONA 79 09/10/2021    GLUCOSE 110 (H) 07/11/2024    CALCIUM 7.5 (L) 07/11/2024    ALKPHOS 204 (H) 07/07/2024    AST 58 (H) 07/07/2024    ALT 64 (H) 07/07/2024    BILITOT 2.5 (H) 07/07/2024    ALBUMIN 1.5 (L) 07/07/2024    PROTEINTOT 3.5 (L) 07/07/2024    MG 1.8 07/11/2024    PHOS 2.1 (L) 07/11/2024       Imaging Results (Last 72 Hours)       ** No results found for the last 72 hours. **            Assessment & Plan:  Vangie Carney is a 54 y.o. year-old female with     Septic shock  Small bowel obstruction   - Patient with hypotension, tachycardia, and positive urinalysis. Currently on antibiotics and pressors with levophed   - Blood cultures with NGTD, urine culture with e.coli. Currently on zosyn   - Plan on symptomatic management at this time. Continues to be on vasopressors. Attempting to titrate down and begin Midodrine. Tolerating liquids, had BM 7/8     3. Metastatic colon adenocarcinoma   - Currently follows with Dr. Hanks in the outpatient setting.   - Patient with localized descending/sigmoid moderately differentiated adenocarcinoma March 2023 status post APR. Opted out of adjuvant chemotherapy. Presented 1/2024 with recurrent/metastatic cancer  - Received 4 cycles of 5-FU/leucovorin 2-4/2024, switched to palliative bevacizumab due to intolerance. She has received 4 cycles (last cycle 6/26/24).  - Palliative care following. Patient is DNR/DNI. Sister-in-law reports she is concerned regarding patient ability to recover. Palliative care  following and able to discuss with patient and her  today, who wish to proceed with home hospice, which is appropriate given her inability to tolerate prior treatments, ongoing debility and overall worsening clinical decline. Will sign-off.          Thank you for the consultation and allowing us to participate in the care of Ms. Carney. We will sign off.         Jessie Dahl, ANURAG  07/12/24  14:35 EDT        A total of 35 minutes were spent helping to coordinate this patient’s care today; ~20 minutes of this time were spent face-to-face with the patient in her hospital room this morning, reviewing her interim medical history and counseling on the current treatment and follow up plans. All questions were answered to her satisfaction.

## 2024-07-12 NOTE — PLAN OF CARE
Goal Outcome Evaluation:  Plan of Care Reviewed With: patient        Problem: Skin Injury Risk Increased  Goal: Skin Health and Integrity  Outcome: Ongoing, Progressing  Intervention: Optimize Skin Protection  Description: Perform a full pressure injury risk assessment, as indicated by screening, upon admission to care unit.  Reassess skin (injury risk, full inspection) frequently (e.g., scheduled interval, with change in condition) to provide optimal early detection and prevention.  Maintain adequate tissue perfusion (e.g., encourage fluid balance; avoid crossing legs, constrictive clothing or devices) to promote tissue oxygenation.  Maintain head of bed at lowest degree of elevation tolerated, considering medical condition and other restrictions.  Avoid positioning onto an area that remains reddened.  Minimize incontinence and moisture (e.g., toileting schedule; moisture-wicking pad, diaper or incontinence collection device; skin moisture barrier).  Cleanse skin promptly and gently when soiled utilizing a pH-balanced cleanser.  Relieve and redistribute pressure (e.g., scheduled position changes, weight shifts, use of support surface, medical device repositioning, protective dressing application, use of positioning device, microclimate control, use of pressure-injury-monitor  Encourage increased activity, such as sitting in a chair at the bedside or early mobilization, when able to tolerate.  Recent Flowsheet Documentation  Taken 7/11/2024 2020 by Kalina Bruner RN  Pressure Reduction Techniques:   weight shift assistance provided   frequent weight shift encouraged   heels elevated off bed  Pressure Reduction Devices: pressure-redistributing mattress utilized     Problem: Fall Injury Risk  Goal: Absence of Fall and Fall-Related Injury  Outcome: Ongoing, Progressing  Intervention: Identify and Manage Contributors  Description: Develop a fall prevention plan with the patient and caregiver/family.  Provide  reorientation, appropriate sensory stimulation and routines with changes in mental status to decrease risk of fall.  Promote use of personal vision and auditory aids.  Assess assistance level required for safe and effective self-care; provide support as needed, such as toileting, mobilization. For age 65 and older, implement timed toileting with assistance.  Encourage physical activity, such as performance of mobility and self-care at highest level of patient ability, multicomponent exercise program and provision of appropriate assistive devices.  If fall occurs, assess the severity of injury; implement fall injury protocol. Determine the cause and revise fall injury prevention plan.  Regularly review medication contribution to fall risk; adjust medication administration times to minimize risk of falling.  Consider risk related to polypharmacy and age.  Balance adequate pain management with potential for oversedation.  Recent Flowsheet Documentation  Taken 7/11/2024 2300 by Kalina Bruner RN  Medication Review/Management: medications reviewed  Taken 7/11/2024 2100 by Kalina Bruner RN  Medication Review/Management: medications reviewed  Taken 7/11/2024 2020 by Kalina Bruner RN  Medication Review/Management: medications reviewed  Taken 7/11/2024 1900 by Kalina Bruner RN  Medication Review/Management: medications reviewed  Intervention: Promote Injury-Free Environment  Description: Provide a safe, barrier-free environment that encourages independent activity.  Keep care area uncluttered and well-lighted.  Determine need for increased observation or monitoring.  Avoid use of devices that minimize mobility, such as restraints or indwelling urinary catheter.  Recent Flowsheet Documentation  Taken 7/11/2024 2300 by Kalina Bruner RN  Safety Promotion/Fall Prevention: safety round/check completed  Taken 7/11/2024 2100 by Kalina Bruner RN  Safety Promotion/Fall Prevention: safety round/check completed  Taken  7/11/2024 2020 by Kalina Bruner RN  Safety Promotion/Fall Prevention: safety round/check completed  Taken 7/11/2024 1900 by Kalina Bruner RN  Safety Promotion/Fall Prevention: safety round/check completed     Problem: Adult Inpatient Plan of Care  Goal: Plan of Care Review  Outcome: Ongoing, Progressing  Flowsheets (Taken 7/12/2024 0219)  Plan of Care Reviewed With: patient  Goal: Patient-Specific Goal (Individualized)  Outcome: Ongoing, Progressing  Goal: Absence of Hospital-Acquired Illness or Injury  Outcome: Ongoing, Progressing  Intervention: Identify and Manage Fall Risk  Description: Perform standard risk assessment on admission using a validated tool or comprehensive approach appropriate to the patient; reassess fall risk frequently, with change in status or transfer to another level of care.  Communicate fall injury risk to interprofessional healthcare team.  Determine need for increased observation, equipment and environmental modification, such as low bed, signage and supportive, nonskid footwear.  Adjust safety measures to individual developmental age, stage and identified risk factors.  Reinforce the importance of safety and physical activity with patient and family.  Perform regular intentional rounding to assess need for position change, pain assessment and personal needs, including assistance with toileting.  Recent Flowsheet Documentation  Taken 7/11/2024 2300 by Kalina Bruner RN  Safety Promotion/Fall Prevention: safety round/check completed  Taken 7/11/2024 2100 by Kalina Bruner RN  Safety Promotion/Fall Prevention: safety round/check completed  Taken 7/11/2024 2020 by Kalina Bruner RN  Safety Promotion/Fall Prevention: safety round/check completed  Taken 7/11/2024 1900 by Kalina Bruner RN  Safety Promotion/Fall Prevention: safety round/check completed  Intervention: Prevent and Manage VTE (Venous Thromboembolism) Risk  Description: Assess for VTE (venous thromboembolism)  risk.  Encourage and assist with early ambulation.  Initiate and maintain compression or other therapy, as indicated, based on identified risk in accordance with organizational protocol and provider order.  Encourage both active and passive leg exercises while in bed, if unable to ambulate.  Recent Flowsheet Documentation  Taken 7/11/2024 2020 by Kalina Bruner RN  VTE Prevention/Management: (See MAR) other (see comments)  Goal: Optimal Comfort and Wellbeing  Outcome: Ongoing, Progressing  Intervention: Provide Person-Centered Care  Description: Use a family-focused approach to care.  Develop trust and rapport by proactively providing information, encouraging questions, addressing concerns and offering reassurance.  Acknowledge emotional response to hospitalization.  Recognize and utilize personal coping strategies.  Honor spiritual and cultural preferences.  Recent Flowsheet Documentation  Taken 7/11/2024 2020 by Kalina Bruner RN  Trust Relationship/Rapport:   care explained   choices provided   questions answered   reassurance provided   thoughts/feelings acknowledged  Goal: Readiness for Transition of Care  Outcome: Ongoing, Progressing

## 2024-07-12 NOTE — PLAN OF CARE
Problem: Skin Injury Risk Increased  Goal: Skin Health and Integrity  7/12/2024 1526 by Prateek Fernández RN  Outcome: Ongoing, Not Progressing  7/12/2024 1526 by Prateek Fernández RN  Outcome: Ongoing, Progressing  Intervention: Optimize Skin Protection  Recent Flowsheet Documentation  Taken 7/12/2024 0800 by Prateek Fernández RN  Pressure Reduction Techniques:   heels elevated off bed   positioned off wounds   pressure points protected   weight shift assistance provided  Pressure Reduction Devices:   alternating pressure pump (ADD)   heel offloading device utilized   positioning supports utilized   specialty bed utilized  Skin Protection:   adhesive use limited   incontinence pads utilized   skin-to-device areas padded   transparent dressing maintained   tubing/devices free from skin contact     Problem: Fall Injury Risk  Goal: Absence of Fall and Fall-Related Injury  7/12/2024 1526 by Prateek Fernández RN  Outcome: Ongoing, Not Progressing  7/12/2024 1526 by Prateek Fernández RN  Outcome: Ongoing, Progressing  Intervention: Identify and Manage Contributors  Recent Flowsheet Documentation  Taken 7/12/2024 1500 by Prateek Fernández RN  Medication Review/Management: medications reviewed  Taken 7/12/2024 1400 by Prateek Fernández RN  Medication Review/Management: medications reviewed  Taken 7/12/2024 1300 by Prateek Fernández RN  Medication Review/Management: medications reviewed  Taken 7/12/2024 1100 by Prateek Fernández RN  Medication Review/Management: medications reviewed  Taken 7/12/2024 0900 by Prateek Fernández RN  Medication Review/Management: medications reviewed  Taken 7/12/2024 0800 by Prateek Fernández RN  Medication Review/Management: medications reviewed  Taken 7/12/2024 0700 by Prateek Fernández RN  Medication Review/Management: medications reviewed  Intervention: Promote Injury-Free Environment  Recent Flowsheet Documentation  Taken 7/12/2024 1500 by Prateek Fernández RN  Safety Promotion/Fall Prevention:   activity  supervised   fall prevention program maintained   safety round/check completed  Taken 7/12/2024 1300 by Prateek Fernández RN  Safety Promotion/Fall Prevention:   activity supervised   fall prevention program maintained   safety round/check completed  Taken 7/12/2024 1100 by Prateek Fernández RN  Safety Promotion/Fall Prevention:   activity supervised   fall prevention program maintained   safety round/check completed  Taken 7/12/2024 0900 by Prateek Fernández RN  Safety Promotion/Fall Prevention:   activity supervised   fall prevention program maintained   safety round/check completed  Taken 7/12/2024 0700 by Prateek Fernández RN  Safety Promotion/Fall Prevention:   activity supervised   fall prevention program maintained   safety round/check completed     Problem: Adult Inpatient Plan of Care  Goal: Plan of Care Review  7/12/2024 1526 by Prateek Fernández RN  Outcome: Ongoing, Not Progressing  Flowsheets (Taken 7/12/2024 1526)  Progress: no change  Plan of Care Reviewed With:   patient   spouse  7/12/2024 1526 by Prateek Fernández RN  Outcome: Ongoing, Progressing  Flowsheets (Taken 7/12/2024 1526)  Progress: no change  Plan of Care Reviewed With:   patient   spouse  Goal: Patient-Specific Goal (Individualized)  7/12/2024 1526 by Prateek Fernández RN  Outcome: Ongoing, Not Progressing  7/12/2024 1526 by Prateek Fernández RN  Outcome: Ongoing, Progressing  Goal: Absence of Hospital-Acquired Illness or Injury  7/12/2024 1526 by Prateek Fernández RN  Outcome: Ongoing, Not Progressing  7/12/2024 1526 by Prateek Fernández RN  Outcome: Ongoing, Progressing  Intervention: Identify and Manage Fall Risk  Recent Flowsheet Documentation  Taken 7/12/2024 1500 by Prateek Fernández RN  Safety Promotion/Fall Prevention:   activity supervised   fall prevention program maintained   safety round/check completed  Taken 7/12/2024 1300 by Prateek Fernández RN  Safety Promotion/Fall Prevention:   activity supervised   fall prevention program maintained    safety round/check completed  Taken 7/12/2024 1100 by Prateek Fernández RN  Safety Promotion/Fall Prevention:   activity supervised   fall prevention program maintained   safety round/check completed  Taken 7/12/2024 0900 by Prateek Fernández RN  Safety Promotion/Fall Prevention:   activity supervised   fall prevention program maintained   safety round/check completed  Taken 7/12/2024 0700 by Prateek Fernández RN  Safety Promotion/Fall Prevention:   activity supervised   fall prevention program maintained   safety round/check completed  Intervention: Prevent Skin Injury  Recent Flowsheet Documentation  Taken 7/12/2024 0800 by Prateek Fernández RN  Skin Protection:   adhesive use limited   incontinence pads utilized   skin-to-device areas padded   transparent dressing maintained   tubing/devices free from skin contact  Intervention: Prevent and Manage VTE (Venous Thromboembolism) Risk  Recent Flowsheet Documentation  Taken 7/12/2024 1400 by Prateek Fernández RN  VTE Prevention/Management: (See MAR) other (see comments)  Taken 7/12/2024 0800 by Prateek Fernández RN  VTE Prevention/Management: (See MAR) other (see comments)  Range of Motion:   ROM (range of motion) performed   active ROM (range of motion) encouraged  Goal: Optimal Comfort and Wellbeing  7/12/2024 1526 by Prateek Fernández RN  Outcome: Ongoing, Not Progressing  7/12/2024 1526 by Prateek Fernández RN  Outcome: Ongoing, Progressing  Intervention: Provide Person-Centered Care  Recent Flowsheet Documentation  Taken 7/12/2024 1400 by Prateek Fernández RN  Trust Relationship/Rapport:   care explained   emotional support provided   questions answered   reassurance provided   thoughts/feelings acknowledged  Taken 7/12/2024 0800 by Prateek Fernández RN  Trust Relationship/Rapport:   care explained   emotional support provided   questions answered   reassurance provided   thoughts/feelings acknowledged  Goal: Readiness for Transition of Care  7/12/2024 1526 by Prateek Fernández  RN  Outcome: Ongoing, Not Progressing  7/12/2024 1526 by Prateek Fernández, RAÚL  Outcome: Ongoing, Progressing   Goal Outcome Evaluation:  Plan of Care Reviewed With: patient, spouse        Progress: no change

## 2024-07-12 NOTE — CASE MANAGEMENT/SOCIAL WORK
Discharge Planning Assessment   Abimael     Patient Name: Vangie Carney  MRN: [id number]  Today's Date: 7/12/2024    Admit Date: 7/1/2024     Discharge Plan       Row Name 07/12/24 1436       Plan    [phone] Deepthi who states referral was received. Deepthi voices that someone will be contacting spouse about needed DME. SS informed Deepthi that expected discharge date is 7/13/24. [phone] at [facility]. SS to follow.        Row Name 07/12/24 1330       Plan    Plan SS received consult for home with hospice Dx metastatic colorectal adenocarcinoma will need hospital bed, gel pad, bedside table, small wheelchair  Red is contact [phone]. SS spoke to Palliative Care provider and expected discharge date is 7/13/24. SS [facility]. SS to follow.                  Continued Care and Services - Admitted Since 7/1/2024       Home Medical Care       Service Provider Request Status Selected Services Address Phone Fax Patient Preferred    [facility] Pending - No Request Sent N/A [id number] [facility] [id number] [phone] --                  Expected Discharge Date and Time       Expected Discharge Date Expected Discharge Time    Jul 13, 2024         [facility] [place]

## 2024-07-12 NOTE — PROGRESS NOTES
"Palliative Care Daily Progress Note     S: Medical record reviewed, followed up with Primary RN Prateek and Dr Dias regarding patient's condition. When I saw Jennifer today she appeared to be drowsy again this morning and depressed. She denied pain at this time, as well as shortness of breath or anxiety. She did say that she was nauseated earlier and did not want to eat anything.She was off vasopressors and BP was at appropriate level for her baseline.      O:   Palliative Performance Scale Score:     /77   Pulse 98   Temp 98 °F (36.7 °C) (Oral)   Resp 12   Ht 170.2 cm (67\")   Wt 66.2 kg (145 lb 15.1 oz)   LMP 06/15/2016   SpO2 98%   BMI 22.86 kg/m²     Intake/Output Summary (Last 24 hours) at 7/12/2024 1752  Last data filed at 7/12/2024 1707  Gross per 24 hour   Intake 1720 ml   Output 250 ml   Net 1470 ml       PE:  General Appearance:    Chronically ill appearing, awake, alert, cooperative, NAD   HEENT:    NC/AT, without obvious abnormality, EOMI, anicteric , dry mucous membranes    Neck:   supple, trachea midline, no JVD   Lungs:     Unlabored respirations with no rhonchi rales or wheezing noted., diminished bilaterally     Heart:    tachycardia, normal S1 and S2, no M/R/G   Abdomen:     Soft, NT, Distended, hypoactive ABS , clay catheter in place   Extremities:   Moves all extremities, no edema   Pulses:   Pulses palpable and equal bilaterally   Skin:   Dry and jaundiced    Neurologic:   A/Ox3, cooperative, weak   Psych:   Calm, flat, depressed in appearance today       Meds: Reviewed and changes noted    Labs:   Results from last 7 days   Lab Units 07/11/24  0739   WBC 10*3/mm3 5.36   HEMOGLOBIN g/dL 8.4*   HEMATOCRIT % 26.0*   PLATELETS 10*3/mm3 182     Results from last 7 days   Lab Units 07/11/24  0739   SODIUM mmol/L 135*   POTASSIUM mmol/L 3.7   CHLORIDE mmol/L 108*   CO2 mmol/L 17.4*   BUN mg/dL 6   CREATININE mg/dL 0.37*   GLUCOSE mg/dL 110*   CALCIUM mg/dL 7.5*     Results from last 7 days "   Lab Units 07/11/24  0739 07/07/24  2357 07/07/24  0023   SODIUM mmol/L 135*   < > 132*   POTASSIUM mmol/L 3.7   < > 4.0   CHLORIDE mmol/L 108*   < > 107   CO2 mmol/L 17.4*   < > 16.9*   BUN mg/dL 6   < > 9   CREATININE mg/dL 0.37*   < > 0.48*   CALCIUM mg/dL 7.5*   < > 6.9*   BILIRUBIN mg/dL  --   --  2.5*   ALK PHOS U/L  --   --  204*   ALT (SGPT) U/L  --   --  64*   AST (SGOT) U/L  --   --  58*   GLUCOSE mg/dL 110*   < > 87    < > = values in this interval not displayed.     Imaging Results (Last 72 Hours)       ** No results found for the last 72 hours. **              Diagnostics: Reviewed    A: Vangie Carney is a 54 y.o. female admitted on 7/1/2024 for septic shock. She has a past medical history of GERD, malignant neoplasm of sigmoid colon with colon resection. She has Metastatic colorectal adenocarcinoma s/p LAR currently receiving palliative chemotherapy, pulmonary embolism on therapeutic anticoagulation, and gastric dysmotility. She complains today of fatigue, dysphagia, dyspnea worse with exertion, abdominal pain, nausea and vomiting, decreased UOP, dysuria and increased weakness. She presented to the ED for increased weakness and fatigue over the past few days as well as nausea and vomiting. Her ED labs, sodium 133, BUN 22, creat 1.4, eGFR 45.1, glucose 22, calcium 7.9, alk phos 249, total protein 3.9, albumin 1.8, AST 77, ALT 65, total bilirubin 1.7, wbc 8.31, H&H 9.8/28.7, platelets 155, urine cloudy, >1.030, blood trace, positive nitrates, moderate leukocytes, CT shows multiple dilated loops of small bowel consistent with small bowel obstruction, Right pleural effusion and pericardial effusion, incompletely imaged. These are new when compared to the prior study. Fatty infiltration of the liver. No hydronephrosis or ureteral calculus.     Today 7/12/2024  T 98, HR 98, RR 12, BP of 98/69 and was saturating 98% on room air    P:  I was able to speak with Jennifer, her  Red, her brother and his  wife at bedside to discuss goals/plan moving forward. We discussed hospice services both and a hospice facility and at home. Red stated that he would prefer home, I told him that Jennifer would need someone to be with her at all time as she is so weak, he acknowledged understanding and stated that he is son or other family would be with her. I explained what hospice services offered and we discussed what equipment they would need. Decision was made to go home with hospice, I told Red that if it became to difficult at home that hospice had social workers that could help find facility placement. I put hospice referral and let Dr Dias know as well as Jessie in oncology know of plan to go home with hospice. Dr Dais states he will d/c her home tomorrow.    We will continue to follow along. Please do not hesitate to contact us regarding further sx mgmt or GOC needs, including after hours or on weekends via our on call provider at [phone].     Abeba Nowak, APRN    7/12/2024

## 2024-07-12 NOTE — DISCHARGE PLACEMENT REQUEST
"Jan Solano (54 y.o. Female)       Date of Birth   1970    Social Security Number       Address   [address] [id number]    Home Phone   [phone]    MRN   [id number]       Restorationist   Congregation    Marital Status                               Admission Date   24    Admission Type   Emergency    Admitting Provider   Carlos Castro MD    Attending Provider   Mila Dias MD    Department, Room/Bed   [facility], P222/1P       Discharge Date       Discharge Disposition       Discharge Destination                                 Attending Provider: Mila Dias MD    Allergies: Keflex [Cephalexin]    Isolation: None   Infection: None   Code Status: No CPR    Ht: 170.2 cm (67\")   Wt: 66.2 kg (145 lb 15.1 oz)    Admission Cmt: None   Principal Problem: Septic shock due to undetermined organism [A41.9,R65.21]                   Active Insurance as of 2024       Primary Coverage       Payor Plan Insurance Group Employer/Plan Group    [facility] [employer] EMPLOYEE [id number]       Payor Plan Address Payor Plan Phone Number Payor Plan Fax Number Effective Dates    [address] [phone]  2018 - None Entered    [facility] [id number]         Subscriber Name Subscriber Birth Date Member ID       JAN SOLANO 1970 [id number]                     Emergency Contacts        (Rel.) Home Phone Work Phone Mobile Phone    Red Solano (Spouse) -- -- [phone]          [address] [address]  Phone:  [phone]  Fax:  [phone]        Patient: ROOM: P22-1P   Jan Solano MRN:  [id number]   [address] [facility] [address] :  1970  SSN:    Phone: [phone] Sex:  F   PCP: Urvashi Basurto                 Emergency Contact Information       Name Relation Home Work Mobile     Red Solano Spouse     [phone]    "       INSURANCE PAYOR PLAN GROUP # SUBSCRIBER ID   Primary:    THERESA GUNN [id number]   Admitting Diagnosis: Septic shock due to undetermined organism [A41.9, R65.21]  Order Date:  2024        Case Management  Consult       (Order ID: [id number])     Diagnosis:         Priority:  Routine Expected Date:   Expiration Date:        Interval:   Count:    Reason for Consult: home with hospice Dx metastatic colorectal adenocarcinoma will need hospital bed, gel pad, bedside table, small wheelchair  Red is contact [phone]        Authorizing Provider:Abeba Nowak APRN  Authorizing Provider's NPI: [id number]  Order Entered By: Abeba Nowak APRN 2024 12:57 PM     Electronically signed by: Abeba Nowak APRN 2024 12:57 PM          History & Physical        Yeimi Allen DO at 24 1902              [facility] HOSPITALIST HISTORY AND PHYSICAL    Patient Identification:  Name:  Vangie Carney  Age:  54 y.o.  Sex:  female  :  1970  MRN:  [id number]   Admit Date: 2024   Visit Number:  [id number]  Room number:  102/02  Primary Care Physician:  Urvashi Basurto APRN     Subjective     Chief complaint:    Chief Complaint   Patient presents with    Weakness - Generalized       History of presenting illness:   Mrs. Vangie Carney is our 54 year old female patient with past medical history significant for metastatic colorectal adenocarcinoma s/p LAR currently receiving palliative chemotherapy, pulmonary embolism on therapeutic anticoagulation, and gastric dysmotility who presented to the ED today with complaint of generalized weakness.     Patient reports that she has been having worsening fatigue/weakness for the past three days, as well as nausea and vomiting. She complains of dysuria present for about the past four days and per review of most recent Oncology note she was prescribed bactrim on 24. ER physician  "reported she told him she was unable to take the pills due to difficulty swallowing them, so a prescription for liquid bactrim was sent in instead, but that she had only been able to take 1-2 days worth of it so far. Patient reports that the fatigue is worse with exertion, even getting up out of bed. She also reports mild shortness of breath that is worse with exertion. In the ER patient was found to be hypotensive and this did not significantly improve with IV fluid resuscitation, so vasopressors have been ordered by ER physician.     Review of Systems   Constitutional:  Positive for fatigue. Negative for fever.   HENT:  Positive for trouble swallowing. Negative for congestion.    Respiratory:  Positive for shortness of breath. Negative for cough.    Cardiovascular:  Positive for leg swelling. Negative for chest pain.        Chronic leg swelling which she says has been unchanged x3 months   Gastrointestinal:  Positive for abdominal pain, nausea and vomiting.        \"Stomach cramping\"   Genitourinary:  Positive for decreased urine volume and dysuria.   Musculoskeletal:  Negative for arthralgias and myalgias.   Skin:  Negative for rash and wound.   Neurological:  Positive for weakness. Negative for dizziness.   Psychiatric/Behavioral:  Negative for agitation and confusion.      Past Medical History:   Diagnosis Date    Gallbladder attack     GERD (gastroesophageal reflux disease)     Hearing aid worn     History of ear infections     Malignant neoplasm of sigmoid colon 3/14/2023    Seasonal allergies     Wears glasses      Past Surgical History:   Procedure Laterality Date    CHOLECYSTECTOMY      COLON RESECTION N/A 3/14/2023    Procedure: COLON RESECTION LOW ANTERIOR LAPAROSCOPIC WITH DAVINCI ROBOT;  Surgeon: Shari Aguirre MD;  Location: [facility];  Service: Robotics - DaVinci;  Laterality: N/A;    COLONOSCOPY N/A 2/15/2023    Procedure: COLONOSCOPY FOR SCREENING;  Surgeon: Giselle Iniguez MD;  " Location: [facility] OR;  Service: Gastroenterology;  Laterality: N/A;    ENDOSCOPY N/A 2/15/2023    Procedure: ESOPHAGOGASTRODUODENOSCOPY WITH BIOPSY;  Surgeon: Giselle Iniguez MD;  Location: [facility] OR;  Service: Gastroenterology;  Laterality: N/A;    LAPAROSCOPIC TUBAL LIGATION Bilateral     MIDDLE EAR SURGERY      PORTACATH PLACEMENT N/A 2024    Procedure: INSERTION OF PORTACATH;  Surgeon: Jaylen Leal MD;  Location: [facility] OR;  Service: General;  Laterality: N/A;     Family History   Problem Relation Age of Onset    Cancer Mother     Cancer Father     Cancer Sister     Cancer Brother     Cancer Maternal Grandmother     Cancer Maternal Grandfather     Breast cancer Neg Hx      Social History     Socioeconomic History    Marital status:    Tobacco Use    Smoking status: Former     Current packs/day: 0.00     Average packs/day: 1 pack/day for 30.0 years (30.0 ttl pk-yrs)     Types: Cigarettes     Start date:      Quit date:      Years since quittin.5     Passive exposure: Never    Smokeless tobacco: Never   Vaping Use    Vaping status: Every Day    Substances: Nicotine, Flavoring    Devices: Refillable tank   Substance and Sexual Activity    Alcohol use: Never    Drug use: Defer    Sexual activity: Defer     Birth control/protection: None       Allergies:  Keflex [cephalexin]  Medications below are reported home medications pulling from within the system; at this time, these medications have not been reconciled unless otherwise specified and are in the verification process for further verifcation as current home medications.    Prior to Admission Medications       Prescriptions Last Dose Informant Patient Reported? Taking?    apixaban (ELIQUIS) 5 MG tablet tablet 2024 Spouse/Significant Other, Pharmacy No Yes    Take 1 tablet by mouth 2 (Two) Times a Day.    dicyclomine (BENTYL) 10 MG capsule 2024 Spouse/Significant Other, Pharmacy No Yes    Take 1 capsule by mouth 4  (Four) Times a Day Before Meals & at Bedtime.    granisetron (Sancuso) 3.1 MG/24HR 6/26/2024 Spouse/Significant Other, Pharmacy No No    Place 1 patch on the skin once every 7 days.    HYDROcodone-acetaminophen (NORCO) 5-325 MG per tablet Past Week Spouse/Significant Other, Pharmacy Yes Yes    Take 1-2 tablets by mouth Every 8 (Eight) Hours As Needed for Mild Pain.    lidocaine-prilocaine (EMLA) 2.5-2.5 % cream Past Month Spouse/Significant Other, Pharmacy No Yes    Apply to port-a-cath site 30 minutes prior to arrival at infusion center. Cover with plastic wrap.    metoclopramide (REGLAN) 10 MG tablet Past Week Spouse/Significant Other, Pharmacy Yes Yes    Take 1 tablet by mouth 3 (Three) Times a Day As Needed (for nausea).    nystatin (MYCOSTATIN) 608929 UNIT/GM cream Past Month Spouse/Significant Other, Pharmacy No Yes    Apply 1 Application topically to the appropriate area as directed 2 (Two) Times a Day.    ondansetron (ZOFRAN) 8 MG tablet Past Week Spouse/Significant Other, Pharmacy Yes Yes    Take 1 tablet by mouth Every 8 (Eight) Hours As Needed for Nausea or Vomiting.    pantoprazole (PROTONIX) 40 MG EC tablet 6/30/2024 Spouse/Significant Other, Pharmacy No Yes    Take 1 tablet by mouth Daily.    sennosides-docusate (PERICOLACE) 8.6-50 MG per tablet Past Week Spouse/Significant Other, Pharmacy Yes Yes    Take 2 tablets by mouth 2 (Two) Times a Day As Needed for Constipation.    sulfamethoxazole-trimethoprim (BACTRIM,SEPTRA) 200-40 MG/5ML suspension 6/30/2024 Spouse/Significant Other, Pharmacy No Yes    Take 20 mL by mouth 2 (Two) Times a Day for 10 days.          Objective     Vital Signs:  Temp:  [97.6 °F (36.4 °C)] 97.6 °F (36.4 °C)  Heart Rate:  [105-123] 120  Resp:  [18] 18  BP: ()/(49-77) 80/55    Mean Arterial Pressure (Non-Invasive) for the past 24 hrs (Last 3 readings):   Noninvasive MAP (mmHg)   07/01/24 1850 63   07/01/24 1840 60   07/01/24 1830 63     SpO2:  [71 %-100 %] 100 %  on   ;       Body mass index is 19.58 kg/m².    Wt Readings from Last 3 Encounters:   07/01/24 56.7 kg (125 lb)   06/26/24 56.7 kg (125 lb)   06/26/24 56.7 kg (125 lb)        Physical Exam:   Constitutional:  Well-developed and well-nourished.  No acute distress.      HENT:  Head:  Normocephalic and atraumatic.  Mouth:  Moist mucous membranes.    Eyes:  Conjunctivae and EOM are normal. No scleral icterus.    Neck:  Neck supple.     Cardiovascular:  Mild tachycardia, regular rhythm, and normal heart sounds. No murmur.  Pulmonary/Chest:  Normal rate and effort. Breath sounds clear to auscultation bilaterally with good air movement.  Abdominal:  Soft. Mild distention, mild to moderate RUQ tenderness without rigidity or guarding. Normal bowel sounds present.  Musculoskeletal:  No tenderness and no deformity.  No red or swollen joints anywhere.    Neurological: Awake, alert, no focal deficit on gross examination. No slurred speech or facial droop.   Skin:  Skin is warm and dry. No rash noted. Appeared slightly jaundiced.  Peripheral vascular:  No cyanosis, 1+ lower extremity edema bilaterally.    EKG:  sinus tachycardia, rate 110, nonspecific t wave changes  ECG 12 Lead ED Triage Standing Order; Weak / Dizzy / AMS   Final Result   Test Reason : ED Triage Standing Order~   Blood Pressure :   */*   mmHG   Vent. Rate : 109 BPM     Atrial Rate : 109 BPM      P-R Int : 166 ms          QRS Dur :  66 ms       QT Int : 296 ms       P-R-T Axes :  67  57  63 degrees      QTc Int : 398 ms      Sinus tachycardia   Low voltage QRS   Cannot rule out Anterior infarct (cited on or before 07-MAR-2024)   Abnormal ECG   When compared with ECG of 21-APR-2024 16:27,   Questionable change in QRS axis   Nonspecific T wave abnormality now evident in Inferior leads   Nonspecific T wave abnormality, worse in Lateral leads   Confirmed by Giuseppe Nava ([id number]) on 7/1/2024 11:50:43 AM      Referred By: CURD           Confirmed By: Giuseppe Nava     "      Telemetry:  sinus tachycardia rate 110s    Labs:  Results from last 7 days   Lab Units 07/01/24  1818 07/01/24  1125 06/26/24  1159   LACTATE mmol/L 4.9*  --   --    WBC 10*3/mm3  --  8.31 6.13   HEMOGLOBIN g/dL  --  9.8* 12.4   HEMATOCRIT %  --  28.7* 37.2   MCV fL  --  101.4* 101.6*   MCHC g/dL  --  34.1 33.3   PLATELETS 10*3/mm3  --  155 266         Results from last 7 days   Lab Units 07/01/24  1125 06/26/24  1159   SODIUM mmol/L 133* 136   POTASSIUM mmol/L 3.9 3.8   MAGNESIUM mg/dL 1.8  --    CHLORIDE mmol/L 99 99   CO2 mmol/L 20.6* 23.9   BUN mg/dL 22* 12   CREATININE mg/dL 1.40* 0.49*   CALCIUM mg/dL 7.9* 8.0*   GLUCOSE mg/dL 22* 81   ALBUMIN g/dL 1.8* 2.1*   BILIRUBIN mg/dL 1.8* 1.2   ALK PHOS U/L 249* 235*   AST (SGOT) U/L 77* 63*   ALT (SGPT) U/L 65* 51*   Estimated Creatinine Clearance: 41.6 mL/min (A) (by C-G formula based on SCr of 1.4 mg/dL (H)).    No results found for: \"AMMONIA\"  Results from last 7 days   Lab Units 07/01/24  1125   HSTROP T ng/L 12         Glucose   Date/Time Value Ref Range Status   07/01/2024 1535 187 (H) 70 - 130 mg/dL Final   07/01/2024 1329 83 70 - 130 mg/dL Final   07/01/2024 1159 72 70 - 130 mg/dL Final   07/01/2024 1127 20 (C) 70 - 130 mg/dL Final     No results found for: \"TSH\", \"FREET4\"  Lab Results   Component Value Date    PREGTESTUR Negative 05/15/2015     Pain Management Panel           No data to display              Brief Urine Lab Results  (Last result in the past 365 days)        Color   Clarity   Blood   Leuk Est   Nitrite   Protein   CREAT   Urine HCG        07/01/24 1614 [place]   Cloudy   Trace   Small (1+)   Positive   30 mg/dL (1+)                   I have personally looked at the labs and they are summarized above.    Detailed radiology reports for the last 24 hours:    Imaging Results (Last 24 Hours)       Procedure Component Value Units Date/Time    XR Chest 1 View [[id number]] Collected: 07/01/24 1145     Updated: 07/01/24 1148    Narrative:      XR " CHEST 1 VW-     CLINICAL INDICATION: Weak/Dizzy/AMS triage protocol        COMPARISON: 3/7/2024     TECHNIQUE: Single frontal view of the chest.     FINDINGS:     LUNGS: Lungs are adequately aerated.      HEART AND MEDIASTINUM: Heart and mediastinal contours are unremarkable        SKELETON: Bony and soft tissue structures are unremarkable.     Central line tip is stable  NG tube is been removed       Impression:      No radiographic evidence of acute cardiac or pulmonary disease.           This report was finalized on 7/1/2024 11:46 AM by Dr. Edmond Vasquez MD.             Final impressions for the last 30 days of radiology reports:    XR Chest 1 View    Result Date: 7/1/2024  No radiographic evidence of acute cardiac or pulmonary disease.    This report was finalized on 7/1/2024 11:46 AM by Dr. Edmond Vasquez MD.         Assessment & Plan      #Septic shock secondary to UTI  #Mild hyperbilirubinemia  #Hypoglycemia  #Transaminitis  #Metastatic colorectal cancer on palliative chemotherapy  #Acute kidney injury with oliguria, likely secondary to ATN from septic shock  - Urine appeared grossly abnormal. She has had minimal output in the ER (reportedly about 70cc via straight catheterization) despite IV fluid resuscitation. Monitor strict intake/output. Repeat chemistry panel and CBC in a.m.  - Hypoglycemia resolved after IV dextrose. Monitor q2h accuchecks and continue D5 1/2NS for now. Unclear whether this is from sepsis or due to liver failure in the setting of metastatic disease and fatty infiltration of the liver. AST, ALT, and alk phos all appeared similar to prior labs, but on physical exam patient is tender in the RUQ and appears jaundiced so I have ordered a stat CT of the abdomen. Total bilirubin was elevated at 1.8. I have ordered total and direct bilirubin to further evaluate.   - Empiric antibiotics started for UTI in the ER with ceftriaxone. Continue same. Follow up on blood cultures and urine cultures.   -  Continue vasopressor support with levophed with goal MAP >65  - ER physician spoke with patient's oncologist, Dr. Hanks, and said that he recommends goals of care discussion depending on patient's progress with current treatment and he has agreed to follow along. I discussed code status with patient, witnessed by a family member present at bedside, and patient stated she would not want CPR or intubation in the event of respiratory or cardiac failure/arrest.     #Pulmonary embolism, currently on therapeutic anticoagulation  #Acute on chronic anemia  - No increased oxygen requirement at this time. Continue home eliquis when reconciled by pharmacy. We will need to monitor blood counts closely with repeat cbc in a.m. as hemoglobin decreased from 12.4 on 6/26 to 9.8 today. Hemoglobin may have been falsely elevated previously due to hemoconcentration, as labs from May 2024 were more in the 10-11 range. No active bleeding noted.     No Active Pharmacologic or Mechanical VTE Prophylaxis AND No VTE Prophylaxis Contraindications Documented   - Select Appropriate VTE ProphylaxisActive VTE Prophylaxis  Mechanical:        Start        Signed and Held  Maintain Sequential Compression Device  Continuous                              Dispo: admit INPATIENT status due to the need for care which can only be reasonably provided in an hospital setting such as aggressive/expedited ancillary services and/or consultation services, the necessity for IV medications, close physician monitoring and/or the possible need for procedures.  In such, I feel patient’s risk for adverse outcomes and need for care warrant INPATIENT evaluation and predict the patient’s care encounter to likely last beyond 2 midnights. Patient is high risk due to septic shock.    Yeimi Allen DO  [facility]  07/01/24  19:02 EDT     Electronically signed by Yeimi Allen DO at 07/01/24 1939       Current Facility-Administered Medications    Medication Dose Route Frequency Provider Last Rate Last Admin    acetaminophen (TYLENOL) tablet 1,000 mg  1,000 mg Oral Q8H PRN Nona Barroso DO   1,000 mg at 07/09/24 0403    apixaban (ELIQUIS) tablet 5 mg  5 mg Oral BID Mila Dias MD   5 mg at 07/12/24 0822    sennosides-docusate (PERICOLACE) 8.6-50 MG per tablet 2 tablet  2 tablet Oral BID Yeimi Allen DO   2 tablet at 07/12/24 0822    And    polyethylene glycol (MIRALAX) packet 17 g  17 g Oral Daily PRN Yeimi Allen DO        And    bisacodyl (DULCOLAX) EC tablet 5 mg  5 mg Oral Daily PRN Yeimi Allen DO        And    bisacodyl (DULCOLAX) suppository 10 mg  10 mg Rectal Daily PRN Yeimi Allen DO        calcium carbonate (TUMS) chewable tablet 500 mg (200 mg elemental)  1 tablet Oral TID PRN Carlos Castro MD   1 tablet at 07/04/24 0314    Calcium Replacement - Follow Nurse / BPA Driven Protocol   Does not apply PRN Yeimi Allen DO        castor oil-balsam peru (VENELEX) ointment 1 Application  1 Application Topical Q12H Mila Dias MD   1 Application at 07/12/24 0823    dextrose (D50W) (25 g/50 mL) IV injection 25 g  25 g Intravenous Q15 Min PRN Carlos Castro MD   25 g at 07/02/24 0058    dextrose (GLUTOSE) oral gel 15 g  15 g Oral Q15 Min PRN Carlos Castro MD        dextrose 5 % and lactated Ringer's infusion  50 mL/hr Intravenous Continuous Mila Dias MD 50 mL/hr at 07/11/24 2334 50 mL/hr at 07/11/24 2334    First Mouthwash (Magic Mouthwash) 5 mL  5 mL Swish & Spit Q6H PRN Yeimi Allen DO   5 mL at 07/11/24 2333    fluconazole (DIFLUCAN) IVPB 200 mg  200 mg Intravenous Q24H Yeimi Allen  mL/hr at 07/12/24 1141 200 mg at 07/12/24 1141    glucagon HCl (Diagnostic) injection 1 mg  1 mg Subcutaneous Q15 Min PRN Carlos Castro MD        granisetron (SANCUSO) 3.1 MG/24HR 1 patch  1 patch Transdermal Weekly Yeimi Allen DO   1 patch at 07/10/24 1322    heparin  injection 500 Units  5 mL Intravenous PRN Carlos Castro MD        HYDROcodone-acetaminophen (NORCO) 5-325 MG per tablet 1 tablet  1 tablet Oral Q8H PRN Yeimi Allen DO        lactulose (CHRONULAC) 10 GM/15ML solution 20 g  20 g Oral BID Yeimi Allen DO   20 g at 07/11/24 0816    Magnesium Standard Dose Replacement - Follow Nurse / BPA Driven Protocol   Does not apply PRN Yeimi Allen DO        midodrine (PROAMATINE) tablet 10 mg  10 mg Oral TID AC Mila Dias MD   10 mg at 07/12/24 1141    nitroglycerin (NITROSTAT) SL tablet 0.4 mg  0.4 mg Sublingual Q5 Min PRN Yeimi Allen DO        norepinephrine (LEVOPHED) 8 mg in 250 mL NS infusion (premix)  0.02-0.3 mcg/kg/min (Dosing Weight) Intravenous Titrated Rosita Allen, PharmD   Stopped at 07/11/24 1303    nystatin (MYCOSTATIN) 923028 UNIT/GM cream 1 Application  1 Application Topical BID Yiemi Allen DO   1 Application at 07/12/24 0823    OLANZapine zydis (zyPREXA) disintegrating tablet 5 mg  5 mg Oral Nightly Mila Dias MD   5 mg at 07/11/24 2104    ondansetron ODT (ZOFRAN-ODT) disintegrating tablet 4 mg  4 mg Oral Q6H PRN Yeimi Allen DO   4 mg at 07/11/24 1334    Or    ondansetron (ZOFRAN) injection 4 mg  4 mg Intravenous Q6H PRN Yeimi Allen DO   4 mg at 07/11/24 1945    pantoprazole (PROTONIX) EC tablet 40 mg  40 mg Oral Q AM Mila Dias MD   40 mg at 07/12/24 0637    Potassium Replacement - Follow Nurse / BPA Driven Protocol   Does not apply PRN Yeimi Allen DO        prochlorperazine (COMPAZINE) injection 5 mg  5 mg Intravenous Q4H PRN Elen Hernandez APRN        promethazine (PHENERGAN) suppository 12.5 mg  12.5 mg Rectal Q6H PRN Elen Hernandez APRN        sodium chloride 0.9 % flush 10 mL  10 mL Intravenous PRN Yeimi Allen,         sodium chloride 0.9 % flush 10 mL  10 mL Intravenous PRN Yeimi Allen, DO        sodium chloride 0.9 % flush 10 mL  10 mL Intravenous Q12H Smith,  "Yeimi, DO   10 mL at 07/12/24 0823    sodium chloride 0.9 % flush 10 mL  10 mL Intravenous PRN Allen, Yeimi, DO        sodium chloride 0.9 % flush 10 mL  10 mL Intravenous Q12H Carlos Castro MD   10 mL at 07/12/24 0823    sodium chloride 0.9 % flush 10 mL  10 mL Intravenous PRN Carlos Castro MD        sodium chloride 0.9 % flush 10 mL  10 mL Intravenous Q12H Carlos Castro MD   10 mL at 07/12/24 0823    sodium chloride 0.9 % flush 10 mL  10 mL Intravenous PRN Carlos Castro MD        sodium chloride 0.9 % flush 10 mL  10 mL Intravenous Q12H Allen, Yeimi, DO   10 mL at 07/12/24 0823    sodium chloride 0.9 % flush 10 mL  10 mL Intravenous PRN Allen, Yeimi, DO        sodium chloride 0.9 % flush 20 mL  20 mL Intravenous PRCarlos Mckee MD        sodium chloride 0.9 % infusion 40 mL  40 mL Intravenous PRN Allen, Yeimi, DO        sodium chloride 0.9 % infusion 40 mL  40 mL Intravenous PRN Carlos Castro MD        sodium chloride 0.9 % infusion 40 mL  40 mL Intravenous PRN Carlos Castro MD        sodium chloride 0.9 % infusion 40 mL  40 mL Intravenous PRN Allen, Yeimi, DO            Physician Progress Notes (most recent note)        Abeba Nowak APRN at 07/11/24 1015          Palliative Care Daily Progress Note     S: Medical record reviewed, followed up with Primary RAÚL Galvez and Dr Dias. When I saw Jennifer this am she was resting in bed, awake, alert and appeared to have better color, and was feeling better. She denied pain nausea, anxiety, or shortness of breath. She stated that she had a better nights sleep last night.    O:   Palliative Performance Scale Score:     BP (!) 88/69   Pulse 89   Temp 97.4 °F (36.3 °C) (Oral)   Resp 17   Ht 170.2 cm (67\")   Wt 66.2 kg (145 lb 15.1 oz)   LMP 06/15/2016   SpO2 98%   BMI 22.86 kg/m²     Intake/Output Summary (Last 24 hours) at 7/11/2024 1659  Last data filed at 7/11/2024 1600  Gross per 24 hour "   Intake 1585.42 ml   Output 400 ml   Net 1185.42 ml       PE:  General Appearance:    Chronically ill appearing, awake, alert, cooperative, NAD   HEENT:    NC/AT, without obvious abnormality, EOMI, anicteric , dry mucous membranes    Neck:   supple, trachea midline, no JVD   Lungs:     Unlabored respirations with no rhonchi rales or wheezing noted., diminished bilaterally     Heart:    tachycardia, normal S1 and S2, no M/R/G   Abdomen:     Soft, NT, Distended, hypoactive ABS , clay catheter in place   Extremities:   Moves all extremities, no edema   Pulses:   Pulses palpable and equal bilaterally   Skin:   Dry and jaundiced    Neurologic:   A/Ox3, cooperative, weak   Psych:   Calm, more appropriate, however still flat        Meds: Reviewed and changes noted    Labs:   Results from last 7 days   Lab Units 07/11/24  0739   WBC 10*3/mm3 5.36   HEMOGLOBIN g/dL 8.4*   HEMATOCRIT % 26.0*   PLATELETS 10*3/mm3 182     Results from last 7 days   Lab Units 07/11/24  0739   SODIUM mmol/L 135*   POTASSIUM mmol/L 3.7   CHLORIDE mmol/L 108*   CO2 mmol/L 17.4*   BUN mg/dL 6   CREATININE mg/dL 0.37*   GLUCOSE mg/dL 110*   CALCIUM mg/dL 7.5*     Results from last 7 days   Lab Units 07/11/24  0739 07/07/24  2357 07/07/24  0023   SODIUM mmol/L 135*   < > 132*   POTASSIUM mmol/L 3.7   < > 4.0   CHLORIDE mmol/L 108*   < > 107   CO2 mmol/L 17.4*   < > 16.9*   BUN mg/dL 6   < > 9   CREATININE mg/dL 0.37*   < > 0.48*   CALCIUM mg/dL 7.5*   < > 6.9*   BILIRUBIN mg/dL  --   --  2.5*   ALK PHOS U/L  --   --  204*   ALT (SGPT) U/L  --   --  64*   AST (SGOT) U/L  --   --  58*   GLUCOSE mg/dL 110*   < > 87    < > = values in this interval not displayed.     Imaging Results (Last 72 Hours)       ** No results found for the last 72 hours. **              Diagnostics: Reviewed    A: Vangie Carney is a 54 y.o. female admitted on 7/1/2024 for septic shock. She has a past medical history of GERD, malignant neoplasm of sigmoid colon with colon  resection. She has Metastatic colorectal adenocarcinoma s/p LAR currently receiving palliative chemotherapy, pulmonary embolism on therapeutic anticoagulation, and gastric dysmotility. She complains today of fatigue, dysphagia, dyspnea worse with exertion, abdominal pain, nausea and vomiting, decreased UOP, dysuria and increased weakness. She presented to the ED for increased weakness and fatigue over the past few days as well as nausea and vomiting. Her ED labs, sodium 133, BUN 22, creat 1.4, eGFR 45.1, glucose 22, calcium 7.9, alk phos 249, total protein 3.9, albumin 1.8, AST 77, ALT 65, total bilirubin 1.7, wbc 8.31, H&H 9.8/28.7, platelets 155, urine cloudy, >1.030, blood trace, positive nitrates, moderate leukocytes, CT shows multiple dilated loops of small bowel consistent with small bowel obstruction, Right pleural effusion and pericardial effusion, incompletely imaged. These are new when compared to the prior study. Fatty infiltration of the liver. No hydronephrosis or ureteral calculus.   Today 7/11/2024  Jennifer's VS T 97.4, HR 89, RR 17, BP of 79/58 and was saturating 96% on room air.    P:  I was able to speak with patients  Red at bedside to update and provide support. I discussed patient with Dr Dias and plan is to attempt to get pt up, continue midodrine with goal of d/c vasopressor.    We will continue to follow along. Please do not hesitate to contact us regarding further sx mgmt or GOC needs, including after hours or on weekends via our on call provider at [phone].     ANURAG Sanders    7/11/2024    Electronically signed by Abeba Nowak APRN at 07/11/24 1722          Consult Notes (most recent note)        Romeo Izaguirre PA-C at 07/03/24 0813        Consult Orders    1. Inpatient Urology Consult [[id number]] ordered by Yeimi Allen DO at 07/02/24 1614              Attestation signed by Khanh De Anda MD at 07/03/24 0832    I have reviewed this documentation and  agree.                  Name:  Vangie Carney  :  1970    DATE OF ADMISSION  2024    DATE OF CONSULT  7/3/2024     REFERRING PHYSICIAN  * No referring provider recorded for this case *    PRIMARY CARE PHYSICIAN  Urvashi Basurto APRN    REASON FOR CONSULT  Colmenares catheter complication    CHIEF COMPLAINT  Chief Complaint   Patient presents with    Weakness - Generalized       HISTORY OF PRESENT ILLNESS:   Vangie Carney is a 54 y.o. female who has a past medical history significant for metastatic colorectal adenocarcinoma status post LAR currently receiving palliative chemotherapy, pulmonary embolism on therapeutic anticoagulation, and gastric dysmotility who presented to the emergency department with generalized weakness.  She had a CT scan of the abdomen pelvis completed on 2024 that showed multiple dilated loops of small bowel consistent with small bowel obstruction.  She had right pleural effusions and cardiac fusions.  Patient's bladder was unremarkable and there was no signs of hydronephrosis or ureteral calculus.  Most recent lab work shows a normal creatinine 1.98, GFR 69.2, and BUN of 19.  CBC shows improvement in white blood cell count to 9.25.  Urine analysis showed trace ketones, 3+ bilirubin, trace blood, 1+ protein, 1+ leukocytes, and positive nitrites.  Microscopic UA showed 21-50 white blood cells and 4+ bacteria.  Her urine culture showing 50,000 colony-forming's of gram-negative bacilli.  Due to patient's increasing difficulty urinating indwelling Colmenares catheter was placed yesterday.  Nursing staff report that they received urine return however patient was complaining of pain and postvoid residual at that time revealed roughly 400 to 500 mL.  She has had intermittent bladder scans with her most recent bladder scan revealing roughly 132 mL.  She had very minimal output in her catheter.    I saw Vangie Carney in their hospital room this morning.  Patient was lying in bed acutely  ill appearing.  Blood pressure remains low at 88/58.  Heart rate is normal at 93.  She remains afebrile at 98.1.  Patient reports some intermittent bladder/pelvic pain.  She denies any severe pain/pressure.  States she has had weak urinary stream with feelings of incomplete emptying at home.  Currently has a indwelling Colmenares catheter in place with roughly 50 to 100 mL of yellow urine noted in collection bag.  There is yellow urine noted within collection tubing as well.  I did carefully irrigate the patient's catheter at bedside with roughly 200 mL of sterile water and received yellow urine in return with no notable resistance.  Catheter draining appropriately after irrigation.  Patient did not have any significant pain and tolerated procedure well.    PAST MEDICAL HISTORY  Past Medical History:   Diagnosis Date    Gallbladder attack     GERD (gastroesophageal reflux disease)     Hearing aid worn     History of ear infections     Malignant neoplasm of sigmoid colon 3/14/2023    Seasonal allergies     Wears glasses        PAST SURGICAL HISTORY  Past Surgical History:   Procedure Laterality Date    CHOLECYSTECTOMY      COLON RESECTION N/A 3/14/2023    Procedure: COLON RESECTION LOW ANTERIOR LAPAROSCOPIC WITH DAVINCI ROBOT;  Surgeon: Shari Aguirre MD;  Location: [facility] OR;  Service: Robotics - DaVinci;  Laterality: N/A;    COLONOSCOPY N/A 2/15/2023    Procedure: COLONOSCOPY FOR SCREENING;  Surgeon: Giselle Iniguez MD;  Location: [facility] OR;  Service: Gastroenterology;  Laterality: N/A;    ENDOSCOPY N/A 2/15/2023    Procedure: ESOPHAGOGASTRODUODENOSCOPY WITH BIOPSY;  Surgeon: Giselle Iniguez MD;  Location: [facility] OR;  Service: Gastroenterology;  Laterality: N/A;    LAPAROSCOPIC TUBAL LIGATION Bilateral     MIDDLE EAR SURGERY      PORTACATH PLACEMENT N/A 2/14/2024    Procedure: INSERTION OF PORTACATH;  Surgeon: Jaylen Leal MD;  Location: [facility] OR;  Service: General;  Laterality: N/A;        SOCIAL HISTORY  Social History     Socioeconomic History    Marital status:    Tobacco Use    Smoking status: Former     Current packs/day: 0.00     Average packs/day: 1 pack/day for 30.0 years (30.0 ttl pk-yrs)     Types: Cigarettes     Start date:      Quit date: 2017     Years since quittin.5     Passive exposure: Never    Smokeless tobacco: Never   Vaping Use    Vaping status: Every Day    Substances: Nicotine, Flavoring    Devices: Refillable tank   Substance and Sexual Activity    Alcohol use: Never    Drug use: Defer    Sexual activity: Defer     Birth control/protection: None       FAMILY HISTORY  Family History   Problem Relation Age of Onset    Cancer Mother     Cancer Father     Cancer Sister     Cancer Brother     Cancer Maternal Grandmother     Cancer Maternal Grandfather     Breast cancer Neg Hx        ALLERGIES  Allergies   Allergen Reactions    Keflex [Cephalexin] Nausea Only     Beta lactam allergy details  Antibiotic reaction: nausea  Age at reaction: adult  Dose to reaction time: (!) minutes  Reason for antibiotic: unknown  Epinephrine required for reaction?: no  Tolerated antibiotics: augmentin, amoxicillin        INPATIENT MEDICATIONS  Current Facility-Administered Medications   Medication Dose Route Frequency Provider Last Rate Last Admin    [Held by provider] apixaban (ELIQUIS) tablet 5 mg  5 mg Oral BID Yeimi Allen DO        sennosides-docusate (PERICOLACE) 8.6-50 MG per tablet 2 tablet  2 tablet Oral BID PRN Yeimi Allen DO        And    polyethylene glycol (MIRALAX) packet 17 g  17 g Oral Daily PRN Yeimi Allen DO        And    bisacodyl (DULCOLAX) EC tablet 5 mg  5 mg Oral Daily PRN Yeimi Allen DO        And    bisacodyl (DULCOLAX) suppository 10 mg  10 mg Rectal Daily PRN Yeimi Allen DO        castor oil-balsam peru (VENELEX) ointment 1 Application  1 Application Topical Q12H Yeimi Allen DO   1 Application at 24 2246    dextrose (D50W) (25  g/50 mL) IV injection 25 g  25 g Intravenous Q15 Min PRN Carlos Castro MD   25 g at 07/02/24 0058    dextrose (GLUTOSE) oral gel 15 g  15 g Oral Q15 Min PRN Carlos Castro MD        dextrose 5 % and sodium chloride 0.45 % infusion  125 mL/hr Intravenous Continuous Carlos Castro  mL/hr at 07/02/24 1805 125 mL/hr at 07/02/24 1805    [Held by provider] dicyclomine (BENTYL) capsule 10 mg  10 mg Oral 4x Daily AC & at Bedtime Yeimi Allen DO        Enoxaparin Sodium (LOVENOX) syringe 60 mg  1 mg/kg Subcutaneous Q12H Yeimi Allen DO   60 mg at 07/03/24 0558    First Mouthwash (Magic Mouthwash) 5 mL  5 mL Swish & Spit Q6H Yeimi Allen DO   5 mL at 07/02/24 2247    glucagon HCl (Diagnostic) injection 1 mg  1 mg Subcutaneous Q15 Min PRN Carlos Castro MD        granisetron (SANCUSO) 3.1 MG/24HR 1 patch  1 patch Transdermal Weekly Yeimi Allen DO        heparin injection 500 Units  5 mL Intravenous PRN Carlos Castro MD        HYDROcodone-acetaminophen (NORCO) 5-325 MG per tablet 1 tablet  1 tablet Oral Q8H PRN Yeimi Allen DO        Magnesium Standard Dose Replacement - Follow Nurse / BPA Driven Protocol   Does not apply PRN Yeimi Allen DO        magnesium sulfate 2g/50 mL (PREMIX) infusion  2 g Intravenous Q2H Yeimi Allen DO        [Held by provider] metoclopramide (REGLAN) tablet 5 mg  5 mg Oral TID PRN Yeimi Allen DO        mupirocin (BACTROBAN) 2 % nasal ointment 1 Application  1 application  Each Nare BID Yeimi Allen DO   1 Application at 07/02/24 2248    nitroglycerin (NITROSTAT) SL tablet 0.4 mg  0.4 mg Sublingual Q5 Min PRN Yeimi Allen DO        norepinephrine (LEVOPHED) 8 mg in 250 mL NS infusion (premix)  0.02-0.3 mcg/kg/min Intravenous Titrated Yeimi Allen DO 21.3 mL/hr at 07/03/24 0558 0.2 mcg/kg/min at 07/03/24 0558    nystatin (MYCOSTATIN) 467476 UNIT/GM cream 1 Application  1 Application Topical BID Yeimi Allen DO   1  Application at 07/02/24 2247    ondansetron (ZOFRAN) tablet 8 mg  8 mg Oral Q8H PRN Yeimi Allen, DO        [Held by provider] pantoprazole (PROTONIX) EC tablet 40 mg  40 mg Oral QAM AC Cayden Allena, DO        piperacillin-tazobactam (ZOSYN) IVPB 4.5 g IVPB in 100 mL NS (VTB)  4.5 g Intravenous Q8H Yeimi Allen, DO   4.5 g at 07/03/24 0010    potassium chloride 20 mEq in 50 mL IVPB  20 mEq Intravenous Q1H Yeimi Allen,         Potassium Replacement - Follow Nurse / BPA Driven Protocol   Does not apply PRN Cayden Allena,         sodium chloride 0.9 % flush 10 mL  10 mL Intravenous PRN Rika Allensea, DO        sodium chloride 0.9 % flush 10 mL  10 mL Intravenous PRN Rika Allensea, DO        sodium chloride 0.9 % flush 10 mL  10 mL Intravenous Q12H Yeimi Allen, DO   10 mL at 07/02/24 2244    sodium chloride 0.9 % flush 10 mL  10 mL Intravenous PRN Rika Allensea, DO        sodium chloride 0.9 % flush 10 mL  10 mL Intravenous Q12H Carlos Castro MD   10 mL at 07/02/24 2245    sodium chloride 0.9 % flush 10 mL  10 mL Intravenous PRN Carlos Castro MD        sodium chloride 0.9 % flush 10 mL  10 mL Intravenous Q12H Carlos Castro MD   10 mL at 07/02/24 2245    sodium chloride 0.9 % flush 10 mL  10 mL Intravenous PRN Carlos Castro MD        sodium chloride 0.9 % flush 10 mL  10 mL Intravenous Q12H Rika Allensea, DO   10 mL at 07/02/24 2245    sodium chloride 0.9 % flush 10 mL  10 mL Intravenous PRN Rika Allensea, DO        sodium chloride 0.9 % flush 20 mL  20 mL Intravenous PRN Carlos Castro MD        sodium chloride 0.9 % infusion 40 mL  40 mL Intravenous PRN Cayden Allena, DO        sodium chloride 0.9 % infusion 40 mL  40 mL Intravenous PRN Carlos Castro MD        sodium chloride 0.9 % infusion 40 mL  40 mL Intravenous PRN Carlos Castro MD        sodium chloride 0.9 % infusion 40 mL  40 mL Intravenous PRN Yeimi Allen DO      "    PHYSICAL EXAMINATION    BP (!) 88/58   Pulse 93   Temp 98.1 °F (36.7 °C) (Oral)   Resp 17   Ht 170.2 cm (67\")   Wt 66.5 kg (146 lb 9.7 oz)   LMP 06/15/2016   SpO2 99%   BMI 22.96 kg/m²     GENERAL: Acutely ill-appearing underdeveloped white female in no acute distress.  HEENT:  Pupils equally round and reactive to light.  Extraocular muscles intact.  CARDIOVASCULAR:  Regular rate and rhythm.  Muffled heart sounds.  LUNGS: Decreased breath sounds bilaterally  ABDOMEN:  Soft, nontender, nondistended with positive bowel sounds.  : Indwelling Colmenares catheter in place with minimal yellow urinary output noted in collection tubing and bag.  Urine yellow in color with no gross hematuria or sediments.  NEURO:  Cranial nerves grossly intact.  No focal deficits.  PSYCH:  Alert and oriented x3.    LABORATORY     WBC   Date Value Ref Range Status   07/03/2024 9.25 3.40 - 10.80 10*3/mm3 Final     RBC   Date Value Ref Range Status   07/03/2024 2.66 (L) 3.77 - 5.28 10*6/mm3 Final     Hemoglobin   Date Value Ref Range Status   07/03/2024 9.1 (L) 12.0 - 15.9 g/dL Final     Hematocrit   Date Value Ref Range Status   07/03/2024 26.2 (L) 34.0 - 46.6 % Final     MCV   Date Value Ref Range Status   07/03/2024 98.5 (H) 79.0 - 97.0 fL Final     MCH   Date Value Ref Range Status   07/03/2024 34.2 (H) 26.6 - 33.0 pg Final     MCHC   Date Value Ref Range Status   07/03/2024 34.7 31.5 - 35.7 g/dL Final     RDW   Date Value Ref Range Status   07/03/2024 14.3 12.3 - 15.4 % Final     RDW-SD   Date Value Ref Range Status   07/03/2024 51.8 37.0 - 54.0 fl Final     MPV   Date Value Ref Range Status   07/03/2024 9.8 6.0 - 12.0 fL Final     Platelets   Date Value Ref Range Status   07/03/2024 135 (L) 140 - 450 10*3/mm3 Final     Neutrophil %   Date Value Ref Range Status   07/02/2024 78.5 (H) 42.7 - 76.0 % Final     Lymphocyte %   Date Value Ref Range Status   07/02/2024 16.8 (L) 19.6 - 45.3 % Final     Monocyte %   Date Value Ref Range " Status   07/02/2024 4.3 (L) 5.0 - 12.0 % Final     Eosinophil %   Date Value Ref Range Status   07/02/2024 0.0 (L) 0.3 - 6.2 % Final     Basophil %   Date Value Ref Range Status   07/02/2024 0.1 0.0 - 1.5 % Final     Immature Grans %   Date Value Ref Range Status   07/02/2024 0.3 0.0 - 0.5 % Final     Neutrophils, Absolute   Date Value Ref Range Status   07/02/2024 9.08 (H) 1.70 - 7.00 10*3/mm3 Final     Lymphocytes, Absolute   Date Value Ref Range Status   07/02/2024 1.94 0.70 - 3.10 10*3/mm3 Final     Monocytes, Absolute   Date Value Ref Range Status   07/02/2024 0.50 0.10 - 0.90 10*3/mm3 Final     Eosinophils, Absolute   Date Value Ref Range Status   07/02/2024 0.00 0.00 - 0.40 10*3/mm3 Final     Basophils, Absolute   Date Value Ref Range Status   07/02/2024 0.01 0.00 - 0.20 10*3/mm3 Final     Immature Grans, Absolute   Date Value Ref Range Status   07/02/2024 0.04 0.00 - 0.05 10*3/mm3 Final     nRBC   Date Value Ref Range Status   07/02/2024 0.0 0.0 - 0.2 /100 WBC Final       Glucose   Date Value Ref Range Status   07/03/2024 98 65 - 99 mg/dL Final     Sodium   Date Value Ref Range Status   07/03/2024 133 (L) 136 - 145 mmol/L Final     Potassium   Date Value Ref Range Status   07/03/2024 3.5 3.5 - 5.2 mmol/L Final     Comment:     Slight hemolysis detected by analyzer. Result may be falsely elevated.     CO2   Date Value Ref Range Status   07/03/2024 17.9 (L) 22.0 - 29.0 mmol/L Final     Chloride   Date Value Ref Range Status   07/03/2024 104 98 - 107 mmol/L Final     Anion Gap   Date Value Ref Range Status   07/03/2024 11.1 5.0 - 15.0 mmol/L Final     Creatinine   Date Value Ref Range Status   07/03/2024 0.98 0.57 - 1.00 mg/dL Final     BUN   Date Value Ref Range Status   07/03/2024 19 6 - 20 mg/dL Final     BUN/Creatinine Ratio   Date Value Ref Range Status   07/03/2024 19.4 7.0 - 25.0 Final     Calcium   Date Value Ref Range Status   07/03/2024 7.3 (L) 8.6 - 10.5 mg/dL Final     Alkaline Phosphatase   Date  "Value Ref Range Status   07/02/2024 237 (H) 39 - 117 U/L Final     Total Protein   Date Value Ref Range Status   07/02/2024 3.6 (L) 6.0 - 8.5 g/dL Final     ALT (SGPT)   Date Value Ref Range Status   07/02/2024 67 (H) 1 - 33 U/L Final     AST (SGOT)   Date Value Ref Range Status   07/02/2024 78 (H) 1 - 32 U/L Final     Total Bilirubin   Date Value Ref Range Status   07/02/2024 1.5 (H) 0.0 - 1.2 mg/dL Final     Albumin   Date Value Ref Range Status   07/02/2024 1.5 (L) 3.5 - 5.2 g/dL Final     Globulin   Date Value Ref Range Status   07/02/2024 2.1 gm/dL Final       Magnesium   Date Value Ref Range Status   07/03/2024 1.5 (L) 1.6 - 2.6 mg/dL Final     No results found for: \"LDH\", \"URICACID\"     IMAGING  Imaging Results (Last 72 Hours)       Procedure Component Value Units Date/Time    CT Abdomen Pelvis Without Contrast [[id number]] Collected: 07/02/24 0028     Updated: 07/02/24 0035    Narrative:      EXAMINATION: CT abdomen and pelvis without contrast     CLINICAL HISTORY: Abdominal distention.     COMPARISON: 4/21/2024     TECHNIQUE: Contiguous 5 mm thick slices were obtained through the  abdomen and pelvis without contrast. Coronal and sagittal reformats were  performed.     FINDINGS:     ABDOMEN:  At least a small right pleural effusion is noted, incompletely imaged.  Similarly there is at least a small pericardial effusion, also  incompletely imaged. These are new when compared with the prior study.     The liver is diffusely low in attenuation in a pattern suggesting fatty  infiltration. There has been prior cholecystectomy. No hydronephrosis  either kidney. No definite ureteral calculus. The unenhanced spleen,  adrenal glands and pancreas appear grossly normal.     PELVIS:  Postsurgical changes are noted within the sigmoid region where a suture  line is present. There are multiple dilated loops of small bowel with  fecalization of small bowel contents. The findings are consistent with  at least high-grade " partial small bowel obstruction. No free air is  identified to suggest perforation. A small amount of free fluid is  noted. No loculated collection is identified to suggest an abscess.       Impression:      1. Multiple dilated loops of small bowel consistent with small bowel  obstruction.  2. Right pleural effusion and pericardial effusion, incompletely imaged.  These are new when compared to the prior study.  3. Fatty infiltration of the liver.  4. No hydronephrosis or ureteral calculus.     This report was finalized on 7/2/2024 12:33 AM by Trenton Zurita MD.       XR Chest 1 View [[id number]] Collected: 07/01/24 1145     Updated: 07/01/24 1148    Narrative:      XR CHEST 1 VW-     CLINICAL INDICATION: Weak/Dizzy/AMS triage protocol        COMPARISON: 3/7/2024     TECHNIQUE: Single frontal view of the chest.     FINDINGS:     LUNGS: Lungs are adequately aerated.      HEART AND MEDIASTINUM: Heart and mediastinal contours are unremarkable        SKELETON: Bony and soft tissue structures are unremarkable.     Central line tip is stable  NG tube is been removed       Impression:      No radiographic evidence of acute cardiac or pulmonary disease.           This report was finalized on 7/1/2024 11:46 AM by Dr. Edmond Vasquez MD.               CT Abdomen and Pelvis: Results for orders placed during the hospital encounter of 01/17/24    CT Abdomen Pelvis With & Without Contrast    Narrative  EXAM:  CT Abdomen and Pelvis Without and With Intravenous Contrast    EXAM DATE:  1/17/2024 1:36 PM    CLINICAL HISTORY:  R10.84; R10.84-Generalized abdominal pain    TECHNIQUE:  Axial computed tomography images of the abdomen and pelvis without and  with intravenous contrast.  Sagittal and coronal reformatted images were  created and reviewed.  This CT exam was performed using one or more of  the following dose reduction techniques:  automated exposure control,  adjustment of the mA and/or kV according to patient size, and/or use  of  iterative reconstruction technique.    COMPARISON:  4/7/2023    FINDINGS:  Lung bases:  Interval development of 5.5 mm nodule right lower lobe.  Dedicated chest CT is warranted for further characterization.    ABDOMEN:  Liver:  Unremarkable as visualized.  No focal liver lesions are noted.  Gallbladder and bile ducts:  Cholecystectomy.  No ductal dilation.  Pancreas:  Unremarkable as visualized.  No mass.  No ductal dilation.  Spleen:  Unremarkable as visualized.  No splenomegaly.  Adrenals:  Unremarkable as visualized.  No mass.  Kidneys and ureters:  Unremarkable as visualized.  No solid mass.  No  obstructing stones.  No hydronephrosis.  Stomach and bowel:  There is abnormal soft tissue density in the upper  pelvis superior to the colonic anastomosis site and is predominantly in  the presacral space, 30.4 mm x 31.7 mm. Also suspicious for metastatic  disease.  Postsurgical changes are noted for sigmoid resection with  unremarkable appearance of anastomosis.  No obstruction.  No mucosal  thickening.    PELVIS:  Appendix:  The appendix extends towards the midline and approximates  the presacral space soft tissue implant.  Bladder:  Unremarkable as visualized.  No mass.  No stones.  Reproductive:  Unremarkable as visualized.    ABDOMEN and PELVIS:  Intraperitoneal space:  Soft tissue implant in the left omentum is  approximately 14.1 mm and new since previous exam also suspicious for  metastatic disease. 22.9 mm soft tissue implant in the left presacral  space immediately adjacent to the colonic anastomosis. No free air.  No  significant fluid collection.  Bones/joints:  No acute fracture.  No dislocation.  Soft tissues:  Umbilical hernia is noted which contains fat and a  small soft tissue nodule that is approximately 14.2 mm and is new since  the previous exam. Suspicious for metastatic implant.  Soft tissue  implant along the left psoas muscle surface is 16.1 mm compatible with  metastatic  implant.  Vasculature:  Unremarkable as visualized.  No abdominal aortic  aneurysm.  Lymph nodes:  Unremarkable as visualized.  No retroperitoneal  adenopathy identified.    Impression  1.  Interval development of metastatic disease.  2.  Specifically, soft tissue metastatic implants are noted in the  omentum, left lower quadrant, upper presacral space, and left lower  presacral space immediately adjacent to the anastomosis site as detailed  above. Soft tissue implant within umbilical hernia fat.  3.  Development of 5.5 mm nodule right lower lobe suspicious for  metastatic lung nodule.  4.  Interval postsurgical changes from sigmoid resection.  5.  Mild fatty infiltration of liver. No focal liver lesions.  6.  Other incidental/nonacute findings detailed above.      This report was finalized on 1/17/2024 2:11 PM by Dr. Sarabjit Noe MD.       CT Stone Protocol: No results found for this or any previous visit.       KUB: Results for orders placed during the hospital encounter of 11/09/22    XR Abdomen KUB    Narrative  EXAM:  XR Abdomen, 1 View    EXAM DATE:  11/9/2022 1:12 PM    CLINICAL HISTORY:  constipation    TECHNIQUE:  Frontal supine view of the abdomen/pelvis.    COMPARISON:  No relevant prior studies available.    FINDINGS:  Gastrointestinal tract:  Mild volume fecal retention in the colon  consistent with constipation.  No dilation.  Bones/joints:  Unremarkable.    Impression  Mild volume fecal retention in the colon consistent with constipation.    This report was finalized on 11/9/2022 1:43 PM by Dr. Sarabjit Noe MD.   \    LABS:   Admission on 07/01/2024   Component Date Value Ref Range Status    QT Interval 07/01/2024 296  ms Final    QTC Interval 07/01/2024 398  ms Final    Glucose 07/01/2024 22 (C)  65 - 99 mg/dL Final    BUN 07/01/2024 22 (H)  6 - 20 mg/dL Final    Creatinine 07/01/2024 1.40 (H)  0.57 - 1.00 mg/dL Final    Sodium 07/01/2024 133 (L)  136 - 145 mmol/L Final    Potassium 07/01/2024  3.9  3.5 - 5.2 mmol/L Final    Chloride 07/01/2024 99  98 - 107 mmol/L Final    CO2 07/01/2024 20.6 (L)  22.0 - 29.0 mmol/L Final    Calcium 07/01/2024 7.9 (L)  8.6 - 10.5 mg/dL Final    Total Protein 07/01/2024 3.9 (L)  6.0 - 8.5 g/dL Final    Albumin 07/01/2024 1.8 (L)  3.5 - 5.2 g/dL Final    ALT (SGPT) 07/01/2024 65 (H)  1 - 33 U/L Final    AST (SGOT) 07/01/2024 77 (H)  1 - 32 U/L Final    Alkaline Phosphatase 07/01/2024 249 (H)  39 - 117 U/L Final    Total Bilirubin 07/01/2024 1.8 (H)  0.0 - 1.2 mg/dL Final    Globulin 07/01/2024 2.1  gm/dL Final    A/G Ratio 07/01/2024 0.9  g/dL Final    BUN/Creatinine Ratio 07/01/2024 15.7  7.0 - 25.0 Final    Anion Gap 07/01/2024 13.4  5.0 - 15.0 mmol/L Final    eGFR 07/01/2024 45.1 (L)  >60.0 mL/min/1.73 Final    HS Troponin T 07/01/2024 12  <14 ng/L Final    Magnesium 07/01/2024 1.8  1.6 - 2.6 mg/dL Final    Color, UA 07/01/2024 Orange (A)  Yellow, Straw Final    Appearance, UA 07/01/2024 Cloudy (A)  Clear Final    pH, UA 07/01/2024 <=5.0  5.0 - 8.0 Final    Specific Gravity, UA 07/01/2024 1.025  1.005 - 1.030 Final    Glucose, UA 07/01/2024 Negative  Negative Final    Ketones, UA 07/01/2024 Trace (A)  Negative Final    Bilirubin, UA 07/01/2024 Large (3+) (A)  Negative Final    Blood, UA 07/01/2024 Trace (A)  Negative Final    Protein, UA 07/01/2024 30 mg/dL (1+) (A)  Negative Final    Leuk Esterase, UA 07/01/2024 Small (1+) (A)  Negative Final    Nitrite, UA 07/01/2024 Positive (A)  Negative Final    Urobilinogen, UA 07/01/2024 1.0 E.U./dL  0.2 - 1.0 E.U./dL Final    Extra Tube 07/01/2024 Hold for add-ons.   Final    Auto resulted.    Extra Tube 07/01/2024 hold for add-on   Final    Auto resulted    Extra Tube 07/01/2024 Hold for add-ons.   Final    Auto resulted.    Extra Tube 07/01/2024 Hold for add-ons.   Final    Auto resulted    WBC 07/01/2024 8.31  3.40 - 10.80 10*3/mm3 Final    RBC 07/01/2024 2.83 (L)  3.77 - 5.28 10*6/mm3 Final    Hemoglobin 07/01/2024 9.8 (L)   12.0 - 15.9 g/dL Final    Hematocrit 07/01/2024 28.7 (L)  34.0 - 46.6 % Final    MCV 07/01/2024 101.4 (H)  79.0 - 97.0 fL Final    MCH 07/01/2024 34.6 (H)  26.6 - 33.0 pg Final    MCHC 07/01/2024 34.1  31.5 - 35.7 g/dL Final    RDW 07/01/2024 15.5 (H)  12.3 - 15.4 % Final    RDW-SD 07/01/2024 57.3 (H)  37.0 - 54.0 fl Final    MPV 07/01/2024 9.6  6.0 - 12.0 fL Final    Platelets 07/01/2024 155  140 - 450 10*3/mm3 Final    Neutrophil % 07/01/2024 81.1 (H)  42.7 - 76.0 % Final    Lymphocyte % 07/01/2024 14.7 (L)  19.6 - 45.3 % Final    Monocyte % 07/01/2024 3.6 (L)  5.0 - 12.0 % Final    Eosinophil % 07/01/2024 0.0 (L)  0.3 - 6.2 % Final    Basophil % 07/01/2024 0.1  0.0 - 1.5 % Final    Immature Grans % 07/01/2024 0.5  0.0 - 0.5 % Final    Neutrophils, Absolute 07/01/2024 6.74  1.70 - 7.00 10*3/mm3 Final    Lymphocytes, Absolute 07/01/2024 1.22  0.70 - 3.10 10*3/mm3 Final    Monocytes, Absolute 07/01/2024 0.30  0.10 - 0.90 10*3/mm3 Final    Eosinophils, Absolute 07/01/2024 0.00  0.00 - 0.40 10*3/mm3 Final    Basophils, Absolute 07/01/2024 0.01  0.00 - 0.20 10*3/mm3 Final    Immature Grans, Absolute 07/01/2024 0.04  0.00 - 0.05 10*3/mm3 Final    nRBC 07/01/2024 0.0  0.0 - 0.2 /100 WBC Final    Glucose 07/01/2024 20 (C)  70 - 130 mg/dL Final    Glucose 07/01/2024 72  70 - 130 mg/dL Final    Glucose 07/01/2024 83  70 - 130 mg/dL Final    Glucose 07/01/2024 187 (H)  70 - 130 mg/dL Final    RBC, UA 07/01/2024 0-2  None Seen, 0-2 /HPF Final    WBC, UA 07/01/2024 21-50 (A)  None Seen, 0-2 /HPF Final    Bacteria, UA 07/01/2024 4+ (A)  None Seen /HPF Final    Squamous Epithelial Cells, UA 07/01/2024 0-2  None Seen, 0-2 /HPF Final    Hyaline Casts, UA 07/01/2024 None Seen  None Seen /LPF Final    Methodology 07/01/2024 Manual Light Microscopy   Final    Extra Tube 07/01/2024 Hold for add-ons.   Final    Auto resulted.    Lactate 07/01/2024 4.9 (C)  0.5 - 2.0 mmol/L Final    Blood Culture 07/01/2024 No growth at 24 hours    Preliminary    Blood Culture 07/01/2024 No growth at 24 hours   Preliminary    Urine Culture 07/01/2024 50,000 CFU/mL Gram Negative Bacilli (A)   Preliminary    Lactate 07/01/2024 3.3 (C)  0.5 - 2.0 mmol/L Final    Total Bilirubin 07/01/2024 1.7 (H)  0.0 - 1.2 mg/dL Final    Bilirubin, Direct 07/01/2024 1.5 (H)  0.0 - 0.3 mg/dL Final    Bilirubin, Indirect 07/01/2024 0.2  mg/dL Final    Lactate 07/02/2024 2.0  0.5 - 2.0 mmol/L Final    Glucose 07/02/2024 75  65 - 99 mg/dL Final    BUN 07/02/2024 22 (H)  6 - 20 mg/dL Final    Creatinine 07/02/2024 1.38 (H)  0.57 - 1.00 mg/dL Final    Sodium 07/02/2024 133 (L)  136 - 145 mmol/L Final    Potassium 07/02/2024 3.8  3.5 - 5.2 mmol/L Final    Chloride 07/02/2024 103  98 - 107 mmol/L Final    CO2 07/02/2024 16.8 (L)  22.0 - 29.0 mmol/L Final    Calcium 07/02/2024 7.2 (L)  8.6 - 10.5 mg/dL Final    Total Protein 07/02/2024 3.6 (L)  6.0 - 8.5 g/dL Final    Albumin 07/02/2024 1.5 (L)  3.5 - 5.2 g/dL Final    ALT (SGPT) 07/02/2024 67 (H)  1 - 33 U/L Final    AST (SGOT) 07/02/2024 78 (H)  1 - 32 U/L Final    Alkaline Phosphatase 07/02/2024 237 (H)  39 - 117 U/L Final    Total Bilirubin 07/02/2024 1.5 (H)  0.0 - 1.2 mg/dL Final    Globulin 07/02/2024 2.1  gm/dL Final    A/G Ratio 07/02/2024 0.7  g/dL Final    BUN/Creatinine Ratio 07/02/2024 15.9  7.0 - 25.0 Final    Anion Gap 07/02/2024 13.2  5.0 - 15.0 mmol/L Final    eGFR 07/02/2024 45.9 (L)  >60.0 mL/min/1.73 Final    WBC 07/02/2024 11.57 (H)  3.40 - 10.80 10*3/mm3 Final    RBC 07/02/2024 2.73 (L)  3.77 - 5.28 10*6/mm3 Final    Hemoglobin 07/02/2024 9.6 (L)  12.0 - 15.9 g/dL Final    Hematocrit 07/02/2024 27.8 (L)  34.0 - 46.6 % Final    MCV 07/02/2024 101.8 (H)  79.0 - 97.0 fL Final    MCH 07/02/2024 35.2 (H)  26.6 - 33.0 pg Final    MCHC 07/02/2024 34.5  31.5 - 35.7 g/dL Final    RDW 07/02/2024 15.2  12.3 - 15.4 % Final    RDW-SD 07/02/2024 55.8 (H)  37.0 - 54.0 fl Final    MPV 07/02/2024 9.7  6.0 - 12.0 fL Final     Platelets 07/02/2024 173  140 - 450 10*3/mm3 Final    Neutrophil % 07/02/2024 78.5 (H)  42.7 - 76.0 % Final    Lymphocyte % 07/02/2024 16.8 (L)  19.6 - 45.3 % Final    Monocyte % 07/02/2024 4.3 (L)  5.0 - 12.0 % Final    Eosinophil % 07/02/2024 0.0 (L)  0.3 - 6.2 % Final    Basophil % 07/02/2024 0.1  0.0 - 1.5 % Final    Immature Grans % 07/02/2024 0.3  0.0 - 0.5 % Final    Neutrophils, Absolute 07/02/2024 9.08 (H)  1.70 - 7.00 10*3/mm3 Final    Lymphocytes, Absolute 07/02/2024 1.94  0.70 - 3.10 10*3/mm3 Final    Monocytes, Absolute 07/02/2024 0.50  0.10 - 0.90 10*3/mm3 Final    Eosinophils, Absolute 07/02/2024 0.00  0.00 - 0.40 10*3/mm3 Final    Basophils, Absolute 07/02/2024 0.01  0.00 - 0.20 10*3/mm3 Final    Immature Grans, Absolute 07/02/2024 0.04  0.00 - 0.05 10*3/mm3 Final    nRBC 07/02/2024 0.0  0.0 - 0.2 /100 WBC Final    Glucose 07/01/2024 76  70 - 130 mg/dL Final    Glucose 07/02/2024 70  70 - 130 mg/dL Final    LVIDd 07/02/2024 3.7  cm Final    LVIDs 07/02/2024 2.8  cm Final    IVSd 07/02/2024 0.88  cm Final    LVPWd 07/02/2024 1.00  cm Final    FS 07/02/2024 26.0  % Final    IVS/LVPW 07/02/2024 0.88  cm Final    ESV(cubed) 07/02/2024 20.9  ml Final    LV Sys Vol (BSA corrected) 07/02/2024 7.2  cm2 Final    EDV(cubed) 07/02/2024 51.7  ml Final    LV Mas Vol (BSA corrected) 07/02/2024 21.9  cm2 Final    LV mass(C)d 07/02/2024 104.1  grams Final    LVOT area 07/02/2024 3.1  cm2 Final    LVOT diam 07/02/2024 2.00  cm Final    EDV(MOD-sp4) 07/02/2024 38.3  ml Final    ESV(MOD-sp4) 07/02/2024 12.6  ml Final    SV(MOD-sp4) 07/02/2024 25.7  ml Final    SVi(MOD-SP4) 07/02/2024 14.7  ml/m2 Final    EF(MOD-sp4) 07/02/2024 67.1  % Final    MV E max mendoza 07/02/2024 63.8  cm/sec Final    MV A max mendoza 07/02/2024 67.3  cm/sec Final    MV E/A 07/02/2024 0.95   Final    LA ESV Index (BP) 07/02/2024 8.2  ml/m2 Final    Med Peak E' Mendoza 07/02/2024 6.9  cm/sec Final    Lat Peak E' Mendoza 07/02/2024 6.7  cm/sec Final    Avg E/e'  ratio 07/02/2024 9.38   Final    TAPSE (>1.6) 07/02/2024 2.07  cm Final    LA dimension (2D)  07/02/2024 1.50  cm Final    Ao pk libia 07/02/2024 110.0  cm/sec Final    Ao max PG 07/02/2024 4.8  mmHg Final    Ao mean PG 07/02/2024 2.00  mmHg Final    Ao V2 VTI 07/02/2024 15.8  cm Final    PA acc time 07/02/2024 0.10  sec Final    Ao root diam 07/02/2024 3.3  cm Final    ACS 07/02/2024 2.00  cm Final    Glucose 07/02/2024 167 (H)  70 - 130 mg/dL Final    Glucose 07/02/2024 134 (H)  70 - 130 mg/dL Final    Glucose 07/02/2024 146 (H)  70 - 130 mg/dL Final    Glucose 07/02/2024 130  70 - 130 mg/dL Final    Glucose 07/02/2024 143 (H)  70 - 130 mg/dL Final    Glucose 07/02/2024 135 (H)  70 - 130 mg/dL Final    WBC 07/03/2024 9.25  3.40 - 10.80 10*3/mm3 Final    RBC 07/03/2024 2.66 (L)  3.77 - 5.28 10*6/mm3 Final    Hemoglobin 07/03/2024 9.1 (L)  12.0 - 15.9 g/dL Final    Hematocrit 07/03/2024 26.2 (L)  34.0 - 46.6 % Final    MCV 07/03/2024 98.5 (H)  79.0 - 97.0 fL Final    MCH 07/03/2024 34.2 (H)  26.6 - 33.0 pg Final    MCHC 07/03/2024 34.7  31.5 - 35.7 g/dL Final    RDW 07/03/2024 14.3  12.3 - 15.4 % Final    RDW-SD 07/03/2024 51.8  37.0 - 54.0 fl Final    MPV 07/03/2024 9.8  6.0 - 12.0 fL Final    Platelets 07/03/2024 135 (L)  140 - 450 10*3/mm3 Final    Glucose 07/03/2024 98  65 - 99 mg/dL Final    BUN 07/03/2024 19  6 - 20 mg/dL Final    Creatinine 07/03/2024 0.98  0.57 - 1.00 mg/dL Final    Sodium 07/03/2024 133 (L)  136 - 145 mmol/L Final    Potassium 07/03/2024 3.5  3.5 - 5.2 mmol/L Final    Slight hemolysis detected by analyzer. Result may be falsely elevated.    Chloride 07/03/2024 104  98 - 107 mmol/L Final    CO2 07/03/2024 17.9 (L)  22.0 - 29.0 mmol/L Final    Calcium 07/03/2024 7.3 (L)  8.6 - 10.5 mg/dL Final    BUN/Creatinine Ratio 07/03/2024 19.4  7.0 - 25.0 Final    Anion Gap 07/03/2024 11.1  5.0 - 15.0 mmol/L Final    eGFR 07/03/2024 69.2  >60.0 mL/min/1.73 Final    Magnesium 07/03/2024 1.5 (L)  1.6 - 2.6  mg/dL Final    Glucose 07/03/2024 116  70 - 130 mg/dL Final    Glucose 07/03/2024 131 (H)  70 - 130 mg/dL Final   Lab on 06/26/2024   Component Date Value Ref Range Status    Glucose 06/26/2024 81  65 - 99 mg/dL Final    BUN 06/26/2024 12  6 - 20 mg/dL Final    Creatinine 06/26/2024 0.49 (L)  0.57 - 1.00 mg/dL Final    Sodium 06/26/2024 136  136 - 145 mmol/L Final    Potassium 06/26/2024 3.8  3.5 - 5.2 mmol/L Final    Chloride 06/26/2024 99  98 - 107 mmol/L Final    CO2 06/26/2024 23.9  22.0 - 29.0 mmol/L Final    Calcium 06/26/2024 8.0 (L)  8.6 - 10.5 mg/dL Final    Total Protein 06/26/2024 4.6 (L)  6.0 - 8.5 g/dL Final    Albumin 06/26/2024 2.1 (L)  3.5 - 5.2 g/dL Final    ALT (SGPT) 06/26/2024 51 (H)  1 - 33 U/L Final    AST (SGOT) 06/26/2024 63 (H)  1 - 32 U/L Final    Alkaline Phosphatase 06/26/2024 235 (H)  39 - 117 U/L Final    Total Bilirubin 06/26/2024 1.2  0.0 - 1.2 mg/dL Final    Globulin 06/26/2024 2.5  gm/dL Final    A/G Ratio 06/26/2024 0.8  g/dL Final    BUN/Creatinine Ratio 06/26/2024 24.5  7.0 - 25.0 Final    Anion Gap 06/26/2024 13.1  5.0 - 15.0 mmol/L Final    eGFR 06/26/2024 112.9  >60.0 mL/min/1.73 Final    Color, UA 06/26/2024 Orange (A)  Yellow, Straw Final    Appearance, UA 06/26/2024 Cloudy (A)  Clear Final    pH, UA 06/26/2024 6.0  5.0 - 8.0 Final    Specific Gravity, UA 06/26/2024 >1.030 (H)  1.005 - 1.030 Final    Glucose, UA 06/26/2024 Negative  Negative Final    Ketones, UA 06/26/2024 15 mg/dL (1+) (A)  Negative Final    Bilirubin, UA 06/26/2024 Moderate (2+) (A)  Negative Final    Blood, UA 06/26/2024 Trace (A)  Negative Final    Protein, UA 06/26/2024 30 mg/dL (1+) (A)  Negative Final    Leuk Esterase, UA 06/26/2024 Moderate (2+) (A)  Negative Final    Nitrite, UA 06/26/2024 Positive (A)  Negative Final    Urobilinogen, UA 06/26/2024 1.0 E.U./dL  0.2 - 1.0 E.U./dL Final    WBC 06/26/2024 6.13  3.40 - 10.80 10*3/mm3 Final    RBC 06/26/2024 3.66 (L)  3.77 - 5.28 10*6/mm3 Final     Hemoglobin 06/26/2024 12.4  12.0 - 15.9 g/dL Final    Hematocrit 06/26/2024 37.2  34.0 - 46.6 % Final    MCV 06/26/2024 101.6 (H)  79.0 - 97.0 fL Final    MCH 06/26/2024 33.9 (H)  26.6 - 33.0 pg Final    MCHC 06/26/2024 33.3  31.5 - 35.7 g/dL Final    RDW 06/26/2024 14.5  12.3 - 15.4 % Final    RDW-SD 06/26/2024 53.7  37.0 - 54.0 fl Final    MPV 06/26/2024 8.9  6.0 - 12.0 fL Final    Platelets 06/26/2024 266  140 - 450 10*3/mm3 Final    Neutrophil % 06/26/2024 70.8  42.7 - 76.0 % Final    Lymphocyte % 06/26/2024 21.5  19.6 - 45.3 % Final    Monocyte % 06/26/2024 6.5  5.0 - 12.0 % Final    Eosinophil % 06/26/2024 0.0 (L)  0.3 - 6.2 % Final    Basophil % 06/26/2024 0.7  0.0 - 1.5 % Final    Immature Grans % 06/26/2024 0.5  0.0 - 0.5 % Final    Neutrophils, Absolute 06/26/2024 4.34  1.70 - 7.00 10*3/mm3 Final    Lymphocytes, Absolute 06/26/2024 1.32  0.70 - 3.10 10*3/mm3 Final    Monocytes, Absolute 06/26/2024 0.40  0.10 - 0.90 10*3/mm3 Final    Eosinophils, Absolute 06/26/2024 0.00  0.00 - 0.40 10*3/mm3 Final    Basophils, Absolute 06/26/2024 0.04  0.00 - 0.20 10*3/mm3 Final    Immature Grans, Absolute 06/26/2024 0.03  0.00 - 0.05 10*3/mm3 Final    nRBC 06/26/2024 0.0  0.0 - 0.2 /100 WBC Final   Infusion on 06/12/2024   Component Date Value Ref Range Status    Glucose 06/12/2024 95  65 - 99 mg/dL Final    BUN 06/12/2024 11  6 - 20 mg/dL Final    Creatinine 06/12/2024 0.55 (L)  0.57 - 1.00 mg/dL Final    Sodium 06/12/2024 138  136 - 145 mmol/L Final    Potassium 06/12/2024 3.7  3.5 - 5.2 mmol/L Final    Chloride 06/12/2024 102  98 - 107 mmol/L Final    CO2 06/12/2024 22.4  22.0 - 29.0 mmol/L Final    Calcium 06/12/2024 8.4 (L)  8.6 - 10.5 mg/dL Final    Total Protein 06/12/2024 4.8 (L)  6.0 - 8.5 g/dL Final    Albumin 06/12/2024 2.4 (L)  3.5 - 5.2 g/dL Final    ALT (SGPT) 06/12/2024 62 (H)  1 - 33 U/L Final    AST (SGOT) 06/12/2024 102 (H)  1 - 32 U/L Final    Alkaline Phosphatase 06/12/2024 206 (H)  39 - 117 U/L Final     Total Bilirubin 06/12/2024 1.1  0.0 - 1.2 mg/dL Final    Globulin 06/12/2024 2.4  gm/dL Final    A/G Ratio 06/12/2024 1.0  g/dL Final    BUN/Creatinine Ratio 06/12/2024 20.0  7.0 - 25.0 Final    Anion Gap 06/12/2024 13.6  5.0 - 15.0 mmol/L Final    eGFR 06/12/2024 109.8  >60.0 mL/min/1.73 Final    Color, UA 06/12/2024 Dark Yellow (A)  Yellow, Straw Final    Appearance, UA 06/12/2024 Turbid (A)  Clear Final    pH, UA 06/12/2024 5.5  5.0 - 8.0 Final    Specific Gravity, UA 06/12/2024 >1.030 (H)  1.005 - 1.030 Final    Glucose, UA 06/12/2024 Negative  Negative Final    Ketones, UA 06/12/2024 15 mg/dL (1+) (A)  Negative Final    Bilirubin, UA 06/12/2024 Moderate (2+) (A)  Negative Final    Blood, UA 06/12/2024 Negative  Negative Final    Protein, UA 06/12/2024 30 mg/dL (1+) (A)  Negative Final    Leuk Esterase, UA 06/12/2024 Moderate (2+) (A)  Negative Final    Nitrite, UA 06/12/2024 Positive (A)  Negative Final    Urobilinogen, UA 06/12/2024 1.0 E.U./dL  0.2 - 1.0 E.U./dL Final    WBC 06/12/2024 6.98  3.40 - 10.80 10*3/mm3 Final    RBC 06/12/2024 3.55 (L)  3.77 - 5.28 10*6/mm3 Final    Hemoglobin 06/12/2024 12.4  12.0 - 15.9 g/dL Final    Hematocrit 06/12/2024 37.3  34.0 - 46.6 % Final    MCV 06/12/2024 105.1 (H)  79.0 - 97.0 fL Final    MCH 06/12/2024 34.9 (H)  26.6 - 33.0 pg Final    MCHC 06/12/2024 33.2  31.5 - 35.7 g/dL Final    RDW 06/12/2024 13.8  12.3 - 15.4 % Final    RDW-SD 06/12/2024 54.5 (H)  37.0 - 54.0 fl Final    MPV 06/12/2024 9.7  6.0 - 12.0 fL Final    Platelets 06/12/2024 274  140 - 450 10*3/mm3 Final    Neutrophil % 06/12/2024 73.0  42.7 - 76.0 % Final    Lymphocyte % 06/12/2024 19.6  19.6 - 45.3 % Final    Monocyte % 06/12/2024 6.3  5.0 - 12.0 % Final    Eosinophil % 06/12/2024 0.3  0.3 - 6.2 % Final    Basophil % 06/12/2024 0.7  0.0 - 1.5 % Final    Immature Grans % 06/12/2024 0.1  0.0 - 0.5 % Final    Neutrophils, Absolute 06/12/2024 5.09  1.70 - 7.00 10*3/mm3 Final    Lymphocytes, Absolute  06/12/2024 1.37  0.70 - 3.10 10*3/mm3 Final    Monocytes, Absolute 06/12/2024 0.44  0.10 - 0.90 10*3/mm3 Final    Eosinophils, Absolute 06/12/2024 0.02  0.00 - 0.40 10*3/mm3 Final    Basophils, Absolute 06/12/2024 0.05  0.00 - 0.20 10*3/mm3 Final    Immature Grans, Absolute 06/12/2024 0.01  0.00 - 0.05 10*3/mm3 Final    nRBC 06/12/2024 0.0  0.0 - 0.2 /100 WBC Final   Lab on 05/30/2024   Component Date Value Ref Range Status    Glucose 05/30/2024 83  65 - 99 mg/dL Final    BUN 05/30/2024 11  6 - 20 mg/dL Final    Creatinine 05/30/2024 0.52 (L)  0.57 - 1.00 mg/dL Final    Sodium 05/30/2024 142  136 - 145 mmol/L Final    Potassium 05/30/2024 3.3 (L)  3.5 - 5.2 mmol/L Final    Chloride 05/30/2024 106  98 - 107 mmol/L Final    CO2 05/30/2024 26.1  22.0 - 29.0 mmol/L Final    Calcium 05/30/2024 8.4 (L)  8.6 - 10.5 mg/dL Final    Total Protein 05/30/2024 4.8 (L)  6.0 - 8.5 g/dL Final    Albumin 05/30/2024 2.6 (L)  3.5 - 5.2 g/dL Final    ALT (SGPT) 05/30/2024 71 (H)  1 - 33 U/L Final    AST (SGOT) 05/30/2024 111 (H)  1 - 32 U/L Final    Alkaline Phosphatase 05/30/2024 198 (H)  39 - 117 U/L Final    Total Bilirubin 05/30/2024 1.0  0.0 - 1.2 mg/dL Final    Globulin 05/30/2024 2.2  gm/dL Final    A/G Ratio 05/30/2024 1.2  g/dL Final    BUN/Creatinine Ratio 05/30/2024 21.2  7.0 - 25.0 Final    Anion Gap 05/30/2024 9.9  5.0 - 15.0 mmol/L Final    eGFR 05/30/2024 111.9  >60.0 mL/min/1.73 Final    Color, UA 05/30/2024 Dark Yellow (A)  Yellow, Straw Final    Appearance, UA 05/30/2024 Turbid (A)  Clear Final    pH, UA 05/30/2024 5.5  5.0 - 8.0 Final    Specific Gravity, UA 05/30/2024 >=1.030  1.005 - 1.030 Final    Glucose, UA 05/30/2024 Negative  Negative Final    Ketones, UA 05/30/2024 15 mg/dL (1+) (A)  Negative Final    Bilirubin, UA 05/30/2024 Moderate (2+) (A)  Negative Final    Blood, UA 05/30/2024 Negative  Negative Final    Protein, UA 05/30/2024 30 mg/dL (1+) (A)  Negative Final    Leuk Esterase, UA 05/30/2024 Negative   Negative Final    Nitrite, UA 05/30/2024 Positive (A)  Negative Final    Urobilinogen, UA 05/30/2024 1.0 E.U./dL  0.2 - 1.0 E.U./dL Final    CEA 05/30/2024 2.78  ng/mL Final    WBC 05/30/2024 5.32  3.40 - 10.80 10*3/mm3 Final    RBC 05/30/2024 3.38 (L)  3.77 - 5.28 10*6/mm3 Final    Hemoglobin 05/30/2024 11.9 (L)  12.0 - 15.9 g/dL Final    Hematocrit 05/30/2024 36.3  34.0 - 46.6 % Final    MCV 05/30/2024 107.4 (H)  79.0 - 97.0 fL Final    MCH 05/30/2024 35.2 (H)  26.6 - 33.0 pg Final    MCHC 05/30/2024 32.8  31.5 - 35.7 g/dL Final    RDW 05/30/2024 15.3  12.3 - 15.4 % Final    RDW-SD 05/30/2024 61.1 (H)  37.0 - 54.0 fl Final    MPV 05/30/2024 9.4  6.0 - 12.0 fL Final    Platelets 05/30/2024 224  140 - 450 10*3/mm3 Final    Neutrophil % 05/30/2024 69.2  42.7 - 76.0 % Final    Lymphocyte % 05/30/2024 24.2  19.6 - 45.3 % Final    Monocyte % 05/30/2024 5.3  5.0 - 12.0 % Final    Eosinophil % 05/30/2024 0.2 (L)  0.3 - 6.2 % Final    Basophil % 05/30/2024 0.9  0.0 - 1.5 % Final    Immature Grans % 05/30/2024 0.2  0.0 - 0.5 % Final    Neutrophils, Absolute 05/30/2024 3.68  1.70 - 7.00 10*3/mm3 Final    Lymphocytes, Absolute 05/30/2024 1.29  0.70 - 3.10 10*3/mm3 Final    Monocytes, Absolute 05/30/2024 0.28  0.10 - 0.90 10*3/mm3 Final    Eosinophils, Absolute 05/30/2024 0.01  0.00 - 0.40 10*3/mm3 Final    Basophils, Absolute 05/30/2024 0.05  0.00 - 0.20 10*3/mm3 Final    Immature Grans, Absolute 05/30/2024 0.01  0.00 - 0.05 10*3/mm3 Final    nRBC 05/30/2024 0.0  0.0 - 0.2 /100 WBC Final   Lab on 05/15/2024   Component Date Value Ref Range Status    Glucose 05/15/2024 102 (H)  65 - 99 mg/dL Final    BUN 05/15/2024 8  6 - 20 mg/dL Final    Creatinine 05/15/2024 0.48 (L)  0.57 - 1.00 mg/dL Final    Sodium 05/15/2024 143  136 - 145 mmol/L Final    Potassium 05/15/2024 3.4 (L)  3.5 - 5.2 mmol/L Final    Chloride 05/15/2024 106  98 - 107 mmol/L Final    CO2 05/15/2024 26.3  22.0 - 29.0 mmol/L Final    Calcium 05/15/2024 8.4 (L)   8.6 - 10.5 mg/dL Final    Total Protein 05/15/2024 5.2 (L)  6.0 - 8.5 g/dL Final    Albumin 05/15/2024 2.6 (L)  3.5 - 5.2 g/dL Final    ALT (SGPT) 05/15/2024 84 (H)  1 - 33 U/L Final    AST (SGOT) 05/15/2024 82 (H)  1 - 32 U/L Final    Alkaline Phosphatase 05/15/2024 260 (H)  39 - 117 U/L Final    Total Bilirubin 05/15/2024 1.0  0.0 - 1.2 mg/dL Final    Globulin 05/15/2024 2.6  gm/dL Final    A/G Ratio 05/15/2024 1.0  g/dL Final    BUN/Creatinine Ratio 05/15/2024 16.7  7.0 - 25.0 Final    Anion Gap 05/15/2024 10.7  5.0 - 15.0 mmol/L Final    eGFR 05/15/2024 114.1  >60.0 mL/min/1.73 Final    Color, UA 05/15/2024 Orange (A)  Yellow, Straw Final    Appearance, UA 05/15/2024 Turbid (A)  Clear Final    pH, UA 05/15/2024 5.5  5.0 - 8.0 Final    Specific Gravity, UA 05/15/2024 >=1.030  1.005 - 1.030 Final    Glucose, UA 05/15/2024 Negative  Negative Final    Ketones, UA 05/15/2024 Trace (A)  Negative Final    Bilirubin, UA 05/15/2024 Moderate (2+) (A)  Negative Final    Blood, UA 05/15/2024 Small (1+) (A)  Negative Final    Protein, UA 05/15/2024 30 mg/dL (1+) (A)  Negative Final    Leuk Esterase, UA 05/15/2024 Large (3+) (A)  Negative Final    Nitrite, UA 05/15/2024 Positive (A)  Negative Final    Urobilinogen, UA 05/15/2024 1.0 E.U./dL  0.2 - 1.0 E.U./dL Final    WBC 05/15/2024 7.07  3.40 - 10.80 10*3/mm3 Final    RBC 05/15/2024 3.11 (L)  3.77 - 5.28 10*6/mm3 Final    Hemoglobin 05/15/2024 10.8 (L)  12.0 - 15.9 g/dL Final    Hematocrit 05/15/2024 32.2 (L)  34.0 - 46.6 % Final    MCV 05/15/2024 103.5 (H)  79.0 - 97.0 fL Final    MCH 05/15/2024 34.7 (H)  26.6 - 33.0 pg Final    MCHC 05/15/2024 33.5  31.5 - 35.7 g/dL Final    RDW 05/15/2024 18.8 (H)  12.3 - 15.4 % Final    RDW-SD 05/15/2024 71.5 (H)  37.0 - 54.0 fl Final    MPV 05/15/2024 9.5  6.0 - 12.0 fL Final    Platelets 05/15/2024 302  140 - 450 10*3/mm3 Final    Neutrophil % 05/15/2024 74.1  42.7 - 76.0 % Final    Lymphocyte % 05/15/2024 20.1  19.6 - 45.3 % Final     Monocyte % 05/15/2024 4.7 (L)  5.0 - 12.0 % Final    Eosinophil % 05/15/2024 0.4  0.3 - 6.2 % Final    Basophil % 05/15/2024 0.4  0.0 - 1.5 % Final    Immature Grans % 05/15/2024 0.3  0.0 - 0.5 % Final    Neutrophils, Absolute 05/15/2024 5.24  1.70 - 7.00 10*3/mm3 Final    Lymphocytes, Absolute 05/15/2024 1.42  0.70 - 3.10 10*3/mm3 Final    Monocytes, Absolute 05/15/2024 0.33  0.10 - 0.90 10*3/mm3 Final    Eosinophils, Absolute 05/15/2024 0.03  0.00 - 0.40 10*3/mm3 Final    Basophils, Absolute 05/15/2024 0.03  0.00 - 0.20 10*3/mm3 Final    Immature Grans, Absolute 05/15/2024 0.02  0.00 - 0.05 10*3/mm3 Final    nRBC 05/15/2024 0.0  0.0 - 0.2 /100 WBC Final    Anisocytosis 05/15/2024 Mod/2+  None Seen Final    Macrocytes 05/15/2024 Mod/2+  None Seen Final    Large Platelets 05/15/2024 Slight/1+  None Seen Final    Ferritin 05/15/2024 753.50 (H)  13.00 - 150.00 ng/mL Final    Iron 05/15/2024 77  37 - 145 mcg/dL Final    Iron Saturation (TSAT) 05/15/2024 74 (H)  20 - 50 % Final    Transferrin 05/15/2024 70 (L)  200 - 360 mg/dL Final    TIBC 05/15/2024 104 (L)  298 - 536 mcg/dL Final   Infusion on 05/01/2024   Component Date Value Ref Range Status    Glucose 05/01/2024 69  65 - 99 mg/dL Final    BUN 05/01/2024 10  6 - 20 mg/dL Final    Creatinine 05/01/2024 0.48 (L)  0.57 - 1.00 mg/dL Final    Sodium 05/01/2024 140  136 - 145 mmol/L Final    Potassium 05/01/2024 3.3 (L)  3.5 - 5.2 mmol/L Final    Chloride 05/01/2024 104  98 - 107 mmol/L Final    CO2 05/01/2024 23.9  22.0 - 29.0 mmol/L Final    Calcium 05/01/2024 8.2 (L)  8.6 - 10.5 mg/dL Final    Total Protein 05/01/2024 4.7 (L)  6.0 - 8.5 g/dL Final    Albumin 05/01/2024 2.6 (L)  3.5 - 5.2 g/dL Final    ALT (SGPT) 05/01/2024 116 (H)  1 - 33 U/L Final    AST (SGOT) 05/01/2024 165 (H)  1 - 32 U/L Final    Alkaline Phosphatase 05/01/2024 236 (H)  39 - 117 U/L Final    Total Bilirubin 05/01/2024 0.9  0.0 - 1.2 mg/dL Final    Globulin 05/01/2024 2.1  gm/dL Final    A/G Ratio  05/01/2024 1.2  g/dL Final    BUN/Creatinine Ratio 05/01/2024 20.8  7.0 - 25.0 Final    Anion Gap 05/01/2024 12.1  5.0 - 15.0 mmol/L Final    eGFR 05/01/2024 114.1  >60.0 mL/min/1.73 Final    WBC 05/01/2024 7.15  3.40 - 10.80 10*3/mm3 Final    RBC 05/01/2024 3.19 (L)  3.77 - 5.28 10*6/mm3 Final    Hemoglobin 05/01/2024 10.3 (L)  12.0 - 15.9 g/dL Final    Hematocrit 05/01/2024 31.5 (L)  34.0 - 46.6 % Final    MCV 05/01/2024 98.7 (H)  79.0 - 97.0 fL Final    MCH 05/01/2024 32.3  26.6 - 33.0 pg Final    MCHC 05/01/2024 32.7  31.5 - 35.7 g/dL Final    RDW 05/01/2024 23.9 (H)  12.3 - 15.4 % Final    RDW-SD 05/01/2024 86.4 (H)  37.0 - 54.0 fl Final    MPV 05/01/2024 10.6  6.0 - 12.0 fL Final    Platelets 05/01/2024 218  140 - 450 10*3/mm3 Final    Neutrophil % 05/01/2024 79.3 (H)  42.7 - 76.0 % Final    Lymphocyte % 05/01/2024 14.8 (L)  19.6 - 45.3 % Final    Monocyte % 05/01/2024 4.8 (L)  5.0 - 12.0 % Final    Eosinophil % 05/01/2024 0.1 (L)  0.3 - 6.2 % Final    Basophil % 05/01/2024 0.7  0.0 - 1.5 % Final    Immature Grans % 05/01/2024 0.3  0.0 - 0.5 % Final    Neutrophils, Absolute 05/01/2024 5.67  1.70 - 7.00 10*3/mm3 Final    Lymphocytes, Absolute 05/01/2024 1.06  0.70 - 3.10 10*3/mm3 Final    Monocytes, Absolute 05/01/2024 0.34  0.10 - 0.90 10*3/mm3 Final    Eosinophils, Absolute 05/01/2024 0.01  0.00 - 0.40 10*3/mm3 Final    Basophils, Absolute 05/01/2024 0.05  0.00 - 0.20 10*3/mm3 Final    Immature Grans, Absolute 05/01/2024 0.02  0.00 - 0.05 10*3/mm3 Final    nRBC 05/01/2024 0.0  0.0 - 0.2 /100 WBC Final    Color, UA 05/01/2024 Orange (A)  Yellow, Straw Final    Appearance, UA 05/01/2024 Cloudy (A)  Clear Final    pH, UA 05/01/2024 5.5  5.0 - 8.0 Final    Specific Gravity, UA 05/01/2024 1.024  1.005 - 1.030 Final    Glucose, UA 05/01/2024 Negative  Negative Final    Ketones, UA 05/01/2024 15 mg/dL (1+) (A)  Negative Final    Bilirubin, UA 05/01/2024 Moderate (2+) (A)  Negative Final    Blood, UA 05/01/2024  Negative  Negative Final    Protein, UA 05/01/2024 30 mg/dL (1+) (A)  Negative Final    Leuk Esterase, UA 05/01/2024 Moderate (2+) (A)  Negative Final    Nitrite, UA 05/01/2024 Positive (A)  Negative Final    Urobilinogen, UA 05/01/2024 1.0 E.U./dL  0.2 - 1.0 E.U./dL Final    Anisocytosis 05/01/2024 Large/3+  None Seen Final    Hypochromia 05/01/2024 Mod/2+  None Seen Final    Macrocytes 05/01/2024 Slight/1+  None Seen Final    Platelet Morphology 05/01/2024 Normal  Normal Final   Lab on 04/23/2024   Component Date Value Ref Range Status    Glucose 04/23/2024 90  65 - 99 mg/dL Final    BUN 04/23/2024 12  6 - 20 mg/dL Final    Creatinine 04/23/2024 0.63  0.57 - 1.00 mg/dL Final    Sodium 04/23/2024 142  136 - 145 mmol/L Final    Potassium 04/23/2024 3.4 (L)  3.5 - 5.2 mmol/L Final    Chloride 04/23/2024 105  98 - 107 mmol/L Final    CO2 04/23/2024 26.9  22.0 - 29.0 mmol/L Final    Calcium 04/23/2024 8.5 (L)  8.6 - 10.5 mg/dL Final    Total Protein 04/23/2024 5.3 (L)  6.0 - 8.5 g/dL Final    Albumin 04/23/2024 2.6 (L)  3.5 - 5.2 g/dL Final    ALT (SGPT) 04/23/2024 187 (H)  1 - 33 U/L Final    AST (SGOT) 04/23/2024 196 (H)  1 - 32 U/L Final    Alkaline Phosphatase 04/23/2024 363 (H)  39 - 117 U/L Final    Total Bilirubin 04/23/2024 0.9  0.0 - 1.2 mg/dL Final    Globulin 04/23/2024 2.7  gm/dL Final    A/G Ratio 04/23/2024 1.0  g/dL Final    BUN/Creatinine Ratio 04/23/2024 19.0  7.0 - 25.0 Final    Anion Gap 04/23/2024 10.1  5.0 - 15.0 mmol/L Final    eGFR 04/23/2024 106.9  >60.0 mL/min/1.73 Final    WBC 04/23/2024 3.68  3.40 - 10.80 10*3/mm3 Final    RBC 04/23/2024 3.71 (L)  3.77 - 5.28 10*6/mm3 Final    Hemoglobin 04/23/2024 11.6 (L)  12.0 - 15.9 g/dL Final    Hematocrit 04/23/2024 35.0  34.0 - 46.6 % Final    MCV 04/23/2024 94.3  79.0 - 97.0 fL Final    MCH 04/23/2024 31.3  26.6 - 33.0 pg Final    MCHC 04/23/2024 33.1  31.5 - 35.7 g/dL Final    RDW 04/23/2024 26.4 (H)  12.3 - 15.4 % Final    RDW-SD 04/23/2024 89.1 (H)   37.0 - 54.0 fl Final    MPV 04/23/2024 9.4  6.0 - 12.0 fL Final    Platelets 04/23/2024 332  140 - 450 10*3/mm3 Final    Neutrophil % 04/23/2024 58.0  42.7 - 76.0 % Final    Lymphocyte % 04/23/2024 26.0  19.6 - 45.3 % Final    Atypical/Reactive Lymphocytes noted.      Monocyte % 04/23/2024 13.0 (H)  5.0 - 12.0 % Final    Basophil % 04/23/2024 2.0 (H)  0.0 - 1.5 % Final    Bands %  04/23/2024 1.0  0.0 - 5.0 % Final    Neutrophils Absolute 04/23/2024 2.17  1.70 - 7.00 10*3/mm3 Final    Lymphocytes Absolute 04/23/2024 0.96  0.70 - 3.10 10*3/mm3 Final    Monocytes Absolute 04/23/2024 0.48  0.10 - 0.90 10*3/mm3 Final    Basophils Absolute 04/23/2024 0.07  0.00 - 0.20 10*3/mm3 Final    Anisocytosis 04/23/2024 Large/3+  None Seen Final    Hypochromia 04/23/2024 Mod/2+  None Seen Final    Platelet Morphology 04/23/2024 Normal  Normal Final   Admission on 04/21/2024, Discharged on 04/21/2024   Component Date Value Ref Range Status    Glucose 04/21/2024 87  65 - 99 mg/dL Final    BUN 04/21/2024 13  6 - 20 mg/dL Final    Creatinine 04/21/2024 0.59  0.57 - 1.00 mg/dL Final    Sodium 04/21/2024 140  136 - 145 mmol/L Final    Potassium 04/21/2024 3.5  3.5 - 5.2 mmol/L Final    Chloride 04/21/2024 101  98 - 107 mmol/L Final    CO2 04/21/2024 25.4  22.0 - 29.0 mmol/L Final    Calcium 04/21/2024 9.1  8.6 - 10.5 mg/dL Final    Total Protein 04/21/2024 5.5 (L)  6.0 - 8.5 g/dL Final    Albumin 04/21/2024 2.9 (L)  3.5 - 5.2 g/dL Final    ALT (SGPT) 04/21/2024 195 (H)  1 - 33 U/L Final    AST (SGOT) 04/21/2024 236 (H)  1 - 32 U/L Final    Alkaline Phosphatase 04/21/2024 393 (H)  39 - 117 U/L Final    Total Bilirubin 04/21/2024 1.0  0.0 - 1.2 mg/dL Final    Globulin 04/21/2024 2.6  gm/dL Final    A/G Ratio 04/21/2024 1.1  g/dL Final    BUN/Creatinine Ratio 04/21/2024 22.0  7.0 - 25.0 Final    Anion Gap 04/21/2024 13.6  5.0 - 15.0 mmol/L Final    eGFR 04/21/2024 108.6  >60.0 mL/min/1.73 Final    Protime 04/21/2024 12.8  12.1 - 14.7  Seconds Final    INR 04/21/2024 0.91  0.90 - 1.10 Final    PTT 04/21/2024 25.9 (L)  26.5 - 34.5 seconds Final    Color, UA 04/21/2024 Dark Yellow (A)  Yellow, Straw Final    Appearance, UA 04/21/2024 Slightly Cloudy (A)  Clear Final    pH, UA 04/21/2024 5.0  5.0 - 8.0 Final    Specific Gravity, UA 04/21/2024 1.015  1.005 - 1.030 Final    Glucose, UA 04/21/2024 Negative  Negative Final    Ketones, UA 04/21/2024 Negative  Negative Final    Bilirubin, UA 04/21/2024 Negative  Negative Final    Blood, UA 04/21/2024 Negative  Negative Final    Protein, UA 04/21/2024 Trace (A)  Negative Final    Leuk Esterase, UA 04/21/2024 Negative  Negative Final    Nitrite, UA 04/21/2024 Negative  Negative Final    Urobilinogen, UA 04/21/2024 0.2 E.U./dL  0.2 - 1.0 E.U./dL Final    QT Interval 04/21/2024 318  ms Final    QTC Interval 04/21/2024 408  ms Final    WBC 04/21/2024 2.63 (L)  3.40 - 10.80 10*3/mm3 Final    RBC 04/21/2024 3.90  3.77 - 5.28 10*6/mm3 Final    Hemoglobin 04/21/2024 12.3  12.0 - 15.9 g/dL Final    Hematocrit 04/21/2024 36.9  34.0 - 46.6 % Final    MCV 04/21/2024 94.6  79.0 - 97.0 fL Final    MCH 04/21/2024 31.5  26.6 - 33.0 pg Final    MCHC 04/21/2024 33.3  31.5 - 35.7 g/dL Final    RDW 04/21/2024 26.8 (H)  12.3 - 15.4 % Final    RDW-SD 04/21/2024 90.9 (H)  37.0 - 54.0 fl Final    MPV 04/21/2024 9.6  6.0 - 12.0 fL Final    Platelets 04/21/2024 348  140 - 450 10*3/mm3 Final    Neutrophil % 04/21/2024 33.9 (L)  42.7 - 76.0 % Final    Lymphocyte % 04/21/2024 45.6 (H)  19.6 - 45.3 % Final    Monocyte % 04/21/2024 16.0 (H)  5.0 - 12.0 % Final    Eosinophil % 04/21/2024 1.9  0.3 - 6.2 % Final    Basophil % 04/21/2024 1.1  0.0 - 1.5 % Final    Neutrophils, Absolute 04/21/2024 0.89 (L)  1.70 - 7.00 10*3/mm3 Final    Lymphocytes, Absolute 04/21/2024 1.20  0.70 - 3.10 10*3/mm3 Final    Monocytes, Absolute 04/21/2024 0.42  0.10 - 0.90 10*3/mm3 Final    Eosinophils, Absolute 04/21/2024 0.05  0.00 - 0.40 10*3/mm3 Final     Basophils, Absolute 04/21/2024 0.03  0.00 - 0.20 10*3/mm3 Final    Extra Tube 04/21/2024 Hold for add-ons.   Final    Auto resulted.    Extra Tube 04/21/2024 hold for add-on   Final    Auto resulted    Extra Tube 04/21/2024 Hold for add-ons.   Final    Auto resulted.    Extra Tube 04/21/2024 Hold for add-ons.   Final    Auto resulted    Neutrophil % 04/21/2024 39.0 (L)  42.7 - 76.0 % Final    Lymphocyte % 04/21/2024 52.0 (H)  19.6 - 45.3 % Final    Atypical/Reactive Lymphocytes noted.      Monocyte % 04/21/2024 5.0  5.0 - 12.0 % Final    Eosinophil % 04/21/2024 4.0  0.3 - 6.2 % Final    Neutrophils Absolute 04/21/2024 1.03 (L)  1.70 - 7.00 10*3/mm3 Final    Lymphocytes Absolute 04/21/2024 1.37  0.70 - 3.10 10*3/mm3 Final    Monocytes Absolute 04/21/2024 0.13  0.10 - 0.90 10*3/mm3 Final    Eosinophils Absolute 04/21/2024 0.11  0.00 - 0.40 10*3/mm3 Final    Anisocytosis 04/21/2024 Mod/2+  None Seen Final    Target Cells 04/21/2024 Slight/1+  None Seen Final    Platelet Morphology 04/21/2024 Normal  Normal Final   Lab on 04/16/2024   Component Date Value Ref Range Status    Glucose 04/16/2024 91  65 - 99 mg/dL Final    BUN 04/16/2024 12  6 - 20 mg/dL Final    Creatinine 04/16/2024 0.55 (L)  0.57 - 1.00 mg/dL Final    Sodium 04/16/2024 140  136 - 145 mmol/L Final    Potassium 04/16/2024 3.6  3.5 - 5.2 mmol/L Final    Chloride 04/16/2024 103  98 - 107 mmol/L Final    CO2 04/16/2024 27.8  22.0 - 29.0 mmol/L Final    Calcium 04/16/2024 8.8  8.6 - 10.5 mg/dL Final    Total Protein 04/16/2024 5.0 (L)  6.0 - 8.5 g/dL Final    Albumin 04/16/2024 2.8 (L)  3.5 - 5.2 g/dL Final    ALT (SGPT) 04/16/2024 269 (H)  1 - 33 U/L Final    AST (SGOT) 04/16/2024 119 (H)  1 - 32 U/L Final    Alkaline Phosphatase 04/16/2024 438 (H)  39 - 117 U/L Final    Total Bilirubin 04/16/2024 1.3 (H)  0.0 - 1.2 mg/dL Final    Globulin 04/16/2024 2.2  gm/dL Final    A/G Ratio 04/16/2024 1.3  g/dL Final    BUN/Creatinine Ratio 04/16/2024 21.8  7.0 - 25.0  Final    Anion Gap 04/16/2024 9.2  5.0 - 15.0 mmol/L Final    eGFR 04/16/2024 110.4  >60.0 mL/min/1.73 Final    CEA 04/16/2024 2.67  ng/mL Final    WBC 04/16/2024 2.03 (L)  3.40 - 10.80 10*3/mm3 Final    RBC 04/16/2024 3.64 (L)  3.77 - 5.28 10*6/mm3 Final    Hemoglobin 04/16/2024 11.2 (L)  12.0 - 15.9 g/dL Final    Hematocrit 04/16/2024 33.4 (L)  34.0 - 46.6 % Final    MCV 04/16/2024 91.8  79.0 - 97.0 fL Final    MCH 04/16/2024 30.8  26.6 - 33.0 pg Final    MCHC 04/16/2024 33.5  31.5 - 35.7 g/dL Final    RDW 04/16/2024 27.8 (H)  12.3 - 15.4 % Final    RDW-SD 04/16/2024 93.7 (H)  37.0 - 54.0 fl Final    MPV 04/16/2024 10.0  6.0 - 12.0 fL Final    Platelets 04/16/2024 163  140 - 450 10*3/mm3 Final    Neutrophil % 04/16/2024 53.2  42.7 - 76.0 % Final    Lymphocyte % 04/16/2024 35.0  19.6 - 45.3 % Final    Monocyte % 04/16/2024 10.8  5.0 - 12.0 % Final    Eosinophil % 04/16/2024 0.5  0.3 - 6.2 % Final    Basophil % 04/16/2024 0.5  0.0 - 1.5 % Final    Immature Grans % 04/16/2024 0.0  0.0 - 0.5 % Final    Neutrophils, Absolute 04/16/2024 1.08 (L)  1.70 - 7.00 10*3/mm3 Final    Lymphocytes, Absolute 04/16/2024 0.71  0.70 - 3.10 10*3/mm3 Final    Monocytes, Absolute 04/16/2024 0.22  0.10 - 0.90 10*3/mm3 Final    Eosinophils, Absolute 04/16/2024 0.01  0.00 - 0.40 10*3/mm3 Final    Basophils, Absolute 04/16/2024 0.01  0.00 - 0.20 10*3/mm3 Final    Immature Grans, Absolute 04/16/2024 0.00  0.00 - 0.05 10*3/mm3 Final    nRBC 04/16/2024 0.0  0.0 - 0.2 /100 WBC Final    Anisocytosis 04/16/2024 Large/3+  None Seen Final    Poikilocytes 04/16/2024 Slight/1+  None Seen Final    Stomatocytes 04/16/2024 Slight/1+  None Seen Final    Platelet Morphology 04/16/2024 Normal  Normal Final   Infusion on 04/04/2024   Component Date Value Ref Range Status    Potassium 04/04/2024 2.7 (L)  3.5 - 5.2 mmol/L Final   There may be more visits with results that are not included.        PATHOLOGY  * Cannot find OR log *    IMPRESSION AND  PLAN  Vangie Carney is a 54 y.o., white female with:  Catheter dysfunction/retention -I suspect the patient's residuals on bladder scan are most likely secondary to physiological fluid.  Patient catheter was irrigated at bedside with 200 mL of sterile water and no complication were noted.  I received no resistance with good return.  I recommend to continue with observation.  Patient may undergo repeat bladder scans if pain worsens or she has minimal output through her indwelling Colmenares catheter.  Patient improved clinically Colmenares catheter been removed for a voiding trial.  I will follow along closely with the patient.  Acute urinary tract infection -patient's urine culture is growing 50,000 colony-forming units of gram-negative bacilli.  Would recommend to continue with Zosyn    The patient was in agreement with these plans.    Thank you for asking us to participate in Vangie Carney's care.  We will continue to follow with you.  Please do not hesitate to call with any questions or concerns that you may have.    A total of 40 minutes were spent coordinating this patient’s care in clinic today; 20 minutes of which were face-to-face with the patient, reviewing medical history and counseling on the current treatment and followup plan.  All questions were answered to patient's satisfaction.         This document has been electronically signed by Romeo Izaguirre PA-C  July 3, 2024 08:14 EDT    Part of this note may be an electronic transcription/translation of spoken language to printed text using the Dragon Dictation System.    Electronically signed by Khanh De Anda MD at 07/03/24 4526

## 2024-07-12 NOTE — PROGRESS NOTES
[facility] HOSPITALIST PROGRESS NOTE     Patient Identification:  Name:  Vangie Carney  Age:  54 y.o.  Sex:  female  :  1970  MRN:  [id number]  Visit Number:  [id number]  Primary Care Provider:  Urvashi Basurto APRN    Length of stay:  11    Subjective: Patient seen and examined urine again she only takes sips of her boost, intake is very poor, sister-in-law present inside the room.  No abdominal pain, blood pressures improved off pressor support tolerating with midodrine.  Discussed at length with patient herself as well as sister-in-law regarding discharge planning, patient is very debilitated requiring 3-4 people just to get her sit at the edge of the bed.  She lives with her .    After discussing with her , he agreed for the patient to be discharged with hospice once ready.    Chief Complaint: Nausea and vomiting  ----------------------------------------------------------------------------------------------------------------------  Current Hospital Meds:  apixaban, 5 mg, Oral, BID  castor oil-balsam peru, 1 Application, Topical, Q12H  fluconazole, 200 mg, Intravenous, Q24H  granisetron, 1 patch, Transdermal, Weekly  lactulose, 20 g, Oral, BID  midodrine, 10 mg, Oral, TID AC  nystatin, 1 Application, Topical, BID  OLANZapine zydis, 5 mg, Oral, Nightly  pantoprazole, 40 mg, Oral, Q AM  senna-docusate sodium, 2 tablet, Oral, BID  sodium chloride, 10 mL, Intravenous, Q12H  sodium chloride, 10 mL, Intravenous, Q12H  sodium chloride, 10 mL, Intravenous, Q12H  sodium chloride, 10 mL, Intravenous, Q12H      dextrose 5 % and lactated Ringer's, 50 mL/hr, Last Rate: 50 mL/hr (24 7694)  norepinephrine, 0.02-0.3 mcg/kg/min (Dosing Weight), Last Rate: Stopped (24 8237)      ----------------------------------------------------------------------------------------------------------------------  Vital Signs:  Temp:  [97 °F (36.1 °C)-98.6 °F (37 °C)] 97 °F (36.1 °C)  Heart  Rate:  [] 93  Resp:  [12-18] 12  BP: ()/(63-89) 94/71       Tele: Sinus rhythm 86 bpm      07/09/24  0400 07/10/24  0400 07/11/24 0400   Weight: 66.2 kg (145 lb 15.1 oz) 66 kg (145 lb 8.1 oz) 66.2 kg (145 lb 15.1 oz)     Body mass index is 22.86 kg/m².    Intake/Output Summary (Last 24 hours) at 7/12/2024 1449  Last data filed at 7/12/2024 1300  Gross per 24 hour   Intake 1207 ml   Output 150 ml   Net 1057 ml     Diet: Regular/House, Gastrointestinal; Low Irritant; Texture: Soft to Chew (NDD 3); Soft to Chew: Whole Meat; Fluid Consistency: Thin (IDDSI 0)  ----------------------------------------------------------------------------------------------------------------------  Physical exam:  General: Patient is chronically ill-appearing, very pleasant awake and alert, malnourished.    No respiratory distress.    Skin:  Skin is warm and dry. No rash noted. No pallor.    HENT:  Head:  Normocephalic and atraumatic.  Mouth:  Moist mucous membranes.  She has no oral thrush  Eyes:  Conjunctivae and EOM are normal.  Pupils are equal, round, and reactive to light.  She has mild jaundiceNeck:  Neck supple.  No JVD present.    Pulmonary/Chest:  No respiratory distress, no wheezes, no crackles, with normal breath sounds and good air movement.  Cardiovascular:  Normal rate, regular rhythm and normal heart sounds with no murmur.  Abdominal:  Soft.  Bowel sounds are normal.  No distension and no tenderness.   Extremities: Abdomen is distended bowel sounds present, edema noted of the abdomen lower half.  Bilateral upper and lower extremity edema   Neurological:  Motor strength equal no obvious deficit, sensory grossly intact.   No cranial nerve deficit.  No tongue deviation.  No facial droop.  No slurred speech.    Genitourinary: Fully catheter with dark ham  "urine  Back:  ----------------------------------------------------------------------------------------------------------------------  ----------------------------------------------------------------------------------------------------------------------      Results from last 7 days   Lab Units 07/11/24 0739 07/07/24 2357 07/07/24  0023   WBC 10*3/mm3 5.36 5.86 5.24   HEMOGLOBIN g/dL 8.4* 8.7* 8.7*   HEMATOCRIT % 26.0* 26.3* 25.8*   MCV fL 108.3* 104.8* 105.3*   MCHC g/dL 32.3 33.1 33.7   PLATELETS 10*3/mm3 182 189 147     Results from last 7 days   Lab Units 07/10/24  0826   PH, ARTERIAL pH units 7.403   PO2 ART mm Hg 82.4*   PCO2, ARTERIAL mm Hg 30.0*   HCO3 ART mmol/L 18.7*     Results from last 7 days   Lab Units 07/11/24 0739 07/07/24 2357 07/07/24 0023   SODIUM mmol/L 135* 130* 132*   POTASSIUM mmol/L 3.7 3.7 4.0   MAGNESIUM mg/dL 1.8  --   --    CHLORIDE mmol/L 108* 107 107   CO2 mmol/L 17.4* 14.6* 16.9*   BUN mg/dL 6 9 9   CREATININE mg/dL 0.37* 0.42* 0.48*   CALCIUM mg/dL 7.5* 6.9* 6.9*   GLUCOSE mg/dL 110* 100* 87   ALBUMIN g/dL  --   --  1.5*   BILIRUBIN mg/dL  --   --  2.5*   ALK PHOS U/L  --   --  204*   AST (SGOT) U/L  --   --  58*   ALT (SGPT) U/L  --   --  64*   Estimated Creatinine Clearance: 183.8 mL/min (A) (by C-G formula based on SCr of 0.37 mg/dL (L)).    No results found for: \"AMMONIA\"      No results found for: \"BLOODCX\"  No results found for: \"URINECX\"  No results found for: \"WOUNDCX\"  No results found for: \"STOOLCX\"    I have personally looked at the labs and they are summarized above.  ----------------------------------------------------------------------------------------------------------------------  Imaging Results (Last 24 Hours)       ** No results found for the last 24 hours. **          ----------------------------------------------------------------------------------------------------------------------  Assessment and Plan:  -Hypotension multifactorial  -Severe " hypoalbuminemia  -Severe anorexia  -Cancer related malnourishment severe  -History of PE on chronic anticoagulation  -Respiratory alkalosis  -Hypervolemic hyponatremia  -Acute cystitis without hematuria completed Zosyn culture E. Coli    Patient has reached her maximum potential for this hospitalization, overall her prognosis will be poor, patient encouraged to increase p.o. intake unfortunately she is unable to tolerate . patient and family wants her home once discharged with hospice.  Discussed with palliative care arrangement has been done tentatively for tomorrow.    Disposition Home with hospice      Mila Dias MD  07/12/24  14:49 EDT

## 2024-07-13 VITALS
HEART RATE: 105 BPM | OXYGEN SATURATION: 97 % | HEIGHT: 67 IN | RESPIRATION RATE: 15 BRPM | TEMPERATURE: 98.6 F | SYSTOLIC BLOOD PRESSURE: 104 MMHG | BODY MASS INDEX: 25.95 KG/M2 | DIASTOLIC BLOOD PRESSURE: 72 MMHG | WEIGHT: 165.34 LBS

## 2024-07-13 LAB
GLUCOSE BLDC GLUCOMTR-MCNC: 91 MG/DL (ref 70–130)
GLUCOSE BLDC GLUCOMTR-MCNC: 96 MG/DL (ref 70–130)
GLUCOSE BLDC GLUCOMTR-MCNC: 97 MG/DL (ref 70–130)

## 2024-07-13 PROCEDURE — 25010000002 FLUCONAZOLE PER 200 MG: Performed by: STUDENT IN AN ORGANIZED HEALTH CARE EDUCATION/TRAINING PROGRAM

## 2024-07-13 PROCEDURE — 82948 REAGENT STRIP/BLOOD GLUCOSE: CPT

## 2024-07-13 PROCEDURE — 99239 HOSP IP/OBS DSCHRG MGMT >30: CPT | Performed by: HOSPITALIST

## 2024-07-13 RX ORDER — ONDANSETRON 4 MG/1
4 TABLET, ORALLY DISINTEGRATING ORAL EVERY 6 HOURS PRN
Qty: 12 TABLET | Refills: 0 | Status: SHIPPED | OUTPATIENT
Start: 2024-07-13

## 2024-07-13 RX ORDER — HYDROCODONE BITARTRATE AND ACETAMINOPHEN 5; 325 MG/1; MG/1
1 TABLET ORAL EVERY 8 HOURS PRN
Qty: 12 TABLET | Refills: 0 | Status: SHIPPED | OUTPATIENT
Start: 2024-07-13

## 2024-07-13 RX ORDER — MIDODRINE HYDROCHLORIDE 10 MG/1
10 TABLET ORAL
Qty: 90 TABLET | Refills: 0 | Status: SHIPPED | OUTPATIENT
Start: 2024-07-13

## 2024-07-13 RX ORDER — LACTULOSE 10 G/15ML
20 SOLUTION ORAL 2 TIMES DAILY
Qty: 237 ML | Refills: 3 | Status: SHIPPED | OUTPATIENT
Start: 2024-07-13

## 2024-07-13 RX ORDER — ACETAMINOPHEN 500 MG
1000 TABLET ORAL EVERY 8 HOURS PRN
Qty: 90 TABLET | Refills: 3 | Status: SHIPPED | OUTPATIENT
Start: 2024-07-13

## 2024-07-13 RX ORDER — OLANZAPINE 5 MG/1
5 TABLET, ORALLY DISINTEGRATING ORAL NIGHTLY
Qty: 30 TABLET | Refills: 3 | Status: SHIPPED | OUTPATIENT
Start: 2024-07-13

## 2024-07-13 RX ORDER — NALOXONE HYDROCHLORIDE 4 MG/.1ML
SPRAY NASAL
Qty: 2 EACH | Refills: 0 | Status: SHIPPED | OUTPATIENT
Start: 2024-07-13

## 2024-07-13 RX ADMIN — Medication 10 ML: at 08:23

## 2024-07-13 RX ADMIN — FLUCONAZOLE 200 MG: 2 INJECTION, SOLUTION INTRAVENOUS at 11:10

## 2024-07-13 RX ADMIN — APIXABAN 5 MG: 5 TABLET, FILM COATED ORAL at 08:22

## 2024-07-13 RX ADMIN — Medication 1 APPLICATION: at 08:23

## 2024-07-13 RX ADMIN — NYSTATIN 1 APPLICATION: 100000 CREAM TOPICAL at 08:23

## 2024-07-13 RX ADMIN — CARBIDOPA AND LEVODOPA 10 MG: 50; 200 TABLET, EXTENDED RELEASE ORAL at 06:33

## 2024-07-13 RX ADMIN — LACTULOSE 20 G: 20 SOLUTION ORAL at 08:22

## 2024-07-13 RX ADMIN — CARBIDOPA AND LEVODOPA 10 MG: 50; 200 TABLET, EXTENDED RELEASE ORAL at 11:10

## 2024-07-13 RX ADMIN — PANTOPRAZOLE SODIUM 40 MG: 40 TABLET, DELAYED RELEASE ORAL at 05:06

## 2024-07-13 NOTE — PLAN OF CARE
Goal Outcome Evaluation:  Plan of Care Reviewed With: patient        Progress: no change  Outcome Evaluation: pt VSS, mIVF's infusing, pt remains off levophed, family at bedside and minimal complaints on N/V and pain. pt given prn medications for pain this shift. Call light in reach and bed alarm set. Plans for d/c home today.

## 2024-07-13 NOTE — PLAN OF CARE
Problem: Skin Injury Risk Increased  Goal: Skin Health and Integrity  Outcome: Met     Problem: Fall Injury Risk  Goal: Absence of Fall and Fall-Related Injury  Outcome: Met     Problem: Adult Inpatient Plan of Care  Goal: Plan of Care Review  Outcome: Met  Flowsheets (Taken 7/13/2024 1005)  Outcome Evaluation: Patient being discharged today  Goal: Patient-Specific Goal (Individualized)  Outcome: Met  Goal: Absence of Hospital-Acquired Illness or Injury  Outcome: Met  Goal: Optimal Comfort and Wellbeing  Outcome: Met  Goal: Readiness for Transition of Care  Outcome: Met   Goal Outcome Evaluation:              Outcome Evaluation: Patient being discharged today

## 2024-07-13 NOTE — NURSING NOTE
Report called to [facility] of [facility]. Also spoke to Red the patient's spouse who states that he is unable to get in contact with DME that the number continues to go to voicemail. RAÚL Sebastian states she will contact DME and have them call Red.     Ivory Quintana RN

## 2024-07-13 NOTE — DISCHARGE SUMMARY
[facility] HOSPITALIST MEDICINE DISCHARGE SUMMARY    Patient Identification:  Name:  Vangie Carney  Age:  54 y.o.  Sex:  female  :  1970  MRN:  [id number]  Visit Number:  [id number]    Date of Admission: 2024  Date of Discharge: 2024  DISCHARGE DISPOSITION   Stable  PCP: Urvashi Basurto APRN    DISCHARGE DIAGNOSIS : Shock multifactorial likely from intravascular hypovolemia, UTI/sepsis.  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  Fatty liver disease, severe debility.    HOSPITAL COURSE  For detailed history and physical exam I would refer you to the admission note that was done on , patient is a 54 y.o. female presented to [facility] complaining of severe generalized weakness nausea and vomiting abdominal cramping.  At the emergency room she was hypotensive she received IV fluids challenged with no improvement she was placed on Levophed with good results.  CT scan of the abdomen pelvis revealed ascites, small bowel obstruction.  Urine was also suggestive of UTI she was placed on broad-spectrum antibiotic, she was placed n.p.o., surgery was consulted, CT scan reviewed by Dr. Frias and found patient has ascites with findings on the CT scan suggestive of peritoneal seeding of the her cancer.  Did not recommend surgery symptomatic treatment, if patient continues to have vomiting and SBO suggested NG tube placement.  Her stay she did start moving her bowels, her intake is very poor to almost nil, she reports mouth is sore, careful exam did not reveal signs of thrush, this was treated with Magic mouthwash.  Dr. Allen who was the attending  physician initially also Peter empirically with Diflucan for possible esophageal candidiasis.  During her stay she requires intermittent Levophed drip despite completed treatment of her UTI which grew E. coli pansensitive.  She did receive albumin with good response to her blood pressure.  Due to persistent hypotension suspected cause is multifactorial, severe hypoalbuminemia with relative intravascular hypovolemia, she was trialed with midodrine with good results.  For the past 48 hours, her blood pressure has been well-maintained off pressor support.  Her biggest challenges her is her appetite, she was tried with olanzapine with limited improvement.  She is also very weak it requires 4 personnel to assist her to sit at the edge of the bed.    Dietary time while she was here palliative care was consulted for goals of care together with family and .  She is DNR/DNI.  She has reached her maximum potential for improvement on this hospitalization, discussed with family options including hospice care facility,  and family as well as patient would prefer home hospice.      Overall her prognosis is very poor she is not getting enough nutrition despite encouragement.  Patient and family is aware.      VITAL SIGNS:      07/11/24  0400 07/12/24  0400 07/13/24  0400   Weight: 66.2 kg (145 lb 15.1 oz) 73.9 kg (162 lb 14.7 oz) 75 kg (165 lb 5.5 oz)     Body mass index is 25.9 kg/m².  Vitals:    07/13/24 0816   BP:    Pulse:    Resp:    Temp: 98.6 °F (37 °C)   SpO2:      PHYSICAL EXAM:  General: Awake and alert very pleasant, cachectic, sister-in-law present inside the room.    No respiratory distress.    Skin:  Skin is warm and dry. No rash noted. No pallor.    HENT:  Head:  Normocephalic and atraumatic.  Mouth:  Moist mucous membranes.  No thrush  Eyes:  Conjunctivae and EOM are normal.  Pupils are equal, round, and reactive to light.  Very mildly jaundiced  Neck:  Neck supple.  No JVD present.     Pulmonary/Chest:  No respiratory distress, no wheezes, no crackles, with normal breath sounds and good air movement.  Cardiovascular:  Normal rate, regular rhythm and normal heart sounds with no murmur.  Abdominal: Distended with ascites, no guarding or rigidity.  Soft bowel sounds present no tenderness.    Extremities: He has anasarca, no Homans' sign no symmetry of the legs.    Neurological:  Motor strength equal no obvious deficit, sensory grossly intact.   No cranial nerve deficit.  No tongue deviation.  No facial droop.  No slurred speech.    Genitourinary: Colmenares catheter in place with dark ham urine  Back:  ----------    DISCHARGE MEDICATIONS:     Discharge Medications        New Medications        Instructions Start Date   acetaminophen 500 MG tablet  Commonly known as: TYLENOL   1,000 mg, Oral, Every 8 Hours PRN      First Mouthwash (Magic Mouthwash) suspension   5 mL, Swish & Spit, Every 6 Hours PRN      lactulose 10 GM/15ML solution  Commonly known as: CHRONULAC   20 g, Oral, 2 Times Daily      midodrine 10 MG tablet  Commonly known as: PROAMATINE   10 mg, Oral, 3 Times Daily Before Meals      naloxone 4 MG/0.1ML nasal spray  Commonly known as: NARCAN   Call 911. Don't prime. [place] in 1 nostril for overdose. Repeat in 2-3 minutes in other nostril if no or minimal breathing/responsiveness.      OLANZapine zydis 5 MG disintegrating tablet  Commonly known as: zyPREXA   5 mg, Translingual, Nightly      ondansetron ODT 4 MG disintegrating tablet  Commonly known as: ZOFRAN-ODT   4 mg, Translingual, Every 6 Hours PRN             Changes to Medications        Instructions Start Date   HYDROcodone-acetaminophen 5-325 MG per tablet  Commonly known as: NORCO  What changed:   how much to take  reasons to take this   1 tablet, Oral, Every 8 Hours PRN             Continue These Medications        Instructions Start Date   Eliquis 5 MG tablet tablet  Generic drug: apixaban   5 mg, Oral, 2 Times Daily      nystatin  771914 UNIT/GM cream  Commonly known as: MYCOSTATIN   1 Application, Topical, 2 Times Daily      pantoprazole 40 MG EC tablet  Commonly known as: PROTONIX   40 mg, Oral, Daily      [place] 3.1 MG/24HR  Generic drug: granisetron   Place 1 patch on the skin once every 7 days.             Stop These Medications      dicyclomine 10 MG capsule  Commonly known as: BENTYL     lidocaine-prilocaine 2.5-2.5 % cream  Commonly known as: EMLA     metoclopramide 10 MG tablet  Commonly known as: REGLAN     ondansetron 8 MG tablet  Commonly known as: ZOFRAN     sennosides-docusate 8.6-50 MG per tablet  Commonly known as: PERICOLACE     sulfamethoxazole-trimethoprim 200-40 MG/5ML suspension  Commonly known as: BACTRIM,SEPTRA              Diet Instructions       Diet: Regular/House Diet; Soft to Chew (NDD 3); Ground Meat; Thin (IDDSI 0)      Discharge Diet: Regular/House Diet    Texture: Soft to Chew (NDD 3)    Soft to Chew: Ground Meat    Fluid Consistency: Thin (IDDSI 0)          Your Scheduled Appointments      Jul 17, 2024  1:30 PM  Lab with [facility] NURSE [facility]  [facility] HEMATOLOGY & ONCOLOGY ([place]) [address] [facility] [address]  [facility] [address]  [phone]   Bring ID and  Insurance cards.  If you received paperwork in the mail, fill it out and bring it with them.         Jul 17, 2024  2:00 PM  FOLLOW UP with ROWENA Hanks MD  [facility] HEMATOLOGY & ONCOLOGY ([place]) [address] [facility] [address]  [facility] [address]  [phone]   Bring ID and  Insurance cards.  New patients are to arrive 30 minutes prior to the appointement.  If you received paperwork in the mail, fill it out and bring it with them.  All other appt's need to be here 15 minutes early.          Jul 17, 2024  2:45 PM  INFUSION with [facility]  [facility] [address]  [phone]                Follow-up Information       Urvashi Basurto, APRN .     Specialty: Nurse Practitioner  Contact information:  [address] [place] [address] [address]  [facility] [address]  [phone]                                  Mila Dias MD  07/13/24  09:02 EDT    Please note that this discharge summary required more than 30 minutes to complete.

## 2024-07-13 NOTE — NURSING NOTE
Contacted [facility],  will be in contact with receiving nurse who will be contacting me to get report. Spoke with spouse Red to ensure delivery of patient equipment, he states that he hasn't received equipment and will call DME.    Ivory Quintana

## 2024-07-15 NOTE — CASE MANAGEMENT/SOCIAL WORK
Discharge Planning Assessment  NATHAN Varghese     Patient Name: Vangie Carney  MRN: [id number]  Today's Date: 7/15/2024    Admit Date: 7/1/2024     Discharge Plan       Row Name 07/15/24 0902       Plan    Final Discharge Disposition Code 50 - home with hospice    Final Note Pt was discharged on 7/13/24 with [facility].                  Continued Care and Services - Discharged on 7/13/2024 Admission date: 7/1/2024 - Discharge disposition: Hospice/Home      Home Medical Care       Service Provider Request Status Selected Services Address Phone Fax Patient Preferred    [facility]  Selected Home Hospice [address] [place] [id number] [phone] --                  Expected Discharge Date and Time       Expected Discharge Date Expected Discharge Time    Jul 13, 2024      TESS [place]

## (undated) DEVICE — DRP C/ARM W/BAND W/CLIPS 41X74IN

## (undated) DEVICE — SUT MNCRYL PLS ANTIB UD 4/0 PS2 18IN

## (undated) DEVICE — ESOPHAGEAL BALLOON DILATATION CATHETER: Brand: CRE FIXED WIRE

## (undated) DEVICE — SUREFORM 45: Brand: SUREFORM

## (undated) DEVICE — ENDOGATOR AUXILIARY WATER JET CONNECTOR: Brand: ENDOGATOR

## (undated) DEVICE — GLV SURG PREMIERPRO MIC LTX PF SZ7.5 BRN

## (undated) DEVICE — GLV SURG DERMASSURE GRN LF PF 7.0

## (undated) DEVICE — COLUMN DRAPE

## (undated) DEVICE — Device

## (undated) DEVICE — NDL HYPO ECLPS SFTY 18G 1 1/2IN

## (undated) DEVICE — PENCL ES MEGADINE EZ/CLEAN BUTN W/HOLSTR 10FT

## (undated) DEVICE — SINGLE PORT MANIFOLD: Brand: NEPTUNE 2

## (undated) DEVICE — NDL HYPO ECLPS SFTY 22G 1 1/2IN

## (undated) DEVICE — SPNG LAP PREWSH SFTPK 18X18IN STRL PK/5

## (undated) DEVICE — Device: Brand: DEFENDO AIR/WATER/SUCTION AND BIOPSY VALVE

## (undated) DEVICE — SEAL

## (undated) DEVICE — TOTAL TRAY, 16FR 10ML SIL FOLEY, URN: Brand: MEDLINE

## (undated) DEVICE — CANNULA SEAL

## (undated) DEVICE — STRAP STRTCHR ADJ LF 60X2 1/4IN

## (undated) DEVICE — FRCP BX RADJAW4 NDL 2.8 240CM LG OG BX40

## (undated) DEVICE — LAPAROSCOPY WOUND CLOSURE SYSTEM KIT CONSISTS OF:05391529640002; NEOCLOSE ANCHORGUIDE 5/12 & 8/15 US; MODEL NUMBER NCA515-U; QTY 10 AND05391529640019; NEOCLOSE AUTOANCHOR X2 PACK US; MODEL NUMBER NCA2-U;  QTY 10: Brand: NEOCLOSE ANCHORGUIDE REGULAR PORT CLOSURE KIT US

## (undated) DEVICE — ST ACC MICROPUNCTURE .018 ECHO/TRANSLSS/PLDM/TP 4F/10CM 21G

## (undated) DEVICE — LEGGINGS, PAIR, 29X43, STERILE: Brand: MEDLINE

## (undated) DEVICE — SUT VIC 3/0 SH 27IN J416H

## (undated) DEVICE — DEV INFL ALLIANCE2 SYS

## (undated) DEVICE — BLD SCLPL RIBBACK C/SS SZ11

## (undated) DEVICE — Device: Brand: SPOT EX ENDOSCOPIC TATTOO

## (undated) DEVICE — TUBING, SUCTION, 1/4" X 20', STRAIGHT: Brand: MEDLINE INDUSTRIES, INC.

## (undated) DEVICE — SUT VIC 3/0 SH 36IN J527H

## (undated) DEVICE — PATIENT RETURN ELECTRODE, SINGLE-USE, CONTACT QUALITY MONITORING, ADULT, WITH 9FT CORD, FOR PATIENTS WEIGING OVER 33LBS. (15KG): Brand: MEGADYNE

## (undated) DEVICE — TRENDELENBURG WINGPAD POSITIONING KIT DELUXE - WITHOUT BODY STRAP: Brand: SOULE MEDICAL

## (undated) DEVICE — SAFESECURE,SECUREMENT,FOLEY CATH,STERILE: Brand: MEDLINE

## (undated) DEVICE — SUT MNCRYL PLS ANTIB UD 3/0 PS2 27IN

## (undated) DEVICE — SPNG GZ WOVN 4X4IN 12PLY 10/BX STRL

## (undated) DEVICE — DRSNG PAD ABD 8X10IN STRL

## (undated) DEVICE — ST TBG CONN PNEUMOCLEAR EVAC SMOKE HEAT/HUMID

## (undated) DEVICE — SUCTION IRRIGATOR: Brand: ENDOWRIST

## (undated) DEVICE — PK UROL DAVINCI 10

## (undated) DEVICE — SYS CLS SKIN PREMIERPRO EXOFINFUSION 22CM

## (undated) DEVICE — DRP SURG LAP T/DRP TRANSV 102X78X121IN STRL

## (undated) DEVICE — ELECTRD NDL MEGADYNE EZCLEAN NOSE 7CM

## (undated) DEVICE — SUT SILK 0 SH 30IN K834H

## (undated) DEVICE — TIP COVER ACCESSORY

## (undated) DEVICE — PK BASIC 70

## (undated) DEVICE — SUT PROLN 3/0 8832H

## (undated) DEVICE — SYR LUERLOK 30CC

## (undated) DEVICE — THE BITE BLOCK MAXI, LATEX FREE STRAP IS USED TO PROTECT THE ENDOSCOPE INSERTION TUBE FROM BEING BITTEN BY THE PATIENT.

## (undated) DEVICE — BLADELESS OBTURATOR: Brand: WECK VISTA

## (undated) DEVICE — ARM DRAPE

## (undated) DEVICE — CONN Y IRR DISP 1P/U

## (undated) DEVICE — JELLY,LUBE,STERILE,FLIP TOP,TUBE,2-OZ: Brand: MEDLINE

## (undated) DEVICE — BLANKT WARM UPPR/BDY ARM/OUT 57X196CM

## (undated) DEVICE — PAD GRND E/S MEGADYNE MONOPLR 2/PLT W/CORD A/ DISP

## (undated) DEVICE — GLV SURG PREMIERPRO MIC LTX PF SZ6.5 BRN

## (undated) DEVICE — GLV SURG PREMIERPRO MIC LTX PF SZ7 BRN

## (undated) DEVICE — DRAPE,UNDERBUTTOCKS,PCH,STERILE: Brand: MEDLINE

## (undated) DEVICE — SUT PDS 1 CTX 36IN VIO PDP371T

## (undated) DEVICE — 3M™ STERI-DRAPE™ INSTRUMENT POUCH 1018: Brand: STERI-DRAPE™

## (undated) DEVICE — TOWEL,OR,DSP,ST,BLUE,STD,4/PK,20PK/CS: Brand: MEDLINE

## (undated) DEVICE — ENDOPATH XCEL BLADELESS TROCARS WITH STABILITY SLEEVES: Brand: ENDOPATH XCEL

## (undated) DEVICE — REDUCER: Brand: ENDOWRIST

## (undated) DEVICE — UNDERGLV SURG BIOGEL INDICAT PF 8 GRN

## (undated) DEVICE — VESSEL SEALER EXTEND: Brand: ENDOWRIST

## (undated) DEVICE — 3M™ STERI-DRAPE™ OPERATION TAPE, 10 CM X 55 CM 9099: Brand: 3M™ STERI-DRAPE™